# Patient Record
Sex: FEMALE | Race: BLACK OR AFRICAN AMERICAN | Employment: UNEMPLOYED | URBAN - METROPOLITAN AREA
[De-identification: names, ages, dates, MRNs, and addresses within clinical notes are randomized per-mention and may not be internally consistent; named-entity substitution may affect disease eponyms.]

---

## 2017-02-27 ENCOUNTER — HOSPITAL ENCOUNTER (EMERGENCY)
Age: 47
Discharge: HOME | End: 2017-02-27
Attending: EMERGENCY MEDICINE
Payer: COMMERCIAL

## 2017-02-27 VITALS
DIASTOLIC BLOOD PRESSURE: 93 MMHG | RESPIRATION RATE: 17 BRPM | HEIGHT: 67 IN | TEMPERATURE: 99.2 F | SYSTOLIC BLOOD PRESSURE: 132 MMHG | BODY MASS INDEX: 27.31 KG/M2 | WEIGHT: 174 LBS | OXYGEN SATURATION: 100 % | HEART RATE: 86 BPM

## 2017-02-27 DIAGNOSIS — K08.89 TOOTHACHE: Primary | ICD-10-CM

## 2017-02-27 PROCEDURE — G0381 LEV 2 HOSP TYPE B ED VISIT: HCPCS

## 2017-02-27 RX ORDER — PENICILLIN V POTASSIUM 500 MG/1
500 TABLET ORAL 4 TIMES DAILY
Qty: 28 TABLET | Refills: 0 | Status: SHIPPED | OUTPATIENT
Start: 2017-02-27 | End: 2017-03-06

## 2017-02-27 ASSESSMENT — PAIN DESCRIPTION - FREQUENCY: FREQUENCY: CONTINUOUS

## 2017-02-27 ASSESSMENT — PAIN SCALES - GENERAL: PAINLEVEL_OUTOF10: 8

## 2017-02-27 ASSESSMENT — PAIN DESCRIPTION - LOCATION: LOCATION: JAW

## 2017-02-27 ASSESSMENT — ENCOUNTER SYMPTOMS
VOMITING: 0
SHORTNESS OF BREATH: 0
NAUSEA: 0
SORE THROAT: 0
VOICE CHANGE: 0
ABDOMINAL PAIN: 0

## 2017-02-27 ASSESSMENT — PAIN DESCRIPTION - ORIENTATION: ORIENTATION: LEFT;LOWER

## 2017-02-27 ASSESSMENT — PAIN DESCRIPTION - PAIN TYPE: TYPE: ACUTE PAIN

## 2017-06-12 ENCOUNTER — APPOINTMENT (OUTPATIENT)
Dept: CT IMAGING | Age: 47
End: 2017-06-12
Payer: COMMERCIAL

## 2017-06-12 ENCOUNTER — HOSPITAL ENCOUNTER (EMERGENCY)
Age: 47
Discharge: HOME | End: 2017-06-12
Attending: EMERGENCY MEDICINE
Payer: COMMERCIAL

## 2017-06-12 VITALS
WEIGHT: 163 LBS | SYSTOLIC BLOOD PRESSURE: 132 MMHG | HEART RATE: 100 BPM | TEMPERATURE: 98.1 F | BODY MASS INDEX: 25.58 KG/M2 | HEIGHT: 67 IN | RESPIRATION RATE: 16 BRPM | OXYGEN SATURATION: 100 % | DIASTOLIC BLOOD PRESSURE: 70 MMHG

## 2017-06-12 DIAGNOSIS — R73.9 HYPERGLYCEMIA: ICD-10-CM

## 2017-06-12 DIAGNOSIS — R10.9 RIGHT FLANK PAIN: Primary | ICD-10-CM

## 2017-06-12 LAB
ABSOLUTE EOS #: 0.6 K/UL (ref 0–0.4)
ABSOLUTE LYMPH #: 2.8 K/UL (ref 1–4.8)
ABSOLUTE MONO #: 0.4 K/UL (ref 0.1–1.3)
ALBUMIN SERPL-MCNC: 3.5 G/DL (ref 3.5–5.2)
ALBUMIN/GLOBULIN RATIO: ABNORMAL (ref 1–2.5)
ALP BLD-CCNC: 101 U/L (ref 35–104)
ALT SERPL-CCNC: 5 U/L (ref 5–33)
ANION GAP SERPL CALCULATED.3IONS-SCNC: 17 MMOL/L (ref 9–17)
AST SERPL-CCNC: 15 U/L
BASOPHILS # BLD: 1 %
BASOPHILS ABSOLUTE: 0.1 K/UL (ref 0–0.2)
BILIRUB SERPL-MCNC: <0.15 MG/DL (ref 0.3–1.2)
BILIRUBIN DIRECT: <0.08 MG/DL
BILIRUBIN URINE: NEGATIVE
BILIRUBIN, INDIRECT: ABNORMAL MG/DL (ref 0–1)
BUN BLDV-MCNC: 8 MG/DL (ref 6–20)
BUN/CREAT BLD: ABNORMAL (ref 9–20)
CALCIUM SERPL-MCNC: 8.7 MG/DL (ref 8.6–10.4)
CHLORIDE BLD-SCNC: 96 MMOL/L (ref 98–107)
CHP ED QC CHECK: YES
CO2: 20 MMOL/L (ref 20–31)
COLOR: YELLOW
COMMENT UA: ABNORMAL
CREAT SERPL-MCNC: 0.76 MG/DL (ref 0.5–0.9)
DIFFERENTIAL TYPE: ABNORMAL
EOSINOPHILS RELATIVE PERCENT: 7 %
GFR AFRICAN AMERICAN: >60 ML/MIN
GFR NON-AFRICAN AMERICAN: >60 ML/MIN
GFR SERPL CREATININE-BSD FRML MDRD: ABNORMAL ML/MIN/{1.73_M2}
GFR SERPL CREATININE-BSD FRML MDRD: ABNORMAL ML/MIN/{1.73_M2}
GLOBULIN: ABNORMAL G/DL (ref 1.5–3.8)
GLUCOSE BLD-MCNC: 294 MG/DL (ref 65–105)
GLUCOSE BLD-MCNC: 312 MG/DL
GLUCOSE BLD-MCNC: 395 MG/DL (ref 70–99)
GLUCOSE URINE: ABNORMAL
HCG QUALITATIVE: NEGATIVE
HCT VFR BLD CALC: 44.6 % (ref 36–46)
HEMOGLOBIN: 14.5 G/DL (ref 12–16)
KETONES, URINE: NEGATIVE
LEUKOCYTE ESTERASE, URINE: NEGATIVE
LIPASE: 26 U/L (ref 13–60)
LYMPHOCYTES # BLD: 34 %
MCH RBC QN AUTO: 27.7 PG (ref 26–34)
MCHC RBC AUTO-ENTMCNC: 32.6 G/DL (ref 31–37)
MCV RBC AUTO: 84.8 FL (ref 80–100)
MONOCYTES # BLD: 5 %
NITRITE, URINE: NEGATIVE
PDW BLD-RTO: 15.8 % (ref 11.5–14.9)
PH UA: 5 (ref 5–8)
PLATELET # BLD: 377 K/UL (ref 150–450)
PLATELET ESTIMATE: ABNORMAL
PMV BLD AUTO: 7.6 FL (ref 6–12)
POTASSIUM SERPL-SCNC: 3.9 MMOL/L (ref 3.7–5.3)
PROTEIN UA: NEGATIVE
RBC # BLD: 5.25 M/UL (ref 4–5.2)
RBC # BLD: ABNORMAL 10*6/UL
SEG NEUTROPHILS: 53 %
SEGMENTED NEUTROPHILS ABSOLUTE COUNT: 4.4 K/UL (ref 1.3–9.1)
SODIUM BLD-SCNC: 133 MMOL/L (ref 135–144)
SPECIFIC GRAVITY UA: 1.03 (ref 1–1.03)
TOTAL PROTEIN: 7 G/DL (ref 6.4–8.3)
TURBIDITY: CLEAR
URINE HGB: NEGATIVE
UROBILINOGEN, URINE: NORMAL
WBC # BLD: 8.2 K/UL (ref 3.5–11)
WBC # BLD: ABNORMAL 10*3/UL

## 2017-06-12 PROCEDURE — 85025 COMPLETE CBC W/AUTO DIFF WBC: CPT

## 2017-06-12 PROCEDURE — 96372 THER/PROPH/DIAG INJ SC/IM: CPT

## 2017-06-12 PROCEDURE — 82947 ASSAY GLUCOSE BLOOD QUANT: CPT

## 2017-06-12 PROCEDURE — 80076 HEPATIC FUNCTION PANEL: CPT

## 2017-06-12 PROCEDURE — 6360000002 HC RX W HCPCS: Performed by: EMERGENCY MEDICINE

## 2017-06-12 PROCEDURE — 80048 BASIC METABOLIC PNL TOTAL CA: CPT

## 2017-06-12 PROCEDURE — 74176 CT ABD & PELVIS W/O CONTRAST: CPT

## 2017-06-12 PROCEDURE — 99285 EMERGENCY DEPT VISIT HI MDM: CPT

## 2017-06-12 PROCEDURE — 83690 ASSAY OF LIPASE: CPT

## 2017-06-12 PROCEDURE — 6370000000 HC RX 637 (ALT 250 FOR IP): Performed by: EMERGENCY MEDICINE

## 2017-06-12 PROCEDURE — 96374 THER/PROPH/DIAG INJ IV PUSH: CPT

## 2017-06-12 PROCEDURE — 36415 COLL VENOUS BLD VENIPUNCTURE: CPT

## 2017-06-12 PROCEDURE — 84703 CHORIONIC GONADOTROPIN ASSAY: CPT

## 2017-06-12 PROCEDURE — 81003 URINALYSIS AUTO W/O SCOPE: CPT

## 2017-06-12 PROCEDURE — 2580000003 HC RX 258: Performed by: EMERGENCY MEDICINE

## 2017-06-12 RX ORDER — MORPHINE SULFATE 4 MG/ML
4 INJECTION, SOLUTION INTRAMUSCULAR; INTRAVENOUS ONCE
Status: COMPLETED | OUTPATIENT
Start: 2017-06-12 | End: 2017-06-12

## 2017-06-12 RX ORDER — ONDANSETRON 4 MG/1
4 TABLET, ORALLY DISINTEGRATING ORAL ONCE
Status: COMPLETED | OUTPATIENT
Start: 2017-06-12 | End: 2017-06-12

## 2017-06-12 RX ORDER — ACETAMINOPHEN 500 MG
500 TABLET ORAL ONCE
Status: COMPLETED | OUTPATIENT
Start: 2017-06-12 | End: 2017-06-12

## 2017-06-12 RX ORDER — 0.9 % SODIUM CHLORIDE 0.9 %
1000 INTRAVENOUS SOLUTION INTRAVENOUS ONCE
Status: COMPLETED | OUTPATIENT
Start: 2017-06-12 | End: 2017-06-12

## 2017-06-12 RX ADMIN — ONDANSETRON 4 MG: 4 TABLET, ORALLY DISINTEGRATING ORAL at 22:52

## 2017-06-12 RX ADMIN — SODIUM CHLORIDE 1000 ML: 9 INJECTION, SOLUTION INTRAVENOUS at 21:01

## 2017-06-12 RX ADMIN — MORPHINE SULFATE 4 MG: 4 INJECTION, SOLUTION INTRAMUSCULAR; INTRAVENOUS at 20:30

## 2017-06-12 RX ADMIN — INSULIN LISPRO 4 UNITS: 100 INJECTION, SOLUTION INTRAVENOUS; SUBCUTANEOUS at 20:59

## 2017-06-12 RX ADMIN — ACETAMINOPHEN 500 MG: 500 TABLET ORAL at 19:26

## 2017-06-12 ASSESSMENT — PAIN DESCRIPTION - ONSET: ONSET: ON-GOING

## 2017-06-12 ASSESSMENT — ENCOUNTER SYMPTOMS
ABDOMINAL PAIN: 0
DIARRHEA: 0
VOMITING: 0
EYE PAIN: 0
COUGH: 0
BACK PAIN: 0
SHORTNESS OF BREATH: 0
NAUSEA: 0
SORE THROAT: 0

## 2017-06-12 ASSESSMENT — PAIN DESCRIPTION - FREQUENCY: FREQUENCY: CONTINUOUS

## 2017-06-12 ASSESSMENT — PAIN DESCRIPTION - PAIN TYPE: TYPE: ACUTE PAIN

## 2017-06-12 ASSESSMENT — PAIN SCALES - GENERAL
PAINLEVEL_OUTOF10: 9
PAINLEVEL_OUTOF10: 9
PAINLEVEL_OUTOF10: 10

## 2017-06-12 ASSESSMENT — PAIN DESCRIPTION - ORIENTATION: ORIENTATION: RIGHT

## 2017-06-12 ASSESSMENT — PAIN DESCRIPTION - LOCATION: LOCATION: FLANK

## 2017-06-13 LAB — GLUCOSE BLD-MCNC: 312 MG/DL (ref 65–105)

## 2017-06-19 ENCOUNTER — HOSPITAL ENCOUNTER (EMERGENCY)
Age: 47
Discharge: HOME | End: 2017-06-19
Attending: EMERGENCY MEDICINE
Payer: COMMERCIAL

## 2017-06-19 VITALS
DIASTOLIC BLOOD PRESSURE: 93 MMHG | TEMPERATURE: 97.5 F | RESPIRATION RATE: 16 BRPM | OXYGEN SATURATION: 100 % | HEART RATE: 90 BPM | SYSTOLIC BLOOD PRESSURE: 143 MMHG
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
## 2017-06-19 PROCEDURE — 99283 EMERGENCY DEPT VISIT LOW MDM: CPT

## 2017-06-19 ASSESSMENT — ENCOUNTER SYMPTOMS
DIARRHEA: 0
COLOR CHANGE: 0
SHORTNESS OF BREATH: 0
BACK PAIN: 0
NAUSEA: 0
ABDOMINAL PAIN: 0
COUGH: 0
VOMITING: 0

## 2017-07-19 ENCOUNTER — HOSPITAL ENCOUNTER (EMERGENCY)
Age: 47
Discharge: HOME | End: 2017-07-19
Attending: EMERGENCY MEDICINE
Payer: COMMERCIAL

## 2017-07-19 VITALS
HEART RATE: 106 BPM | OXYGEN SATURATION: 100 % | SYSTOLIC BLOOD PRESSURE: 176 MMHG | TEMPERATURE: 97.2 F | RESPIRATION RATE: 18 BRPM | DIASTOLIC BLOOD PRESSURE: 97 MMHG

## 2017-07-19 DIAGNOSIS — L29.9 PRURITUS: Primary | ICD-10-CM

## 2017-07-19 PROCEDURE — 99282 EMERGENCY DEPT VISIT SF MDM: CPT

## 2017-07-19 PROCEDURE — 6370000000 HC RX 637 (ALT 250 FOR IP): Performed by: EMERGENCY MEDICINE

## 2017-07-19 RX ORDER — PREDNISONE 20 MG/1
20 TABLET ORAL ONCE
Status: COMPLETED | OUTPATIENT
Start: 2017-07-19 | End: 2017-07-19

## 2017-07-19 RX ORDER — FAMOTIDINE 20 MG/1
20 TABLET, FILM COATED ORAL ONCE
Status: COMPLETED | OUTPATIENT
Start: 2017-07-19 | End: 2017-07-19

## 2017-07-19 RX ORDER — DIPHENHYDRAMINE HCL 25 MG
25 TABLET ORAL ONCE
Status: COMPLETED | OUTPATIENT
Start: 2017-07-19 | End: 2017-07-19

## 2017-07-19 RX ADMIN — FAMOTIDINE 20 MG: 20 TABLET, FILM COATED ORAL at 21:59

## 2017-07-19 RX ADMIN — DIPHENHYDRAMINE HCL 25 MG: 25 TABLET ORAL at 22:01

## 2017-07-19 RX ADMIN — PREDNISONE 20 MG: 20 TABLET ORAL at 21:59

## 2017-07-19 ASSESSMENT — ENCOUNTER SYMPTOMS
PHOTOPHOBIA: 0
COUGH: 0
ABDOMINAL PAIN: 0
EYE PAIN: 0
VOMITING: 0
RHINORRHEA: 0
NAUSEA: 0
SORE THROAT: 0
DIARRHEA: 0
BLOOD IN STOOL: 0
SHORTNESS OF BREATH: 0

## 2017-09-18 ENCOUNTER — HOSPITAL ENCOUNTER (INPATIENT)
Age: 47
LOS: 2 days | Discharge: HOME HEALTH | DRG: 420 | End: 2017-09-20
Attending: EMERGENCY MEDICINE | Admitting: INTERNAL MEDICINE
Payer: COMMERCIAL

## 2017-09-18 DIAGNOSIS — E16.2 HYPOGLYCEMIA: Primary | ICD-10-CM

## 2017-09-18 LAB
CHP ED QC CHECK: YES
CHP ED QC CHECK: YES
GLUCOSE BLD-MCNC: 71 MG/DL
GLUCOSE BLD-MCNC: 71 MG/DL (ref 65–105)
GLUCOSE BLD-MCNC: 84 MG/DL
GLUCOSE BLD-MCNC: 84 MG/DL (ref 65–105)
GLUCOSE BLD-MCNC: 89 MG/DL (ref 65–105)

## 2017-09-18 PROCEDURE — 82947 ASSAY GLUCOSE BLOOD QUANT: CPT

## 2017-09-18 PROCEDURE — G0378 HOSPITAL OBSERVATION PER HR: HCPCS

## 2017-09-18 PROCEDURE — 2060000000 HC ICU INTERMEDIATE R&B

## 2017-09-18 PROCEDURE — G0383 LEV 4 HOSP TYPE B ED VISIT: HCPCS

## 2017-09-18 PROCEDURE — 2580000003 HC RX 258: Performed by: EMERGENCY MEDICINE

## 2017-09-18 RX ORDER — DEXTROSE AND SODIUM CHLORIDE 5; .9 G/100ML; G/100ML
INJECTION, SOLUTION INTRAVENOUS CONTINUOUS
Status: DISCONTINUED | OUTPATIENT
Start: 2017-09-19 | End: 2017-09-20

## 2017-09-18 RX ORDER — DEXTROSE MONOHYDRATE 25 G/50ML
25 INJECTION, SOLUTION INTRAVENOUS ONCE
Status: COMPLETED | OUTPATIENT
Start: 2017-09-18 | End: 2017-09-18

## 2017-09-18 RX ADMIN — DEXTROSE MONOHYDRATE 25 G: 25 INJECTION, SOLUTION INTRAVENOUS at 23:26

## 2017-09-18 ASSESSMENT — PAIN DESCRIPTION - PAIN TYPE: TYPE: ACUTE PAIN

## 2017-09-18 ASSESSMENT — PAIN DESCRIPTION - FREQUENCY: FREQUENCY: CONTINUOUS

## 2017-09-18 ASSESSMENT — PAIN DESCRIPTION - ORIENTATION: ORIENTATION: LOWER;LEFT

## 2017-09-18 ASSESSMENT — PAIN DESCRIPTION - ONSET: ONSET: SUDDEN

## 2017-09-18 ASSESSMENT — PAIN DESCRIPTION - PROGRESSION: CLINICAL_PROGRESSION: GRADUALLY WORSENING

## 2017-09-18 ASSESSMENT — PAIN DESCRIPTION - DESCRIPTORS: DESCRIPTORS: ACHING;STABBING

## 2017-09-18 ASSESSMENT — PAIN DESCRIPTION - LOCATION: LOCATION: BACK

## 2017-09-18 ASSESSMENT — PAIN SCALES - GENERAL: PAINLEVEL_OUTOF10: 9

## 2017-09-19 PROBLEM — E16.2 HYPOGLYCEMIA: Status: ACTIVE | Noted: 2017-09-19

## 2017-09-19 LAB
ESTIMATED AVERAGE GLUCOSE: 243 MG/DL
GLUCOSE BLD-MCNC: 104 MG/DL (ref 65–105)
GLUCOSE BLD-MCNC: 132 MG/DL (ref 65–105)
GLUCOSE BLD-MCNC: 137 MG/DL (ref 65–105)
GLUCOSE BLD-MCNC: 150 MG/DL (ref 65–105)
GLUCOSE BLD-MCNC: 165 MG/DL (ref 65–105)
GLUCOSE BLD-MCNC: 194 MG/DL (ref 65–105)
GLUCOSE BLD-MCNC: 211 MG/DL (ref 65–105)
GLUCOSE BLD-MCNC: 219 MG/DL (ref 65–105)
GLUCOSE BLD-MCNC: 233 MG/DL (ref 65–105)
GLUCOSE BLD-MCNC: 40 MG/DL (ref 65–105)
GLUCOSE BLD-MCNC: 64 MG/DL (ref 65–105)
GLUCOSE BLD-MCNC: 64 MG/DL (ref 65–105)
GLUCOSE BLD-MCNC: 65 MG/DL (ref 65–105)
GLUCOSE BLD-MCNC: 75 MG/DL (ref 65–105)
GLUCOSE BLD-MCNC: 98 MG/DL (ref 65–105)
HBA1C MFR BLD: 10.1 % (ref 4–6)
INSULIN COMMENT: NORMAL
INSULIN REFERENCE RANGE:: NORMAL
INSULIN: 16.6 MU/L

## 2017-09-19 PROCEDURE — 83525 ASSAY OF INSULIN: CPT

## 2017-09-19 PROCEDURE — G0108 DIAB MANAGE TRN  PER INDIV: HCPCS

## 2017-09-19 PROCEDURE — 6370000000 HC RX 637 (ALT 250 FOR IP): Performed by: NURSE PRACTITIONER

## 2017-09-19 PROCEDURE — 6360000002 HC RX W HCPCS

## 2017-09-19 PROCEDURE — 97535 SELF CARE MNGMENT TRAINING: CPT

## 2017-09-19 PROCEDURE — 2580000003 HC RX 258: Performed by: EMERGENCY MEDICINE

## 2017-09-19 PROCEDURE — 82947 ASSAY GLUCOSE BLOOD QUANT: CPT

## 2017-09-19 PROCEDURE — 97165 OT EVAL LOW COMPLEX 30 MIN: CPT

## 2017-09-19 PROCEDURE — 1200000000 HC SEMI PRIVATE

## 2017-09-19 PROCEDURE — 2500000003 HC RX 250 WO HCPCS: Performed by: NURSE PRACTITIONER

## 2017-09-19 PROCEDURE — G8989 SELF CARE D/C STATUS: HCPCS

## 2017-09-19 PROCEDURE — 99222 1ST HOSP IP/OBS MODERATE 55: CPT | Performed by: FAMILY MEDICINE

## 2017-09-19 PROCEDURE — 6370000000 HC RX 637 (ALT 250 FOR IP): Performed by: FAMILY MEDICINE

## 2017-09-19 PROCEDURE — 6370000000 HC RX 637 (ALT 250 FOR IP): Performed by: EMERGENCY MEDICINE

## 2017-09-19 PROCEDURE — 36415 COLL VENOUS BLD VENIPUNCTURE: CPT

## 2017-09-19 PROCEDURE — 2580000003 HC RX 258: Performed by: NURSE PRACTITIONER

## 2017-09-19 PROCEDURE — G8988 SELF CARE GOAL STATUS: HCPCS

## 2017-09-19 PROCEDURE — 6360000002 HC RX W HCPCS: Performed by: NURSE PRACTITIONER

## 2017-09-19 PROCEDURE — G8987 SELF CARE CURRENT STATUS: HCPCS

## 2017-09-19 PROCEDURE — 83036 HEMOGLOBIN GLYCOSYLATED A1C: CPT

## 2017-09-19 RX ORDER — ACETAMINOPHEN 325 MG/1
650 TABLET ORAL EVERY 4 HOURS PRN
Status: DISCONTINUED | OUTPATIENT
Start: 2017-09-19 | End: 2017-09-20 | Stop reason: HOSPADM

## 2017-09-19 RX ORDER — ACETAMINOPHEN 325 MG/1
650 TABLET ORAL EVERY 4 HOURS PRN
Status: DISCONTINUED | OUTPATIENT
Start: 2017-09-19 | End: 2017-09-19

## 2017-09-19 RX ORDER — SODIUM CHLORIDE 0.9 % (FLUSH) 0.9 %
10 SYRINGE (ML) INJECTION PRN
Status: DISCONTINUED | OUTPATIENT
Start: 2017-09-19 | End: 2017-09-20 | Stop reason: HOSPADM

## 2017-09-19 RX ORDER — MAGNESIUM SULFATE 1 G/100ML
1 INJECTION INTRAVENOUS PRN
Status: DISCONTINUED | OUTPATIENT
Start: 2017-09-19 | End: 2017-09-20 | Stop reason: HOSPADM

## 2017-09-19 RX ORDER — TOPIRAMATE 25 MG/1
25 TABLET ORAL DAILY
Status: DISCONTINUED | OUTPATIENT
Start: 2017-09-19 | End: 2017-09-20 | Stop reason: HOSPADM

## 2017-09-19 RX ORDER — DEXTROSE MONOHYDRATE 25 G/50ML
12.5 INJECTION, SOLUTION INTRAVENOUS PRN
Status: DISCONTINUED | OUTPATIENT
Start: 2017-09-19 | End: 2017-09-20 | Stop reason: HOSPADM

## 2017-09-19 RX ORDER — DEXTROSE MONOHYDRATE 50 MG/ML
100 INJECTION, SOLUTION INTRAVENOUS PRN
Status: DISCONTINUED | OUTPATIENT
Start: 2017-09-19 | End: 2017-09-20 | Stop reason: HOSPADM

## 2017-09-19 RX ORDER — DICYCLOMINE HYDROCHLORIDE 10 MG/1
20 CAPSULE ORAL EVERY 6 HOURS PRN
Status: DISCONTINUED | OUTPATIENT
Start: 2017-09-19 | End: 2017-09-20 | Stop reason: HOSPADM

## 2017-09-19 RX ORDER — NICOTINE POLACRILEX 4 MG
15 LOZENGE BUCCAL PRN
Status: DISCONTINUED | OUTPATIENT
Start: 2017-09-19 | End: 2017-09-20 | Stop reason: HOSPADM

## 2017-09-19 RX ORDER — PANTOPRAZOLE SODIUM 40 MG/1
40 TABLET, DELAYED RELEASE ORAL
Status: DISCONTINUED | OUTPATIENT
Start: 2017-09-19 | End: 2017-09-20 | Stop reason: HOSPADM

## 2017-09-19 RX ORDER — SODIUM CHLORIDE 0.9 % (FLUSH) 0.9 %
10 SYRINGE (ML) INJECTION EVERY 12 HOURS SCHEDULED
Status: DISCONTINUED | OUTPATIENT
Start: 2017-09-19 | End: 2017-09-20 | Stop reason: HOSPADM

## 2017-09-19 RX ORDER — ONDANSETRON 4 MG/1
4 TABLET, FILM COATED ORAL EVERY 8 HOURS PRN
Status: DISCONTINUED | OUTPATIENT
Start: 2017-09-19 | End: 2017-09-20 | Stop reason: HOSPADM

## 2017-09-19 RX ORDER — QUETIAPINE FUMARATE 100 MG/1
100 TABLET, FILM COATED ORAL NIGHTLY
Status: DISCONTINUED | OUTPATIENT
Start: 2017-09-19 | End: 2017-09-20 | Stop reason: HOSPADM

## 2017-09-19 RX ORDER — INSULIN GLARGINE 100 [IU]/ML
75 INJECTION, SOLUTION SUBCUTANEOUS NIGHTLY
Status: DISCONTINUED | OUTPATIENT
Start: 2017-09-19 | End: 2017-09-19

## 2017-09-19 RX ORDER — POTASSIUM CHLORIDE 7.45 MG/ML
10 INJECTION INTRAVENOUS PRN
Status: DISCONTINUED | OUTPATIENT
Start: 2017-09-19 | End: 2017-09-20 | Stop reason: HOSPADM

## 2017-09-19 RX ORDER — MORPHINE SULFATE 2 MG/ML
2 INJECTION, SOLUTION INTRAMUSCULAR; INTRAVENOUS
Status: DISCONTINUED | OUTPATIENT
Start: 2017-09-19 | End: 2017-09-20 | Stop reason: HOSPADM

## 2017-09-19 RX ORDER — HYDROCODONE BITARTRATE AND ACETAMINOPHEN 5; 325 MG/1; MG/1
1 TABLET ORAL EVERY 4 HOURS PRN
Status: DISCONTINUED | OUTPATIENT
Start: 2017-09-19 | End: 2017-09-20 | Stop reason: HOSPADM

## 2017-09-19 RX ORDER — ASPIRIN 81 MG/1
81 TABLET, CHEWABLE ORAL DAILY
Status: DISCONTINUED | OUTPATIENT
Start: 2017-09-19 | End: 2017-09-20 | Stop reason: HOSPADM

## 2017-09-19 RX ORDER — POTASSIUM CHLORIDE 20 MEQ/1
40 TABLET, EXTENDED RELEASE ORAL PRN
Status: DISCONTINUED | OUTPATIENT
Start: 2017-09-19 | End: 2017-09-20 | Stop reason: HOSPADM

## 2017-09-19 RX ORDER — OXYCODONE HYDROCHLORIDE AND ACETAMINOPHEN 5; 325 MG/1; MG/1
1 TABLET ORAL EVERY 4 HOURS PRN
Status: DISCONTINUED | OUTPATIENT
Start: 2017-09-19 | End: 2017-09-19

## 2017-09-19 RX ORDER — BISACODYL 10 MG
10 SUPPOSITORY, RECTAL RECTAL DAILY PRN
Status: DISCONTINUED | OUTPATIENT
Start: 2017-09-19 | End: 2017-09-20 | Stop reason: HOSPADM

## 2017-09-19 RX ORDER — POTASSIUM CHLORIDE 20MEQ/15ML
40 LIQUID (ML) ORAL PRN
Status: DISCONTINUED | OUTPATIENT
Start: 2017-09-19 | End: 2017-09-20 | Stop reason: HOSPADM

## 2017-09-19 RX ORDER — MORPHINE SULFATE 2 MG/ML
INJECTION, SOLUTION INTRAMUSCULAR; INTRAVENOUS
Status: COMPLETED
Start: 2017-09-19 | End: 2017-09-19

## 2017-09-19 RX ORDER — ATORVASTATIN CALCIUM 40 MG/1
40 TABLET, FILM COATED ORAL DAILY
Status: DISCONTINUED | OUTPATIENT
Start: 2017-09-19 | End: 2017-09-20 | Stop reason: HOSPADM

## 2017-09-19 RX ORDER — LISINOPRIL 2.5 MG/1
2.5 TABLET ORAL DAILY
Status: DISCONTINUED | OUTPATIENT
Start: 2017-09-19 | End: 2017-09-20

## 2017-09-19 RX ORDER — MORPHINE SULFATE 4 MG/ML
4 INJECTION, SOLUTION INTRAMUSCULAR; INTRAVENOUS
Status: DISCONTINUED | OUTPATIENT
Start: 2017-09-19 | End: 2017-09-20 | Stop reason: HOSPADM

## 2017-09-19 RX ORDER — ONDANSETRON 2 MG/ML
4 INJECTION INTRAMUSCULAR; INTRAVENOUS EVERY 6 HOURS PRN
Status: DISCONTINUED | OUTPATIENT
Start: 2017-09-19 | End: 2017-09-20 | Stop reason: HOSPADM

## 2017-09-19 RX ORDER — OXYCODONE HYDROCHLORIDE AND ACETAMINOPHEN 5; 325 MG/1; MG/1
2 TABLET ORAL EVERY 4 HOURS PRN
Status: DISCONTINUED | OUTPATIENT
Start: 2017-09-19 | End: 2017-09-19

## 2017-09-19 RX ADMIN — ASPIRIN 81 MG: 81 TABLET, CHEWABLE ORAL at 10:21

## 2017-09-19 RX ADMIN — ENOXAPARIN SODIUM 40 MG: 40 INJECTION SUBCUTANEOUS at 10:21

## 2017-09-19 RX ADMIN — HYDROCODONE BITARTRATE AND ACETAMINOPHEN 1 TABLET: 5; 325 TABLET ORAL at 08:35

## 2017-09-19 RX ADMIN — DEXTROSE AND SODIUM CHLORIDE: 5; 900 INJECTION, SOLUTION INTRAVENOUS at 12:32

## 2017-09-19 RX ADMIN — DEXTROSE MONOHYDRATE 12.5 G: 25 INJECTION, SOLUTION INTRAVENOUS at 06:14

## 2017-09-19 RX ADMIN — MORPHINE SULFATE 2 MG: 2 INJECTION, SOLUTION INTRAMUSCULAR; INTRAVENOUS at 06:13

## 2017-09-19 RX ADMIN — GLUCAGON HYDROCHLORIDE 1 MG: KIT at 09:41

## 2017-09-19 RX ADMIN — HYDROCODONE BITARTRATE AND ACETAMINOPHEN 1 TABLET: 5; 325 TABLET ORAL at 21:11

## 2017-09-19 RX ADMIN — QUETIAPINE FUMARATE 100 MG: 100 TABLET ORAL at 21:11

## 2017-09-19 RX ADMIN — Medication 10 ML: at 21:11

## 2017-09-19 RX ADMIN — DEXTROSE MONOHYDRATE 100 ML/HR: 50 INJECTION, SOLUTION INTRAVENOUS at 09:43

## 2017-09-19 RX ADMIN — ATORVASTATIN CALCIUM 40 MG: 40 TABLET, FILM COATED ORAL at 10:21

## 2017-09-19 RX ADMIN — DEXTROSE AND SODIUM CHLORIDE: 5; 900 INJECTION, SOLUTION INTRAVENOUS at 01:04

## 2017-09-19 RX ADMIN — LISINOPRIL 2.5 MG: 2.5 TABLET ORAL at 10:21

## 2017-09-19 RX ADMIN — TOPIRAMATE 25 MG: 25 TABLET, FILM COATED ORAL at 10:22

## 2017-09-19 RX ADMIN — INSULIN LISPRO 6 UNITS: 100 INJECTION, SOLUTION INTRAVENOUS; SUBCUTANEOUS at 17:18

## 2017-09-19 ASSESSMENT — ENCOUNTER SYMPTOMS
ABDOMINAL PAIN: 0
DIARRHEA: 0
WHEEZING: 0
COUGH: 0
BLOOD IN STOOL: 0
PHOTOPHOBIA: 0
BACK PAIN: 0
SHORTNESS OF BREATH: 0
RHINORRHEA: 0
CONSTIPATION: 0
CHEST TIGHTNESS: 0
SINUS PRESSURE: 0
EYE PAIN: 0
NAUSEA: 0
SORE THROAT: 0
VOMITING: 0

## 2017-09-19 ASSESSMENT — PAIN SCALES - GENERAL
PAINLEVEL_OUTOF10: 10
PAINLEVEL_OUTOF10: 9
PAINLEVEL_OUTOF10: 4
PAINLEVEL_OUTOF10: 10
PAINLEVEL_OUTOF10: 9
PAINLEVEL_OUTOF10: 10

## 2017-09-19 ASSESSMENT — PAIN DESCRIPTION - PAIN TYPE
TYPE: ACUTE PAIN
TYPE: ACUTE PAIN
TYPE: CHRONIC PAIN

## 2017-09-19 ASSESSMENT — PAIN DESCRIPTION - LOCATION
LOCATION: BACK
LOCATION: HEAD
LOCATION: HEAD

## 2017-09-19 ASSESSMENT — PAIN DESCRIPTION - FREQUENCY: FREQUENCY: CONTINUOUS

## 2017-09-19 ASSESSMENT — PAIN DESCRIPTION - ORIENTATION
ORIENTATION: LOWER
ORIENTATION: LOWER

## 2017-09-19 ASSESSMENT — PAIN DESCRIPTION - DESCRIPTORS: DESCRIPTORS: ACHING;CONSTANT

## 2017-09-20 VITALS
HEIGHT: 66 IN | BODY MASS INDEX: 25.23 KG/M2 | TEMPERATURE: 98.3 F | RESPIRATION RATE: 18 BRPM | OXYGEN SATURATION: 96 % | HEART RATE: 91 BPM | SYSTOLIC BLOOD PRESSURE: 134 MMHG | DIASTOLIC BLOOD PRESSURE: 76 MMHG | WEIGHT: 157 LBS

## 2017-09-20 LAB
ALBUMIN SERPL-MCNC: 3.5 G/DL (ref 3.5–5.2)
ALBUMIN/GLOBULIN RATIO: 1.3 (ref 1–2.5)
ALP BLD-CCNC: 78 U/L (ref 35–104)
ALT SERPL-CCNC: 8 U/L (ref 5–33)
ANION GAP SERPL CALCULATED.3IONS-SCNC: 13 MMOL/L (ref 9–17)
AST SERPL-CCNC: 12 U/L
BILIRUB SERPL-MCNC: <0.1 MG/DL (ref 0.3–1.2)
BUN BLDV-MCNC: 10 MG/DL (ref 6–20)
BUN/CREAT BLD: ABNORMAL (ref 9–20)
CALCIUM SERPL-MCNC: 8.6 MG/DL (ref 8.6–10.4)
CHLORIDE BLD-SCNC: 104 MMOL/L (ref 98–107)
CO2: 23 MMOL/L (ref 20–31)
CREAT SERPL-MCNC: 0.68 MG/DL (ref 0.5–0.9)
GFR AFRICAN AMERICAN: >60 ML/MIN
GFR NON-AFRICAN AMERICAN: >60 ML/MIN
GFR SERPL CREATININE-BSD FRML MDRD: ABNORMAL ML/MIN/{1.73_M2}
GFR SERPL CREATININE-BSD FRML MDRD: ABNORMAL ML/MIN/{1.73_M2}
GLUCOSE BLD-MCNC: 102 MG/DL (ref 65–105)
GLUCOSE BLD-MCNC: 129 MG/DL (ref 65–105)
GLUCOSE BLD-MCNC: 190 MG/DL (ref 65–105)
GLUCOSE BLD-MCNC: 53 MG/DL (ref 65–105)
GLUCOSE BLD-MCNC: 70 MG/DL (ref 65–105)
GLUCOSE BLD-MCNC: 84 MG/DL (ref 70–99)
HCT VFR BLD CALC: 41.5 % (ref 36–46)
HEMOGLOBIN: 13.8 G/DL (ref 12–16)
INR BLD: 0.9
MCH RBC QN AUTO: 29.1 PG (ref 26–34)
MCHC RBC AUTO-ENTMCNC: 33.3 G/DL (ref 31–37)
MCV RBC AUTO: 87.3 FL (ref 80–100)
PDW BLD-RTO: 17.9 % (ref 12.5–15.4)
PLATELET # BLD: 380 K/UL (ref 140–450)
PMV BLD AUTO: 7.5 FL (ref 6–12)
POTASSIUM SERPL-SCNC: 4.1 MMOL/L (ref 3.7–5.3)
PROTHROMBIN TIME: 9.8 SEC (ref 9.4–12.6)
RBC # BLD: 4.75 M/UL (ref 4–5.2)
SODIUM BLD-SCNC: 140 MMOL/L (ref 135–144)
TOTAL PROTEIN: 6.2 G/DL (ref 6.4–8.3)
WBC # BLD: 8.9 K/UL (ref 3.5–11)

## 2017-09-20 PROCEDURE — 85610 PROTHROMBIN TIME: CPT

## 2017-09-20 PROCEDURE — 6370000000 HC RX 637 (ALT 250 FOR IP): Performed by: EMERGENCY MEDICINE

## 2017-09-20 PROCEDURE — 2580000003 HC RX 258: Performed by: EMERGENCY MEDICINE

## 2017-09-20 PROCEDURE — 99232 SBSQ HOSP IP/OBS MODERATE 35: CPT | Performed by: INTERNAL MEDICINE

## 2017-09-20 PROCEDURE — 82947 ASSAY GLUCOSE BLOOD QUANT: CPT

## 2017-09-20 PROCEDURE — 80053 COMPREHEN METABOLIC PANEL: CPT

## 2017-09-20 PROCEDURE — 85027 COMPLETE CBC AUTOMATED: CPT

## 2017-09-20 PROCEDURE — 36415 COLL VENOUS BLD VENIPUNCTURE: CPT

## 2017-09-20 PROCEDURE — 6370000000 HC RX 637 (ALT 250 FOR IP): Performed by: NURSE PRACTITIONER

## 2017-09-20 RX ORDER — LISINOPRIL 10 MG/1
10 TABLET ORAL DAILY
Status: DISCONTINUED | OUTPATIENT
Start: 2017-09-21 | End: 2017-09-20 | Stop reason: HOSPADM

## 2017-09-20 RX ORDER — INSULIN GLARGINE 100 [IU]/ML
30 INJECTION, SOLUTION SUBCUTANEOUS 2 TIMES DAILY
Qty: 1 VIAL | Refills: 1 | Status: ON HOLD | OUTPATIENT
Start: 2017-09-20 | End: 2018-06-20

## 2017-09-20 RX ADMIN — DEXTROSE AND SODIUM CHLORIDE: 5; 900 INJECTION, SOLUTION INTRAVENOUS at 08:21

## 2017-09-20 RX ADMIN — LISINOPRIL 2.5 MG: 2.5 TABLET ORAL at 09:27

## 2017-09-20 RX ADMIN — ATORVASTATIN CALCIUM 40 MG: 40 TABLET, FILM COATED ORAL at 09:27

## 2017-09-20 RX ADMIN — HYDROCODONE BITARTRATE AND ACETAMINOPHEN 1 TABLET: 5; 325 TABLET ORAL at 01:49

## 2017-09-20 RX ADMIN — PANTOPRAZOLE SODIUM 40 MG: 40 TABLET, DELAYED RELEASE ORAL at 09:27

## 2017-09-20 RX ADMIN — ASPIRIN 81 MG: 81 TABLET, CHEWABLE ORAL at 09:26

## 2017-09-20 ASSESSMENT — PAIN SCALES - GENERAL: PAINLEVEL_OUTOF10: 9

## 2017-11-19 ENCOUNTER — HOSPITAL ENCOUNTER (OUTPATIENT)
Age: 47
Setting detail: OBSERVATION
Discharge: HOME | End: 2017-11-20
Attending: EMERGENCY MEDICINE | Admitting: INTERNAL MEDICINE
Payer: COMMERCIAL

## 2017-11-19 DIAGNOSIS — R53.83 FATIGUE, UNSPECIFIED TYPE: ICD-10-CM

## 2017-11-19 DIAGNOSIS — E16.2 HYPOGLYCEMIA: Primary | ICD-10-CM

## 2017-11-19 PROCEDURE — 99285 EMERGENCY DEPT VISIT HI MDM: CPT

## 2017-11-19 PROCEDURE — 82947 ASSAY GLUCOSE BLOOD QUANT: CPT

## 2017-11-19 PROCEDURE — 93005 ELECTROCARDIOGRAM TRACING: CPT

## 2017-11-19 RX ORDER — 0.9 % SODIUM CHLORIDE 0.9 %
1000 INTRAVENOUS SOLUTION INTRAVENOUS ONCE
Status: COMPLETED | OUTPATIENT
Start: 2017-11-20 | End: 2017-11-20

## 2017-11-19 ASSESSMENT — PAIN DESCRIPTION - DESCRIPTORS: DESCRIPTORS: ACHING

## 2017-11-19 ASSESSMENT — PAIN SCALES - GENERAL: PAINLEVEL_OUTOF10: 9

## 2017-11-19 ASSESSMENT — PAIN DESCRIPTION - LOCATION: LOCATION: HEAD

## 2017-11-19 ASSESSMENT — PAIN DESCRIPTION - ORIENTATION: ORIENTATION: POSTERIOR

## 2017-11-19 ASSESSMENT — PAIN DESCRIPTION - PAIN TYPE: TYPE: ACUTE PAIN

## 2017-11-20 ENCOUNTER — APPOINTMENT (OUTPATIENT)
Dept: GENERAL RADIOLOGY | Age: 47
End: 2017-11-20
Payer: COMMERCIAL

## 2017-11-20 VITALS
BODY MASS INDEX: 26.52 KG/M2 | SYSTOLIC BLOOD PRESSURE: 136 MMHG | DIASTOLIC BLOOD PRESSURE: 73 MMHG | TEMPERATURE: 98 F | WEIGHT: 165 LBS | HEIGHT: 66 IN | HEART RATE: 102 BPM | RESPIRATION RATE: 18 BRPM | OXYGEN SATURATION: 100 %

## 2017-11-20 LAB
ABSOLUTE EOS #: 0.25 K/UL (ref 0–0.44)
ABSOLUTE IMMATURE GRANULOCYTE: 0.05 K/UL (ref 0–0.3)
ABSOLUTE LYMPH #: 3.62 K/UL (ref 1.1–3.7)
ABSOLUTE MONO #: 0.5 K/UL (ref 0.1–1.2)
ALBUMIN SERPL-MCNC: 3.5 G/DL (ref 3.5–5.2)
ALBUMIN/GLOBULIN RATIO: 1 (ref 1–2.5)
ALP BLD-CCNC: 107 U/L (ref 35–104)
ALT SERPL-CCNC: 10 U/L (ref 5–33)
AMYLASE: 58 U/L (ref 28–100)
ANION GAP SERPL CALCULATED.3IONS-SCNC: 13 MMOL/L (ref 9–17)
AST SERPL-CCNC: 17 U/L
BASOPHILS # BLD: 1 % (ref 0–2)
BASOPHILS ABSOLUTE: 0.04 K/UL (ref 0–0.2)
BILIRUB SERPL-MCNC: <0.1 MG/DL (ref 0.3–1.2)
BUN BLDV-MCNC: 10 MG/DL (ref 6–20)
BUN/CREAT BLD: ABNORMAL (ref 9–20)
CALCIUM SERPL-MCNC: 9.1 MG/DL (ref 8.6–10.4)
CHLORIDE BLD-SCNC: 101 MMOL/L (ref 98–107)
CO2: 25 MMOL/L (ref 20–31)
CREAT SERPL-MCNC: 0.76 MG/DL (ref 0.5–0.9)
DIFFERENTIAL TYPE: ABNORMAL
EKG ATRIAL RATE: 103 BPM
EKG P AXIS: 65 DEGREES
EKG P-R INTERVAL: 150 MS
EKG Q-T INTERVAL: 366 MS
EKG QRS DURATION: 96 MS
EKG QTC CALCULATION (BAZETT): 479 MS
EKG R AXIS: 53 DEGREES
EKG T AXIS: 46 DEGREES
EKG VENTRICULAR RATE: 103 BPM
EOSINOPHILS RELATIVE PERCENT: 3 % (ref 1–4)
GFR AFRICAN AMERICAN: >60 ML/MIN
GFR NON-AFRICAN AMERICAN: >60 ML/MIN
GFR SERPL CREATININE-BSD FRML MDRD: ABNORMAL ML/MIN/{1.73_M2}
GFR SERPL CREATININE-BSD FRML MDRD: ABNORMAL ML/MIN/{1.73_M2}
GLUCOSE BLD-MCNC: 103 MG/DL (ref 65–105)
GLUCOSE BLD-MCNC: 182 MG/DL (ref 65–105)
GLUCOSE BLD-MCNC: 194 MG/DL (ref 65–105)
GLUCOSE BLD-MCNC: 220 MG/DL (ref 65–105)
GLUCOSE BLD-MCNC: 232 MG/DL (ref 65–105)
GLUCOSE BLD-MCNC: 286 MG/DL (ref 65–105)
GLUCOSE BLD-MCNC: 296 MG/DL (ref 65–105)
GLUCOSE BLD-MCNC: 62 MG/DL (ref 70–99)
GLUCOSE BLD-MCNC: 69 MG/DL (ref 65–105)
GLUCOSE BLD-MCNC: 89 MG/DL (ref 65–105)
HCT VFR BLD CALC: 44.3 % (ref 36.3–47.1)
HEMOGLOBIN: 14.8 G/DL (ref 11.9–15.1)
IMMATURE GRANULOCYTES: 1 %
LACTIC ACID, WHOLE BLOOD: 2 MMOL/L (ref 0.7–2.1)
LACTIC ACID, WHOLE BLOOD: 2 MMOL/L (ref 0.7–2.1)
LIPASE: 24 U/L (ref 13–60)
LYMPHOCYTES # BLD: 41 % (ref 24–43)
MCH RBC QN AUTO: 29.2 PG (ref 25.2–33.5)
MCHC RBC AUTO-ENTMCNC: 33.4 G/DL (ref 28.4–34.8)
MCV RBC AUTO: 87.4 FL (ref 82.6–102.9)
MONOCYTES # BLD: 6 % (ref 3–12)
PDW BLD-RTO: 15.2 % (ref 11.8–14.4)
PLATELET # BLD: ABNORMAL K/UL (ref 138–453)
PLATELET ESTIMATE: ABNORMAL
PLATELET, FLUORESCENCE: NORMAL K/UL (ref 138–453)
PLATELET, IMMATURE FRACTION: NORMAL % (ref 1.1–10.3)
PMV BLD AUTO: ABNORMAL FL (ref 8.1–13.5)
POC TROPONIN I: 0.01 NG/ML (ref 0–0.1)
POC TROPONIN INTERP: NORMAL
POTASSIUM SERPL-SCNC: 3.7 MMOL/L (ref 3.7–5.3)
RBC # BLD: 5.07 M/UL (ref 3.95–5.11)
RBC # BLD: ABNORMAL 10*6/UL
SEG NEUTROPHILS: 49 % (ref 36–65)
SEGMENTED NEUTROPHILS ABSOLUTE COUNT: 4.36 K/UL (ref 1.5–8.1)
SODIUM BLD-SCNC: 139 MMOL/L (ref 135–144)
TOTAL PROTEIN: 6.9 G/DL (ref 6.4–8.3)
TROPONIN INTERP: NORMAL
TROPONIN INTERP: NORMAL
TROPONIN T: <0.03 NG/ML
TROPONIN T: <0.03 NG/ML
TSH SERPL DL<=0.05 MIU/L-ACNC: 1.66 MIU/L (ref 0.3–5)
WBC # BLD: 8.8 K/UL (ref 3.5–11.3)
WBC # BLD: ABNORMAL 10*3/UL

## 2017-11-20 PROCEDURE — 71020 XR CHEST STANDARD TWO VW: CPT

## 2017-11-20 PROCEDURE — 80053 COMPREHEN METABOLIC PANEL: CPT

## 2017-11-20 PROCEDURE — 85025 COMPLETE CBC W/AUTO DIFF WBC: CPT

## 2017-11-20 PROCEDURE — 99219 PR INITIAL OBSERVATION CARE/DAY 50 MINUTES: CPT | Performed by: INTERNAL MEDICINE

## 2017-11-20 PROCEDURE — 6370000000 HC RX 637 (ALT 250 FOR IP): Performed by: NURSE PRACTITIONER

## 2017-11-20 PROCEDURE — G0378 HOSPITAL OBSERVATION PER HR: HCPCS

## 2017-11-20 PROCEDURE — G0108 DIAB MANAGE TRN  PER INDIV: HCPCS

## 2017-11-20 PROCEDURE — 84443 ASSAY THYROID STIM HORMONE: CPT

## 2017-11-20 PROCEDURE — 82947 ASSAY GLUCOSE BLOOD QUANT: CPT

## 2017-11-20 PROCEDURE — 6370000000 HC RX 637 (ALT 250 FOR IP): Performed by: STUDENT IN AN ORGANIZED HEALTH CARE EDUCATION/TRAINING PROGRAM

## 2017-11-20 PROCEDURE — 83690 ASSAY OF LIPASE: CPT

## 2017-11-20 PROCEDURE — 2580000003 HC RX 258: Performed by: STUDENT IN AN ORGANIZED HEALTH CARE EDUCATION/TRAINING PROGRAM

## 2017-11-20 PROCEDURE — 84484 ASSAY OF TROPONIN QUANT: CPT

## 2017-11-20 PROCEDURE — 82150 ASSAY OF AMYLASE: CPT

## 2017-11-20 PROCEDURE — 83605 ASSAY OF LACTIC ACID: CPT

## 2017-11-20 PROCEDURE — 36415 COLL VENOUS BLD VENIPUNCTURE: CPT

## 2017-11-20 PROCEDURE — 2580000003 HC RX 258: Performed by: NURSE PRACTITIONER

## 2017-11-20 RX ORDER — SODIUM CHLORIDE 0.9 % (FLUSH) 0.9 %
10 SYRINGE (ML) INJECTION EVERY 12 HOURS SCHEDULED
Status: DISCONTINUED | OUTPATIENT
Start: 2017-11-20 | End: 2017-11-20 | Stop reason: HOSPADM

## 2017-11-20 RX ORDER — DICYCLOMINE HYDROCHLORIDE 10 MG/1
20 CAPSULE ORAL EVERY 6 HOURS PRN
Status: DISCONTINUED | OUTPATIENT
Start: 2017-11-20 | End: 2017-11-20 | Stop reason: HOSPADM

## 2017-11-20 RX ORDER — QUETIAPINE FUMARATE 100 MG/1
100 TABLET, FILM COATED ORAL NIGHTLY
Status: DISCONTINUED | OUTPATIENT
Start: 2017-11-20 | End: 2017-11-20 | Stop reason: HOSPADM

## 2017-11-20 RX ORDER — BISACODYL 10 MG
10 SUPPOSITORY, RECTAL RECTAL DAILY PRN
Status: DISCONTINUED | OUTPATIENT
Start: 2017-11-20 | End: 2017-11-20 | Stop reason: HOSPADM

## 2017-11-20 RX ORDER — POTASSIUM CHLORIDE 7.45 MG/ML
10 INJECTION INTRAVENOUS PRN
Status: DISCONTINUED | OUTPATIENT
Start: 2017-11-20 | End: 2017-11-20 | Stop reason: HOSPADM

## 2017-11-20 RX ORDER — LISINOPRIL 2.5 MG/1
2.5 TABLET ORAL DAILY
Status: DISCONTINUED | OUTPATIENT
Start: 2017-11-20 | End: 2017-11-20 | Stop reason: HOSPADM

## 2017-11-20 RX ORDER — OXYCODONE HYDROCHLORIDE AND ACETAMINOPHEN 5; 325 MG/1; MG/1
2 TABLET ORAL EVERY 4 HOURS PRN
Status: DISCONTINUED | OUTPATIENT
Start: 2017-11-20 | End: 2017-11-20 | Stop reason: HOSPADM

## 2017-11-20 RX ORDER — POTASSIUM CHLORIDE 20MEQ/15ML
40 LIQUID (ML) ORAL PRN
Status: DISCONTINUED | OUTPATIENT
Start: 2017-11-20 | End: 2017-11-20 | Stop reason: HOSPADM

## 2017-11-20 RX ORDER — DOCUSATE SODIUM 100 MG/1
100 CAPSULE, LIQUID FILLED ORAL 2 TIMES DAILY
Status: DISCONTINUED | OUTPATIENT
Start: 2017-11-20 | End: 2017-11-20 | Stop reason: HOSPADM

## 2017-11-20 RX ORDER — NICOTINE POLACRILEX 4 MG
15 LOZENGE BUCCAL PRN
Status: DISCONTINUED | OUTPATIENT
Start: 2017-11-20 | End: 2017-11-20 | Stop reason: HOSPADM

## 2017-11-20 RX ORDER — BUTALBITAL, ACETAMINOPHEN AND CAFFEINE 50; 325; 40 MG/1; MG/1; MG/1
1 TABLET ORAL ONCE
Status: COMPLETED | OUTPATIENT
Start: 2017-11-20 | End: 2017-11-20

## 2017-11-20 RX ORDER — OXYCODONE HYDROCHLORIDE AND ACETAMINOPHEN 5; 325 MG/1; MG/1
1 TABLET ORAL EVERY 4 HOURS PRN
Status: DISCONTINUED | OUTPATIENT
Start: 2017-11-20 | End: 2017-11-20 | Stop reason: HOSPADM

## 2017-11-20 RX ORDER — DEXTROSE MONOHYDRATE 50 MG/ML
100 INJECTION, SOLUTION INTRAVENOUS PRN
Status: DISCONTINUED | OUTPATIENT
Start: 2017-11-20 | End: 2017-11-20 | Stop reason: HOSPADM

## 2017-11-20 RX ORDER — NITROGLYCERIN 0.4 MG/1
0.4 TABLET SUBLINGUAL EVERY 5 MIN PRN
Status: DISCONTINUED | OUTPATIENT
Start: 2017-11-20 | End: 2017-11-20 | Stop reason: HOSPADM

## 2017-11-20 RX ORDER — ONDANSETRON 2 MG/ML
4 INJECTION INTRAMUSCULAR; INTRAVENOUS EVERY 6 HOURS PRN
Status: DISCONTINUED | OUTPATIENT
Start: 2017-11-20 | End: 2017-11-20 | Stop reason: HOSPADM

## 2017-11-20 RX ORDER — SODIUM CHLORIDE 0.9 % (FLUSH) 0.9 %
10 SYRINGE (ML) INJECTION PRN
Status: DISCONTINUED | OUTPATIENT
Start: 2017-11-20 | End: 2017-11-20 | Stop reason: HOSPADM

## 2017-11-20 RX ORDER — ACETAMINOPHEN 325 MG/1
650 TABLET ORAL EVERY 4 HOURS PRN
Status: DISCONTINUED | OUTPATIENT
Start: 2017-11-20 | End: 2017-11-20 | Stop reason: HOSPADM

## 2017-11-20 RX ORDER — ATORVASTATIN CALCIUM 40 MG/1
40 TABLET, FILM COATED ORAL DAILY
Status: DISCONTINUED | OUTPATIENT
Start: 2017-11-20 | End: 2017-11-20 | Stop reason: HOSPADM

## 2017-11-20 RX ORDER — POTASSIUM CHLORIDE 20 MEQ/1
40 TABLET, EXTENDED RELEASE ORAL PRN
Status: DISCONTINUED | OUTPATIENT
Start: 2017-11-20 | End: 2017-11-20 | Stop reason: HOSPADM

## 2017-11-20 RX ORDER — DEXTROSE MONOHYDRATE 25 G/50ML
12.5 INJECTION, SOLUTION INTRAVENOUS PRN
Status: DISCONTINUED | OUTPATIENT
Start: 2017-11-20 | End: 2017-11-20 | Stop reason: HOSPADM

## 2017-11-20 RX ORDER — MAGNESIUM SULFATE 1 G/100ML
1 INJECTION INTRAVENOUS PRN
Status: DISCONTINUED | OUTPATIENT
Start: 2017-11-20 | End: 2017-11-20 | Stop reason: HOSPADM

## 2017-11-20 RX ADMIN — Medication 10 ML: at 10:04

## 2017-11-20 RX ADMIN — DOCUSATE SODIUM 100 MG: 100 CAPSULE ORAL at 10:04

## 2017-11-20 RX ADMIN — LISINOPRIL 2.5 MG: 2.5 TABLET ORAL at 10:04

## 2017-11-20 RX ADMIN — BUTALBITAL, ACETAMINOPHEN, AND CAFFEINE 1 TABLET: 50; 325; 40 TABLET ORAL at 03:06

## 2017-11-20 RX ADMIN — OXYCODONE HYDROCHLORIDE AND ACETAMINOPHEN 2 TABLET: 5; 325 TABLET ORAL at 14:54

## 2017-11-20 RX ADMIN — INSULIN LISPRO 4 UNITS: 100 INJECTION, SOLUTION INTRAVENOUS; SUBCUTANEOUS at 12:46

## 2017-11-20 RX ADMIN — ASPIRIN 325 MG: 325 TABLET, COATED ORAL at 06:13

## 2017-11-20 RX ADMIN — SODIUM CHLORIDE 1000 ML: 9 INJECTION, SOLUTION INTRAVENOUS at 03:08

## 2017-11-20 RX ADMIN — ATORVASTATIN CALCIUM 40 MG: 40 TABLET, FILM COATED ORAL at 10:04

## 2017-11-20 RX ADMIN — OXYCODONE HYDROCHLORIDE AND ACETAMINOPHEN 2 TABLET: 5; 325 TABLET ORAL at 04:32

## 2017-11-20 ASSESSMENT — ENCOUNTER SYMPTOMS
BACK PAIN: 0
COUGH: 0
BLURRED VISION: 1
CONSTIPATION: 0
DOUBLE VISION: 0
SHORTNESS OF BREATH: 0
NAUSEA: 1
WHEEZING: 0
EYE PAIN: 0
VOICE CHANGE: 0
STRIDOR: 0
DIARRHEA: 0
NAUSEA: 0
SPUTUM PRODUCTION: 0
BACK PAIN: 1
BLOOD IN STOOL: 0
ABDOMINAL PAIN: 0
VOMITING: 0
TROUBLE SWALLOWING: 0
SORE THROAT: 0

## 2017-11-20 ASSESSMENT — PAIN DESCRIPTION - FREQUENCY
FREQUENCY: CONTINUOUS
FREQUENCY: CONTINUOUS

## 2017-11-20 ASSESSMENT — PAIN SCALES - GENERAL
PAINLEVEL_OUTOF10: 9
PAINLEVEL_OUTOF10: 10
PAINLEVEL_OUTOF10: 9
PAINLEVEL_OUTOF10: 10
PAINLEVEL_OUTOF10: 9
PAINLEVEL_OUTOF10: 2

## 2017-11-20 ASSESSMENT — PAIN DESCRIPTION - LOCATION
LOCATION: HEAD
LOCATION: HEAD

## 2017-11-20 ASSESSMENT — PAIN DESCRIPTION - PROGRESSION
CLINICAL_PROGRESSION: NOT CHANGED
CLINICAL_PROGRESSION: NOT CHANGED

## 2017-11-20 ASSESSMENT — PAIN DESCRIPTION - DESCRIPTORS
DESCRIPTORS: ACHING
DESCRIPTORS: HEADACHE

## 2017-11-20 ASSESSMENT — PAIN DESCRIPTION - ONSET
ONSET: ON-GOING
ONSET: ON-GOING

## 2017-11-20 ASSESSMENT — PAIN DESCRIPTION - PAIN TYPE
TYPE: ACUTE PAIN
TYPE: ACUTE PAIN

## 2017-11-20 ASSESSMENT — PAIN DESCRIPTION - ORIENTATION
ORIENTATION: POSTERIOR
ORIENTATION: POSTERIOR

## 2017-11-20 NOTE — ED PROVIDER NOTES
[facility]  Emergency Department Encounter  Emergency Medicine Resident     Pt Name: Randolph Wolfe  MRN: [id number]  [place] 1970  Date of evaluation: 11/19/17  PCP:  Adriana Larose MD    [address] [place] [address]       Chief Complaint   Patient presents with    Hypoglycemia     having low BS all day today       HISTORY OF PRESENT ILLNESS  (Location/Symptom, Timing/Onset, Context/Setting, Quality, Duration, Modifying Factors, Severity.)      Randolph Wolfe is a 50 y.o. female who presents with Hyperglycemia and fatigue. She notes that her blood sugar has been low all day. She took 5 mg of Lantus at 1 PM, 35 mg again at 9 PM.  And states that her blood sugar has been anywhere from 50-80 when she is checked at home. Family is present with the patient who notes that she is acting fatigued than usual.  She was complaining of left-sided chest pain yesterday    PAST MEDICAL / SURGICAL / SOCIAL / FAMILY HISTORY      has a past medical history of Back pain; Bipolar 1 disorder ([facility] Diabetes mellitus ([facility] Disc disorder; and Hypertension. has a past surgical history that includes Cholecystectomy and back surgery. Social History     Social History    Marital status: Single     Spouse name: N/A    Number of children: N/A    Years of education: N/A     Occupational History    Not on file. 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 No pertinent family history.     Allergies:  Codeine    Home Medications:  Prior to Admission medications    Medication Sig frequency and hematuria. Musculoskeletal: Negative for back pain, gait problem and joint swelling. Neurological: Positive for dizziness. Negative for syncope, speech difficulty and headaches. Psychiatric/Behavioral: Negative for suicidal ideas. PHYSICAL EXAM   (up to 7 for level 4, 8 or more for level 5)      INITIAL VITALS:   /73   Pulse 102   Temp 98 °F (36.7 °C) (Oral)   Resp 18   Ht 5' 6\" (1.676 m)   Wt 165 lb (74.8 kg)   SpO2 100%   BMI 26.63 kg/m²     Physical Exam   Constitutional: She is oriented to person, place, and time. She appears well-developed and well-nourished. No distress. Patient is very tired appearing   HENT:   Head: Normocephalic and atraumatic. Eyes: Conjunctivae and EOM are normal. Pupils are equal, round, and reactive to light. Neck: Normal range of motion. Neck supple. Cardiovascular: Normal rate, regular rhythm, normal heart sounds and intact distal pulses. Pulmonary/Chest: Effort normal and breath sounds normal. No stridor. No respiratory distress. She has no wheezes. Abdominal: Soft. Bowel sounds are normal. There is no tenderness. There is no rebound. Musculoskeletal: Normal range of motion. She exhibits no edema. Neurological: She is alert and oriented to person, place, and time. No cranial nerve deficit. She exhibits normal muscle tone. Coordination normal.   NIH was assessed patient was a one for drowsiness   Skin: Skin is warm and dry. She is not diaphoretic. Psychiatric: She has a normal mood and affect. Thought content normal.   Nursing note and vitals reviewed.       DIFFERENTIAL  DIAGNOSIS     PLAN (LABS / IMAGING / EKG):  Orders Placed This Encounter   Procedures    XR CHEST STANDARD (2 VW)    CBC Auto Differential    DRUG SCREEN MULTI URINE    UA W/REFLEX CULTURE    COMPREHENSIVE METABOLIC PANEL    LIPASE    Amylase    TSH with Reflex    Immature Platelet Fraction    Comprehensive metabolic panel    Troponin    Lipid panel - fasting    CBC    Lactic Acid, Whole Blood    DIET CARDIAC;    Vital signs per unit routine    Notify physician    Up as tolerated    Daily weights    Intake and output    Tobacco cessation education protocol    Place intermittent pneumatic compression device    HYPOGLYCEMIA TREATMENT: blood glucose less than 50 mg/dL and patient  ALERT and TOLERATING PO    HYPOGLYCEMIA TREATMENT: blood glucose less than 70 mg/dL and patient ALERT and TOLERATING PO    HYPOGLYCEMIA TREATMENT: blood glucose less than 70 mg/dL and patient NOT ALERT or NPO    Telemetry monitoring    Full Code    Inpatient consult to Internal Medicine    Inpatient consult to Hospitalist    Inpatient consult to Diabetes educator    Initiate Oxygen Therapy Protocol    Pulse Oximetry Spot Check    POC Glucose Fingerstick    POCT troponin    POCT Glucose    POCT Glucose    POC Glucose Fingerstick    POC Glucose Fingerstick    POC Glucose Fingerstick    POC Glucose Fingerstick    POC Glucose Fingerstick    EKG 12 Lead    EKG 12 lead    Insert peripheral IV    PATIENT STATUS (FROM ED OR OR/PROCEDURAL) Observation       MEDICATIONS ORDERED:  Orders Placed This Encounter   Medications    0.9 % sodium chloride bolus    butalbital-acetaminophen-caffeine (FIORICET, ESGIC) per tablet 1 tablet    atorvastatin (LIPITOR) tablet 40 mg    dicyclomine (BENTYL) capsule 20 mg    lisinopril (PRINIVIL;ZESTRIL) tablet 2.5 mg    QUEtiapine (SEROQUEL) tablet 100 mg    sodium chloride flush 0.9 % injection 10 mL    sodium chloride flush 0.9 % injection 10 mL    OR Linked Order Group     potassium chloride (KLOR-CON M) extended release tablet 40 mEq     potassium chloride 20 MEQ/15ML (10%) oral solution 40 mEq     potassium chloride 10 mEq/100 mL IVPB (Peripheral Line)    potassium chloride 10 mEq/100 mL IVPB (Peripheral Line)    magnesium sulfate 1 g in dextrose 5% 100 mL IVPB    acetaminophen (TYLENOL) tablet 650 mg    magnesium hydroxide (MILK OF MAGNESIA) 400 MG/5ML suspension 30 mL    docusate sodium (COLACE) capsule 100 mg    bisacodyl (DULCOLAX) suppository 10 mg    ondansetron (ZOFRAN) injection 4 mg    aspirin EC tablet 325 mg    enoxaparin (LOVENOX) injection 40 mg    nitroGLYCERIN (NITROSTAT) SL tablet 0.4 mg    glucose (GLUTOSE) 40 % oral gel 15 g    dextrose 50 % solution 12.5 g    glucagon (rDNA) injection 1 mg    dextrose 5 % solution    insulin lispro (HUMALOG) injection vial 0-12 Units    insulin lispro (HUMALOG) injection vial 0-6 Units    OR Linked Order Group     oxyCODONE-acetaminophen (PERCOCET) 5-325 MG per tablet 1 tablet     oxyCODONE-acetaminophen (PERCOCET) 5-325 MG per tablet 2 tablet    OR Linked Order Group     HYDROmorphone (DILAUDID) injection 0.25 mg     HYDROmorphone (DILAUDID) injection 0.5 mg       DDX:  Hypoglycemia, Infection, ACS, stroke, AMS, seizure, headache,  alcohol / drugs / toxidromes,         DIAGNOSTIC RESULTS / EMERGENCY DEPARTMENT COURSE / MDM     LABS:  Results for orders placed or performed during the hospital encounter of 11/19/17   CBC Auto Differential   Result Value Ref Range    WBC 8.8 3.5 - 11.3 k/uL    RBC 5.07 3.95 - 5.11 m/uL    Hemoglobin 14.8 11.9 - 15.1 g/dL    Hematocrit 44.3 36.3 - 47.1 %    MCV 87.4 82.6 - 102.9 fL    MCH 29.2 25.2 - 33.5 pg    MCHC 33.4 28.4 - 34.8 g/dL    RDW 15.2 (H) 11.8 - 14.4 %    Platelets See Reflexed IPF Result 138 - 453 k/uL    MPV NOT REPORTED 8.1 - 13.5 fL    Differential Type NOT REPORTED     WBC Morphology NOT REPORTED     RBC Morphology ANISOCYTOSIS PRESENT     Platelet Estimate NOT REPORTED     Seg Neutrophils 49 36 - 65 %    Lymphocytes 41 24 - 43 %    Monocytes 6 3 - 12 %    Eosinophils % 3 1 - 4 %    Basophils 1 0 - 2 %    Immature Granulocytes 1 (H) 0 %    Segs Absolute 4.36 1.50 - 8.10 k/uL    Absolute Lymph # 3.62 1.10 - 3.70 k/uL    Absolute Mono # 0.50 0.10 - 1.20 k/uL    Absolute Eos # 0.25 0.00 - 0.44 k/uL TO:  Logan Edgar MD  [address]  [phone]            DISCHARGE MEDICATIONS:  Current Discharge Medication List          Zoraida Zuh DO  Emergency Medicine Resident    (Please note that portions of this note were completed with a voice recognition program.  Efforts were made to edit the dictations but occasionally words are mis-transcribed.)       Zoraida Zhu DO  Resident  11/20/17 8617

## 2017-11-20 NOTE — ED PROVIDER NOTES
[facility]     Emergency Department     Faculty Attestation    I performed a history and physical examination of the patient and discussed management with the resident. I reviewed the residents note and agree with the documented findings including all diagnostic interpretations and plan of care. Any areas of disagreement are noted on the chart. I was personally present for the key portions of any procedures. I have documented in the chart those procedures where I was not present during the key portions. I have reviewed the emergency nurses triage note. I agree with the chief complaint, past medical history, past surgical history, allergies, medications, social and family history as documented unless otherwise noted below. Documentation of the HPI, Physical Exam and Medical Decision Making performed by [email or web address] is based on my personal performance of the HPI, PE and MDM. For Physician Assistant/ Nurse Practitioner cases/documentation I have personally evaluated this patient and have completed at least one if not all key elements of the E/M (history, physical exam, and MDM). Additional findings are as noted. Primary Care Physician: Rayna Zhou MD    History: This is a 50 y.o. female who presents to the Emergency Department with complaint of hypoglycemia, fatigue. Ongoing for the past day. Sugar as low as 55. Does respond to oral intake. Takes Lantus twice daily, no recent changes in dosage. Appetite has been somewhat decreased. No fevers no vomiting no shortness breath or cough, no dysuria no diarrhea. Did have episode of chest pain yesterday left sided radiating to left arm. Recently stopped taking aspirin and Lipitor. No history of coronary artery disease. Physical:     oral temperature is 97.7 °F (36.5 °C). Her blood pressure is 147/86 (abnormal) and her pulse is 109. Her respiration is 16 and oxygen saturation is 98%.     50 y.o. female appears fatigued, nontoxic, oropharynx clear moist, pupils are 4 mm and weakly reactive bilaterally extracted motion is intact, no facial droop no slurring of speech, cardiac exam tachycardic no murmurs or gallops, pulmonary clear to auscultation bilaterally abdomen soft mild diffuse tenderness on examination with no rebound or guarding. Radial pulse 2+ bilaterally. Impression: Hypoglycemia, chest pain yesterday    Plan: Cardiac and abdominal labs, TSH, EKG, chest x-ray, fluids. Potential admission. Pre-hypertension/Hypertension: The patient has been informed that they may have pre-hypertension or Hypertension based on a blood pressure reading in the emergency department. I recommend that the patient call the primary care provider listed on their discharge instructions or a physician of their choice this week to arrange follow up for further evaluation of possible pre-hypertension or Hypertension.       EKG Interpretation  EKG Interpretation    Interpreted by emergency department physician    Rhythm: sinus tachycardia  Rate: 100-110  Axis: normal  Ectopy: premature atrial contraction  Conduction: normal  ST Segments: normal  T Waves: normal  Q Waves: none    EKG  Impression: non-specific EKG    Norbert Trinidad MD      Interpreted by me      Jenifer Aguilar MD  Attending Emergency Physician        Norbert Trinidad MD  11/20/17 [id number]

## 2017-11-20 NOTE — ED TRIAGE NOTES
Pt presents to ed with c/o hypoglycemia all day today. PT states at one point her BS was as low as 55. Pt states her BS was 65 while she was eating dinner around 1900. PT states at 2100 it was about 115. PT states she took her lantus at 2100 as well. PT staets after that her bs when down to 55. Pt states she ate a lot of hard candy and cake to get it back up. Pt c/o headache right now. No other complaints per patient. Pt resting on bed. Family at  Bedside. Pt placed on pulse ox and BP cuff.

## 2017-11-20 NOTE — PROGRESS NOTES
( long acting) / Humalog-  Bolus (pre meal)  regime - insulin action times. _x__ Importance of dosing  pre meal rapid insulin from BG result at time of start of  meal & administering  within 15 minutes of eating meals   _x__ Importance of taking long acting insulin at same time each day and not to skip doses   _x__ Demonstration of self-administering insulin via vial and syringe Handout to go over administration using vial and syringe   _x__ Reinforce safe needle / sharps disposal    _x__ Insulin injection site rotation  Patient mainly uses left abd area, discussed with nurse to have poatient do own injection and use new site    Following Support materials were provided for patient to take home:  _x__ Educational Booklet \"Diabetes Leaving the Hospital\"  _x__ Be safe with Allen teaching sheet from [employer]  _x__\"Type 2 Diabetes and Adding Insulin\" take home kit with survival skills fold out, self care diary for blood glucose and food, Diabetes ID card and glucose tabs  _x__ [facility] Diabetes Education brochure/ contact card      Patient verbalizes and needs reinforcement. understanding  of survival skills education. Patient very vague with sharing information but receptive with education information given. RECOMMENDATIONS INPATIENT PLAN:  _x_ Dietician Consult this admission for education on CHO diet and diet plan for home  Patient states that she has never followed any CHO counting diet and not familiar with it.   RN Bedside Support Plan:  _x__ Bedside RN to support patient with administration of insulin at bedside  _x__ Bedside RN to support patient with SMBG -Reinforce target BG ranges, Hyper and Hypo signs and symptoms and treatment  _x_ Bedside RN to coordinate BG Check with mealtime and insulin  _x_ Bedside RN to ensure snack at HS for patient on HS correction scale insulin  _x_ Bedside RN to reinforce survival skills education and diabetes self care    __x_ set up patient to watch Education TV Channels [facility]'s Channels 75 and 76 at 9am, 3pm,  7pm, 9pm)         RECOMMENDATIONS FOR OUTPATIENT PLAN:  Diabetes Self-Monitoring Supplies:  _x__ Preferred / formulary blood glucose meter for BGSM at home use  Patient has meter at home    __x_ Strips and lancets for 4  frequency of home BGSM  Patient should be testing before each kassy and bedtime with taking both Lantus and Novolog at meals. Diabetes Medications:  _x__ Insulin  Vial  Basal / long acting - dose per MD   _x__ Insulin  Vial  Bolus pre meal set dose  or correction scale - dose per MD  _x__ Insulin Delivery method - Syringe - order  Insulin syringe Needle U 100 31G X 15/64\" 0.5ml    Diabetes Education / HCP follow -up :   _x_ Would recommend follow -up education at outpatient diabetes education at [address] [place] [address]. An ordered is needed for this service and can be placed via EHR. Discharge Navigator --- Med Reconciliation -- New orders for discharge tab -- search diabetic ed - REF20 -  [facility]  - review and sign     _x__ Follow -up with HCP / PCP within one week.     Chance White RN

## 2017-11-20 NOTE — CARE COORDINATION
needed for discharge: No     Discharge Plan: home with [facility] home care         Electronically signed by Celeste Ivy RN on 11/20/17 at 11:36 AM

## 2017-11-20 NOTE — H&P
[facility]    HISTORY AND PHYSICAL EXAMINATION            Date:   11/20/2017  Patient name:  Twan Nova  Date of admission:  11/19/2017 11:29 PM  MRN:   [id number]  Account:  [de-identified]  YOB: 1970  PCP:    Logan Sanches MD  Room:   8153/2297-61  Code Status:    Full Code    Chief Complaint:     Chief Complaint   Patient presents with    Hypoglycemia     having low BS all day today       History Obtained From:     patient, electronic medical record    History of Present Illness: The patient is a 50 y.o. female who presents with Hypoglycemia (having low BS all day today)   she is admitted to the hospital for further evaluation and treatment    Patient was admitted thru ER with:  Suzie Perez is a 50 y.o. female who presents with Hyperglycemia and fatigue. She notes that her blood sugar has been low all day. She took 5 mg of Lantus at 1 PM, 35 mg again at 9 PM.  And states that her blood sugar has been anywhere from 50-80 when she is checked at home. Family is present with the patient who notes that she is acting fatigued than usual.  She was complaining of left-sided chest pain yesterday\"    She acknowledges that she has not been eating well  There has been no compliance with the ADA diet  Her insulin administration has been erratic  Her weight has fallen from 190-160 pounds over the last several months      Past Medical History:     Past Medical History:   Diagnosis Date    Back pain     Bipolar 1 disorder ([address]     Diabetes mellitus ([facility]     Disc disorder     Hypertension         Past Surgical History:     Past Surgical History:   Procedure Laterality Date    BACK SURGERY      CHOLECYSTECTOMY          Medications Prior to Admission:     Prior to Admission medications    Medication Sig Start Date End Date Taking?  Authorizing Provider   insulin glargine (LANTUS) 100 UNIT/ML injection vial Inject Result Value Ref Range    POC Glucose 89 65 - 105 mg/dL   EKG 12 Lead    Collection Time: 11/19/17 11:59 PM   Result Value Ref Range    Ventricular Rate 103 BPM    Atrial Rate 103 BPM    P-R Interval 150 ms    QRS Duration 96 ms    Q-T Interval 366 ms    QTc Calculation (Bazett) 479 ms    P Axis 65 degrees    R Axis 53 degrees    T Axis 46 degrees   POCT troponin    Collection Time: 11/20/17 12:10 AM   Result Value Ref Range    POC Troponin I 0.01 0.00 - 0.10 ng/mL    POC Troponin Interp       The Troponin-I (POC) results cannot be compared to the Troponin-T results.    CBC Auto Differential    Collection Time: 11/20/17 12:19 AM   Result Value Ref Range    WBC 8.8 3.5 - 11.3 k/uL    RBC 5.07 3.95 - 5.11 m/uL    Hemoglobin 14.8 11.9 - 15.1 g/dL    Hematocrit 44.3 36.3 - 47.1 %    MCV 87.4 82.6 - 102.9 fL    MCH 29.2 25.2 - 33.5 pg    MCHC 33.4 28.4 - 34.8 g/dL    RDW 15.2 (H) 11.8 - 14.4 %    Platelets See Reflexed IPF Result 138 - 453 k/uL    MPV NOT REPORTED 8.1 - 13.5 fL    Differential Type NOT REPORTED     WBC Morphology NOT REPORTED     RBC Morphology ANISOCYTOSIS PRESENT     Platelet Estimate NOT REPORTED     Seg Neutrophils 49 36 - 65 %    Lymphocytes 41 24 - 43 %    Monocytes 6 3 - 12 %    Eosinophils % 3 1 - 4 %    Basophils 1 0 - 2 %    Immature Granulocytes 1 (H) 0 %    Segs Absolute 4.36 1.50 - 8.10 k/uL    Absolute Lymph # 3.62 1.10 - 3.70 k/uL    Absolute Mono # 0.50 0.10 - 1.20 k/uL    Absolute Eos # 0.25 0.00 - 0.44 k/uL    Basophils # 0.04 0.00 - 0.20 k/uL    Absolute Immature Granulocyte 0.05 0.00 - 0.30 k/uL   COMPREHENSIVE METABOLIC PANEL    Collection Time: 11/20/17 12:19 AM   Result Value Ref Range    Glucose 62 (L) 70 - 99 mg/dL    BUN 10 6 - 20 mg/dL    CREATININE 0.76 0.50 - 0.90 mg/dL    Bun/Cre Ratio NOT REPORTED 9 - 20    Calcium 9.1 8.6 - 10.4 mg/dL    Sodium 139 135 - 144 mmol/L    Potassium 3.7 3.7 - 5.3 mmol/L    Chloride 101 98 - 107 mmol/L    CO2 25 20 - 31 mmol/L    Anion Gap 13 9 - 17 mmol/L    Alkaline Phosphatase 107 (H) 35 - 104 U/L    ALT 10 5 - 33 U/L    AST 17 <32 U/L    Total Bilirubin <0.10 (L) 0.3 - 1.2 mg/dL    Total Protein 6.9 6.4 - 8.3 g/dL    Alb 3.5 3.5 - 5.2 g/dL    Albumin/Globulin Ratio 1.0 1.0 - 2.5    GFR Non-African American >60 >60 mL/min    GFR African American >60 >60 mL/min    GFR Comment          GFR Staging NOT REPORTED    LIPASE    Collection Time: 11/20/17 12:19 AM   Result Value Ref Range    Lipase 24 13 - 60 U/L   Amylase    Collection Time: 11/20/17 12:19 AM   Result Value Ref Range    Amylase 58 28 - 100 U/L   Lactic Acid, Whole Blood    Collection Time: 11/20/17 12:19 AM   Result Value Ref Range    Lactic Acid, Whole Blood 2.0 0.7 - 2.1 mmol/L   TSH with Reflex    Collection Time: 11/20/17 12:19 AM   Result Value Ref Range    TSH 1.66 0.30 - 5.00 mIU/L   Immature Platelet Fraction    Collection Time: 11/20/17 12:19 AM   Result Value Ref Range    Platelet, Immature Fraction NOT REPORTED 1.1 - 10.3 %    Platelet, Fluorescence Platelet clumps present, count appears adequate.  138 - 453 k/uL   POC Glucose Fingerstick    Collection Time: 11/20/17 12:34 AM   Result Value Ref Range    POC Glucose 69 65 - 105 mg/dL   POC Glucose Fingerstick    Collection Time: 11/20/17  2:18 AM   Result Value Ref Range    POC Glucose 103 65 - 105 mg/dL   POC Glucose Fingerstick    Collection Time: 11/20/17  4:07 AM   Result Value Ref Range    POC Glucose 220 (H) 65 - 105 mg/dL   Troponin    Collection Time: 11/20/17  5:21 AM   Result Value Ref Range    Troponin T <0.03 <0.03 ng/mL    Troponin Interp         Lactic Acid, Whole Blood    Collection Time: 11/20/17  5:21 AM   Result Value Ref Range    Lactic Acid, Whole Blood 2.0 0.7 - 2.1 mmol/L   POC Glucose Fingerstick    Collection Time: 11/20/17  7:26 AM   Result Value Ref Range    POC Glucose 182 (H) 65 - 105 mg/dL   Troponin    Collection Time: 11/20/17 10:02 AM   Result Value Ref Range    Troponin T <0.03 <0.03 ng/mL    Troponin Interp         POC Glucose Fingerstick    Collection Time: 11/20/17 10:04 AM   Result Value Ref Range    POC Glucose 194 (H) 65 - 105 mg/dL   POC Glucose Fingerstick    Collection Time: 11/20/17 12:17 PM   Result Value Ref Range    POC Glucose 232 (H) 65 - 105 mg/dL   POC Glucose Fingerstick    Collection Time: 11/20/17  2:33 PM   Result Value Ref Range    POC Glucose 286 (H) 65 - 105 mg/dL       Imaging/Diagnostics:  Chest x-ray  Impression:        1. No acute cardiopulmonary disease.         Results for Elsy Solis (MRN [id number]) as of 11/20/2017 15:17   Ref. Range 12/10/2015 23:45 9/19/2017 06:51   Hemoglobin A1C Latest Ref Range: 4.0 - 6.0 % 10.5 (H) 10.1 (H)       Assessment :      Principal Problem:    Hypoglycemia  Active Problems:    Migraine without aura and without status migrainosus, not intractable    Type 2 diabetes mellitus with hyperglycemia ([facility])    Intractable migraine without status migrainosus    Bipolar 1 disorder ([facility]    Hypertension        Plan:     Admit for observation  Will monitor and control Diabetes  Will monitor and control Blood Pressure  Diabetic Ed  Patient seems to have little insight into managing her blood sugars  There are concerns regarding compliance  Ongoing pain management    Her sugars seem to be responding  Hope to discharge this p.m. Will arrange home care to assist the patient in proper diabetic management  Will discharge when arrangements complete and ok with other services.   Follow-up with PCP in one week, Jose Segal MD  Notify PCP of discharge   She has been provided with glucagon for home use  Med rec done  TEVIN done  Home care orders placed    Consultations:   [place] TO HOSPITALIST  IP CONSULT TO [address] [place] [address],   11/20/2017  3:18 PM    Copy sent to Dr. Jose Segal MD

## 2017-11-21 NOTE — DISCHARGE SUMMARY
[facility])    Hypertension           Plan:      Admit for observation  Will monitor and control Diabetes  Will monitor and control Blood Pressure  Diabetic Ed  Patient seems to have little insight into managing her blood sugars  There are concerns regarding compliance  Ongoing pain management     Her sugars seem to be responding  Hope to discharge this p.m. Will arrange home care to assist the patient in proper diabetic management  Will discharge when arrangements complete and ok with other services. Follow-up with PCP in one week, Lizeth Pratt MD  Notify PCP of discharge   She has been provided with glucagon for home use  Med rec done  TEVIN done  Home care orders placed      Significant Diagnostic Studies:   Labs / Micro:   Hematology:  Recent Labs      11/20/17 0019   WBC  8.8   HGB  14.8   HCT  44.3   PLT  See Reflexed IPF Result     Chemistry:  Recent Labs      11/20/17 0019   NA  139   K  3.7   CL  101   CO2  25   GLUCOSE  62*   BUN  10   CREATININE  0.76   CALCIUM  9.1     Recent Labs      11/20/17 0010 11/20/17 0019   PROT   --   6.9   LABALBU   --   3.5   TSH   --   1.66   AST   --   17   ALT   --   10   ALKPHOS   --   107*   BILITOT   --   <0.10*   AMYLASE   --   58   LIPASE   --   24   TROPONINI  0.01   --          Radiology:    Xr Chest Standard (2 Vw)    Result Date: 11/20/2017  EXAMINATION: TWO VIEWS OF THE CHEST 11/20/2017 12:06 am COMPARISON: 07/11/2016. HISTORY: ORDERING SYSTEM PROVIDED HISTORY: chest pain TECHNOLOGIST PROVIDED HISTORY: Reason for exam:->chest pain Acute. Initial evaluation. Low blood sugar. FINDINGS: The cardiac silhouette and mediastinal contours are normal.  The lungs are clear. There is no focal lung infiltrate. There is eventration of the right hemidiaphragm. No pleural effusion or pneumothorax. Cholecystectomy clips are noted. The visualized osseous structures are unremarkable. 1. No acute cardiopulmonary disease.          Consultations:    Consults: tablet  Commonly known as:  SEROQUEL        STOP taking these medications    topiramate 25 MG tablet  Commonly known as:  TOPAMAX           Where to Get Your Medications      You can get these medications from any pharmacy    Bring a paper prescription for each of these medications  · glucagon (rDNA) 1 MG injection         Time Spent on discharge is  31 mins in patient examination, evaluation, counseling, medication reconciliation, discharge plan and follow up. Electronically signed by   Shayy Medellin DO  11/20/2017  10:56 PM      Thank you Dr. Yumi Orr MD for the opportunity to be involved in this patient's care.

## 2018-01-18 ENCOUNTER — HOSPITAL ENCOUNTER (EMERGENCY)
Age: 48
Discharge: HOME | End: 2018-01-18
Attending: EMERGENCY MEDICINE
Payer: COMMERCIAL

## 2018-01-18 VITALS
BODY MASS INDEX: 29.62 KG/M2 | HEIGHT: 69 IN | WEIGHT: 200 LBS | DIASTOLIC BLOOD PRESSURE: 95 MMHG | RESPIRATION RATE: 16 BRPM | HEART RATE: 95 BPM | SYSTOLIC BLOOD PRESSURE: 137 MMHG | OXYGEN SATURATION: 99 % | TEMPERATURE: 97 F

## 2018-01-18 DIAGNOSIS — M54.50 CHRONIC LOW BACK PAIN WITHOUT SCIATICA, UNSPECIFIED BACK PAIN LATERALITY: Primary | ICD-10-CM

## 2018-01-18 DIAGNOSIS — G89.29 CHRONIC BILATERAL LOW BACK PAIN WITHOUT SCIATICA: ICD-10-CM

## 2018-01-18 DIAGNOSIS — M54.50 CHRONIC BILATERAL LOW BACK PAIN WITHOUT SCIATICA: ICD-10-CM

## 2018-01-18 DIAGNOSIS — G89.29 CHRONIC LOW BACK PAIN WITHOUT SCIATICA, UNSPECIFIED BACK PAIN LATERALITY: Primary | ICD-10-CM

## 2018-01-18 PROCEDURE — 99282 EMERGENCY DEPT VISIT SF MDM: CPT

## 2018-01-18 RX ORDER — OXYCODONE HYDROCHLORIDE AND ACETAMINOPHEN 5; 325 MG/1; MG/1
1 TABLET ORAL EVERY 8 HOURS PRN
Qty: 10 TABLET | Refills: 0 | Status: SHIPPED | OUTPATIENT
Start: 2018-01-18 | End: 2018-01-21

## 2018-01-18 RX ORDER — ORPHENADRINE CITRATE 100 MG/1
100 TABLET, EXTENDED RELEASE ORAL 2 TIMES DAILY PRN
Qty: 15 TABLET | Refills: 0 | Status: SHIPPED | OUTPATIENT
Start: 2018-01-18 | End: 2018-01-28

## 2018-01-18 ASSESSMENT — ENCOUNTER SYMPTOMS
COLOR CHANGE: 0
BACK PAIN: 1
CHOKING: 0
COUGH: 0
ABDOMINAL DISTENTION: 0
APNEA: 0

## 2018-01-18 ASSESSMENT — PAIN SCALES - WONG BAKER: WONGBAKER_NUMERICALRESPONSE: 8;6

## 2018-01-18 ASSESSMENT — PAIN DESCRIPTION - FREQUENCY: FREQUENCY: CONTINUOUS

## 2018-01-18 ASSESSMENT — PAIN DESCRIPTION - PAIN TYPE: TYPE: CHRONIC PAIN

## 2018-01-18 ASSESSMENT — PAIN DESCRIPTION - DESCRIPTORS: DESCRIPTORS: DULL

## 2018-01-18 ASSESSMENT — PAIN DESCRIPTION - ORIENTATION: ORIENTATION: LOWER;MID

## 2018-01-18 ASSESSMENT — PAIN DESCRIPTION - LOCATION: LOCATION: BACK

## 2018-01-18 NOTE — ED PROVIDER NOTES
[facility]     Emergency Department     Faculty Attestation        I performed a history and physical examination of the patient and discussed management with the resident. I reviewed the residents note and agree with the documented findings and plan of care. Any areas of disagreement are noted on the chart. I was personally present for the key portions of any procedures. I have documented in the chart those procedures where I was not present during the key portions. I have reviewed the emergency nurses triage note. I agree with the chief complaint, past medical history, past surgical history, allergies, medications, social and family history as documented unless otherwise noted below. For Physician Assistant/ Nurse Practitioner cases/documentation I have personally evaluated this patient and have completed at least one if not all key elements of the E/M (history, physical exam, and MDM). Additional findings are as noted. PCP:  Brittanie Gonzalez MD    Pertinent Comments:       Patient presents with acute on chronic back pain without any new injury. Denies Numbness/tingling or weakness. No loss of b/b function. No perianal numbness. Physical Exam:  5/5 strength in Bilateral LE's, sensation to light touch intact, DTR's are 2+ and equal.  Downgoing Babinski's Bilaterally. Abd is soft/nt/nd/+ BS/No pulsatile masses palpated. Plan: Pain control with short course followup. Critical Care  None      (Please note that portions of this note were completed with a voice recognition program. Efforts were made to edit the dictations but occasionally words are mis-transcribed.  Whenever words are used in this note in any gender, they shall be construed as though they were used in the gender appropriate to the circumstances; and whenever words are used in this note in the singular or plural form, they shall be construed as though they were used in
TABLET    Take 1 tablet by mouth every 8 hours as needed for Pain for up to 3 days.        (Please note that portions of this note were completed with a voice recognition program.  Efforts were made to edit the dictations but occasionally words are mis-transcribed.)    Chloe Vaca  Emergency Medicine Resident           Chloe Vaca MD  Resident  01/18/18 5139

## 2018-06-19 ENCOUNTER — HOSPITAL ENCOUNTER (INPATIENT)
Age: 48
LOS: 1 days | Discharge: HOME | DRG: 420 | End: 2018-06-20
Attending: EMERGENCY MEDICINE | Admitting: INTERNAL MEDICINE
Payer: COMMERCIAL

## 2018-06-19 DIAGNOSIS — E16.0 HYPOGLYCEMIA DUE TO INSULIN: Primary | ICD-10-CM

## 2018-06-19 DIAGNOSIS — T38.3X5A HYPOGLYCEMIA DUE TO INSULIN: Primary | ICD-10-CM

## 2018-06-19 LAB
-: ABNORMAL
ABSOLUTE EOS #: 0.45 K/UL (ref 0–0.4)
ABSOLUTE IMMATURE GRANULOCYTE: 0 K/UL (ref 0–0.3)
ABSOLUTE LYMPH #: 5.82 K/UL (ref 1–4.8)
ABSOLUTE MONO #: 0.67 K/UL (ref 0.1–0.8)
AMORPHOUS: ABNORMAL
ANION GAP SERPL CALCULATED.3IONS-SCNC: 13 MMOL/L (ref 9–17)
BACTERIA: ABNORMAL
BASOPHILS # BLD: 0 % (ref 0–2)
BASOPHILS ABSOLUTE: 0 K/UL (ref 0–0.2)
BILIRUBIN URINE: NEGATIVE
BUN BLDV-MCNC: 7 MG/DL (ref 6–20)
BUN/CREAT BLD: ABNORMAL (ref 9–20)
CALCIUM SERPL-MCNC: 8.6 MG/DL (ref 8.6–10.4)
CASTS UA: ABNORMAL /LPF (ref 0–2)
CHLORIDE BLD-SCNC: 101 MMOL/L (ref 98–107)
CHP ED QC CHECK: YES
CO2: 23 MMOL/L (ref 20–31)
COLOR: YELLOW
CREAT SERPL-MCNC: 0.6 MG/DL (ref 0.5–0.9)
CRYSTALS, UA: ABNORMAL /HPF
DIFFERENTIAL TYPE: ABNORMAL
EOSINOPHILS RELATIVE PERCENT: 4 % (ref 1–4)
EPITHELIAL CELLS UA: ABNORMAL /HPF (ref 0–5)
GFR AFRICAN AMERICAN: >60 ML/MIN
GFR NON-AFRICAN AMERICAN: >60 ML/MIN
GFR SERPL CREATININE-BSD FRML MDRD: ABNORMAL ML/MIN/{1.73_M2}
GFR SERPL CREATININE-BSD FRML MDRD: ABNORMAL ML/MIN/{1.73_M2}
GLUCOSE BLD-MCNC: 136 MG/DL (ref 65–105)
GLUCOSE BLD-MCNC: 159 MG/DL (ref 65–105)
GLUCOSE BLD-MCNC: 178 MG/DL
GLUCOSE BLD-MCNC: 178 MG/DL (ref 65–105)
GLUCOSE BLD-MCNC: 374 MG/DL (ref 70–99)
GLUCOSE BLD-MCNC: 38 MG/DL (ref 65–105)
GLUCOSE BLD-MCNC: 46 MG/DL (ref 65–105)
GLUCOSE BLD-MCNC: 65 MG/DL (ref 65–105)
GLUCOSE BLD-MCNC: 96 MG/DL (ref 65–105)
GLUCOSE URINE: ABNORMAL
HCT VFR BLD CALC: 41 % (ref 36.3–47.1)
HEMOGLOBIN: 13.1 G/DL (ref 11.9–15.1)
IMMATURE GRANULOCYTES: 0 %
KETONES, URINE: NEGATIVE
LEUKOCYTE ESTERASE, URINE: NEGATIVE
LYMPHOCYTES # BLD: 52 % (ref 24–44)
MCH RBC QN AUTO: 27.6 PG (ref 25.2–33.5)
MCHC RBC AUTO-ENTMCNC: 32 G/DL (ref 28.4–34.8)
MCV RBC AUTO: 86.5 FL (ref 82.6–102.9)
MONOCYTES # BLD: 6 % (ref 1–7)
MORPHOLOGY: ABNORMAL
MUCUS: ABNORMAL
NITRITE, URINE: NEGATIVE
NRBC AUTOMATED: 0 PER 100 WBC
OTHER OBSERVATIONS UA: ABNORMAL
PDW BLD-RTO: 15.5 % (ref 11.8–14.4)
PH UA: 5.5 (ref 5–8)
PLATELET # BLD: 412 K/UL (ref 138–453)
PLATELET ESTIMATE: ABNORMAL
PMV BLD AUTO: 9.2 FL (ref 8.1–13.5)
POTASSIUM SERPL-SCNC: 3 MMOL/L (ref 3.7–5.3)
PROTEIN UA: NEGATIVE
RBC # BLD: 4.74 M/UL (ref 3.95–5.11)
RBC # BLD: ABNORMAL 10*6/UL
RBC UA: ABNORMAL /HPF (ref 0–2)
RENAL EPITHELIAL, UA: ABNORMAL /HPF
SEG NEUTROPHILS: 38 % (ref 36–66)
SEGMENTED NEUTROPHILS ABSOLUTE COUNT: 4.26 K/UL (ref 1.8–7.7)
SODIUM BLD-SCNC: 137 MMOL/L (ref 135–144)
SPECIFIC GRAVITY UA: 1.02 (ref 1–1.03)
TRICHOMONAS: ABNORMAL
TURBIDITY: CLEAR
URINE HGB: NEGATIVE
UROBILINOGEN, URINE: NORMAL
WBC # BLD: 11.2 K/UL (ref 3.5–11.3)
WBC # BLD: ABNORMAL 10*3/UL
WBC UA: ABNORMAL /HPF (ref 0–5)
YEAST: ABNORMAL

## 2018-06-19 PROCEDURE — 80048 BASIC METABOLIC PNL TOTAL CA: CPT

## 2018-06-19 PROCEDURE — 99285 EMERGENCY DEPT VISIT HI MDM: CPT

## 2018-06-19 PROCEDURE — 96365 THER/PROPH/DIAG IV INF INIT: CPT

## 2018-06-19 PROCEDURE — 2580000003 HC RX 258: Performed by: EMERGENCY MEDICINE

## 2018-06-19 PROCEDURE — 96366 THER/PROPH/DIAG IV INF ADDON: CPT

## 2018-06-19 PROCEDURE — 96374 THER/PROPH/DIAG INJ IV PUSH: CPT

## 2018-06-19 PROCEDURE — 81001 URINALYSIS AUTO W/SCOPE: CPT

## 2018-06-19 PROCEDURE — G0378 HOSPITAL OBSERVATION PER HR: HCPCS

## 2018-06-19 PROCEDURE — 6360000002 HC RX W HCPCS: Performed by: EMERGENCY MEDICINE

## 2018-06-19 PROCEDURE — 96375 TX/PRO/DX INJ NEW DRUG ADDON: CPT

## 2018-06-19 PROCEDURE — 85025 COMPLETE CBC W/AUTO DIFF WBC: CPT

## 2018-06-19 PROCEDURE — 82947 ASSAY GLUCOSE BLOOD QUANT: CPT

## 2018-06-19 PROCEDURE — 2000000000 HC ICU R&B

## 2018-06-19 PROCEDURE — 2500000003 HC RX 250 WO HCPCS: Performed by: EMERGENCY MEDICINE

## 2018-06-19 RX ORDER — DEXTROSE, SODIUM CHLORIDE, AND POTASSIUM CHLORIDE 5; .9; .15 G/100ML; G/100ML; G/100ML
INJECTION INTRAVENOUS CONTINUOUS
Status: DISCONTINUED | OUTPATIENT
Start: 2018-06-19 | End: 2018-06-20

## 2018-06-19 RX ORDER — DEXTROSE MONOHYDRATE 25 G/50ML
25 INJECTION, SOLUTION INTRAVENOUS ONCE
Status: COMPLETED | OUTPATIENT
Start: 2018-06-19 | End: 2018-06-19

## 2018-06-19 RX ORDER — DIPHENHYDRAMINE HYDROCHLORIDE 50 MG/ML
25 INJECTION INTRAMUSCULAR; INTRAVENOUS ONCE
Status: COMPLETED | OUTPATIENT
Start: 2018-06-19 | End: 2018-06-19

## 2018-06-19 RX ORDER — ONDANSETRON 2 MG/ML
4 INJECTION INTRAMUSCULAR; INTRAVENOUS ONCE
Status: COMPLETED | OUTPATIENT
Start: 2018-06-19 | End: 2018-06-19

## 2018-06-19 RX ORDER — DEXAMETHASONE SODIUM PHOSPHATE 10 MG/ML
10 INJECTION INTRAMUSCULAR; INTRAVENOUS ONCE
Status: COMPLETED | OUTPATIENT
Start: 2018-06-19 | End: 2018-06-19

## 2018-06-19 RX ORDER — 0.9 % SODIUM CHLORIDE 0.9 %
1000 INTRAVENOUS SOLUTION INTRAVENOUS ONCE
Status: COMPLETED | OUTPATIENT
Start: 2018-06-19 | End: 2018-06-19

## 2018-06-19 RX ADMIN — PROCHLORPERAZINE EDISYLATE 10 MG: 5 INJECTION INTRAMUSCULAR; INTRAVENOUS at 19:18

## 2018-06-19 RX ADMIN — DIPHENHYDRAMINE HYDROCHLORIDE 25 MG: 50 INJECTION, SOLUTION INTRAMUSCULAR; INTRAVENOUS at 19:17

## 2018-06-19 RX ADMIN — SODIUM CHLORIDE 1000 ML: 9 INJECTION, SOLUTION INTRAVENOUS at 19:16

## 2018-06-19 RX ADMIN — GLUCAGON 1 MG: 1 INJECTION, POWDER, LYOPHILIZED, FOR SOLUTION INTRAMUSCULAR; INTRAVENOUS at 22:31

## 2018-06-19 RX ADMIN — ONDANSETRON 4 MG: 2 INJECTION INTRAMUSCULAR; INTRAVENOUS at 21:29

## 2018-06-19 RX ADMIN — DEXTROSE MONOHYDRATE 25 G: 25 INJECTION, SOLUTION INTRAVENOUS at 20:05

## 2018-06-19 RX ADMIN — DEXAMETHASONE SODIUM PHOSPHATE 10 MG: 10 INJECTION INTRAMUSCULAR; INTRAVENOUS at 19:18

## 2018-06-19 RX ADMIN — DEXTROSE MONOHYDRATE 25 G: 25 INJECTION, SOLUTION INTRAVENOUS at 21:14

## 2018-06-19 RX ADMIN — POTASSIUM CHLORIDE, DEXTROSE MONOHYDRATE AND SODIUM CHLORIDE: 150; 5; 900 INJECTION, SOLUTION INTRAVENOUS at 21:29

## 2018-06-19 ASSESSMENT — PAIN DESCRIPTION - LOCATION: LOCATION: HEAD;BACK

## 2018-06-19 ASSESSMENT — PAIN DESCRIPTION - DESCRIPTORS: DESCRIPTORS: ACHING;DULL

## 2018-06-19 ASSESSMENT — ENCOUNTER SYMPTOMS
CHEST TIGHTNESS: 0
SHORTNESS OF BREATH: 0
ABDOMINAL PAIN: 0
DIARRHEA: 0
SORE THROAT: 0
CONSTIPATION: 0
VOMITING: 0
NAUSEA: 0

## 2018-06-19 ASSESSMENT — PAIN DESCRIPTION - FREQUENCY: FREQUENCY: CONTINUOUS

## 2018-06-19 ASSESSMENT — PAIN DESCRIPTION - ONSET: ONSET: ON-GOING

## 2018-06-19 ASSESSMENT — PAIN DESCRIPTION - PROGRESSION: CLINICAL_PROGRESSION: GRADUALLY WORSENING

## 2018-06-19 ASSESSMENT — PAIN SCALES - GENERAL: PAINLEVEL_OUTOF10: 8

## 2018-06-19 ASSESSMENT — PAIN DESCRIPTION - PAIN TYPE: TYPE: ACUTE PAIN

## 2018-06-20 VITALS
TEMPERATURE: 98.3 F | RESPIRATION RATE: 13 BRPM | OXYGEN SATURATION: 100 % | SYSTOLIC BLOOD PRESSURE: 141 MMHG | WEIGHT: 161.38 LBS | DIASTOLIC BLOOD PRESSURE: 89 MMHG | HEIGHT: 67 IN | BODY MASS INDEX: 25.33 KG/M2 | HEART RATE: 74 BPM

## 2018-06-20 PROBLEM — E11.9 TYPE 2 DIABETES MELLITUS (HCC): Status: ACTIVE | Noted: 2018-06-20

## 2018-06-20 LAB
ABSOLUTE EOS #: <0.03 K/UL (ref 0–0.44)
ABSOLUTE IMMATURE GRANULOCYTE: 0.03 K/UL (ref 0–0.3)
ABSOLUTE LYMPH #: 1.21 K/UL (ref 1.1–3.7)
ABSOLUTE MONO #: 0.1 K/UL (ref 0.1–1.2)
ALBUMIN SERPL-MCNC: 3.7 G/DL (ref 3.5–5.2)
ALBUMIN/GLOBULIN RATIO: 1.2 (ref 1–2.5)
ALP BLD-CCNC: 74 U/L (ref 35–104)
ALT SERPL-CCNC: 8 U/L (ref 5–33)
ANION GAP SERPL CALCULATED.3IONS-SCNC: 13 MMOL/L (ref 9–17)
AST SERPL-CCNC: 11 U/L
BASOPHILS # BLD: 0 % (ref 0–2)
BASOPHILS ABSOLUTE: <0.03 K/UL (ref 0–0.2)
BILIRUB SERPL-MCNC: 0.17 MG/DL (ref 0.3–1.2)
BUN BLDV-MCNC: 8 MG/DL (ref 6–20)
BUN/CREAT BLD: ABNORMAL (ref 9–20)
CALCIUM IONIZED: 1.22 MMOL/L (ref 1.13–1.33)
CALCIUM SERPL-MCNC: 8.8 MG/DL (ref 8.6–10.4)
CHLORIDE BLD-SCNC: 102 MMOL/L (ref 98–107)
CO2: 20 MMOL/L (ref 20–31)
CREAT SERPL-MCNC: 0.56 MG/DL (ref 0.5–0.9)
DIFFERENTIAL TYPE: ABNORMAL
EKG ATRIAL RATE: 52 BPM
EKG P AXIS: 71 DEGREES
EKG P-R INTERVAL: 154 MS
EKG Q-T INTERVAL: 540 MS
EKG QRS DURATION: 102 MS
EKG QTC CALCULATION (BAZETT): 502 MS
EKG R AXIS: 52 DEGREES
EKG T AXIS: 62 DEGREES
EKG VENTRICULAR RATE: 52 BPM
EOSINOPHILS RELATIVE PERCENT: 0 % (ref 1–4)
GFR AFRICAN AMERICAN: >60 ML/MIN
GFR NON-AFRICAN AMERICAN: >60 ML/MIN
GFR SERPL CREATININE-BSD FRML MDRD: ABNORMAL ML/MIN/{1.73_M2}
GFR SERPL CREATININE-BSD FRML MDRD: ABNORMAL ML/MIN/{1.73_M2}
GLUCOSE BLD-MCNC: 168 MG/DL (ref 65–105)
GLUCOSE BLD-MCNC: 168 MG/DL (ref 65–105)
GLUCOSE BLD-MCNC: 169 MG/DL (ref 70–99)
GLUCOSE BLD-MCNC: 192 MG/DL (ref 65–105)
GLUCOSE BLD-MCNC: 200 MG/DL (ref 65–105)
GLUCOSE BLD-MCNC: 207 MG/DL (ref 65–105)
GLUCOSE BLD-MCNC: 214 MG/DL (ref 65–105)
GLUCOSE BLD-MCNC: 217 MG/DL (ref 65–105)
HCT VFR BLD CALC: 42.5 % (ref 36.3–47.1)
HEMOGLOBIN: 13.5 G/DL (ref 11.9–15.1)
IMMATURE GRANULOCYTES: 1 %
LACTIC ACID, WHOLE BLOOD: 1.6 MMOL/L (ref 0.7–2.1)
LACTIC ACID: NORMAL MMOL/L
LYMPHOCYTES # BLD: 19 % (ref 24–43)
MAGNESIUM: 2.2 MG/DL (ref 1.6–2.6)
MCH RBC QN AUTO: 27.6 PG (ref 25.2–33.5)
MCHC RBC AUTO-ENTMCNC: 31.8 G/DL (ref 28.4–34.8)
MCV RBC AUTO: 86.9 FL (ref 82.6–102.9)
MONOCYTES # BLD: 2 % (ref 3–12)
NRBC AUTOMATED: 0 PER 100 WBC
PDW BLD-RTO: 15.4 % (ref 11.8–14.4)
PHOSPHORUS: 1.8 MG/DL (ref 2.6–4.5)
PLATELET # BLD: 386 K/UL (ref 138–453)
PLATELET ESTIMATE: ABNORMAL
PMV BLD AUTO: 9.4 FL (ref 8.1–13.5)
POTASSIUM SERPL-SCNC: 4.1 MMOL/L (ref 3.7–5.3)
RBC # BLD: 4.89 M/UL (ref 3.95–5.11)
RBC # BLD: ABNORMAL 10*6/UL
SEG NEUTROPHILS: 78 % (ref 36–65)
SEGMENTED NEUTROPHILS ABSOLUTE COUNT: 4.99 K/UL (ref 1.5–8.1)
SODIUM BLD-SCNC: 135 MMOL/L (ref 135–144)
TOTAL PROTEIN: 6.8 G/DL (ref 6.4–8.3)
TROPONIN INTERP: NORMAL
TROPONIN T: <0.03 NG/ML
WBC # BLD: 6.4 K/UL (ref 3.5–11.3)
WBC # BLD: ABNORMAL 10*3/UL

## 2018-06-20 PROCEDURE — 6370000000 HC RX 637 (ALT 250 FOR IP): Performed by: STUDENT IN AN ORGANIZED HEALTH CARE EDUCATION/TRAINING PROGRAM

## 2018-06-20 PROCEDURE — 99223 1ST HOSP IP/OBS HIGH 75: CPT | Performed by: INTERNAL MEDICINE

## 2018-06-20 PROCEDURE — 93005 ELECTROCARDIOGRAM TRACING: CPT

## 2018-06-20 PROCEDURE — 84100 ASSAY OF PHOSPHORUS: CPT

## 2018-06-20 PROCEDURE — 6360000002 HC RX W HCPCS: Performed by: STUDENT IN AN ORGANIZED HEALTH CARE EDUCATION/TRAINING PROGRAM

## 2018-06-20 PROCEDURE — G0378 HOSPITAL OBSERVATION PER HR: HCPCS

## 2018-06-20 PROCEDURE — 82330 ASSAY OF CALCIUM: CPT

## 2018-06-20 PROCEDURE — 2500000003 HC RX 250 WO HCPCS: Performed by: STUDENT IN AN ORGANIZED HEALTH CARE EDUCATION/TRAINING PROGRAM

## 2018-06-20 PROCEDURE — 36415 COLL VENOUS BLD VENIPUNCTURE: CPT

## 2018-06-20 PROCEDURE — 82947 ASSAY GLUCOSE BLOOD QUANT: CPT

## 2018-06-20 PROCEDURE — 2580000003 HC RX 258: Performed by: STUDENT IN AN ORGANIZED HEALTH CARE EDUCATION/TRAINING PROGRAM

## 2018-06-20 PROCEDURE — 96368 THER/DIAG CONCURRENT INF: CPT

## 2018-06-20 PROCEDURE — G0108 DIAB MANAGE TRN  PER INDIV: HCPCS

## 2018-06-20 PROCEDURE — 94762 N-INVAS EAR/PLS OXIMTRY CONT: CPT

## 2018-06-20 PROCEDURE — 83735 ASSAY OF MAGNESIUM: CPT

## 2018-06-20 PROCEDURE — 96366 THER/PROPH/DIAG IV INF ADDON: CPT

## 2018-06-20 PROCEDURE — 85025 COMPLETE CBC W/AUTO DIFF WBC: CPT

## 2018-06-20 PROCEDURE — 80053 COMPREHEN METABOLIC PANEL: CPT

## 2018-06-20 PROCEDURE — 96372 THER/PROPH/DIAG INJ SC/IM: CPT

## 2018-06-20 PROCEDURE — 83605 ASSAY OF LACTIC ACID: CPT

## 2018-06-20 PROCEDURE — 84484 ASSAY OF TROPONIN QUANT: CPT

## 2018-06-20 RX ORDER — DEXTROSE, SODIUM CHLORIDE, AND POTASSIUM CHLORIDE 5; .45; .15 G/100ML; G/100ML; G/100ML
INJECTION INTRAVENOUS CONTINUOUS
Status: DISCONTINUED | OUTPATIENT
Start: 2018-06-20 | End: 2018-06-20 | Stop reason: HOSPADM

## 2018-06-20 RX ORDER — SODIUM CHLORIDE 0.9 % (FLUSH) 0.9 %
10 SYRINGE (ML) INJECTION EVERY 12 HOURS SCHEDULED
Status: DISCONTINUED | OUTPATIENT
Start: 2018-06-20 | End: 2018-06-20

## 2018-06-20 RX ORDER — ASPIRIN 81 MG/1
81 TABLET ORAL DAILY
Status: DISCONTINUED | OUTPATIENT
Start: 2018-06-20 | End: 2018-06-20 | Stop reason: HOSPADM

## 2018-06-20 RX ORDER — ONDANSETRON 2 MG/ML
4 INJECTION INTRAMUSCULAR; INTRAVENOUS EVERY 8 HOURS PRN
Status: DISCONTINUED | OUTPATIENT
Start: 2018-06-20 | End: 2018-06-20 | Stop reason: HOSPADM

## 2018-06-20 RX ORDER — ONDANSETRON 2 MG/ML
4 INJECTION INTRAMUSCULAR; INTRAVENOUS EVERY 6 HOURS PRN
Status: DISCONTINUED | OUTPATIENT
Start: 2018-06-20 | End: 2018-06-20 | Stop reason: SDUPTHER

## 2018-06-20 RX ORDER — ACETAMINOPHEN 325 MG/1
650 TABLET ORAL EVERY 4 HOURS PRN
Status: DISCONTINUED | OUTPATIENT
Start: 2018-06-20 | End: 2018-06-20 | Stop reason: HOSPADM

## 2018-06-20 RX ORDER — SODIUM CHLORIDE 0.9 % (FLUSH) 0.9 %
10 SYRINGE (ML) INJECTION PRN
Status: DISCONTINUED | OUTPATIENT
Start: 2018-06-20 | End: 2018-06-20 | Stop reason: HOSPADM

## 2018-06-20 RX ORDER — LISINOPRIL 2.5 MG/1
2.5 TABLET ORAL DAILY
Status: DISCONTINUED | OUTPATIENT
Start: 2018-06-20 | End: 2018-06-20 | Stop reason: HOSPADM

## 2018-06-20 RX ORDER — NICOTINE POLACRILEX 4 MG
15 LOZENGE BUCCAL PRN
Status: DISCONTINUED | OUTPATIENT
Start: 2018-06-20 | End: 2018-06-20 | Stop reason: HOSPADM

## 2018-06-20 RX ORDER — ACETAMINOPHEN 325 MG/1
650 TABLET ORAL EVERY 4 HOURS PRN
Status: DISCONTINUED | OUTPATIENT
Start: 2018-06-20 | End: 2018-06-20

## 2018-06-20 RX ORDER — DEXTROSE MONOHYDRATE 25 G/50ML
12.5 INJECTION, SOLUTION INTRAVENOUS PRN
Status: DISCONTINUED | OUTPATIENT
Start: 2018-06-20 | End: 2018-06-20 | Stop reason: HOSPADM

## 2018-06-20 RX ORDER — ACETAMINOPHEN 325 MG/1
650 TABLET ORAL EVERY 4 HOURS PRN
Status: DISCONTINUED | OUTPATIENT
Start: 2018-06-20 | End: 2018-06-20 | Stop reason: SDUPTHER

## 2018-06-20 RX ORDER — SODIUM CHLORIDE 0.9 % (FLUSH) 0.9 %
10 SYRINGE (ML) INJECTION EVERY 12 HOURS SCHEDULED
Status: DISCONTINUED | OUTPATIENT
Start: 2018-06-20 | End: 2018-06-20 | Stop reason: HOSPADM

## 2018-06-20 RX ORDER — ATORVASTATIN CALCIUM 40 MG/1
40 TABLET, FILM COATED ORAL DAILY
Status: DISCONTINUED | OUTPATIENT
Start: 2018-06-20 | End: 2018-06-20 | Stop reason: HOSPADM

## 2018-06-20 RX ORDER — DEXTROSE MONOHYDRATE 50 MG/ML
100 INJECTION, SOLUTION INTRAVENOUS PRN
Status: DISCONTINUED | OUTPATIENT
Start: 2018-06-20 | End: 2018-06-20 | Stop reason: HOSPADM

## 2018-06-20 RX ADMIN — ACETAMINOPHEN 650 MG: 325 TABLET ORAL at 04:41

## 2018-06-20 RX ADMIN — ASPIRIN 81 MG: 81 TABLET, COATED ORAL at 08:39

## 2018-06-20 RX ADMIN — ACETAMINOPHEN 650 MG: 325 TABLET ORAL at 12:02

## 2018-06-20 RX ADMIN — ENOXAPARIN SODIUM 40 MG: 100 INJECTION SUBCUTANEOUS at 08:39

## 2018-06-20 RX ADMIN — DESMOPRESSIN ACETATE 40 MG: 0.2 TABLET ORAL at 08:39

## 2018-06-20 RX ADMIN — SODIUM PHOSPHATE, MONOBASIC, MONOHYDRATE 11.7 MMOL: 276; 142 INJECTION, SOLUTION INTRAVENOUS at 06:35

## 2018-06-20 RX ADMIN — POTASSIUM CHLORIDE, DEXTROSE MONOHYDRATE AND SODIUM CHLORIDE: 150; 5; 450 INJECTION, SOLUTION INTRAVENOUS at 02:50

## 2018-06-20 RX ADMIN — LISINOPRIL 2.5 MG: 2.5 TABLET ORAL at 08:39

## 2018-06-20 RX ADMIN — Medication 10 ML: at 08:39

## 2018-06-20 ASSESSMENT — PAIN DESCRIPTION - PAIN TYPE
TYPE: ACUTE PAIN
TYPE: ACUTE PAIN

## 2018-06-20 ASSESSMENT — PAIN DESCRIPTION - LOCATION
LOCATION: HEAD
LOCATION: HEAD

## 2018-06-20 ASSESSMENT — PAIN DESCRIPTION - FREQUENCY
FREQUENCY: CONTINUOUS
FREQUENCY: CONTINUOUS

## 2018-06-20 ASSESSMENT — PAIN DESCRIPTION - PROGRESSION
CLINICAL_PROGRESSION: NOT CHANGED

## 2018-06-20 ASSESSMENT — PAIN SCALES - GENERAL
PAINLEVEL_OUTOF10: 3
PAINLEVEL_OUTOF10: 6
PAINLEVEL_OUTOF10: 0

## 2018-06-20 ASSESSMENT — PAIN DESCRIPTION - DESCRIPTORS
DESCRIPTORS: ACHING;DULL
DESCRIPTORS: ACHING;DULL

## 2018-06-20 ASSESSMENT — PAIN DESCRIPTION - ONSET
ONSET: ON-GOING
ONSET: ON-GOING

## 2018-10-29 ENCOUNTER — HOSPITAL ENCOUNTER (EMERGENCY)
Age: 48
Discharge: HOME | End: 2018-10-29
Attending: EMERGENCY MEDICINE
Payer: COMMERCIAL

## 2018-10-29 ENCOUNTER — APPOINTMENT (OUTPATIENT)
Dept: CT IMAGING | Age: 48
End: 2018-10-29
Payer: COMMERCIAL

## 2018-10-29 VITALS
BODY MASS INDEX: 26.06 KG/M2 | HEART RATE: 59 BPM | TEMPERATURE: 97.5 F | HEIGHT: 67 IN | DIASTOLIC BLOOD PRESSURE: 76 MMHG | RESPIRATION RATE: 18 BRPM | OXYGEN SATURATION: 100 % | WEIGHT: 166 LBS | SYSTOLIC BLOOD PRESSURE: 167 MMHG

## 2018-10-29 DIAGNOSIS — R19.7 DIARRHEA, UNSPECIFIED TYPE: ICD-10-CM

## 2018-10-29 DIAGNOSIS — R10.84 GENERALIZED ABDOMINAL PAIN: ICD-10-CM

## 2018-10-29 DIAGNOSIS — R11.2 NON-INTRACTABLE VOMITING WITH NAUSEA, UNSPECIFIED VOMITING TYPE: Primary | ICD-10-CM

## 2018-10-29 LAB
-: NORMAL
-: NORMAL
ALBUMIN SERPL-MCNC: 3.2 G/DL (ref 3.5–5.2)
ALBUMIN/GLOBULIN RATIO: 1.1 (ref 1–2.5)
ALLEN TEST: ABNORMAL
ALP BLD-CCNC: 80 U/L (ref 35–104)
ALT SERPL-CCNC: 11 U/L (ref 5–33)
AMORPHOUS: NORMAL
ANION GAP SERPL CALCULATED.3IONS-SCNC: 6 MMOL/L (ref 9–17)
ANION GAP: 4 MMOL/L (ref 7–16)
AST SERPL-CCNC: 26 U/L
BACTERIA: NORMAL
BILIRUB SERPL-MCNC: <0.1 MG/DL (ref 0.3–1.2)
BILIRUBIN URINE: NEGATIVE
BUN BLDV-MCNC: 8 MG/DL (ref 6–20)
BUN/CREAT BLD: ABNORMAL (ref 9–20)
CALCIUM SERPL-MCNC: 8.1 MG/DL (ref 8.6–10.4)
CASTS UA: NORMAL /LPF (ref 0–8)
CHLORIDE BLD-SCNC: 104 MMOL/L (ref 98–107)
CO2: 24 MMOL/L (ref 20–31)
COLOR: YELLOW
CREAT SERPL-MCNC: 0.64 MG/DL (ref 0.5–0.9)
CRYSTALS, UA: NORMAL /HPF
EKG ATRIAL RATE: 82 BPM
EKG P AXIS: 69 DEGREES
EKG P-R INTERVAL: 156 MS
EKG Q-T INTERVAL: 406 MS
EKG QRS DURATION: 96 MS
EKG QTC CALCULATION (BAZETT): 474 MS
EKG R AXIS: 54 DEGREES
EKG T AXIS: 56 DEGREES
EKG VENTRICULAR RATE: 82 BPM
EPITHELIAL CELLS UA: NORMAL /HPF (ref 0–5)
FIO2: ABNORMAL
GFR AFRICAN AMERICAN: >60 ML/MIN
GFR NON-AFRICAN AMERICAN: >60 ML/MIN
GFR SERPL CREATININE-BSD FRML MDRD: ABNORMAL ML/MIN/{1.73_M2}
GFR SERPL CREATININE-BSD FRML MDRD: ABNORMAL ML/MIN/{1.73_M2}
GLUCOSE BLD-MCNC: 121 MG/DL (ref 70–99)
GLUCOSE BLD-MCNC: 158 MG/DL (ref 74–100)
GLUCOSE URINE: NEGATIVE
HCO3 VENOUS: 27.2 MMOL/L (ref 22–29)
HCT VFR BLD CALC: 42.7 % (ref 36.3–47.1)
HEMOGLOBIN: 13.9 G/DL (ref 11.9–15.1)
KETONES, URINE: NEGATIVE
LACTIC ACID, WHOLE BLOOD: 1.6 MMOL/L (ref 0.7–2.1)
LEUKOCYTE ESTERASE, URINE: NEGATIVE
LIPASE: 39 U/L (ref 13–60)
MCH RBC QN AUTO: 28.7 PG (ref 25.2–33.5)
MCHC RBC AUTO-ENTMCNC: 32.6 G/DL (ref 28.4–34.8)
MCV RBC AUTO: 88.2 FL (ref 82.6–102.9)
MODE: ABNORMAL
MUCUS: NORMAL
NEGATIVE BASE EXCESS, VEN: ABNORMAL (ref 0–2)
NITRITE, URINE: NEGATIVE
NRBC AUTOMATED: 0 PER 100 WBC
O2 DEVICE/FLOW/%: ABNORMAL
O2 SAT, VEN: 36 % (ref 60–85)
OTHER OBSERVATIONS UA: NORMAL
PATIENT TEMP: ABNORMAL
PCO2, VEN: 45.3 MM HG (ref 41–51)
PDW BLD-RTO: 15.5 % (ref 11.8–14.4)
PH UA: 5 (ref 5–8)
PH VENOUS: 7.39 (ref 7.32–7.43)
PLATELET # BLD: 324 K/UL (ref 138–453)
PMV BLD AUTO: 11.6 FL (ref 8.1–13.5)
PO2, VEN: 21.9 MM HG (ref 30–50)
POC CHLORIDE: 108 MMOL/L (ref 98–107)
POC HEMATOCRIT: 48 % (ref 36–46)
POC HEMOGLOBIN: 16.4 G/DL (ref 12–16)
POC IONIZED CALCIUM: 1.01 MMOL/L (ref 1.15–1.33)
POC LACTIC ACID: 1.51 MMOL/L (ref 0.56–1.39)
POC PCO2 TEMP: ABNORMAL MM HG
POC PH TEMP: ABNORMAL
POC PO2 TEMP: ABNORMAL MM HG
POC SODIUM: 139 MMOL/L (ref 138–146)
POC TROPONIN I: 0.01 NG/ML (ref 0–0.1)
POC TROPONIN INTERP: NORMAL
POSITIVE BASE EXCESS, VEN: 2 (ref 0–3)
POTASSIUM SERPL-SCNC: 5.9 MMOL/L (ref 3.7–5.3)
PROTEIN UA: NEGATIVE
RBC # BLD: 4.84 M/UL (ref 3.95–5.11)
RBC UA: NORMAL /HPF (ref 0–4)
REASON FOR REJECTION: NORMAL
RENAL EPITHELIAL, UA: NORMAL /HPF
SAMPLE SITE: ABNORMAL
SODIUM BLD-SCNC: 134 MMOL/L (ref 135–144)
SPECIFIC GRAVITY UA: 1.02 (ref 1–1.03)
TOTAL CO2, VENOUS: 29 MMOL/L (ref 23–30)
TOTAL PROTEIN: 6.1 G/DL (ref 6.4–8.3)
TRICHOMONAS: NORMAL
TURBIDITY: CLEAR
URINE HGB: NEGATIVE
UROBILINOGEN, URINE: NORMAL
WBC # BLD: 10.1 K/UL (ref 3.5–11.3)
WBC UA: NORMAL /HPF (ref 0–5)
YEAST: NORMAL
ZZ NTE CLEAN UP: ORDERED TEST: NORMAL
ZZ NTE WITH NAME CLEAN UP: SPECIMEN SOURCE: NORMAL

## 2018-10-29 PROCEDURE — 83690 ASSAY OF LIPASE: CPT

## 2018-10-29 PROCEDURE — 83605 ASSAY OF LACTIC ACID: CPT

## 2018-10-29 PROCEDURE — 85014 HEMATOCRIT: CPT

## 2018-10-29 PROCEDURE — 99284 EMERGENCY DEPT VISIT MOD MDM: CPT

## 2018-10-29 PROCEDURE — 85027 COMPLETE CBC AUTOMATED: CPT

## 2018-10-29 PROCEDURE — 82803 BLOOD GASES ANY COMBINATION: CPT

## 2018-10-29 PROCEDURE — 84484 ASSAY OF TROPONIN QUANT: CPT

## 2018-10-29 PROCEDURE — 96374 THER/PROPH/DIAG INJ IV PUSH: CPT

## 2018-10-29 PROCEDURE — 74177 CT ABD & PELVIS W/CONTRAST: CPT

## 2018-10-29 PROCEDURE — 82947 ASSAY GLUCOSE BLOOD QUANT: CPT

## 2018-10-29 PROCEDURE — 82330 ASSAY OF CALCIUM: CPT

## 2018-10-29 PROCEDURE — 84132 ASSAY OF SERUM POTASSIUM: CPT

## 2018-10-29 PROCEDURE — 84295 ASSAY OF SERUM SODIUM: CPT

## 2018-10-29 PROCEDURE — 6370000000 HC RX 637 (ALT 250 FOR IP): Performed by: STUDENT IN AN ORGANIZED HEALTH CARE EDUCATION/TRAINING PROGRAM

## 2018-10-29 PROCEDURE — 82435 ASSAY OF BLOOD CHLORIDE: CPT

## 2018-10-29 PROCEDURE — 80053 COMPREHEN METABOLIC PANEL: CPT

## 2018-10-29 PROCEDURE — 6360000002 HC RX W HCPCS: Performed by: STUDENT IN AN ORGANIZED HEALTH CARE EDUCATION/TRAINING PROGRAM

## 2018-10-29 PROCEDURE — 81001 URINALYSIS AUTO W/SCOPE: CPT

## 2018-10-29 PROCEDURE — 93005 ELECTROCARDIOGRAM TRACING: CPT

## 2018-10-29 PROCEDURE — 6360000004 HC RX CONTRAST MEDICATION: Performed by: STUDENT IN AN ORGANIZED HEALTH CARE EDUCATION/TRAINING PROGRAM

## 2018-10-29 PROCEDURE — 2580000003 HC RX 258: Performed by: STUDENT IN AN ORGANIZED HEALTH CARE EDUCATION/TRAINING PROGRAM

## 2018-10-29 PROCEDURE — 82565 ASSAY OF CREATININE: CPT

## 2018-10-29 PROCEDURE — 96375 TX/PRO/DX INJ NEW DRUG ADDON: CPT

## 2018-10-29 RX ORDER — ONDANSETRON 2 MG/ML
4 INJECTION INTRAMUSCULAR; INTRAVENOUS ONCE
Status: COMPLETED | OUTPATIENT
Start: 2018-10-29 | End: 2018-10-29

## 2018-10-29 RX ORDER — MORPHINE SULFATE 4 MG/ML
4 INJECTION, SOLUTION INTRAMUSCULAR; INTRAVENOUS ONCE
Status: COMPLETED | OUTPATIENT
Start: 2018-10-29 | End: 2018-10-29

## 2018-10-29 RX ORDER — 0.9 % SODIUM CHLORIDE 0.9 %
1000 INTRAVENOUS SOLUTION INTRAVENOUS ONCE
Status: COMPLETED | OUTPATIENT
Start: 2018-10-29 | End: 2018-10-29

## 2018-10-29 RX ORDER — ONDANSETRON 4 MG/1
4 TABLET, ORALLY DISINTEGRATING ORAL EVERY 8 HOURS PRN
Qty: 20 TABLET | Refills: 0 | Status: ON HOLD | OUTPATIENT
Start: 2018-10-29 | End: 2022-05-30 | Stop reason: HOSPADM

## 2018-10-29 RX ORDER — MAGNESIUM HYDROXIDE/ALUMINUM HYDROXICE/SIMETHICONE 120; 1200; 1200 MG/30ML; MG/30ML; MG/30ML
30 SUSPENSION ORAL ONCE
Status: COMPLETED | OUTPATIENT
Start: 2018-10-29 | End: 2018-10-29

## 2018-10-29 RX ADMIN — ONDANSETRON 4 MG: 2 INJECTION INTRAMUSCULAR; INTRAVENOUS at 09:49

## 2018-10-29 RX ADMIN — ALUMINUM HYDROXIDE, MAGNESIUM HYDROXIDE, AND SIMETHICONE 30 ML: 200; 200; 20 SUSPENSION ORAL at 11:55

## 2018-10-29 RX ADMIN — SODIUM CHLORIDE 1000 ML: 9 INJECTION, SOLUTION INTRAVENOUS at 09:48

## 2018-10-29 RX ADMIN — MORPHINE SULFATE 4 MG: 4 INJECTION INTRAVENOUS at 09:49

## 2018-10-29 RX ADMIN — IOPAMIDOL 75 ML: 755 INJECTION, SOLUTION INTRAVENOUS at 10:33

## 2018-10-29 ASSESSMENT — ENCOUNTER SYMPTOMS
SORE THROAT: 0
DIARRHEA: 1
BLOOD IN STOOL: 0
NAUSEA: 1
SHORTNESS OF BREATH: 1
VOMITING: 1
COUGH: 0
ABDOMINAL PAIN: 1

## 2018-10-29 ASSESSMENT — PAIN DESCRIPTION - PAIN TYPE: TYPE: ACUTE PAIN

## 2018-10-29 ASSESSMENT — PAIN SCALES - GENERAL
PAINLEVEL_OUTOF10: 9
PAINLEVEL_OUTOF10: 9

## 2018-10-29 ASSESSMENT — PAIN DESCRIPTION - LOCATION: LOCATION: ABDOMEN

## 2018-10-29 NOTE — ED PROVIDER NOTES
Emergency Medicine Attending Note    I have seen and examined the patient in conjunction with the Resident/MLP. Please see my key portion documented:      I agree with the assessment and plan as discussed with Dr. Lizy Cardoso. Electronically signed:  PRADEEP Torres MD  10/29/18 [id number]

## 2018-10-29 NOTE — ED PROVIDER NOTES
Review of Systems   Constitutional: Positive for appetite change and chills. Negative for fatigue and fever. HENT: Negative for congestion and sore throat. Eyes: Negative for visual disturbance. Respiratory: Positive for shortness of breath. Negative for cough. Cardiovascular: Negative for chest pain. Gastrointestinal: Positive for abdominal pain, diarrhea, nausea and vomiting. Negative for blood in stool. Genitourinary: Negative for dysuria, flank pain and hematuria. Musculoskeletal: Negative for arthralgias, gait problem, neck pain and neck stiffness. Neurological: Negative for syncope, weakness, light-headedness, numbness and headaches. PHYSICAL EXAM   (up to 7 for level 4, 8 or more for level 5)      INITIAL VITALS:   BP (!) 167/76   Pulse 59   Temp 97.5 °F (36.4 °C) (Oral)   Resp 18   Ht 5' 7\" (1.702 m)   Wt 166 lb (75.3 kg)   SpO2 100%   BMI 26.00 kg/m²     Physical Exam   Gen. Appearance: patient appears well, nondistressed. HEENT: head atraumatic, normocephalic. Pupils equal and reactive to light. Oropharynx clear and moist.  Neck: Supple, normal range of motion. No lymphadenopathy. Pulmonary: Lungs clear to auscultation bilaterally. Equal air entry right left. Cardiovascular:  Heart sounds normal, no murmurs. Peripheral pulses strong, regular, equal.   Abdomen: Soft, nondistended. Moderately tender to palpation diffusely, with no rebound tenderness, guarding, or rigidity. Bowel sounds normal. No palpable masses. Neurology: GCS 15. Oriented to person place and time. Normal tone and power in all 4 extremities. No sensory deficits.     Skin: Warm, dry, well perfused      DIFFERENTIAL  DIAGNOSIS     PLAN (LABS / IMAGING / EKG):  Orders Placed This Encounter   Procedures    CT ABDOMEN PELVIS W IV CONTRAST    CBC    Urinalysis with microscopic    Lactic Acid, Whole Blood    Hemoglobin and hematocrit, blood    SODIUM (POC)    POTASSIUM (POC)    CHLORIDE (POC)  CALCIUM, IONIC (POC)    SPECIMEN REJECTION    Comprehensive Metabolic Panel    Lipase    PREVIOUS SPECIMEN    POC CHEMISTRY (NA,K,ICA,GLU,CALC HCT/HGB,LACTATE,CREA,CL)    POCT Troponin I    Venous Blood Gas, POC    Creatinine W/GFR Point of Care    Lactic Acid, POC    POCT Glucose    Anion Gap (Calc) POC    POCT troponin    EKG 12 Lead       MEDICATIONS ORDERED:  Orders Placed This Encounter   Medications    ondansetron (ZOFRAN) injection 4 mg    0.9 % sodium chloride bolus    morphine (PF) injection 4 mg    iopamidol (ISOVUE-370) 76 % injection 75 mL    aluminum & magnesium hydroxide-simethicone (MAALOX) 200-200-20 MG/5ML suspension 30 mL    ondansetron (ZOFRAN ODT) 4 MG disintegrating tablet     Sig: Take 1 tablet by mouth every 8 hours as needed for Nausea     Dispense:  20 tablet     Refill:  0       DDX: GERD, PUD, pancreatitis, cholecystitis, GB colic, cholangitis, Uual-Bxbz-Gmqffj, ACS/ MI, pneumonia, SBO, DKA, AAA, mesenteric ischemia, perforated viscous, acute gastroenteritis, NSAP, pyelonephritis, kidney stone, appendicitis, hernia, UTI, constipation, ectopic, ovarian torsion, ovarian cyst, PID, tuboovarian abscess, period/ fibroid    DIAGNOSTIC RESULTS / EMERGENCY DEPARTMENT COURSE / MDM     LABS:  Results for orders placed or performed during the hospital encounter of 10/29/18   CBC   Result Value Ref Range    WBC 10.1 3.5 - 11.3 k/uL    RBC 4.84 3.95 - 5.11 m/uL    Hemoglobin 13.9 11.9 - 15.1 g/dL    Hematocrit 42.7 36.3 - 47.1 %    MCV 88.2 82.6 - 102.9 fL    MCH 28.7 25.2 - 33.5 pg    MCHC 32.6 28.4 - 34.8 g/dL    RDW 15.5 (H) 11.8 - 14.4 %    Platelets 698 061 - 928 k/uL    MPV 11.6 8.1 - 13.5 fL    NRBC Automated 0.0 0.0 per 100 WBC   Urinalysis with microscopic   Result Value Ref Range    Color, UA YELLOW YEL    Turbidity UA CLEAR CLEAR    Glucose, Ur NEGATIVE NEG    Bilirubin Urine NEGATIVE NEG    Ketones, Urine NEGATIVE NEG    Specific [place], UA 1.021 1.005 - 1.030 of diverticulitis. Patient is afebrile, hemodynamically stable, and in no acute distress. She appears well hydrated, with normal mucous membranes, normal skin turgor, normal capillary refill. Normal respiratory effort, lungs clear bilaterally, heart sounds normal, neurologically normal.  Abdominal examination with diffuse tenderness to palpation, worse in RUQ and LLQ, no findings of surgical abdomen. History of cholecystectomy. No symptoms or findings on exam to suggest pulmonary or cardiac etiology of symptoms. Patient has been postmenopausal for many years; doubt pregnancy. With vomiting & diarrhea, doubt aortic pathology as etiology of patient's symptoms. No  symptoms. Plan for CBC, CMP, lipase, lactic acid, urinalysis, CT abdomen/pelvis. Symptomatic treatment. Reassess. RADIOLOGY:  CT ABDOMEN PELVIS W IV CONTRAST (Final result)   Result time 10/29/18 11:00:22   Final result by Fernando Curtis MD (10/29/18 11:00:22)                Impression:    No evidence for acute intra-abdominal or intrapelvic pathology.  No bowel  obstruction or inflammation.  No free intraperitoneal air or fluid.  No  evidence for urinary obstruction.  Normal appendix. Status post L5-S1 posterior spinal fusion with vertical rods and pedicle  screws.  Severe degenerative disc disease at L4-L5. Few scattered colonic diverticula without acute diverticulitis. Narrative:    EXAMINATION:  CT OF THE ABDOMEN AND PELVIS WITH CONTRAST 10/29/2018 10:33 am    TECHNIQUE:  CT of the abdomen and pelvis was performed with the administration of  intravenous contrast. Multiplanar reformatted images are provided for review. Dose modulation, iterative reconstruction, and/or weight based adjustment of  the mA/kV was utilized to reduce the radiation dose to as low as reasonably  achievable.     COMPARISON:  06/12/2017    HISTORY:  ORDERING SYSTEM PROVIDED HISTORY: eval for diverticulitis  TECHNOLOGIST PROVIDED HISTORY:  IV Only with no concerning findings. Lipase normal.  Lactic acid normal.  Urinalysis unremarkable. CT abdomen/pelvis unremarkable. Patient reassessed. Her symptoms have improved and she is amenable to outpatient follow-up. Rx for zofran provided. Patient counseled to follow up with her PCP as soon as possible which she does agree to do. I counseled the patient to return to the ED for worsening symptoms, excessive vomiting/inability to tolerate fluids, chest pain, shortness of breath, pain radiating to her back, blood in her vomit or stool, or for any other cares or concerns. Pt discharged in stable condition and in no distress. PROCEDURES:  None    CONSULTS:  None    CRITICAL CARE:  None    FINAL IMPRESSION      1. Non-intractable vomiting with nausea, unspecified vomiting type    2. Diarrhea, unspecified type    3.  Generalized abdominal pain          DISPOSITION / PLAN     DISPOSITION Decision To Discharge 10/29/2018 11:56:44 AM      PATIENT REFERRED TO:  Caesar Perez MD  [place] [address], [address] [facility]  [phone]    Schedule an appointment as soon as possible for a visit       [facility] ED  [address] [place] [address]  [phone]    As needed      DISCHARGE MEDICATIONS:  Discharge Medication List as of 10/29/2018 12:05 PM      START taking these medications    Details   ondansetron (ZOFRAN ODT) 4 MG disintegrating tablet Take 1 tablet by mouth every 8 hours as needed for Nausea, Disp-20 tablet, R-0Print             Marty Oconnor MD  Emergency Medicine Resident    (Please note that portions of thisnote were completed with a voice recognition program.  Efforts were made to edit the dictations but occasionally words are mis-transcribed.)        Marty Oconnor MD  10/30/18 0778

## 2018-10-30 LAB
GFR NON-AFRICAN AMERICAN: >60 ML/MIN
GFR SERPL CREATININE-BSD FRML MDRD: >60 ML/MIN
GFR SERPL CREATININE-BSD FRML MDRD: NORMAL ML/MIN/{1.73_M2}
POC CREATININE: 0.78 MG/DL (ref 0.51–1.19)
POC POTASSIUM: 7.2 MMOL/L (ref 3.5–4.5)

## 2019-01-02 ENCOUNTER — HOSPITAL ENCOUNTER (EMERGENCY)
Age: 49
Discharge: HOME | End: 2019-01-02
Attending: EMERGENCY MEDICINE
Payer: COMMERCIAL

## 2019-01-02 ENCOUNTER — APPOINTMENT (OUTPATIENT)
Dept: GENERAL RADIOLOGY | Age: 49
End: 2019-01-02
Payer: COMMERCIAL

## 2019-01-02 VITALS
WEIGHT: 170 LBS | BODY MASS INDEX: 26.68 KG/M2 | DIASTOLIC BLOOD PRESSURE: 91 MMHG | SYSTOLIC BLOOD PRESSURE: 153 MMHG | RESPIRATION RATE: 17 BRPM | OXYGEN SATURATION: 99 % | HEIGHT: 67 IN | TEMPERATURE: 98.1 F | HEART RATE: 96 BPM
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
## 2019-01-02 DIAGNOSIS — S20.212A CONTUSION OF LEFT CHEST WALL, INITIAL ENCOUNTER: Primary | ICD-10-CM

## 2019-01-02 PROCEDURE — 71046 X-RAY EXAM CHEST 2 VIEWS: CPT

## 2019-01-02 PROCEDURE — 99284 EMERGENCY DEPT VISIT MOD MDM: CPT

## 2019-01-02 PROCEDURE — 6360000002 HC RX W HCPCS: Performed by: STUDENT IN AN ORGANIZED HEALTH CARE EDUCATION/TRAINING PROGRAM

## 2019-01-02 PROCEDURE — 96372 THER/PROPH/DIAG INJ SC/IM: CPT

## 2019-01-02 RX ORDER — CYCLOBENZAPRINE HCL 5 MG
5 TABLET ORAL 2 TIMES DAILY PRN
Qty: 10 TABLET | Refills: 0 | Status: ON HOLD | OUTPATIENT
Start: 2019-01-02 | End: 2022-05-30 | Stop reason: HOSPADM

## 2019-01-02 RX ORDER — KETOROLAC TROMETHAMINE 30 MG/ML
30 INJECTION, SOLUTION INTRAMUSCULAR; INTRAVENOUS ONCE
Status: COMPLETED | OUTPATIENT
Start: 2019-01-02 | End: 2019-01-02

## 2019-01-02 RX ORDER — KETOROLAC TROMETHAMINE 30 MG/ML
30 INJECTION, SOLUTION INTRAMUSCULAR; INTRAVENOUS ONCE
Status: DISCONTINUED | OUTPATIENT
Start: 2019-01-02 | End: 2019-01-02

## 2019-01-02 RX ORDER — ACETAMINOPHEN 500 MG
1000 TABLET ORAL EVERY 6 HOURS PRN
Qty: 30 TABLET | Refills: 0 | Status: SHIPPED | OUTPATIENT
Start: 2019-01-02 | End: 2021-07-19

## 2019-01-02 RX ORDER — KETOCONAZOLE 20 MG/G
CREAM TOPICAL
Qty: 30 G | Refills: 1 | Status: SHIPPED | OUTPATIENT
Start: 2019-01-02

## 2019-01-02 RX ORDER — IBUPROFEN 800 MG/1
800 TABLET ORAL EVERY 8 HOURS PRN
Qty: 30 TABLET | Refills: 0 | Status: SHIPPED | OUTPATIENT
Start: 2019-01-02 | End: 2021-07-19

## 2019-01-02 RX ADMIN — KETOROLAC TROMETHAMINE 30 MG: 30 INJECTION, SOLUTION INTRAMUSCULAR at 17:19

## 2019-01-02 ASSESSMENT — PAIN SCALES - GENERAL
PAINLEVEL_OUTOF10: 8
PAINLEVEL_OUTOF10: 8

## 2019-01-02 ASSESSMENT — ENCOUNTER SYMPTOMS
SHORTNESS OF BREATH: 0
NAUSEA: 0
VOMITING: 0
ABDOMINAL PAIN: 0

## 2019-08-16 ENCOUNTER — APPOINTMENT (OUTPATIENT)
Dept: GENERAL RADIOLOGY | Age: 49
End: 2019-08-16
Payer: COMMERCIAL

## 2019-08-16 ENCOUNTER — HOSPITAL ENCOUNTER (EMERGENCY)
Age: 49
Discharge: HOME | End: 2019-08-16
Attending: EMERGENCY MEDICINE
Payer: COMMERCIAL

## 2019-08-16 VITALS
BODY MASS INDEX: 27.47 KG/M2 | OXYGEN SATURATION: 98 % | HEIGHT: 67 IN | RESPIRATION RATE: 18 BRPM | TEMPERATURE: 97.4 F | WEIGHT: 175 LBS | HEART RATE: 89 BPM | SYSTOLIC BLOOD PRESSURE: 178 MMHG | DIASTOLIC BLOOD PRESSURE: 85 MMHG

## 2019-08-16 DIAGNOSIS — M79.604 RIGHT LEG PAIN: Primary | ICD-10-CM

## 2019-08-16 LAB
ABSOLUTE EOS #: 0.16 K/UL (ref 0–0.44)
ABSOLUTE IMMATURE GRANULOCYTE: <0.03 K/UL (ref 0–0.3)
ABSOLUTE LYMPH #: 3.41 K/UL (ref 1.1–3.7)
ABSOLUTE MONO #: 0.54 K/UL (ref 0.1–1.2)
ANION GAP SERPL CALCULATED.3IONS-SCNC: 13 MMOL/L (ref 9–17)
BASOPHILS # BLD: 1 % (ref 0–2)
BASOPHILS ABSOLUTE: 0.05 K/UL (ref 0–0.2)
BUN BLDV-MCNC: 11 MG/DL (ref 6–20)
BUN/CREAT BLD: ABNORMAL (ref 9–20)
C-REACTIVE PROTEIN: 12.8 MG/L (ref 0–5)
CALCIUM SERPL-MCNC: 9.5 MG/DL (ref 8.6–10.4)
CHLORIDE BLD-SCNC: 97 MMOL/L (ref 98–107)
CO2: 25 MMOL/L (ref 20–31)
CREAT SERPL-MCNC: 0.64 MG/DL (ref 0.5–0.9)
DIFFERENTIAL TYPE: ABNORMAL
EOSINOPHILS RELATIVE PERCENT: 3 % (ref 1–4)
GFR AFRICAN AMERICAN: >60 ML/MIN
GFR NON-AFRICAN AMERICAN: >60 ML/MIN
GFR SERPL CREATININE-BSD FRML MDRD: ABNORMAL ML/MIN/{1.73_M2}
GFR SERPL CREATININE-BSD FRML MDRD: ABNORMAL ML/MIN/{1.73_M2}
GLUCOSE BLD-MCNC: 177 MG/DL (ref 70–99)
HCT VFR BLD CALC: 43.1 % (ref 36.3–47.1)
HEMOGLOBIN: 13.5 G/DL (ref 11.9–15.1)
IMMATURE GRANULOCYTES: 0 %
LYMPHOCYTES # BLD: 53 % (ref 24–43)
MCH RBC QN AUTO: 27.3 PG (ref 25.2–33.5)
MCHC RBC AUTO-ENTMCNC: 31.3 G/DL (ref 28.4–34.8)
MCV RBC AUTO: 87.2 FL (ref 82.6–102.9)
MONOCYTES # BLD: 8 % (ref 3–12)
NRBC AUTOMATED: 0 PER 100 WBC
PDW BLD-RTO: 15.4 % (ref 11.8–14.4)
PLATELET # BLD: 367 K/UL (ref 138–453)
PLATELET ESTIMATE: ABNORMAL
PMV BLD AUTO: 9.5 FL (ref 8.1–13.5)
POTASSIUM SERPL-SCNC: 3.7 MMOL/L (ref 3.7–5.3)
RBC # BLD: 4.94 M/UL (ref 3.95–5.11)
RBC # BLD: ABNORMAL 10*6/UL
SEG NEUTROPHILS: 35 % (ref 36–65)
SEGMENTED NEUTROPHILS ABSOLUTE COUNT: 2.26 K/UL (ref 1.5–8.1)
SODIUM BLD-SCNC: 135 MMOL/L (ref 135–144)
WBC # BLD: 6.4 K/UL (ref 3.5–11.3)
WBC # BLD: ABNORMAL 10*3/UL

## 2019-08-16 PROCEDURE — 73610 X-RAY EXAM OF ANKLE: CPT

## 2019-08-16 PROCEDURE — 80048 BASIC METABOLIC PNL TOTAL CA: CPT

## 2019-08-16 PROCEDURE — 86140 C-REACTIVE PROTEIN: CPT

## 2019-08-16 PROCEDURE — 85025 COMPLETE CBC W/AUTO DIFF WBC: CPT

## 2019-08-16 PROCEDURE — 6370000000 HC RX 637 (ALT 250 FOR IP): Performed by: STUDENT IN AN ORGANIZED HEALTH CARE EDUCATION/TRAINING PROGRAM

## 2019-08-16 PROCEDURE — 99283 EMERGENCY DEPT VISIT LOW MDM: CPT

## 2019-08-16 RX ORDER — IBUPROFEN 800 MG/1
800 TABLET ORAL ONCE
Status: COMPLETED | OUTPATIENT
Start: 2019-08-16 | End: 2019-08-16

## 2019-08-16 RX ORDER — ACETAMINOPHEN 500 MG
1000 TABLET ORAL ONCE
Status: COMPLETED | OUTPATIENT
Start: 2019-08-16 | End: 2019-08-16

## 2019-08-16 RX ORDER — OXYCODONE HYDROCHLORIDE 5 MG/1
5 TABLET ORAL ONCE
Status: COMPLETED | OUTPATIENT
Start: 2019-08-16 | End: 2019-08-16

## 2019-08-16 RX ADMIN — IBUPROFEN 800 MG: 800 TABLET, FILM COATED ORAL at 19:09

## 2019-08-16 RX ADMIN — ACETAMINOPHEN 1000 MG: 500 TABLET ORAL at 19:09

## 2019-08-16 RX ADMIN — OXYCODONE HYDROCHLORIDE 5 MG: 5 TABLET ORAL at 21:41

## 2019-08-16 ASSESSMENT — ENCOUNTER SYMPTOMS
COLOR CHANGE: 0
NAUSEA: 0
PHOTOPHOBIA: 0
WHEEZING: 0
RHINORRHEA: 0
ABDOMINAL PAIN: 0
VOMITING: 0
SHORTNESS OF BREATH: 0

## 2019-08-16 ASSESSMENT — PAIN DESCRIPTION - LOCATION: LOCATION: ANKLE;LEG

## 2019-08-16 ASSESSMENT — PAIN SCALES - GENERAL: PAINLEVEL_OUTOF10: 9

## 2019-08-16 ASSESSMENT — PAIN DESCRIPTION - PROGRESSION: CLINICAL_PROGRESSION: GRADUALLY WORSENING

## 2019-08-16 ASSESSMENT — PAIN DESCRIPTION - FREQUENCY: FREQUENCY: CONTINUOUS

## 2019-08-16 ASSESSMENT — PAIN DESCRIPTION - PAIN TYPE: TYPE: ACUTE PAIN

## 2019-08-16 ASSESSMENT — PAIN DESCRIPTION - ORIENTATION: ORIENTATION: LEFT

## 2019-08-16 ASSESSMENT — PAIN DESCRIPTION - DESCRIPTORS: DESCRIPTORS: BURNING;TINGLING;ACHING

## 2019-08-17 NOTE — ED PROVIDER NOTES
[facility]     Emergency Department     Faculty Note/ Attestation      Pt Name: Ami Sen                                       MRN: [id number]  [place] 1970  Date of evaluation: 8/16/2019    Patients PCP:    Genora Landau, MD    Attestation  I performed a history and physical examination of the patient and discussed management with the resident. I reviewed the residents note and agree with the documented findings and plan of care. Any areas of disagreement are noted on the chart. I was personally present for the key portions of any procedures. I have documented in the chart those procedures where I was not present during the key portions. I have reviewed the emergency nurses triage note. I agree with the chief complaint, past medical history, past surgical history, allergies, medications, social and family history as documented unless otherwise noted below. For Physician Assistant/ Nurse Practitioner cases/documentation I have personally evaluated this patient and have completed at least one if not all key elements of the E/M (history, physical exam, and MDM). Additional findings are as noted. Initial Screens:        Mac Coma Scale  Eye Opening: Spontaneous  Best Verbal Response: Oriented  Best Motor Response: Obeys commands  [place] Coma Scale Score: 15    Vitals:    Vitals:    08/16/19 1831 08/16/19 2108 08/16/19 2200   BP: (!) 186/111 (!) 187/97 (!) 178/85   Pulse: 93 90 89   Resp: 16 16 18   Temp: 97.4 °F (36.3 °C)     TempSrc: Oral     SpO2: 100% 98% 98%   Weight: 175 lb (79.4 kg)     Height: 5' 7\" (1.702 m)         CHIEF COMPLAINT       Chief Complaint   Patient presents with    Ankle Pain     Pain radiating up leg. x2 days. Had stints put in 8/5        The pt arrives with ankle pain x 2 days. The pt had vascular surgery on the 5th of August with Stents. The pt now having pain in distal fibula without any trauma. The pt surgery was at [facility].   The pt has
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    Sexual activity: Not on file   Lifestyle    Physical activity:     Days per week: Not on file     Minutes per session: Not on file    Stress: Not on file   Relationships    Social connections:     Talks on phone: Not on file     Gets together: Not on file     Attends Bahai service: Not on file     Active member of club or organization: Not on file     Attends meetings of clubs or organizations: Not on file     Relationship status: Not on file    Intimate partner violence:     Fear of current or ex partner: Not on file     Emotionally abused: Not on file     Physically abused: Not on file     Forced sexual activity: Not on file   Other Topics Concern    Not on file   Social History Narrative    Not on file       Did not review family history with patient    Allergies:  Codeine    Home Medications:  Prior to Admission medications    Medication Sig Start Date End Date Taking?  Authorizing Provider   ibuprofen (ADVIL;MOTRIN) 800 MG tablet Take 1 tablet by mouth every 8 hours as needed for Pain 1/2/19   Judy Madden, DO   acetaminophen (TYLENOL) 500 MG tablet Take 2 tablets by mouth every 6 hours as needed for Pain 1/2/19   Judy Madden, DO   cyclobenzaprine (FLEXERIL) 5 MG tablet Take 1 tablet by mouth 2 times daily as needed for Muscle spasms 1/2/19   Judy Madden, DO   ketoconazole (NIZORAL) 2 % cream Apply topically daily below Left breast 1/2/19   Judy Madden, DO   ondansetron (ZOFRAN ODT) 4 MG disintegrating tablet Take 1 tablet by mouth every 8 hours as needed for Nausea 10/29/18   Natalie Amaya MD   insulin glargine (LANTUS) 100 UNIT/ML injection pen Inject 60 Units into the skin nightly    Historical Provider, MD   lisinopril (PRINIVIL;ZESTRIL) 2.5 MG tablet Take 2.5 mg by mouth daily    Historical Provider, MD   atorvastatin

## 2020-01-19 ENCOUNTER — APPOINTMENT (OUTPATIENT)
Dept: GENERAL RADIOLOGY | Age: 50
End: 2020-01-19
Payer: COMMERCIAL

## 2020-01-19 ENCOUNTER — HOSPITAL ENCOUNTER (EMERGENCY)
Age: 50
Discharge: HOME | End: 2020-01-19
Attending: EMERGENCY MEDICINE
Payer: COMMERCIAL

## 2020-01-19 VITALS
HEART RATE: 69 BPM | SYSTOLIC BLOOD PRESSURE: 114 MMHG | OXYGEN SATURATION: 97 % | RESPIRATION RATE: 16 BRPM | DIASTOLIC BLOOD PRESSURE: 66 MMHG

## 2020-01-19 LAB
ACETAMINOPHEN LEVEL: <5 UG/ML (ref 10–30)
ALBUMIN SERPL-MCNC: 3.7 G/DL (ref 3.5–5.2)
ALBUMIN/GLOBULIN RATIO: 1.1 (ref 1–2.5)
ALP BLD-CCNC: 96 U/L (ref 35–104)
ALT SERPL-CCNC: 13 U/L (ref 5–33)
ANION GAP SERPL CALCULATED.3IONS-SCNC: 16 MMOL/L (ref 9–17)
AST SERPL-CCNC: 12 U/L
BILIRUB SERPL-MCNC: 0.26 MG/DL (ref 0.3–1.2)
BILIRUBIN URINE: NEGATIVE
BUN BLDV-MCNC: 10 MG/DL (ref 6–20)
BUN/CREAT BLD: ABNORMAL (ref 9–20)
CALCIUM SERPL-MCNC: 9.2 MG/DL (ref 8.6–10.4)
CHLORIDE BLD-SCNC: 95 MMOL/L (ref 98–107)
CO2: 21 MMOL/L (ref 20–31)
COLOR: YELLOW
COMMENT UA: NORMAL
CREAT SERPL-MCNC: 0.62 MG/DL (ref 0.5–0.9)
ETHANOL PERCENT: <0.01 %
ETHANOL: <10 MG/DL
GFR AFRICAN AMERICAN: >60 ML/MIN
GFR NON-AFRICAN AMERICAN: >60 ML/MIN
GFR SERPL CREATININE-BSD FRML MDRD: ABNORMAL ML/MIN/{1.73_M2}
GFR SERPL CREATININE-BSD FRML MDRD: ABNORMAL ML/MIN/{1.73_M2}
GLUCOSE BLD-MCNC: 125 MG/DL (ref 65–105)
GLUCOSE BLD-MCNC: 224 MG/DL (ref 70–99)
GLUCOSE BLD-MCNC: 57 MG/DL (ref 65–105)
GLUCOSE URINE: NEGATIVE
HCT VFR BLD CALC: 44.3 % (ref 36.3–47.1)
HEMOGLOBIN: 14.9 G/DL (ref 11.9–15.1)
KETONES, URINE: NEGATIVE
LEUKOCYTE ESTERASE, URINE: NEGATIVE
MCH RBC QN AUTO: 28.8 PG (ref 25.2–33.5)
MCHC RBC AUTO-ENTMCNC: 33.6 G/DL (ref 28.4–34.8)
MCV RBC AUTO: 85.5 FL (ref 82.6–102.9)
NITRITE, URINE: NEGATIVE
NRBC AUTOMATED: 0 PER 100 WBC
PDW BLD-RTO: 16.4 % (ref 11.8–14.4)
PH UA: 6.5 (ref 5–8)
PLATELET # BLD: 311 K/UL (ref 138–453)
PMV BLD AUTO: 9.7 FL (ref 8.1–13.5)
POTASSIUM SERPL-SCNC: 3.5 MMOL/L (ref 3.7–5.3)
PROTEIN UA: NEGATIVE
RBC # BLD: 5.18 M/UL (ref 3.95–5.11)
SALICYLATE LEVEL: <1 MG/DL (ref 3–10)
SODIUM BLD-SCNC: 132 MMOL/L (ref 135–144)
SPECIFIC GRAVITY UA: 1.01 (ref 1–1.03)
TOTAL PROTEIN: 7 G/DL (ref 6.4–8.3)
TOXIC TRICYCLIC SC,BLOOD: NEGATIVE
TURBIDITY: CLEAR
URINE HGB: NEGATIVE
UROBILINOGEN, URINE: NORMAL
WBC # BLD: 7.8 K/UL (ref 3.5–11.3)

## 2020-01-19 PROCEDURE — 82947 ASSAY GLUCOSE BLOOD QUANT: CPT

## 2020-01-19 PROCEDURE — 81003 URINALYSIS AUTO W/O SCOPE: CPT

## 2020-01-19 PROCEDURE — 80307 DRUG TEST PRSMV CHEM ANLYZR: CPT

## 2020-01-19 PROCEDURE — 99284 EMERGENCY DEPT VISIT MOD MDM: CPT

## 2020-01-19 PROCEDURE — 80053 COMPREHEN METABOLIC PANEL: CPT

## 2020-01-19 PROCEDURE — 85027 COMPLETE CBC AUTOMATED: CPT

## 2020-01-19 PROCEDURE — G0480 DRUG TEST DEF 1-7 CLASSES: HCPCS

## 2020-01-19 NOTE — ED PROVIDER NOTES
[facility]     Emergency Department     Faculty Attestation    I performed a history and physical examination of the patient and discussed management with the resident. I reviewed the residents note and agree with the documented findings including all diagnostic interpretations and plan of care. Any areas of disagreement are noted on the chart. I was personally present for the key portions of any procedures. I have documented in the chart those procedures where I was not present during the key portions. I have reviewed the emergency nurses triage note. I agree with the chief complaint, past medical history, past surgical history, allergies, medications, social and family history as documented unless otherwise noted below. Documentation of the HPI, Physical Exam and Medical Decision Making performed by scribheriberto is based on my personal performance of the HPI, PE and MDM. For Physician Assistant/ Nurse Practitioner cases/documentation I have personally evaluated this patient and have completed at least one if not all key elements of the E/M (history, physical exam, and MDM). Additional findings are as noted. Primary Care Physician: Carly Christianson MD    History: This is a 44 y.o. female who presents to the Emergency Department with complaint of blood sugar abnormality and feeling funny. Cannot give more specific than just feeling off. Has had polyuria but no polydipsia. Blood sugar check was over 400 before taking routine insulin regimen. No chest pain or shortness of breath no nausea no vomiting no abdominal pain. Has reported cough recently. Physical:     blood pressure is 142/71 (abnormal) and her pulse is 90. Her respiration is 18 and oxygen saturation is 99%. 44 y.o. female No acute distress, cardiac exam regular rate and rhythm no murmurs rubs gallops, pulmonary clear bilaterally abdomen is soft nontender nondistended.   Radial pulse 2+ bilaterally. Calves nontender nonswollen.     Impression: hyperglycemia    Plan: labs, fluids, chest x-ray and UA, reassess, recheck Glu      Loreto Shearer MD, [facility]  Attending Emergency Physician        Pia Chavis MD  01/19/20 [id number]

## 2020-01-19 NOTE — ED PROVIDER NOTES
[facility]  ED  Emergency Department Encounter  EmergencyMedicine Resident     Pt Name:Mikael Foster  MRN: [id number]  [place] 1970  Date of evaluation: 1/28/20  PCP:  Melody Asher MD    CHIEF COMPLAINT       Chief Complaint   Patient presents with    Diabetes     High/low sugar       HISTORY OF PRESENT ILLNESS  (Location/Symptom, Timing/Onset, Context/Setting, Quality, Duration, Modifying Factors, Severity.)      Aniya Strickland is a 44 y.o. female who presents with hyperglycemia. Patient states that she took additional insulin at home because her sugar was greater than 400. She took 10 units of Humalog a few hours ago and subsequently took an additional 20 units because her sugar was not coming down quick enough. Patient without any vomiting, however complains of nausea. No fevers or recent illnesses. She denies any abdominal pain. Patient with history of type 2 diabetes and currently takes 70/30 twice daily with sliding scale. PAST MEDICAL / SURGICAL / SOCIAL / FAMILY HISTORY      has a past medical history of Back pain, Bipolar 1 disorder ([facility] Diabetes mellitus ([facility] [address] Disc disorder, and Hypertension. has a past surgical history that includes Cholecystectomy and back surgery.     Social History     Socioeconomic History    Marital status: Single     Spouse name: Not on file    Number of children: Not on file    Years of education: Not on file    Highest education level: Not on file   Occupational History    Not on file   Social Needs    Financial resource strain: Not on file    Food insecurity:     Worry: Not on file     Inability: Not on file    Transportation needs:     Medical: Not on file     Non-medical: Not on file   Tobacco Use    Smoking status: Current Every Day Smoker     Packs/day: 0.50     Years: 13.00     Pack years: 6.50     Types: Cigarettes    Smokeless 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    Sexual activity: Not on file   Lifestyle    Physical activity:     Days per week: Not on file     Minutes per session: Not on file    Stress: Not on file   Relationships    Social connections:     Talks on phone: Not on file     Gets together: Not on file     Attends Sabianism service: Not on file     Active member of club or organization: Not on file     Attends meetings of clubs or organizations: Not on file     Relationship status: Not on file    Intimate partner violence:     Fear of current or ex partner: Not on file     Emotionally abused: Not on file     Physically abused: Not on file     Forced sexual activity: Not on file   Other Topics Concern    Not on file   Social History Narrative    Not on file       No family history on file. Allergies:  Codeine    Home Medications:  Prior to Admission medications    Medication Sig Start Date End Date Taking?  Authorizing Provider   ibuprofen (ADVIL;MOTRIN) 800 MG tablet Take 1 tablet by mouth every 8 hours as needed for Pain 1/2/19   Judy Madden, DO   acetaminophen (TYLENOL) 500 MG tablet Take 2 tablets by mouth every 6 hours as needed for Pain 1/2/19   Judy Madden, DO   cyclobenzaprine (FLEXERIL) 5 MG tablet Take 1 tablet by mouth 2 times daily as needed for Muscle spasms 1/2/19   Judy Madden, DO   ketoconazole (NIZORAL) 2 % cream Apply topically daily below Left breast 1/2/19   Judy Madden, DO   ondansetron (ZOFRAN ODT) 4 MG disintegrating tablet Take 1 tablet by mouth every 8 hours as needed for Nausea 10/29/18   Paula Hager MD   insulin glargine (LANTUS) 100 UNIT/ML injection pen Inject 60 Units into the skin nightly    Historical Provider, MD   lisinopril (PRINIVIL;ZESTRIL) 2.5 MG tablet Take 2.5 mg by mouth daily    Historical Provider, MD   atorvastatin (LIPITOR) 40 MG tablet Take 40 mg by mouth daily    Historical Provider, MD   aspirin 81 MG tablet Take 81 mg by mouth daily    Historical Provider, MD   dicyclomine (BENTYL) 10 MG capsule Take 2 capsules by mouth every 6 hours as needed (abdominal spasm) 10/21/16   Laureen Haro MD   ibuprofen (ADVIL;MOTRIN) 800 MG tablet Take 1 tablet by mouth every 8 hours as needed for Pain 10/1/16   Leisa Ramirez MD   insulin aspart (NOVOLOG) 100 UNIT/ML injection pen Inject 10 Units into the skin 3 times daily (before meals)     Historical Provider, MD   QUEtiapine (SEROQUEL) 50 MG tablet Take 100 mg by mouth nightly Indications: (2 tabs @ HS)     Historical Provider, MD       REVIEW OF SYSTEMS    (2-9 systems for level 4, 10 or more for level 5)      Review of Systems   Constitutional: Negative for chills and fever. HENT: Negative for sore throat and trouble swallowing. Eyes: Negative for photophobia. Respiratory: Negative for cough, chest tightness, shortness of breath and wheezing. Cardiovascular: Negative for chest pain and palpitations. Gastrointestinal: Positive for nausea. Negative for abdominal pain and vomiting. Endocrine: Negative for polyuria. Genitourinary: Negative for dysuria and flank pain. Musculoskeletal: Negative for back pain and neck pain. Skin: Negative for rash and wound. Neurological: Negative for syncope, weakness, light-headedness and headaches. Psychiatric/Behavioral: Negative for agitation and confusion.          PHYSICAL EXAM   (up to 7 for level 4, 8 or more for level 5)      INITIAL VITALS:   /66   Pulse 69   Resp 16   SpO2 97%       CONSTITUTIONAL: awake, alert, cooperative, no apparent distress  HEAD: atraumatic, normocephalic  EYES: sclera clear, pupils equal and reactive to light  ENT: ears are symmetric, nares patent   HEENT: moist mucous membranes  NECK: Supple, symmetrical, trachea midline  LUNGS: no respiratory distress, no audible wheezing  CARDIOVASCULAR: +S1/S2  ABDOMEN: soft, nontender, nondistended, no guarding, no rebound tenderness MUSCULOSKELETAL: full range of motion noted  NEUROLOGIC: Awake, alert, oriented to name, place and time  EXTREMITIES: peripheral pulses normal, no pedal edema, no calf tenderness  SKIN: normal coloration and turgor    DIFFERENTIAL  DIAGNOSIS     PLAN (LABS / IMAGING / EKG):  Orders Placed This Encounter   Procedures    CBC    Comprehensive Metabolic Panel    TOX SCR, BLD, ED    Urinalysis Reflex to Culture    POC Glucose Fingerstick    POC Glucose Fingerstick       MEDICATIONS ORDERED:  No orders of the defined types were placed in this encounter.         DIAGNOSTIC RESULTS / EMERGENCY DEPARTMENT COURSE / MDM     LABS:  Results for orders placed or performed during the hospital encounter of 01/19/20   CBC   Result Value Ref Range    WBC 7.8 3.5 - 11.3 k/uL    RBC 5.18 (H) 3.95 - 5.11 m/uL    Hemoglobin 14.9 11.9 - 15.1 g/dL    Hematocrit 44.3 36.3 - 47.1 %    MCV 85.5 82.6 - 102.9 fL    MCH 28.8 25.2 - 33.5 pg    MCHC 33.6 28.4 - 34.8 g/dL    RDW 16.4 (H) 11.8 - 14.4 %    Platelets 762 526 - 019 k/uL    MPV 9.7 8.1 - 13.5 fL    NRBC Automated 0.0 0.0 per 100 WBC   Comprehensive Metabolic Panel   Result Value Ref Range    Glucose 224 (H) 70 - 99 mg/dL    BUN 10 6 - 20 mg/dL    CREATININE 0.62 0.50 - 0.90 mg/dL    Bun/Cre Ratio NOT REPORTED 9 - 20    Calcium 9.2 8.6 - 10.4 mg/dL    Sodium 132 (L) 135 - 144 mmol/L    Potassium 3.5 (L) 3.7 - 5.3 mmol/L    Chloride 95 (L) 98 - 107 mmol/L    CO2 21 20 - 31 mmol/L    Anion Gap 16 9 - 17 mmol/L    Alkaline Phosphatase 96 35 - 104 U/L    ALT 13 5 - 33 U/L    AST 12 <32 U/L    Total Bilirubin 0.26 (L) 0.3 - 1.2 mg/dL    Total Protein 7.0 6.4 - 8.3 g/dL    Alb 3.7 3.5 - 5.2 g/dL    Albumin/Globulin Ratio 1.1 1.0 - 2.5    GFR Non-African American >60 >60 mL/min    GFR African American >60 >60 mL/min    GFR Comment          GFR Staging NOT REPORTED    TOX SCR, BLD, ED   Result Value Ref Range    Ethanol <10 <10 mg/dL    Ethanol percent <4.942 <2.792 %    Salicylate Lvl <1 [address]    Schedule an appointment as soon as possible for a visit in 1 day  For Follow-up      DISCHARGE MEDICATIONS:  Discharge Medication List as of 1/19/2020  6:46 AM          Lorna Arenas MD  Emergency Medicine Resident    (Please note that portions of thisnote were completed with a voice recognition program.  Efforts were made to edit the dictations but occasionally words are mis-transcribed.)       Lorna Arenas MD  01/28/20 1038

## 2020-01-20 ENCOUNTER — HOSPITAL ENCOUNTER (OUTPATIENT)
Dept: GENERAL RADIOLOGY | Age: 50
Discharge: HOME | End: 2020-01-22
Payer: COMMERCIAL

## 2020-01-20 PROCEDURE — 71046 X-RAY EXAM CHEST 2 VIEWS: CPT

## 2020-01-28 ASSESSMENT — ENCOUNTER SYMPTOMS
VOMITING: 0
PHOTOPHOBIA: 0
TROUBLE SWALLOWING: 0
NAUSEA: 1
ABDOMINAL PAIN: 0
SORE THROAT: 0
BACK PAIN: 0
COUGH: 0
CHEST TIGHTNESS: 0
SHORTNESS OF BREATH: 0
WHEEZING: 0

## 2021-07-19 ENCOUNTER — HOSPITAL ENCOUNTER (EMERGENCY)
Age: 51
Discharge: HOME | End: 2021-07-19
Attending: EMERGENCY MEDICINE
Payer: COMMERCIAL

## 2021-07-19 ENCOUNTER — APPOINTMENT (OUTPATIENT)
Dept: GENERAL RADIOLOGY | Age: 51
End: 2021-07-19
Payer: COMMERCIAL

## 2021-07-19 VITALS
DIASTOLIC BLOOD PRESSURE: 69 MMHG | HEART RATE: 95 BPM | SYSTOLIC BLOOD PRESSURE: 155 MMHG | TEMPERATURE: 97.8 F | RESPIRATION RATE: 20 BRPM | OXYGEN SATURATION: 99 %

## 2021-07-19 DIAGNOSIS — M79.672 LEFT FOOT PAIN: Primary | ICD-10-CM

## 2021-07-19 PROCEDURE — 6370000000 HC RX 637 (ALT 250 FOR IP): Performed by: STUDENT IN AN ORGANIZED HEALTH CARE EDUCATION/TRAINING PROGRAM

## 2021-07-19 PROCEDURE — 73630 X-RAY EXAM OF FOOT: CPT

## 2021-07-19 PROCEDURE — 73610 X-RAY EXAM OF ANKLE: CPT

## 2021-07-19 PROCEDURE — 99283 EMERGENCY DEPT VISIT LOW MDM: CPT

## 2021-07-19 RX ORDER — IBUPROFEN 400 MG/1
600 TABLET ORAL ONCE
Status: COMPLETED | OUTPATIENT
Start: 2021-07-19 | End: 2021-07-19

## 2021-07-19 RX ORDER — IBUPROFEN 600 MG/1
600 TABLET ORAL EVERY 6 HOURS PRN
Qty: 20 TABLET | Refills: 0 | Status: SHIPPED | OUTPATIENT
Start: 2021-07-19

## 2021-07-19 RX ORDER — ACETAMINOPHEN 500 MG
500 TABLET ORAL EVERY 6 HOURS PRN
Qty: 20 TABLET | Refills: 0 | Status: SHIPPED | OUTPATIENT
Start: 2021-07-19

## 2021-07-19 RX ORDER — IBUPROFEN 600 MG/1
600 TABLET ORAL EVERY 6 HOURS PRN
Qty: 80 TABLET | Refills: 0 | Status: SHIPPED | OUTPATIENT
Start: 2021-07-19 | End: 2021-07-19 | Stop reason: SDUPTHER

## 2021-07-19 RX ADMIN — IBUPROFEN 600 MG: 400 TABLET, FILM COATED ORAL at 18:57

## 2021-07-19 ASSESSMENT — ENCOUNTER SYMPTOMS
NAUSEA: 0
COUGH: 0
ABDOMINAL PAIN: 0
VOMITING: 0
WHEEZING: 0

## 2021-07-19 ASSESSMENT — PAIN SCALES - GENERAL
PAINLEVEL_OUTOF10: 10
PAINLEVEL_OUTOF10: 10

## 2021-07-19 NOTE — ED NOTES
Patient was wearing wedge shoes yesterday when she twisted her ankle. Now complains of pain.  Ice pack given      Srikanth Courtney RN  07/19/21 [id number]

## 2021-07-19 NOTE — ED PROVIDER NOTES
Cholecystectomy and back surgery. 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    Sexual activity: Not on file   Other Topics Concern    Not on file   Social History Narrative    Not on file     Social Determinants of Health     Financial Resource Strain:     Difficulty of Paying Living Expenses:    Food Insecurity:     Worried About Running Out of Food in the Last Year:     Ran Out of Food in the Last Year:    Transportation Needs:     Lack of Transportation (Medical):  Lack of Transportation (Non-Medical):    Physical Activity:     Days of Exercise per Week:     Minutes of Exercise per Session:    Stress:     Feeling of Stress :    Social Connections:     Frequency of Communication with Friends and Family:     Frequency of Social Gatherings with Friends and Family:     Attends Bahai Services:     Active Member of Clubs or Organizations:     Attends Club or Organization Meetings:     Marital Status:    Intimate Partner Violence:     Fear of Current or Ex-Partner:     Emotionally Abused:     Physically Abused:     Sexually Abused:        No family history on file. Allergies:  Codeine    Home Medications:  Prior to Admission medications    Medication Sig Start Date End Date Taking?  Authorizing Provider   acetaminophen (TYLENOL) 500 MG tablet Take 1 tablet by mouth every 6 hours as needed for Pain 7/19/21  Yes Malini Shen, DO   ibuprofen (ADVIL;MOTRIN) 600 MG tablet Take 1 tablet by mouth every 6 hours as needed for Pain 7/19/21  Yes Sanchez Crespo, DO   cyclobenzaprine (FLEXERIL) 5 MG tablet Take 1 tablet by mouth 2 times daily as needed for Muscle spasms 1/2/19   Judy Madden,    ketoconazole (NIZORAL) 2 % cream Apply topically daily below Left breast 1/2/19   Judy Madden, DO   ondansetron (ZOFRAN ODT) 4 MG disintegrating tablet Take 1 tablet by mouth every 8 hours as needed for Nausea 10/29/18   Shantel Morejon MD   insulin glargine (LANTUS) 100 UNIT/ML injection pen Inject 60 Units into the skin nightly    Historical Provider, MD   lisinopril (PRINIVIL;ZESTRIL) 2.5 MG tablet Take 2.5 mg by mouth daily    Historical Provider, MD   atorvastatin (LIPITOR) 40 MG tablet Take 40 mg by mouth daily    Historical Provider, MD   aspirin 81 MG tablet Take 81 mg by mouth daily    Historical Provider, MD   dicyclomine (BENTYL) 10 MG capsule Take 2 capsules by mouth every 6 hours as needed (abdominal spasm) 10/21/16   Jaky Toscano MD   insulin aspart (NOVOLOG) 100 UNIT/ML injection pen Inject 10 Units into the skin 3 times daily (before meals)     Historical Provider, MD   QUEtiapine (SEROQUEL) 50 MG tablet Take 100 mg by mouth nightly Indications: (2 tabs @ HS)     Historical Provider, MD       REVIEW OF SYSTEMS    (2-9 systems for level 4, 10 or more for level 5)      Review of Systems   Constitutional: Negative for chills and fever. HENT: Negative for congestion. Respiratory: Negative for cough and wheezing. Cardiovascular: Negative for chest pain. Gastrointestinal: Negative for abdominal pain, nausea and vomiting. Musculoskeletal: Positive for arthralgias. Negative for neck pain and neck stiffness. Left ankle and foot pain   Skin: Negative for rash. Neurological: Negative for dizziness, weakness and numbness.        PHYSICAL EXAM   (up to 7 for level 4, 8 or more for level 5)      INITIAL VITALS:   BP (!) 155/69   Pulse 95   Temp 97.8 °F (36.6 °C)   Resp 20   SpO2 99%     Physical Exam  Constitutional:       General: She is not in acute distress. Appearance: Normal appearance. She is not ill-appearing or toxic-appearing. HENT:      Head: Normocephalic and atraumatic. Nose: Nose normal.   Eyes:      Extraocular Movements: Extraocular movements intact. Conjunctiva/sclera: Conjunctivae normal.   Cardiovascular:      Rate and Rhythm: Normal rate and regular rhythm. Pulses: Normal pulses. Pulmonary:      Effort: Pulmonary effort is normal. No respiratory distress. Breath sounds: No stridor. No wheezing, rhonchi or rales. Abdominal:      General: There is no distension. Palpations: Abdomen is soft. Tenderness: There is no abdominal tenderness. There is no guarding or rebound. Musculoskeletal:         General: No deformity. Cervical back: Normal range of motion. No muscular tenderness. Comments: Mild tenderness to the inferior aspect of the lateral malleolus of left ankle. Normal range of motion. Diffuse pain in foot involving fifth metatarsal and extending in the toes. No deformity. Sensation to light touch intact. Normal DP PT pulses. No tenderness and full range of motion at the knee. Skin:     General: Skin is warm and dry. Findings: No rash. Neurological:      Mental Status: She is alert and oriented to person, place, and time.    Psychiatric:         Behavior: Behavior normal.         DIFFERENTIAL  DIAGNOSIS     PLAN (LABS / IMAGING / EKG):  Orders Placed This Encounter   Procedures    XR FOOT LEFT (MIN 3 VIEWS)    XR ANKLE LEFT (MIN 3 VIEWS)    [employer] Post Op Shoe, Unisex - Left; MD (M7.5-9/F8.5-10)       MEDICATIONS ORDERED:  Orders Placed This Encounter   Medications    ibuprofen (ADVIL;MOTRIN) tablet 600 mg    acetaminophen (TYLENOL) 500 MG tablet     Sig: Take 1 tablet by mouth every 6 hours as needed for Pain     Dispense:  20 tablet     Refill:  0    DISCONTD: ibuprofen (ADVIL;MOTRIN) 600 MG tablet     Sig: Take 1 tablet by mouth every 6 hours as needed for Pain     Dispense:  80 tablet     Refill:  0    ibuprofen (ADVIL;MOTRIN) 600 MG tablet     Sig: Take 1 tablet by mouth every 6 hours as needed for Pain     Dispense:  20 tablet     Refill:  0         DIAGNOSTIC RESULTS / EMERGENCY DEPARTMENT COURSE / MDM     No results found for this visit on 07/19/21. RADIOLOGY:  XR FOOT LEFT (MIN 3 VIEWS)   Final Result   Left ankle: No acute osseous abnormality. Left foot: Pes planus deformity. Arthritic changes without acute osseous   abnormality. XR ANKLE LEFT (MIN 3 VIEWS)   Final Result   Left ankle: No acute osseous abnormality. Left foot: Pes planus deformity. Arthritic changes without acute osseous   abnormality. IMPRESSION/MDM/EMERGENCY DEPARTMENT COURSE:  Patient came to emergency department, HPI and physical exam were conducted. All nursing notes were reviewed. 78-year-old female present emergency department with complaints of left ankle and foot pain after rolling ankle while wearing high heels yesterday. Difficulty ambulating secondary to pain. Mild associated swelling and limited range of motion due to pain. Concern for fracture. Mildly hypertensive 155/69 could be associated with pain but otherwise asymptomatic. Differential includes sprain, strain, fracture, contusion. Plan for imaging left ankle and foot. Meeting criteria by [place] ankle and foot rules. If no fracture plan for rice therapy and discharge. Crutches as needed. X-rays negative for dislocation or acute fracture. Pain mildly improved after icing it and ibuprofen. Discussed alternate doses of Tylenol Motrin as needed as well as rice therapy with patient and she is agreeable. Ace wrap applied by nursing as well as postop shoe to help with comfort. Patient able to ambulate and not needing crutches.   Discharged home plan to follow-up with PCP

## 2021-07-19 NOTE — ED NOTES
Pt provided with ace wrap and post-op shoe prior to discharge.       Wade Magallno RN  07/19/21 1951

## 2021-07-19 NOTE — ED PROVIDER NOTES
[address] [facility]     Emergency Department     Faculty Attestation    I performed a history and physical examination of the patient and discussed management with the resident. I reviewed the residents note and agree with the documented findings and plan of care. Any areas of disagreement are noted on the chart. I was personally present for the key portions of any procedures. I have documented in the chart those procedures where I was not present during the key portions. I have reviewed the emergency nurses triage note. I agree with the chief complaint, past medical history, past surgical history, allergies, medications, social and family history as documented unless otherwise noted below. For Physician Assistant/ Nurse Practitioner cases/documentation I have personally evaluated this patient and have completed at least one if not all key elements of the E/M (history, physical exam, and MDM). Additional findings are as noted. I have personally seen and evaluated the patient. I find the patient's history and physical exam are consistent with the NP/PA documentation. I agree with the care provided, treatment rendered, disposition and follow-up plan    Injury to the left foot and ankle. There is no knee discomfort no obvious deformity            Critical Care     Hugh Sharma M.D.   Attending Emergency  Physician             Yari Sauer MD  07/19/21 5747

## 2021-11-08 ENCOUNTER — TELEPHONE (OUTPATIENT)
Dept: PRIMARY CARE CLINIC | Age: 51
End: 2021-11-08

## 2022-05-27 ENCOUNTER — HOSPITAL ENCOUNTER (INPATIENT)
Age: 52
LOS: 4 days | Discharge: HOME | DRG: 885 | End: 2022-05-31
Attending: STUDENT IN AN ORGANIZED HEALTH CARE EDUCATION/TRAINING PROGRAM | Admitting: PSYCHIATRY & NEUROLOGY
Payer: COMMERCIAL

## 2022-05-27 DIAGNOSIS — R45.851 SUICIDAL IDEATION: Primary | ICD-10-CM

## 2022-05-27 DIAGNOSIS — R44.0 HEARING VOICES: ICD-10-CM

## 2022-05-27 PROBLEM — F32.A DEPRESSION WITH SUICIDAL IDEATION: Status: ACTIVE | Noted: 2022-05-27

## 2022-05-27 LAB
ABSOLUTE EOS #: 0.2 K/UL (ref 0–0.4)
ABSOLUTE LYMPH #: 2.3 K/UL (ref 1–4.8)
ABSOLUTE MONO #: 0.8 K/UL (ref 0.1–1.3)
ALBUMIN SERPL-MCNC: 3.8 G/DL (ref 3.5–5.2)
ALP BLD-CCNC: 83 U/L (ref 35–104)
ALT SERPL-CCNC: 9 U/L (ref 5–33)
AMPHETAMINE SCREEN URINE: NEGATIVE
ANION GAP SERPL CALCULATED.3IONS-SCNC: 11 MMOL/L (ref 9–17)
AST SERPL-CCNC: 12 U/L
BARBITURATE SCREEN URINE: NEGATIVE
BASOPHILS # BLD: 1 % (ref 0–2)
BASOPHILS ABSOLUTE: 0.1 K/UL (ref 0–0.2)
BENZODIAZEPINE SCREEN, URINE: NEGATIVE
BILIRUB SERPL-MCNC: 0.34 MG/DL (ref 0.3–1.2)
BUN BLDV-MCNC: 13 MG/DL (ref 6–20)
CALCIUM SERPL-MCNC: 9 MG/DL (ref 8.6–10.4)
CANNABINOID SCREEN URINE: NEGATIVE
CHLORIDE BLD-SCNC: 99 MMOL/L (ref 98–107)
CO2: 24 MMOL/L (ref 20–31)
COCAINE METABOLITE, URINE: NEGATIVE
CREAT SERPL-MCNC: 0.67 MG/DL (ref 0.5–0.9)
EOSINOPHILS RELATIVE PERCENT: 3 % (ref 0–4)
ETHANOL PERCENT: <0.01 %
ETHANOL: <10 MG/DL
GFR AFRICAN AMERICAN: >60 ML/MIN
GFR NON-AFRICAN AMERICAN: >60 ML/MIN
GFR SERPL CREATININE-BSD FRML MDRD: ABNORMAL ML/MIN/{1.73_M2}
GLUCOSE BLD-MCNC: 103 MG/DL (ref 65–105)
GLUCOSE BLD-MCNC: 171 MG/DL (ref 65–105)
GLUCOSE BLD-MCNC: 178 MG/DL (ref 65–105)
GLUCOSE BLD-MCNC: 283 MG/DL (ref 70–99)
GLUCOSE BLD-MCNC: 292 MG/DL (ref 65–105)
HCG QUALITATIVE: NEGATIVE
HCT VFR BLD CALC: 38.5 % (ref 36–46)
HEMOGLOBIN: 13.1 G/DL (ref 12–16)
LYMPHOCYTES # BLD: 33 % (ref 24–44)
MCH RBC QN AUTO: 29.6 PG (ref 26–34)
MCHC RBC AUTO-ENTMCNC: 34.1 G/DL (ref 31–37)
MCV RBC AUTO: 86.7 FL (ref 80–100)
METHADONE SCREEN, URINE: NEGATIVE
MONOCYTES # BLD: 11 % (ref 1–7)
OPIATES, URINE: NEGATIVE
OXYCODONE SCREEN URINE: NEGATIVE
PDW BLD-RTO: 14.7 % (ref 11.5–14.9)
PHENCYCLIDINE, URINE: NEGATIVE
PLATELET # BLD: 337 K/UL (ref 150–450)
PMV BLD AUTO: 6.9 FL (ref 6–12)
POTASSIUM SERPL-SCNC: 4 MMOL/L (ref 3.7–5.3)
RBC # BLD: 4.44 M/UL (ref 4–5.2)
SEG NEUTROPHILS: 52 % (ref 36–66)
SEGMENTED NEUTROPHILS ABSOLUTE COUNT: 3.7 K/UL (ref 1.3–9.1)
SODIUM BLD-SCNC: 134 MMOL/L (ref 135–144)
TEST INFORMATION: NORMAL
TOTAL PROTEIN: 6.4 G/DL (ref 6.4–8.3)
WBC # BLD: 7.1 K/UL (ref 3.5–11)

## 2022-05-27 PROCEDURE — 82947 ASSAY GLUCOSE BLOOD QUANT: CPT

## 2022-05-27 PROCEDURE — 99285 EMERGENCY DEPT VISIT HI MDM: CPT

## 2022-05-27 PROCEDURE — G0480 DRUG TEST DEF 1-7 CLASSES: HCPCS

## 2022-05-27 PROCEDURE — 1240000000 HC EMOTIONAL WELLNESS R&B

## 2022-05-27 PROCEDURE — 99223 1ST HOSP IP/OBS HIGH 75: CPT | Performed by: INTERNAL MEDICINE

## 2022-05-27 PROCEDURE — 80053 COMPREHEN METABOLIC PANEL: CPT

## 2022-05-27 PROCEDURE — 6370000000 HC RX 637 (ALT 250 FOR IP): Performed by: STUDENT IN AN ORGANIZED HEALTH CARE EDUCATION/TRAINING PROGRAM

## 2022-05-27 PROCEDURE — 6370000000 HC RX 637 (ALT 250 FOR IP): Performed by: INTERNAL MEDICINE

## 2022-05-27 PROCEDURE — 36415 COLL VENOUS BLD VENIPUNCTURE: CPT

## 2022-05-27 PROCEDURE — 6370000000 HC RX 637 (ALT 250 FOR IP): Performed by: PSYCHIATRY & NEUROLOGY

## 2022-05-27 PROCEDURE — 84703 CHORIONIC GONADOTROPIN ASSAY: CPT

## 2022-05-27 PROCEDURE — 85025 COMPLETE CBC W/AUTO DIFF WBC: CPT

## 2022-05-27 PROCEDURE — 80307 DRUG TEST PRSMV CHEM ANLYZR: CPT

## 2022-05-27 PROCEDURE — 96372 THER/PROPH/DIAG INJ SC/IM: CPT

## 2022-05-27 RX ORDER — ACETAMINOPHEN 325 MG/1
650 TABLET ORAL EVERY 6 HOURS PRN
Status: DISCONTINUED | OUTPATIENT
Start: 2022-05-27 | End: 2022-05-31 | Stop reason: HOSPADM

## 2022-05-27 RX ORDER — IBUPROFEN 400 MG/1
400 TABLET ORAL EVERY 6 HOURS PRN
Status: DISCONTINUED | OUTPATIENT
Start: 2022-05-27 | End: 2022-05-31 | Stop reason: HOSPADM

## 2022-05-27 RX ORDER — ASPIRIN 81 MG/1
81 TABLET, CHEWABLE ORAL DAILY
Status: DISCONTINUED | OUTPATIENT
Start: 2022-05-27 | End: 2022-05-31 | Stop reason: HOSPADM

## 2022-05-27 RX ORDER — KETOCONAZOLE 20 MG/G
CREAM TOPICAL 2 TIMES DAILY
Status: DISCONTINUED | OUTPATIENT
Start: 2022-05-27 | End: 2022-05-31 | Stop reason: HOSPADM

## 2022-05-27 RX ORDER — MAGNESIUM HYDROXIDE/ALUMINUM HYDROXICE/SIMETHICONE 120; 1200; 1200 MG/30ML; MG/30ML; MG/30ML
30 SUSPENSION ORAL EVERY 6 HOURS PRN
Status: DISCONTINUED | OUTPATIENT
Start: 2022-05-27 | End: 2022-05-31 | Stop reason: HOSPADM

## 2022-05-27 RX ORDER — TRAZODONE HYDROCHLORIDE 50 MG/1
50 TABLET ORAL NIGHTLY PRN
Status: DISCONTINUED | OUTPATIENT
Start: 2022-05-27 | End: 2022-05-31 | Stop reason: HOSPADM

## 2022-05-27 RX ORDER — HYDROXYZINE 50 MG/1
50 TABLET, FILM COATED ORAL 3 TIMES DAILY PRN
Status: DISCONTINUED | OUTPATIENT
Start: 2022-05-27 | End: 2022-05-31 | Stop reason: HOSPADM

## 2022-05-27 RX ORDER — HALOPERIDOL 5 MG
5 TABLET ORAL EVERY 6 HOURS PRN
Status: DISCONTINUED | OUTPATIENT
Start: 2022-05-27 | End: 2022-05-31 | Stop reason: HOSPADM

## 2022-05-27 RX ORDER — LORAZEPAM 1 MG/1
2 TABLET ORAL EVERY 6 HOURS PRN
Status: DISCONTINUED | OUTPATIENT
Start: 2022-05-27 | End: 2022-05-31 | Stop reason: HOSPADM

## 2022-05-27 RX ORDER — INSULIN GLARGINE 100 [IU]/ML
60 INJECTION, SOLUTION SUBCUTANEOUS NIGHTLY
Status: DISCONTINUED | OUTPATIENT
Start: 2022-05-27 | End: 2022-05-30

## 2022-05-27 RX ORDER — INSULIN LISPRO 100 [IU]/ML
8 INJECTION, SOLUTION INTRAVENOUS; SUBCUTANEOUS
Status: DISCONTINUED | OUTPATIENT
Start: 2022-05-27 | End: 2022-05-31 | Stop reason: HOSPADM

## 2022-05-27 RX ORDER — DIPHENHYDRAMINE HYDROCHLORIDE 50 MG/ML
50 INJECTION INTRAMUSCULAR; INTRAVENOUS EVERY 6 HOURS PRN
Status: DISCONTINUED | OUTPATIENT
Start: 2022-05-27 | End: 2022-05-31 | Stop reason: HOSPADM

## 2022-05-27 RX ORDER — LORAZEPAM 2 MG/ML
2 INJECTION INTRAMUSCULAR EVERY 6 HOURS PRN
Status: DISCONTINUED | OUTPATIENT
Start: 2022-05-27 | End: 2022-05-31 | Stop reason: HOSPADM

## 2022-05-27 RX ORDER — ACETAMINOPHEN 500 MG
1000 TABLET ORAL ONCE
Status: COMPLETED | OUTPATIENT
Start: 2022-05-27 | End: 2022-05-27

## 2022-05-27 RX ORDER — ATORVASTATIN CALCIUM 20 MG/1
40 TABLET, FILM COATED ORAL DAILY
Status: DISCONTINUED | OUTPATIENT
Start: 2022-05-27 | End: 2022-05-31 | Stop reason: HOSPADM

## 2022-05-27 RX ORDER — INSULIN LISPRO 100 [IU]/ML
5 INJECTION, SOLUTION INTRAVENOUS; SUBCUTANEOUS ONCE
Status: COMPLETED | OUTPATIENT
Start: 2022-05-27 | End: 2022-05-27

## 2022-05-27 RX ORDER — DEXTROSE MONOHYDRATE 50 MG/ML
100 INJECTION, SOLUTION INTRAVENOUS PRN
Status: DISCONTINUED | OUTPATIENT
Start: 2022-05-27 | End: 2022-05-31 | Stop reason: HOSPADM

## 2022-05-27 RX ORDER — LISINOPRIL 5 MG/1
2.5 TABLET ORAL DAILY
Status: DISCONTINUED | OUTPATIENT
Start: 2022-05-27 | End: 2022-05-31 | Stop reason: HOSPADM

## 2022-05-27 RX ORDER — QUETIAPINE FUMARATE 100 MG/1
50 TABLET, FILM COATED ORAL ONCE
Status: DISCONTINUED | OUTPATIENT
Start: 2022-05-27 | End: 2022-05-28

## 2022-05-27 RX ORDER — POLYETHYLENE GLYCOL 3350 17 G/17G
17 POWDER, FOR SOLUTION ORAL DAILY PRN
Status: DISCONTINUED | OUTPATIENT
Start: 2022-05-27 | End: 2022-05-31 | Stop reason: HOSPADM

## 2022-05-27 RX ORDER — NICOTINE 21 MG/24HR
1 PATCH, TRANSDERMAL 24 HOURS TRANSDERMAL DAILY
Status: DISCONTINUED | OUTPATIENT
Start: 2022-05-28 | End: 2022-05-31 | Stop reason: HOSPADM

## 2022-05-27 RX ORDER — HALOPERIDOL 5 MG/ML
5 INJECTION INTRAMUSCULAR EVERY 6 HOURS PRN
Status: DISCONTINUED | OUTPATIENT
Start: 2022-05-27 | End: 2022-05-31 | Stop reason: HOSPADM

## 2022-05-27 RX ADMIN — INSULIN GLARGINE 60 UNITS: 100 INJECTION, SOLUTION SUBCUTANEOUS at 21:24

## 2022-05-27 RX ADMIN — INSULIN LISPRO 5 UNITS: 100 INJECTION, SOLUTION INTRAVENOUS; SUBCUTANEOUS at 12:58

## 2022-05-27 RX ADMIN — ASPIRIN 81 MG: 81 TABLET, CHEWABLE ORAL at 17:08

## 2022-05-27 RX ADMIN — INSULIN LISPRO 8 UNITS: 100 INJECTION, SOLUTION INTRAVENOUS; SUBCUTANEOUS at 17:09

## 2022-05-27 RX ADMIN — IBUPROFEN 400 MG: 400 TABLET ORAL at 17:52

## 2022-05-27 RX ADMIN — ACETAMINOPHEN 1000 MG: 500 TABLET ORAL at 12:57

## 2022-05-27 RX ADMIN — LISINOPRIL 2.5 MG: 5 TABLET ORAL at 17:08

## 2022-05-27 RX ADMIN — HYDROXYZINE HYDROCHLORIDE 50 MG: 50 TABLET, FILM COATED ORAL at 21:25

## 2022-05-27 RX ADMIN — ATORVASTATIN CALCIUM 40 MG: 20 TABLET, FILM COATED ORAL at 17:08

## 2022-05-27 ASSESSMENT — PAIN DESCRIPTION - LOCATION
LOCATION: HEAD

## 2022-05-27 ASSESSMENT — SLEEP AND FATIGUE QUESTIONNAIRES
AVERAGE NUMBER OF SLEEP HOURS: 3
DO YOU HAVE DIFFICULTY SLEEPING: NO
SLEEP PATTERN: DIFFICULTY FALLING ASLEEP
DO YOU USE A SLEEP AID: YES

## 2022-05-27 ASSESSMENT — PAIN SCALES - GENERAL
PAINLEVEL_OUTOF10: 9
PAINLEVEL_OUTOF10: 6
PAINLEVEL_OUTOF10: 5

## 2022-05-27 ASSESSMENT — LIFESTYLE VARIABLES
HOW OFTEN DO YOU HAVE A DRINK CONTAINING ALCOHOL: MONTHLY OR LESS
HOW MANY STANDARD DRINKS CONTAINING ALCOHOL DO YOU HAVE ON A TYPICAL DAY: 1 OR 2

## 2022-05-27 ASSESSMENT — PATIENT HEALTH QUESTIONNAIRE - PHQ9: SUM OF ALL RESPONSES TO PHQ QUESTIONS 1-9: 21

## 2022-05-27 ASSESSMENT — ENCOUNTER SYMPTOMS
ABDOMINAL PAIN: 0
RHINORRHEA: 0
VOMITING: 0
NAUSEA: 0
COUGH: 0
SHORTNESS OF BREATH: 0

## 2022-05-27 ASSESSMENT — PAIN DESCRIPTION - DESCRIPTORS: DESCRIPTORS: ACHING

## 2022-05-27 NOTE — ED NOTES
Mode of arrival (squad #, walk in, police, etc) : walk in (dropped off by friend)        Chief complaint(s): suicidal         Arrival Note (brief scenario, treatment PTA, etc). : patient states she began having suicidal thoughts last night. Patient states it was a sudden onset brought on my auditory hallucinations. Patient states she cannot understand what the voices are saying. Patient states she usually takes 50mg seroquel nightly, but didn't take it last night. Patient had seroquel tablet in her pocket (removed and placed in patient belonging bag). Patient states she has thought about \"running in front of a car\". Patient states she hasn't used cocaine for about the past month. Patient also states she has had a migraine for the past 3 days. Patient is alert and oriented x4.         C= \"Have you ever felt that you should Cut down on your drinking? \"  No  A= \"Have people Annoyed you by criticizing your drinking? \"  No  G= \"Have you ever felt bad or Guilty about your drinking? \"  No  E= \"Have you ever had a drink as an Eye-opener first thing in the morning to steady your nerves or to help a hangover? \"  No      Deferred []      Reason for deferring: N/A    *If yes to two or more: probable alcohol abuse. Jinny Carlisle RN  05/27/22 4725

## 2022-05-27 NOTE — BH NOTE
Patient given tobacco quitline number 17695932058 at this time, refusing to call at this time, states \" I just dont want to quit now\"- patient given information as to the dangers of long term tobacco use. Continue to reinforce the importance of tobacco cessation.

## 2022-05-27 NOTE — H&P
[facility] Internal Medicine  Tootei Simon MD; Rebeca Herrera MD; Gita Sands MD; MD Avril Cabrera MD; MD [facility]   [facility]    HISTORY AND PHYSICAL EXAMINATION            Date:   5/27/2022  Patient name:  Everett Hargrove  Date of admission:  5/27/2022 10:10 AM  MRN:   [id number]  Account:  [de-identified]  YOB: 1970  PCP:    Vicki Rizvi MD  Room:   [facility]0208-  Code Status:    Full Code    Chief Complaint:     No chief complaint on file. Diabetes mellitus    History Obtained From:     Patient medical record nursing staff    History of Present Illness:     Everett Hargrove is a 45 y.o. Non- / non  female who presents with No chief complaint on file. and is admitted to the hospital for the management of Depression with suicidal ideation. HTN  Onset more than 2 years ago  desmond mild to mod  Controlled with current po meds  Not associated with headaches or blurry vision  No chest pain    Diabetes   Duration more than 7 years  Modifying factors on Glucophage and other med  Severity uncontrolled sever  Associated signs and symtoms neuropathy/ckd/ CAD. aggravated with sugar diet and better with low sugar diet           Past Medical History:     Past Medical History:   Diagnosis Date    Back pain     Bipolar 1 disorder ([address]     Diabetes mellitus [address] [address]     Disc disorder     Hypertension         Past Surgical History:     Past Surgical History:   Procedure Laterality Date    BACK SURGERY      CHOLECYSTECTOMY          Medications Prior to Admission:     Prior to Admission medications    Medication Sig Start Date End Date Taking?  Authorizing Provider   acetaminophen (TYLENOL) 500 MG tablet Take 1 tablet by mouth every 6 hours as needed for Pain 7/19/21   Heriberto Wynne DO   ibuprofen (ADVIL;MOTRIN) 600 MG tablet Take 1 tablet by mouth every 6 hours as needed for Pain 7/19/21   Ronald Slaughter,    cyclobenzaprine (FLEXERIL) 5 MG tablet Take 1 tablet by mouth 2 times daily as needed for Muscle spasms 1/2/19   Judy Madden DO   ketoconazole (NIZORAL) 2 % cream Apply topically daily below Left breast 1/2/19   Judy Madden,    ondansetron (ZOFRAN ODT) 4 MG disintegrating tablet Take 1 tablet by mouth every 8 hours as needed for Nausea 10/29/18   Narciso Sparks MD   insulin glargine (LANTUS) 100 UNIT/ML injection pen Inject 60 Units into the skin nightly    Historical Provider, MD   lisinopril (PRINIVIL;ZESTRIL) 2.5 MG tablet Take 2.5 mg by mouth daily    Historical Provider, MD   atorvastatin (LIPITOR) 40 MG tablet Take 40 mg by mouth daily    Historical Provider, MD   aspirin 81 MG tablet Take 81 mg by mouth daily    Historical Provider, MD   dicyclomine (BENTYL) 10 MG capsule Take 2 capsules by mouth every 6 hours as needed (abdominal spasm) 10/21/16   Tom Mccoy MD   insulin aspart (NOVOLOG) 100 UNIT/ML injection pen Inject 10 Units into the skin 3 times daily (before meals)     Historical Provider, MD   QUEtiapine (SEROQUEL) 50 MG tablet Take 50 mg by mouth nightly Indications: (2 tabs @ HS)     Historical Provider, MD        Allergies:     Codeine    Social History:     Tobacco:    reports that she has been smoking cigarettes. She has a 6.50 pack-year smoking history. She has never used smokeless tobacco.  Alcohol:      reports no history of alcohol use. Drug Use:  reports no history of drug use. Family History:     History reviewed. No pertinent family history. Review of Systems:     Positive and Negative as described in HPI.     CONSTITUTIONAL:  negative for fevers, chills, sweats, fatigue, weight loss  HEENT:  negative for vision, hearing changes, runny nose, throat pain  RESPIRATORY:  negative for shortness of breath, cough, congestion, wheezing  CARDIOVASCULAR:  negative for chest pain, palpitations  GASTROINTESTINAL: negative for nausea, vomiting, diarrhea, constipation, change in bowel habits, abdominal pain   GENITOURINARY:  negative for difficulty of urination, burning with urination, frequency   INTEGUMENT:  negative for rash, skin lesions, easy bruising   HEMATOLOGIC/LYMPHATIC:  negative for swelling/edema   ALLERGIC/IMMUNOLOGIC:  negative for urticaria , itching  ENDOCRINE:  negative increase in drinking, increase in urination, hot or cold intolerance  MUSCULOSKELETAL:  negative joint pains, muscle aches, swelling of joints  NEUROLOGICAL:  negative for headaches, dizziness, lightheadedness, numbness, pain, tingling extremities      Physical Exam:   BP (!) 151/71   Pulse 79   Temp 98.1 °F (36.7 °C) (Oral)   Resp 15   Ht 5' 7\" (1.702 m)   SpO2 99%   BMI 27.41 kg/m²   Temp (24hrs), Av.1 °F (36.7 °C), Min:98.1 °F (36.7 °C), Max:98.1 °F (36.7 °C)    Recent Labs     22  1108 22  1406   POCGLU 292* 178*     No intake or output data in the 24 hours ending 22 1512    General Appearance: alert, well appearing, and in no acute distress  Mental status: oriented to person, place, and time  Head: normocephalic, atraumatic  Eye: no icterus, redness, pupils equal and reactive, extraocular eye movements intact, conjunctiva clear  Ear: normal external ear, no discharge, hearing intact  Nose: no drainage noted  Mouth: mucous membranes moist  Patient is edentulous  Neck: supple, no carotid bruits, thyroid not palpable  Lungs: Bilateral equal air entry, clear to ausculation, no wheezing, rales or rhonchi, normal effort  Cardiovascular: normal rate, regular rhythm, no murmur, gallop, rub  Abdomen: Soft, nontender, nondistended, normal bowel sounds, no hepatomegaly or splenomegaly  Neurologic: There are no new focal motor or sensory deficits, normal muscle tone and bulk, no abnormal sensation, normal speech, cranial nerves II through XII grossly intact  Skin: No gross lesions, rashes, bruising or bleeding on exposed skin area  Extremities: peripheral pulses palpable, no pedal edema or calf pain with palpation      Investigations:      Laboratory Testing:  Recent Results (from the past 24 hour(s))   POC Glucose Fingerstick    Collection Time: 05/27/22 11:08 AM   Result Value Ref Range    POC Glucose 292 (H) 65 - 105 mg/dL   CBC with Auto Differential    Collection Time: 05/27/22 11:48 AM   Result Value Ref Range    WBC 7.1 3.5 - 11.0 k/uL    RBC 4.44 4.0 - 5.2 m/uL    Hemoglobin 13.1 12.0 - 16.0 g/dL    Hematocrit 38.5 36 - 46 %    MCV 86.7 80 - 100 fL    MCH 29.6 26 - 34 pg    MCHC 34.1 31 - 37 g/dL    RDW 14.7 11.5 - 14.9 %    Platelets 746 126 - 865 k/uL    MPV 6.9 6.0 - 12.0 fL    Seg Neutrophils 52 36 - 66 %    Lymphocytes 33 24 - 44 %    Monocytes 11 (H) 1 - 7 %    Eosinophils % 3 0 - 4 %    Basophils 1 0 - 2 %    Segs Absolute 3.70 1.3 - 9.1 k/uL    Absolute Lymph # 2.30 1.0 - 4.8 k/uL    Absolute Mono # 0.80 0.1 - 1.3 k/uL    Absolute Eos # 0.20 0.0 - 0.4 k/uL    Basophils Absolute 0.10 0.0 - 0.2 k/uL   Comprehensive Metabolic Panel    Collection Time: 05/27/22 11:48 AM   Result Value Ref Range    Glucose 283 (H) 70 - 99 mg/dL    BUN 13 6 - 20 mg/dL    CREATININE 0.67 0.50 - 0.90 mg/dL    Calcium 9.0 8.6 - 10.4 mg/dL    Sodium 134 (L) 135 - 144 mmol/L    Potassium 4.0 3.7 - 5.3 mmol/L    Chloride 99 98 - 107 mmol/L    CO2 24 20 - 31 mmol/L    Anion Gap 11 9 - 17 mmol/L    Alkaline Phosphatase 83 35 - 104 U/L    ALT 9 5 - 33 U/L    AST 12 <32 U/L    Total Bilirubin 0.34 0.3 - 1.2 mg/dL    Total Protein 6.4 6.4 - 8.3 g/dL    Albumin 3.8 3.5 - 5.2 g/dL    GFR Non-African American >60 >60 mL/min    GFR African American >60 >60 mL/min    GFR Comment         Ethanol    Collection Time: 05/27/22 11:48 AM   Result Value Ref Range    Ethanol <10 <10 mg/dL    Ethanol percent <0.010 %   HCG Qualitative, Serum    Collection Time: 05/27/22 11:48 AM   Result Value Ref Range    hCG Qual NEGATIVE NEGATIVE   Urine Drug Screen Collection Time: 05/27/22 11:55 AM   Result Value Ref Range    Amphetamine Screen, Ur NEGATIVE NEGATIVE    Barbiturate Screen, Ur NEGATIVE NEGATIVE    Benzodiazepine Screen, Urine NEGATIVE NEGATIVE    Cocaine Metabolite, Urine NEGATIVE NEGATIVE    Methadone Screen, Urine NEGATIVE NEGATIVE    Opiates, Urine NEGATIVE NEGATIVE    Phencyclidine, Urine NEGATIVE NEGATIVE    Cannabinoid Scrn, Ur NEGATIVE NEGATIVE    Oxycodone Screen, Ur NEGATIVE NEGATIVE    Test Information       Assay provides medical screening only. The absence of expected drug(s) and/or metabolite(s) may indicate diluted or adulterated urine, limitations of testing or timing of collection. POC Glucose Fingerstick    Collection Time: 05/27/22  2:06 PM   Result Value Ref Range    POC Glucose 178 (H) 65 - 105 mg/dL       Imaging/Diagnostics:  No results found. Assessment :      Hospital Problems           Last Modified POA    * (Principal) Depression with suicidal ideation 5/27/2022 Yes          Plan:     61-year-old lady with history of uncontrolled diabetes mellitus start Lantus 60 units nightly  Humalog 8 units before meals  Hypertension start lisinopril 2.5 mg  Hyperlipidemia start Lipitor 40 mg  Primary prevention aspirin 81 mg          Gerhardt Lovings, MD  5/27/2022  3:12 PM    Copy sent to Dr. Gracy Collet, MD    Please note that this chart was generated using voice recognition Dragon dictation software. Although every effort was made to ensure the accuracy of this automated transcription, some errors in transcription may have occurred.
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
## 2022-05-27 NOTE — ED PROVIDER NOTES
[facility]  Emergency Department Encounter  Emergency Medicine Physician     Pt Name: Mary Rowland  MRN: [id number]  [place] 1970  Date of evaluation: 5/27/22  PCP:  Padmaja Holcomb MD    CHIEF COMPLAINT     No chief complaint on file. HISTORY OF PRESENT ILLNESS  (Location/Symptom, Timing/Onset, Context/Setting, Quality, Duration, Modifying Factors, Severity.)    Mary Rowland is a 45 y.o. female who presents with suicidal thoughts, hearing voices. Patient states that she has been hearing more more voices recently that have been telling her to kill her self and to overdose on her medication. She states the voices couple days ago telling her to overdose on her Seroquel so she threw out all her Seroquel and now she is out of Seroquel. States that she was using crack/cocaine for over 30 years but quit a month ago. She states that she has not used since then. She states that she is doing her best to try to stay sober but the voices are making it difficult. Denies other complaints        PAST MEDICAL / SURGICAL / SOCIAL / FAMILY HISTORY    has a past medical history of Back pain, Bipolar 1 disorder ([address] Diabetes mellitus ([address] Disc disorder, and Hypertension. has a past surgical history that includes Cholecystectomy and back surgery.     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    Sexual activity: Not on file   Other Topics Concern    Not on file   Social History Narrative    Not on file     Social Determinants of Health     Financial Resource Strain:     Difficulty of Paying Living Expenses: Not on file   Food Insecurity:     Worried About Running Out of Food in the Last Year: Not on file    Marci of Food in the Last Year: Not on file   Transportation Needs:     Lack of Transportation (Medical): Not on file    Lack of Transportation (Non-Medical): Not on file   Physical Activity:     Days of Exercise per Week: Not on file    Minutes of Exercise per Session: Not on file   Stress:     Feeling of Stress : Not on file   Social Connections:     Frequency of Communication with Friends and Family: Not on file    Frequency of Social Gatherings with Friends and Family: Not on file    Attends [employer] Services: Not on file    Active Member of [address] [place] [address] [employer] or Organizations: Not on file    Attends Club or Organization Meetings: Not on file    Marital Status: Not on file   Intimate Partner Violence:     Fear of Current or Ex-Partner: Not on file    Emotionally Abused: Not on file    Physically Abused: Not on file    Sexually Abused: Not on file   Housing Stability:     Unable to Pay for Housing in the Last Year: Not on file    Number of [place] in the Last Year: Not on file    Unstable Housing in the Last Year: Not on file       History reviewed. No pertinent family history. Allergies:    Codeine    Home Medications:  Prior to Admission medications    Medication Sig Start Date End Date Taking?  Authorizing Provider   acetaminophen (TYLENOL) 500 MG tablet Take 1 tablet by mouth every 6 hours as needed for Pain 7/19/21   Dolphus Chihuahua, DO   ibuprofen (ADVIL;MOTRIN) 600 MG tablet Take 1 tablet by mouth every 6 hours as needed for Pain 7/19/21   Dolphus Chihuahua, DO   cyclobenzaprine (FLEXERIL) 5 MG tablet Take 1 tablet by mouth 2 times daily as needed for Muscle spasms 1/2/19   Judy Madden, DO   ketoconazole (NIZORAL) 2 % cream Apply topically daily below Left breast 1/2/19   Judy Madden,    ondansetron (ZOFRAN ODT) 4 MG disintegrating tablet Take 1 tablet by mouth every 8 hours as needed for Nausea 10/29/18   Srinivas Quiros MD   insulin glargine (LANTUS) 100 UNIT/ML injection pen Inject 60 Units into the skin nightly    Historical Provider, MD   lisinopril (PRINIVIL;ZESTRIL) 2.5 MG tablet Take 2.5 mg by mouth daily    Historical Provider, MD   atorvastatin (LIPITOR) 40 MG tablet Take 40 mg by mouth daily    Historical Provider, MD   aspirin 81 MG tablet Take 81 mg by mouth daily    Historical Provider, MD   dicyclomine (BENTYL) 10 MG capsule Take 2 capsules by mouth every 6 hours as needed (abdominal spasm) 10/21/16   Julia Giraldo MD   insulin aspart (NOVOLOG) 100 UNIT/ML injection pen Inject 10 Units into the skin 3 times daily (before meals)     Historical Provider, MD   QUEtiapine (SEROQUEL) 50 MG tablet Take 50 mg by mouth nightly Indications: (2 tabs @ HS)     Historical Provider, MD       REVIEW OF SYSTEMS    (2-9 systems for level 4, 10 or more for level 5)    Review of Systems   Constitutional: Negative for chills, fatigue and fever. HENT: Negative for congestion and rhinorrhea. Respiratory: Negative for cough and shortness of breath. Cardiovascular: Negative for chest pain. Gastrointestinal: Negative for abdominal pain, nausea and vomiting. Genitourinary: Negative for dysuria and flank pain. Musculoskeletal: Negative for myalgias. Neurological: Negative for headaches. Psychiatric/Behavioral: Positive for hallucinations, sleep disturbance and suicidal ideas. Negative for confusion. All other systems reviewed and are negative.       PHYSICAL EXAM   (up to 7 for level 4, 8 or more for level 5)    INITIAL VITALS:   ED Triage Vitals   BP Temp Temp Source Heart Rate Resp SpO2 Height Weight   05/27/22 1020 05/27/22 1020 05/27/22 1020 05/27/22 1020 05/27/22 1020 05/27/22 1020 05/27/22 1012 --   (!) 151/71 98.1 °F (36.7 °C) Oral 79 15 99 % 5' 7\" (1.702 m)        Physical Exam  Vitals and nursing note reviewed. Constitutional:       General: She is not in acute distress. Appearance: She is well-developed. Cardiovascular:      Rate and Rhythm: Normal rate and regular rhythm. Pulses:           Radial pulses are 2+ on the right side and 2+ on the left side. Heart sounds: Normal heart sounds. No murmur heard. Pulmonary:      Effort: Pulmonary effort is normal. No respiratory distress. Breath sounds: Normal breath sounds. No decreased breath sounds. Abdominal:      General: There is no distension. Palpations: Abdomen is soft. Tenderness: There is no abdominal tenderness. Skin:     General: Skin is warm and dry. Capillary Refill: Capillary refill takes less than 2 seconds. Neurological:      General: No focal deficit present. Mental Status: She is alert and oriented to person, place, and time. GCS: GCS eye subscore is 4. GCS verbal subscore is 5. GCS motor subscore is 6. Psychiatric:         Mood and Affect: Mood is depressed. Affect is flat. Speech: Speech normal.         Behavior: Behavior is cooperative. Thought Content: Thought content includes suicidal ideation. Thought content does not include homicidal ideation. Thought content includes suicidal plan. Thought content does not include homicidal plan. Cognition and Memory: Cognition is not impaired. Memory is not impaired. DIFFERENTIAL  DIAGNOSIS   PLAN (LABS / IMAGING / EKG):  Orders Placed This Encounter   Procedures    CBC with Auto Differential    Comprehensive Metabolic Panel    Ethanol    Urine Drug Screen    HCG Qualitative, Serum    ADULT DIET;  Regular    Vital signs    Activity as tolerated    HYPOGLYCEMIA TREATMENT: blood glucose less than 50 mg/dL and patient  ALERT and TOLERATING PO    HYPOGLYCEMIA TREATMENT: blood glucose less than 70 mg/dL and patient ALERT and TOLERATING PO    FINGERSTICK - Abnormal; Notable for the following components:    POC Glucose 292 (*)     All other components within normal limits   POC GLUCOSE FINGERSTICK - Abnormal; Notable for the following components:    POC Glucose 178 (*)     All other components within normal limits   ETHANOL   URINE DRUG SCREEN   HCG, SERUM, QUALITATIVE   POCT GLUCOSE   POCT GLUCOSE   POCT GLUCOSE   POCT GLUCOSE   POCT GLUCOSE       RADIOLOGY:  No results found. Impression:  Patient presents with worsening suicidal ideations and hallucinations, auditory. Denies any visual hallucinations. Has not been taking her medication for several days since she threw them out because the voices were telling her to overdose them. Patient was demonstrating actually somewhat irrational thought in order to avoid overdosing. States that she is here to get help and I believe that she would benefit from an inpatient stay. EMERGENCY DEPARTMENT COURSE:  ED Course as of 05/27/22 1617   Fri May 27, 2022   1242 Patient is medically cleared for admission to the [facility] [AP]      ED Course User Index  [AP] Any Ortiz DO         PROCEDURES:  none    CONSULTS:  IP CONSULT TO INTERNAL MEDICINE    CRITICAL CARE:  There was a high probability of clinically significant/life threatening deterioration in this patient's condition which required my urgent intervention. Total critical care time was 10 minutes. This excludes any time for separately reportable procedures. FINAL IMPRESSION     1. Suicidal ideation    2. Hearing voices          DISPOSITION / PLAN   DISPOSITION Decision To Admit 05/27/2022 12:41:27 PM      PATIENT REFERRED TO:  No follow-up provider specified.     DISCHARGE MEDICATIONS:  Current Discharge Medication List          Any Ortiz DO  Emergency Medicine Attending    (Please note that portions of this note were completed with a voice recognition program.  Efforts were made to edit the dictations but occasionally words are mis-transcribed.)         Frankey Railing,   05/27/22 2849

## 2022-05-28 PROBLEM — F33.3 MAJOR DEPRESSIVE DISORDER, RECURRENT, SEVERE WITH PSYCHOTIC FEATURES (HCC): Status: ACTIVE | Noted: 2022-05-28

## 2022-05-28 PROBLEM — F41.1 GENERALIZED ANXIETY DISORDER WITH PANIC ATTACKS: Status: ACTIVE | Noted: 2022-05-28

## 2022-05-28 PROBLEM — F14.21 COCAINE USE DISORDER, SEVERE, IN EARLY REMISSION (HCC): Status: ACTIVE | Noted: 2022-05-28

## 2022-05-28 PROBLEM — F43.10 PTSD (POST-TRAUMATIC STRESS DISORDER): Status: ACTIVE | Noted: 2022-05-28

## 2022-05-28 PROBLEM — F41.0 GENERALIZED ANXIETY DISORDER WITH PANIC ATTACKS: Status: ACTIVE | Noted: 2022-05-28

## 2022-05-28 LAB
GLUCOSE BLD-MCNC: 108 MG/DL (ref 65–105)
GLUCOSE BLD-MCNC: 139 MG/DL (ref 65–105)
GLUCOSE BLD-MCNC: 150 MG/DL (ref 65–105)
GLUCOSE BLD-MCNC: 152 MG/DL (ref 65–105)
GLUCOSE BLD-MCNC: 97 MG/DL (ref 65–105)

## 2022-05-28 PROCEDURE — 6370000000 HC RX 637 (ALT 250 FOR IP): Performed by: PSYCHIATRY & NEUROLOGY

## 2022-05-28 PROCEDURE — 6370000000 HC RX 637 (ALT 250 FOR IP): Performed by: INTERNAL MEDICINE

## 2022-05-28 PROCEDURE — 99232 SBSQ HOSP IP/OBS MODERATE 35: CPT | Performed by: INTERNAL MEDICINE

## 2022-05-28 PROCEDURE — 82947 ASSAY GLUCOSE BLOOD QUANT: CPT

## 2022-05-28 PROCEDURE — 90792 PSYCH DIAG EVAL W/MED SRVCS: CPT | Performed by: PSYCHIATRY & NEUROLOGY

## 2022-05-28 PROCEDURE — APPSS60 APP SPLIT SHARED TIME 46-60 MINUTES: Performed by: NURSE PRACTITIONER

## 2022-05-28 PROCEDURE — 1240000000 HC EMOTIONAL WELLNESS R&B

## 2022-05-28 RX ORDER — QUETIAPINE FUMARATE 50 MG/1
50 TABLET, FILM COATED ORAL NIGHTLY
Status: DISCONTINUED | OUTPATIENT
Start: 2022-05-28 | End: 2022-05-31 | Stop reason: HOSPADM

## 2022-05-28 RX ORDER — BENZONATATE 100 MG/1
100 CAPSULE ORAL 3 TIMES DAILY PRN
Status: DISCONTINUED | OUTPATIENT
Start: 2022-05-28 | End: 2022-05-31 | Stop reason: HOSPADM

## 2022-05-28 RX ADMIN — TRAZODONE HYDROCHLORIDE 50 MG: 50 TABLET ORAL at 21:26

## 2022-05-28 RX ADMIN — HYDROXYZINE HYDROCHLORIDE 50 MG: 50 TABLET, FILM COATED ORAL at 21:26

## 2022-05-28 RX ADMIN — ACETAMINOPHEN 650 MG: 325 TABLET ORAL at 08:38

## 2022-05-28 RX ADMIN — INSULIN GLARGINE 60 UNITS: 100 INJECTION, SOLUTION SUBCUTANEOUS at 21:26

## 2022-05-28 RX ADMIN — ASPIRIN 81 MG: 81 TABLET, CHEWABLE ORAL at 08:33

## 2022-05-28 RX ADMIN — INSULIN LISPRO 8 UNITS: 100 INJECTION, SOLUTION INTRAVENOUS; SUBCUTANEOUS at 08:01

## 2022-05-28 RX ADMIN — INSULIN LISPRO 8 UNITS: 100 INJECTION, SOLUTION INTRAVENOUS; SUBCUTANEOUS at 17:06

## 2022-05-28 RX ADMIN — BENZONATATE 100 MG: 100 CAPSULE ORAL at 13:24

## 2022-05-28 RX ADMIN — QUETIAPINE FUMARATE 50 MG: 50 TABLET ORAL at 21:26

## 2022-05-28 RX ADMIN — HYDROXYZINE HYDROCHLORIDE 50 MG: 50 TABLET, FILM COATED ORAL at 12:06

## 2022-05-28 RX ADMIN — LISINOPRIL 2.5 MG: 5 TABLET ORAL at 08:33

## 2022-05-28 RX ADMIN — ACETAMINOPHEN 650 MG: 325 TABLET ORAL at 21:26

## 2022-05-28 RX ADMIN — ACETAMINOPHEN 650 MG: 325 TABLET ORAL at 16:02

## 2022-05-28 RX ADMIN — ATORVASTATIN CALCIUM 40 MG: 20 TABLET, FILM COATED ORAL at 08:34

## 2022-05-28 ASSESSMENT — PAIN SCALES - GENERAL
PAINLEVEL_OUTOF10: 9
PAINLEVEL_OUTOF10: 0
PAINLEVEL_OUTOF10: 10
PAINLEVEL_OUTOF10: 2

## 2022-05-28 ASSESSMENT — PAIN DESCRIPTION - LOCATION: LOCATION: HEAD

## 2022-05-28 ASSESSMENT — PAIN DESCRIPTION - DESCRIPTORS: DESCRIPTORS: ACHING;TENDER;THROBBING

## 2022-05-28 NOTE — GROUP NOTE
Group Therapy Note    Date: 5/28/2022    Group Start Time: 1350  Group End Time: 8378  Group Topic: Healthy Living/Wellness    MONALISA SCOTT Emigdio Boyd        Group Therapy Note    Attendees: 6/18         Patient's Goal:  Watch ARUN talk and discuss    Notes:  calm    Status After Intervention:  Unchanged    Participation Level: Active Listener and Interactive    Participation Quality: Appropriate and Attentive      Speech:  normal      Thought Process/Content: Logical      Affective Functioning: Congruent      Mood: stable      Level of consciousness:  Alert and Attentive      Response to Learning: Able to verbalize current knowledge/experience and Progressing to goal      Endings: None Reported    Modes of Intervention: Education and Media      Discipline Responsible: Behavorial Health Tech      Signature:   Jed Cam

## 2022-05-28 NOTE — PROGRESS NOTES
Behavioral Services  Medicare Certification Upon Admission    I certify that this patient's inpatient psychiatric hospital admission is medically necessary for:    [x] (1) Treatment which could reasonably be expected to improve this patient's condition,       [x] (2) Or for diagnostic study;     AND     [x](2) The inpatient psychiatric services are provided while the individual is under the care of a physician and are included in the individualized plan of care.     Estimated length of stay/service 3-5 days    Plan for post-hospital care hc    Electronically signed by Renetta Alvarenga MD on 5/28/2022 at 10:51 AM
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
## 2022-05-28 NOTE — PROGRESS NOTES
[address] Internal Medicine  Thi Alanis MD; Bebeto Hayden MD; Mau Angeles MD; MD Vinita Ramos MD; MD BÁRBARA [facility] Internal Medicine   [facility]    HISTORY AND PHYSICAL EXAMINATION            Date:   5/28/2022  Patient name:  Marco aL  Date of admission:  5/27/2022 10:10 AM  MRN:   [id number]  Account:  [de-identified]  YOB: 1970  PCP:    Renetta Liu MD  Room:   [address] [place] [address]  Code Status:    Full Code    Chief Complaint:     No chief complaint on file. Diabetes mellitus    History Obtained From:     Patient medical record nursing staff    History of Present Illness:     Marco La is a 45 y.o. Non- / non  female who presents with No chief complaint on file. and is admitted to the hospital for the management of Major depressive disorder, recurrent, severe with psychotic features ([facility] HTN  Onset more than 2 years ago  desmond mild to mod  Controlled with current po meds  Not associated with headaches or blurry vision  No chest pain    Diabetes   Duration more than 7 years  Modifying factors on Glucophage and other med  Severity uncontrolled sever  Associated signs and symtoms neuropathy/ckd/ CAD. aggravated with sugar diet and better with low sugar diet           Past Medical History:     Past Medical History:   Diagnosis Date    Back pain     Bipolar 1 disorder ([address]     Diabetes mellitus ([address]     Disc disorder     Hypertension         Past Surgical History:     Past Surgical History:   Procedure Laterality Date    BACK SURGERY      CHOLECYSTECTOMY          Medications Prior to Admission:     Prior to Admission medications    Medication Sig Start Date End Date Taking?  Authorizing Provider   acetaminophen (TYLENOL) 500 MG tablet Take 1 tablet by mouth every 6 hours as needed for Pain 7/19/21   Leno Gonzáles DO   ibuprofen (ADVIL;MOTRIN) 600 MG negative for chest pain, palpitations  GASTROINTESTINAL:  negative for nausea, vomiting, diarrhea, constipation, change in bowel habits, abdominal pain   GENITOURINARY:  negative for difficulty of urination, burning with urination, frequency   INTEGUMENT:  negative for rash, skin lesions, easy bruising   HEMATOLOGIC/LYMPHATIC:  negative for swelling/edema   ALLERGIC/IMMUNOLOGIC:  negative for urticaria , itching  ENDOCRINE:  negative increase in drinking, increase in urination, hot or cold intolerance  MUSCULOSKELETAL:  negative joint pains, muscle aches, swelling of joints  NEUROLOGICAL:  negative for headaches, dizziness, lightheadedness, numbness, pain, tingling extremities      Physical Exam:   /74   Pulse 100   Temp 97.6 °F (36.4 °C) (Temporal)   Resp 14   Ht 5' 7\" (1.702 m)   Wt 131 lb (59.4 kg)   SpO2 100%   BMI 20.52 kg/m²   Temp (24hrs), Av.8 °F (36.6 °C), Min:97.6 °F (36.4 °C), Max:98 °F (36.7 °C)    Recent Labs     22  2113 22  0227 22  0745 22  1145   POCGLU 103 150* 152* 97     No intake or output data in the 24 hours ending 22 1547    General Appearance: alert, well appearing, and in no acute distress  Mental status: oriented to person, place, and time  Head: normocephalic, atraumatic  Eye: no icterus, redness, pupils equal and reactive, extraocular eye movements intact, conjunctiva clear  Ear: normal external ear, no discharge, hearing intact  Nose: no drainage noted  Mouth: mucous membranes moist  Patient is edentulous  Neck: supple, no carotid bruits, thyroid not palpable  Lungs: Bilateral equal air entry, clear to ausculation, no wheezing, rales or rhonchi, normal effort  Cardiovascular: normal rate, regular rhythm, no murmur, gallop, rub  Abdomen: Soft, nontender, nondistended, normal bowel sounds, no hepatomegaly or splenomegaly  Neurologic: There are no new focal motor or sensory deficits, normal muscle tone and bulk, no abnormal sensation, normal speech, cranial nerves II through XII grossly intact  Skin: No gross lesions, rashes, bruising or bleeding on exposed skin area  Extremities: peripheral pulses palpable, no pedal edema or calf pain with palpation      Investigations:      Laboratory Testing:  Recent Results (from the past 24 hour(s))   POC Glucose Fingerstick    Collection Time: 05/27/22  5:01 PM   Result Value Ref Range    POC Glucose 171 (H) 65 - 105 mg/dL   POC Glucose Fingerstick    Collection Time: 05/27/22  9:13 PM   Result Value Ref Range    POC Glucose 103 65 - 105 mg/dL   POC Glucose Fingerstick    Collection Time: 05/28/22  2:27 AM   Result Value Ref Range    POC Glucose 150 (H) 65 - 105 mg/dL   POC Glucose Fingerstick    Collection Time: 05/28/22  7:45 AM   Result Value Ref Range    POC Glucose 152 (H) 65 - 105 mg/dL   POC Glucose Fingerstick    Collection Time: 05/28/22 11:45 AM   Result Value Ref Range    POC Glucose 97 65 - 105 mg/dL       Imaging/Diagnostics:  No results found. Assessment :      Hospital Problems           Last Modified POA    * (Principal) Major depressive disorder, recurrent, severe with psychotic features ([facility] 5/28/2022 Yes    PTSD (post-traumatic stress disorder) 5/28/2022 Yes    Generalized anxiety disorder with panic attacks 5/28/2022 Yes    Cocaine use disorder, severe, in early remission ([facility] 5/28/2022 Yes          Plan:     66-year-old lady with history of uncontrolled diabetes mellitus start Lantus 60 units nightly  Humalog 8 units before meals  Hypertension start lisinopril 2.5 mg  Hyperlipidemia start Lipitor 40 mg  Primary prevention aspirin 81 mg          Chandan Martinez MD  5/28/2022  3:47 PM    Copy sent to Dr. Mariano Dozier MD    Please note that this chart was generated using voice recognition Dragon dictation software. Although every effort was made to ensure the accuracy of this automated transcription, some errors in transcription may have occurred.

## 2022-05-28 NOTE — PLAN OF CARE
Problem: Depression/Self Harm  Goal: Effect of psychiatric condition will be minimized and patient will be protected from self harm  Description: INTERVENTIONS:  1. Assess impact of patient's symptoms on level of functioning, self care needs and offer support as indicated  2. Assess patient/family knowledge of depression, impact on illness and need for teaching  3. Provide emotional support, presence and reassurance  4. Assess for possible suicidal thoughts or ideation. If patient expresses suicidal thoughts or statements do not leave alone, initiate Suicide Precautions, move to a room close to the nursing station and obtain sitter  5. Initiate consults as appropriate with Mental Health Professional, Spiritual Care, Psychosocial CNS, and consider a recommendation to the LIP for a Psychiatric Consultation  5/28/2022 1331 by Aditi Rios RN  Outcome: Progressing     Patient denied suicidal ideation. Patient denied homicidal ideation. Patient stated that her anxiety level was a 1/10. Patient stated that her depression level was a 7/10. When asked why, patient stated \"I'm use to it. \" Patient denied having any visual, auditory, gustatory, tactile, and olfactory hallucinations. Patient denied delusions. Patient denied paranoia. Patient stated that her sleep was poor due to her coughing. Patient stated that her appetite is adequate. Patient took a shower today and stated that \"I plan on taking a shower each day. \" Patient stated that she does not attend groups. When encouraged patient expressed willingness to attend morning group session. Patient stated \"I interact with peers when out in the day room. \" Patient stated that her medications are effective. Will continue to monitor.

## 2022-05-28 NOTE — CARE COORDINATION
[facility] Biopsychosocial Assessment    Current Level of Psychosocial Functioning     Independent X  Dependent    Minimal Assist     Comments:    Psychosocial High Risk Factors (check all that apply)    Unable to obtain meds   Chronic illness/pain    Substance abuse X  Lack of Family Support   Financial stress   Isolation   Inadequate Community Resources  Suicide attempt(s)  Not taking medications X  Victim of crime   Developmental Delay  Unable to manage personal needs    Age 72 or older   Homeless  No transportation   Readmission within 30 days  Unemployment  Traumatic Event    Comments:   Psychiatric Advanced Directives: n/a    Family to [employer] in Treatment: n/a    Sexual Orientation:  n/a    Patient Strengths: patient has housing, support from family, income, insurance, motivated for treatment    Patient Barriers: substance use, non-compliance with treatment, auditory hallucinations, poor judgement      Opiate Education Provided:  No- patient does not use opiates      CMHC/mental health history: [employer] [facility]   medication management, group/individual therapies, family meetings, psycho -education, treatment team meetings to assist with stabilization    Initial Discharge Plan:  Patient has her own apartment to go to but is planning to try to find inpatient treatment       Clinical Summary:    John Foreman is a 46 y/o female admitted to [place] [address] for suicidal ideations and symptoms of major depressive disorder. Patient reports fleeting suicidal thoughts for the past two days with a plan to walk in front of a car. Patient is also reporting auditory hallucinations but is unable to identify what she is hearing. She reports using cocaine and verbalized interest in going to inpatient treatment from the hospital.  provided patient with information and encourage patient to reach out to programs for admission at discharge.   Social work offered support to patient and will continue to engage her in treatment/discharge planning.
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
## 2022-05-28 NOTE — GROUP NOTE
Group Therapy Note    Date: 5/28/2022    Group Start Time: 1100  Group End Time: 1120  Group Topic: Group Therapy    [facility] SONIA Llamas        Group Therapy Note    Attendees:  17/18           patient refused to attend safety planning group at 1100 after encouragement from staff.  1:1 talk time offered but refused      Signature:  Yan Rodas

## 2022-05-28 NOTE — PLAN OF CARE
Problem: Anxiety  Goal: Will report anxiety at manageable levels  Description: INTERVENTIONS:  1. Administer medication as ordered  2. Teach and rehearse alternative coping skills  3. Provide emotional support with 1:1 interaction with staff  Outcome: Progressing  Flowsheets (Taken 5/28/2022 0002)  Will report anxiety at manageable levels:   Provide emotional support with 1:1 interaction with staff   Administer medication as ordered     Problem: Depression/Self Harm  Goal: Effect of psychiatric condition will be minimized and patient will be protected from self harm  Description: INTERVENTIONS:  1. Assess impact of patient's symptoms on level of functioning, self care needs and offer support as indicated  2. Assess patient/family knowledge of depression, impact on illness and need for teaching  3. Provide emotional support, presence and reassurance  4. Assess for possible suicidal thoughts or ideation. If patient expresses suicidal thoughts or statements do not leave alone, initiate Suicide Precautions, move to a room close to the nursing station and obtain sitter  5. Initiate consults as appropriate with Mental Health Professional, Spiritual Care, Psychosocial CNS, and consider a recommendation to the [facility] for a Psychiatric Consultation  Outcome: Progressing   Denies suicidal thoughts and remains free from harm at this time. Remains calm and cooperative. Pt is out to the day room for short time, aloof of peers. Accepting of medications.

## 2022-05-28 NOTE — GROUP NOTE
Group Therapy Note    Date: 5/28/2022    Group Start Time: 1000  Group End Time: 2943  Group Topic: Psychoeducation    [facility] NNEKA Mireles, LEATHAW        Group Therapy Note    Attendees: 4/18       Patient was offered group therapy today but declined to participate despite encouragement from staff. 1:1 was offered.       Signature:  NNEKA Vargas, LEEANN

## 2022-05-28 NOTE — BH NOTE
Patients blood sugar was 97 at lunch. 8 units of Humalog scheduled was not given due to blood sugar.

## 2022-05-29 LAB
GLUCOSE BLD-MCNC: 144 MG/DL (ref 65–105)
GLUCOSE BLD-MCNC: 187 MG/DL (ref 65–105)
GLUCOSE BLD-MCNC: 80 MG/DL (ref 65–105)
GLUCOSE BLD-MCNC: 95 MG/DL (ref 65–105)

## 2022-05-29 PROCEDURE — 1240000000 HC EMOTIONAL WELLNESS R&B

## 2022-05-29 PROCEDURE — 99231 SBSQ HOSP IP/OBS SF/LOW 25: CPT | Performed by: NURSE PRACTITIONER

## 2022-05-29 PROCEDURE — 6370000000 HC RX 637 (ALT 250 FOR IP): Performed by: PSYCHIATRY & NEUROLOGY

## 2022-05-29 PROCEDURE — 6370000000 HC RX 637 (ALT 250 FOR IP): Performed by: INTERNAL MEDICINE

## 2022-05-29 PROCEDURE — 99232 SBSQ HOSP IP/OBS MODERATE 35: CPT | Performed by: INTERNAL MEDICINE

## 2022-05-29 PROCEDURE — 82947 ASSAY GLUCOSE BLOOD QUANT: CPT

## 2022-05-29 RX ADMIN — INSULIN GLARGINE 60 UNITS: 100 INJECTION, SOLUTION SUBCUTANEOUS at 20:39

## 2022-05-29 RX ADMIN — ACETAMINOPHEN 650 MG: 325 TABLET ORAL at 08:04

## 2022-05-29 RX ADMIN — KETOCONAZOLE: 20 CREAM TOPICAL at 20:39

## 2022-05-29 RX ADMIN — ASPIRIN 81 MG: 81 TABLET, CHEWABLE ORAL at 07:55

## 2022-05-29 RX ADMIN — ACETAMINOPHEN 650 MG: 325 TABLET ORAL at 14:18

## 2022-05-29 RX ADMIN — QUETIAPINE FUMARATE 50 MG: 50 TABLET ORAL at 20:39

## 2022-05-29 RX ADMIN — IBUPROFEN 400 MG: 400 TABLET ORAL at 18:53

## 2022-05-29 RX ADMIN — HYDROXYZINE HYDROCHLORIDE 50 MG: 50 TABLET, FILM COATED ORAL at 08:03

## 2022-05-29 RX ADMIN — INSULIN LISPRO 8 UNITS: 100 INJECTION, SOLUTION INTRAVENOUS; SUBCUTANEOUS at 12:05

## 2022-05-29 RX ADMIN — HYDROXYZINE HYDROCHLORIDE 50 MG: 50 TABLET, FILM COATED ORAL at 14:19

## 2022-05-29 RX ADMIN — LISINOPRIL 2.5 MG: 5 TABLET ORAL at 07:56

## 2022-05-29 RX ADMIN — ACETAMINOPHEN 650 MG: 325 TABLET ORAL at 20:42

## 2022-05-29 RX ADMIN — TRAZODONE HYDROCHLORIDE 50 MG: 50 TABLET ORAL at 20:40

## 2022-05-29 RX ADMIN — HYDROXYZINE HYDROCHLORIDE 50 MG: 50 TABLET, FILM COATED ORAL at 20:41

## 2022-05-29 RX ADMIN — INSULIN LISPRO 8 UNITS: 100 INJECTION, SOLUTION INTRAVENOUS; SUBCUTANEOUS at 07:56

## 2022-05-29 RX ADMIN — ATORVASTATIN CALCIUM 40 MG: 20 TABLET, FILM COATED ORAL at 07:55

## 2022-05-29 ASSESSMENT — PAIN SCALES - GENERAL
PAINLEVEL_OUTOF10: 4
PAINLEVEL_OUTOF10: 7
PAINLEVEL_OUTOF10: 9
PAINLEVEL_OUTOF10: 2
PAINLEVEL_OUTOF10: 3
PAINLEVEL_OUTOF10: 7
PAINLEVEL_OUTOF10: 3

## 2022-05-29 ASSESSMENT — PAIN DESCRIPTION - LOCATION
LOCATION: GENERALIZED
LOCATION: HEAD

## 2022-05-29 NOTE — GROUP NOTE
Group Therapy Note    Date: 5/29/2022    Group Start Time: 0900  Group End Time: 0920  Group Topic: Community Meeting    MONALISA Donaldsonvictor hugo Adams        Group Therapy Note    Attendees: 2/17         Patient's Goal:  Find an inpatient rehab    Notes:  controlled    Status After Intervention:  Unchanged    Participation Level: Active Listener and Interactive    Participation Quality: Appropriate and Attentive      Speech:  normal      Thought Process/Content: Logical      Affective Functioning: Congruent      Mood: stable      Level of consciousness:  Alert, Oriented x4 and Attentive      Response to Learning: Able to verbalize current knowledge/experience and Progressing to goal      Endings: None Reported    Modes of Intervention: Education, Support and Socialization      Discipline Responsible: Behavorial Health Tech      Signature:   Mamie Leija

## 2022-05-29 NOTE — CARE COORDINATION
Met with patient today who identified she wants to go to outpatient treatment at [facility] at discharge and that she had talked to [facility] at [facility] to arrange this. She signed ANJELICA and clinicals were faxed. Scheduled patient to arrive on Tuesday at 1:30 and [facility] states they would be providing transportation.

## 2022-05-29 NOTE — PROGRESS NOTES
Daily Progress Note  5/29/2022    Patient Name: Yasmine Cast: Depression with suicidal ideation with plan and intent to walk into traffic         SUBJECTIVE:   Patient was seen for follow-up assessment today. Nursing staff report she has been medication adherent and behaviorally in control. She is cooperative and pleasant in the milieu. She was observed to be in the day area talking on the phone prior to assessment. She was agreeable to conversation on approach, which was held in the privacy of her room. Patient reports that she is in very good spirits today and is feeling hopeful that she will maintain sobriety from crack cocaine. She expresses great pride that it has been almost 5 weeks since she last used. She has started the application process for an IOP program at [facility] following discharge. She reports that thoughts of suicidal ideation are now fleeting and no longer at the forefront of her mind. Mood continues to improve and she is not feeling distressing anxiety today. She denies auditory and visual hallucinations and makes no delusional or paranoid statements. She has been attending groups and her interactions with peers and staff have been appropriate. At this time patient has yet to demonstrate sustained stability and is unable to contract for safety in the community and warrants further hospitalization for safety and stabilization.     Compliant with scheduled medications: [x] Yes    [] No     Received emergency medications in past 24 hrs: [] Yes   [x] No     Appetite:  [x] Normal/Adequate/Unchanged  [] Increased  [] Decreased      Sleep:       [x] Normal/Adequate/Unchanged  [] Fair  [] Poor      Group Attendance on Unit:   [x] Yes  [] Selectively    [] No    Medication Side Effects: Denies         Mental Status Exam  Level of consciousness: Alert and awake   Appearance: Appropriate attire for setting, seated in chair, with fair  grooming and hygiene Behavior/Motor: Approachable, calm  Attitude toward examiner: Cooperative, attentive, good eye contact  Speech: spontaneous, normal volume and hyperverbal   Mood: Euphoric  Affect: Mood congruent  Thought processes: coherent and rapid   Thought content: Denies homicidal ideation  Suicidal Ideation: Improving; patient unable to contract for safety in community  Delusions: Not evident  Perceptual Disturbance: patient is not observed responding to internal stimuli  Cognition: Oriented to self, location, time, and situation  Memory: intact  Insight & Judgement: poor     Data   height is 5' 7\" (1.702 m) and weight is 131 lb (59.4 kg). Her temporal temperature is 97.1 °F (36.2 °C). Her blood pressure is 111/70 and her pulse is 88. Her respiration is 14 and oxygen saturation is 100%.    Labs:   Admission on 05/27/2022   Component Date Value Ref Range Status    POC Glucose 05/27/2022 292* 65 - 105 mg/dL Final    WBC 05/27/2022 7.1  3.5 - 11.0 k/uL Final    RBC 05/27/2022 4.44  4.0 - 5.2 m/uL Final    Hemoglobin 05/27/2022 13.1  12.0 - 16.0 g/dL Final    Hematocrit 05/27/2022 38.5  36 - 46 % Final    MCV 05/27/2022 86.7  80 - 100 fL Final    MCH 05/27/2022 29.6  26 - 34 pg Final    MCHC 05/27/2022 34.1  31 - 37 g/dL Final    RDW 05/27/2022 14.7  11.5 - 14.9 % Final    Platelets 76/97/7824 337  150 - 450 k/uL Final    MPV 05/27/2022 6.9  6.0 - 12.0 fL Final    Seg Neutrophils 05/27/2022 52  36 - 66 % Final    Lymphocytes 05/27/2022 33  24 - 44 % Final    Monocytes 05/27/2022 11* 1 - 7 % Final    Eosinophils % 05/27/2022 3  0 - 4 % Final    Basophils 05/27/2022 1  0 - 2 % Final    Segs Absolute 05/27/2022 3.70  1.3 - 9.1 k/uL Final    Absolute Lymph # 05/27/2022 2.30  1.0 - 4.8 k/uL Final    Absolute Mono # 05/27/2022 0.80  0.1 - 1.3 k/uL Final    Absolute Eos # 05/27/2022 0.20  0.0 - 0.4 k/uL Final    Basophils Absolute 05/27/2022 0.10  0.0 - 0.2 k/uL Final    Glucose 05/27/2022 283* 70 - 99 mg/dL Final  BUN 05/27/2022 13  6 - 20 mg/dL Final    CREATININE 05/27/2022 0.67  0.50 - 0.90 mg/dL Final    Calcium 05/27/2022 9.0  8.6 - 10.4 mg/dL Final    Sodium 05/27/2022 134* 135 - 144 mmol/L Final    Potassium 05/27/2022 4.0  3.7 - 5.3 mmol/L Final    Chloride 05/27/2022 99  98 - 107 mmol/L Final    CO2 05/27/2022 24  20 - 31 mmol/L Final    Anion Gap 05/27/2022 11  9 - 17 mmol/L Final    Alkaline Phosphatase 05/27/2022 83  35 - 104 U/L Final    ALT 05/27/2022 9  5 - 33 U/L Final    AST 05/27/2022 12  <32 U/L Final    Total Bilirubin 05/27/2022 0.34  0.3 - 1.2 mg/dL Final    Total Protein 05/27/2022 6.4  6.4 - 8.3 g/dL Final    Albumin 05/27/2022 3.8  3.5 - 5.2 g/dL Final    GFR Non- 05/27/2022 >60  >60 mL/min Final    GFR  05/27/2022 >60  >60 mL/min Final    GFR Comment 05/27/2022        Final    Comment: Average GFR for 52-63 years old:   80 mL/min/1.73sq m  Chronic Kidney Disease:   <60 mL/min/1.73sq m  Kidney failure:   <15 mL/min/1.73sq m              eGFR calculated using average adult body mass.  Additional eGFR calculator available at:        [email or web address]            Ethanol 05/27/2022 <10  <10 mg/dL Final    Ethanol percent 05/27/2022 <0.010  % Final    Amphetamine Screen, Ur 05/27/2022 NEGATIVE  NEGATIVE Final    Comment:       (Positive cutoff 1000 ng/mL)                  Barbiturate Screen, Ur 05/27/2022 NEGATIVE  NEGATIVE Final    Comment:       (Positive cutoff 200 ng/mL)                  Benzodiazepine Screen, Urine 05/27/2022 NEGATIVE  NEGATIVE Final    Comment:       (Positive cutoff 200 ng/mL)                  Cocaine Metabolite, Urine 05/27/2022 NEGATIVE  NEGATIVE Final    Comment:       (Positive cutoff 300 ng/mL)                  Methadone Screen, Urine 05/27/2022 NEGATIVE  NEGATIVE Final    Comment:       (Positive cutoff 300 ng/mL)                  Opiates, Urine 05/27/2022 NEGATIVE  NEGATIVE Final Comment:       (Positive cutoff 300 ng/mL)                  Phencyclidine, Urine 05/27/2022 NEGATIVE  NEGATIVE Final    Comment:       (Positive cutoff 25 ng/mL)                  Cannabinoid Scrn, Ur 05/27/2022 NEGATIVE  NEGATIVE Final    Comment:       (Positive cutoff 50 ng/mL)                  Oxycodone Screen, Ur 05/27/2022 NEGATIVE  NEGATIVE Final    Comment:       (Positive cutoff 100 ng/mL)                  Test Information 05/27/2022 Assay provides medical screening only. The absence of expected drug(s) and/or metabolite(s) may indicate diluted or adulterated urine, limitations of testing or timing of collection. Final    Comment: Testing for legal purposes should be confirmed by another method. To request confirmation   of test result, please call the lab within 7 days of sample submission.  hCG Qual 05/27/2022 NEGATIVE  NEGATIVE Final    Comment: Specimens with hCG levels near the threshold of the test (25 mIU/mL) may give a negative or   indeterminate result. In such cases, another test should be performed with a new specimen   in 48-72 hours. If early pregnancy is suspected clinically in this setting, correlation   with quantitative serum b-hCG level is suggested.  POC Glucose 05/27/2022 178* 65 - 105 mg/dL Final    POC Glucose 05/27/2022 171* 65 - 105 mg/dL Final    POC Glucose 05/27/2022 103  65 - 105 mg/dL Final    POC Glucose 05/28/2022 150* 65 - 105 mg/dL Final    POC Glucose 05/28/2022 152* 65 - 105 mg/dL Final    POC Glucose 05/28/2022 97  65 - 105 mg/dL Final    POC Glucose 05/28/2022 139* 65 - 105 mg/dL Final    POC Glucose 05/28/2022 108* 65 - 105 mg/dL Final    POC Glucose 05/29/2022 80  65 - 105 mg/dL Final    POC Glucose 05/29/2022 144* 65 - 105 mg/dL Final         Reviewed patient's current plan of care and vital signs with nursing staff.     Labs reviewed: [x] Yes  Last EKG in EMR reviewed: [x] Yes    Medications  Current Facility-Administered Medications: QUEtiapine (SEROQUEL) tablet 50 mg, 50 mg, Oral, Nightly  benzonatate (TESSALON) capsule 100 mg, 100 mg, Oral, TID PRN  acetaminophen (TYLENOL) tablet 650 mg, 650 mg, Oral, Q6H PRN  ibuprofen (ADVIL;MOTRIN) tablet 400 mg, 400 mg, Oral, Q6H PRN  hydrOXYzine (ATARAX) tablet 50 mg, 50 mg, Oral, TID PRN  traZODone (DESYREL) tablet 50 mg, 50 mg, Oral, Nightly PRN  polyethylene glycol (GLYCOLAX) packet 17 g, 17 g, Oral, Daily PRN  aluminum & magnesium hydroxide-simethicone (MAALOX) 200-200-20 MG/5ML suspension 30 mL, 30 mL, Oral, Q6H PRN  haloperidol (HALDOL) tablet 5 mg, 5 mg, Oral, Q6H PRN **AND** LORazepam (ATIVAN) tablet 2 mg, 2 mg, Oral, Q6H PRN  haloperidol lactate (HALDOL) injection 5 mg, 5 mg, IntraMUSCular, Q6H PRN **AND** LORazepam (ATIVAN) injection 2 mg, 2 mg, IntraMUSCular, Q6H PRN **AND** diphenhydrAMINE (BENADRYL) injection 50 mg, 50 mg, IntraMUSCular, Q6H PRN  aspirin chewable tablet 81 mg, 81 mg, Oral, Daily  atorvastatin (LIPITOR) tablet 40 mg, 40 mg, Oral, Daily  insulin glargine (LANTUS) injection vial 60 Units, 60 Units, SubCUTAneous, Nightly  ketoconazole (NIZORAL) 2 % cream, , Topical, BID  lisinopril (PRINIVIL;ZESTRIL) tablet 2.5 mg, 2.5 mg, Oral, Daily  glucose chewable tablet 16 g, 4 tablet, Oral, PRN  dextrose bolus 10% 125 mL, 125 mL, IntraVENous, PRN **OR** dextrose bolus 10% 250 mL, 250 mL, IntraVENous, PRN  glucagon (rDNA) injection 1 mg, 1 mg, IntraMUSCular, PRN  dextrose 5 % solution, 100 mL/hr, IntraVENous, PRN  insulin lispro (HUMALOG) injection vial 8 Units, 8 Units, SubCUTAneous, TID WC  nicotine (NICODERM CQ) 14 MG/24HR 1 patch, 1 patch, TransDERmal, Daily    ASSESSMENT  Major depressive disorder, recurrent, severe with psychotic features [facility]         PLAN  Patient symptoms are: improving  Continue current medication regimen. Patient applied to [facility]   Monitor need and frequency of PRN medications. Encourage participation in groups and milieu.   Attempt to develop insight. Supportive Therapy conducted. Probable discharge is per attending physician. Follow-up daily while inpatient. Patient continues to be monitored in the inpatient psychiatric facility at [facility] for safety and stabilization. Patient continues to need, on a daily basis, active treatment furnished directly by or requiring the supervision of inpatient psychiatric personnel. Electronically signed by PRITI Marsh CNP on 5/29/2022 at 4:40 PM    **This report has been created using voice recognition software. It may contain minor errors which are inherent in voice recognition technology. **

## 2022-05-29 NOTE — PROGRESS NOTES
[facility]  Taylor Rasmussen MD; Dee Cordero MD; Kaitlin Gomez MD; MD Tommy Patterson MD; Joanna Wilson MD    [facility]   [facility]    HISTORY AND PHYSICAL EXAMINATION            Date:   5/29/2022  Patient name:  Patrica Vargas  Date of admission:  5/27/2022 10:10 AM  MRN:   [id number]  Account:  [de-identified]  YOB: 1970  PCP:    Kasandra Craig MD  Room:   [address] [place] [address]  Code Status:    Full Code    Chief Complaint:     No chief complaint on file. Diabetes mellitus    History Obtained From:     Patient medical record nursing staff    History of Present Illness:     Patrica Vargas is a 45 y.o. Non- / non  female who presents with No chief complaint on file. and is admitted to the hospital for the management of Major depressive disorder, recurrent, severe with psychotic features ([facility] HTN  Onset more than 2 years ago  desmond mild to mod  Controlled with current po meds  Not associated with headaches or blurry vision  No chest pain    Diabetes   Duration more than 7 years  Modifying factors on Glucophage and other med  Severity uncontrolled sever  Associated signs and symtoms neuropathy/ckd/ CAD. aggravated with sugar diet and better with low sugar diet           Past Medical History:     Past Medical History:   Diagnosis Date    Back pain     Bipolar 1 disorder ([address]     Diabetes mellitus ([address]     Disc disorder     Hypertension         Past Surgical History:     Past Surgical History:   Procedure Laterality Date    BACK SURGERY      CHOLECYSTECTOMY          Medications Prior to Admission:     Prior to Admission medications    Medication Sig Start Date End Date Taking?  Authorizing Provider   acetaminophen (TYLENOL) 500 MG tablet Take 1 tablet by mouth every 6 hours as needed for Pain 7/19/21   Bernestine Duty, DO   ibuprofen (ADVIL;MOTRIN) 600 MG tablet Take 1 tablet by mouth every 6 hours as needed for Pain 7/19/21   Nuris Fernando, DO   cyclobenzaprine (FLEXERIL) 5 MG tablet Take 1 tablet by mouth 2 times daily as needed for Muscle spasms 1/2/19   Judy Madden DO   ketoconazole (NIZORAL) 2 % cream Apply topically daily below Left breast 1/2/19   Judy Madden DO   ondansetron (ZOFRAN ODT) 4 MG disintegrating tablet Take 1 tablet by mouth every 8 hours as needed for Nausea 10/29/18   Arlet Merchant MD   insulin glargine (LANTUS) 100 UNIT/ML injection pen Inject 60 Units into the skin nightly    Historical Provider, MD   lisinopril (PRINIVIL;ZESTRIL) 2.5 MG tablet Take 2.5 mg by mouth daily    Historical Provider, MD   atorvastatin (LIPITOR) 40 MG tablet Take 40 mg by mouth daily    Historical Provider, MD   aspirin 81 MG tablet Take 81 mg by mouth daily    Historical Provider, MD   dicyclomine (BENTYL) 10 MG capsule Take 2 capsules by mouth every 6 hours as needed (abdominal spasm) 10/21/16   Flaco Banerjee MD   insulin aspart (NOVOLOG) 100 UNIT/ML injection pen Inject 10 Units into the skin 3 times daily (before meals)     Historical Provider, MD   QUEtiapine (SEROQUEL) 50 MG tablet Take 50 mg by mouth nightly Indications: (2 tabs @ HS)     Historical Provider, MD        Allergies:     Codeine and Fish-derived products    Social History:     Tobacco:    reports that she has been smoking cigarettes. She has a 6.50 pack-year smoking history. She has never used smokeless tobacco.  Alcohol:      reports no history of alcohol use. Drug Use:  reports no history of drug use. Family History:     History reviewed. No pertinent family history. Review of Systems:     Positive and Negative as described in HPI.     CONSTITUTIONAL:  negative for fevers, chills, sweats, fatigue, weight loss  HEENT:  negative for vision, hearing changes, runny nose, throat pain  RESPIRATORY:  negative for shortness of breath, cough, congestion, wheezing  CARDIOVASCULAR: negative for chest pain, palpitations  GASTROINTESTINAL:  negative for nausea, vomiting, diarrhea, constipation, change in bowel habits, abdominal pain   GENITOURINARY:  negative for difficulty of urination, burning with urination, frequency   INTEGUMENT:  negative for rash, skin lesions, easy bruising   HEMATOLOGIC/LYMPHATIC:  negative for swelling/edema   ALLERGIC/IMMUNOLOGIC:  negative for urticaria , itching  ENDOCRINE:  negative increase in drinking, increase in urination, hot or cold intolerance  MUSCULOSKELETAL:  negative joint pains, muscle aches, swelling of joints  NEUROLOGICAL:  negative for headaches, dizziness, lightheadedness, numbness, pain, tingling extremities      Physical Exam:   /70   Pulse 88   Temp 97.1 °F (36.2 °C) (Temporal)   Resp 14   Ht 5' 7\" (1.702 m)   Wt 131 lb (59.4 kg)   SpO2 100%   BMI 20.52 kg/m²   Temp (24hrs), Av.4 °F (36.3 °C), Min:97.1 °F (36.2 °C), Max:97.6 °F (36.4 °C)    Recent Labs     22  1659 22  0724 22  1142   POCGLU 139* 108* 80 144*     No intake or output data in the 24 hours ending 22 1424    General Appearance: alert, well appearing, and in no acute distress  Mental status: oriented to person, place, and time  Head: normocephalic, atraumatic  Eye: no icterus, redness, pupils equal and reactive, extraocular eye movements intact, conjunctiva clear  Ear: normal external ear, no discharge, hearing intact  Nose: no drainage noted  Mouth: mucous membranes moist  Patient is edentulous  Neck: supple, no carotid bruits, thyroid not palpable  Lungs: Bilateral equal air entry, clear to ausculation, no wheezing, rales or rhonchi, normal effort  Cardiovascular: normal rate, regular rhythm, no murmur, gallop, rub  Abdomen: Soft, nontender, nondistended, normal bowel sounds, no hepatomegaly or splenomegaly  Neurologic: There are no new focal motor or sensory deficits, normal muscle tone and bulk, no abnormal sensation, normal speech, cranial nerves II through XII grossly intact  Skin: No gross lesions, rashes, bruising or bleeding on exposed skin area  Extremities: peripheral pulses palpable, no pedal edema or calf pain with palpation      Investigations:      Laboratory Testing:  Recent Results (from the past 24 hour(s))   POC Glucose Fingerstick    Collection Time: 05/28/22  4:59 PM   Result Value Ref Range    POC Glucose 139 (H) 65 - 105 mg/dL   POC Glucose Fingerstick    Collection Time: 05/28/22  8:13 PM   Result Value Ref Range    POC Glucose 108 (H) 65 - 105 mg/dL   POC Glucose Fingerstick    Collection Time: 05/29/22  7:24 AM   Result Value Ref Range    POC Glucose 80 65 - 105 mg/dL   POC Glucose Fingerstick    Collection Time: 05/29/22 11:42 AM   Result Value Ref Range    POC Glucose 144 (H) 65 - 105 mg/dL       Imaging/Diagnostics:  No results found. Assessment :      Hospital Problems           Last Modified POA    * (Principal) Major depressive disorder, recurrent, severe with psychotic features ([facility] 5/28/2022 Yes    PTSD (post-traumatic stress disorder) 5/28/2022 Yes    Generalized anxiety disorder with panic attacks 5/28/2022 Yes    Cocaine use disorder, severe, in early remission ([address] 5/28/2022 Yes          Plan:     17-year-old lady with history of uncontrolled diabetes mellitus start Lantus 60 units nightly  Humalog 8 units before meals  Hypertension start lisinopril 2.5 mg  Hyperlipidemia start Lipitor 40 mg  Primary prevention aspirin 81 mg          Jim Wilson MD  5/29/2022  2:24 PM    Copy sent to Dr. Mina Mccain MD    Please note that this chart was generated using voice recognition Dragon dictation software. Although every effort was made to ensure the accuracy of this automated transcription, some errors in transcription may have occurred.

## 2022-05-29 NOTE — PROGRESS NOTES
Comprehensive Nutrition Assessment    Type and Reason for Visit:  Initial,Positive Nutrition Screen (Poor appetite and intake, wt loss)    Nutrition Recommendations/Plan:   1. Continue current diet. 2. Monitor glucose levels. Malnutrition Assessment:  Malnutrition Status:  Insufficient data (05/29/22 1602)    Context:  Acute Illness     Findings of the 6 clinical characteristics of malnutrition:  Energy Intake:  Unable to assess (poor appetite and intake reported prior to admission)  Weight Loss:   (No apparent wt loss x 3 months; previous wt loss of 20lb over unknown time period)     Body Fat Loss:  Unable to assess     Muscle Mass Loss:  Unable to assess    Fluid Accumulation:  No significant fluid accumulation     Strength:  Not Performed    Nutrition Assessment:    Pt admitted due to depression with suicidal ideation. Nurse states pt appears to be eating well and tolerating current diet. No plans to implement carbohydrate control diet at present. Nurse will contact RD if further intervention appears necessary. Nutrition Related Findings:    No edema. POC Glucose: 80, 144. Hx: DM, HTN, Substance Abuse. Current Nutrition Intake & Therapies:    Average Meal Intake: %     ADULT DIET; Regular    Anthropometric Measures:  Height: 5' 7\" (170.2 cm)  Ideal Body Weight (IBW): 135 lbs (61 kg)    Admission Body Weight: 130 lb 15.3 oz (59.4 kg)  Current Body Weight: 130 lb 15.3 oz (59.4 kg), 97 % IBW. Weight Source: Standing Scale  Current BMI (kg/m2): 20.5  Usual Body Weight: 130 lb 1.1 oz (59 kg) (59 kg (2/14/22), 67.6kg (8/10/20))  % Weight Change (Calculated): 0.7                    BMI Categories: Normal Weight (BMI 18.5-24. 9)    Estimated Daily Nutrient Needs:  Energy Requirements Based On: Formula  Weight Used for Energy Requirements: Admission  Energy (kcal/day): 6570-8033 based on [facility] with 1.2-1.3 factor  Weight Used for Protein Requirements: Admission  Protein (g/day): 59 based on 1 gm per kg    Nutrition Diagnosis:   · Inadequate oral intake related to psychological cause or life stress as evidenced by poor intake prior to admission,weight loss      Nutrition Interventions:   Food and/or Nutrient Delivery: Continue Current Diet  Nutrition Education/Counseling: No recommendation at this time  Coordination of Nutrition Care: Continue to monitor while inpatient       Goals:     Goals: Meet at least 75% of estimated needs (with reasonable glucose control)       Nutrition Monitoring and Evaluation:   Behavioral-Environmental Outcomes: None Identified  Food/Nutrient Intake Outcomes: Food and Nutrient Intake  Physical Signs/Symptoms Outcomes: Biochemical Data,Chewing or Swallowing,Weight    Discharge Planning:    Continue current diet (Recommend carbohdrate control diet if pt willing, especially if glucose control is not adequate)     Mary Alvarado RD, LD  Contact: [phone]

## 2022-05-29 NOTE — PLAN OF CARE
Problem: Depression/Self Harm  Goal: Effect of psychiatric condition will be minimized and patient will be protected from self harm  Description: INTERVENTIONS:  1. Assess impact of patient's symptoms on level of functioning, self care needs and offer support as indicated  2. Assess patient/family knowledge of depression, impact on illness and need for teaching  3. Provide emotional support, presence and reassurance  4. Assess for possible suicidal thoughts or ideation. If patient expresses suicidal thoughts or statements do not leave alone, initiate Suicide Precautions, move to a room close to the nursing station and obtain sitter  5. Initiate consults as appropriate with Mental Health Professional, Spiritual Care, Psychosocial CNS, and consider a recommendation to the LIP for a Psychiatric Consultation  5/29/2022 0819 by Lani Amador RN  Outcome: Progressing  Pt denies thoughts of self-harm, safety checks continue Q15 minutes. Pt brightens upon approach. Pleasant and cooperative, took medications without issue. Denies symptoms of depression. No complaints about sleep or appetite. Denies hallucinations of any type. Problem: Anxiety  Goal: Will report anxiety at manageable levels  Description: INTERVENTIONS:  1. Administer medication as ordered  2. Teach and rehearse alternative coping skills  3. Provide emotional support with 1:1 interaction with staff  5/29/2022 0819 by Lani Amador RN  Outcome: Progressing  Pt reports some anxiety. Controlled in behavior. Pt requests as needed medications appropriately.
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
## 2022-05-30 LAB
GLUCOSE BLD-MCNC: 106 MG/DL (ref 65–105)
GLUCOSE BLD-MCNC: 109 MG/DL (ref 65–105)
GLUCOSE BLD-MCNC: 128 MG/DL (ref 65–105)
GLUCOSE BLD-MCNC: 129 MG/DL (ref 65–105)
GLUCOSE BLD-MCNC: 173 MG/DL (ref 65–105)
GLUCOSE BLD-MCNC: 56 MG/DL (ref 65–105)
GLUCOSE BLD-MCNC: 57 MG/DL (ref 65–105)
GLUCOSE BLD-MCNC: 98 MG/DL (ref 65–105)

## 2022-05-30 PROCEDURE — 6370000000 HC RX 637 (ALT 250 FOR IP): Performed by: INTERNAL MEDICINE

## 2022-05-30 PROCEDURE — 99232 SBSQ HOSP IP/OBS MODERATE 35: CPT | Performed by: INTERNAL MEDICINE

## 2022-05-30 PROCEDURE — 6370000000 HC RX 637 (ALT 250 FOR IP): Performed by: PSYCHIATRY & NEUROLOGY

## 2022-05-30 PROCEDURE — 99232 SBSQ HOSP IP/OBS MODERATE 35: CPT | Performed by: PSYCHIATRY & NEUROLOGY

## 2022-05-30 PROCEDURE — 1240000000 HC EMOTIONAL WELLNESS R&B

## 2022-05-30 PROCEDURE — 82947 ASSAY GLUCOSE BLOOD QUANT: CPT

## 2022-05-30 PROCEDURE — APPSS30 APP SPLIT SHARED TIME 16-30 MINUTES: Performed by: NURSE PRACTITIONER

## 2022-05-30 RX ORDER — TRAZODONE HYDROCHLORIDE 50 MG/1
50 TABLET ORAL NIGHTLY PRN
Qty: 30 TABLET | Refills: 0 | Status: SHIPPED | OUTPATIENT
Start: 2022-05-30

## 2022-05-30 RX ORDER — HYDROXYZINE 50 MG/1
50 TABLET, FILM COATED ORAL 3 TIMES DAILY PRN
Qty: 90 TABLET | Refills: 0 | Status: SHIPPED | OUTPATIENT
Start: 2022-05-30 | End: 2022-06-29

## 2022-05-30 RX ORDER — LISINOPRIL 2.5 MG/1
2.5 TABLET ORAL DAILY
Qty: 30 TABLET | Refills: 0 | Status: ON HOLD | OUTPATIENT
Start: 2022-05-30 | End: 2022-10-24 | Stop reason: SDUPTHER

## 2022-05-30 RX ORDER — INSULIN GLARGINE 100 [IU]/ML
40 INJECTION, SOLUTION SUBCUTANEOUS NIGHTLY
Status: DISCONTINUED | OUTPATIENT
Start: 2022-05-30 | End: 2022-05-31 | Stop reason: HOSPADM

## 2022-05-30 RX ORDER — ATORVASTATIN CALCIUM 40 MG/1
40 TABLET, FILM COATED ORAL DAILY
Qty: 30 TABLET | Refills: 0 | Status: ON HOLD | OUTPATIENT
Start: 2022-05-30 | End: 2022-10-24 | Stop reason: SDUPTHER

## 2022-05-30 RX ORDER — QUETIAPINE FUMARATE 50 MG/1
50 TABLET, FILM COATED ORAL NIGHTLY
Qty: 30 TABLET | Refills: 0 | Status: ON HOLD | OUTPATIENT
Start: 2022-05-30 | End: 2022-10-24 | Stop reason: SDUPTHER

## 2022-05-30 RX ADMIN — QUETIAPINE FUMARATE 50 MG: 50 TABLET ORAL at 21:22

## 2022-05-30 RX ADMIN — ALUMINUM HYDROXIDE, MAGNESIUM HYDROXIDE, AND SIMETHICONE 30 ML: 200; 200; 20 SUSPENSION ORAL at 15:16

## 2022-05-30 RX ADMIN — Medication 16 G: at 01:50

## 2022-05-30 RX ADMIN — INSULIN GLARGINE 40 UNITS: 100 INJECTION, SOLUTION SUBCUTANEOUS at 21:23

## 2022-05-30 RX ADMIN — ACETAMINOPHEN 650 MG: 325 TABLET ORAL at 12:14

## 2022-05-30 RX ADMIN — HYDROXYZINE HYDROCHLORIDE 50 MG: 50 TABLET, FILM COATED ORAL at 13:04

## 2022-05-30 RX ADMIN — TRAZODONE HYDROCHLORIDE 50 MG: 50 TABLET ORAL at 21:22

## 2022-05-30 RX ADMIN — HYDROXYZINE HYDROCHLORIDE 50 MG: 50 TABLET, FILM COATED ORAL at 21:21

## 2022-05-30 RX ADMIN — LISINOPRIL 2.5 MG: 5 TABLET ORAL at 09:06

## 2022-05-30 RX ADMIN — ASPIRIN 81 MG: 81 TABLET, CHEWABLE ORAL at 09:06

## 2022-05-30 RX ADMIN — ATORVASTATIN CALCIUM 40 MG: 20 TABLET, FILM COATED ORAL at 09:05

## 2022-05-30 ASSESSMENT — PAIN SCALES - GENERAL
PAINLEVEL_OUTOF10: 5
PAINLEVEL_OUTOF10: 3
PAINLEVEL_OUTOF10: 9

## 2022-05-30 ASSESSMENT — PAIN DESCRIPTION - LOCATION
LOCATION: HEAD
LOCATION: ABDOMEN;CHEST

## 2022-05-30 NOTE — DISCHARGE INSTR - DIET
Good nutrition is important when healing from an illness, injury, or surgery. Follow any nutrition recommendations given to you during your hospital stay. If you were given an oral nutrition supplement while in the hospital, continue to take this supplement at home. You can take it with meals, in-between meals, and/or before bedtime. These supplements can be purchased at most local grocery stores, pharmacies, and chain [employer]-stores. If you have any questions about your diet or nutrition, call the hospital and ask for the dietitian.   General diet  Increase fluids

## 2022-05-30 NOTE — GROUP NOTE
Group Therapy Note    Date: 5/30/2022    Group Start Time: 0900  Group End Time: 0930  Group Topic: Group Therapy    [facility] Edgardo Varma LPN        Group Therapy Note    Attendees: 4/18         Patient's Goal:  Remain positive, relax more, use positive coping skills    Status After Intervention:  Improved    Participation Level:  Active Listener and Interactive    Participation Quality: Appropriate, Attentive, Sharing and Supportive      Speech:  normal      Thought Process/Content: Logical      Affective Functioning: Congruent      Mood: elevated      Level of consciousness:  Alert, Oriented x4 and Attentive      Response to Learning: Able to verbalize current knowledge/experience, Able to verbalize/acknowledge new learning, Able to retain information, Capable of insight, Able to change behavior and Progressing to goal      Endings: None Reported    Modes of Intervention: Socialization      Discipline Responsible: Licensed Practical Nurse      Signature:  Cammy Bates LPN

## 2022-05-30 NOTE — CARE COORDINATION
Social work called [facility] to verify patient will be arriving tomorrow by 1:30 still.   They stated they are not able to provide to/from the assessment tomorrow but will be able to provide transportation to programming once she has completed tomorrow's assessment and accepted to the [facility] program.

## 2022-05-30 NOTE — PLAN OF CARE
Problem: Depression/Self Harm  Goal: Effect of psychiatric condition will be minimized and patient will be protected from self harm  Description: INTERVENTIONS:  1. Assess impact of patient's symptoms on level of functioning, self care needs and offer support as indicated  2. Assess patient/family knowledge of depression, impact on illness and need for teaching  3. Provide emotional support, presence and reassurance  4. Assess for possible suicidal thoughts or ideation. If patient expresses suicidal thoughts or statements do not leave alone, initiate Suicide Precautions, move to a room close to the nursing station and obtain sitter  5. Initiate consults as appropriate with Mental Health Professional, Spiritual Care, Psychosocial CNS, and consider a recommendation to the LIP for a Psychiatric Consultation  5/30/2022 1014 by Makenzie Astorga RN  Outcome: Progressing  Pt denies feeling depressed. No thoughts of self-harm, safety checks continue Q15 minutes. Out and social with peers. Attends groups. Cooperative and pleasant. Compliant with medications. No complaints about sleep. Pt looks forward to discharge and leading a sober life. Problem: Anxiety  Goal: Will report anxiety at manageable levels  Description: INTERVENTIONS:  1. Administer medication as ordered  2. Teach and rehearse alternative coping skills  3. Provide emotional support with 1:1 interaction with staff  5/30/2022 1014 by Makenzie Astorga RN  Outcome: Progressing  Does not report any anxiety. Problem: Nutrition Deficit:  Goal: Optimize nutritional status  Outcome: Progressing  Out for all meals and eats adequate amounts.

## 2022-05-30 NOTE — GROUP NOTE
Group Therapy Note    Date: 2022    Group Start Time: 1330  Group End Time: 3916  Group Topic: Music Therapy    MONALISA AYALA    Ga Hines        Group Therapy Note    Attendees: 10/18         Patient's Goal:  Patients shared preferred music and analyzed lyrics and themes in music, and reflected on these to identify things in their life that they are they thankful for. Goals to increase socialization; experience and reflect on thankfulness and appreciation; increase self-expression;     Notes:  Patient attended and participated in group having positive interactions with peers and staff. Patient shared music and expressed appreciation for \"still being here\" which was also the title of the song she shared. Patient stated she could have  from her 27 years of drug abuse, but she has lived, and expressed thankfulness to God to still be alive and have the opportunity to live a sober life. Status After Intervention:  Improved    Participation Level:  Active Listener and Interactive    Participation Quality: Appropriate, Attentive and Sharing      Speech:  normal      Thought Process/Content: Logical  Linear      Affective Functioning: Congruent      Mood: euthymic      Level of consciousness:  Alert and Attentive      Response to Learning: Able to verbalize current knowledge/experience and Progressing to goal      Endings: None Reported    Modes of Intervention: Support, Socialization, Exploration, Activity, Media and Reality-testing      Discipline Responsible: Psychoeducational Specialist      Signature:  Ga Hines

## 2022-05-30 NOTE — PROGRESS NOTES
Daily Progress Note  5/30/2022    Patient Name: Minus Rater: Depression with suicidal ideation with plan and intent to walk into traffic         SUBJECTIVE:   Patient was seen for follow-up assessment today. Nursing staff report she has been medication adherent and behaviorally in control. She is approached in the day area and is pleasant and agreeable to assessment in privacy of her room. Thoughts and speech are organized and linear. Patient shares with writer that she has filled out the communication board in her room and has set goals for herself. She identifies that her mood today is \"happy, but anxious\". She remains focused on her success with sobriety and is looking forward to her assessment with [facility] tomorrow. Symptoms of depression continue to improve and she has been spending time in the day area today and is social with peers. She denies any side effects from scheduled medications. She attends groups regularly and her interactions with peers and staff have been appropriate. She is denying any auditory or visual hallucinations and makes no delusional or paranoid statements during assessment. Patient reports that suicidal ideation has resolved and she is no longer having thoughts of not wanting to be alive. Patient denies homicidal ideation. Patient is focused on discharge tomorrow.     Compliant with scheduled medications: [x] Yes    [] No     Received emergency medications in past 24 hrs: [] Yes   [x] No     Appetite:  [x] Normal/Adequate/Unchanged  [] Increased  [] Decreased      Sleep:       [x] Normal/Adequate/Unchanged  [] Fair  [] Poor      Group Attendance on Unit:   [x] Yes  [] Selectively    [] No    Medication Side Effects: Denies         Mental Status Exam  Level of consciousness: Alert and awake   Appearance: Appropriate attire for setting, seated in chair, with fair  grooming and hygiene   Behavior/Motor: Approachable, calm  Attitude toward examiner: Cooperative, attentive, good eye contact  Speech: spontaneous, normal volume and hyperverbal   Mood: Euthymic  Affect: Mood congruent  Thought processes: coherent and rapid   Thought content: Denies homicidal ideation  Suicidal Ideation: Improved  Delusions: Not evident  Perceptual Disturbance: patient is not observed responding to internal stimuli  Cognition: Oriented to self, location, time, and situation  Memory: intact  Insight & Judgement: poor, improving    Data   height is 5' 7\" (1.702 m) and weight is 131 lb (59.4 kg). Her oral temperature is 97.9 °F (36.6 °C). Her blood pressure is 136/65 and her pulse is 88. Her respiration is 16 and oxygen saturation is 100%.    Labs:   Admission on 05/27/2022   Component Date Value Ref Range Status    POC Glucose 05/27/2022 292* 65 - 105 mg/dL Final    WBC 05/27/2022 7.1  3.5 - 11.0 k/uL Final    RBC 05/27/2022 4.44  4.0 - 5.2 m/uL Final    Hemoglobin 05/27/2022 13.1  12.0 - 16.0 g/dL Final    Hematocrit 05/27/2022 38.5  36 - 46 % Final    MCV 05/27/2022 86.7  80 - 100 fL Final    MCH 05/27/2022 29.6  26 - 34 pg Final    MCHC 05/27/2022 34.1  31 - 37 g/dL Final    RDW 05/27/2022 14.7  11.5 - 14.9 % Final    Platelets 32/25/6467 337  150 - 450 k/uL Final    MPV 05/27/2022 6.9  6.0 - 12.0 fL Final    Seg Neutrophils 05/27/2022 52  36 - 66 % Final    Lymphocytes 05/27/2022 33  24 - 44 % Final    Monocytes 05/27/2022 11* 1 - 7 % Final    Eosinophils % 05/27/2022 3  0 - 4 % Final    Basophils 05/27/2022 1  0 - 2 % Final    Segs Absolute 05/27/2022 3.70  1.3 - 9.1 k/uL Final    Absolute Lymph # 05/27/2022 2.30  1.0 - 4.8 k/uL Final    Absolute Mono # 05/27/2022 0.80  0.1 - 1.3 k/uL Final    Absolute Eos # 05/27/2022 0.20  0.0 - 0.4 k/uL Final    Basophils Absolute 05/27/2022 0.10  0.0 - 0.2 k/uL Final    Glucose 05/27/2022 283* 70 - 99 mg/dL Final    BUN 05/27/2022 13  6 - 20 mg/dL Final    CREATININE 05/27/2022 0.67  0.50 - 0.90 mg/dL Final    Calcium 05/27/2022 9.0  8.6 - 10.4 mg/dL Final    Sodium 05/27/2022 134* 135 - 144 mmol/L Final    Potassium 05/27/2022 4.0  3.7 - 5.3 mmol/L Final    Chloride 05/27/2022 99  98 - 107 mmol/L Final    CO2 05/27/2022 24  20 - 31 mmol/L Final    Anion Gap 05/27/2022 11  9 - 17 mmol/L Final    Alkaline Phosphatase 05/27/2022 83  35 - 104 U/L Final    ALT 05/27/2022 9  5 - 33 U/L Final    AST 05/27/2022 12  <32 U/L Final    Total Bilirubin 05/27/2022 0.34  0.3 - 1.2 mg/dL Final    Total Protein 05/27/2022 6.4  6.4 - 8.3 g/dL Final    Albumin 05/27/2022 3.8  3.5 - 5.2 g/dL Final    GFR Non- 05/27/2022 >60  >60 mL/min Final    GFR  05/27/2022 >60  >60 mL/min Final    GFR Comment 05/27/2022        Final    Comment: Average GFR for 52-63 years old:   80 mL/min/1.73sq m  Chronic Kidney Disease:   <60 mL/min/1.73sq m  Kidney failure:   <15 mL/min/1.73sq m              eGFR calculated using average adult body mass.  Additional eGFR calculator available at:        [employer]            Ethanol 05/27/2022 <10  <10 mg/dL Final    Ethanol percent 05/27/2022 <0.010  % Final    Amphetamine Screen, Ur 05/27/2022 NEGATIVE  NEGATIVE Final    Comment:       (Positive cutoff 1000 ng/mL)                  Barbiturate Screen, Ur 05/27/2022 NEGATIVE  NEGATIVE Final    Comment:       (Positive cutoff 200 ng/mL)                  Benzodiazepine Screen, Urine 05/27/2022 NEGATIVE  NEGATIVE Final    Comment:       (Positive cutoff 200 ng/mL)                  Cocaine Metabolite, Urine 05/27/2022 NEGATIVE  NEGATIVE Final    Comment:       (Positive cutoff 300 ng/mL)                  Methadone Screen, Urine 05/27/2022 NEGATIVE  NEGATIVE Final    Comment:       (Positive cutoff 300 ng/mL)                  Opiates, Urine 05/27/2022 NEGATIVE  NEGATIVE Final    Comment:       (Positive cutoff 300 ng/mL)                  Phencyclidine, Urine 05/27/2022 NEGATIVE  NEGATIVE Final    Comment:       (Positive cutoff 25 ng/mL)                  Cannabinoid Scrn, Ur 05/27/2022 NEGATIVE  NEGATIVE Final    Comment:       (Positive cutoff 50 ng/mL)                  Oxycodone Screen, Ur 05/27/2022 NEGATIVE  NEGATIVE Final    Comment:       (Positive cutoff 100 ng/mL)                  Test Information 05/27/2022 Assay provides medical screening only. The absence of expected drug(s) and/or metabolite(s) may indicate diluted or adulterated urine, limitations of testing or timing of collection. Final    Comment: Testing for legal purposes should be confirmed by another method. To request confirmation   of test result, please call the lab within 7 days of sample submission.  hCG Qual 05/27/2022 NEGATIVE  NEGATIVE Final    Comment: Specimens with hCG levels near the threshold of the test (25 mIU/mL) may give a negative or   indeterminate result. In such cases, another test should be performed with a new specimen   in 48-72 hours. If early pregnancy is suspected clinically in this setting, correlation   with quantitative serum b-hCG level is suggested.       POC Glucose 05/27/2022 178* 65 - 105 mg/dL Final    POC Glucose 05/27/2022 171* 65 - 105 mg/dL Final    POC Glucose 05/27/2022 103  65 - 105 mg/dL Final    POC Glucose 05/28/2022 150* 65 - 105 mg/dL Final    POC Glucose 05/28/2022 152* 65 - 105 mg/dL Final    POC Glucose 05/28/2022 97  65 - 105 mg/dL Final    POC Glucose 05/28/2022 139* 65 - 105 mg/dL Final    POC Glucose 05/28/2022 108* 65 - 105 mg/dL Final    POC Glucose 05/29/2022 80  65 - 105 mg/dL Final    POC Glucose 05/29/2022 144* 65 - 105 mg/dL Final    POC Glucose 05/29/2022 95  65 - 105 mg/dL Final    POC Glucose 05/29/2022 187* 65 - 105 mg/dL Final    POC Glucose 05/30/2022 56* 65 - 105 mg/dL Final    Critical Noted    POC Glucose 05/30/2022 129* 65 - 105 mg/dL Final    POC Glucose 05/30/2022 57* 65 - 105 mg/dL Final    Critical Noted    POC Glucose 05/30/2022 106* 65 - 105 mg/dL Final    POC Glucose 05/30/2022 109* 65 - 105 mg/dL Final         Reviewed patient's current plan of care and vital signs with nursing staff.     Labs reviewed: [x] Yes  Last EKG in EMR reviewed: [x] Yes    Medications  Current Facility-Administered Medications: insulin glargine (LANTUS) injection vial 40 Units, 40 Units, SubCUTAneous, Nightly  QUEtiapine (SEROQUEL) tablet 50 mg, 50 mg, Oral, Nightly  benzonatate (TESSALON) capsule 100 mg, 100 mg, Oral, TID PRN  acetaminophen (TYLENOL) tablet 650 mg, 650 mg, Oral, Q6H PRN  ibuprofen (ADVIL;MOTRIN) tablet 400 mg, 400 mg, Oral, Q6H PRN  hydrOXYzine (ATARAX) tablet 50 mg, 50 mg, Oral, TID PRN  traZODone (DESYREL) tablet 50 mg, 50 mg, Oral, Nightly PRN  polyethylene glycol (GLYCOLAX) packet 17 g, 17 g, Oral, Daily PRN  aluminum & magnesium hydroxide-simethicone (MAALOX) 200-200-20 MG/5ML suspension 30 mL, 30 mL, Oral, Q6H PRN  haloperidol (HALDOL) tablet 5 mg, 5 mg, Oral, Q6H PRN **AND** LORazepam (ATIVAN) tablet 2 mg, 2 mg, Oral, Q6H PRN  haloperidol lactate (HALDOL) injection 5 mg, 5 mg, IntraMUSCular, Q6H PRN **AND** LORazepam (ATIVAN) injection 2 mg, 2 mg, IntraMUSCular, Q6H PRN **AND** diphenhydrAMINE (BENADRYL) injection 50 mg, 50 mg, IntraMUSCular, Q6H PRN  aspirin chewable tablet 81 mg, 81 mg, Oral, Daily  atorvastatin (LIPITOR) tablet 40 mg, 40 mg, Oral, Daily  ketoconazole (NIZORAL) 2 % cream, , Topical, BID  lisinopril (PRINIVIL;ZESTRIL) tablet 2.5 mg, 2.5 mg, Oral, Daily  glucose chewable tablet 16 g, 4 tablet, Oral, PRN  dextrose bolus 10% 125 mL, 125 mL, IntraVENous, PRN **OR** dextrose bolus 10% 250 mL, 250 mL, IntraVENous, PRN  glucagon (rDNA) injection 1 mg, 1 mg, IntraMUSCular, PRN  dextrose 5 % solution, 100 mL/hr, IntraVENous, PRN  insulin lispro (HUMALOG) injection vial 8 Units, 8 Units, SubCUTAneous, TID WC  nicotine (NICODERM CQ) 14 MG/24HR 1 patch, 1 patch, TransDERmal, Daily    ASSESSMENT  Major depressive disorder, recurrent, severe with psychotic features ([facility] [address]   DIANE with panic attacks  PTSD     Cocaine use disorder, severe, in early remission         HANDOFF  Patient symptoms are: improving  Patient has been accepted to [facility] with assessment scheduled 5/31 @ 1:30 PM  Medications as determined by attending physician  Encourage participation in groups and milieu. Probable discharge is to be determined by MD    Electronically signed by PRITI Vogel CNP on 5/30/2022 at 4:15 PM    **This report has been created using voice recognition software. It may contain minor errors which are inherent in voice recognition technology. **                                         Psychiatry Attending Attestation     I independently saw and evaluated the patient. I reviewed the Advance Practice Provider's documentation above. Any additional comments or changes to the Advance Practice Provider's documentation are stated below otherwise agree with assessment. Patient feels better than before. Mood and affect are better. Patient reports fleeting suicidal thoughts with no intent or plan. Patient notes that these thoughts are occurring less frequently. Denies any homicidal thoughts, that was explored with the patient. Oriented to time place and person. Recent and remote memory is intact. Patient feels hopeful. Sleep and appetite is good. No side effect from medication reported. Side-effect of medication were discussed with the patient . Patient is responding to current treatment. Discharge soon, if patient continues to show improvement. Case discussed with the staff. =     PLAN  Patient s symptoms are improving  We will continue same medication today and observe  Attempt to develop insight  Psycho-education conducted. Supportive Therapy conducted.   Probable discharge is tomorrow  Follow-up TBD    Electronically signed by Cami Frey MD on 5/30/22 at 5:15 PM EDT

## 2022-05-30 NOTE — PLAN OF CARE
Problem: Anxiety  Goal: Will report anxiety at manageable levels  Description: INTERVENTIONS:  1. Administer medication as ordered  2. Teach and rehearse alternative coping skills  3. Provide emotional support with 1:1 interaction with staff  5/29/2022 2113 by Bere Roper  Outcome: Progressing  Patient denies anxiety during 1:1 talk time. Patient have been calm, cooperative during 1:1 talk time. Q 15 minutes safety checks maintained. Problem: Depression/Self Harm  Goal: Effect of psychiatric condition will be minimized and patient will be protected from self harm  Description: INTERVENTIONS:  1. Assess impact of patient's symptoms on level of functioning, self care needs and offer support as indicated  2. Assess patient/family knowledge of depression, impact on illness and need for teaching  3. Provide emotional support, presence and reassurance  4. Assess for possible suicidal thoughts or ideation. If patient expresses suicidal thoughts or statements do not leave alone, initiate Suicide Precautions, move to a room close to the nursing station and obtain sitter  5. Initiate consults as appropriate with Mental Health Professional, Spiritual Care, Psychosocial CNS, and consider a recommendation to the [facility]IP for a Psychiatric Consultation  5/29/2022 2113 by Bere Roper  Outcome: Progressing   Patient denies depression during 1:1 talk time. Patient have been calm, cooperative during 1:1 talk time. Q 15 minutes safety checks maintained.

## 2022-05-30 NOTE — PROGRESS NOTES
[address] Internal Medicine  Boyd Avila MD; Haris Parrish MD; Yoel Mcdermott MD; MD Dianne Winkler MD; MD BÁRBARA Salguero [facility] Internal Medicine   [facility]    HISTORY AND PHYSICAL EXAMINATION            Date:   5/30/2022  Patient name:  Solo Deng  Date of admission:  5/27/2022 10:10 AM  MRN:   [id number]  Account:  [de-identified]  YOB: 1970  PCP:    Billy Mon MD  Room:   [address] [place] [address]  Code Status:    Full Code    Chief Complaint:     No chief complaint on file. Diabetes mellitus    History Obtained From:     Patient medical record nursing staff    History of Present Illness:     Solo Deng is a 45 y.o. Non- / non  female who presents with No chief complaint on file. and is admitted to the hospital for the management of Major depressive disorder, recurrent, severe with psychotic features ([address] HTN  Onset more than 2 years ago  desmond mild to mod  Controlled with current po meds  Not associated with headaches or blurry vision  No chest pain    Diabetes   Duration more than 7 years  Modifying factors on Glucophage and other med  Severity uncontrolled sever  Associated signs and symtoms neuropathy/ckd/ CAD. aggravated with sugar diet and better with low sugar diet           Past Medical History:     Past Medical History:   Diagnosis Date    Back pain     Bipolar 1 disorder ([address]     Diabetes mellitus ([address]     Disc disorder     Hypertension         Past Surgical History:     Past Surgical History:   Procedure Laterality Date    BACK SURGERY      CHOLECYSTECTOMY          Medications Prior to Admission:     Prior to Admission medications    Medication Sig Start Date End Date Taking?  Authorizing Provider   acetaminophen (TYLENOL) 500 MG tablet Take 1 tablet by mouth every 6 hours as needed for Pain 7/19/21   Rachele Vargas,    ibuprofen (ADVIL;MOTRIN) 600 MG tablet Take 1 tablet by mouth every 6 hours as needed for Pain 7/19/21   Erasto Ruelas,    cyclobenzaprine (FLEXERIL) 5 MG tablet Take 1 tablet by mouth 2 times daily as needed for Muscle spasms 1/2/19   Judy Madden DO   ketoconazole (NIZORAL) 2 % cream Apply topically daily below Left breast 1/2/19   Judy Madden DO   ondansetron (ZOFRAN ODT) 4 MG disintegrating tablet Take 1 tablet by mouth every 8 hours as needed for Nausea 10/29/18   Jamal Raphael MD   insulin glargine (LANTUS) 100 UNIT/ML injection pen Inject 60 Units into the skin nightly    Historical Provider, MD   lisinopril (PRINIVIL;ZESTRIL) 2.5 MG tablet Take 2.5 mg by mouth daily    Historical Provider, MD   atorvastatin (LIPITOR) 40 MG tablet Take 40 mg by mouth daily    Historical Provider, MD   aspirin 81 MG tablet Take 81 mg by mouth daily    Historical Provider, MD   dicyclomine (BENTYL) 10 MG capsule Take 2 capsules by mouth every 6 hours as needed (abdominal spasm) 10/21/16   Malou Hickey MD   insulin aspart (NOVOLOG) 100 UNIT/ML injection pen Inject 10 Units into the skin 3 times daily (before meals)     Historical Provider, MD   QUEtiapine (SEROQUEL) 50 MG tablet Take 50 mg by mouth nightly Indications: (2 tabs @ HS)     Historical Provider, MD        Allergies:     Codeine and Fish-derived products    Social History:     Tobacco:    reports that she has been smoking cigarettes. She has a 6.50 pack-year smoking history. She has never used smokeless tobacco.  Alcohol:      reports no history of alcohol use. Drug Use:  reports no history of drug use. Family History:     History reviewed. No pertinent family history. Review of Systems:     Positive and Negative as described in HPI.     CONSTITUTIONAL:  negative for fevers, chills, sweats, fatigue, weight loss  HEENT:  negative for vision, hearing changes, runny nose, throat pain  RESPIRATORY:  negative for shortness of breath, cough, congestion, wheezing  CARDIOVASCULAR: negative for chest pain, palpitations  GASTROINTESTINAL:  negative for nausea, vomiting, diarrhea, constipation, change in bowel habits, abdominal pain   GENITOURINARY:  negative for difficulty of urination, burning with urination, frequency   INTEGUMENT:  negative for rash, skin lesions, easy bruising   HEMATOLOGIC/LYMPHATIC:  negative for swelling/edema   ALLERGIC/IMMUNOLOGIC:  negative for urticaria , itching  ENDOCRINE:  negative increase in drinking, increase in urination, hot or cold intolerance  MUSCULOSKELETAL:  negative joint pains, muscle aches, swelling of joints  NEUROLOGICAL:  negative for headaches, dizziness, lightheadedness, numbness, pain, tingling extremities      Physical Exam:   /65   Pulse 88   Temp 97.9 °F (36.6 °C) (Oral)   Resp 16   Ht 5' 7\" (1.702 m)   Wt 131 lb (59.4 kg)   SpO2 100%   BMI 20.52 kg/m²   Temp (24hrs), Av.9 °F (36.6 °C), Min:97.9 °F (36.6 °C), Max:97.9 °F (36.6 °C)    Recent Labs     22  0147 22  0205 22  0718 22  0803   POCGLU 56* 129* 57* 106*     No intake or output data in the 24 hours ending 22 1155    General Appearance: alert, well appearing, and in no acute distress  Mental status: oriented to person, place, and time  Head: normocephalic, atraumatic  Eye: no icterus, redness, pupils equal and reactive, extraocular eye movements intact, conjunctiva clear  Ear: normal external ear, no discharge, hearing intact  Nose: no drainage noted  Mouth: mucous membranes moist  Patient is edentulous  Neck: supple, no carotid bruits, thyroid not palpable  Lungs: Bilateral equal air entry, clear to ausculation, no wheezing, rales or rhonchi, normal effort  Cardiovascular: normal rate, regular rhythm, no murmur, gallop, rub  Abdomen: Soft, nontender, nondistended, normal bowel sounds, no hepatomegaly or splenomegaly  Neurologic: There are no new focal motor or sensory deficits, normal muscle tone and bulk, no abnormal sensation, normal software. Although every effort was made to ensure the accuracy of this automated transcription, some errors in transcription may have occurred.

## 2022-05-30 NOTE — PROGRESS NOTES
0147 pt felt blood sugar was low. Accucheck taken- BS 56, pt given glucose chewable tabs per order. Pt requested orange juice, peanut butter, and crackers also. Pt alert and tolerating PO meds and food.     0205- BS recheck- 129

## 2022-05-31 VITALS
DIASTOLIC BLOOD PRESSURE: 71 MMHG | SYSTOLIC BLOOD PRESSURE: 105 MMHG | BODY MASS INDEX: 20.56 KG/M2 | HEART RATE: 120 BPM | OXYGEN SATURATION: 100 % | RESPIRATION RATE: 14 BRPM | HEIGHT: 67 IN | TEMPERATURE: 97.6 F | WEIGHT: 131 LBS

## 2022-05-31 LAB
GLUCOSE BLD-MCNC: 133 MG/DL (ref 65–105)
GLUCOSE BLD-MCNC: 85 MG/DL (ref 65–105)
GLUCOSE BLD-MCNC: 97 MG/DL (ref 65–105)

## 2022-05-31 PROCEDURE — 6370000000 HC RX 637 (ALT 250 FOR IP): Performed by: INTERNAL MEDICINE

## 2022-05-31 PROCEDURE — 82947 ASSAY GLUCOSE BLOOD QUANT: CPT

## 2022-05-31 PROCEDURE — 6370000000 HC RX 637 (ALT 250 FOR IP): Performed by: PSYCHIATRY & NEUROLOGY

## 2022-05-31 PROCEDURE — 99239 HOSP IP/OBS DSCHRG MGMT >30: CPT | Performed by: PSYCHIATRY & NEUROLOGY

## 2022-05-31 RX ADMIN — ATORVASTATIN CALCIUM 40 MG: 20 TABLET, FILM COATED ORAL at 08:27

## 2022-05-31 RX ADMIN — LISINOPRIL 2.5 MG: 5 TABLET ORAL at 08:27

## 2022-05-31 RX ADMIN — ASPIRIN 81 MG: 81 TABLET, CHEWABLE ORAL at 08:27

## 2022-05-31 RX ADMIN — POLYETHYLENE GLYCOL 3350 17 G: 17 POWDER, FOR SOLUTION ORAL at 03:07

## 2022-05-31 NOTE — PLAN OF CARE
Problem: Anxiety  Goal: Will report anxiety at manageable levels  Description: INTERVENTIONS:  1. Administer medication as ordered  2. Teach and rehearse alternative coping skills  3. Provide emotional support with 1:1 interaction with staff  5/30/2022 2022 by Shemar Mendez  Outcome: Adequate for Discharge  Patient denies feelings of anxiety and reports readiness for discharge. Patient vocalized insight into their drug use and a willingness to maintain sobriety. Patient educated on other coping methods such as deep breathing, walking, talking, playing cards. Problem: Depression/Self Harm  Goal: Effect of psychiatric condition will be minimized and patient will be protected from self harm  Description: INTERVENTIONS:  1. Assess impact of patient's symptoms on level of functioning, self care needs and offer support as indicated  2. Assess patient/family knowledge of depression, impact on illness and need for teaching  3. Provide emotional support, presence and reassurance  4. Assess for possible suicidal thoughts or ideation. If patient expresses suicidal thoughts or statements do not leave alone, initiate Suicide Precautions, move to a room close to the nursing station and obtain sitter  5. Initiate consults as appropriate with Mental Health Professional, Spiritual Care, Psychosocial CNS, and consider a recommendation to the [facility] for a Psychiatric Consultation  5/30/2022 2022 by Shemar Mendez  Outcome: Adequate for Discharge  Patient denies thoughts to harm self. Safe environment maintained and patient remains free from self-harm. Agreeable to seeking staff should thoughts to harm self or others arise. Q15min checks for safety.

## 2022-05-31 NOTE — BH NOTE
[address] [facility]  Discharge Note    Pt discharged with followings belongings:   Dental Appliances: None  Vision - Corrective Lenses: None  Hearing Aid: None  Jewelry: Ring  Body Piercings Removed: No  Clothing: Socks,Jacket/Coat,Pants,Shirt,Undergarments,Footwear  Other Valuables: Other (Comment) (none)   Valuables sent home with patient. Patient education on aftercare instructions: given to patient  Information faxed to [address] [place] [address] by nurse  at 10:58 AM .Patient verbalize understanding of AVS:  Yes, discharge to home via daughter. Status EXAM upon discharge:  Mental Status and Behavioral Exam  Normal: Yes  Level of Assistance: Independent/Self  Facial Expression: Brightened  Affect: Appropriate  Level of Consciousness: Alert  Frequency of Checks: 4 times per hour, close  Mood:Normal: Yes  Mood: Anxious  Motor Activity:Normal: Yes  Eye Contact: Good  Observed Behavior: Cooperative  Sexual Misconduct History: Current - no  Preception: [place] to person,[place] to time,[place] to place,[place] to situation  Attention:Normal: Yes  Attention: Distractible  Thought Processes: Circumstantial  Thought Content:Normal: Yes  Thought Content: Preoccupations  Depression Symptoms: No problems reported or observed. Anxiety Symptoms: No problems reported or observed. Marisol Symptoms: No problems reported or observed.   Hallucinations: None  Delusions: No  Memory:Normal: Yes  Memory: Poor remote,Poor recent  Insight and Judgment: Yes  Insight and Judgment: Poor judgment,Poor insight      Metabolic Screening:    Lab Results   Component Value Date    LABA1C 10.1 (H) 09/19/2017       No results found for: CHOL  No results found for: TRIG  No results found for: HDL  No components found for: LDLCAL  No results found for: Storm Freeze, LPN

## 2022-05-31 NOTE — BH NOTE
Patient given tobacco quitline number 04136640698 at this time, refusing to call at this time, states \" I just dont want to quit now\"- patient given information as to the dangers of long term tobacco use. Continue to reinforce the importance of tobacco cessation.

## 2022-05-31 NOTE — GROUP NOTE
HS Goal Group     Date: May 30, 2022   Group Start Time: 2000   Group End Time: 2025   [facility]   Attendees: 10/18     Group Topic: HS Goal Group   Notes: Patient participated in HS goal group. Status After Intervention: Improved   Participation Level:  Active Listener and Interactive   Participation Quality: Appropriate, attentive and supportive   Speech: Normal   Thought Process/Content: Logical and linear   Affective Functioning: Congruent   Mood: WDL   Level of consciousness: Oriented x4   Response to Learning: Progressing to goal; able to verbalize current knowledge/experience, acknowledge new learning, retain information and change behavior    Endings: None reported   Modes of Intervention: Education and exploration       Discipline Responsible: Behavioral Health Tech   Signature: GIO Hinojosa

## 2022-05-31 NOTE — CARE COORDINATION
Name: Gulshan Bach    : 1970    Discharge Date: 22    Primary Auth/Cert #: [id number]    Destination: Private residence    Discharge Medications:      Medication List      START taking these medications    hydrOXYzine 50 MG tablet  Commonly known as: ATARAX  Take 1 tablet by mouth 3 times daily as needed for Anxiety  Notes to patient: ANXIETY     traZODone 50 MG tablet  Commonly known as: DESYREL  Take 1 tablet by mouth nightly as needed for Sleep  Notes to patient: SLEEP        CONTINUE taking these medications    acetaminophen 500 MG tablet  Commonly known as: TYLENOL  Take 1 tablet by mouth every 6 hours as needed for Pain  Notes to patient: PAIN     aspirin 81 MG tablet  Notes to patient: HEART HEALTH     atorvastatin 40 MG tablet  Commonly known as: LIPITOR  Take 1 tablet by mouth daily  Notes to patient: CHOLESTEROL     ibuprofen 600 MG tablet  Commonly known as: ADVIL;MOTRIN  Take 1 tablet by mouth every 6 hours as needed for Pain  Notes to patient: PAIN     insulin aspart 100 UNIT/ML injection pen  Commonly known as: NovoLOG  Notes to patient: DIABETES     insulin glargine 100 UNIT/ML injection pen  Commonly known as: LANTUS;BASAGLAR  Notes to patient: DIABETES     ketoconazole 2 % cream  Commonly known as: NIZORAL  Apply topically daily below Left breast  Notes to patient: SKIN RASH     lisinopril 2.5 MG tablet  Commonly known as: PRINIVIL;ZESTRIL  Take 1 tablet by mouth daily  Notes to patient: LOWER BLOOD PRESSURE     QUEtiapine 50 MG tablet  Commonly known as: SEROQUEL  Take 1 tablet by mouth nightly Indications: (2 tabs @ HS)  Notes to patient:  [address] taking these medications    cyclobenzaprine 5 MG tablet  Commonly known as: FLEXERIL     dicyclomine 10 MG capsule  Commonly known as: Bentyl     ondansetron 4 MG disintegrating tablet  Commonly known as: Zofran ODT           Where to Get Your Medications      These medications were sent to [address] [place] [address]

## 2022-05-31 NOTE — PLAN OF CARE
Problem: Anxiety  Goal: Will report anxiety at manageable levels  Description: INTERVENTIONS:  1. Administer medication as ordered  2. Teach and rehearse alternative coping skills  3. Provide emotional support with 1:1 interaction with staff  Outcome: Completed     Problem: Depression/Self Harm  Goal: Effect of psychiatric condition will be minimized and patient will be protected from self harm  Description: INTERVENTIONS:  1. Assess impact of patient's symptoms on level of functioning, self care needs and offer support as indicated  2. Assess patient/family knowledge of depression, impact on illness and need for teaching  3. Provide emotional support, presence and reassurance  4. Assess for possible suicidal thoughts or ideation. If patient expresses suicidal thoughts or statements do not leave alone, initiate Suicide Precautions, move to a room close to the nursing station and obtain sitter  5.  Initiate consults as appropriate with Mental Health Professional, Spiritual Care, Psychosocial CNS, and consider a recommendation to the [facility] for a Psychiatric Consultation  Outcome: Completed     Problem: Pain  Goal: Verbalizes/displays adequate comfort level or baseline comfort level  Outcome: Completed     Problem: Nutrition Deficit:  Goal: Optimize nutritional status  Outcome: Completed
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
## 2022-10-24 ENCOUNTER — APPOINTMENT (OUTPATIENT)
Dept: GENERAL RADIOLOGY | Age: 52
End: 2022-10-24
Payer: COMMERCIAL

## 2022-10-24 ENCOUNTER — APPOINTMENT (OUTPATIENT)
Dept: NUCLEAR MEDICINE | Age: 52
End: 2022-10-24
Payer: COMMERCIAL

## 2022-10-24 ENCOUNTER — HOSPITAL ENCOUNTER (OUTPATIENT)
Age: 52
Setting detail: OBSERVATION
Discharge: HOME | End: 2022-10-25
Attending: EMERGENCY MEDICINE | Admitting: EMERGENCY MEDICINE
Payer: COMMERCIAL

## 2022-10-24 DIAGNOSIS — R07.9 CHEST PAIN, UNSPECIFIED TYPE: Primary | ICD-10-CM

## 2022-10-24 LAB
ABSOLUTE EOS #: 0.2 K/UL (ref 0–0.44)
ABSOLUTE IMMATURE GRANULOCYTE: <0.03 K/UL (ref 0–0.3)
ABSOLUTE LYMPH #: 2.26 K/UL (ref 1.1–3.7)
ABSOLUTE MONO #: 0.56 K/UL (ref 0.1–1.2)
ANION GAP SERPL CALCULATED.3IONS-SCNC: 13 MMOL/L (ref 9–17)
BASOPHILS # BLD: 0 % (ref 0–2)
BASOPHILS ABSOLUTE: <0.03 K/UL (ref 0–0.2)
BUN BLDV-MCNC: 14 MG/DL (ref 6–20)
CALCIUM SERPL-MCNC: 9 MG/DL (ref 8.6–10.4)
CHLORIDE BLD-SCNC: 99 MMOL/L (ref 98–107)
CO2: 23 MMOL/L (ref 20–31)
CREAT SERPL-MCNC: 0.67 MG/DL (ref 0.5–0.9)
D-DIMER QUANTITATIVE: 0.46 MG/L FEU
EOSINOPHILS RELATIVE PERCENT: 3 % (ref 1–4)
GFR SERPL CREATININE-BSD FRML MDRD: >60 ML/MIN/1.73M2
GLUCOSE BLD-MCNC: 159 MG/DL (ref 65–105)
GLUCOSE BLD-MCNC: 252 MG/DL (ref 65–105)
GLUCOSE BLD-MCNC: 274 MG/DL (ref 65–105)
GLUCOSE BLD-MCNC: 328 MG/DL (ref 70–99)
HCG QUALITATIVE: NEGATIVE
HCT VFR BLD CALC: 39 % (ref 36.3–47.1)
HEMOGLOBIN: 13.2 G/DL (ref 11.9–15.1)
IMMATURE GRANULOCYTES: 0 %
LV EF: 36 %
LV EF: 40 %
LVEF MODALITY: NORMAL
LVEF MODALITY: NORMAL
LYMPHOCYTES # BLD: 36 % (ref 24–43)
MCH RBC QN AUTO: 28.6 PG (ref 25.2–33.5)
MCHC RBC AUTO-ENTMCNC: 33.8 G/DL (ref 28.4–34.8)
MCV RBC AUTO: 84.6 FL (ref 82.6–102.9)
MONOCYTES # BLD: 9 % (ref 3–12)
NRBC AUTOMATED: 0 PER 100 WBC
PDW BLD-RTO: 13.2 % (ref 11.8–14.4)
PLATELET # BLD: 414 K/UL (ref 138–453)
PMV BLD AUTO: 9 FL (ref 8.1–13.5)
POTASSIUM SERPL-SCNC: 4 MMOL/L (ref 3.7–5.3)
RBC # BLD: 4.61 M/UL (ref 3.95–5.11)
SARS-COV-2, RAPID: NOT DETECTED
SEDIMENTATION RATE, ERYTHROCYTE: 24 MM/HR (ref 0–30)
SEG NEUTROPHILS: 52 % (ref 36–65)
SEGMENTED NEUTROPHILS ABSOLUTE COUNT: 3.19 K/UL (ref 1.5–8.1)
SODIUM BLD-SCNC: 135 MMOL/L (ref 135–144)
SPECIMEN DESCRIPTION: NORMAL
TROPONIN, HIGH SENSITIVITY: 10 NG/L (ref 0–14)
TROPONIN, HIGH SENSITIVITY: 11 NG/L (ref 0–14)
WBC # BLD: 6.3 K/UL (ref 3.5–11.3)

## 2022-10-24 PROCEDURE — 84484 ASSAY OF TROPONIN QUANT: CPT

## 2022-10-24 PROCEDURE — 96374 THER/PROPH/DIAG INJ IV PUSH: CPT

## 2022-10-24 PROCEDURE — 6360000002 HC RX W HCPCS: Performed by: STUDENT IN AN ORGANIZED HEALTH CARE EDUCATION/TRAINING PROGRAM

## 2022-10-24 PROCEDURE — 93005 ELECTROCARDIOGRAM TRACING: CPT

## 2022-10-24 PROCEDURE — 84703 CHORIONIC GONADOTROPIN ASSAY: CPT

## 2022-10-24 PROCEDURE — 6370000000 HC RX 637 (ALT 250 FOR IP): Performed by: EMERGENCY MEDICINE

## 2022-10-24 PROCEDURE — 93306 TTE W/DOPPLER COMPLETE: CPT

## 2022-10-24 PROCEDURE — 2580000003 HC RX 258: Performed by: STUDENT IN AN ORGANIZED HEALTH CARE EDUCATION/TRAINING PROGRAM

## 2022-10-24 PROCEDURE — 99285 EMERGENCY DEPT VISIT HI MDM: CPT

## 2022-10-24 PROCEDURE — 3430000000 HC RX DIAGNOSTIC RADIOPHARMACEUTICAL: Performed by: EMERGENCY MEDICINE

## 2022-10-24 PROCEDURE — 71045 X-RAY EXAM CHEST 1 VIEW: CPT

## 2022-10-24 PROCEDURE — 85652 RBC SED RATE AUTOMATED: CPT

## 2022-10-24 PROCEDURE — 6370000000 HC RX 637 (ALT 250 FOR IP): Performed by: STUDENT IN AN ORGANIZED HEALTH CARE EDUCATION/TRAINING PROGRAM

## 2022-10-24 PROCEDURE — A9500 TC99M SESTAMIBI: HCPCS | Performed by: EMERGENCY MEDICINE

## 2022-10-24 PROCEDURE — 2580000003 HC RX 258

## 2022-10-24 PROCEDURE — 6370000000 HC RX 637 (ALT 250 FOR IP)

## 2022-10-24 PROCEDURE — 87635 SARS-COV-2 COVID-19 AMP PRB: CPT

## 2022-10-24 PROCEDURE — G0378 HOSPITAL OBSERVATION PER HR: HCPCS

## 2022-10-24 PROCEDURE — 82947 ASSAY GLUCOSE BLOOD QUANT: CPT

## 2022-10-24 PROCEDURE — 85379 FIBRIN DEGRADATION QUANT: CPT

## 2022-10-24 PROCEDURE — 93017 CV STRESS TEST TRACING ONLY: CPT

## 2022-10-24 PROCEDURE — 85025 COMPLETE CBC W/AUTO DIFF WBC: CPT

## 2022-10-24 PROCEDURE — 78452 HT MUSCLE IMAGE SPECT MULT: CPT

## 2022-10-24 PROCEDURE — 80048 BASIC METABOLIC PNL TOTAL CA: CPT

## 2022-10-24 RX ORDER — KETOROLAC TROMETHAMINE 30 MG/ML
15 INJECTION, SOLUTION INTRAMUSCULAR; INTRAVENOUS ONCE
Status: COMPLETED | OUTPATIENT
Start: 2022-10-24 | End: 2022-10-24

## 2022-10-24 RX ORDER — ALBUTEROL SULFATE 90 UG/1
2 AEROSOL, METERED RESPIRATORY (INHALATION) PRN
Status: DISCONTINUED | OUTPATIENT
Start: 2022-10-24 | End: 2022-10-24 | Stop reason: ALTCHOICE

## 2022-10-24 RX ORDER — ATORVASTATIN CALCIUM 40 MG/1
40 TABLET, FILM COATED ORAL DAILY
Qty: 30 TABLET | Refills: 0 | Status: SHIPPED | OUTPATIENT
Start: 2022-10-24

## 2022-10-24 RX ORDER — TRAZODONE HYDROCHLORIDE 50 MG/1
50 TABLET ORAL NIGHTLY PRN
Status: DISCONTINUED | OUTPATIENT
Start: 2022-10-24 | End: 2022-10-25 | Stop reason: HOSPADM

## 2022-10-24 RX ORDER — METOPROLOL TARTRATE 5 MG/5ML
5 INJECTION INTRAVENOUS EVERY 5 MIN PRN
Status: DISCONTINUED | OUTPATIENT
Start: 2022-10-24 | End: 2022-10-24 | Stop reason: ALTCHOICE

## 2022-10-24 RX ORDER — SODIUM CHLORIDE 9 MG/ML
INJECTION, SOLUTION INTRAVENOUS PRN
Status: DISCONTINUED | OUTPATIENT
Start: 2022-10-24 | End: 2022-10-25 | Stop reason: HOSPADM

## 2022-10-24 RX ORDER — ENOXAPARIN SODIUM 100 MG/ML
40 INJECTION SUBCUTANEOUS DAILY
Status: DISCONTINUED | OUTPATIENT
Start: 2022-10-24 | End: 2022-10-25 | Stop reason: HOSPADM

## 2022-10-24 RX ORDER — NITROGLYCERIN 0.4 MG/1
0.4 TABLET SUBLINGUAL EVERY 5 MIN PRN
Status: DISCONTINUED | OUTPATIENT
Start: 2022-10-24 | End: 2022-10-24 | Stop reason: ALTCHOICE

## 2022-10-24 RX ORDER — ONDANSETRON 4 MG/1
4 TABLET, ORALLY DISINTEGRATING ORAL EVERY 8 HOURS PRN
Status: DISCONTINUED | OUTPATIENT
Start: 2022-10-24 | End: 2022-10-25 | Stop reason: HOSPADM

## 2022-10-24 RX ORDER — ACETAMINOPHEN 325 MG/1
650 TABLET ORAL EVERY 4 HOURS PRN
Status: DISCONTINUED | OUTPATIENT
Start: 2022-10-24 | End: 2022-10-25 | Stop reason: HOSPADM

## 2022-10-24 RX ORDER — SODIUM CHLORIDE 0.9 % (FLUSH) 0.9 %
5-40 SYRINGE (ML) INJECTION PRN
Status: DISCONTINUED | OUTPATIENT
Start: 2022-10-24 | End: 2022-10-24 | Stop reason: ALTCHOICE

## 2022-10-24 RX ORDER — SODIUM CHLORIDE 0.9 % (FLUSH) 0.9 %
10 SYRINGE (ML) INJECTION PRN
Status: DISCONTINUED | OUTPATIENT
Start: 2022-10-24 | End: 2022-10-25 | Stop reason: HOSPADM

## 2022-10-24 RX ORDER — LISINOPRIL 2.5 MG/1
2.5 TABLET ORAL DAILY
Qty: 30 TABLET | Refills: 0 | Status: SHIPPED | OUTPATIENT
Start: 2022-10-24

## 2022-10-24 RX ORDER — QUETIAPINE FUMARATE 25 MG/1
50 TABLET, FILM COATED ORAL NIGHTLY
Status: DISCONTINUED | OUTPATIENT
Start: 2022-10-24 | End: 2022-10-25 | Stop reason: HOSPADM

## 2022-10-24 RX ORDER — INSULIN LISPRO 100 [IU]/ML
0-4 INJECTION, SOLUTION INTRAVENOUS; SUBCUTANEOUS NIGHTLY
Status: DISCONTINUED | OUTPATIENT
Start: 2022-10-24 | End: 2022-10-25 | Stop reason: HOSPADM

## 2022-10-24 RX ORDER — LISINOPRIL 2.5 MG/1
2.5 TABLET ORAL DAILY
Status: DISCONTINUED | OUTPATIENT
Start: 2022-10-24 | End: 2022-10-25 | Stop reason: HOSPADM

## 2022-10-24 RX ORDER — QUETIAPINE FUMARATE 50 MG/1
50 TABLET, FILM COATED ORAL NIGHTLY
Qty: 30 TABLET | Refills: 0 | Status: SHIPPED | OUTPATIENT
Start: 2022-10-24

## 2022-10-24 RX ORDER — ATROPINE SULFATE 0.1 MG/ML
0.5 INJECTION INTRAVENOUS EVERY 5 MIN PRN
Status: DISCONTINUED | OUTPATIENT
Start: 2022-10-24 | End: 2022-10-24 | Stop reason: ALTCHOICE

## 2022-10-24 RX ORDER — ATORVASTATIN CALCIUM 40 MG/1
40 TABLET, FILM COATED ORAL DAILY
Status: DISCONTINUED | OUTPATIENT
Start: 2022-10-24 | End: 2022-10-25 | Stop reason: HOSPADM

## 2022-10-24 RX ORDER — ACETAMINOPHEN 325 MG/1
650 TABLET ORAL ONCE
Status: COMPLETED | OUTPATIENT
Start: 2022-10-24 | End: 2022-10-24

## 2022-10-24 RX ORDER — ASPIRIN 81 MG/1
81 TABLET, CHEWABLE ORAL DAILY
Status: DISCONTINUED | OUTPATIENT
Start: 2022-10-24 | End: 2022-10-25 | Stop reason: HOSPADM

## 2022-10-24 RX ORDER — INSULIN LISPRO 100 [IU]/ML
0-4 INJECTION, SOLUTION INTRAVENOUS; SUBCUTANEOUS
Status: DISCONTINUED | OUTPATIENT
Start: 2022-10-24 | End: 2022-10-25 | Stop reason: HOSPADM

## 2022-10-24 RX ORDER — SODIUM CHLORIDE 0.9 % (FLUSH) 0.9 %
5-40 SYRINGE (ML) INJECTION EVERY 12 HOURS SCHEDULED
Status: DISCONTINUED | OUTPATIENT
Start: 2022-10-24 | End: 2022-10-25 | Stop reason: HOSPADM

## 2022-10-24 RX ORDER — ONDANSETRON 2 MG/ML
4 INJECTION INTRAMUSCULAR; INTRAVENOUS EVERY 6 HOURS PRN
Status: DISCONTINUED | OUTPATIENT
Start: 2022-10-24 | End: 2022-10-25 | Stop reason: HOSPADM

## 2022-10-24 RX ORDER — DEXTROSE MONOHYDRATE 100 MG/ML
INJECTION, SOLUTION INTRAVENOUS CONTINUOUS PRN
Status: DISCONTINUED | OUTPATIENT
Start: 2022-10-24 | End: 2022-10-25 | Stop reason: HOSPADM

## 2022-10-24 RX ORDER — SODIUM CHLORIDE 0.9 % (FLUSH) 0.9 %
5-40 SYRINGE (ML) INJECTION PRN
Status: DISCONTINUED | OUTPATIENT
Start: 2022-10-24 | End: 2022-10-25 | Stop reason: HOSPADM

## 2022-10-24 RX ORDER — SODIUM CHLORIDE 9 MG/ML
500 INJECTION, SOLUTION INTRAVENOUS CONTINUOUS PRN
Status: DISCONTINUED | OUTPATIENT
Start: 2022-10-24 | End: 2022-10-24 | Stop reason: ALTCHOICE

## 2022-10-24 RX ORDER — AMINOPHYLLINE DIHYDRATE 25 MG/ML
50 INJECTION, SOLUTION INTRAVENOUS PRN
Status: DISCONTINUED | OUTPATIENT
Start: 2022-10-24 | End: 2022-10-24 | Stop reason: ALTCHOICE

## 2022-10-24 RX ADMIN — TETRAKIS(2-METHOXYISOBUTYLISOCYANIDE)COPPER(I) TETRAFLUOROBORATE 12.6 MILLICURIE: 1 INJECTION, POWDER, LYOPHILIZED, FOR SOLUTION INTRAVENOUS at 09:44

## 2022-10-24 RX ADMIN — TETRAKIS(2-METHOXYISOBUTYLISOCYANIDE)COPPER(I) TETRAFLUOROBORATE 34 MILLICURIE: 1 INJECTION, POWDER, LYOPHILIZED, FOR SOLUTION INTRAVENOUS at 11:10

## 2022-10-24 RX ADMIN — KETOROLAC TROMETHAMINE 15 MG: 30 INJECTION, SOLUTION INTRAMUSCULAR at 19:30

## 2022-10-24 RX ADMIN — ACETAMINOPHEN 650 MG: 325 TABLET ORAL at 07:12

## 2022-10-24 RX ADMIN — ACETAMINOPHEN 650 MG: 325 TABLET ORAL at 21:45

## 2022-10-24 RX ADMIN — REGADENOSON 0.4 MG: 0.08 INJECTION, SOLUTION INTRAVENOUS at 11:08

## 2022-10-24 RX ADMIN — SODIUM CHLORIDE, PRESERVATIVE FREE 10 ML: 5 INJECTION INTRAVENOUS at 11:10

## 2022-10-24 RX ADMIN — SODIUM CHLORIDE, PRESERVATIVE FREE 10 ML: 5 INJECTION INTRAVENOUS at 19:32

## 2022-10-24 RX ADMIN — INSULIN LISPRO 2 UNITS: 100 INJECTION, SOLUTION INTRAVENOUS; SUBCUTANEOUS at 13:23

## 2022-10-24 RX ADMIN — ACETAMINOPHEN 650 MG: 325 TABLET ORAL at 16:28

## 2022-10-24 RX ADMIN — LISINOPRIL 2.5 MG: 2.5 TABLET ORAL at 13:23

## 2022-10-24 RX ADMIN — ACETAMINOPHEN 650 MG: 325 TABLET ORAL at 13:21

## 2022-10-24 RX ADMIN — DESMOPRESSIN ACETATE 40 MG: 0.2 TABLET ORAL at 21:59

## 2022-10-24 RX ADMIN — SODIUM CHLORIDE, PRESERVATIVE FREE 10 ML: 5 INJECTION INTRAVENOUS at 11:01

## 2022-10-24 RX ADMIN — ASPIRIN 81 MG 81 MG: 81 TABLET ORAL at 13:23

## 2022-10-24 RX ADMIN — QUETIAPINE FUMARATE 50 MG: 25 TABLET ORAL at 21:59

## 2022-10-24 ASSESSMENT — PAIN DESCRIPTION - PAIN TYPE: TYPE: ACUTE PAIN

## 2022-10-24 ASSESSMENT — PAIN DESCRIPTION - DESCRIPTORS
DESCRIPTORS: THROBBING;ACHING
DESCRIPTORS: ACHING
DESCRIPTORS: ACHING
DESCRIPTORS: ACHING;THROBBING
DESCRIPTORS: ACHING

## 2022-10-24 ASSESSMENT — ENCOUNTER SYMPTOMS
DIARRHEA: 0
SHORTNESS OF BREATH: 0
ABDOMINAL PAIN: 0
NAUSEA: 1
CHEST TIGHTNESS: 1
COLOR CHANGE: 0
WHEEZING: 0
VOMITING: 0

## 2022-10-24 ASSESSMENT — PAIN DESCRIPTION - LOCATION
LOCATION: CHEST
LOCATION: HEAD
LOCATION: CHEST
LOCATION: HEAD
LOCATION: HEAD
LOCATION: BACK;HEAD

## 2022-10-24 ASSESSMENT — PAIN DESCRIPTION - ORIENTATION
ORIENTATION: ANTERIOR;POSTERIOR
ORIENTATION: LEFT
ORIENTATION: ANTERIOR;POSTERIOR
ORIENTATION: ANTERIOR
ORIENTATION: LEFT
ORIENTATION: ANTERIOR

## 2022-10-24 ASSESSMENT — PAIN DESCRIPTION - FREQUENCY: FREQUENCY: CONTINUOUS

## 2022-10-24 ASSESSMENT — PAIN SCALES - GENERAL
PAINLEVEL_OUTOF10: 10
PAINLEVEL_OUTOF10: 9
PAINLEVEL_OUTOF10: 8
PAINLEVEL_OUTOF10: 8

## 2022-10-24 ASSESSMENT — PAIN DESCRIPTION - ONSET: ONSET: ON-GOING

## 2022-10-24 ASSESSMENT — PAIN - FUNCTIONAL ASSESSMENT
PAIN_FUNCTIONAL_ASSESSMENT: 0-10
PAIN_FUNCTIONAL_ASSESSMENT: PREVENTS OR INTERFERES SOME ACTIVE ACTIVITIES AND ADLS
PAIN_FUNCTIONAL_ASSESSMENT: PREVENTS OR INTERFERES SOME ACTIVE ACTIVITIES AND ADLS
PAIN_FUNCTIONAL_ASSESSMENT: ACTIVITIES ARE NOT PREVENTED
PAIN_FUNCTIONAL_ASSESSMENT: PREVENTS OR INTERFERES SOME ACTIVE ACTIVITIES AND ADLS
PAIN_FUNCTIONAL_ASSESSMENT: ACTIVITIES ARE NOT PREVENTED

## 2022-10-24 ASSESSMENT — HEART SCORE
ECG: 0
ECG: 0

## 2022-10-24 NOTE — ED NOTES
Pt to ED with complaints of chest pain. Pt states pain started yesterday afternoon. Pt was evaluated at OSH and signed out AMA. Pt states pain started in her jaw and radiates down face, neck, and arm. Pt reports it as being \"pokey\" and sharp. Pt also reports being nauseous. Pt received 324 ASA and 1 nitro PTA. Pt placed on full cardiac monitor. EKG obtained. IV established PTA. Resident and Dr. Biju Gomez at bedside.      Karly Aguirre RN  10/24/22 9717

## 2022-10-24 NOTE — CONSULTS
medications    Medication Sig Start Date End Date Taking? Authorizing Provider   traZODone (DESYREL) 50 MG tablet Take 1 tablet by mouth nightly as needed for Sleep 5/30/22   Elidia Mo MD   atorvastatin (LIPITOR) 40 MG tablet Take 1 tablet by mouth daily 5/30/22   Elidia Mo MD   lisinopril (PRINIVIL;ZESTRIL) 2.5 MG tablet Take 1 tablet by mouth daily 5/30/22   Elidia Mo MD   QUEtiapine (SEROQUEL) 50 MG tablet Take 1 tablet by mouth nightly Indications: (2 tabs @ HS) 5/30/22   Elidia Mo MD   acetaminophen (TYLENOL) 500 MG tablet Take 1 tablet by mouth every 6 hours as needed for Pain 7/19/21   Strawberry Lease, DO   ibuprofen (ADVIL;MOTRIN) 600 MG tablet Take 1 tablet by mouth every 6 hours as needed for Pain 7/19/21   Omar Lease, DO   ketoconazole (NIZORAL) 2 % cream Apply topically daily below Left breast 1/2/19   Judy Madden, DO   insulin glargine (LANTUS) 100 UNIT/ML injection pen Inject 60 Units into the skin nightly    Historical Provider, MD   aspirin 81 MG tablet Take 81 mg by mouth daily    Historical Provider, MD   insulin aspart (NOVOLOG) 100 UNIT/ML injection pen Inject 10 Units into the skin 3 times daily (before meals)     Historical Provider, MD       Allergies:  Codeine and Fish-derived products    Social History:   reports that she has been smoking cigarettes. She has a 6.50 pack-year smoking history. She has never used smokeless tobacco. She reports that she does not drink alcohol and does not use drugs. Family History: family history is not on file. No h/o sudden cardiac death. REVIEW OF SYSTEMS:    Constitutional: there has been no unanticipated weight loss. There's been No change in energy level, No change in activity level. Eyes: No visual changes or diplopia. No scleral icterus. ENT: No Headaches  Cardiovascular: as above  Respiratory: No previous pulmonary problems, No cough  Gastrointestinal: No abdominal pain.   No change in bowel or bladder habits. Genitourinary: No dysuria, trouble voiding, or hematuria. Musculoskeletal:  No gait disturbance, No weakness or joint complaints. Integumentary: No rash or pruritis. Neurological: No headache, diplopia, change in muscle strength, numbness or tingling. No change in gait, balance, coordination, mood, affect, memory, mentation, behavior. Psychiatric: No anxiety, or depression. Endocrine: No temperature intolerance. No excessive thirst, fluid intake, or urination. No tremor. Hematologic/Lymphatic: No abnormal bruising or bleeding, blood clots or swollen lymph nodes. Allergic/Immunologic: No nasal congestion or hives. PHYSICAL EXAM:      BP (!) 165/86   Pulse 74   Temp 98.2 °F (36.8 °C) (Oral)   Resp 15   SpO2 98%    Constitutional and General Appearance: alert, cooperative, no distress and appears stated age  HEENT: PERRL, no cervical lymphadenopathy. No masses palpable. Normal oral mucosa  Respiratory:  Normal excursion and expansion without use of accessory muscles  Resp Auscultation: Good respiratory effort. No for increased work of breathing. On auscultation: clear to auscultation bilaterally  Cardiovascular: The apical impulse is not displaced  Heart tones are crisp and normal. regular S1 and S2.  Jugular venous pulsation Normal  The carotid upstroke is normal in amplitude and contour without delay or bruit  Peripheral pulses are symmetrical and full   Abdomen:   No masses or tenderness  Bowel sounds present  Extremities:   No Cyanosis or Clubbing   Lower extremity edema: No   Skin: Warm and dry  Neurological:  Alert and oriented.   Moves all extremities well  No abnormalities of mood, affect, memory, mentation, or behavior are noted      Labs:     CBC:   Recent Labs     10/24/22  0507   WBC 6.3   HGB 13.2   HCT 39.0        BMP:   Recent Labs     10/24/22  0507      K 4.0   CO2 23   BUN 14   CREATININE 0.67   LABGLOM >60   GLUCOSE 328*     BNP: No results for input(s): BNP in the last 72 hours. PT/INR: No results for input(s): PROTIME, INR in the last 72 hours. APTT:No results for input(s): APTT in the last 72 hours. CARDIAC ENZYMES:No results for input(s): CKTOTAL, CKMB, CKMBINDEX, TROPONINI in the last 72 hours. FASTING LIPID PANEL:No results found for: HDL, LDLDIRECT, LDLCALC, TRIG  LIVER PROFILE:No results for input(s): AST, ALT, LABALBU in the last 72 hours. Patient Active Problem List   Diagnosis    Migraine without aura and without status migrainosus, not intractable    Type 2 diabetes mellitus with hyperglycemia (HCC)    Intractable migraine without status migrainosus    Hypoglycemia    Bipolar 1 disorder (HCC)    Hypertension    Hypoglycemia due to insulin    Type 2 diabetes mellitus ([facility] [address]    Depression with suicidal ideation    Major depressive disorder, recurrent, severe with psychotic features ([address]    PTSD (post-traumatic stress disorder)    Generalized anxiety disorder with panic attacks    Cocaine use disorder, severe, in early remission ([facility] [address]    Chest pain         DATA:    IMPRESSION:    Chest pain  HTN  HLD  Diabetes, on insulin  PAD  PVD  Tobacco abuse    RECOMMENDATIONS:  Possible stress test to assess for perfusion defects  Possible Echo to assess LVEF and for any wall motion abnormalities    Will discuss with rounding attending for final recommendations. Michell Singh MD  Cardiology Service  Internal Medicine Residency Program, PGY-1  [facility]      Attending Physician Statement:    I have discussed the care of  Nighat Thompson , including pertinent history and exam findings, with the Cardiology fellow/resident. I have seen and examined the patient and the key elements of all parts of the encounter have been performed by me.  I agree with the assessment, plan and orders as documented by the fellow/resident, after I modified exam findings and plan of treatments, and the final version is my approved version of the assessment. Additional Comments:     Intermittent chest pain. Multiple coronary risk factors including PAD. ECG shows NS ST-T changes. Troponins negative. Will proceed with pharmacological stress test for further risk stratification (can not exercise due to PAD). Continue asa and high intensity statin.

## 2022-10-24 NOTE — CARE COORDINATION
10/24/22 1041   Service Assessment   Patient Orientation Alert and Oriented   Cognition Alert   History Provided By Patient   [address]   PCP Verified by CM Yes   Prior Functional Level Independent in ADLs/IADLs   Current Functional Level Independent in ADLs/IADLs   Can patient return to prior living arrangement Yes   Ability to make needs known: Good   Social/Functional History   Lives With Daughter   Type of Home Apartment   Home Layout One level   ADL Assistance Independent   Homemaking Assistance Independent   Homemaking Responsibilities Yes   Ambulation Assistance Independent   Transfer Assistance Independent   Active  No   Mode of Transportation Cab   Discharge Planning   Type of Residence [address] [facility]   Current Services Prior To Admission None   Potential Assistance Needed N/A   DME Ordered?  No   Potential Assistance Purchasing Medications No   Type of Home Care Services None   Patient expects to be discharged to: Apartment   Condition of Participation: Discharge Planning   The Plan for Transition of Care is related to the following treatment goals: increased comfort level   Home with daughter, may need cab  Per patient's request, notified patient's PCP Dr. Nixon Franklin office (spoke with Cara Rousseau) that she will not make her appointment today

## 2022-10-24 NOTE — ED PROVIDER NOTES
[facility]     Emergency Department     Faculty Attestation    I performed a history and physical examination of the patient and discussed management with the resident. I have reviewed and agree with the residents findings including all diagnostic interpretations, and treatment plans as written. Any areas of disagreement are noted on the chart. I was personally present for the key portions of any procedures. I have documented in the chart those procedures where I was not present during the key portions. I have reviewed the emergency nurses triage note. I agree with the chief complaint, past medical history, past surgical history, allergies, medications, social and family history as documented unless otherwise noted below. Documentation of the HPI, Physical Exam and Medical Decision Making performed by scribheriberto is based on my personal performance of the HPI, PE and MDM. For Physician Assistant/ Nurse Practitioner cases/documentation I have personally evaluated this patient and have completed at least one if not all key elements of the E/M (history, physical exam, and MDM). Additional findings are as noted. 46 yo F cp / intermittent diaphoresis, c/o nausea, no sob, given asa 324 mg by ems, pt had recent visit at [facility],   PE vss, gcs 15, neck supple, abdomen non tender, no distension, no rigidity, no calf swelling, no calf tenderness,     Observation admit    EKG Interpretation    Interpreted by me  Sinus, heart rate 81, no ischemia, normal axis, QT corrected 483    CRITICAL CARE: There was a high probability of clinically significant/life threatening deterioration in this patient's condition which required my urgent intervention. Total critical care time was 10 minutes. This excludes any time for separately reportable procedures.        [facility],   10/24/22 [address],   10/24/22 0901

## 2022-10-24 NOTE — ED PROVIDER NOTES
[facility]  ED  Emergency Department Encounter  Emergency Medicine Resident     Pt Name:Mikael Flores  MRN: [id number]  [place] 1970  Date of evaluation: 10/24/22  PCP:  Matthew Henson MD      CHIEF COMPLAINT       Chief Complaint   Patient presents with    Chest Pain     Started yesterday, getting progressively worse, started in jaw, radiates down left side       HISTORY OF PRESENT ILLNESS  (Location/Symptom, Timing/Onset, Context/Setting, Quality, Duration, Modifying Factors, Severity.)      Sean Hernandes is a 45 y.o. female past medical history of hypertension, diabetes, peripheral vascular disease presenting for assessment of chest pain. Onset of symptoms was approximately 24 hours ago. Patient states that she was at rest when she had onset of substernal to left-sided chest pain. At the time it was nonradiating and tolerable. She went for assessment at [address] [place] [address] where a troponin and chest x-ray were negative. She states that she left early because she had to get home to her daughter. She states that she has since had progressively worsening symptoms, including radiation to the left arm and neck as well as nausea and intermittent diaphoresis. She has not taken anything for her symptoms. Denies fevers, chills, cough, shortness of breath, vomiting. PAST MEDICAL / SURGICAL / SOCIAL / FAMILY HISTORY      has a past medical history of Back pain, Bipolar 1 disorder ([address] Diabetes mellitus ([address] Disc disorder, and Hypertension. has a past surgical history that includes Cholecystectomy and back surgery.       Social History     Socioeconomic History    Marital status: Single     Spouse name: Not on file    Number of children: Not on file    Years of education: Not on file    Highest education level: Not on file   Occupational History    Not on file   Tobacco Use    Smoking status: Every Day     Packs/day: 0.50     Years: 13.00     Pack years: 6.50     Types: Cigarettes    Smokeless tobacco: Never   Substance and Sexual Activity    Alcohol use: No    Drug use: No     Comment: 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    Sexual activity: Not on file   Other Topics Concern    Not on file   Social History Narrative    Not on file     Social Determinants of Health     Financial Resource Strain: Not on file   Food Insecurity: Not on file   Transportation Needs: Not on file   Physical Activity: Not on file   Stress: Not on file   Social Connections: Not on file   Intimate Partner Violence: Not on file   Housing Stability: Not on file       History reviewed. No pertinent family history. Allergies:  Codeine and Fish-derived products    Home Medications:  Prior to Admission medications    Medication Sig Start Date End Date Taking?  Authorizing Provider   traZODone (DESYREL) 50 MG tablet Take 1 tablet by mouth nightly as needed for Sleep 5/30/22   Pelon Middleton MD   atorvastatin (LIPITOR) 40 MG tablet Take 1 tablet by mouth daily 5/30/22   Pelon Middleton MD   lisinopril (PRINIVIL;ZESTRIL) 2.5 MG tablet Take 1 tablet by mouth daily 5/30/22   Pelon Middleton MD   QUEtiapine (SEROQUEL) 50 MG tablet Take 1 tablet by mouth nightly Indications: (2 tabs @ HS) 5/30/22   Pelon Middleton MD   acetaminophen (TYLENOL) 500 MG tablet Take 1 tablet by mouth every 6 hours as needed for Pain 7/19/21   Angelo Curtis DO   ibuprofen (ADVIL;MOTRIN) 600 MG tablet Take 1 tablet by mouth every 6 hours as needed for Pain 7/19/21   Angelo Curtis DO   ketoconazole (NIZORAL) 2 % cream Apply topically daily below Left breast 1/2/19   Judy Madden DO   insulin glargine (LANTUS) 100 UNIT/ML injection pen Inject 60 Units into the skin nightly    Historical Provider, MD   aspirin 81 MG tablet Take 81 mg by mouth daily    Historical Provider, MD   insulin aspart (NOVOLOG) 100 UNIT/ML injection pen Inject 10 Units into the skin 3 times daily (before meals) Historical Provider, MD       REVIEW OF SYSTEMS    (2-9 systems for level 4, 10 or more for level 5)      Review of Systems   Constitutional:  Positive for appetite change. Negative for activity change, fatigue and fever. Respiratory:  Positive for chest tightness. Negative for shortness of breath and wheezing. Cardiovascular:  Positive for chest pain. Negative for leg swelling. Gastrointestinal:  Positive for nausea. Negative for abdominal pain, diarrhea and vomiting. Genitourinary:  Negative for dysuria and flank pain. Musculoskeletal:  Positive for neck pain. Negative for arthralgias and myalgias. Skin:  Negative for color change. Neurological:  Positive for light-headedness. Negative for dizziness and headaches. PHYSICAL EXAM   (up to 7 for level 4, 8 or more for level 5)      INITIAL VITALS:   BP (!) 165/86   Pulse 74   Temp 98.2 °F (36.8 °C) (Oral)   Resp 15   SpO2 98%     Physical Exam  Vitals reviewed. Constitutional:       General: She is not in acute distress. Appearance: Normal appearance. She is not ill-appearing or diaphoretic. HENT:      Head: Normocephalic and atraumatic. Eyes:      Extraocular Movements: Extraocular movements intact. Pupils: Pupils are equal, round, and reactive to light. Cardiovascular:      Rate and Rhythm: Normal rate and regular rhythm. Musculoskeletal:      Cervical back: Normal range of motion. Neurological:      Mental Status: She is alert.        DIFFERENTIAL  DIAGNOSIS     PLAN (LABS / IMAGING / EKG):  Orders Placed This Encounter   Procedures    COVID-19, Rapid    XR CHEST PORTABLE    Troponin    CBC with Auto Differential    Basic Metabolic Panel    D-Dimer, Quantitative    Place in Observation Service       MEDICATIONS ORDERED:  Orders Placed This Encounter   Medications    acetaminophen (TYLENOL) tablet 650 mg         DDX: Myocardial infarction, pneumothorax, PE, musculoskeletal chest pain, dissection, pneumonia    DIAGNOSTIC RESULTS / EMERGENCY DEPARTMENT COURSE / MDM   LAB RESULTS:  Results for orders placed or performed during the hospital encounter of 10/24/22   Troponin   Result Value Ref Range    Troponin, High Sensitivity 11 0 - 14 ng/L   Troponin   Result Value Ref Range    Troponin, High Sensitivity 10 0 - 14 ng/L   CBC with Auto Differential   Result Value Ref Range    WBC 6.3 3.5 - 11.3 k/uL    RBC 4.61 3.95 - 5.11 m/uL    Hemoglobin 13.2 11.9 - 15.1 g/dL    Hematocrit 39.0 36.3 - 47.1 %    MCV 84.6 82.6 - 102.9 fL    MCH 28.6 25.2 - 33.5 pg    MCHC 33.8 28.4 - 34.8 g/dL    RDW 13.2 11.8 - 14.4 %    Platelets 937 729 - 206 k/uL    MPV 9.0 8.1 - 13.5 fL    NRBC Automated 0.0 0.0 per 100 WBC    Seg Neutrophils 52 36 - 65 %    Lymphocytes 36 24 - 43 %    Monocytes 9 3 - 12 %    Eosinophils % 3 1 - 4 %    Basophils 0 0 - 2 %    Immature Granulocytes 0 0 %    Segs Absolute 3.19 1.50 - 8.10 k/uL    Absolute Lymph # 2.26 1.10 - 3.70 k/uL    Absolute Mono # 0.56 0.10 - 1.20 k/uL    Absolute Eos # 0.20 0.00 - 0.44 k/uL    Basophils Absolute <0.03 0.00 - 0.20 k/uL    Absolute Immature Granulocyte <0.03 0.00 - 0.30 k/uL   Basic Metabolic Panel   Result Value Ref Range    Glucose 328 (H) 70 - 99 mg/dL    BUN 14 6 - 20 mg/dL    Creatinine 0.67 0.50 - 0.90 mg/dL    Est, Glom Filt Rate >60 >60 mL/min/1.73m2    Calcium 9.0 8.6 - 10.4 mg/dL    Sodium 135 135 - 144 mmol/L    Potassium 4.0 3.7 - 5.3 mmol/L    Chloride 99 98 - 107 mmol/L    CO2 23 20 - 31 mmol/L    Anion Gap 13 9 - 17 mmol/L   D-Dimer, Quantitative   Result Value Ref Range    D-Dimer, Quant 0.46 mg/L FEU       IMPRESSION: Chest pain    RADIOLOGY:  XR CHEST PORTABLE   Preliminary Result   Negative portable chest x-ray. No acute cardiopulmonary abnormality   demonstrated. Clear lungs bilaterally.                EKG  Normal sinus rhythm    All EKG's are interpreted by the Emergency Department Physician who either signs or Co-signs this chart in the absence of a cardiologist.    EMERGENCY DEPARTMENT COURSE:  This patient presents to the emergency department with elevated blood pressure 178/78. Vital signs are otherwise within normal limits. She is complaining of chest pain that is substernal radiating to the left upper extremity and the neck. ECG assessed at bedside, normal sinus rhythm. Wells score 1.5, PERC 1.5. We will obtain ACS work-up and D-dimer. Reassess. 6:52 AM  Patient reassessed. Pain stable to improved. Troponin 11 on initial read. D-dimer negative. No significant electrolyte abnormality. No anemia or leukocytosis. Chest x-ray negative for acute process. Patient has a heart score of 4. She will be admitted to the observation unit for further assessment. She is agreeable to this. No notes of EC Admission Criteria type on file. PROCEDURES:      CONSULTS:  None    CRITICAL CARE:      FINAL IMPRESSION      1. Chest pain, unspecified type          DISPOSITION / PLAN     DISPOSITION Admitted 10/24/2022 06:50:39 AM      PATIENT REFERRED TO:  No follow-up provider specified.     DISCHARGE MEDICATIONS:  New Prescriptions    No medications on file       Que Nevarez MD  Emergency Medicine Resident    (Please note that portions of thisnote were completed with a voice recognition program.  Efforts were made to edit the dictations but occasionally words are mis-transcribed.)       Que Nevarez MD  Resident  10/24/22 [id number]

## 2022-10-24 NOTE — PROGRESS NOTES
[address] [facility]  CDU / OBSERVATION ENCOUNTER  ATTENDING NOTE       I performed a history and physical examination of the patient and discussed management with the resident or midlevel provider. I reviewed the resident or midlevel provider's note and agree with the documented findings and plan of care. Any areas of disagreement are noted on the chart. I was personally present for the key portions of any procedures. I have documented in the chart those procedures where I was not present during the key portions. I have reviewed the nurses notes. I agree with the chief complaint, past medical history, past surgical history, allergies, medications, social and family history as documented unless otherwise noted below. The Family history, social history, and ROS are effectively unchanged since admission unless noted elsewhere in the chart. Patient presents with complaints of chest pain. Patient's description of pain is typical enough of angina that she has a heart score of 4. Patient does have risk factors. Patient had work-up in emergency department which included EKGs and troponins. Negative. Patient admitted for further cardiac evaluation. Patient has not had cardiac testing for years. Last stress testing noted to be in 2014. Patient symptoms currently controlled. Patient for stress testing and disposition per testing results. Patient had stress testing done. Patient's stress test showed decreased ejection fraction and septal wall motion abnormalities. We will get echo to address these concerns. Unclear as to whether this is acute or chronic. Patient had no perfusion defects noted. Will reassess patient after echo. Patient being kept for cardiology to see again. EKG done this afternoon negative and unchanged from previous.     Monica Garcia MD  Attending Emergency  Physician

## 2022-10-24 NOTE — H&P
[address] [facility]  CDU / OBSERVATION ENCOUNTER  ATTENDING NOTE     Pt Name: Marina Lazo  MRN: [id number]  [place] 1970  Date of evaluation: 10/24/22  Patient's PCP is :  Corine Howard MD    CHIEF COMPLAINT       Chief Complaint   Patient presents with    Chest Pain     Started yesterday, getting progressively worse, started in jaw, radiates down left side       HISTORY OF PRESENT Gera Gee is a 45 y.o. female who presents chest pain    Location/Symptom: Patient complains of pain in the left jaw radiating down the neck and into the chest.  States that this is associated with \"black spots\" in her vision. She denies having had these black spots before and denies having them at the moment. Timing/Onset: on 10/23, 24 hours ago,   Provocation: none  Quality: \"sharp\"  Radiation: from jaw down towards chest  Severity: 8/10  Timing/Duration: Progressively worsening  Modifying Factors: none    REVIEW OF SYSTEMS       General ROS - No fevers, No malaise   Ophthalmic ROS - No discharge, black spots in vision which appeared and then disappeared. ENT ROS -  No sore throat, No rhinorrhea,   Respiratory ROS - no shortness of breath, no cough, no  wheezing  Cardiovascular ROS -left-sided chest pain, no dyspnea on exertion  Gastrointestinal ROS - No abdominal pain, no nausea or vomiting, no change in bowel habits, no black or bloody stools  Genito-Urinary ROS - No dysuria, trouble voiding, or hematuria  Musculoskeletal ROS - No myalgias, No arthalgias  Neurological ROS - No headache, no dizziness/lightheadedness, No focal weakness, no loss of sensation  Dermatological ROS - No lesions, No rash     (PQRS) Advance directives on face sheet per hospital policy. No change unless specifically mentioned in chart    PAST MEDICAL HISTORY    has a past medical history of Back pain, Bipolar 1 disorder ([address] Diabetes mellitus ([address] Disc disorder, and Hypertension.   Peripheral arterial disease. Upon review the patient has a history of peripheral arterial disease, hypertension, diabetes all of which are pertinent to her presenting complaint. SURGICAL HISTORY      has a past surgical history that includes Cholecystectomy and back surgery. I have reviewed and agree with Surgical History entered and it is not pertinent to this complaint. CURRENT MEDICATIONS     acetaminophen (TYLENOL) tablet 650 mg, Once      All medication charted and reviewed. ALLERGIES     is allergic to codeine and fish-derived products. FAMILY HISTORY     She indicated that her mother is . She indicated that her father is . family history is not on file. The patient denies any pertinent family history. I have reviewed and agree with the family history entered. I have reviewed the Family History and it is not significant to the case    SOCIAL HISTORY      reports that she has been smoking cigarettes. She has a 6.50 pack-year smoking history. She has never used smokeless tobacco. She reports that she does not drink alcohol and does not use drugs. I have reviewed and agree with all Social.  The patient has a history of crack cocaine use disorder and suicidal ideation. PHYSICAL EXAM     INITIAL VITALS:  oral temperature is 98.2 °F (36.8 °C). Her blood pressure is 165/86 (abnormal) and her pulse is 74. Her respiration is 15 and oxygen saturation is 98%. CONSTITUTIONAL: AOx4, no apparent distress, appears stated age, mild cachexia with wasting of temporalis muscles bilaterally.    HEAD: normocephalic, atraumatic   EYES: PERRLA, EOMI    ENT: moist mucous membranes, uvula midline   NECK: supple, symmetric   BACK: symmetric   LUNGS: clear to auscultation bilaterally   CARDIOVASCULAR: regular rate and rhythm, no murmurs, rubs or gallops   ABDOMEN: soft, non-tender, non-distended with normal active bowel sounds   NEUROLOGIC:  MAEx4, no focal sensory or motor deficits   MUSCULOSKELETAL: no clubbing, cyanosis or edema   SKIN: no rash or wounds       DIFFERENTIAL DIAGNOSIS/MDM:     ACS, PE, temporal arteritis, trigeminal neuralgia, MSK pain were considered. DIAGNOSTIC RESULTS     EKG: All EKG's are interpreted by the Observation Physician who either signs or Co-signs this chart in the absence of a cardiologist.    EKG Interpretation    Interpreted by observation physician. No ECG available from this encounter. RADIOLOGY:   I directly visualized the following  images and reviewed the radiologist interpretations:    XR CHEST PORTABLE    Result Date: 10/24/2022  Negative portable chest x-ray. No acute cardiopulmonary abnormality demonstrated. Clear lungs bilaterally. LABS:  I have reviewed and interpreted all available lab results. Labs Reviewed   BASIC METABOLIC PANEL - Abnormal; Notable for the following components:       Result Value    Glucose 328 (*)     All other components within normal limits   COVID-19, RAPID   TROPONIN   TROPONIN   CBC WITH AUTO DIFFERENTIAL   D-DIMER, QUANTITATIVE         CDU IMPRESSION / Ana Sanchez is a 45 y.o. female who presents with ongoing chest pain for approximately 24 hours. Patient has a history of peripheral arterial disease, hypertension, and diabetes mellitus. Patient is now saying that the pain originates from the left jaw shooting down into the chest and is associated with visual disturbance. Chest pain  Cardiology on board, stress test planned  ESR ordered to investigate for temporal arteritis  Continue home medications and pain control  Monitor vitals, labs, and imaging      DISPO: Cardiology evaluation with stress test and ESR for temporal arteritis investigation. CONSULTS:  Inpatient consult to cardiology    PROCEDURES:  Not indicated     PATIENT REFERRED TO:    No follow-up provider specified.     --  Judy Hamilton MD   Emergency Medicine Attending    This dictation was generated by voice recognition computer software. Although all attempts are made to edit the dictation for accuracy, there may be errors in the transcription that are not intended.

## 2022-10-24 NOTE — PROCEDURES
[facility]                              CARDIAC STRESS TEST    PATIENT NAME: Gayathri Hassan                 :        1970  MED REC NO:   [id number]                             ROOM:       0782  ACCOUNT NO:   [de-identified]                           ADMIT DATE: 10/24/2022  PROVIDER:     Stefani Shelby    DATE OF STUDY:  10/24/2022    ORDERING PROVIDER: Dr. Michelle Orozco PROVIDER: Dr. Gupta Patricia: Dr. Neptali Nieves:    Indication: Chest pain    Procedure explained and consent signed. Medications: Lexiscan, 0.4 mg  Resting Heart rate: 51 bpm  Resting blood pressure:  168/63 mm/Hg  Infusion heart rate:  92 bpm  Infusion blood pressure:  173/78 mm/Hg  Resting EKG: Abnormal, Sinus bradycardia and T wave changes V2-V3  Stress heart response: Normal Response  Stress BP response: Appropriate  Stress EKG(S): No changes seen  Chest discomfort: 8/10 during test and 5/10 in recovery  Ischemic EKG changes: None    IMPRESSION:  Electrocardiographically Negative Lexiscan stress study. Radio-isotope  results to follow from the department of Nuclear Medicine.            Jethro De Jesus    D: 10/24/2022 12:55:19       T: 10/24/2022 12:56:24     [id number]  Job#: [id number]     Doc#: Unknown    CC:

## 2022-10-24 NOTE — ED NOTES
Covid swab obtained, pt with episode of vomiting at this time. Pt denies any additional needs.      Surinder Oswald RN  10/24/22 1246

## 2022-10-24 NOTE — PLAN OF CARE
Problem: Chronic Conditions and Co-morbidities  Goal: Patient's chronic conditions and co-morbidity symptoms are monitored and maintained or improved  Outcome: Progressing  Flowsheets (Taken 10/24/2022 1943)  Care Plan - Patient's Chronic Conditions and Co-Morbidity Symptoms are Monitored and Maintained or Improved:   Monitor and assess patient's chronic conditions and comorbid symptoms for stability, deterioration, or improvement   Collaborate with multidisciplinary team to address chronic and comorbid conditions and prevent exacerbation or deterioration   Update acute care plan with appropriate goals if chronic or comorbid symptoms are exacerbated and prevent overall improvement and discharge     Problem: Pain  Goal: Verbalizes/displays adequate comfort level or baseline comfort level  Outcome: Progressing

## 2022-10-24 NOTE — DISCHARGE INSTRUCTIONS
You were seen in the emergency department for jaw and chest pain. Your cardiac work-up was negative including your stress test showed no evidence of low blood supply to the heart. There was mild to moderate decrease in your heart function however. It is important to follow-up with your primary care provider, call them as soon as possible to schedule an appointment. Return to the emergency department if you have any worsening of your chest pain, new pain, new visual disturbance or headache, difficulty breathing, or any other acute concern.

## 2022-10-25 VITALS
TEMPERATURE: 98.5 F | RESPIRATION RATE: 18 BRPM | HEIGHT: 67 IN | SYSTOLIC BLOOD PRESSURE: 128 MMHG | OXYGEN SATURATION: 100 % | DIASTOLIC BLOOD PRESSURE: 83 MMHG | WEIGHT: 138.45 LBS | HEART RATE: 67 BPM | BODY MASS INDEX: 21.73 KG/M2

## 2022-10-25 LAB
EKG ATRIAL RATE: 69 BPM
EKG P AXIS: 72 DEGREES
EKG P-R INTERVAL: 152 MS
EKG Q-T INTERVAL: 436 MS
EKG QRS DURATION: 108 MS
EKG QTC CALCULATION (BAZETT): 467 MS
EKG R AXIS: 54 DEGREES
EKG T AXIS: 56 DEGREES
EKG VENTRICULAR RATE: 69 BPM
GLUCOSE BLD-MCNC: 168 MG/DL (ref 65–105)
GLUCOSE BLD-MCNC: 270 MG/DL (ref 65–105)
GLUCOSE BLD-MCNC: 297 MG/DL (ref 65–105)
LV EF: 50 %
LVEF MODALITY: NORMAL

## 2022-10-25 PROCEDURE — C1894 INTRO/SHEATH, NON-LASER: HCPCS

## 2022-10-25 PROCEDURE — 6370000000 HC RX 637 (ALT 250 FOR IP): Performed by: SURGERY

## 2022-10-25 PROCEDURE — G0378 HOSPITAL OBSERVATION PER HR: HCPCS

## 2022-10-25 PROCEDURE — 2709999900 HC NON-CHARGEABLE SUPPLY

## 2022-10-25 PROCEDURE — 6360000002 HC RX W HCPCS

## 2022-10-25 PROCEDURE — 6370000000 HC RX 637 (ALT 250 FOR IP)

## 2022-10-25 PROCEDURE — 6370000000 HC RX 637 (ALT 250 FOR IP): Performed by: EMERGENCY MEDICINE

## 2022-10-25 PROCEDURE — 2580000003 HC RX 258

## 2022-10-25 PROCEDURE — 82947 ASSAY GLUCOSE BLOOD QUANT: CPT

## 2022-10-25 PROCEDURE — 93458 L HRT ARTERY/VENTRICLE ANGIO: CPT

## 2022-10-25 PROCEDURE — 6360000004 HC RX CONTRAST MEDICATION

## 2022-10-25 PROCEDURE — 6370000000 HC RX 637 (ALT 250 FOR IP): Performed by: STUDENT IN AN ORGANIZED HEALTH CARE EDUCATION/TRAINING PROGRAM

## 2022-10-25 RX ADMIN — INSULIN LISPRO 2 UNITS: 100 INJECTION, SOLUTION INTRAVENOUS; SUBCUTANEOUS at 12:04

## 2022-10-25 RX ADMIN — LISINOPRIL 2.5 MG: 2.5 TABLET ORAL at 08:17

## 2022-10-25 RX ADMIN — METOPROLOL TARTRATE 12.5 MG: 25 TABLET ORAL at 20:25

## 2022-10-25 RX ADMIN — QUETIAPINE FUMARATE 50 MG: 25 TABLET ORAL at 20:26

## 2022-10-25 RX ADMIN — SODIUM CHLORIDE, PRESERVATIVE FREE 10 ML: 5 INJECTION INTRAVENOUS at 08:19

## 2022-10-25 RX ADMIN — METOPROLOL TARTRATE 12.5 MG: 25 TABLET ORAL at 12:04

## 2022-10-25 RX ADMIN — ACETAMINOPHEN 650 MG: 325 TABLET ORAL at 19:03

## 2022-10-25 RX ADMIN — ASPIRIN 81 MG 81 MG: 81 TABLET ORAL at 08:17

## 2022-10-25 RX ADMIN — DESMOPRESSIN ACETATE 40 MG: 0.2 TABLET ORAL at 20:25

## 2022-10-25 RX ADMIN — SODIUM CHLORIDE, PRESERVATIVE FREE 10 ML: 5 INJECTION INTRAVENOUS at 20:26

## 2022-10-25 ASSESSMENT — PAIN SCALES - GENERAL: PAINLEVEL_OUTOF10: 4

## 2022-10-25 NOTE — PROGRESS NOTES
OBS/CDU   Attending NOTE      Patients PCP is:  Bella Burrell MD        SUBJECTIVE      Patient without significant complaint. Patient anxious to get procedure done with. Patient has been NPO. Awaiting catheterization today. Patient feeling better. Minimal symptoms now. PHYSICAL EXAM      General: NAD, AO X 3  Heent: EMOI, PERRL  Neck: SUPPLE, NO JVD  Cardiovascular: RRR, S1S2  Pulmonary: CTAB, NO SOB  Abdomen: SOFT, NTTP, ND, +BS  Extremities: +2/4 PULSES DISTAL, NO SWELLING  Neuro / Psych: NO NUMBNESS OR TINGLING, MENTATION AT BASELINE    PERTINENT TEST /EXAMS      I have reviewed all available laboratory results. MEDICATIONS CURRENT   metoprolol tartrate (LOPRESSOR) tablet 12.5 mg, BID  aspirin chewable tablet 81 mg, Daily  atorvastatin (LIPITOR) tablet 40 mg, Daily  lisinopril (PRINIVIL;ZESTRIL) tablet 2.5 mg, Daily  QUEtiapine (SEROQUEL) tablet 50 mg, Nightly  traZODone (DESYREL) tablet 50 mg, Nightly PRN  sodium chloride flush 0.9 % injection 5-40 mL, 2 times per day  sodium chloride flush 0.9 % injection 5-40 mL, PRN  0.9 % sodium chloride infusion, PRN  enoxaparin (LOVENOX) injection 40 mg, Daily  acetaminophen (TYLENOL) tablet 650 mg, Q4H PRN  ondansetron (ZOFRAN-ODT) disintegrating tablet 4 mg, Q8H PRN   Or  ondansetron (ZOFRAN) injection 4 mg, Q6H PRN  sodium chloride flush 0.9 % injection 10 mL, PRN  sodium chloride flush 0.9 % injection 10 mL, PRN  insulin lispro (HUMALOG) injection vial 0-4 Units, TID WC  insulin lispro (HUMALOG) injection vial 0-4 Units, Nightly  glucose chewable tablet 16 g, PRN  dextrose bolus 10% 125 mL, PRN   Or  dextrose bolus 10% 250 mL, PRN  glucagon (rDNA) injection 1 mg, PRN  dextrose 10 % infusion, Continuous PRN        All medication charted and reviewed. CONSULTS      IP CONSULT TO CARDIOLOGY  IP CONSULT TO CARDIOLOGY    ASSESSMENT/PLAN       Cristian Barrett is a 45 y.o. female who presents with chest pain, uncertain etiology.       Patient had stress testing done yesterday. Decreased ejection fraction but without lack of this blood flow. No ischemia noted on stress testing. Stress read as intermediate risk due to presence of low ejection fraction  Ejection fraction confirmed by ultrasound. Cardiac ultrasound done yesterday. Evaluated by senior cardiology resident today. Patient for catheterization. Further assessment after cath. Continue home medications and pain control  Monitor vitals, labs, and imaging  DISPO: pending consults and clinical improvement    --  Matthew Veliz MD  Emergency Medicine Attending Physician     This dictation was generated by voice recognition computer software. Although all attempts are made to edit the dictation for accuracy, there may be errors in the transcription that are not intended.

## 2022-10-25 NOTE — OP NOTE
[facility]    CARDIAC CATHETERIZATION    Date:   10/25/2022  Patient name:  Bandar Moreno  Date of admission:  10/24/2022  4:56 AM  MRN:   [id number]  YOB: 1970    Operators:  Primary:   LINDEN Cristina MD (Attending Physician)\    Assistant fellow :      Joanne Frias MD          Procedure performed:     [x] Left Heart Catheterization. [] Graft Angiography. [x] Left Ventriculography. [] Right Heart Catheterization. [x] Coronary Angiography. [] Aortic Valve Studies. [] PCI:      [] Other:       Pre Procedure Conscious Sedation Data:  ASA Class:    [] I [] II [x] III [] IV    Mallampati Class:  [x] I [] II [] III [] IV      Indication:  [] STEMI      [] + Stress test  [] ACS      [] + EKG Changes  [] Non Q MI       [] Significant Risk Factors  [] Recurrent Angina             [] Diabetes Mellitus    [] New LBBB      [] Uncontrolled HTN. [x] CHF / Low EF changes     [] Abnormal CTA / Ca Score      Procedure:  Access:  [] Femoral  [x] Radial  artery       [x] Right  [] Left    Procedure: After informed consent was obtained with explanation of the risks and benefits, patient was brought to the cath lab. The access area was prepped and draped in sterile fashion. 1% lidocaine was used for local block. The artery was cannulated with 6  Fr sheath with brisk arterial blood return. The side port was frequently flushed and aspirated with normal saline. Findings:  Cardiac Arteries and Lesion Findings       LMCA: Normal 0% stenosis. LAD: has proximal 30% and mid 30% stenosis. LCx: Normal 0% stenosis. The OM1 has ostial 30% stenosis. The OM2 has   proximal 30% stenosis. The Om3 is normal.     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 There is distal 50-60% stenosis. Coronary Tree        Dominance: Right       LV Analysis   LV function assessed as:Normal.   Ejection Fraction   +----------------------------------------------------------------------+---+   ! Method !EF%!   +----------------------------------------------------------------------+---+   ! LV gram                                                               !50 !   +----------------------------------------------------------------------+---+    Procedure Summary        Moderate disease of distal RCA. It also has high anterior origin. Mild disease otherwise. LVEF 50%. Recommendations        Medical therapy as needed. Risk factor modification. Routine Post Diagnostic Cath orders. Estimated Blood Loss: 10 mL      ____________________________________________________________________    History and Risk Factors    [x] Hypertension     [] Family history of CAD  [x] Hyperlipidemia     [] Cerebrovascular Disease   [] Prior MI       [] Peripheral Vascular disease   [] Prior PCI              [] Diabetes Mellitus    [] Left Main PCI. [] Currently on Dialysis. [] Prior CABG. [] Currently smoker. [] Cardiac Arrest outside of healthcare facility. [] Yes    [x] No        Witnessed     [] Yes   [] No     Arrest after arrival of EMS  [] Yes   [] No     [] Cardiac Arrest at other Facility. [] Yes   [x] No    Pre-Procedure Information. Heart Failure       [x] Yes    [] No        Class  [] I      [x] II  [] III    [] IV. New Diagnosis    [x] Yes  [] No    HF Type      [x] Systolic   [] Diastolic          [] Unknown. Diagnostic Test:   EKG       [] Normal   [x] Abnormal    New antiarrhythmia medications:    [] Yes   [] No   New onset atrial fibrillation / Flutter     [] Yes   [] No   ECG Abnormalities:      [] V. Fib   [] Zoe V. Tach           [] NS V. T   [] New LBBB           [] T.  Inv  []  ST dev > 0.5 mm         [] PVC's freq  [] PVC's infrequent    Stress Test Performed:      [] Yes    [x] No     Type:     [] Stress Echo   [] Exercise Stress Test (no imaging)      [] Stress Nuclear  [] Stress Imaging     Results   [] Negative   [] Positive        [] Indeterminate  [] Unavailable     If Positive/ Risk / Extent of Ischemia:       [] Low  [] Intermediate         [] High  [] Unavailable      Cardiac CTA Performed:     [] Yes    [x] No      Results   [] CAD   [] Non obstructive CAD      [] No CAD   [] Uncertain      [] Unknown   [] Structural Disease. Pre Procedure Medications:   [x] Yes    [] No         [x] ASA   [] Beta Blockers      [] Nitrate   [] Ca Channel Blockers      [] Ranolazine   [] Statin       [] Plavix/Others antiplatelets      Electronically signed on 10/25/2022 at 4:36 PM by:    Bobbi Duong MD  Fellow, [address]    I was present during entire procedure and performed all critical elements of the procedure.     Danie Toscano MD

## 2022-10-25 NOTE — PROGRESS NOTES
[facility]  Progress Note                   Date:   10/25/2022  Patient name: Rylie Abreu  Date of admission:  10/24/2022  4:56 AM  MRN:   [id number]  YOB: 1970  PCP: Amie Morales MD    Reason for Admission: Chest pain [R07.9]  Chest pain, unspecified type [R07.9]    Subjective:       Clinical Changes /Abnormalities:Patient seen and examined. Denies chest pain or shortness of breath. Tele/vitals/labs reviewed . Discussed case with RN. Review of Systems    Medications:   Scheduled Meds:   aspirin  81 mg Oral Daily    atorvastatin  40 mg Oral Daily    lisinopril  2.5 mg Oral Daily    QUEtiapine  50 mg Oral Nightly    sodium chloride flush  5-40 mL IntraVENous 2 times per day    enoxaparin  40 mg SubCUTAneous Daily    insulin lispro  0-4 Units SubCUTAneous TID WC    insulin lispro  0-4 Units SubCUTAneous Nightly     Continuous Infusions:   sodium chloride      dextrose       CBC:   Recent Labs     10/24/22  0507   WBC 6.3   HGB 13.2        BMP:    Recent Labs     10/24/22  0507      K 4.0   CL 99   CO2 23   BUN 14   CREATININE 0.67   GLUCOSE 328*     Hepatic:No results for input(s): AST, ALT, ALB, BILITOT, ALKPHOS in the last 72 hours. Troponin:   Recent Labs     10/24/22  0507 10/24/22  0633   TROPHS 11 10     BNP: No results for input(s): BNP in the last 72 hours. Lipids: No results for input(s): CHOL, HDL in the last 72 hours. Invalid input(s): LDLCALCU  INR: No results for input(s): INR in the last 72 hours. Objective:   Vitals: BP (!) 150/90   Pulse 70   Temp 98.4 °F (36.9 °C)   Resp 18   Ht 5' 7\" (1.702 m)   Wt 138 lb 7.2 oz (62.8 kg)   SpO2 99%   BMI 21.68 kg/m²   General appearance: alert and cooperative with exam  HEENT: Head: Normocephalic, no lesions, without obvious abnormality.   Neck:no JVD, trachea midline, no adenopathy  Lungs: Clear to auscultation  Heart: Regular rate and rhythm, s1/s2 auscultated, no murmurs  Abdomen: soft, non-tender, bowel sounds active  Extremities: no edema  Neurologic: not done    Stress test   Impression   1. No definitive scintigraphic evidence for reversible ischemia or infarct. 2. Left ventricular ejection fraction of 36%. Mild septal wall hypokinesis. 3.  Please see report for EKG portion of the examination which will be   performed separately by physician from cardiology. Risk stratification:  Intermediate risk         ECHO Summary  Left ventricle is normal in size with moderately reduced function. Anteroseptal and inferior wall hypokinesis. Calculated ejection fraction by bi-plane Navarrete's method is 40%  Mitral valve structure is normal.  Mild mitral regurgitation. Tricuspid valve structure is normal.  Mild tricuspid regurgitation. Estimated right ventricular systolic pressure is 30 mmHg.      Signature  ----------------------------------------------------------------------------   Electronically signed by Buster Palacios(Sonographer) on 10/24/2022 07:17   PM  ----------------------------------------------------------------------------     ----------------------------------------------------------------------------   Electronically signed by Willis Cheema(Interpreting physician) on 10/25/2022   09:13 AM  ----------------------------------------------------------------------------      Assessment / Acute Cardiac Problems:   Chest pain  HTN  HLD  Diabetes, on insulin  PAD  PVD  Tobacco abuse    Patient Active Problem List:     Migraine without aura and without status migrainosus, not intractable     Type 2 diabetes mellitus with hyperglycemia (HCC)     Intractable migraine without status migrainosus     Hypoglycemia     B[facility] 1 disorder ([facility]     Hypertension     Hypoglycemia due to insulin     Type 2 diabetes mellitus ([facility]     Depression with suicidal ideation     Major depressive disorder, recurrent, severe with psychotic features ([facility]     PTSD (post-traumatic stress disorder) Generalized anxiety disorder with panic attacks     Cocaine use disorder, severe, in early remission (HCC)     Chest pain      Plan of Treatment:   Coniine statin , asa , ACE , will add low dose BB   ECHO result discussed with Dr. Jerome Nino who recommended cardiac cath - I have discussed risks (including but not limited to vascular injury, infection, hematoma, contrast induced kidney dysfunction, CVA and MI), benefits, alternatives in detail. All questions answered. Patient agrees to proceed.       Electronically signed by PRITI Hussein NP on 10/25/2022 at 10:52 AM  [address]  [phone]

## 2022-10-25 NOTE — PLAN OF CARE
Problem: Chronic Conditions and Co-morbidities  Goal: Patient's chronic conditions and co-morbidity symptoms are monitored and maintained or improved  10/24/2022 2240 by Sam Chambers RN  Outcome: Progressing  10/24/2022 1944 by Jono Rooney RN  Outcome: Progressing  Flowsheets (Taken 10/24/2022 6593)  Care Plan - Patient's Chronic Conditions and Co-Morbidity Symptoms are Monitored and Maintained or Improved:   Monitor and assess patient's chronic conditions and comorbid symptoms for stability, deterioration, or improvement   Collaborate with multidisciplinary team to address chronic and comorbid conditions and prevent exacerbation or deterioration   Update acute care plan with appropriate goals if chronic or comorbid symptoms are exacerbated and prevent overall improvement and discharge     Problem: Pain  Goal: Verbalizes/displays adequate comfort level or baseline comfort level  10/24/2022 2240 by Sam Chambers RN  Outcome: Progressing  10/24/2022 1944 by Jono Rooney RN  Outcome: Progressing

## 2022-10-26 ENCOUNTER — CARE COORDINATION (OUTPATIENT)
Dept: CASE MANAGEMENT | Age: 52
End: 2022-10-26

## 2022-10-26 NOTE — DISCHARGE SUMMARY
CDU Discharge Summary        Patient:  Lorena Pakr  YOB: 1970    MRN: [id number]   Acct: [de-identified]    Primary Care Physician: Gabi Hirsch MD    Admit date:  10/24/2022  4:56 AM  Discharge date: 10/25/2022  9:15 PM     Discharge Diagnoses:     1.) Acute chest pain secondary to unknown etiology. Improved with Iv hydration, analgesics and rest    Follow-up:  Call today/tomorrow for a follow up appointment with Gabi Hirsch MD , or return to the Emergency Room with worsening symptoms    Stressed to patient the importance of following up with primary care doctor for further workup/management of symptoms. Pt verbalizes understanding and agrees with plan.     Discharge Medication Changes:       Medication List        CONTINUE taking these medications      acetaminophen 500 MG tablet  Commonly known as: TYLENOL  Take 1 tablet by mouth every 6 hours as needed for Pain     aspirin 81 MG tablet     atorvastatin 40 MG tablet  Commonly known as: LIPITOR  Take 1 tablet by mouth daily     ibuprofen 600 MG tablet  Commonly known as: ADVIL;MOTRIN  Take 1 tablet by mouth every 6 hours as needed for Pain     insulin aspart 100 UNIT/ML injection pen  Commonly known as: NovoLOG     insulin glargine 100 UNIT/ML injection pen  Commonly known as: LANTUS;BASAGLAR     lisinopril 2.5 MG tablet  Commonly known as: PRINIVIL;ZESTRIL  Take 1 tablet by mouth daily     QUEtiapine 50 MG tablet  Commonly known as: SEROQUEL  Take 1 tablet by mouth nightly Indications: (2 tabs @ HS)     traZODone 50 MG tablet  Commonly known as: DESYREL  Take 1 tablet by mouth nightly as needed for Sleep            ASK your doctor about these medications      ketoconazole 2 % cream  Commonly known as: NIZORAL  Apply topically daily below Left breast               Where to Get Your Medications        These medications were sent to [facility] [address] [facility], [address] [facility]  [address] [facility] [address] [place] [address]      Phone: [phone]   atorvastatin 40 MG tablet  lisinopril 2.5 MG tablet  QUEtiapine 50 MG tablet         Diet:  No diet orders on file, advance as tolerated     Activity:  As tolerated    Consultants: IP CONSULT TO CARDIOLOGY  IP CONSULT TO CARDIOLOGY    Procedures:  Not indicated     Diagnostic Test:   Results for orders placed or performed during the hospital encounter of 10/24/22   COVID-19, Rapid    Specimen: Nasopharyngeal Swab   Result Value Ref Range    Specimen Description . NASOPHARYNGEAL SWAB     SARS-CoV-2, Rapid Not Detected Not Detected   Troponin   Result Value Ref Range    Troponin, High Sensitivity 11 0 - 14 ng/L   Troponin   Result Value Ref Range    Troponin, High Sensitivity 10 0 - 14 ng/L   CBC with Auto Differential   Result Value Ref Range    WBC 6.3 3.5 - 11.3 k/uL    RBC 4.61 3.95 - 5.11 m/uL    Hemoglobin 13.2 11.9 - 15.1 g/dL    Hematocrit 39.0 36.3 - 47.1 %    MCV 84.6 82.6 - 102.9 fL    MCH 28.6 25.2 - 33.5 pg    MCHC 33.8 28.4 - 34.8 g/dL    RDW 13.2 11.8 - 14.4 %    Platelets 013 491 - 998 k/uL    MPV 9.0 8.1 - 13.5 fL    NRBC Automated 0.0 0.0 per 100 WBC    Seg Neutrophils 52 36 - 65 %    Lymphocytes 36 24 - 43 %    Monocytes 9 3 - 12 %    Eosinophils % 3 1 - 4 %    Basophils 0 0 - 2 %    Immature Granulocytes 0 0 %    Segs Absolute 3.19 1.50 - 8.10 k/uL    Absolute Lymph # 2.26 1.10 - 3.70 k/uL    Absolute Mono # 0.56 0.10 - 1.20 k/uL    Absolute Eos # 0.20 0.00 - 0.44 k/uL    Basophils Absolute <0.03 0.00 - 0.20 k/uL    Absolute Immature Granulocyte <0.03 0.00 - 0.30 k/uL   Basic Metabolic Panel   Result Value Ref Range    Glucose 328 (H) 70 - 99 mg/dL    BUN 14 6 - 20 mg/dL    Creatinine 0.67 0.50 - 0.90 mg/dL    Est, Glom Filt Rate >60 >60 mL/min/1.73m2    Calcium 9.0 8.6 - 10.4 mg/dL    Sodium 135 135 - 144 mmol/L    Potassium 4.0 3.7 - 5.3 mmol/L    Chloride 99 98 - 107 mmol/L    CO2 23 20 - 31 mmol/L    Anion Gap 13 9 - 17 mmol/L   D-Dimer, Quantitative   Result Value Ref Range    D-Dimer, Quant 0.46 mg/L FEU   HCG Qualitative, Serum   Result Value Ref Range    hCG Qual NEGATIVE NEGATIVE   Sedimentation Rate   Result Value Ref Range    Sed Rate 24 0 - 30 mm/Hr   POC Glucose Fingerstick   Result Value Ref Range    POC Glucose 274 (H) 65 - 105 mg/dL   POC Glucose Fingerstick   Result Value Ref Range    POC Glucose 159 (H) 65 - 105 mg/dL   POC Glucose Fingerstick   Result Value Ref Range    POC Glucose 252 (H) 65 - 105 mg/dL   POC Glucose Fingerstick   Result Value Ref Range    POC Glucose 270 (H) 65 - 105 mg/dL   POC Glucose Fingerstick   Result Value Ref Range    POC Glucose 297 (H) 65 - 105 mg/dL   POC Glucose Fingerstick   Result Value Ref Range    POC Glucose 168 (H) 65 - 105 mg/dL   EKG 12 Lead   Result Value Ref Range    Ventricular Rate 69 BPM    Atrial Rate 69 BPM    P-R Interval 152 ms    QRS Duration 108 ms    Q-T Interval 436 ms    QTc Calculation (Bazett) 467 ms    P Axis 72 degrees    R Axis 54 degrees    T Axis 56 degrees     XR CHEST PORTABLE    Result Date: 10/24/2022  EXAMINATION: ONE XRAY VIEW OF THE CHEST.  1 PORTABLE UPRIGHT AP VIEW OF CHEST. 10/24/2022 5:15 am COMPARISON: Chest x-ray, standard two views on 01/19/2020. HISTORY: ORDERING SYSTEM PROVIDED HISTORY: chest pain TECHNOLOGIST PROVIDED HISTORY: chest pain FINDINGS: The lungs are without acute focal process. There is no effusion or pneumothorax. The cardiomediastinal silhouette is without acute process. The osseous structures are without acute process. No definite interval changes compared to previous chest x-ray in 2020. Negative portable chest x-ray. No acute cardiopulmonary abnormality demonstrated. Clear lungs bilaterally.      NM Cardiac Stress Test Nuclear Imaging    Result Date: 10/24/2022  EXAMINATION: MYOCARDIAL PERFUSION IMAGING 10/24/2022 10:17 am TECHNIQUE: Rest dose:  12.6 mCi Tc-99m sestamibi intravenously Stress dose:  34 mCi Tc-99m sestamibi intravenously Under cardiology supervision, 0.4 mg Lexiscan was infused intravenously prior to injection of the stress dose. SPECT imaging was acquired following injection of the sestamibi. ECG gating was obtained following the stress acquisition. COMPARISON: 02/25/2014 HISTORY: ORDERING SYSTEM PROVIDED HISTORY: Chest pain TECHNOLOGIST PROVIDED HISTORY: Reason for Exam: Chest pain Procedure Type->Rx Is the patient pregnant?->No Reason for Exam: Chest pain, jaw/back and arm pain, numbness in the arms and hands,SOB, dizzy, nausea,unusual swating, prior heart bypass surgery, high BP, diabetic, and family Hx of CAD. 58-year-old female with chest pain FINDINGS: The patient achieved a maximum heart rate of 92 beats per minute, 55 % of the maximum age predicted heart rate of 166 beats per minute. Perfusion: There is no scintigraphic evidence for a reversible or fixed perfusion defect to suggest reversible ischemia or infarct. Function: The gated SPECT data demonstrates normal left ventricular size. Mild septal wall hypokinesis. Left ventricular ejection fraction:  36% TID score:  1.07 (threshold value of 1.39 is used for Lexiscan stress with Tc-99m). There is no stress-induced cavitary dilatation to suggest compensated triple vessel disease. End diastolic volume:  684UN Scores are visually adjusted to account for potential artifact. Summed stress score:  0 Summed rest score:  0 Summed reversibility score:  0     1. No definitive scintigraphic evidence for reversible ischemia or infarct. 2. Left ventricular ejection fraction of 36%. Mild septal wall hypokinesis. 3.  Please see report for EKG portion of the examination which will be performed separately by physician from cardiology. Risk stratification:  Intermediate risk Note:  Risk stratification incorporates both clinical history and test results.   Final risk determination is the responsibility of the ordering provider as history and other test results may increase or decrease the risk stratification reported for this examination. Risk stratification criteria are adapted from \"Noninvasive Risk Stratification\" criteria from [facility]. Al, ACC/AATS/AHA/ASE/ASNC/SCAI/SCCT/STS 2017 Appropriate Use Criteria For Coronary Revascularization in Patients With Stable Ischemic Heart Disease [facility] Volume 69, Issue 17, May 2017 High risk (>3% annual death or MI) 1. Severe resting LV dysfunction (LVEF >35%) not readily explained by non coronary causes 2. Resting perfusion abnormalities greater than 10% of the myocardium in patients without prior history or evidence of MI 3. Stress-induced perfusion abnormalities encumbering greater than or equal to 10% myocardium or stress segmental scores indicating multiple vascular territories with abnormalities 4. Stress-induced LV dilatation (TID ratio greater than 1.19 for exercise and greater than 1.39 for regadenoson) Intermediate risk (1% to 3% annual death or MI) 1. Mild/moderate resting LV dysfunction (LVEF 35% to 49%) not readily explained by non coronary causes. 2.  Resting perfusion abnormalities in 5%-9.9% of the myocardium in patients without a history or prior evidence of MI 3. Stress-induced perfusion abnormality encumbering 5%-9.9% of the myocardium or stress segmental scores indicating 1 vascular territory with abnormalities but without LV dilation 4. Small wall motion abnormality involving 1-2 segments and only 1 coronary bed. Low Risk (Less than 1% annual death or MI) 1. Normal or small myocardial perfusion defect at rest or with stress encumbering less than 5% of the myocardium. Physical Exam:    General appearance - NAD, AOx 3   Lungs -CTAB, no R/R/R  Heart - RRR, no M/R/G  Abdomen - Soft, NT/ND  Neurological:  MAEx4, No focal motor deficit, sensory loss  Extremities - Cap refil <2 sec in all ext., no edema  Skin -warm, dry      Hospital Course:  Clinical course has improved, labs and imaging reviewed.      Jorge Li

## 2022-10-26 NOTE — CARE COORDINATION
Care Transitions Outreach Attempt    Call within 2 business days of discharge: Yes   Attempted to reach patient for transitions of care follow up. Unable to reach patient. Patient: Didi Garner Patient : 1970 MRN: [id number]    Last Discharge  Street       Date Complaint Diagnosis Description Type Department Provider    10/24/22 Chest Pain Chest pain, unspecified type ED to Hosp-Admission (Discharged) (ADMITTED) [facility] Catalino Us MD; [facility]. .. Was this an external facility discharge? No Discharge Facility: [facility]    Noted following upcoming appointments from discharge chart review:   [facility] follow up appointment(s): No future appointments.   Non-[facility] follow up appointment(s): n/a

## 2022-10-27 ENCOUNTER — CARE COORDINATION (OUTPATIENT)
Dept: CASE MANAGEMENT | Age: 52
End: 2022-10-27

## 2022-10-27 NOTE — CARE COORDINATION
Care Transitions Outreach Attempt #2    Call within 2 business days of discharge: Yes   Attempt #2 to reach patient for transitions of care follow up. Unable to reach patient. [facility] full. Non [facility] PCP. CTN sign off for transitions. Patient: Yi Vanegas Patient : 1970 MRN: [id number]    Last Discharge [address]       Date Complaint Diagnosis Description Type Department Provider    10/24/22 Chest Pain Chest pain, unspecified type ED to Hosp-Admission (Discharged) (ADMITTED) [facility] 3C Bernarda Lockett MD; [facility]. .. Was this an external facility discharge? No Discharge Facility: [facility]    Noted following upcoming appointments from discharge chart review:   [facility] follow up appointment(s): No future appointments.     Dawna Sutherland, RN BSN   Care Transitions Nurse  [phone]

## 2023-07-16 ENCOUNTER — APPOINTMENT (OUTPATIENT)
Dept: CT IMAGING | Age: 53
DRG: 069 | End: 2023-07-16
Payer: MEDICARE

## 2023-07-16 ENCOUNTER — SOCIAL WORK (OUTPATIENT)
Dept: EMERGENCY DEPT | Age: 53
End: 2023-07-16

## 2023-07-16 ENCOUNTER — HOSPITAL ENCOUNTER (INPATIENT)
Age: 53
LOS: 3 days | Discharge: HOME | DRG: 069 | End: 2023-07-19
Attending: EMERGENCY MEDICINE | Admitting: PSYCHIATRY & NEUROLOGY
Payer: MEDICARE

## 2023-07-16 ENCOUNTER — APPOINTMENT (OUTPATIENT)
Dept: MRI IMAGING | Age: 53
DRG: 069 | End: 2023-07-16
Payer: MEDICARE

## 2023-07-16 DIAGNOSIS — I63.9 CEREBROVASCULAR ACCIDENT (CVA), UNSPECIFIED MECHANISM (HCC): Primary | ICD-10-CM

## 2023-07-16 LAB
ANION GAP SERPL CALCULATED.3IONS-SCNC: 11 MMOL/L (ref 9–17)
APAP SERPL-MCNC: <5 UG/ML (ref 10–30)
BASOPHILS # BLD: 0.07 K/UL (ref 0–0.2)
BASOPHILS NFR BLD: 1 % (ref 0–2)
BILIRUB UR QL STRIP: NEGATIVE
BUN BLD-MCNC: 18 MG/DL (ref 8–26)
BUN SERPL-MCNC: 16 MG/DL (ref 6–20)
CA-I BLD-SCNC: 1.18 MMOL/L (ref 1.15–1.33)
CALCIUM SERPL-MCNC: 9.8 MG/DL (ref 8.6–10.4)
CHLORIDE BLD-SCNC: 100 MMOL/L (ref 98–107)
CHLORIDE SERPL-SCNC: 99 MMOL/L (ref 98–107)
CK SERPL-CCNC: 164 U/L (ref 26–192)
CLARITY UR: CLEAR
CO2 BLD CALC-SCNC: 35 MMOL/L (ref 22–30)
CO2 SERPL-SCNC: 27 MMOL/L (ref 20–31)
COLOR UR: YELLOW
COMMENT: ABNORMAL
CREAT SERPL-MCNC: 0.6 MG/DL (ref 0.5–0.9)
EGFR, POC: >60 ML/MIN/1.73M2
EOSINOPHIL # BLD: 0.4 K/UL (ref 0–0.44)
EOSINOPHILS RELATIVE PERCENT: 5 % (ref 1–4)
ERYTHROCYTE [DISTWIDTH] IN BLOOD BY AUTOMATED COUNT: 14.6 % (ref 11.8–14.4)
ETHANOL PERCENT: <0.01 %
ETHANOLAMINE SERPL-MCNC: <10 MG/DL
GFR SERPL CREATININE-BSD FRML MDRD: >60 ML/MIN/1.73M2
GLUCOSE BLD-MCNC: 151 MG/DL (ref 74–100)
GLUCOSE SERPL-MCNC: 161 MG/DL (ref 70–99)
GLUCOSE UR STRIP-MCNC: NEGATIVE MG/DL
HCO3 VENOUS: 34.5 MMOL/L (ref 22–29)
HCT VFR BLD AUTO: 43.9 % (ref 36.3–47.1)
HCT VFR BLD AUTO: 50 % (ref 36–46)
HGB BLD-MCNC: 14.7 G/DL (ref 11.9–15.1)
HGB UR QL STRIP.AUTO: NEGATIVE
IMM GRANULOCYTES # BLD AUTO: <0.03 K/UL (ref 0–0.3)
IMM GRANULOCYTES NFR BLD: 0 %
INR PPP: 1
KETONES UR STRIP-MCNC: NEGATIVE MG/DL
LEUKOCYTE ESTERASE UR QL STRIP: NEGATIVE
LYMPHOCYTES # BLD: 56 % (ref 24–43)
LYMPHOCYTES NFR BLD: 4.85 K/UL (ref 1.1–3.7)
MCH RBC QN AUTO: 29 PG (ref 25.2–33.5)
MCHC RBC AUTO-ENTMCNC: 33.5 G/DL (ref 28.4–34.8)
MCV RBC AUTO: 86.6 FL (ref 82.6–102.9)
METER GLUCOSE: 146 MG/DL (ref 65–105)
METER GLUCOSE: 160 MG/DL (ref 65–105)
METER GLUCOSE: 334 MG/DL (ref 65–105)
MONOCYTES NFR BLD: 0.68 K/UL (ref 0.1–1.2)
MONOCYTES NFR BLD: 8 % (ref 3–12)
MYOGLOBIN SERPL-MCNC: 35 NG/ML (ref 25–58)
NEUTROPHILS NFR BLD: 30 % (ref 36–65)
NEUTS SEG NFR BLD: 2.57 K/UL (ref 1.5–8.1)
NITRITE UR QL STRIP: NEGATIVE
NRBC BLD-RTO: 0 PER 100 WBC
O2 SAT, VEN: 31.1 % (ref 60–85)
PARTIAL THROMBOPLASTIN TIME: 26.5 SEC (ref 23–36.5)
PCO2, VEN: 54.6 MM HG (ref 41–51)
PH UR STRIP: 7.5 [PH] (ref 5–8)
PH VENOUS: 7.41 (ref 7.32–7.43)
PLATELET # BLD AUTO: 334 K/UL (ref 138–453)
PMV BLD AUTO: 9.1 FL (ref 8.1–13.5)
PO2, VEN: 20 MM HG (ref 30–50)
POC ANION GAP: 5 MMOL/L (ref 7–16)
POC CREATININE: 0.6 MG/DL (ref 0.51–1.19)
POC HEMOGLOBIN (CALC): 16.8 G/DL (ref 12–16)
POC LACTIC ACID: 0.7 MMOL/L (ref 0.56–1.39)
POSITIVE BASE EXCESS, VEN: 7.7 MMOL/L (ref 0–3)
POTASSIUM BLD-SCNC: 6.6 MMOL/L (ref 3.5–5.1)
POTASSIUM SERPL-SCNC: 4.8 MMOL/L (ref 3.7–5.3)
PROT UR STRIP-MCNC: NEGATIVE MG/DL
PROTHROMBIN TIME: 12.8 SEC (ref 11.7–14.9)
RBC # BLD AUTO: 5.07 M/UL (ref 3.95–5.11)
RBC # BLD: ABNORMAL 10*6/UL
SALICYLATES SERPL-MCNC: <1 MG/DL (ref 3–10)
SODIUM BLD-SCNC: 138 MMOL/L (ref 138–146)
SODIUM SERPL-SCNC: 137 MMOL/L (ref 135–144)
SP GR UR STRIP: 1.04 (ref 1–1.03)
TOXIC TRICYCLIC SC,BLOOD: NEGATIVE
TROPONIN I SERPL HS-MCNC: 12 NG/L (ref 0–14)
UROBILINOGEN UR STRIP-ACNC: NORMAL EU/DL (ref 0–1)
WBC OTHER # BLD: 8.6 K/UL (ref 3.5–11.3)

## 2023-07-16 PROCEDURE — 80051 ELECTROLYTE PANEL: CPT

## 2023-07-16 PROCEDURE — G0480 DRUG TEST DEF 1-7 CLASSES: HCPCS

## 2023-07-16 PROCEDURE — 6360000002 HC RX W HCPCS

## 2023-07-16 PROCEDURE — 81003 URINALYSIS AUTO W/O SCOPE: CPT

## 2023-07-16 PROCEDURE — 97535 SELF CARE MNGMENT TRAINING: CPT

## 2023-07-16 PROCEDURE — 85730 THROMBOPLASTIN TIME PARTIAL: CPT

## 2023-07-16 PROCEDURE — 85014 HEMATOCRIT: CPT

## 2023-07-16 PROCEDURE — 97165 OT EVAL LOW COMPLEX 30 MIN: CPT

## 2023-07-16 PROCEDURE — 82553 CREATINE MB FRACTION: CPT

## 2023-07-16 PROCEDURE — 85027 COMPLETE CBC AUTOMATED: CPT

## 2023-07-16 PROCEDURE — 70551 MRI BRAIN STEM W/O DYE: CPT

## 2023-07-16 PROCEDURE — 0042T CT BRAIN PERFUSION: CPT

## 2023-07-16 PROCEDURE — 2060000002 HC BURN ICU INTERMEDIATE R&B

## 2023-07-16 PROCEDURE — 92523 SPEECH SOUND LANG COMPREHEN: CPT

## 2023-07-16 PROCEDURE — 82550 ASSAY OF CK (CPK): CPT

## 2023-07-16 PROCEDURE — 2580000003 HC RX 258: Performed by: PSYCHIATRY & NEUROLOGY

## 2023-07-16 PROCEDURE — 85610 PROTHROMBIN TIME: CPT

## 2023-07-16 PROCEDURE — 6360000004 HC RX CONTRAST MEDICATION: Performed by: PSYCHIATRY & NEUROLOGY

## 2023-07-16 PROCEDURE — 80179 DRUG ASSAY SALICYLATE: CPT

## 2023-07-16 PROCEDURE — 99222 1ST HOSP IP/OBS MODERATE 55: CPT | Performed by: PSYCHIATRY & NEUROLOGY

## 2023-07-16 PROCEDURE — 94761 N-INVAS EAR/PLS OXIMETRY MLT: CPT

## 2023-07-16 PROCEDURE — 83605 ASSAY OF LACTIC ACID: CPT

## 2023-07-16 PROCEDURE — 80143 DRUG ASSAY ACETAMINOPHEN: CPT

## 2023-07-16 PROCEDURE — 82330 ASSAY OF CALCIUM: CPT

## 2023-07-16 PROCEDURE — 6360000004 HC RX CONTRAST MEDICATION: Performed by: STUDENT IN AN ORGANIZED HEALTH CARE EDUCATION/TRAINING PROGRAM

## 2023-07-16 PROCEDURE — 2500000003 HC RX 250 WO HCPCS: Performed by: PSYCHIATRY & NEUROLOGY

## 2023-07-16 PROCEDURE — 82947 ASSAY GLUCOSE BLOOD QUANT: CPT

## 2023-07-16 PROCEDURE — 6370000000 HC RX 637 (ALT 250 FOR IP)

## 2023-07-16 PROCEDURE — 6370000000 HC RX 637 (ALT 250 FOR IP): Performed by: PSYCHIATRY & NEUROLOGY

## 2023-07-16 PROCEDURE — 84520 ASSAY OF UREA NITROGEN: CPT

## 2023-07-16 PROCEDURE — 82803 BLOOD GASES ANY COMBINATION: CPT

## 2023-07-16 PROCEDURE — 70450 CT HEAD/BRAIN W/O DYE: CPT

## 2023-07-16 PROCEDURE — 80048 BASIC METABOLIC PNL TOTAL CA: CPT

## 2023-07-16 PROCEDURE — 99285 EMERGENCY DEPT VISIT HI MDM: CPT

## 2023-07-16 PROCEDURE — 70498 CT ANGIOGRAPHY NECK: CPT

## 2023-07-16 PROCEDURE — 82565 ASSAY OF CREATININE: CPT

## 2023-07-16 PROCEDURE — 93005 ELECTROCARDIOGRAM TRACING: CPT | Performed by: STUDENT IN AN ORGANIZED HEALTH CARE EDUCATION/TRAINING PROGRAM

## 2023-07-16 PROCEDURE — 83874 ASSAY OF MYOGLOBIN: CPT

## 2023-07-16 PROCEDURE — 99221 1ST HOSP IP/OBS SF/LOW 40: CPT | Performed by: PSYCHIATRY & NEUROLOGY

## 2023-07-16 PROCEDURE — 80307 DRUG TEST PRSMV CHEM ANLYZR: CPT

## 2023-07-16 PROCEDURE — 84484 ASSAY OF TROPONIN QUANT: CPT

## 2023-07-16 RX ORDER — CLOPIDOGREL BISULFATE 75 MG/1
75 TABLET ORAL DAILY
Status: DISCONTINUED | OUTPATIENT
Start: 2023-07-17 | End: 2023-07-19 | Stop reason: HOSPADM

## 2023-07-16 RX ORDER — HYDRALAZINE HYDROCHLORIDE 20 MG/ML
20 INJECTION INTRAMUSCULAR; INTRAVENOUS EVERY 6 HOURS PRN
Status: DISCONTINUED | OUTPATIENT
Start: 2023-07-16 | End: 2023-07-19 | Stop reason: HOSPADM

## 2023-07-16 RX ORDER — LABETALOL HYDROCHLORIDE 5 MG/ML
10 INJECTION, SOLUTION INTRAVENOUS EVERY 4 HOURS PRN
Status: DISCONTINUED | OUTPATIENT
Start: 2023-07-16 | End: 2023-07-19 | Stop reason: HOSPADM

## 2023-07-16 RX ORDER — ASPIRIN 81 MG/1
81 TABLET ORAL DAILY
Status: DISCONTINUED | OUTPATIENT
Start: 2023-07-16 | End: 2023-07-19 | Stop reason: HOSPADM

## 2023-07-16 RX ORDER — INSULIN LISPRO 100 [IU]/ML
0-4 INJECTION, SOLUTION INTRAVENOUS; SUBCUTANEOUS
Status: DISCONTINUED | OUTPATIENT
Start: 2023-07-16 | End: 2023-07-17

## 2023-07-16 RX ORDER — ACETAMINOPHEN 325 MG/1
650 TABLET ORAL EVERY 4 HOURS PRN
Status: DISCONTINUED | OUTPATIENT
Start: 2023-07-16 | End: 2023-07-19 | Stop reason: HOSPADM

## 2023-07-16 RX ORDER — KETOROLAC TROMETHAMINE 15 MG/ML
30 INJECTION, SOLUTION INTRAMUSCULAR; INTRAVENOUS ONCE
Status: COMPLETED | OUTPATIENT
Start: 2023-07-16 | End: 2023-07-16

## 2023-07-16 RX ORDER — ROSUVASTATIN CALCIUM 20 MG/1
40 TABLET, COATED ORAL NIGHTLY
Status: DISCONTINUED | OUTPATIENT
Start: 2023-07-16 | End: 2023-07-19 | Stop reason: HOSPADM

## 2023-07-16 RX ORDER — CLOPIDOGREL BISULFATE 75 MG/1
300 TABLET ORAL ONCE
Status: COMPLETED | OUTPATIENT
Start: 2023-07-16 | End: 2023-07-16

## 2023-07-16 RX ORDER — DIPHENHYDRAMINE HYDROCHLORIDE 50 MG/ML
25 INJECTION INTRAMUSCULAR; INTRAVENOUS EVERY 6 HOURS PRN
Status: DISCONTINUED | OUTPATIENT
Start: 2023-07-16 | End: 2023-07-19 | Stop reason: HOSPADM

## 2023-07-16 RX ORDER — ONDANSETRON 4 MG/1
4 TABLET, ORALLY DISINTEGRATING ORAL EVERY 8 HOURS PRN
Status: DISCONTINUED | OUTPATIENT
Start: 2023-07-16 | End: 2023-07-19 | Stop reason: HOSPADM

## 2023-07-16 RX ORDER — DEXTROSE MONOHYDRATE 100 MG/ML
INJECTION, SOLUTION INTRAVENOUS CONTINUOUS PRN
Status: DISCONTINUED | OUTPATIENT
Start: 2023-07-16 | End: 2023-07-19 | Stop reason: HOSPADM

## 2023-07-16 RX ORDER — POLYETHYLENE GLYCOL 3350 17 G/17G
17 POWDER, FOR SOLUTION ORAL DAILY PRN
Status: DISCONTINUED | OUTPATIENT
Start: 2023-07-16 | End: 2023-07-19 | Stop reason: HOSPADM

## 2023-07-16 RX ORDER — ASPIRIN 300 MG/1
300 SUPPOSITORY RECTAL DAILY
Status: DISCONTINUED | OUTPATIENT
Start: 2023-07-16 | End: 2023-07-19 | Stop reason: HOSPADM

## 2023-07-16 RX ORDER — INSULIN LISPRO 100 [IU]/ML
0-4 INJECTION, SOLUTION INTRAVENOUS; SUBCUTANEOUS NIGHTLY
Status: DISCONTINUED | OUTPATIENT
Start: 2023-07-16 | End: 2023-07-17

## 2023-07-16 RX ORDER — ONDANSETRON 2 MG/ML
4 INJECTION INTRAMUSCULAR; INTRAVENOUS EVERY 6 HOURS PRN
Status: DISCONTINUED | OUTPATIENT
Start: 2023-07-16 | End: 2023-07-19 | Stop reason: HOSPADM

## 2023-07-16 RX ORDER — ENOXAPARIN SODIUM 100 MG/ML
40 INJECTION SUBCUTANEOUS DAILY
Status: DISCONTINUED | OUTPATIENT
Start: 2023-07-16 | End: 2023-07-19 | Stop reason: HOSPADM

## 2023-07-16 RX ORDER — KETOROLAC TROMETHAMINE 15 MG/ML
30 INJECTION, SOLUTION INTRAMUSCULAR; INTRAVENOUS ONCE
Status: COMPLETED | OUTPATIENT
Start: 2023-07-17 | End: 2023-07-17

## 2023-07-16 RX ADMIN — ROSUVASTATIN CALCIUM 40 MG: 20 TABLET, FILM COATED ORAL at 21:30

## 2023-07-16 RX ADMIN — IOPAMIDOL 50 ML: 755 INJECTION, SOLUTION INTRAVENOUS at 14:15

## 2023-07-16 RX ADMIN — INSULIN LISPRO 4 UNITS: 100 INJECTION, SOLUTION INTRAVENOUS; SUBCUTANEOUS at 21:32

## 2023-07-16 RX ADMIN — VALPROATE SODIUM 250 MG: 100 INJECTION, SOLUTION INTRAVENOUS at 14:44

## 2023-07-16 RX ADMIN — IOPAMIDOL 90 ML: 755 INJECTION, SOLUTION INTRAVENOUS at 03:36

## 2023-07-16 RX ADMIN — KETOROLAC TROMETHAMINE 30 MG: 15 INJECTION, SOLUTION INTRAMUSCULAR; INTRAVENOUS at 06:09

## 2023-07-16 RX ADMIN — ACETAMINOPHEN 650 MG: 325 TABLET ORAL at 16:23

## 2023-07-16 RX ADMIN — ACETAMINOPHEN 650 MG: 325 TABLET ORAL at 21:30

## 2023-07-16 RX ADMIN — Medication 10 MG: at 19:56

## 2023-07-16 RX ADMIN — ASPIRIN 81 MG: 81 TABLET, COATED ORAL at 07:51

## 2023-07-16 RX ADMIN — ENOXAPARIN SODIUM 40 MG: 40 INJECTION SUBCUTANEOUS at 12:37

## 2023-07-16 RX ADMIN — CLOPIDOGREL BISULFATE 300 MG: 75 TABLET, FILM COATED ORAL at 12:37

## 2023-07-16 RX ADMIN — HYDRALAZINE HYDROCHLORIDE 20 MG: 20 INJECTION INTRAMUSCULAR; INTRAVENOUS at 18:19

## 2023-07-16 ASSESSMENT — PAIN - FUNCTIONAL ASSESSMENT
PAIN_FUNCTIONAL_ASSESSMENT: ACTIVITIES ARE NOT PREVENTED
PAIN_FUNCTIONAL_ASSESSMENT: NONE - DENIES PAIN
PAIN_FUNCTIONAL_ASSESSMENT: ACTIVITIES ARE NOT PREVENTED
PAIN_FUNCTIONAL_ASSESSMENT: ACTIVITIES ARE NOT PREVENTED

## 2023-07-16 ASSESSMENT — ENCOUNTER SYMPTOMS
WHEEZING: 0
COLOR CHANGE: 0
CHEST TIGHTNESS: 0
NAUSEA: 0
VOMITING: 0
VOICE CHANGE: 1
COUGH: 0
BACK PAIN: 0
SHORTNESS OF BREATH: 0
ABDOMINAL PAIN: 0

## 2023-07-16 ASSESSMENT — PAIN DESCRIPTION - LOCATION
LOCATION: HEAD

## 2023-07-16 ASSESSMENT — PAIN DESCRIPTION - ORIENTATION
ORIENTATION: PROXIMAL;UPPER
ORIENTATION: POSTERIOR
ORIENTATION: LEFT
ORIENTATION: POSTERIOR
ORIENTATION: POSTERIOR

## 2023-07-16 ASSESSMENT — PAIN SCALES - GENERAL
PAINLEVEL_OUTOF10: 5
PAINLEVEL_OUTOF10: 8
PAINLEVEL_OUTOF10: 4
PAINLEVEL_OUTOF10: 9
PAINLEVEL_OUTOF10: 8
PAINLEVEL_OUTOF10: 5
PAINLEVEL_OUTOF10: 9

## 2023-07-16 ASSESSMENT — PAIN DESCRIPTION - PAIN TYPE
TYPE: ACUTE PAIN

## 2023-07-16 ASSESSMENT — PAIN DESCRIPTION - DESCRIPTORS
DESCRIPTORS: ACHING

## 2023-07-16 ASSESSMENT — PAIN SCALES - WONG BAKER: WONGBAKER_NUMERICALRESPONSE: 2

## 2023-07-16 ASSESSMENT — LIFESTYLE VARIABLES
HOW MANY STANDARD DRINKS CONTAINING ALCOHOL DO YOU HAVE ON A TYPICAL DAY: PATIENT DOES NOT DRINK
HOW OFTEN DO YOU HAVE A DRINK CONTAINING ALCOHOL: NEVER

## 2023-07-16 NOTE — ED NOTES
ED to inpatient nurses report      Chief Complaint:  Chief Complaint   Patient presents with    Cerebrovascular Accident     Present to ED from: [address]:     LOC: alert and orientated to name, place, date  Mobility: Requires assistance * 1  Oxygen Baseline: RA    Current needs required: RA   Pending ED orders: MRI  Present condition: A&Ox4, resting comfortably on stretcher,NAD. Remains on full monitor, NIH updated. Pt is updated plan of care, informed of needing MRI in the morning. Why did the patient come to the ED? CVA, slurred speech, sensation changes  What is the plan? MRI  Any procedures or intervention occur?      Mental Status:  Level of Consciousness: Alert (0)    Psych Assessment:      Vital signs   Vitals:    07/16/23 0433 07/16/23 0435 07/16/23 0437 07/16/23 0438   BP: (!) 179/64 (!) 154/88     Pulse: 58 54 70 62   Resp: 19 17     Temp:       TempSrc:       SpO2: 99% 99%     Weight:       Height:            Vitals:  Patient Vitals for the past 24 hrs:   BP Temp Temp src Pulse Resp SpO2 Height Weight   07/16/23 0438 -- -- -- 62 -- -- -- --   07/16/23 0437 -- -- -- 70 -- -- -- --   07/16/23 0435 (!) 154/88 -- -- 54 17 99 % -- --   07/16/23 0433 (!) 179/64 -- -- 58 19 99 % -- --   07/16/23 0425 (!) 188/70 -- -- 71 19 93 % -- --   07/16/23 0420 (!) 184/93 -- -- 65 22 98 % -- --   07/16/23 0419 -- -- -- -- 16 -- -- --   07/16/23 0418 -- -- -- 78 -- -- -- --   07/16/23 0415 (!) 172/81 -- -- 57 15 97 % -- --   07/16/23 0410 (!) 190/92 -- -- 78 19 97 % -- --   07/16/23 0407 -- -- -- 81 18 99 % -- --   07/16/23 0405 (!) 172/78 -- -- 65 13 98 % -- --   07/16/23 0400 (!) 171/125 -- -- 70 16 99 % -- --   07/16/23 0358 (!) 187/80 -- -- 66 15 100 % -- --   07/16/23 0356 -- -- -- 64 14 97 % -- --   07/16/23 0356 (!) 187/80 -- -- 68 14 100 % -- --   07/16/23 0346 (!) 158/65 -- -- -- -- -- -- --   07/16/23 0345 -- -- -- 59 16 98 % -- --   07/16/23 0341 (!) 180/73 -- -- -- -- -- -- --   07/16/23 0340 -- -- --

## 2023-07-16 NOTE — H&P
[facility]   [address] [facility]    HISTORY AND PHYSICAL EXAMINATION            Date:   7/16/2023  Patient name:  Henrik Sprague  Date of admission:  7/16/2023  3:29 AM  MRN:   [id number]  Account:  [de-identified]  YOB: 1970  PCP:    Nunu Browne MD  Room:   15/15  Code Status:    Full Code     CHIEF COMPLAINT:       R sided weakness    HISTORY OF PRESENT ILLNESS:       The patient is a 52 y.o. female who presents with right face, arm, leg weakness and dysarthria that presented upon waking up at 2 AM.  Patient's last known well was 10 PM when she went to sleep with no neurological deficits. Patient presents from [facility] where she was undergoing detox. Past medical history significant for hypertension, hyperlipidemia, severe PAD, drug abuse. Patient taking aspirin and cilostazol at home but unsure if she has been taking her medications consistently. NIH of 5 for dysarthria, sensory and facial droop. Weakness in the right upper and lower extremity noted on neurological exam.  Initial , blood glucose 151. CT head does not show any acute abnormality, CTA shows no LVO or critical stenosis. PAST MEDICAL HISTORY :       Past Medical History:        Diagnosis Date    Back pain     Bipolar 1 disorder ([address])     Diabetes mellitus ([address])     Disc disorder     Hypertension        Past Surgical History:        Procedure Laterality Date    BACK SURGERY      CHOLECYSTECTOMY         Social History:   Social History     Socioeconomic History    Marital status: Single     Spouse name: Not on file    Number of children: Not on file    Years of education: Not on file    Highest education level: Not on file   Occupational History    Not on file   Tobacco Use    Smoking status: Every Day     Packs/day: 0.50     Years: 13.00     Pack years: 6.50     Types: Cigarettes    Smokeless tobacco: Never   Substance and Sexual Activity    Alcohol use:  No

## 2023-07-16 NOTE — PLAN OF CARE
Notified primary service of the following via secure message: 1. Shreyas Negro Patient would like something to eat. Current diet is NPO. 2. Shreyas Negro Also, order clarification. Patient ordered 300mg Plavix today. However, per neuro note, \"Hold Plavix 300 mg now then Plavix 75mg daily for at least 21d. \" Want to clarify if patient should receive today. It's been held, for now. 3. Shreyas Negro Lastly, patient would like to be d/c and complete needed test outpatient - MRI and echo. Patient stated that she is admitted to inpatient [facility] for drug use and does not want to miss any part of her program. Her ride is here and willing to take her back. Primary service responded okay to give plavix and planned to address all other concerns when they round. 1205pm Notified primary service via secure message that patient was having another episode of slurred speech and right sided weakness. Requested someone come to the bedside now to evaluate. Dr. Gunter came to bedside to evaluate patient, called a stroke alert. However patient's symptoms resolved after 8 minutes. 1252pm Patient had another episode of slurred speech and right sided weakness when getting ready for transport to MRI. Dr. Jaylon Castle and Dr. Gunter witness some of patient's behavior and evaluated patient. Stated plan is to proceed to MRI then to CT Brain perfusion. 1413pm Notified primary service of the following via secure message: Patient had another episode in MRI where she couldn't move right side, slurred speech. Last about 3 minutes then resolved. Patient now in [address] [facility]. Last BP was 200/60. Was as high as . CT perfusion test in progress now. No medication for elevated BP ordered. 1434pm Notified primary service of the following via secure message: Back in the room. /70, HR 49-58. Dr. Gunter came to bedside. Discuss image results with patient and family. See orders. 1808pm Notified primary service of the following via secure message: /75. HR 52.  No medications

## 2023-07-16 NOTE — ED NOTES
Pt placed on bed pan. Pt cleaned up and placed in brief. Urine sent to lab.      Tom Rebolledo RN  07/16/23 1960

## 2023-07-16 NOTE — ED NOTES
The following labs were labeled with appropriate pt sticker and tubed to lab:     [] Blue     [] Lavender   [] on ice  [] Green/yellow  [] Green/black [] on ice  [] Cinderella Camel  [] on ice  [] Yellow  [] Red  [] Type/ Screen  [] ABG  [] VBG    [] COVID-19 swab    [] Rapid  [] PCR  [] Flu swab  [] Peds Viral Panel     [x] Urine Sample  [] Fecal Sample  [] Pelvic Cultures  [] Blood Cultures  [] X 2  [] STREP Cultures       Armand Lew RN  07/16/23 1959

## 2023-07-16 NOTE — PROGRESS NOTES
SLP ALL NOTES  Facility/Department: [address] BURN UNIT  Initial Speech/Language/Cognitive Assessment    NAME: Brenda Caba  : 1970   MRN: [id number]  ADMISSION DATE: 2023  ADMITTING DIAGNOSIS: has Migraine without aura and without status migrainosus, not intractable; Type 2 diabetes mellitus with hyperglycemia ([address]); Intractable migraine without status migrainosus; Hypoglycemia; Bipolar 1 disorder ([address]); Hypertension; Hypoglycemia due to insulin; Type 2 diabetes mellitus ([address]); Depression with suicidal ideation; Major depressive disorder, recurrent, severe with psychotic features ([address]); PTSD (post-traumatic stress disorder); Generalized anxiety disorder with panic attacks; Cocaine use disorder, severe, in early remission ([address]); Chest pain; and Acute ischemic stroke [facility]) on their problem list.    Date of Eval: 2023   Evaluating Therapist: SONJA Claros    Primary Complaint:     Pain:  Pain Assessment  Pain Assessment: 0-10  Pain Level: 0    Vision/ Hearing  Vision  Vision: Within Functional Limits  Hearing  Hearing: Within functional limits    Assessment:  Pt presents with mild cognitive deficits characterized by difficulties with delayed recall of 3 units, abstract reasoning and deductive reasoning. Pt. Presents with no dysarthria, no O/M deficits at this time. Pt. Does report she has episodes of right weakness with slurred speech. This was not noted during evaluation. ST to follow up and provide treatment to address noted deficits. Education provided.      Recommendations:  Recommendations  Requires SLP Intervention: Yes  Patient Education: yes  Patient Education Response: Verbalizes understanding  D/C Recommendations: Ongoing speech therapy is recommended during this hospitalization       Plan:   Speech Therapy Prognosis  Prognosis: Fair  Individuals consulted  Consulted and agree with results and recommendations: Patient    Goals:  Short Term Goals  Goal 1: Pt. will complete abstract

## 2023-07-16 NOTE — CONSULTS
previous history of stroke. On aspirin and cilostazol at home, unclear compliance. Stroke by clinical assessment        Last Known Well (date and time)   2200 7/15/2023     Candidate for IV Tenecteplase therapy    Yes []  Risks including 6% of sich/death, benefits of potential improved thrombolysis, and alternatives to IV thrombolytics discussed with patient and/or family. No   [x] due to the following exclusion criteria: Last well more than 4.5 hrs   Candidate for Thrombectomy   Yes []    No  [x] due to the following exclusion criteria: No LVO on Imaging      Disposition   [x] General Neurology Care Status - prefer 1st floor (1C)   [] Internal Medicine General Care Status   [] NICU Status - (1B)     [] MICU Status   [] Observation Status    Stroke admission order set:  [] [id number] - LEANDRO Intercerebral Hemorrhage Admission  [] [id number] - LEANDRO Sub Arachnoid Hemorrhage Admission  [] [id number] - LEANDRO Ischemic Stroke TPA Treatment Focused  [] [id number] - IP Ischemic Stroke ICU Post Alteplase (TPA) Admission   [x] [id number] - GEN Ischemic Stroke Non-Thrombolytic Focused      Plan:       Imaging  - CTH WO: Done  - MRI Brain WO: Pending  - CTA H&N  done  - ECHO  - EKG    Medications  -Continue aspirin 81 mg daily  -Hold Plavix 300 mg now then Plavix 75mg daily for at least 61Z  - Folic acid 1mg BID  -Crestor 40 mg nightly     Labs  - Fasting Lipid Panel - less than 70  - If no recent a1c, order a1c.  A1c DM Goal: <7.0%    Orders  - PT, OT, Speech eval, PMR c/s  - Telemetry   - Neuro checks per protocol  - We recommend SBP <200  - Blood glucose goal less than 180  - Please avoid dextrose containing solutions     - Discussed with Sherry Fernandez MD    Additional recommendations may follow    Please contact EV NSG or telestroke with any changes in patients neurologic status.            Javed Grace MD   7/16/2023  4:30 AM

## 2023-07-16 NOTE — PROGRESS NOTES
[facility]   [address] [facility]    Progress note             Date:   7/16/2023  Patient name:  Harry Llye  Date of admission:  7/16/2023  3:29 AM  MRN:   [id number]  Account:  [de-identified]  YOB: 1970  PCP:    Campbell Patel MD  Room:   [address] [place] [address]  Code Status:    Full Code    Chief Complaint:     Recurrent right-sided deficits and anarthria    Interval hx: The Patient was seen and examined at bedside. Patient reports episode of right-sided weakness and trouble speaking which had occurred during rehab around 2 AM on 7/16/2023. By the time EMS arrived, her symptoms had resolved. Patient was brought into the ED and reexperiencing symptoms. Symptoms resolved within 10 minutes or so. As of 15 a.m., patient had recurrence of symptoms and spontaneously resolved as well. Later in the afternoon, nurse notified Ary West with regards to recurrence of right-sided deficits. Patient underwent stat MRI as well as CT perfusion, both unremarkable. Medications Prior to Admission:     Prior to Admission medications    Medication Sig Start Date End Date Taking?  Authorizing Provider   atorvastatin (LIPITOR) 40 MG tablet Take 1 tablet by mouth daily  Patient not taking: Reported on 7/16/2023 10/24/22   Bren Mercer MD   lisinopril (PRINIVIL;ZESTRIL) 2.5 MG tablet Take 1 tablet by mouth daily  Patient not taking: Reported on 7/16/2023 10/24/22   Bren Mercer MD   QUEtiapine (SEROQUEL) 50 MG tablet Take 1 tablet by mouth nightly Indications: (2 tabs @ HS)  Patient not taking: Reported on 7/16/2023 10/24/22   Bren Mercer MD   traZODone (DESYREL) 50 MG tablet Take 1 tablet by mouth nightly as needed for Sleep  Patient not taking: Reported on 7/16/2023 5/30/22   Jamil Peterson MD   insulin glargine (LANTUS) 100 UNIT/ML injection pen Inject 60 Units into the skin nightly  Patient not taking: Reported on 7/16/2023    Historical Provider, MD

## 2023-07-16 NOTE — PROGRESS NOTES
Pt had increased weakness to rt side lasting 5 minutes then returned to baseline. Writer informed provider via perfect serve. Provider came to bedside. No new orders at this time.

## 2023-07-16 NOTE — ED NOTES
Patient is given warm blankets, family friend is updated by phone by Laura Lizarraga with permission of patient.      Ha Torres RN  07/16/23 [id number]

## 2023-07-16 NOTE — ED NOTES
The following labs were labeled with appropriate pt sticker and tubed to lab:     [x] Blue     [x] Lavender   [] on ice  [x] Green/yellow  [x] Green/black [] on ice  [] Delwyn Sheryl  [] on ice  [] Yellow  [] Red  [] Type/ Screen  [] ABG  [] VBG    [] COVID-19 swab    [] Rapid  [] PCR  [] Flu swab  [] Peds Viral Panel     [] Urine Sample  [] Fecal Sample  [] Pelvic Cultures  [] Blood Cultures  [] X 2  [] STREP Cultures       Deb Rogers RN  07/16/23 9576

## 2023-07-16 NOTE — CARE COORDINATION
LM with [facility] [address]. Requesting CB concerning pt return to [facility]. Called Fareed Barlow in ER, she gave CM [facility] number to call [phone]. Called above number, spoke to Kacey, he transferred CM to [facility], spoke Cinthya who transferred CM to nurse Sravani Quinteros. Provided Sravani Quinteros with CM number to call once she finds out what needs to be done to dc pt back to [address] [place] [address]    Call [phone] ask for Detox unit-ext [phone]  Fax DC information [phone], they will review and call back concerning pt return to [facility] Detox center.    Please include clinical notes- H & P.

## 2023-07-16 NOTE — PROGRESS NOTES
Occupational Therapy  Facility/Department: [address] BURN UNIT  Occupational Therapy Initial Assessment    Name: Arturo Fuentes  : 1970  MRN: [id number]  Date of Service: 2023  Chief Complaint   Patient presents with    Cerebrovascular Accident       Discharge Recommendations: No therapy recommended at discharge. Patient Diagnosis(es): The encounter diagnosis was Cerebrovascular accident (CVA), unspecified mechanism ([address]). Past Medical History:  has a past medical history of Back pain, Bipolar 1 disorder ([address]), Diabetes mellitus ([address]), Disc disorder, and Hypertension. Past Surgical History:  has a past surgical history that includes Cholecystectomy and back surgery. Assessment   Performance deficits / Impairments: Decreased ADL status; Decreased high-level IADLs  Prognosis: Good  Decision Making: Low Complexity  REQUIRES OT FOLLOW-UP: Yes  Activity Tolerance  Activity Tolerance: Patient Tolerated treatment well        Plan   Occupational Therapy Plan  Times Per Week: 2-3x     Restrictions  Restrictions/Precautions  Restrictions/Precautions: General Precautions, Up as Tolerated  Required Braces or Orthoses?: No  Position Activity Restriction  Other position/activity restrictions: SBP goal<200    Subjective   General  Patient assessed for rehabilitation services?: Yes  Family / Caregiver Present: No  Diagnosis: R weakness, dysarthria, undergoing detox  Subjective  Subjective: 7/10 pain level at back/L side of head  General Comment  Comments: RN approved therapy session     Social/Functional History  Social/Functional History  Lives With: Daughter  Type of Home: Apartment  Home Layout: One level  Home Access: Level entry  Bathroom Shower/Tub: Tub/Shower unit  Bathroom Toilet: Standard  Bathroom Equipment: Shower chair  Home Equipment:  (none)  ADL Assistance: Independent  Homemaking Assistance: Independent  Homemaking Responsibilities: Yes  Ambulation Assistance: Independent  Transfer

## 2023-07-16 NOTE — ED TRIAGE NOTES
Pt to ed via LS 1 for stroke alert. Pt was at [facility] when she woke up and called out for help stating she could not move her right side. Per ems last known well is last night at 2200. Pt states she woke up with slurred speech, right sided facial droop and right sided weakness. Pt states she is supposed to take blood thinners but has not taken it in a long time. Pt states she has not taken any of her prescribed meds in a long time. Pt was in detox at [facility] for cocaine, but states she also tested positive for fentanyl but states she was not aware she used fentanyl. Pt states she has no hx of stroke. Pt is alert and oriented on arrival. Stroke alert called. Ekg done. Pt in gown, on full cardiac monitor. IV placed by ems, labs drawn. Dr. Dacia Rodríguez at bedside. Will continue to monitor.

## 2023-07-17 PROBLEM — R53.1 RIGHT SIDED WEAKNESS: Status: ACTIVE | Noted: 2023-07-17

## 2023-07-17 PROBLEM — G45.9 TIA (TRANSIENT ISCHEMIC ATTACK): Status: ACTIVE | Noted: 2023-07-17

## 2023-07-17 PROBLEM — F44.9 CONVERSION DISORDER: Status: ACTIVE | Noted: 2023-07-17

## 2023-07-17 LAB
CHOLEST SERPL-MCNC: 167 MG/DL
CHOLESTEROL/HDL RATIO: 4.8
ERYTHROCYTE [DISTWIDTH] IN BLOOD BY AUTOMATED COUNT: 14.6 % (ref 11.8–14.4)
EST. AVERAGE GLUCOSE BLD GHB EST-MCNC: 243 MG/DL
HBA1C MFR BLD: 10.1 % (ref 4–6)
HCT VFR BLD AUTO: 39.1 % (ref 36.3–47.1)
HCYS SERPL-SCNC: 10.7 UMOL/L
HDLC SERPL-MCNC: 35 MG/DL
HGB BLD-MCNC: 12.9 G/DL (ref 11.9–15.1)
LDLC SERPL CALC-MCNC: 97 MG/DL (ref 0–130)
LV EF: 45 %
LVEF MODALITY: NORMAL
MCH RBC QN AUTO: 28.9 PG (ref 25.2–33.5)
MCHC RBC AUTO-ENTMCNC: 33 G/DL (ref 28.4–34.8)
MCV RBC AUTO: 87.5 FL (ref 82.6–102.9)
METER GLUCOSE: 113 MG/DL (ref 65–105)
METER GLUCOSE: 158 MG/DL (ref 65–105)
METER GLUCOSE: 241 MG/DL (ref 65–105)
METER GLUCOSE: 381 MG/DL (ref 65–105)
NRBC BLD-RTO: 0 PER 100 WBC
PLATELET # BLD AUTO: 385 K/UL (ref 138–453)
PMV BLD AUTO: 9.2 FL (ref 8.1–13.5)
RBC # BLD AUTO: 4.47 M/UL (ref 3.95–5.11)
TRIGL SERPL-MCNC: 173 MG/DL
WBC OTHER # BLD: 7.5 K/UL (ref 3.5–11.3)

## 2023-07-17 PROCEDURE — 97129 THER IVNTJ 1ST 15 MIN: CPT

## 2023-07-17 PROCEDURE — 81291 MTHFR GENE: CPT

## 2023-07-17 PROCEDURE — 2060000002 HC BURN ICU INTERMEDIATE R&B

## 2023-07-17 PROCEDURE — 85306 CLOT INHIBIT PROT S FREE: CPT

## 2023-07-17 PROCEDURE — 6360000002 HC RX W HCPCS

## 2023-07-17 PROCEDURE — 83090 ASSAY OF HOMOCYSTEINE: CPT

## 2023-07-17 PROCEDURE — 80061 LIPID PANEL: CPT

## 2023-07-17 PROCEDURE — 6370000000 HC RX 637 (ALT 250 FOR IP): Performed by: PSYCHIATRY & NEUROLOGY

## 2023-07-17 PROCEDURE — 85240 CLOT FACTOR VIII AHG 1 STAGE: CPT

## 2023-07-17 PROCEDURE — 6370000000 HC RX 637 (ALT 250 FOR IP)

## 2023-07-17 PROCEDURE — 6360000002 HC RX W HCPCS: Performed by: STUDENT IN AN ORGANIZED HEALTH CARE EDUCATION/TRAINING PROGRAM

## 2023-07-17 PROCEDURE — 83036 HEMOGLOBIN GLYCOSYLATED A1C: CPT

## 2023-07-17 PROCEDURE — 36415 COLL VENOUS BLD VENIPUNCTURE: CPT

## 2023-07-17 PROCEDURE — 93306 TTE W/DOPPLER COMPLETE: CPT

## 2023-07-17 PROCEDURE — 82947 ASSAY GLUCOSE BLOOD QUANT: CPT

## 2023-07-17 PROCEDURE — 85027 COMPLETE CBC AUTOMATED: CPT

## 2023-07-17 PROCEDURE — 99233 SBSQ HOSP IP/OBS HIGH 50: CPT | Performed by: PSYCHIATRY & NEUROLOGY

## 2023-07-17 PROCEDURE — 85307 ASSAY ACTIVATED PROTEIN C: CPT

## 2023-07-17 PROCEDURE — 85300 ANTITHROMBIN III ACTIVITY: CPT

## 2023-07-17 PROCEDURE — 81240 F2 GENE: CPT

## 2023-07-17 PROCEDURE — 81241 F5 GENE: CPT

## 2023-07-17 PROCEDURE — 85305 CLOT INHIBIT PROT S TOTAL: CPT

## 2023-07-17 RX ORDER — TRAZODONE HYDROCHLORIDE 50 MG/1
50 TABLET ORAL NIGHTLY PRN
Status: DISCONTINUED | OUTPATIENT
Start: 2023-07-17 | End: 2023-07-19 | Stop reason: HOSPADM

## 2023-07-17 RX ORDER — INSULIN LISPRO 100 [IU]/ML
0-4 INJECTION, SOLUTION INTRAVENOUS; SUBCUTANEOUS NIGHTLY
Status: DISCONTINUED | OUTPATIENT
Start: 2023-07-17 | End: 2023-07-19 | Stop reason: HOSPADM

## 2023-07-17 RX ORDER — INSULIN LISPRO 100 [IU]/ML
0-8 INJECTION, SOLUTION INTRAVENOUS; SUBCUTANEOUS
Status: DISCONTINUED | OUTPATIENT
Start: 2023-07-17 | End: 2023-07-19 | Stop reason: HOSPADM

## 2023-07-17 RX ORDER — KETOROLAC TROMETHAMINE 15 MG/ML
15 INJECTION, SOLUTION INTRAMUSCULAR; INTRAVENOUS ONCE
Status: COMPLETED | OUTPATIENT
Start: 2023-07-17 | End: 2023-07-17

## 2023-07-17 RX ADMIN — DIPHENHYDRAMINE HYDROCHLORIDE 25 MG: 50 INJECTION, SOLUTION INTRAMUSCULAR; INTRAVENOUS at 00:20

## 2023-07-17 RX ADMIN — KETOROLAC TROMETHAMINE 30 MG: 15 INJECTION, SOLUTION INTRAMUSCULAR; INTRAVENOUS at 00:15

## 2023-07-17 RX ADMIN — ACETAMINOPHEN 650 MG: 325 TABLET ORAL at 23:01

## 2023-07-17 RX ADMIN — KETOROLAC TROMETHAMINE 15 MG: 15 INJECTION, SOLUTION INTRAMUSCULAR; INTRAVENOUS at 16:54

## 2023-07-17 RX ADMIN — TRAZODONE HYDROCHLORIDE 50 MG: 50 TABLET ORAL at 23:00

## 2023-07-17 RX ADMIN — INSULIN LISPRO 1 UNITS: 100 INJECTION, SOLUTION INTRAVENOUS; SUBCUTANEOUS at 09:43

## 2023-07-17 RX ADMIN — ASPIRIN 81 MG: 81 TABLET, COATED ORAL at 09:39

## 2023-07-17 RX ADMIN — HYDRALAZINE HYDROCHLORIDE 20 MG: 20 INJECTION INTRAMUSCULAR; INTRAVENOUS at 15:47

## 2023-07-17 RX ADMIN — CLOPIDOGREL BISULFATE 75 MG: 75 TABLET, FILM COATED ORAL at 09:39

## 2023-07-17 RX ADMIN — ACETAMINOPHEN 650 MG: 325 TABLET ORAL at 18:18

## 2023-07-17 RX ADMIN — INSULIN LISPRO 8 UNITS: 100 INJECTION, SOLUTION INTRAVENOUS; SUBCUTANEOUS at 18:13

## 2023-07-17 RX ADMIN — ACETAMINOPHEN 650 MG: 325 TABLET ORAL at 13:27

## 2023-07-17 RX ADMIN — DIPHENHYDRAMINE HYDROCHLORIDE 25 MG: 50 INJECTION, SOLUTION INTRAMUSCULAR; INTRAVENOUS at 21:15

## 2023-07-17 RX ADMIN — ROSUVASTATIN CALCIUM 40 MG: 20 TABLET, FILM COATED ORAL at 21:12

## 2023-07-17 RX ADMIN — ENOXAPARIN SODIUM 40 MG: 40 INJECTION SUBCUTANEOUS at 09:39

## 2023-07-17 ASSESSMENT — PAIN SCALES - WONG BAKER
WONGBAKER_NUMERICALRESPONSE: 2
WONGBAKER_NUMERICALRESPONSE: 0;2

## 2023-07-17 ASSESSMENT — PAIN DESCRIPTION - ORIENTATION
ORIENTATION: OTHER (COMMENT)
ORIENTATION: POSTERIOR
ORIENTATION: RIGHT
ORIENTATION: POSTERIOR
ORIENTATION: POSTERIOR
ORIENTATION: UPPER

## 2023-07-17 ASSESSMENT — PAIN DESCRIPTION - LOCATION
LOCATION: HEAD
LOCATION: HAND

## 2023-07-17 ASSESSMENT — PAIN SCALES - GENERAL
PAINLEVEL_OUTOF10: 10
PAINLEVEL_OUTOF10: 9
PAINLEVEL_OUTOF10: 9
PAINLEVEL_OUTOF10: 0
PAINLEVEL_OUTOF10: 10
PAINLEVEL_OUTOF10: 0
PAINLEVEL_OUTOF10: 2
PAINLEVEL_OUTOF10: 7

## 2023-07-17 ASSESSMENT — PAIN DESCRIPTION - PAIN TYPE
TYPE: NEUROPATHIC PAIN
TYPE: NEUROPATHIC PAIN

## 2023-07-17 ASSESSMENT — PAIN DESCRIPTION - ONSET
ONSET: ON-GOING
ONSET: GRADUAL

## 2023-07-17 ASSESSMENT — PAIN - FUNCTIONAL ASSESSMENT
PAIN_FUNCTIONAL_ASSESSMENT: ACTIVITIES ARE NOT PREVENTED
PAIN_FUNCTIONAL_ASSESSMENT: ACTIVITIES ARE NOT PREVENTED

## 2023-07-17 ASSESSMENT — PAIN DESCRIPTION - DESCRIPTORS
DESCRIPTORS: ACHING;SHARP
DESCRIPTORS: ACHING;POUNDING;THROBBING
DESCRIPTORS: ACHING;DISCOMFORT
DESCRIPTORS: ACHING

## 2023-07-17 ASSESSMENT — PAIN DESCRIPTION - FREQUENCY
FREQUENCY: CONTINUOUS
FREQUENCY: CONTINUOUS

## 2023-07-17 NOTE — PLAN OF CARE
Problem: Safety - Adult  Goal: Free from fall injury  Recent Flowsheet Documentation  Taken 7/17/2023 0900 by Louisa Clifford RN  Free From Fall Injury: Instruct family/caregiver on patient safety

## 2023-07-17 NOTE — CARE COORDINATION
Consult for drug rehab, lost bed at [facility]. SW met with patient who confirmed she was at [facility] program for 7 days when admitted to hospital.  Patient admitted to cocaine use and has been clean since admission to [facility]. Patient planned to return to [facility] to continue treatment however when she called [facility] she was told \"she lost her bed\" and would have to have a new assessment to return. Patient initially unsure if she wanted to return to [facility]- drug treatment list provided to patient. Patient agreeable with this writer calling [facility]. SW called [facility] detox unit-spoke with Krystin Crockett who indicated they will need medical clearance/dc paperwork and patient will need to have assessment for re-admission to program since hospitalization, medical clearance/dc orders can be faxed to [facility] at [phone] Attn: Erlin Harper. Krystin Crockett indicated their walk-in hours for assessments are 8am- 1pm and patient will need to bring all medications with her to assessment. SW left message for [facility] admissions to see if appointment can be made for pt in the event dc is not before 1pm- awaiting return call. Patient updated on above information and confirmed she would like to return to [facility] and agreeable with hospital sending medical information/dc orders to [facility]. JERALD spoke with Erlin Harper at [facility] admissions who confirmed pt can bring in medical information/dc orders for assessment 8-1 pm, they are unable to schedule any appointments for assessments later than 1pm today. RN updated, pt currently off floor for echo.

## 2023-07-17 NOTE — PLAN OF CARE
Problem: Chronic Conditions and Co-morbidities  Goal: Patient's chronic conditions and co-morbidity symptoms are monitored and maintained or improved  7/16/2023 2308 by Reema Godfrey RN  Outcome: Progressing  7/16/2023 1542 by Naomi Maxwell RN  Outcome: Progressing     Problem: Discharge Planning  Goal: Discharge to home or other facility with appropriate resources  7/16/2023 2308 by Reema Godfrey RN  Outcome: Progressing  7/16/2023 1542 by Naomi Maxwell RN  Outcome: Progressing     Problem: Pain  Goal: Verbalizes/displays adequate comfort level or baseline comfort level  7/16/2023 2308 by Reema Godfrey RN  Outcome: Progressing  7/16/2023 1542 by Naomi Maxwell RN  Outcome: Progressing     Problem: Safety - Adult  Goal: Free from fall injury  7/16/2023 2308 by Reema Godfrey RN  Outcome: Progressing  7/16/2023 1542 by Naomi Maxwell RN  Outcome: Progressing

## 2023-07-17 NOTE — PLAN OF CARE
Problem: Chronic Conditions and Co-morbidities  Goal: Patient's chronic conditions and co-morbidity symptoms are monitored and maintained or improved  7/17/2023 0136 by Natali Gore RN  Outcome: Progressing  7/16/2023 2308 by Natali Gore RN  Outcome: Progressing  7/16/2023 1542 by Sonny Orona RN  Outcome: Progressing     Problem: Discharge Planning  Goal: Discharge to home or other facility with appropriate resources  7/17/2023 0136 by Natali Gore RN  Outcome: Progressing  7/16/2023 2308 by Natali Gore RN  Outcome: Progressing  7/16/2023 1542 by Sonny Orona RN  Outcome: Progressing     Problem: Pain  Goal: Verbalizes/displays adequate comfort level or baseline comfort level  7/17/2023 0136 by Natali Gore RN  Outcome: Progressing  7/16/2023 2308 by Natali Gore RN  Outcome: Progressing  7/16/2023 1542 by Sonny Orona RN  Outcome: Progressing     Problem: Safety - Adult  Goal: Free from fall injury  7/17/2023 0136 by Natali Gore RN  Outcome: Progressing  7/16/2023 2308 by Natali Gore RN  Outcome: Progressing  7/16/2023 1542 by Sonny Orona RN  Outcome: Progressing

## 2023-07-17 NOTE — CARE COORDINATION
Transitional Planning  Attempted to see patient for assessment, not in room. 218 pm Attempted to see patient, not in room.

## 2023-07-17 NOTE — PROGRESS NOTES
Physical Therapy        Physical Therapy Cancel Note      DATE: 2023    NAME: Pia Galindo  MRN: [id number]   : 1970      Patient not seen this date for Physical Therapy due to:    Patient independent with functional mobility. Will defer PT evaluation at this time. Pt independently ambulating off unit. Please reorder PT if future needs arise.        Electronically signed by Esther Conner PT on 2023 at 11:51 AM

## 2023-07-17 NOTE — PROGRESS NOTES
[address] [place] [address] Neurology   [address] [place] [address]    Progress note             Date:   7/17/2023  Patient name:  Kameron Gutierrez  Date of admission:  7/16/2023  3:29 AM  MRN:   [id number]  Account:  [de-identified]  YOB: 1970  PCP:    Felipe Katz MD  Room:   [address] [place] [address]  Code Status:    Full Code    Chief Complaint:     Recurrent right-sided deficits and anarthria    Interval hx: The Patient was seen and examined at bedside. No recurrence of right-sided deficits. Patient today endorsing increased social stressors. Notably, she has been living with her daughter for the past several months ever since her  passed away. Patient wants to live by herself but is unable to do so due to financial difficulties. She is also unable to find work. Medications Prior to Admission:     Prior to Admission medications    Medication Sig Start Date End Date Taking?  Authorizing Provider   atorvastatin (LIPITOR) 40 MG tablet Take 1 tablet by mouth daily  Patient not taking: Reported on 7/16/2023 10/24/22   Wei Bui MD   lisinopril (PRINIVIL;ZESTRIL) 2.5 MG tablet Take 1 tablet by mouth daily  Patient not taking: Reported on 7/16/2023 10/24/22   Wei Bui MD   QUEtiapine (SEROQUEL) 50 MG tablet Take 1 tablet by mouth nightly Indications: (2 tabs @ HS)  Patient not taking: Reported on 7/16/2023 10/24/22   Wei Bui MD   traZODone (DESYREL) 50 MG tablet Take 1 tablet by mouth nightly as needed for Sleep  Patient not taking: Reported on 7/16/2023 5/30/22   Anoop Albright MD   insulin glargine (LANTUS) 100 UNIT/ML injection pen Inject 60 Units into the skin nightly  Patient not taking: Reported on 7/16/2023    Historical Provider, MD   aspirin 81 MG tablet Take 81 mg by mouth daily  Patient not taking: Reported on 7/16/2023    Historical Provider, MD   insulin aspart (NOVOLOG) 100 UNIT/ML injection pen Inject 10 Units into the skin 3 times daily (before

## 2023-07-17 NOTE — CARE COORDINATION
Case Management Assessment  Initial Evaluation    Date/Time of Evaluation: 7/17/2023 4:21 PM  Assessment Completed by: Ralf Colin RN    If patient is discharged prior to next notation, then this note serves as note for discharge by case management. Patient Name: Francois Woo                   YOB: 1970  Diagnosis: Acute ischemic stroke [facility]) [I63.9]  Cerebrovascular accident (CVA), unspecified mechanism ([address]) [I63.9]                   Date / Time: 7/16/2023  3:29 AM    Patient Admission Status: Inpatient   Readmission Risk (Low < 19, Mod (19-27), High > 27): Readmission Risk Score: 11.8    Current PCP: Malissa Hinds MD  PCP verified by CM? Yes (Dr. Malissa Hinds.)    Chart Reviewed: Yes      History Provided by: Patient  Patient Orientation: Alert and Oriented, Person, Place, Situation    Patient Cognition: Alert    Hospitalization in the last 30 days (Readmission):  No    If yes, Readmission Assessment in CM Navigator will be completed. Advance Directives:      Code Status: Full Code   Patient's Primary Decision Maker is: Legal Next of Kin      Discharge Planning:    Patient lives with: (P) Family Members Type of Home: (P) Other (Comment) (Inpatient detox center)  Primary Care Giver: Self  Patient Support Systems include: Children   Current Financial resources: Medicaid, Medicare  Current community resources: [facility]  Current services prior to admission: (P) None            Current DME:              Type of Home Care services:  (P) None    ADLS  Prior functional level: Independent in ADLs/IADLs  Current functional level: Independent in ADLs/IADLs    PT AM-PAC:   /24  OT AM-PAC: 23 /24    Family can provide assistance at DC: No  Would you like Case Management to discuss the discharge plan with any other family members/significant others, and if so, who? No  Plans to Return to Present Housing: Other (see comment) (patient was at [facility], no longer has a bed.  Working

## 2023-07-18 LAB
METER GLUCOSE: 170 MG/DL (ref 65–105)
METER GLUCOSE: 194 MG/DL (ref 65–105)
METER GLUCOSE: 280 MG/DL (ref 65–105)
METER GLUCOSE: 314 MG/DL (ref 65–105)

## 2023-07-18 PROCEDURE — 6360000002 HC RX W HCPCS

## 2023-07-18 PROCEDURE — 6370000000 HC RX 637 (ALT 250 FOR IP)

## 2023-07-18 PROCEDURE — 2060000002 HC BURN ICU INTERMEDIATE R&B

## 2023-07-18 PROCEDURE — 82947 ASSAY GLUCOSE BLOOD QUANT: CPT

## 2023-07-18 PROCEDURE — 99232 SBSQ HOSP IP/OBS MODERATE 35: CPT | Performed by: PSYCHIATRY & NEUROLOGY

## 2023-07-18 PROCEDURE — 7100000010 HC PHASE II RECOVERY - FIRST 15 MIN

## 2023-07-18 PROCEDURE — 6370000000 HC RX 637 (ALT 250 FOR IP): Performed by: PSYCHIATRY & NEUROLOGY

## 2023-07-18 PROCEDURE — 7100000011 HC PHASE II RECOVERY - ADDTL 15 MIN

## 2023-07-18 PROCEDURE — 97535 SELF CARE MNGMENT TRAINING: CPT

## 2023-07-18 PROCEDURE — 6360000002 HC RX W HCPCS: Performed by: PSYCHIATRY & NEUROLOGY

## 2023-07-18 RX ORDER — ASPIRIN 81 MG/1
81 TABLET ORAL DAILY
Qty: 21 TABLET | Refills: 0 | Status: SHIPPED | OUTPATIENT
Start: 2023-07-19

## 2023-07-18 RX ORDER — KETOROLAC TROMETHAMINE 15 MG/ML
15 INJECTION, SOLUTION INTRAMUSCULAR; INTRAVENOUS EVERY 6 HOURS PRN
Status: DISCONTINUED | OUTPATIENT
Start: 2023-07-18 | End: 2023-07-19 | Stop reason: HOSPADM

## 2023-07-18 RX ORDER — QUETIAPINE FUMARATE 25 MG/1
50 TABLET, FILM COATED ORAL NIGHTLY PRN
Status: DISCONTINUED | OUTPATIENT
Start: 2023-07-18 | End: 2023-07-19 | Stop reason: HOSPADM

## 2023-07-18 RX ORDER — LISINOPRIL 2.5 MG/1
2.5 TABLET ORAL DAILY
Status: DISCONTINUED | OUTPATIENT
Start: 2023-07-18 | End: 2023-07-19 | Stop reason: HOSPADM

## 2023-07-18 RX ORDER — ROSUVASTATIN CALCIUM 40 MG/1
40 TABLET, COATED ORAL NIGHTLY
Qty: 30 TABLET | Refills: 3 | Status: SHIPPED | OUTPATIENT
Start: 2023-07-18

## 2023-07-18 RX ORDER — CLOPIDOGREL BISULFATE 75 MG/1
75 TABLET ORAL DAILY
Qty: 30 TABLET | Refills: 3 | Status: SHIPPED | OUTPATIENT
Start: 2023-07-19

## 2023-07-18 RX ADMIN — ROSUVASTATIN CALCIUM 40 MG: 20 TABLET, FILM COATED ORAL at 22:00

## 2023-07-18 RX ADMIN — KETOROLAC TROMETHAMINE 15 MG: 15 INJECTION, SOLUTION INTRAMUSCULAR; INTRAVENOUS at 18:14

## 2023-07-18 RX ADMIN — ASPIRIN 81 MG: 81 TABLET, COATED ORAL at 08:48

## 2023-07-18 RX ADMIN — CLOPIDOGREL BISULFATE 75 MG: 75 TABLET, FILM COATED ORAL at 08:39

## 2023-07-18 RX ADMIN — INSULIN LISPRO 6 UNITS: 100 INJECTION, SOLUTION INTRAVENOUS; SUBCUTANEOUS at 12:17

## 2023-07-18 RX ADMIN — ENOXAPARIN SODIUM 40 MG: 40 INJECTION SUBCUTANEOUS at 08:39

## 2023-07-18 RX ADMIN — ACETAMINOPHEN 650 MG: 325 TABLET ORAL at 05:34

## 2023-07-18 ASSESSMENT — PAIN DESCRIPTION - DESCRIPTORS
DESCRIPTORS: ACHING
DESCRIPTORS: ACHING;STABBING
DESCRIPTORS: ACHING

## 2023-07-18 ASSESSMENT — PAIN DESCRIPTION - ORIENTATION
ORIENTATION: POSTERIOR

## 2023-07-18 ASSESSMENT — PAIN DESCRIPTION - ONSET: ONSET: GRADUAL

## 2023-07-18 ASSESSMENT — PAIN - FUNCTIONAL ASSESSMENT
PAIN_FUNCTIONAL_ASSESSMENT: ACTIVITIES ARE NOT PREVENTED
PAIN_FUNCTIONAL_ASSESSMENT: PREVENTS OR INTERFERES SOME ACTIVE ACTIVITIES AND ADLS

## 2023-07-18 ASSESSMENT — PAIN DESCRIPTION - LOCATION
LOCATION: HEAD

## 2023-07-18 ASSESSMENT — PAIN SCALES - GENERAL
PAINLEVEL_OUTOF10: 8
PAINLEVEL_OUTOF10: 2
PAINLEVEL_OUTOF10: 9

## 2023-07-18 ASSESSMENT — PAIN DESCRIPTION - PAIN TYPE
TYPE: NEUROPATHIC PAIN
TYPE: NEUROPATHIC PAIN

## 2023-07-18 ASSESSMENT — PAIN DESCRIPTION - FREQUENCY
FREQUENCY: CONTINUOUS
FREQUENCY: CONTINUOUS

## 2023-07-18 NOTE — PROGRESS NOTES
CLINICAL PHARMACY NOTE: MEDS TO BEDS    Total # of Prescriptions Filled: 3   The following medications were delivered to the patient:  ASA 81MG   PLAVIX 75MG   CRESTOR 40MG     Additional Documentation:     [address]

## 2023-07-18 NOTE — PROGRESS NOTES
SPIRITUAL CARE DEPARTMENT - [address] [place] [address]  PROGRESS NOTE    Shift date: 7/17/2023  Shift day: Monday   Shift # 3    Room # 0163/0214-58   Name: Francois Woo                [employer]ist: [address] [place] [address] East of Lutheran: Unknown    Referral: Routine Visit    Admit Date & Time: 7/16/2023  3:29 AM    Assessment:  Francois Woo is a 52 y.o. female in the hospital because of an \"Acute Ischemic Stroke,\" per report. Upon entering the room writer observes patient resting in hospital bed, in dark room. Patient woke upon  calling her name. Intervention:   received Perfectserve message from bedside nurse, requesting a Bible for patient.  visited patient in room and introduced herself to patient.  experienced patient as fatigued and lethargic.  shared words of encouragement with patient.  shared Bible, Daily Devotional and Guidepost with patient in room.  encouraged patient to reach out to spiritual care for further support throughout her hospital stay. Per chart, patient's emergency contacts are her children, Sharla Magaña ([phone]), and Juan Edmonds ([phone]). Outcome:  Patient thanked  for visit. Plan:  Chaplains will remain available to offer spiritual and emotional support as needed.      07/17/23 2151   Encounter Summary   Service Provided For: Patient   Referral/Consult From: Nurse;Patient   Support System Children  (per chart)   Last Encounter  07/17/23   Complexity of Encounter Low   Begin Time 2151   End Time  2204   Total Time Calculated 13 min   Encounter    Type Follow up   Spiritual/Emotional needs   Type Spiritual Support   Assessment/Intervention/Outcome   Assessment Calm;Coping  (Fatigued)   Intervention Sustaining Presence/Ministry of presence   Outcome Comfort;Coping;Receptive   Plan and Referrals   Plan/Referrals Continue to visit, (comment)  (as needed)     Electronically signed by Katia Salcedo on 7/17/2023 at 11:27 PM.  [address] [facility]  [phone]

## 2023-07-18 NOTE — CARE COORDINATION
Transitional Planning  SW working with patient to go back to [facility] or [facility].

## 2023-07-18 NOTE — CARE COORDINATION
Met with pt to discuss her plans at discharge. Pt stated at first that she would like to go back to [facility]. She now is thinking she might want to go to [facility] or [facility]. Pt states that she called both and asked that SW follow up. [facility] and they do have openings. They requested some medication information be faxed. Also [facility]. Awaiting decision back from [facility].

## 2023-07-18 NOTE — PROGRESS NOTES
[facility] Neurology   [address] [facility]    Progress note             Date:   7/18/2023  Patient name:  Navdeep Cortes  Date of admission:  7/16/2023  3:29 AM  MRN:   [id number]  Account:  [de-identified]  YOB: 1970  PCP:    Leslie Luciano MD  Room:   [address] [place] [address]  Code Status:    Full Code    Chief Complaint:     Recurrent right-sided deficits and anarthria    Interval hx: The Patient was seen and examined at bedside. No recurrence of right-sided deficits. Patient today endorsing increased social stressors. Notably, she has been living with her daughter for the past several months ever since her  passed away. Patient wants to live by herself but is unable to do so due to financial difficulties. She is also unable to find work. Patient is stable to be discharged. Medications Prior to Admission:     Prior to Admission medications    Medication Sig Start Date End Date Taking? Authorizing Provider   clopidogrel (PLAVIX) 75 MG tablet Take 1 tablet by mouth daily 7/19/23  Yes Daiana Orozco, DO   aspirin 81 MG EC tablet Take 1 tablet by mouth daily 7/19/23  Yes Naldo Bang, DO   rosuvastatin (CRESTOR) 40 MG tablet Take 1 tablet by mouth nightly 7/18/23  Yes Naldo Bang, DO   lisinopril (PRINIVIL;ZESTRIL) 2.5 MG tablet Take 1 tablet by mouth daily 10/24/22   Shayy Noel MD   QUEtiapine (SEROQUEL) 50 MG tablet Take 1 tablet by mouth nightly Indications: (2 tabs @ HS) 10/24/22   Shayy Noel MD        Allergies:     Codeine and Fish-derived products    Social History:     Tobacco:    reports that she has been smoking cigarettes. She has a 6.50 pack-year smoking history. She has never used smokeless tobacco.  Alcohol:      reports no history of alcohol use. Drug Use:  reports no history of drug use. Family History:     No family history on file.     Review of Systems:     ROS:  Constitutional  Negative for fever and chills    HEENT  Negative for

## 2023-07-18 NOTE — DISCHARGE INSTR - COC
Continuity of Care Form    Patient Name: Della Wheeler   :  1970  MRN:  [id number]    Admit date:  2023  Discharge date:  ***    Code Status Order: Full Code   Advance Directives:     Admitting Physician:  Katie Castaneda MD  PCP: Amina Painter MD    Discharging Nurse: [facility] Unit/Room#: 6261/0171-28  Discharging Unit Phone Number: ***    Emergency Contact:   Extended Emergency Contact Information  Primary Emergency Contact: Lyndsey Donohue  Address: NA  Home Phone: [phone]  Mobile Phone: [phone]  Relation: Child  Secondary Emergency Contact: Alberto Gallagher  Address: NA  Home Phone: [phone]  Relation: Child    Past Surgical History:  Past Surgical History:   Procedure Laterality Date    BACK SURGERY      CHOLECYSTECTOMY         Immunization History: There is no immunization history on file for this patient. Active Problems:  Patient Active Problem List   Diagnosis Code    Migraine without aura and without status migrainosus, not intractable G43.009    Type 2 diabetes mellitus with hyperglycemia ([facility]) E11.65    Intractable migraine without status migrainosus G43.919    Hypoglycemia E16.2    Bipolar 1 disorder (HCC) F31.9    Hypertension I10    Hypoglycemia due to insulin E16.0, T38.3X5A    Type 2 diabetes mellitus ([address]) E11.9    Depression with suicidal ideation F32. A, R45.851    Major depressive disorder, recurrent, severe with psychotic features ([address]) F33.3    PTSD (post-traumatic stress disorder) F43.10    Generalized anxiety disorder with panic attacks F41.1, F41.0    Cocaine use disorder, severe, in early remission ([address]) F14.21    Chest pain R07.9    Acute ischemic stroke ([address]) I63.9    Cerebrovascular accident (CVA) ([address]) I63.9    Conversion disorder F44.9    TIA (transient ischemic attack) G45.9    Right sided weakness R53.1       Isolation/Infection:   Isolation            No Isolation          Patient Infection Status       None to display

## 2023-07-18 NOTE — PROGRESS NOTES
RN notified neuro about onset of blurry vision and black spots, and decreased sensation in left side of face. Symptoms resolved within 5 min. No new orders from neuro.

## 2023-07-18 NOTE — PROGRESS NOTES
Occupational Therapy  Facility/Department: [address] BURN UNIT  Occupational Therapy Initial Assessment    Name: Keith Barnes  : 1970  MRN: [id number]  Date of Service: 2023    Discharge Recommendations: Pt would benefit from continued therapy after discharge. Patient Diagnosis(es): The encounter diagnosis was Cerebrovascular accident (CVA), unspecified mechanism ([address]). Past Medical History:  has a past medical history of Back pain, Bipolar 1 disorder ([address]), Diabetes mellitus ([address]), Disc disorder, and Hypertension. Past Surgical History:  has a past surgical history that includes Cholecystectomy and back surgery. Assessment   Performance deficits / Impairments: Decreased ADL status; Decreased high-level IADLs  Assessment: Pt would benefit from continued acute care OT to address ab ove listed deifits. Prognosis: Good  Activity Tolerance  Activity Tolerance: Patient Tolerated treatment well        Plan   Occupational Therapy Plan  Times Per Week: 2-3x/wk     Restrictions  Restrictions/Precautions  Restrictions/Precautions: General Precautions, Up as Tolerated  Required Braces or Orthoses?: No  Position Activity Restriction  Other position/activity restrictions: SBP goal<200    Subjective   General  Patient assessed for rehabilitation services?: Yes  Family / Caregiver Present: No  Diagnosis: R weakness, dysarthria, undergoing detox  Subjective  Subjective: 8/10 pain level L side of head  General Comment  Comments: RN approved therapy session           Objective   O2 Device: None (Room air)  Safety Devices  Type of Devices: Left in bed;Nurse notified;Call light within reach; Patient at risk for falls  Bed Mobility Training  Bed Mobility Training: Yes  Overall Level of Assistance: Stand-by assistance  Interventions: Safety awareness training;Verbal cues  Rolling: Stand-by assistance; Adaptive equipment  Supine to Sit: Stand-by assistance; Adaptive equipment  Sit to Supine: Adaptive

## 2023-07-19 ENCOUNTER — APPOINTMENT (OUTPATIENT)
Dept: GENERAL RADIOLOGY | Age: 53
DRG: 069 | End: 2023-07-19
Payer: MEDICARE

## 2023-07-19 VITALS
HEIGHT: 67 IN | SYSTOLIC BLOOD PRESSURE: 154 MMHG | OXYGEN SATURATION: 96 % | RESPIRATION RATE: 15 BRPM | TEMPERATURE: 97.6 F | BODY MASS INDEX: 21.5 KG/M2 | HEART RATE: 75 BPM | WEIGHT: 137 LBS | DIASTOLIC BLOOD PRESSURE: 64 MMHG

## 2023-07-19 LAB
APCR PPP: 4.18 {RATIO}
EKG ATRIAL RATE: 60 BPM
EKG P AXIS: 73 DEGREES
EKG P-R INTERVAL: 160 MS
EKG Q-T INTERVAL: 436 MS
EKG QRS DURATION: 100 MS
EKG QTC CALCULATION (BAZETT): 436 MS
EKG R AXIS: 42 DEGREES
EKG T AXIS: 46 DEGREES
EKG VENTRICULAR RATE: 60 BPM
METER GLUCOSE: 240 MG/DL (ref 65–105)
METER GLUCOSE: 244 MG/DL (ref 65–105)
PROT S FREE AG ACT/NOR PPP IA: 82 % (ref 55–123)
TROPONIN I SERPL HS-MCNC: 11 NG/L (ref 0–14)

## 2023-07-19 PROCEDURE — 6360000002 HC RX W HCPCS: Performed by: PSYCHIATRY & NEUROLOGY

## 2023-07-19 PROCEDURE — 84484 ASSAY OF TROPONIN QUANT: CPT

## 2023-07-19 PROCEDURE — 6360000002 HC RX W HCPCS

## 2023-07-19 PROCEDURE — 93005 ELECTROCARDIOGRAM TRACING: CPT | Performed by: STUDENT IN AN ORGANIZED HEALTH CARE EDUCATION/TRAINING PROGRAM

## 2023-07-19 PROCEDURE — 97129 THER IVNTJ 1ST 15 MIN: CPT

## 2023-07-19 PROCEDURE — 36415 COLL VENOUS BLD VENIPUNCTURE: CPT

## 2023-07-19 PROCEDURE — 6370000000 HC RX 637 (ALT 250 FOR IP)

## 2023-07-19 PROCEDURE — 6370000000 HC RX 637 (ALT 250 FOR IP): Performed by: PSYCHIATRY & NEUROLOGY

## 2023-07-19 PROCEDURE — 93010 ELECTROCARDIOGRAM REPORT: CPT | Performed by: INTERNAL MEDICINE

## 2023-07-19 PROCEDURE — 82947 ASSAY GLUCOSE BLOOD QUANT: CPT

## 2023-07-19 PROCEDURE — 71045 X-RAY EXAM CHEST 1 VIEW: CPT

## 2023-07-19 RX ADMIN — ACETAMINOPHEN 650 MG: 325 TABLET ORAL at 08:47

## 2023-07-19 RX ADMIN — INSULIN LISPRO 2 UNITS: 100 INJECTION, SOLUTION INTRAVENOUS; SUBCUTANEOUS at 08:42

## 2023-07-19 RX ADMIN — LISINOPRIL 2.5 MG: 2.5 TABLET ORAL at 08:41

## 2023-07-19 RX ADMIN — ASPIRIN 81 MG: 81 TABLET, COATED ORAL at 08:41

## 2023-07-19 RX ADMIN — ENOXAPARIN SODIUM 40 MG: 40 INJECTION SUBCUTANEOUS at 08:41

## 2023-07-19 RX ADMIN — TRAZODONE HYDROCHLORIDE 50 MG: 50 TABLET ORAL at 01:56

## 2023-07-19 RX ADMIN — CLOPIDOGREL BISULFATE 75 MG: 75 TABLET, FILM COATED ORAL at 08:51

## 2023-07-19 RX ADMIN — KETOROLAC TROMETHAMINE 15 MG: 15 INJECTION, SOLUTION INTRAMUSCULAR; INTRAVENOUS at 05:25

## 2023-07-19 RX ADMIN — ACETAMINOPHEN 650 MG: 325 TABLET ORAL at 01:55

## 2023-07-19 ASSESSMENT — PAIN - FUNCTIONAL ASSESSMENT
PAIN_FUNCTIONAL_ASSESSMENT: PREVENTS OR INTERFERES SOME ACTIVE ACTIVITIES AND ADLS
PAIN_FUNCTIONAL_ASSESSMENT: PREVENTS OR INTERFERES WITH MANY ACTIVE NOT PASSIVE ACTIVITIES
PAIN_FUNCTIONAL_ASSESSMENT: PREVENTS OR INTERFERES WITH MANY ACTIVE NOT PASSIVE ACTIVITIES
PAIN_FUNCTIONAL_ASSESSMENT: PREVENTS OR INTERFERES SOME ACTIVE ACTIVITIES AND ADLS

## 2023-07-19 ASSESSMENT — PAIN SCALES - GENERAL
PAINLEVEL_OUTOF10: 7
PAINLEVEL_OUTOF10: 3
PAINLEVEL_OUTOF10: 8
PAINLEVEL_OUTOF10: 10
PAINLEVEL_OUTOF10: 0
PAINLEVEL_OUTOF10: 0

## 2023-07-19 ASSESSMENT — PAIN DESCRIPTION - PAIN TYPE
TYPE: OTHER (COMMENT)
TYPE: ACUTE PAIN
TYPE: NEUROPATHIC PAIN

## 2023-07-19 ASSESSMENT — PAIN DESCRIPTION - FREQUENCY
FREQUENCY: CONTINUOUS

## 2023-07-19 ASSESSMENT — PAIN DESCRIPTION - ORIENTATION
ORIENTATION: RIGHT;LEFT
ORIENTATION: RIGHT;LEFT
ORIENTATION: POSTERIOR
ORIENTATION: POSTERIOR

## 2023-07-19 ASSESSMENT — PAIN SCALES - WONG BAKER
WONGBAKER_NUMERICALRESPONSE: 0
WONGBAKER_NUMERICALRESPONSE: 0

## 2023-07-19 ASSESSMENT — PAIN DESCRIPTION - ONSET
ONSET: ON-GOING
ONSET: ON-GOING
ONSET: GRADUAL

## 2023-07-19 ASSESSMENT — PAIN DESCRIPTION - LOCATION
LOCATION: HEAD
LOCATION: CHEST;HEAD;ARM
LOCATION: CHEST;ARM;HEAD
LOCATION: HEAD

## 2023-07-19 ASSESSMENT — PAIN DESCRIPTION - DESCRIPTORS
DESCRIPTORS: DISCOMFORT
DESCRIPTORS: DISCOMFORT
DESCRIPTORS: ACHING;DISCOMFORT
DESCRIPTORS: ACHING;DISCOMFORT

## 2023-07-19 NOTE — PLAN OF CARE
Problem: Chronic Conditions and Co-morbidities  Goal: Patient's chronic conditions and co-morbidity symptoms are monitored and maintained or improved  7/18/2023 2348 by José Miguel Ivey RN  Outcome: Progressing  7/18/2023 1837 by Natali Victoria RN  Outcome: Progressing     Problem: Discharge Planning  Goal: Discharge to home or other facility with appropriate resources  7/18/2023 2348 by José Miguel Ivey RN  Outcome: Progressing  7/18/2023 1837 by Natali Victoria RN  Outcome: Progressing     Problem: Pain  Goal: Verbalizes/displays adequate comfort level or baseline comfort level  7/18/2023 2348 by José Miguel Ivey RN  Outcome: Progressing  7/18/2023 1837 by Natali Victoria RN  Outcome: Progressing     Problem: Safety - Adult  Goal: Free from fall injury  7/18/2023 2348 by José Miguel Ivey RN  Outcome: Progressing  7/18/2023 1837 by Natali Victorai RN  Outcome: Progressing     Problem: Skin/Tissue Integrity  Goal: Absence of new skin breakdown  Description: 1. Monitor for areas of redness and/or skin breakdown  2. Assess vascular access sites hourly  3. Every 4-6 hours minimum:  Change oxygen saturation probe site  4. Every 4-6 hours:  If on nasal continuous positive airway pressure, respiratory therapy assess nares and determine need for appliance change or resting period.   7/18/2023 2348 by José Miguel Ivey RN  Outcome: Progressing  7/18/2023 1837 by Natali Victoria RN  Outcome: Progressing

## 2023-07-19 NOTE — CARE COORDINATION
PHQ-9  Met with pt this date to assess for support and needs. Pt states she lives with her tr. Pt has 2 sons, 3 dtrs and 6 grandchildren. All live close by and are very supportive. Pt has no suicidal ideations or feelings of depression. Patient Health Questionnaire-9 (PHQ-9)    Over the last 2 weeks, how often have you been bothered by any of the following problems? 1. Little Interest or pleasure in doing things? [x] Not at all  [] Several Days  [] More than half the day  []  Nearly every day    2. Feeling down, depressed or hopeless? [x] Not at all  [] Several Days  [] More than half the day  []  Nearly every day    3. Trouble falling or staying asleep, or sleeping too much? [x] Not at all  [] Several Days  [] More than half the day  []  Nearly every day    4. Feeling tired or having little energy? [x] Not at all  [] Several Days  [] More than half the day  []  Nearly every day    5. Poor apettite or overeating? [x] Not at all  [] Several Days  [] More than half the day  []  Nearly every day    6. Feeling bad about yourself-or that you are a failure or have let yourself or your family down? [x] Not at all  [] Several Days  [] More than half the day  []  Nearly every day    7. Trouble concentrating on things, such as reading the newspaper or watching television? [x] Not at all  [] Several Days  [] More than half the day  []  Nearly every day    8. Moving or speaking so slowly that other people could have noticed? Or the opposite-being so fidgety or restless that you have been moving around a lot more than usual?   [x] Not at all  [] Several Days  [] More than half the day  []  Nearly every day    9. Thoughts that you would be better off dead or of hurting yourself in some way?    [x] Not at all  [] Several Days  [] More than half the day  []  Nearly every day    Total Score: 0    If you checked off any problems, how difficult have these problems made it for you to do your work, take care of

## 2023-07-19 NOTE — PROGRESS NOTES
Patient was given discharge instructions. Patient was given meds to bed medications. Patient educated to take medication as prescribed. Patient educated to follow up with PCP after discharge. Patient education on Plavix anticoagulant. Nazanin with social work set up patients transportation to [facility] center to kompany and then to patients home. Patient was offered to be wheeled to main entrance of hospital per unit staff but denied. Patient states she will wait for cab outside. Patients questions were answered in full. Patient was discharged safely with all personal belongings.

## 2023-07-19 NOTE — CARE COORDINATION
[facility] called and cannot take pt. Met with pt and she is continues to want to pursue Recovery housing. Called [facility] and spoke to [employer]. She stated that they can take pt today but need to be at [facility] today at 10:30am.  Explained that we will be unable to guanaco her there by 10:30am.  She stated that she can take pt tomorrow/Thursday at 11:00am.  Pt is agreeable to this plan as she needs to go to [facility] and  her belongings. Pt also spoke to [facility] to complete assessment via phone.

## 2023-07-20 LAB
AT III ACT/NOR PPP CHRO: 101 % (ref 83–122)
EKG ATRIAL RATE: 65 BPM
EKG P AXIS: 75 DEGREES
EKG P-R INTERVAL: 154 MS
EKG Q-T INTERVAL: 432 MS
EKG QRS DURATION: 86 MS
EKG QTC CALCULATION (BAZETT): 449 MS
EKG R AXIS: 49 DEGREES
EKG T AXIS: 44 DEGREES
EKG VENTRICULAR RATE: 65 BPM
FACTOR VIII ACTIVITY: 79 % (ref 50–150)
PROT S AG ACT/NOR PPP IA: 146 % (ref 63–126)

## 2023-07-20 PROCEDURE — 93010 ELECTROCARDIOGRAM REPORT: CPT | Performed by: INTERNAL MEDICINE

## 2023-07-21 LAB
F5 P.R506Q BLD/T QL: NEGATIVE
MTHFR INTERPRETATION: NORMAL
MTHFR MUTATION A1286C: NORMAL
MTHFR MUTATION C665T: NEGATIVE
SPECIMEN SOURCE: NORMAL
SPECIMEN: NORMAL

## 2023-07-23 LAB
F2 C.20210G>A GENO BLD/T: NEGATIVE
SPECIMEN SOURCE: NORMAL

## 2023-08-06 ENCOUNTER — APPOINTMENT (OUTPATIENT)
Dept: CT IMAGING | Age: 53
End: 2023-08-06
Payer: MEDICARE

## 2023-08-06 ENCOUNTER — APPOINTMENT (OUTPATIENT)
Dept: GENERAL RADIOLOGY | Age: 53
End: 2023-08-06
Payer: MEDICARE

## 2023-08-06 ENCOUNTER — HOSPITAL ENCOUNTER (EMERGENCY)
Age: 53
Discharge: HOME | End: 2023-08-06
Attending: EMERGENCY MEDICINE
Payer: MEDICARE

## 2023-08-06 VITALS
OXYGEN SATURATION: 98 % | DIASTOLIC BLOOD PRESSURE: 55 MMHG | SYSTOLIC BLOOD PRESSURE: 119 MMHG | HEART RATE: 61 BPM | TEMPERATURE: 98.4 F | RESPIRATION RATE: 14 BRPM

## 2023-08-06 DIAGNOSIS — R29.90 STROKE-LIKE SYMPTOMS: Primary | ICD-10-CM

## 2023-08-06 LAB
ANION GAP SERPL CALCULATED.3IONS-SCNC: 11 MMOL/L (ref 9–17)
BASOPHILS # BLD: 0.03 K/UL (ref 0–0.2)
BASOPHILS NFR BLD: 0 % (ref 0–2)
BNP SERPL-MCNC: 43 PG/ML
BUN BLD-MCNC: 17 MG/DL (ref 8–26)
BUN SERPL-MCNC: 16 MG/DL (ref 6–20)
CA-I BLD-SCNC: 1.22 MMOL/L (ref 1.15–1.33)
CALCIUM SERPL-MCNC: 9.1 MG/DL (ref 8.6–10.4)
CHLORIDE BLD-SCNC: 101 MMOL/L (ref 98–107)
CHLORIDE SERPL-SCNC: 101 MMOL/L (ref 98–107)
CK SERPL-CCNC: 140 U/L (ref 26–192)
CO2 BLD CALC-SCNC: 27 MMOL/L (ref 22–30)
CO2 SERPL-SCNC: 23 MMOL/L (ref 20–31)
CREAT SERPL-MCNC: 0.7 MG/DL (ref 0.5–0.9)
EGFR, POC: >60 ML/MIN/1.73M2
EOSINOPHIL # BLD: 0.28 K/UL (ref 0–0.44)
EOSINOPHILS RELATIVE PERCENT: 4 % (ref 1–4)
ERYTHROCYTE [DISTWIDTH] IN BLOOD BY AUTOMATED COUNT: 14.4 % (ref 11.8–14.4)
GFR SERPL CREATININE-BSD FRML MDRD: >60 ML/MIN/1.73M2
GLUCOSE BLD-MCNC: 343 MG/DL (ref 74–100)
GLUCOSE SERPL-MCNC: 322 MG/DL (ref 70–99)
HCO3 VENOUS: 26.9 MMOL/L (ref 22–29)
HCT VFR BLD AUTO: 41 % (ref 36.3–47.1)
HCT VFR BLD AUTO: 48 % (ref 36–46)
HGB BLD-MCNC: 14 G/DL (ref 11.9–15.1)
IMM GRANULOCYTES # BLD AUTO: <0.03 K/UL (ref 0–0.3)
IMM GRANULOCYTES NFR BLD: 0 %
INR PPP: 1
LYMPHOCYTES NFR BLD: 4.03 K/UL (ref 1.1–3.7)
LYMPHOCYTES RELATIVE PERCENT: 55 % (ref 24–43)
MAGNESIUM SERPL-MCNC: 2.3 MG/DL (ref 1.6–2.6)
MCH RBC QN AUTO: 29.8 PG (ref 25.2–33.5)
MCHC RBC AUTO-ENTMCNC: 34.1 G/DL (ref 28.4–34.8)
MCV RBC AUTO: 87.2 FL (ref 82.6–102.9)
METER GLUCOSE: 295 MG/DL (ref 65–105)
MONOCYTES NFR BLD: 0.42 K/UL (ref 0.1–1.2)
MONOCYTES NFR BLD: 6 % (ref 3–12)
MYOGLOBIN SERPL-MCNC: 30 NG/ML (ref 25–58)
NEUTROPHILS NFR BLD: 35 % (ref 36–65)
NEUTS SEG NFR BLD: 2.59 K/UL (ref 1.5–8.1)
NRBC BLD-RTO: 0 PER 100 WBC
O2 SAT, VEN: 18.5 % (ref 60–85)
PARTIAL THROMBOPLASTIN TIME: 26.8 SEC (ref 23–36.5)
PCO2, VEN: 47.9 MM HG (ref 41–51)
PH VENOUS: 7.36 (ref 7.32–7.43)
PLATELET # BLD AUTO: 315 K/UL (ref 138–453)
PMV BLD AUTO: 9.2 FL (ref 8.1–13.5)
PO2, VEN: 15.4 MM HG (ref 30–50)
POC ANION GAP: 11 MMOL/L (ref 7–16)
POC CREATININE: 0.6 MG/DL (ref 0.51–1.19)
POC HEMOGLOBIN (CALC): 16.3 G/DL (ref 12–16)
POC LACTIC ACID: 1.1 MMOL/L (ref 0.56–1.39)
POSITIVE BASE EXCESS, VEN: 0.6 MMOL/L (ref 0–3)
POTASSIUM BLD-SCNC: 4.2 MMOL/L (ref 3.5–4.5)
POTASSIUM SERPL-SCNC: 4.4 MMOL/L (ref 3.7–5.3)
PROTHROMBIN TIME: 12.7 SEC (ref 11.7–14.9)
RBC # BLD AUTO: 4.7 M/UL (ref 3.95–5.11)
SODIUM BLD-SCNC: 138 MMOL/L (ref 138–146)
SODIUM SERPL-SCNC: 135 MMOL/L (ref 135–144)
TROPONIN I SERPL HS-MCNC: 10 NG/L (ref 0–14)
TROPONIN I SERPL HS-MCNC: 10 NG/L (ref 0–14)
WBC OTHER # BLD: 7.4 K/UL (ref 3.5–11.3)

## 2023-08-06 PROCEDURE — 96365 THER/PROPH/DIAG IV INF INIT: CPT

## 2023-08-06 PROCEDURE — 70498 CT ANGIOGRAPHY NECK: CPT

## 2023-08-06 PROCEDURE — 6360000002 HC RX W HCPCS

## 2023-08-06 PROCEDURE — 82330 ASSAY OF CALCIUM: CPT

## 2023-08-06 PROCEDURE — 99285 EMERGENCY DEPT VISIT HI MDM: CPT

## 2023-08-06 PROCEDURE — 82565 ASSAY OF CREATININE: CPT

## 2023-08-06 PROCEDURE — 85730 THROMBOPLASTIN TIME PARTIAL: CPT

## 2023-08-06 PROCEDURE — 93005 ELECTROCARDIOGRAM TRACING: CPT

## 2023-08-06 PROCEDURE — 6360000004 HC RX CONTRAST MEDICATION: Performed by: EMERGENCY MEDICINE

## 2023-08-06 PROCEDURE — 71045 X-RAY EXAM CHEST 1 VIEW: CPT

## 2023-08-06 PROCEDURE — 82553 CREATINE MB FRACTION: CPT

## 2023-08-06 PROCEDURE — 83874 ASSAY OF MYOGLOBIN: CPT

## 2023-08-06 PROCEDURE — 96366 THER/PROPH/DIAG IV INF ADDON: CPT

## 2023-08-06 PROCEDURE — 82550 ASSAY OF CK (CPK): CPT

## 2023-08-06 PROCEDURE — 84484 ASSAY OF TROPONIN QUANT: CPT

## 2023-08-06 PROCEDURE — 83605 ASSAY OF LACTIC ACID: CPT

## 2023-08-06 PROCEDURE — 84520 ASSAY OF UREA NITROGEN: CPT

## 2023-08-06 PROCEDURE — 85610 PROTHROMBIN TIME: CPT

## 2023-08-06 PROCEDURE — 82947 ASSAY GLUCOSE BLOOD QUANT: CPT

## 2023-08-06 PROCEDURE — 70450 CT HEAD/BRAIN W/O DYE: CPT

## 2023-08-06 PROCEDURE — 96375 TX/PRO/DX INJ NEW DRUG ADDON: CPT

## 2023-08-06 PROCEDURE — 85025 COMPLETE CBC W/AUTO DIFF WBC: CPT

## 2023-08-06 PROCEDURE — 83880 ASSAY OF NATRIURETIC PEPTIDE: CPT

## 2023-08-06 PROCEDURE — 80048 BASIC METABOLIC PNL TOTAL CA: CPT

## 2023-08-06 PROCEDURE — 83735 ASSAY OF MAGNESIUM: CPT

## 2023-08-06 PROCEDURE — 82803 BLOOD GASES ANY COMBINATION: CPT

## 2023-08-06 PROCEDURE — 85014 HEMATOCRIT: CPT

## 2023-08-06 PROCEDURE — 80051 ELECTROLYTE PANEL: CPT

## 2023-08-06 RX ORDER — ONDANSETRON 2 MG/ML
4 INJECTION INTRAMUSCULAR; INTRAVENOUS ONCE
Status: COMPLETED | OUTPATIENT
Start: 2023-08-06 | End: 2023-08-06

## 2023-08-06 RX ORDER — PROCHLORPERAZINE EDISYLATE 5 MG/ML
10 INJECTION INTRAMUSCULAR; INTRAVENOUS ONCE
Status: COMPLETED | OUTPATIENT
Start: 2023-08-06 | End: 2023-08-06

## 2023-08-06 RX ORDER — TOPIRAMATE 25 MG/1
25 TABLET ORAL DAILY
Qty: 30 TABLET | Refills: 0 | Status: SHIPPED | OUTPATIENT
Start: 2023-08-06 | End: 2023-09-05

## 2023-08-06 RX ORDER — DIPHENHYDRAMINE HYDROCHLORIDE 50 MG/ML
25 INJECTION INTRAMUSCULAR; INTRAVENOUS ONCE
Status: COMPLETED | OUTPATIENT
Start: 2023-08-06 | End: 2023-08-06

## 2023-08-06 RX ORDER — MAGNESIUM SULFATE IN WATER 40 MG/ML
2000 INJECTION, SOLUTION INTRAVENOUS ONCE
Status: COMPLETED | OUTPATIENT
Start: 2023-08-06 | End: 2023-08-06

## 2023-08-06 RX ADMIN — DIPHENHYDRAMINE HYDROCHLORIDE 25 MG: 50 INJECTION INTRAMUSCULAR; INTRAVENOUS at 01:36

## 2023-08-06 RX ADMIN — MAGNESIUM SULFATE HEPTAHYDRATE 2000 MG: 40 INJECTION, SOLUTION INTRAVENOUS at 03:19

## 2023-08-06 RX ADMIN — PROCHLORPERAZINE EDISYLATE 10 MG: 5 INJECTION INTRAMUSCULAR; INTRAVENOUS at 01:37

## 2023-08-06 RX ADMIN — ONDANSETRON 4 MG: 2 INJECTION INTRAMUSCULAR; INTRAVENOUS at 01:38

## 2023-08-06 RX ADMIN — IOPAMIDOL 90 ML: 755 INJECTION, SOLUTION INTRAVENOUS at 01:20

## 2023-08-06 ASSESSMENT — ENCOUNTER SYMPTOMS
TROUBLE SWALLOWING: 0
CHEST TIGHTNESS: 0
COUGH: 0
NAUSEA: 1
PHOTOPHOBIA: 0
BACK PAIN: 0
VOMITING: 0
ABDOMINAL PAIN: 0
SORE THROAT: 0
SHORTNESS OF BREATH: 1
SINUS PAIN: 0

## 2023-08-06 NOTE — CONSULTS
cocktail and if needed IV magnesium  - Discharge on Topamax 25 mg daily until neurology follow-up      - Discussed with Barbara Paris MD    Additional recommendations may follow    Please contact [facility] or telestroke with any changes in patients neurologic status.          Malik Nice MD   8/6/2023  1:19 AM

## 2023-08-06 NOTE — DISCHARGE INSTRUCTIONS
You were seen in the ED tonight for headache and strokelike symptoms. We obtained CT imaging of your head and CT imaging of your neck to look at the vessels in your neck and brain, your imaging was significant for stenosis of one of the large vessels in your neck, however this was unchanged from your prior CT imaging on your recent admission. We administered pain medications to control your headache symptoms, your headache improved with these medications. Given your symptoms of stroke, we recommended admission, however you are wanting to be discharged at this time. Please come back to the ED if you continue to have these symptoms or if they worsen in any way, as you will likely need an MRI for further evaluation. We discussed this in detail with you as part of our informed discharge. Neurology is requesting that you be prescribed Topamax which is a medication that will help prevent migraines. Please take this every day. You are also prescribed aspirin, Plavix, rosuvastatin upon your recent discharge following her stroke. Please take these every day as prescribed to help prevent any new strokes. You will be arranged to follow-up with the neurologist in clinic after discharge. Please follow-up with them as soon as possible. Please return to the ED if you have any worsening symptoms of your headache, notice any changes in the nature of the headache, experience worsening of your strokelike symptoms including numbness, tingling, increasing weakness, or any other worrisome symptoms. Again, it is crucial that you return to the ED if you have any worsening symptoms, as you will need an MRI as the next step in evaluation of your stroke-like symptoms.

## 2023-08-06 NOTE — PROGRESS NOTES
[facility] CARE DEPARTMENT - [address]     Emergency/Trauma Note    PATIENT NAME: Abdifatah Magallanes    Shift date: 8/05/2023  Shift day: Saturday   Shift # 3    Room # 21/21   Name: Abdifatah Magallanes            Age: 52 y.o. Gender: female          Mormon: [id number] [facility] [address] of Spiritism: Unknown    Trauma/Incident type: Stroke Alert Non-Critical  Admit Date & Time: 8/6/2023 12:36 AM  TRAUMA NAME: N/A    ADVANCE DIRECTIVES IN CHART? No    NAME OF DECISION MAKER: Unknown    RELATIONSHIP OF DECISION MAKER TO PATIENT: Unknown    PATIENT/EVENT DESCRIPTION:  Abdifatah Magallanes is a 52 y.o. female who arrived to [address] and was paged out as a \"Stroke Alert Non-Critical.\" Per report, patient was Aric Da Silva known well at 6 pm.\" Patient was resting on her side, visiting with ED Registration, when  visited. Pt to be admitted to 21/21. SPIRITUAL ASSESSMENT-INTERVENTION-OUTCOME:   responded to page and gathered patient information outside room.  introduced herself to patient in room and sat down in chair beside hospital bed. Patient shared that she came to the hospital due to a \"Bad Headache\" and \"Numbness\" that traveled down her arm. Patient indicated that no one is aware of her hospitalization.  offered to reach out to family, however, patient declined.  encouraged patient to reach out to spiritual care if she changes her mind regarding contacting family. Patient indicated no further needs at time of visit. Patient thanked  for visit and care. PATIENT BELONGINGS:  No belongings noted    ANY BELONGINGS OF SIGNIFICANT VALUE NOTED:  N/A    REGISTRATION STAFF NOTIFIED? Yes      WHAT IS YOUR SPIRITUAL CARE PLAN FOR THIS PATIENT?:  Chaplains can make follow-up visit, per request. Katie Ariasjyotsna can be reached 24/7 via [employer].      08/06/23 0115   Encounter Summary   Service Provided For: Patient   Referral/Consult From: Multi-disciplinary team  (Stroke Alert Non-Critical)   Support

## 2023-08-06 NOTE — ED PROVIDER NOTES
[facility] ED  Emergency Department Encounter  Emergency Medicine Resident     Pt Name:Mikael Tapia  MRN: [id number]  [address] [place] 1970  Date of evaluation: 8/6/23  PCP:  Felipe Katz MD  Note Started: 12:38 AM EDT      CHIEF COMPLAINT       Chief Complaint   Patient presents with    Headache       HISTORY OF PRESENT ILLNESS  (Location/Symptom, Timing/Onset, Context/Setting, Quality, Duration, Modifying Factors, Severity.)      Kameron Gutierrez is a 52 y.o. female who presents with headache that started around 6 PM this evening. Patient states the headache is unlike her prior migraines, the headache starts in the back of her head, associated with numbness of the right side of the face, pain that radiates down into her right arm and chest, numbness of her right leg. Also associated with nausea, however no vomiting. Patient also states she is feeling like she is having \"shallow breaths\" but denies feeling like she is having trouble breathing. She also complaining of lightheadedness, blurred vision. Denies any trauma to the head, no falls. Of note, patient was recently admitted to the hospital on July 16 for concern for stroke, MRI brain and CT perfusion scans were negative. Patient states that his symptoms feel similar to when she was admitted for the strokelike symptoms. She was discharged on aspirin, Plavix, rosuvastatin, the patient is unsure if she has been taking his medications. At this time patient denies any fever, abdominal pain, vomiting, trouble having bowel movements, trouble urinating, leg swelling. PAST MEDICAL / SURGICAL / SOCIAL / FAMILY HISTORY      has a past medical history of Back pain, Bipolar 1 disorder ([address]), Diabetes mellitus ([address]), Disc disorder, and Hypertension. has a past surgical history that includes Cholecystectomy and back surgery.       Social History     Socioeconomic History    Marital status: Single     Spouse name: Not on file

## 2023-08-07 LAB
EKG ATRIAL RATE: 80 BPM
EKG P AXIS: 75 DEGREES
EKG P-R INTERVAL: 158 MS
EKG Q-T INTERVAL: 416 MS
EKG QRS DURATION: 94 MS
EKG QTC CALCULATION (BAZETT): 479 MS
EKG R AXIS: 31 DEGREES
EKG T AXIS: 58 DEGREES
EKG VENTRICULAR RATE: 80 BPM

## 2023-08-07 PROCEDURE — 93010 ELECTROCARDIOGRAM REPORT: CPT | Performed by: INTERNAL MEDICINE

## 2023-08-13 ENCOUNTER — HOSPITAL ENCOUNTER (EMERGENCY)
Age: 53
Discharge: HOME | End: 2023-08-13
Attending: EMERGENCY MEDICINE
Payer: MEDICARE

## 2023-08-13 VITALS
WEIGHT: 138.67 LBS | OXYGEN SATURATION: 100 % | DIASTOLIC BLOOD PRESSURE: 85 MMHG | HEIGHT: 67 IN | SYSTOLIC BLOOD PRESSURE: 146 MMHG | BODY MASS INDEX: 21.76 KG/M2 | RESPIRATION RATE: 13 BRPM | TEMPERATURE: 98.4 F | HEART RATE: 69 BPM

## 2023-08-13 DIAGNOSIS — R51.9 ACUTE NONINTRACTABLE HEADACHE, UNSPECIFIED HEADACHE TYPE: ICD-10-CM

## 2023-08-13 DIAGNOSIS — Z79.899 MEDICATION MANAGEMENT: Primary | ICD-10-CM

## 2023-08-13 DIAGNOSIS — R73.9 HYPERGLYCEMIA: ICD-10-CM

## 2023-08-13 LAB
ALBUMIN SERPL-MCNC: 4.2 G/DL (ref 3.5–5.2)
ALBUMIN/GLOB SERPL: 1.4 {RATIO} (ref 1–2.5)
ALP SERPL-CCNC: 102 U/L (ref 35–104)
ALT SERPL-CCNC: 10 U/L (ref 5–33)
ANION GAP SERPL CALCULATED.3IONS-SCNC: 8 MMOL/L (ref 9–17)
AST SERPL-CCNC: 12 U/L
BASOPHILS # BLD: 0.04 K/UL (ref 0–0.2)
BASOPHILS NFR BLD: 1 % (ref 0–2)
BILIRUB SERPL-MCNC: 0.2 MG/DL (ref 0.3–1.2)
BODY TEMPERATURE: 37
BUN BLD-MCNC: 13 MG/DL (ref 8–26)
BUN SERPL-MCNC: 13 MG/DL (ref 6–20)
CA-I BLD-SCNC: 1.28 MMOL/L (ref 1.15–1.33)
CALCIUM SERPL-MCNC: 9.2 MG/DL (ref 8.6–10.4)
CHLORIDE BLD-SCNC: 100 MMOL/L (ref 98–107)
CHLORIDE SERPL-SCNC: 97 MMOL/L (ref 98–107)
CO2 BLD CALC-SCNC: 30 MMOL/L (ref 22–30)
CO2 SERPL-SCNC: 23 MMOL/L (ref 20–31)
COHGB MFR BLD: 8.1 % (ref 0–5)
CREAT SERPL-MCNC: 0.7 MG/DL (ref 0.5–0.9)
EGFR, POC: >60 ML/MIN/1.73M2
EOSINOPHIL # BLD: 0.28 K/UL (ref 0–0.44)
EOSINOPHILS RELATIVE PERCENT: 4 % (ref 1–4)
ERYTHROCYTE [DISTWIDTH] IN BLOOD BY AUTOMATED COUNT: 14.6 % (ref 11.8–14.4)
FIO2 ON VENT: ABNORMAL %
GFR SERPL CREATININE-BSD FRML MDRD: >60 ML/MIN/1.73M2
GLUCOSE BLD-MCNC: 220 MG/DL (ref 65–105)
GLUCOSE BLD-MCNC: 288 MG/DL (ref 65–105)
GLUCOSE BLD-MCNC: 439 MG/DL (ref 74–100)
GLUCOSE SERPL-MCNC: 409 MG/DL (ref 70–99)
HCO3 VENOUS: 26.8 MMOL/L (ref 24–30)
HCO3 VENOUS: 29.5 MMOL/L (ref 22–29)
HCT VFR BLD AUTO: 43.5 % (ref 36.3–47.1)
HCT VFR BLD AUTO: 49 % (ref 36–46)
HGB BLD-MCNC: 14.4 G/DL (ref 11.9–15.1)
IMM GRANULOCYTES # BLD AUTO: <0.03 K/UL (ref 0–0.3)
IMM GRANULOCYTES NFR BLD: 0 %
LYMPHOCYTES NFR BLD: 3.56 K/UL (ref 1.1–3.7)
LYMPHOCYTES RELATIVE PERCENT: 44 % (ref 24–43)
MAGNESIUM SERPL-MCNC: 2.3 MG/DL (ref 1.6–2.6)
MCH RBC QN AUTO: 29 PG (ref 25.2–33.5)
MCHC RBC AUTO-ENTMCNC: 33.1 G/DL (ref 28.4–34.8)
MCV RBC AUTO: 87.5 FL (ref 82.6–102.9)
MONOCYTES NFR BLD: 0.44 K/UL (ref 0.1–1.2)
MONOCYTES NFR BLD: 6 % (ref 3–12)
NEGATIVE BASE EXCESS, VEN: 0.5 MMOL/L (ref 0–2)
NEUTROPHILS NFR BLD: 45 % (ref 36–65)
NEUTS SEG NFR BLD: 3.51 K/UL (ref 1.5–8.1)
NRBC BLD-RTO: 0 PER 100 WBC
O2 SAT, VEN: 30.7 % (ref 60–85)
O2 SAT, VEN: 79.5 % (ref 60–85)
PCO2, VEN: 55.9 MM HG (ref 41–51)
PCO2, VEN: 56.8 MM HG (ref 39–55)
PH VENOUS: 7.29 (ref 7.32–7.42)
PH VENOUS: 7.33 (ref 7.32–7.43)
PHOSPHATE SERPL-MCNC: 3.3 MG/DL (ref 2.6–4.5)
PLATELET # BLD AUTO: 336 K/UL (ref 138–453)
PMV BLD AUTO: 9.6 FL (ref 8.1–13.5)
PO2, VEN: 21.4 MM HG (ref 30–50)
PO2, VEN: 46.7 MM HG (ref 30–50)
POC ANION GAP: 9 MMOL/L (ref 7–16)
POC CREATININE: 0.6 MG/DL (ref 0.51–1.19)
POC HEMOGLOBIN (CALC): 16.8 G/DL (ref 12–16)
POC LACTIC ACID: 1.2 MMOL/L (ref 0.56–1.39)
POSITIVE BASE EXCESS, VEN: 1.9 MMOL/L (ref 0–3)
POTASSIUM BLD-SCNC: 4.2 MMOL/L (ref 3.5–4.5)
POTASSIUM SERPL-SCNC: 3.9 MMOL/L (ref 3.7–5.3)
PROT SERPL-MCNC: 7.1 G/DL (ref 6.4–8.3)
RBC # BLD AUTO: 4.97 M/UL (ref 3.95–5.11)
RBC # BLD: ABNORMAL 10*6/UL
SODIUM BLD-SCNC: 138 MMOL/L (ref 138–146)
SODIUM SERPL-SCNC: 128 MMOL/L (ref 135–144)
TROPONIN I SERPL HS-MCNC: 11 NG/L (ref 0–14)
WBC OTHER # BLD: 7.9 K/UL (ref 3.5–11.3)

## 2023-08-13 PROCEDURE — 93005 ELECTROCARDIOGRAM TRACING: CPT | Performed by: STUDENT IN AN ORGANIZED HEALTH CARE EDUCATION/TRAINING PROGRAM

## 2023-08-13 PROCEDURE — 83735 ASSAY OF MAGNESIUM: CPT

## 2023-08-13 PROCEDURE — 80053 COMPREHEN METABOLIC PANEL: CPT

## 2023-08-13 PROCEDURE — 80051 ELECTROLYTE PANEL: CPT

## 2023-08-13 PROCEDURE — 6370000000 HC RX 637 (ALT 250 FOR IP): Performed by: STUDENT IN AN ORGANIZED HEALTH CARE EDUCATION/TRAINING PROGRAM

## 2023-08-13 PROCEDURE — 99284 EMERGENCY DEPT VISIT MOD MDM: CPT

## 2023-08-13 PROCEDURE — 84100 ASSAY OF PHOSPHORUS: CPT

## 2023-08-13 PROCEDURE — 83605 ASSAY OF LACTIC ACID: CPT

## 2023-08-13 PROCEDURE — 82805 BLOOD GASES W/O2 SATURATION: CPT

## 2023-08-13 PROCEDURE — 84484 ASSAY OF TROPONIN QUANT: CPT

## 2023-08-13 PROCEDURE — 82803 BLOOD GASES ANY COMBINATION: CPT

## 2023-08-13 PROCEDURE — 85025 COMPLETE CBC W/AUTO DIFF WBC: CPT

## 2023-08-13 PROCEDURE — 84520 ASSAY OF UREA NITROGEN: CPT

## 2023-08-13 PROCEDURE — 82947 ASSAY GLUCOSE BLOOD QUANT: CPT

## 2023-08-13 PROCEDURE — 85014 HEMATOCRIT: CPT

## 2023-08-13 PROCEDURE — 2580000003 HC RX 258: Performed by: STUDENT IN AN ORGANIZED HEALTH CARE EDUCATION/TRAINING PROGRAM

## 2023-08-13 PROCEDURE — 6360000002 HC RX W HCPCS: Performed by: STUDENT IN AN ORGANIZED HEALTH CARE EDUCATION/TRAINING PROGRAM

## 2023-08-13 PROCEDURE — 36415 COLL VENOUS BLD VENIPUNCTURE: CPT

## 2023-08-13 PROCEDURE — 96372 THER/PROPH/DIAG INJ SC/IM: CPT

## 2023-08-13 PROCEDURE — 96365 THER/PROPH/DIAG IV INF INIT: CPT

## 2023-08-13 PROCEDURE — 82565 ASSAY OF CREATININE: CPT

## 2023-08-13 PROCEDURE — 82330 ASSAY OF CALCIUM: CPT

## 2023-08-13 RX ORDER — DEXTROSE MONOHYDRATE 100 MG/ML
INJECTION, SOLUTION INTRAVENOUS CONTINUOUS PRN
Status: DISCONTINUED | OUTPATIENT
Start: 2023-08-13 | End: 2023-08-13 | Stop reason: HOSPADM

## 2023-08-13 RX ORDER — MAGNESIUM SULFATE IN WATER 40 MG/ML
2000 INJECTION, SOLUTION INTRAVENOUS ONCE
Status: COMPLETED | OUTPATIENT
Start: 2023-08-13 | End: 2023-08-13

## 2023-08-13 RX ORDER — INSULIN GLARGINE 100 [IU]/ML
10 INJECTION, SOLUTION SUBCUTANEOUS NIGHTLY
COMMUNITY

## 2023-08-13 RX ORDER — INSULIN LISPRO 100 [IU]/ML
10 INJECTION, SOLUTION INTRAVENOUS; SUBCUTANEOUS ONCE
Status: COMPLETED | OUTPATIENT
Start: 2023-08-13 | End: 2023-08-13

## 2023-08-13 RX ORDER — INSULIN LISPRO 100 [IU]/ML
10 INJECTION, SOLUTION INTRAVENOUS; SUBCUTANEOUS DAILY
COMMUNITY
End: 2023-08-13

## 2023-08-13 RX ORDER — INSULIN ASPART 100 [IU]/ML
INJECTION, SUSPENSION SUBCUTANEOUS 2 TIMES DAILY WITH MEALS
COMMUNITY

## 2023-08-13 RX ORDER — ACETAMINOPHEN 500 MG
1000 TABLET ORAL ONCE
Status: COMPLETED | OUTPATIENT
Start: 2023-08-13 | End: 2023-08-13

## 2023-08-13 RX ORDER — 0.9 % SODIUM CHLORIDE 0.9 %
1000 INTRAVENOUS SOLUTION INTRAVENOUS ONCE
Status: COMPLETED | OUTPATIENT
Start: 2023-08-13 | End: 2023-08-13

## 2023-08-13 RX ADMIN — INSULIN LISPRO 10 UNITS: 100 INJECTION, SOLUTION INTRAVENOUS; SUBCUTANEOUS at 16:39

## 2023-08-13 RX ADMIN — MAGNESIUM SULFATE HEPTAHYDRATE 2000 MG: 40 INJECTION, SOLUTION INTRAVENOUS at 14:12

## 2023-08-13 RX ADMIN — ACETAMINOPHEN 1000 MG: 500 TABLET ORAL at 14:14

## 2023-08-13 RX ADMIN — SODIUM CHLORIDE 1000 ML: 9 INJECTION, SOLUTION INTRAVENOUS at 14:11

## 2023-08-13 ASSESSMENT — PAIN - FUNCTIONAL ASSESSMENT
PAIN_FUNCTIONAL_ASSESSMENT: NONE - DENIES PAIN
PAIN_FUNCTIONAL_ASSESSMENT: 0-10

## 2023-08-13 ASSESSMENT — PAIN SCALES - GENERAL
PAINLEVEL_OUTOF10: 9
PAINLEVEL_OUTOF10: 8

## 2023-08-13 ASSESSMENT — PAIN DESCRIPTION - LOCATION: LOCATION: HEAD

## 2023-08-13 NOTE — DISCHARGE INSTRUCTIONS
Take your medication as indicated and prescribed. Check your blood sugar at minimum 2 - 3 times per day and write down the results to take to your physician    [address] [place] [address] for worsening symptoms, persistent elevated blood sugar, low blood sugar, or if you develop any concerning symptoms such as: high fever not relieved by acetaminophen (Tylenol) and/or ibuprofen (Motrin / Advil), chills, shortness of breath, chest pain, feeling of your heart fluttering or racing, persistent nausea and/or vomiting, vomiting up blood, blood in your stool, numbness, loss of consciousness, weakness or tingling in the arms or legs or change in color of the extremities, changes in mental status, persistent headache, blurry vision, loss of bladder / bowel control, unable to follow up with your physician, or other any other care or concern.

## 2023-08-13 NOTE — ED NOTES
Patient has a small run of V-tach seen on monitor. Dr. Emily Shelby notified. Patient denies chest pain, SOB or any symptoms.       Michelle Graham RN  08/13/23 8980 Caio Lutz RN  08/13/23 1349

## 2023-08-14 LAB
EKG ATRIAL RATE: 73 BPM
EKG P AXIS: 59 DEGREES
EKG P-R INTERVAL: 162 MS
EKG Q-T INTERVAL: 408 MS
EKG QRS DURATION: 96 MS
EKG QTC CALCULATION (BAZETT): 449 MS
EKG R AXIS: 16 DEGREES
EKG T AXIS: 30 DEGREES
EKG VENTRICULAR RATE: 73 BPM

## 2023-08-14 PROCEDURE — 93010 ELECTROCARDIOGRAM REPORT: CPT | Performed by: INTERNAL MEDICINE

## 2023-08-14 NOTE — ED PROVIDER NOTES
[address] HANDOFF     4:16 PM EDT  Handoff taken on the following patient from prior Attending Physician:  Pt Name: David Aquino  PCP:  Go Dey MD    Attestation  I was available and discussed any additional care issues that arose and coordinated the management plans with the resident(s) caring for the patient during my duty period. Any areas of disagreement with resident's documentation of care or procedures are noted on the chart. I was personally present for the key portions of any/all procedures during my duty period. I have documented in the chart those procedures where I was not present during the key portions.          CHIEF COMPLAINT       Chief Complaint   Patient presents with    Blood Sugar Problem     Out of insulin          CURRENT MEDICATIONS     Previous Medications  Previous Medications    ASPIRIN 81 MG EC TABLET    Take 1 tablet by mouth daily    CLOPIDOGREL (PLAVIX) 75 MG TABLET    Take 1 tablet by mouth daily    INSULIN ASPART PROTAMINE-INSULIN ASPART (NOVOLOG MIX 70/30 FLEXPEN) INJECTION PEN    Inject into the skin 2 times daily (with meals)    INSULIN GLARGINE (LANTUS) 100 UNIT/ML INJECTION VIAL    Inject 10 Units into the skin nightly    LISINOPRIL (PRINIVIL;ZESTRIL) 2.5 MG TABLET    Take 1 tablet by mouth daily    QUETIAPINE (SEROQUEL) 50 MG TABLET    Take 1 tablet by mouth nightly Indications: (2 tabs @ HS)    ROSUVASTATIN (CRESTOR) 40 MG TABLET    Take 1 tablet by mouth nightly    TOPIRAMATE (TOPAMAX) 25 MG TABLET    Take 1 tablet by mouth daily       Encounter Medications  Orders Placed This Encounter   Medications    sodium chloride 0.9 % bolus 1,000 mL    acetaminophen (TYLENOL) tablet 1,000 mg    magnesium sulfate 2000 mg in 50 mL IVPB premix    insulin lispro (HUMALOG) injection vial 10 Units    glucose chewable tablet 16 g    OR Linked Order Group     dextrose bolus 10% 125 mL     dextrose bolus 10% 250 mL    glucagon (rDNA) injection 1
[address] ED  Emergency Department Encounter  Emergency Medicine Resident     Pt Name:Mikael Sauceda  MRN: [id number]  [address] [facility] 1970  Date of evaluation: 8/13/23  PCP:  Rexann Dance, MD  Note Started: 1:57 PM EDT    CHIEF COMPLAINT       Chief Complaint   Patient presents with    Blood Sugar Problem     Out of insulin      HISTORY OF PRESENT ILLNESS  (Location/Symptom, Timing/Onset, Context/Setting, Quality, Duration, Modifying Factors, Severity.)      Ivory Carrero is a 52 y.o. female who presents with elevated blood glucose as well as a headache. Patient states that she has been out of her short acting insulin for several months because her insurance changed and they are no longer covering it. She states that she tried to pick it up and it was over $300. She has been taking her long-acting Lantus. PAST MEDICAL / SURGICAL / SOCIAL / FAMILY HISTORY      has a past medical history of Back pain, Bipolar 1 disorder ([address]), Diabetes mellitus ([address]), Disc disorder, and Hypertension. has a past surgical history that includes Cholecystectomy and back surgery.     Social History     Socioeconomic History    Marital status: Single     Spouse name: Not on file    Number of children: Not on file    Years of education: Not on file    Highest education level: Not on file   Occupational History    Not on file   Tobacco Use    Smoking status: Every Day     Packs/day: 0.50     Years: 13.00     Pack years: 6.50     Types: Cigarettes    Smokeless tobacco: Never   Substance and Sexual Activity    Alcohol use: No    Drug use: Not Currently     Types: Cocaine     Comment: 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    Sexual activity: Not on file   Other Topics Concern    Not on file   Social History Narrative    Not on file     Social Determinants of Health     Financial Resource Strain: Not on file   Food Insecurity: Not on file   Transportation Needs: Not on file   Physical
nondistended. Mucous membranes are moist.  Impression is hyperglycemia and potential electrolyte derangements secondary to this. Fluids, labs, social work, insulin. Amount and/or Complexity of Data Reviewed  External Data Reviewed: labs and notes. Labs: ordered. ECG/medicine tests: ordered. Risk  OTC drugs. Prescription drug management. Diagnosis or treatment significantly limited by social determinants of health.       EKG Interpretation    Interpreted by me    Rhythm: normal sinus   Rate: normal  Axis: normal  Ectopy: none  Conduction: normal  ST Segments: no acute change  T Waves: no acute change  Q Waves: none    Clinical Impression: no acute changes and normal EKG    Alok Patrick MD, Prabhu Giraldo  Attending Emergency Physician        Divine Bingham MD  08/13/23 4335
salivary and thyroid glands. BONES: No acute osseous abnormality. CTA HEAD: ANTERIOR CIRCULATION: Short segment stenosis of moderate degree is present at the anterior aspect of the intra cavernous portion of the left ICA. Otherwise, no significant stenosis of the intracranial internal carotid, anterior cerebral, or middle cerebral arteries. No aneurysm. POSTERIOR CIRCULATION: No significant stenosis of the vertebral, basilar, or posterior cerebral arteries. No aneurysm. OTHER: No dural venous sinus thrombosis on this non-dedicated study. BRAIN: Please refer to the report for the dedicated noncontrast head CT. Short segment stenosis of moderate degree the left ICA at to anterior aspect of the intra cavernous portion. Short segment stenosis versus dissection of the left vertebral artery as it exits the C2 transverse foramen. Mild centrilobular emphysema. MRI brain without contrast    Result Date: 7/16/2023  EXAMINATION: MRI OF THE BRAIN WITHOUT CONTRAST  7/16/2023 1:27 pm TECHNIQUE: Multiplanar multisequence MRI of the brain was performed without the administration of intravenous contrast. COMPARISON: None. HISTORY: ORDERING SYSTEM PROVIDED HISTORY: R sided weakness, stroke workup TECHNOLOGIST PROVIDED HISTORY: R sided weakness, stroke workup Decision Support Exception - unselect if not a suspected or confirmed emergency medical condition->Emergency Medical Condition (MA) Reason for Exam: R sided weakness, stroke workup FINDINGS: INTRACRANIAL STRUCTURES/VENTRICLES: There is no acute infarct. No mass effect or midline shift. No evidence of an acute intracranial hemorrhage. The ventricles and sulci are normal in size and configuration. The sellar/suprasellar regions appear unremarkable. The normal signal voids within the major intracranial vessels appear maintained. ORBITS: The visualized portion of the orbits demonstrate no acute abnormality.  SINUSES: The visualized paranasal sinuses and mastoid air cells

## 2023-08-16 ENCOUNTER — HOSPITAL ENCOUNTER (EMERGENCY)
Age: 53
Discharge: HOME | End: 2023-08-16
Attending: EMERGENCY MEDICINE
Payer: MEDICARE

## 2023-08-16 VITALS
DIASTOLIC BLOOD PRESSURE: 91 MMHG | HEART RATE: 92 BPM | TEMPERATURE: 98.1 F | SYSTOLIC BLOOD PRESSURE: 178 MMHG | OXYGEN SATURATION: 100 % | RESPIRATION RATE: 18 BRPM

## 2023-08-16 DIAGNOSIS — R73.9 HYPERGLYCEMIA: Primary | ICD-10-CM

## 2023-08-16 LAB
ANION GAP SERPL CALCULATED.3IONS-SCNC: 11 MMOL/L (ref 9–17)
B-OH-BUTYR SERPL-MCNC: 0.11 MMOL/L (ref 0.02–0.27)
BASOPHILS # BLD: 0.04 K/UL (ref 0–0.2)
BASOPHILS NFR BLD: 1 % (ref 0–2)
BUN BLD-MCNC: 14 MG/DL (ref 8–26)
BUN BLD-MCNC: 16 MG/DL (ref 8–26)
BUN SERPL-MCNC: 15 MG/DL (ref 6–20)
CA-I BLD-SCNC: 1.16 MMOL/L (ref 1.15–1.33)
CA-I BLD-SCNC: 1.29 MMOL/L (ref 1.15–1.33)
CALCIUM SERPL-MCNC: 9.7 MG/DL (ref 8.6–10.4)
CHLORIDE BLD-SCNC: 101 MMOL/L (ref 98–107)
CHLORIDE BLD-SCNC: 106 MMOL/L (ref 98–107)
CHLORIDE SERPL-SCNC: 99 MMOL/L (ref 98–107)
CO2 BLD CALC-SCNC: 25 MMOL/L (ref 22–30)
CO2 BLD CALC-SCNC: 29 MMOL/L (ref 22–30)
CO2 SERPL-SCNC: 26 MMOL/L (ref 20–31)
CREAT SERPL-MCNC: 0.7 MG/DL (ref 0.5–0.9)
EGFR, POC: >60 ML/MIN/1.73M2
EGFR, POC: >60 ML/MIN/1.73M2
EOSINOPHIL # BLD: 0.29 K/UL (ref 0–0.44)
EOSINOPHILS RELATIVE PERCENT: 4 % (ref 1–4)
ERYTHROCYTE [DISTWIDTH] IN BLOOD BY AUTOMATED COUNT: 14.6 % (ref 11.8–14.4)
GFR SERPL CREATININE-BSD FRML MDRD: >60 ML/MIN/1.73M2
GLUCOSE BLD-MCNC: 237 MG/DL (ref 74–100)
GLUCOSE BLD-MCNC: 303 MG/DL (ref 65–105)
GLUCOSE BLD-MCNC: 321 MG/DL (ref 74–100)
GLUCOSE SERPL-MCNC: 293 MG/DL (ref 70–99)
HCO3 VENOUS: 24.5 MMOL/L (ref 22–29)
HCO3 VENOUS: 28.3 MMOL/L (ref 22–29)
HCT VFR BLD AUTO: 44 % (ref 36.3–47.1)
HCT VFR BLD AUTO: 46 % (ref 36–46)
HCT VFR BLD AUTO: 50 % (ref 36–46)
HGB BLD-MCNC: 14.6 G/DL (ref 11.9–15.1)
IMM GRANULOCYTES # BLD AUTO: <0.03 K/UL (ref 0–0.3)
IMM GRANULOCYTES NFR BLD: 0 %
LYMPHOCYTES NFR BLD: 3.17 K/UL (ref 1.1–3.7)
LYMPHOCYTES RELATIVE PERCENT: 40 % (ref 24–43)
MCH RBC QN AUTO: 29.1 PG (ref 25.2–33.5)
MCHC RBC AUTO-ENTMCNC: 33.2 G/DL (ref 28.4–34.8)
MCV RBC AUTO: 87.6 FL (ref 82.6–102.9)
MONOCYTES NFR BLD: 0.57 K/UL (ref 0.1–1.2)
MONOCYTES NFR BLD: 7 % (ref 3–12)
NEGATIVE BASE EXCESS, VEN: 0.4 MMOL/L (ref 0–2)
NEUTROPHILS NFR BLD: 48 % (ref 36–65)
NEUTS SEG NFR BLD: 3.79 K/UL (ref 1.5–8.1)
NRBC BLD-RTO: 0 PER 100 WBC
O2 SAT, VEN: 26.2 % (ref 60–85)
O2 SAT, VEN: 28.1 % (ref 60–85)
PCO2, VEN: 40.1 MM HG (ref 41–51)
PCO2, VEN: 57 MM HG (ref 41–51)
PH VENOUS: 7.3 (ref 7.32–7.43)
PH VENOUS: 7.39 (ref 7.32–7.43)
PLATELET # BLD AUTO: 319 K/UL (ref 138–453)
PMV BLD AUTO: 9.5 FL (ref 8.1–13.5)
PO2, VEN: 18.6 MM HG (ref 30–50)
PO2, VEN: 20 MM HG (ref 30–50)
POC ANION GAP: 10 MMOL/L (ref 7–16)
POC ANION GAP: 9 MMOL/L (ref 7–16)
POC CREATININE: 0.4 MG/DL (ref 0.51–1.19)
POC CREATININE: 0.4 MG/DL (ref 0.51–1.19)
POC HEMOGLOBIN (CALC): 15.5 G/DL (ref 12–16)
POC HEMOGLOBIN (CALC): 16.9 G/DL (ref 12–16)
POC LACTIC ACID: 0.8 MMOL/L (ref 0.56–1.39)
POC LACTIC ACID: 1.1 MMOL/L (ref 0.56–1.39)
POSITIVE BASE EXCESS, VEN: 0.4 MMOL/L (ref 0–3)
POTASSIUM BLD-SCNC: 4.5 MMOL/L (ref 3.5–4.5)
POTASSIUM BLD-SCNC: 5.2 MMOL/L (ref 3.5–4.5)
POTASSIUM SERPL-SCNC: 4.4 MMOL/L (ref 3.7–5.3)
RBC # BLD AUTO: 5.02 M/UL (ref 3.95–5.11)
RBC # BLD: ABNORMAL 10*6/UL
SODIUM BLD-SCNC: 138 MMOL/L (ref 138–146)
SODIUM BLD-SCNC: 139 MMOL/L (ref 138–146)
SODIUM SERPL-SCNC: 136 MMOL/L (ref 135–144)
WBC OTHER # BLD: 7.9 K/UL (ref 3.5–11.3)

## 2023-08-16 PROCEDURE — 82330 ASSAY OF CALCIUM: CPT

## 2023-08-16 PROCEDURE — 80048 BASIC METABOLIC PNL TOTAL CA: CPT

## 2023-08-16 PROCEDURE — 83605 ASSAY OF LACTIC ACID: CPT

## 2023-08-16 PROCEDURE — 85014 HEMATOCRIT: CPT

## 2023-08-16 PROCEDURE — 96361 HYDRATE IV INFUSION ADD-ON: CPT | Performed by: EMERGENCY MEDICINE

## 2023-08-16 PROCEDURE — 84520 ASSAY OF UREA NITROGEN: CPT

## 2023-08-16 PROCEDURE — 80051 ELECTROLYTE PANEL: CPT

## 2023-08-16 PROCEDURE — 96360 HYDRATION IV INFUSION INIT: CPT | Performed by: EMERGENCY MEDICINE

## 2023-08-16 PROCEDURE — 82565 ASSAY OF CREATININE: CPT

## 2023-08-16 PROCEDURE — 82010 KETONE BODYS QUAN: CPT

## 2023-08-16 PROCEDURE — 2580000003 HC RX 258: Performed by: EMERGENCY MEDICINE

## 2023-08-16 PROCEDURE — 99284 EMERGENCY DEPT VISIT MOD MDM: CPT | Performed by: EMERGENCY MEDICINE

## 2023-08-16 PROCEDURE — 85025 COMPLETE CBC W/AUTO DIFF WBC: CPT

## 2023-08-16 PROCEDURE — 82803 BLOOD GASES ANY COMBINATION: CPT

## 2023-08-16 PROCEDURE — 82947 ASSAY GLUCOSE BLOOD QUANT: CPT

## 2023-08-16 RX ORDER — 0.9 % SODIUM CHLORIDE 0.9 %
1000 INTRAVENOUS SOLUTION INTRAVENOUS ONCE
Status: COMPLETED | OUTPATIENT
Start: 2023-08-16 | End: 2023-08-16

## 2023-08-16 RX ADMIN — SODIUM CHLORIDE 1000 ML: 9 INJECTION, SOLUTION INTRAVENOUS at 11:16

## 2023-08-16 NOTE — DISCHARGE INSTRUCTIONS
Please be sure to get your short acting medication today or call your pharmacy. Return to ER for additional concerns.

## 2023-08-16 NOTE — ED NOTES
The following labs were labeled with appropriate pt sticker and tubed to lab:     [x] Blue     [x] Lavender   [] on ice  [x] Green/yellow  [] Green/black [] on ice  [] Mardella Varela  [] on ice  [] Yellow  [] Red  [] Type/ Screen  [] ABG  [] VBG    [] COVID-19 swab    [] Rapid  [] PCR  [] Flu swab  [] Peds Viral Panel     [] Urine Sample  [] Fecal Sample  [] Pelvic Cultures  [] Blood Cultures  [] X 2  [] STREP Cultures       Haley Francisco RN  08/16/23 1064

## 2023-08-16 NOTE — ED NOTES
Pt presents to the ER via triage ambulatory. Pt c/o headache & nausea. Pt states her BS at home was 440. Pt states not being able to get her insulin medications at home. Nausea and headache started this morning. Pt otherwise in NAD, pt A&O x4.            Eb Calvert RN  08/16/23 1319

## 2023-12-19 ENCOUNTER — HOSPITAL ENCOUNTER (OUTPATIENT)
Age: 53
Setting detail: OBSERVATION
Discharge: HOME | End: 2023-12-20
Attending: EMERGENCY MEDICINE | Admitting: EMERGENCY MEDICINE
Payer: MEDICARE

## 2023-12-19 ENCOUNTER — APPOINTMENT (OUTPATIENT)
Dept: GENERAL RADIOLOGY | Age: 53
End: 2023-12-19
Payer: MEDICARE

## 2023-12-19 ENCOUNTER — APPOINTMENT (OUTPATIENT)
Dept: CT IMAGING | Age: 53
End: 2023-12-19
Payer: MEDICARE

## 2023-12-19 DIAGNOSIS — I69.30 SEQUELA, POST-STROKE: ICD-10-CM

## 2023-12-19 DIAGNOSIS — R42 DIZZINESS: ICD-10-CM

## 2023-12-19 DIAGNOSIS — G45.9 TIA (TRANSIENT ISCHEMIC ATTACK): Primary | ICD-10-CM

## 2023-12-19 DIAGNOSIS — R26.81 UNSTEADY GAIT: ICD-10-CM

## 2023-12-19 LAB
AMPHET UR QL SCN: NEGATIVE
ANION GAP SERPL CALCULATED.3IONS-SCNC: 12 MMOL/L (ref 9–17)
BARBITURATES UR QL SCN: NEGATIVE
BASOPHILS # BLD: 0.04 K/UL (ref 0–0.2)
BASOPHILS NFR BLD: 0 % (ref 0–2)
BENZODIAZ UR QL: NEGATIVE
BUN BLD-MCNC: 14 MG/DL (ref 8–26)
BUN SERPL-MCNC: 13 MG/DL (ref 6–20)
CA-I BLD-SCNC: 1.24 MMOL/L (ref 1.15–1.33)
CALCIUM SERPL-MCNC: 9.3 MG/DL (ref 8.6–10.4)
CANNABINOIDS UR QL SCN: NEGATIVE
CHLORIDE BLD-SCNC: 101 MMOL/L (ref 98–107)
CHLORIDE SERPL-SCNC: 98 MMOL/L (ref 98–107)
CO2 BLD CALC-SCNC: 26 MMOL/L (ref 22–30)
CO2 SERPL-SCNC: 24 MMOL/L (ref 20–31)
COCAINE UR QL SCN: POSITIVE
CREAT SERPL-MCNC: 0.7 MG/DL (ref 0.5–0.9)
EGFR, POC: >60 ML/MIN/1.73M2
EOSINOPHIL # BLD: 0.23 K/UL (ref 0–0.44)
EOSINOPHILS RELATIVE PERCENT: 3 % (ref 1–4)
ERYTHROCYTE [DISTWIDTH] IN BLOOD BY AUTOMATED COUNT: 14.6 % (ref 11.8–14.4)
FENTANYL UR QL: NEGATIVE
GFR SERPL CREATININE-BSD FRML MDRD: >60 ML/MIN/1.73M2
GLUCOSE BLD-MCNC: 307 MG/DL (ref 65–105)
GLUCOSE BLD-MCNC: 346 MG/DL (ref 65–105)
GLUCOSE BLD-MCNC: 350 MG/DL (ref 65–105)
GLUCOSE BLD-MCNC: 400 MG/DL (ref 65–105)
GLUCOSE BLD-MCNC: 465 MG/DL (ref 74–100)
GLUCOSE SERPL-MCNC: 436 MG/DL (ref 70–99)
HCO3 VENOUS: 26.3 MMOL/L (ref 22–29)
HCT VFR BLD AUTO: 46.2 % (ref 36.3–47.1)
HCT VFR BLD AUTO: 51 % (ref 36–46)
HGB BLD-MCNC: 15.2 G/DL (ref 11.9–15.1)
IMM GRANULOCYTES # BLD AUTO: 0.04 K/UL (ref 0–0.3)
IMM GRANULOCYTES NFR BLD: 0 %
LYMPHOCYTES NFR BLD: 3.45 K/UL (ref 1.1–3.7)
LYMPHOCYTES RELATIVE PERCENT: 37 % (ref 24–43)
MCH RBC QN AUTO: 28.5 PG (ref 25.2–33.5)
MCHC RBC AUTO-ENTMCNC: 32.9 G/DL (ref 28.4–34.8)
MCV RBC AUTO: 86.7 FL (ref 82.6–102.9)
METHADONE UR QL: NEGATIVE
MONOCYTES NFR BLD: 0.73 K/UL (ref 0.1–1.2)
MONOCYTES NFR BLD: 8 % (ref 3–12)
NEUTROPHILS NFR BLD: 52 % (ref 36–65)
NEUTS SEG NFR BLD: 4.76 K/UL (ref 1.5–8.1)
NRBC BLD-RTO: 0 PER 100 WBC
O2 SAT, VEN: 56.3 % (ref 60–85)
OPIATES UR QL SCN: NEGATIVE
OXYCODONE UR QL SCN: NEGATIVE
PCO2, VEN: 43.6 MM HG (ref 41–51)
PCP UR QL SCN: NEGATIVE
PH VENOUS: 7.39 (ref 7.32–7.43)
PLATELET # BLD AUTO: 385 K/UL (ref 138–453)
PMV BLD AUTO: 9.6 FL (ref 8.1–13.5)
PO2, VEN: 30 MM HG (ref 30–50)
POC ANION GAP: 11 MMOL/L (ref 7–16)
POC CREATININE: 0.5 MG/DL (ref 0.51–1.19)
POC HEMOGLOBIN (CALC): 17.4 G/DL (ref 12–16)
POC LACTIC ACID: 1.5 MMOL/L (ref 0.56–1.39)
POSITIVE BASE EXCESS, VEN: 0.8 MMOL/L (ref 0–3)
POTASSIUM BLD-SCNC: 4.1 MMOL/L (ref 3.5–4.5)
POTASSIUM SERPL-SCNC: 4 MMOL/L (ref 3.7–5.3)
RBC # BLD AUTO: 5.33 M/UL (ref 3.95–5.11)
RBC # BLD: ABNORMAL 10*6/UL
SODIUM BLD-SCNC: 137 MMOL/L (ref 138–146)
SODIUM SERPL-SCNC: 134 MMOL/L (ref 135–144)
TEST INFORMATION: ABNORMAL
TROPONIN I SERPL HS-MCNC: 17 NG/L (ref 0–14)
TROPONIN I SERPL HS-MCNC: 7 NG/L (ref 0–14)
WBC OTHER # BLD: 9.3 K/UL (ref 3.5–11.3)

## 2023-12-19 PROCEDURE — 84520 ASSAY OF UREA NITROGEN: CPT

## 2023-12-19 PROCEDURE — 84484 ASSAY OF TROPONIN QUANT: CPT

## 2023-12-19 PROCEDURE — 99223 1ST HOSP IP/OBS HIGH 75: CPT | Performed by: PSYCHIATRY & NEUROLOGY

## 2023-12-19 PROCEDURE — 85025 COMPLETE CBC W/AUTO DIFF WBC: CPT

## 2023-12-19 PROCEDURE — 80051 ELECTROLYTE PANEL: CPT

## 2023-12-19 PROCEDURE — 82947 ASSAY GLUCOSE BLOOD QUANT: CPT

## 2023-12-19 PROCEDURE — 85014 HEMATOCRIT: CPT

## 2023-12-19 PROCEDURE — 82565 ASSAY OF CREATININE: CPT

## 2023-12-19 PROCEDURE — 80048 BASIC METABOLIC PNL TOTAL CA: CPT

## 2023-12-19 PROCEDURE — 96374 THER/PROPH/DIAG INJ IV PUSH: CPT

## 2023-12-19 PROCEDURE — 82330 ASSAY OF CALCIUM: CPT

## 2023-12-19 PROCEDURE — 70498 CT ANGIOGRAPHY NECK: CPT

## 2023-12-19 PROCEDURE — 82803 BLOOD GASES ANY COMBINATION: CPT

## 2023-12-19 PROCEDURE — 99285 EMERGENCY DEPT VISIT HI MDM: CPT

## 2023-12-19 PROCEDURE — 6370000000 HC RX 637 (ALT 250 FOR IP)

## 2023-12-19 PROCEDURE — 71045 X-RAY EXAM CHEST 1 VIEW: CPT

## 2023-12-19 PROCEDURE — G0378 HOSPITAL OBSERVATION PER HR: HCPCS

## 2023-12-19 PROCEDURE — 70450 CT HEAD/BRAIN W/O DYE: CPT

## 2023-12-19 PROCEDURE — 6360000002 HC RX W HCPCS

## 2023-12-19 PROCEDURE — 2580000003 HC RX 258

## 2023-12-19 PROCEDURE — 80307 DRUG TEST PRSMV CHEM ANLYZR: CPT

## 2023-12-19 PROCEDURE — 93005 ELECTROCARDIOGRAM TRACING: CPT

## 2023-12-19 PROCEDURE — 6360000004 HC RX CONTRAST MEDICATION

## 2023-12-19 PROCEDURE — 83605 ASSAY OF LACTIC ACID: CPT

## 2023-12-19 RX ORDER — QUETIAPINE FUMARATE 25 MG/1
50 TABLET, FILM COATED ORAL NIGHTLY
Status: DISCONTINUED | OUTPATIENT
Start: 2023-12-19 | End: 2023-12-20 | Stop reason: HOSPADM

## 2023-12-19 RX ORDER — ONDANSETRON 4 MG/1
4 TABLET, ORALLY DISINTEGRATING ORAL EVERY 8 HOURS PRN
Status: DISCONTINUED | OUTPATIENT
Start: 2023-12-19 | End: 2023-12-20 | Stop reason: HOSPADM

## 2023-12-19 RX ORDER — FAMOTIDINE 40 MG/1
40 TABLET, FILM COATED ORAL DAILY
COMMUNITY

## 2023-12-19 RX ORDER — ACETAMINOPHEN 325 MG/1
650 TABLET ORAL EVERY 6 HOURS PRN
Status: DISCONTINUED | OUTPATIENT
Start: 2023-12-19 | End: 2023-12-20 | Stop reason: HOSPADM

## 2023-12-19 RX ORDER — ACETAMINOPHEN 650 MG/1
650 SUPPOSITORY RECTAL EVERY 6 HOURS PRN
Status: DISCONTINUED | OUTPATIENT
Start: 2023-12-19 | End: 2023-12-20 | Stop reason: HOSPADM

## 2023-12-19 RX ORDER — SODIUM CHLORIDE 9 MG/ML
INJECTION, SOLUTION INTRAVENOUS PRN
Status: DISCONTINUED | OUTPATIENT
Start: 2023-12-19 | End: 2023-12-20 | Stop reason: HOSPADM

## 2023-12-19 RX ORDER — ROSUVASTATIN CALCIUM 20 MG/1
40 TABLET, COATED ORAL NIGHTLY
Status: DISCONTINUED | OUTPATIENT
Start: 2023-12-19 | End: 2023-12-20 | Stop reason: HOSPADM

## 2023-12-19 RX ORDER — POLYETHYLENE GLYCOL 3350 17 G/17G
17 POWDER, FOR SOLUTION ORAL DAILY PRN
Status: DISCONTINUED | OUTPATIENT
Start: 2023-12-19 | End: 2023-12-20 | Stop reason: HOSPADM

## 2023-12-19 RX ORDER — POTASSIUM CHLORIDE 7.45 MG/ML
10 INJECTION INTRAVENOUS PRN
Status: DISCONTINUED | OUTPATIENT
Start: 2023-12-19 | End: 2023-12-20 | Stop reason: HOSPADM

## 2023-12-19 RX ORDER — ASPIRIN 81 MG/1
81 TABLET ORAL DAILY
Status: DISCONTINUED | OUTPATIENT
Start: 2023-12-19 | End: 2023-12-20 | Stop reason: HOSPADM

## 2023-12-19 RX ORDER — CLOPIDOGREL BISULFATE 75 MG/1
75 TABLET ORAL DAILY
Status: DISCONTINUED | OUTPATIENT
Start: 2023-12-19 | End: 2023-12-20 | Stop reason: HOSPADM

## 2023-12-19 RX ORDER — POTASSIUM CHLORIDE 20 MEQ/1
40 TABLET, EXTENDED RELEASE ORAL PRN
Status: DISCONTINUED | OUTPATIENT
Start: 2023-12-19 | End: 2023-12-20 | Stop reason: HOSPADM

## 2023-12-19 RX ORDER — KETOROLAC TROMETHAMINE 30 MG/ML
30 INJECTION, SOLUTION INTRAMUSCULAR; INTRAVENOUS ONCE
Status: COMPLETED | OUTPATIENT
Start: 2023-12-19 | End: 2023-12-19

## 2023-12-19 RX ORDER — SODIUM CHLORIDE 0.9 % (FLUSH) 0.9 %
5-40 SYRINGE (ML) INJECTION EVERY 12 HOURS SCHEDULED
Status: DISCONTINUED | OUTPATIENT
Start: 2023-12-19 | End: 2023-12-20 | Stop reason: HOSPADM

## 2023-12-19 RX ORDER — INSULIN LISPRO 100 [IU]/ML
0-16 INJECTION, SOLUTION INTRAVENOUS; SUBCUTANEOUS
Status: DISCONTINUED | OUTPATIENT
Start: 2023-12-19 | End: 2023-12-20 | Stop reason: HOSPADM

## 2023-12-19 RX ORDER — ONDANSETRON 2 MG/ML
4 INJECTION INTRAMUSCULAR; INTRAVENOUS EVERY 6 HOURS PRN
Status: DISCONTINUED | OUTPATIENT
Start: 2023-12-19 | End: 2023-12-20 | Stop reason: HOSPADM

## 2023-12-19 RX ORDER — INSULIN LISPRO 100 [IU]/ML
0-4 INJECTION, SOLUTION INTRAVENOUS; SUBCUTANEOUS NIGHTLY
Status: DISCONTINUED | OUTPATIENT
Start: 2023-12-19 | End: 2023-12-20 | Stop reason: HOSPADM

## 2023-12-19 RX ORDER — OXYCODONE HYDROCHLORIDE 5 MG/1
5 TABLET ORAL ONCE
Status: COMPLETED | OUTPATIENT
Start: 2023-12-19 | End: 2023-12-19

## 2023-12-19 RX ORDER — INSULIN GLARGINE 100 [IU]/ML
10 INJECTION, SOLUTION SUBCUTANEOUS NIGHTLY
Status: DISCONTINUED | OUTPATIENT
Start: 2023-12-19 | End: 2023-12-20 | Stop reason: HOSPADM

## 2023-12-19 RX ORDER — MAGNESIUM SULFATE IN WATER 40 MG/ML
2000 INJECTION, SOLUTION INTRAVENOUS PRN
Status: DISCONTINUED | OUTPATIENT
Start: 2023-12-19 | End: 2023-12-20 | Stop reason: HOSPADM

## 2023-12-19 RX ORDER — LISINOPRIL 2.5 MG/1
2.5 TABLET ORAL DAILY
Status: DISCONTINUED | OUTPATIENT
Start: 2023-12-19 | End: 2023-12-20 | Stop reason: HOSPADM

## 2023-12-19 RX ORDER — SODIUM CHLORIDE 0.9 % (FLUSH) 0.9 %
5-40 SYRINGE (ML) INJECTION PRN
Status: DISCONTINUED | OUTPATIENT
Start: 2023-12-19 | End: 2023-12-20 | Stop reason: HOSPADM

## 2023-12-19 RX ORDER — OMEPRAZOLE 20 MG/1
20 CAPSULE, DELAYED RELEASE ORAL 2 TIMES DAILY
COMMUNITY

## 2023-12-19 RX ORDER — ROSUVASTATIN CALCIUM 10 MG/1
10 TABLET, COATED ORAL DAILY
COMMUNITY

## 2023-12-19 RX ORDER — MECLIZINE HCL 12.5 MG/1
12.5 TABLET ORAL ONCE
Status: COMPLETED | OUTPATIENT
Start: 2023-12-19 | End: 2023-12-19

## 2023-12-19 RX ORDER — ENOXAPARIN SODIUM 100 MG/ML
40 INJECTION SUBCUTANEOUS DAILY
Status: DISCONTINUED | OUTPATIENT
Start: 2023-12-19 | End: 2023-12-20 | Stop reason: HOSPADM

## 2023-12-19 RX ADMIN — SODIUM CHLORIDE, PRESERVATIVE FREE 10 ML: 5 INJECTION INTRAVENOUS at 21:04

## 2023-12-19 RX ADMIN — IOPAMIDOL 90 ML: 755 INJECTION, SOLUTION INTRAVENOUS at 06:07

## 2023-12-19 RX ADMIN — ASPIRIN 81 MG: 81 TABLET, COATED ORAL at 08:03

## 2023-12-19 RX ADMIN — MECLIZINE 12.5 MG: 12.5 TABLET ORAL at 05:04

## 2023-12-19 RX ADMIN — LISINOPRIL 2.5 MG: 2.5 TABLET ORAL at 08:53

## 2023-12-19 RX ADMIN — KETOROLAC TROMETHAMINE 30 MG: 30 INJECTION, SOLUTION INTRAMUSCULAR; INTRAVENOUS at 21:04

## 2023-12-19 RX ADMIN — SODIUM CHLORIDE, PRESERVATIVE FREE 10 ML: 5 INJECTION INTRAVENOUS at 08:00

## 2023-12-19 RX ADMIN — INSULIN LISPRO 12 UNITS: 100 INJECTION, SOLUTION INTRAVENOUS; SUBCUTANEOUS at 08:03

## 2023-12-19 RX ADMIN — INSULIN LISPRO 16 UNITS: 100 INJECTION, SOLUTION INTRAVENOUS; SUBCUTANEOUS at 18:56

## 2023-12-19 RX ADMIN — INSULIN LISPRO 4 UNITS: 100 INJECTION, SOLUTION INTRAVENOUS; SUBCUTANEOUS at 21:13

## 2023-12-19 RX ADMIN — QUETIAPINE FUMARATE 50 MG: 25 TABLET ORAL at 21:02

## 2023-12-19 RX ADMIN — ACETAMINOPHEN 650 MG: 325 TABLET ORAL at 18:56

## 2023-12-19 RX ADMIN — ROSUVASTATIN CALCIUM 40 MG: 20 TABLET, FILM COATED ORAL at 21:03

## 2023-12-19 RX ADMIN — INSULIN LISPRO 4 UNITS: 100 INJECTION, SOLUTION INTRAVENOUS; SUBCUTANEOUS at 13:00

## 2023-12-19 RX ADMIN — INSULIN GLARGINE 10 UNITS: 100 INJECTION, SOLUTION SUBCUTANEOUS at 21:14

## 2023-12-19 RX ADMIN — CLOPIDOGREL BISULFATE 75 MG: 75 TABLET ORAL at 08:03

## 2023-12-19 RX ADMIN — OXYCODONE 5 MG: 5 TABLET ORAL at 23:20

## 2023-12-19 NOTE — ED PROVIDER NOTES
[address] [place] [address] ED  Emergency Department Encounter  Emergency Medicine Resident     Pt Name:Mikael Fuentes  MRN: [id number]  [address] [facility] 1970  Date of evaluation: 12/19/23  PCP:  Hood Vela MD  Note Started: 4:32 AM EST      CHIEF COMPLAINT       Chief Complaint   Patient presents with    Dizziness    Aphasia    Headache       HISTORY OF PRESENT ILLNESS  (Location/Symptom, Timing/Onset, Context/Setting, Quality, Duration, Modifying Factors, Severity.)      Jerson Chacon is a 52 y.o. female w/ PMH DM, CAD, cocaine use, tobacco use, who presents with dizziness. Pt reports having a stroke 10 days ago and has had residual dizziness since then but upon waking this morning it was worse. She also complains of a 10/10 posterior headache. Patient notes that she is supposed to be taking insulin, but has not done so for over a month. She does check her blood sugars at home, they have been running in the high 200s. Patient denies fever, chills, nausea, vomiting, diarrhea, chest pain, shortness of breath, numbness, residual weakness, or any other symptoms at this time. PAST MEDICAL / SURGICAL / SOCIAL / FAMILY HISTORY      has a past medical history of Back pain, Bipolar 1 disorder ([address]), Diabetes mellitus ([address]), Disc disorder, and Hypertension. has a past surgical history that includes Cholecystectomy and back surgery.     Social History     Socioeconomic History    Marital status: Single     Spouse name: Not on file    Number of children: Not on file    Years of education: Not on file    Highest education level: Not on file   Occupational History    Not on file   Tobacco Use    Smoking status: Every Day     Current packs/day: 0.50     Average packs/day: 0.5 packs/day for 13.0 years (6.5 ttl pk-yrs)     Types: Cigarettes    Smokeless tobacco: Never   Substance and Sexual Activity    Alcohol use: No    Drug use: Not Currently     Types: Cocaine     Comment: pt states she has been

## 2023-12-19 NOTE — ED NOTES
Pt presents to the ED with c/o dizziness, headache and slurred speech that has been gradually worsening since 12/9. Pt reports she was seen at [facility] on the 9th for similar sx and was dx with a stroke. Pt denies any new changes to sx. Pt is well appearing, no distress. RR even and unlabored. NIH 0. Pt placed on full cardiac monitor, EKG done, IV established and labs drawn. Pt changed into gown. Call light within reach.       Jitendra Mcmullen RN  12/19/23 0500

## 2023-12-19 NOTE — CONSULTS
[facility] Neurology   [address] [facility]    Neurology Consult Note            Date:   12/19/2023  Patient name:  Jerson Chacon  Date of admission:  12/19/2023  4:31 AM  MRN:   [id number]  Account:  [de-identified]  YOB: 1970  PCP:    Hood Vela MD  Room:   [facility]  Code Status:    Full Code    Chief Complaint:     Chief Complaint   Patient presents with    Dizziness    Aphasia    Headache     History Obtained From:     patient    History of Present Illness: The patient is a 52 y.o. female with significant past medical history of hypertension, hyperlipidemia, diabetes mellitus, mood disorder, substance use, recent pontine stroke who presents with chief complaint of dizziness. Patient was recently admitted to [facility] with chief complaints of dizziness associated with vomiting. CT brain, CTA head and neck was negative. MRI brain was suggestive of acute/subacute left dorsal pontine infarction. She was started on aspirin Plavix and Crestor. Lab Showed elevated A1c of 9.5. Lipid profile showed elevated LDL of 162, triglyceride of 220, cholesterol 246. During her hospitalization she was also found to have elevated troponin levels, cardiology was consulted 2D echo was done showing EF of 55 to 60%. Patient left AMA. Patient presents today to the ER with chief complaints of dizziness. She reported that the dizziness  has been going on since her last hospitalization. Reports that she feels unsteady on her feet. Has noticed dizziness comes and goes and sometimes worse with head movements. It is associated with nausea and episodes of vomiting. Denies any headache or blurry vision, chest pain, cough or shortness of breath, diarrhea or abdominal pain. Patient reports not taking the medication on time, reports that she takes aspirin every other day. Her A1c is also increased from before which explains her noncompliance.   To the POC Hemoglobin (calc) 17.4 (H) 12.0 - 16.0 g/dL    POC Hematocrit 51 (H) 36 - 46 %   Creatinine W/GFR Point of Care    Collection Time: 12/19/23  4:42 AM   Result Value Ref Range    POC Creatinine 0.5 (L) 0.51 - 1.19 mg/dL    eGFR, POC >60 mL/min/1.73m2   CALCIUM, IONIC (POC)    Collection Time: 12/19/23  4:42 AM   Result Value Ref Range    POC Ionized Calcium 1.24 1.15 - 1.33 mmol/L   POCT urea (BUN)    Collection Time: 12/19/23  4:42 AM   Result Value Ref Range    POC BUN 14 8 - 26 mg/dL   Lactic Acid, POC    Collection Time: 12/19/23  4:42 AM   Result Value Ref Range    POC Lactic Acid 1.5 (H) 0.56 - 1.39 mmol/L   POCT Glucose    Collection Time: 12/19/23  4:42 AM   Result Value Ref Range    POC Glucose 465 (HH) 74 - 100 mg/dL   CBC with Auto Differential    Collection Time: 12/19/23  5:05 AM   Result Value Ref Range    WBC 9.3 3.5 - 11.3 k/uL    RBC 5.33 (H) 3.95 - 5.11 m/uL    Hemoglobin 15.2 (H) 11.9 - 15.1 g/dL    Hematocrit 46.2 36.3 - 47.1 %    MCV 86.7 82.6 - 102.9 fL    MCH 28.5 25.2 - 33.5 pg    MCHC 32.9 28.4 - 34.8 g/dL    RDW 14.6 (H) 11.8 - 14.4 %    Platelets 934 010 - 184 k/uL    MPV 9.6 8.1 - 13.5 fL    NRBC Automated 0.0 0.0 per 100 WBC    Neutrophils % 52 36 - 65 %    Lymphocytes % 37 24 - 43 %    Monocytes % 8 3 - 12 %    Eosinophils % 3 1 - 4 %    Basophils % 0 0 - 2 %    Immature Granulocytes 0 0 %    Neutrophils Absolute 4.76 1.50 - 8.10 k/uL    Lymphocytes Absolute 3.45 1.10 - 3.70 k/uL    Monocytes Absolute 0.73 0.10 - 1.20 k/uL    Eosinophils Absolute 0.23 0.00 - 0.44 k/uL    Basophils Absolute 0.04 0.00 - 0.20 k/uL    Absolute Immature Granulocyte 0.04 0.00 - 0.30 k/uL    RBC Morphology ANISOCYTOSIS PRESENT    Basic Metabolic Panel    Collection Time: 12/19/23  5:05 AM   Result Value Ref Range    Sodium 134 (L) 135 - 144 mmol/L    Potassium 4.0 3.7 - 5.3 mmol/L    Chloride 98 98 - 107 mmol/L    CO2 24 20 - 31 mmol/L    Anion Gap 12 9 - 17 mmol/L    Glucose 436 (HH) 70 - 99 mg/dL    BUN

## 2023-12-19 NOTE — PROGRESS NOTES
[address] [facility]  CDU / OBSERVATION ENCOUNTER  ATTENDING NOTE       I performed a history and physical examination of the patient and discussed management with the resident or midlevel provider. I reviewed the resident or midlevel provider's note and agree with the documented findings and plan of care. Any areas of disagreement are noted on the chart. I was personally present for the key portions of any procedures. I have documented in the chart those procedures where I was not present during the key portions. I have reviewed the nurses notes. I agree with the chief complaint, past medical history, past surgical history, allergies, medications, social and family history as documented unless otherwise noted below. The Family history, social history, and ROS are effectively unchanged since admission unless noted elsewhere in the chart. This patient was placed in the observation unit for reevaluation for possible admission to the hospital    Patient admitted to the ETU for dizziness. Patient was recently admitted to [facility] and found to have a subacute/acute pontine CVA. Patient also had an elevated troponin during that stay and cardiology was consulted. Patient left AGAINST MEDICAL ADVICE. Patient left AGAINST MEDICAL ADVICE during that visit prior to full neurology and cardiology evaluation being completed. On my exam today, patient states that she continues to have a lot of dizziness. She says she is able to walk but is worried about falling. Patient is also currently homeless and is on the list to get into a homeless shelter. She says that there is no way for her to be able to get follow-up with cardiology or neurology at [facility] due to her not having reliable transportation. CT head and CTA head and neck done in the emergency department was unremarkable. Patient is alert and oriented and answering all questions appropriately.   Patient has no focal neurologic deficits

## 2023-12-19 NOTE — ED NOTES
The following labs were labeled with appropriate pt sticker and tubed to lab:     [] Blue     [] Lavender   [] on ice  [x] Green/yellow  [] Green/black [] on ice  [] Angella Alfred  [] on ice  [] Yellow  [] Red  [] Pink  [] Type/ Screen  [] ABG  [] VBG    [] COVID-19 swab    [] Rapid  [] PCR  [] Flu swab  [] Peds Viral Panel     [] Urine Sample  [] Fecal Sample  [] Pelvic Cultures  [] Blood Cultures  [] X 2  [] STREP Cultures  [] Wound Cultures     Isaura Styles RN  12/19/23 9896

## 2023-12-19 NOTE — PROGRESS NOTES
[address]    Admission Medication Reconciliation       The patient's list of current home medications has been reviewed. Source(s) of information: Called pharmacy, Medication dispense report, attempted to talk to patient     Based on information provided by the above source(s), I have updated the patient's home med list as described below. Please review the ACTION REQUESTED section of this note below for any discrepancies on current hospital orders. I changed or updated the following medications on the patient's home medication list:  Removed Topiramate   Added Famotidine      Adjusted   Rosuvastatin    Other Notes N/a              Please feel free to call me with any questions about this encounter. Thank you.     Thank you  Sean Moe, PharmD, [facility]  Inpatient Clinical Pharmacist  [phone]      Electronically signed by Jacy Hargrove on 12/19/2023 at 11:34 AM

## 2023-12-19 NOTE — H&P
[address]  CDU / OBSERVATION ENCOUNTER  Resident NOTE     Pt Name: Pia Galindo  MRN: [id number]  [address] [facility] 1970  Date of evaluation: 12/19/23  Patient's PCP is :  Heber Chou MD    CHIEF COMPLAINT       Chief Complaint   Patient presents with    Dizziness    Aphasia    Headache         HISTORY OF PRESENT ILLNESS    Pia Galindo is a 52 y.o. female who presents dizziness. Patient recently had a stroke 10 days ago on December 9 and has had residual dizziness since but significantly worsened this morning upon waking. Patient also complaining of 10 out of 10 posterior headache when presenting to the ED. Patient is also not been taking her home dose of insulin. Patient did not have any residual deficits from previous stroke. Workup in the ED included CT CTA neck and head. MRI on December 9 showed a acute/subacute left dorsal pontine infarct and patient left AMA from [facility]. Was seen by cardiology at the time who had recommended a stress test as an outpatient. Patient admitted to the observation unit for PT OT assessment and rehab placement. Location/Symptom: Dizziness  Timing/Onset: Since stroke but worsening this morning  Provocation: None  Quality: N/A  Radiation: N/A  Severity: Severe  Timing/Duration: Constant  Modifying Factors: None    History was obtained in part through review of the ED chart.  When possible, a direct discussion was had with ED nurses, residents, and attendings  REVIEW OF SYSTEMS       General ROS - No fevers, No malaise   Ophthalmic ROS - No discharge, No changes in vision  ENT ROS -  No sore throat, No rhinorrhea,   Respiratory ROS - no shortness of breath, no cough, no  wheezing  Cardiovascular ROS - No chest pain, no dyspnea on exertion  Gastrointestinal ROS - No abdominal pain, no nausea or vomiting, no change in bowel habits, no black or bloody stools  Genito-Urinary ROS - No dysuria, trouble voiding, or hematuria  Musculoskeletal pneumothorax. No acute osseous abnormality. Elevated right hemidiaphragm. Clear chest without acute cardiopulmonary process. LABS:  I have reviewed and interpreted all available lab results. Labs Reviewed   ELECTROLYTES PLUS - Abnormal; Notable for the following components:       Result Value    POC Sodium 137 (*)     All other components within normal limits   HGB/HCT - Abnormal; Notable for the following components:    POC Hemoglobin (calc) 17.4 (*)     POC Hematocrit 51 (*)     All other components within normal limits   CBC WITH AUTO DIFFERENTIAL - Abnormal; Notable for the following components:    RBC 5.33 (*)     Hemoglobin 15.2 (*)     RDW 14.6 (*)     All other components within normal limits   BASIC METABOLIC PANEL - Abnormal; Notable for the following components:    Sodium 134 (*)     Glucose 436 (*)     All other components within normal limits   TROPONIN - Abnormal; Notable for the following components:    Troponin, High Sensitivity 17 (*)     All other components within normal limits   VENOUS BLOOD GAS, POINT OF CARE - Abnormal; Notable for the following components:    O2 Sat, Job 56.3 (*)     All other components within normal limits   CREATININE W/GFR POINT OF CARE - Abnormal; Notable for the following components:    POC Creatinine 0.5 (*)     All other components within normal limits   LACTIC ACID,POINT OF CARE - Abnormal; Notable for the following components:    POC Lactic Acid 1.5 (*)     All other components within normal limits   POCT GLUCOSE - Abnormal; Notable for the following components:    POC Glucose 465 (*)     All other components within normal limits   POC GLUCOSE FINGERSTICK - Abnormal; Notable for the following components:    POC Glucose 346 (*)     All other components within normal limits   CALCIUM, IONIC (POC)   TROPONIN   UREA N (POC)         CDU JESU / Africa Aleman is a 52 y.o. female who presents with dizziness and unsteady gait.   Recent pontine

## 2023-12-20 ENCOUNTER — OFFICE VISIT (OUTPATIENT)
Dept: OPERATING ROOM | Age: 53
End: 2023-12-20
Attending: INTERNAL MEDICINE

## 2023-12-20 ENCOUNTER — APPOINTMENT (OUTPATIENT)
Age: 53
End: 2023-12-20
Payer: MEDICARE

## 2023-12-20 VITALS
TEMPERATURE: 96.8 F | HEART RATE: 78 BPM | HEIGHT: 67 IN | OXYGEN SATURATION: 100 % | WEIGHT: 138.01 LBS | BODY MASS INDEX: 21.66 KG/M2 | DIASTOLIC BLOOD PRESSURE: 82 MMHG | RESPIRATION RATE: 16 BRPM | SYSTOLIC BLOOD PRESSURE: 142 MMHG

## 2023-12-20 PROBLEM — I63.50 CEREBROVASCULAR ACCIDENT (CVA) OF PONTINE STRUCTURE (HCC): Status: ACTIVE | Noted: 2023-07-16

## 2023-12-20 PROBLEM — Z91.148 NONCOMPLIANCE WITH MEDICATIONS: Status: ACTIVE | Noted: 2023-12-20

## 2023-12-20 PROBLEM — F19.10 DRUG ABUSE (HCC): Status: ACTIVE | Noted: 2023-12-20

## 2023-12-20 LAB
ECHO BSA: 1.72 M2
ECHO BSA: 1.72 M2
GLUCOSE BLD-MCNC: 242 MG/DL (ref 65–105)

## 2023-12-20 PROCEDURE — G0378 HOSPITAL OBSERVATION PER HR: HCPCS

## 2023-12-20 PROCEDURE — 6370000000 HC RX 637 (ALT 250 FOR IP)

## 2023-12-20 PROCEDURE — 6370000000 HC RX 637 (ALT 250 FOR IP): Performed by: EMERGENCY MEDICINE

## 2023-12-20 PROCEDURE — 80307 DRUG TEST PRSMV CHEM ANLYZR: CPT

## 2023-12-20 PROCEDURE — C1764 EVENT RECORDER, CARDIAC: HCPCS | Performed by: STUDENT IN AN ORGANIZED HEALTH CARE EDUCATION/TRAINING PROGRAM

## 2023-12-20 PROCEDURE — 82947 ASSAY GLUCOSE BLOOD QUANT: CPT

## 2023-12-20 PROCEDURE — 93312 ECHO TRANSESOPHAGEAL: CPT | Performed by: INTERNAL MEDICINE

## 2023-12-20 PROCEDURE — 6370000000 HC RX 637 (ALT 250 FOR IP): Performed by: INTERNAL MEDICINE

## 2023-12-20 PROCEDURE — G0480 DRUG TEST DEF 1-7 CLASSES: HCPCS

## 2023-12-20 PROCEDURE — 93285 PRGRMG DEV EVAL SCRMS IP: CPT | Performed by: INTERNAL MEDICINE

## 2023-12-20 PROCEDURE — 2580000003 HC RX 258

## 2023-12-20 PROCEDURE — 99152 MOD SED SAME PHYS/QHP 5/>YRS: CPT | Performed by: STUDENT IN AN ORGANIZED HEALTH CARE EDUCATION/TRAINING PROGRAM

## 2023-12-20 PROCEDURE — 99222 1ST HOSP IP/OBS MODERATE 55: CPT | Performed by: PSYCHIATRY & NEUROLOGY

## 2023-12-20 PROCEDURE — 93325 DOPPLER ECHO COLOR FLOW MAPG: CPT

## 2023-12-20 PROCEDURE — 93320 DOPPLER ECHO COMPLETE: CPT | Performed by: INTERNAL MEDICINE

## 2023-12-20 PROCEDURE — 99153 MOD SED SAME PHYS/QHP EA: CPT | Performed by: STUDENT IN AN ORGANIZED HEALTH CARE EDUCATION/TRAINING PROGRAM

## 2023-12-20 PROCEDURE — 6360000002 HC RX W HCPCS: Performed by: STUDENT IN AN ORGANIZED HEALTH CARE EDUCATION/TRAINING PROGRAM

## 2023-12-20 PROCEDURE — 36415 COLL VENOUS BLD VENIPUNCTURE: CPT

## 2023-12-20 PROCEDURE — 93325 DOPPLER ECHO COLOR FLOW MAPG: CPT | Performed by: INTERNAL MEDICINE

## 2023-12-20 PROCEDURE — 99223 1ST HOSP IP/OBS HIGH 75: CPT | Performed by: INTERNAL MEDICINE

## 2023-12-20 RX ORDER — MECLIZINE HYDROCHLORIDE 25 MG/1
25 TABLET ORAL 3 TIMES DAILY PRN
Qty: 30 TABLET | Refills: 1 | Status: SHIPPED | OUTPATIENT
Start: 2023-12-20

## 2023-12-20 RX ORDER — MECLIZINE HCL 12.5 MG/1
12.5 TABLET ORAL 3 TIMES DAILY PRN
Status: DISCONTINUED | OUTPATIENT
Start: 2023-12-20 | End: 2023-12-20

## 2023-12-20 RX ORDER — LIDOCAINE HYDROCHLORIDE 40 MG/ML
SOLUTION TOPICAL PRN
Status: COMPLETED | OUTPATIENT
Start: 2023-12-20 | End: 2023-12-20

## 2023-12-20 RX ORDER — MECLIZINE HCL 12.5 MG/1
25 TABLET ORAL EVERY 6 HOURS SCHEDULED
Status: DISCONTINUED | OUTPATIENT
Start: 2023-12-20 | End: 2023-12-20 | Stop reason: HOSPADM

## 2023-12-20 RX ORDER — MIDAZOLAM HYDROCHLORIDE 1 MG/ML
INJECTION INTRAMUSCULAR; INTRAVENOUS PRN
Status: COMPLETED | OUTPATIENT
Start: 2023-12-20 | End: 2023-12-20

## 2023-12-20 RX ORDER — FENTANYL CITRATE 50 UG/ML
INJECTION, SOLUTION INTRAMUSCULAR; INTRAVENOUS PRN
Status: COMPLETED | OUTPATIENT
Start: 2023-12-20 | End: 2023-12-20

## 2023-12-20 RX ADMIN — MIDAZOLAM 1 MG: 1 INJECTION INTRAMUSCULAR; INTRAVENOUS at 13:56

## 2023-12-20 RX ADMIN — ASPIRIN 81 MG: 81 TABLET, COATED ORAL at 08:26

## 2023-12-20 RX ADMIN — MIDAZOLAM 1 MG: 1 INJECTION INTRAMUSCULAR; INTRAVENOUS at 13:59

## 2023-12-20 RX ADMIN — INSULIN LISPRO 4 UNITS: 100 INJECTION, SOLUTION INTRAVENOUS; SUBCUTANEOUS at 09:11

## 2023-12-20 RX ADMIN — FENTANYL CITRATE 25 MCG: 50 INJECTION, SOLUTION INTRAMUSCULAR; INTRAVENOUS at 13:56

## 2023-12-20 RX ADMIN — LIDOCAINE HYDROCHLORIDE 10 ML: 40 SOLUTION TOPICAL at 13:56

## 2023-12-20 RX ADMIN — BENZOCAINE, BUTAMBEN, AND TETRACAINE HYDROCHLORIDE 3 SPRAY: .028; .004; .004 AEROSOL, SPRAY TOPICAL at 13:56

## 2023-12-20 RX ADMIN — LISINOPRIL 2.5 MG: 2.5 TABLET ORAL at 08:26

## 2023-12-20 RX ADMIN — CLOPIDOGREL BISULFATE 75 MG: 75 TABLET ORAL at 08:27

## 2023-12-20 RX ADMIN — SODIUM CHLORIDE, PRESERVATIVE FREE 10 ML: 5 INJECTION INTRAVENOUS at 08:27

## 2023-12-20 RX ADMIN — MECLIZINE 25 MG: 12.5 TABLET ORAL at 14:49

## 2023-12-20 NOTE — CARE COORDINATION
DISCHARGE PLANNING EVALUATION: OP/OBSERVATION        12/20/23, 11:50 AM SANTINO Cisneros         Location: OBS 21/21-1   Reason for hospitalization: TIA (transient ischemic attack) [G45.9]  Dizziness [R42]  Unsteady gait [R26.81]  Sequela, post-stroke [I69.30]          PCP: Liat Adan MD        Transition plan: homeless, seen by ss plan is to dc to [facility]

## 2023-12-20 NOTE — PROGRESS NOTES
Physical Therapy        Physical Therapy Cancel Note      DATE: 2023    NAME: Arturo Fuentes  MRN: [id number]   : 1970      Patient not seen this date for Physical Therapy due to:    Patient independent with functional mobility. Pt independently ambulating unit with no AD. Will defer PT evaluation at this time. Please reorder PT if future needs arise.        Electronically signed by Chandler Ohara PT on 2023 at 11:30 AM

## 2023-12-20 NOTE — PROCEDURES
[facility]  Transesophageal Echocardiogram       Today's Date:  12/20/2023  Indication:   Stroke    Operators:  Primary:   Tong Kapoor MD (Attending Physician)  Assistant:   Erich Junior MD (Cardiovascular Fellow)    Pre Procedure Conscious Sedation Data:  ASA Class:    [] I [] II [x] III [] IV    Mallampati Class:  [] I [x] II [] III [] IV    Procedure:  Patient seen and examined. History and Physical reviewed. Labs reviewed. After informed consent was obtained with explanation of the risks and benefits, the patient was brought to cardiac cath lab. All sedation was administered by the cardiologist. The oropharynx was pre-anesthesized with viscous lidocaine and cetacaine spray. The ultrasound probe was passed without any difficulty. LEANN findings:  LA:  Normal  LARRY:  No thrombus  RA:  Normal  RV:   Normal  LV:  Normal  Estimated LVEF:  55%  Aorta:   Mild atheromatous disease arch  Pericardium: No pericardial effusion  Septum:  No intracardiac shunt via color Doppler. No intracardiac shunt via injection of agitated saline. Valves:  Mitral Valve: Structurally normal. trivial regurgitation is identified. Aortic Valve: The aortic valve is trileaflet and opens adequately. no regurgitiation is identified. Tricuspid valve: Structurally normal. no regurgitation is identified. Pulmonary valve: Normal. No significant regurgitation    No valvular vegetations or thrombus identified. Summary:   1. A LEANN was performed without complications. 2. LVEF 55%  3. No thrombus or valvular vegetation identified  4. Trivial MR  5. Will proceed with Loop recorder         Erich Junior MD.  Fellow, cardiovascular disease   [facility]  April Seen, [address] [place] [address] [facility]

## 2023-12-20 NOTE — DISCHARGE SUMMARY
CDU Discharge Summary        Patient:  Keegan Gunter  YOB: 1970    MRN: [id number]   Acct: [de-identified]    Primary Care Physician: Bridger Hdz MD    Admit date:  12/19/2023  4:31 AM  Discharge date: 12/20/2023  5:35 PM     Discharge Diagnoses:     1.) Acute dizziness secondary to CVA history Improved with IV hydration, analgesics, rest, and further cardiac and neuro evaluation    Follow-up:  Call today/tomorrow for a follow up appointment with Bridger Hdz MD , or return to the Emergency Room with worsening symptoms    Stressed to patient the importance of following up with primary care doctor for further workup/management of symptoms. Pt verbalizes understanding and agrees with plan.     Discharge Medication Changes:       Medication List        START taking these medications      meclizine 25 MG tablet  Commonly known as: ANTIVERT  Take 1 tablet by mouth 3 times daily as needed for Dizziness            CONTINUE taking these medications      aspirin 81 MG EC tablet  Take 1 tablet by mouth daily     clopidogrel 75 MG tablet  Commonly known as: PLAVIX  Take 1 tablet by mouth daily     famotidine 40 MG tablet  Commonly known as: PEPCID     insulin glargine 100 UNIT/ML injection vial  Commonly known as: LANTUS     lisinopril 2.5 MG tablet  Commonly known as: PRINIVIL;ZESTRIL  Take 1 tablet by mouth daily     NovoLOG Mix 70/30 FlexPen injection pen  Generic drug: insulin aspart prot & aspart     omeprazole 20 MG delayed release capsule  Commonly known as: PRILOSEC     QUEtiapine 50 MG tablet  Commonly known as: SEROQUEL  Take 1 tablet by mouth nightly Indications: (2 tabs @ HS)     rosuvastatin 10 MG tablet  Commonly known as: CRESTOR               Where to Get Your Medications        These medications were sent to [address] [place] [address], [address] [place] [address]      Phone: [phone]   meclizine 25 MG The visualized paranasal sinuses and mastoid air cells demonstrate no acute abnormality. SOFT TISSUES/SKULL:  No acute abnormality of the visualized skull or soft tissues. No acute intracranial abnormality. No evidence of intracranial hemorrhage. No evidence of focal abnormality or acute process in brain. CTA HEAD NECK W CONTRAST    Result Date: 12/19/2023  EXAMINATION: CTA OF THE HEAD AND NECK WITH CONTRAST 12/19/2023 4:54 am: TECHNIQUE: CTA of the head and neck was performed with the administration of intravenous contrast. Multiplanar reformatted images are provided for review. MIP images are provided for review. Stenosis of the internal carotid arteries measured using NASCET criteria. Automated exposure control, iterative reconstruction, and/or weight based adjustment of the mA/kV was utilized to reduce the radiation dose to as low as reasonably achievable. COMPARISON: Noncontrast CT scan of the head on 12/19/2023 at 6:08 a.m. CTA of head and neck on 8/6/2023. HISTORY: ORDERING SYSTEM PROVIDED HISTORY: headache, dizzy TECHNOLOGIST PROVIDED HISTORY: Headache, dizzy Decision Support Exception - unselect if not a suspected or confirmed emergency medical condition->Emergency Medical Condition (MA) Reason for Exam: dizziness, aphasia, headache. hx of stroke FINDINGS: CTA NECK: AORTIC ARCH/ARCH VESSELS: No dissection or arterial injury. No significant stenosis of the brachiocephalic or subclavian arteries. CAROTID ARTERIES: No dissection, arterial injury, or hemodynamically significant stenosis by NASCET criteria. VERTEBRAL ARTERIES: No dissection, arterial injury, or significant stenosis. SOFT TISSUES: The lung apices are clear. No cervical or superior mediastinal lymphadenopathy. The larynx and pharynx are unremarkable. No acute abnormality of the salivary and thyroid glands. BONES: No acute osseous abnormality.  CTA HEAD: ANTERIOR CIRCULATION: In the cavernous segment of the left internal carotid artery

## 2023-12-20 NOTE — PROGRESS NOTES
[facility])  Occupational Therapy Not Seen Note    DATE: 2023    NAME: Neftaly Kang  MRN: [id number]   : 1970      Patient not seen this date for Occupational Therapy due to:    Patient independent with ADLs and functional tasks with no acute OT needs. Pt independently showering upon arrival to unit and observed walking off unit multiple times with no difficulties. Will defer OT evaluation at this time. Please reorder OT if future needs arise.        Electronically signed by CHEN yLnn on 2023 at 11:48 AM

## 2023-12-20 NOTE — CONSULTS
Cholecystectomy and back surgery. Allergies:  Codeine and Fish-derived products    Social History:   reports that she has been smoking cigarettes. She has a 6.5 pack-year smoking history. She has never used smokeless tobacco. She reports that she does not currently use drugs after having used the following drugs: Cocaine. She reports that she does not drink alcohol. Family History: family history is not on file. Home Medications:    Prior to Admission medications    Medication Sig Start Date End Date Taking? Authorizing Provider   rosuvastatin (CRESTOR) 10 MG tablet Take 1 tablet by mouth daily   Yes Chuy Lai MD   famotidine (PEPCID) 40 MG tablet Take 1 tablet by mouth daily   Yes Chuy Lai MD   omeprazole (PRILOSEC) 20 MG delayed release capsule Take 1 capsule by mouth in the morning and at bedtime   Yes Chuy Lai MD   insulin aspart protamine-insulin aspart (NOVOLOG MIX 70/30 FLEXPEN) injection pen Inject into the skin 2 times daily (with meals)    Chuy Lai MD   insulin glargine (LANTUS) 100 UNIT/ML injection vial Inject 10 Units into the skin nightly    Chuy Lai MD   clopidogrel (PLAVIX) 75 MG tablet Take 1 tablet by mouth daily 7/19/23   Tommy Carl DO   aspirin 81 MG EC tablet Take 1 tablet by mouth daily 7/19/23   Naldo Bang, DO   lisinopril (PRINIVIL;ZESTRIL) 2.5 MG tablet Take 1 tablet by mouth daily 10/24/22   Irma Pritchard MD   QUEtiapine (SEROQUEL) 50 MG tablet Take 1 tablet by mouth nightly Indications: (2 tabs @ HS) 10/24/22   Irma Pritchard MD       REVIEW OF SYSTEMS:    Constitutional: (+) Diaphoresis. No fatigue, weight loss, loss of appetite. Cardiovascular: as per HPI. Respiratory: as per HPI. Gastrointestinal: No abdominal pain, N/V. Genitourinary: No dysuria, trouble voiding, or hematuria. Musculoskeletal:  No gait disturbance, No weakness or joint complaints.   Neurological: (+) Headache, dizziness, LVEF 36%. No evidence for reversible ischemia or infarct. Cardiac Angiography:   10/25/2022: Moderate disease of distal RCA with 50-60% stenosis; high anterior origin  Mild disease otherwise (30% stenosis in proximal LAD, mid LAD, OM1, and OM2)   LVEF 50%. Recommend medical therapy as needed. ASSESSMENT & RECOMMENDATIONS:  Atypical cardiac chest pain  - Patient presenting with continued dizziness and episodic chest pain  - Was started on ASA, Plavix, Lisinopril, and Crestor at last admission  - Troponin 17 > 7  - EKG with T wave inversions  - Urine positive for cocaine  - Plan for TTE, per Neurology request    Type II diabetes mellitus  - Continue home medications      Pending additional recommendations from attending after rounds. MARQUITA Linton-S[address]  [place], [place]. Please wait for final attestation from rounding attending. Attending Physician Statement  I have discussed the case of Keith Barnes including pertinent history and exam findings with the student/resident/fellow. I have seen and examined the patient and the key elements of the encounter have been performed by me. I agree with the assessment, plan and orders as documented by the resident With changes made to the note. 52years old female with history of hypertension, diabetes mellitus, nonobstructive CAD on cardiac cath 2022, history of drug abuse positive drug screen for cocaine, presented with ischemic stroke and atypical chest pain. Her cardiac enzymes are negative and her EKG showed nonspecific T wave changes. Recommendations:  -Continue aspirin, Plavix, ACE inhibitors, and statins  -Add long-acting nitrates with Imdur 30 mg daily  -Proceed with LEANN and loop recorder implantation per neurology recommendations.     Electronically signed by Shira Claire MD on 12/20/2023 at 3:35 PM.    [facility]      [phone]

## 2023-12-20 NOTE — PLAN OF CARE
Problem: Chronic Conditions and Co-morbidities  Goal: Patient's chronic conditions and co-morbidity symptoms are monitored and maintained or improved  Outcome: Progressing  Flowsheets (Taken 12/20/2023 0619)  Care Plan - Patient's Chronic Conditions and Co-Morbidity Symptoms are Monitored and Maintained or Improved: Monitor and assess patient's chronic conditions and comorbid symptoms for stability, deterioration, or improvement     Problem: Safety - Adult  Goal: Free from fall injury  Outcome: Progressing  Flowsheets (Taken 12/20/2023 0619)  Free From Fall Injury: Instruct family/caregiver on patient safety     Problem: Pain  Goal: Verbalizes/displays adequate comfort level or baseline comfort level  Outcome: Progressing  Flowsheets (Taken 12/20/2023 0619)  Verbalizes/displays adequate comfort level or baseline comfort level:   Encourage patient to monitor pain and request assistance   Assess pain using appropriate pain scale

## 2023-12-20 NOTE — CARE COORDINATION
Consult received as pt homeless. Met with pt who stated she was at [facility] and was discharged in Oct.  Stated they wanted her to go to the [facility] but she did not want to be in that neighborhood as that was where she was using drugs. Pt stated she stays between friends and family but can't stay long at any place d/t it being HUD housing. Stated she is on a waiting list for a place to live thru the Homelessness Board and has already called [address] is on the waiting list there as well. Stated she gets an income of $98/month thru her "[employer]'s death benefit. Discussed [facility]'s [facility] and she would need to be there at 5:30p. Pt stated she was interested in going there at discharge. Also provided pt with list of area food pantries/meal sites. Pt stated she has a hx of cocaine use and did have a recent relapse. Stated she used go to AA/NA/CA daily and plans to get back into meetings. Cont to state she does not want to go to [facility] d/t not being a good area for her recovery.

## 2023-12-20 NOTE — PROGRESS NOTES
[employer] Rep contacted and states loop recorder does not need interrogated prior to discharge. Rep interrogated after placement.

## 2023-12-20 NOTE — PROGRESS NOTES
[facility] Neurology   [address]    Progress Note             Date:   12/20/2023  Patient name:  Navdeep Cortes  Date of admission:  12/19/2023  4:31 AM  MRN:   [id number]  Account:  [de-identified]  YOB: 1970  PCP:    Leslie Merida MD  Room:   OBS 21/21-1  Code Status:    Full Code    Chief Complaint:     Chief Complaint   Patient presents with    Dizziness    Aphasia    Headache       Interval hx: The patient was seen and examined at bedside. Is vitally stable, alert and  Alert oriented x 3. Still complaining of dizziness. Denies any associated symptoms. Lab workup positive for cocaine. Plan for LEANN with loop recorder placement. Brief History of Present Illness: The patient is a 52 y.o. female with significant past medical history of hypertension, hyperlipidemia, diabetes mellitus, mood disorder, substance use, recent pontine stroke who presents with chief complaint of dizziness. Patient was recently admitted to [facility] with chief complaints of dizziness associated with vomiting. CT brain, CTA head and neck was negative. MRI brain was suggestive of acute/subacute left dorsal pontine infarction. She was started on aspirin Plavix and Crestor. Lab Showed elevated A1c of 9.5. Lipid profile showed elevated LDL of 162, triglyceride of 220, cholesterol 246. During her hospitalization she was also found to have elevated troponin levels, cardiology was consulted 2D echo was done showing EF of 55 to 60%. Patient left AMA. Patient presents today to the ER with chief complaints of dizziness. She reported that the dizziness  has been going on since her last hospitalization. Reports that she feels unsteady on her feet. Has noticed dizziness comes and goes and sometimes worse with head movements. It is associated with nausea and episodes of vomiting.   Denies any headache or blurry vision, chest pain, cough or shortness of as inpatient status because of co-morbidities listed above, severity of signs and symptoms as outlined, requirement for current medical therapies and most importantly because of direct risk to patient if care not provided in a hospital setting.     Meek Powers MD  Internal medicine resident PGY 2  12/20/2023  1:43 PM    Copy sent to Dr. Kimo Ayala MD

## 2023-12-20 NOTE — PROCEDURES
[facility]      Procedure Note  LOOP RECORDER IMPLANT      Kaelyn Duong (38 y.o., female)  1970 12/20/2023    Procedure: Loop Recorder Implant    Operators:  Primary: Miesha Prakash (Attending Physician)  Gigi Servin MD       Model # Serial #   Recorder [id number]     Sedation monitoring  Loop recorder Implant    Indication: CVA    Procedure:  After the usual preparation of the left neck and chest, the patient was draped in the usual sterile manner. Local anesthesia was infused below the left clavicle from the midline laterally. An incision was made below the left breast, lateral to midline. The incision was dilated. The Loop recorder (Reveal) System was advanced using the standard techniques, and positioned successfully with no bleeding or compliaction    Impression / Device:  Successful Implantation of: Reveal / Loop Recporder    Plan:  Telemetry monitoring  Interrogate recorder before discharge  Wound check at [facility] in 7 days      Marijo Klinefelter, MD.  Fellow, cardiovascular disease   [address] [facility]

## 2023-12-20 NOTE — PROGRESS NOTES
TRANSFER - OUT REPORT:    Verbal report given to anika(name) on Mikael Duarte being transferred to observation(unit) for routine progression of patient care       Report consisted of patient's Situation, Background, Assessment and   Recommendations(SBAR). Information from the following report(s) Nurse Handoff Report was reviewed with the receiving nurse. Opportunity for questions and clarification was provided.       Patient transported with:   Registered Nurse Minocycline Pregnancy And Lactation Text: This medication is Pregnancy Category D and not consider safe during pregnancy. It is also excreted in breast milk.

## 2023-12-21 NOTE — PROGRESS NOTES
OBS/CDU   Attending NOTE      Patients PCP is:  Inna Valencia MD        SUBJECTIVE      Patient admitted for dizziness. Seen by neurology and cardiology. Patient symptoms from a are much more consistent with peripheral vertigo. Described spinning and movement. Patient is ambulating about department easily however. Patient is here with her son who she is more concerned about than herself. Symptoms nearly resolved this morning. Antivert adjusted. PHYSICAL EXAM      General: NAD, AO X 3  Heent: EMOI, PERRL  Neck: SUPPLE, NO JVD  Cardiovascular: RRR, S1S2  Pulmonary: CTAB, NO SOB  Abdomen: SOFT, NTTP, ND, +BS  Extremities: +2/4 PULSES DISTAL, NO SWELLING  Neuro / Psych: NO NUMBNESS OR TINGLING, MENTATION AT BASELINE    PERTINENT TEST /EXAMS      I have reviewed all available laboratory results. MEDICATIONS CURRENT   No current facility-administered medications for this encounter. All medication charted and reviewed. CONSULTS      IP CONSULT TO CASE MANAGEMENT  IP CONSULT TO NEUROLOGY  IP CONSULT TO CARDIOLOGY  IP CONSULT TO SOCIAL WORK    ASSESSMENT/PLAN       Scott Lo is a 52 y.o. female who presents with       Patient with dizziness. More consistent with peripheral vertigo than syncope on my evaluation  Patient seen by neurology and cardiology. Appreciate recommendations  Loop recorder per neurology recommendations inserted. Echocardiogram.  No structural cardiac disease. Medication changes made. Patient with relieved symptoms. Patient for outpatient follow-up after procedures and consultations. Continue home medications and pain control  Monitor vitals, labs, and imaging  DISPO: pending consults and clinical improvement    --  Prasanna Velasco MD  Emergency Medicine Attending Physician     This dictation was generated by voice recognition computer software.   Although all attempts are made to edit the dictation for accuracy, there may be errors in the transcription that

## 2023-12-22 LAB
AMPHETAMINE: NEGATIVE NG/ML
BARBITURATES: NEGATIVE NG/ML
BENZODIAZEPINES: NEGATIVE NG/ML
BUPRENORPHINE: NEGATIVE NG/ML
COCAINE: POSITIVE NG/ML
DRUGS OF ABUSE COMMENT: NORMAL
METHADONE: NEGATIVE NG/ML
METHAMPHETAMINE: NEGATIVE NG/ML
OPIATES: NEGATIVE NG/ML
OXYCODONE: NEGATIVE NG/ML
PHENCYCLIDINE: NEGATIVE NG/ML
THC: NEGATIVE NG/ML

## 2023-12-24 LAB — COCAINE BLOOD: <20 NG/ML

## 2023-12-27 LAB
EKG ATRIAL RATE: 95 BPM
EKG P AXIS: 80 DEGREES
EKG P-R INTERVAL: 154 MS
EKG Q-T INTERVAL: 398 MS
EKG QRS DURATION: 96 MS
EKG QTC CALCULATION (BAZETT): 500 MS
EKG R AXIS: 37 DEGREES
EKG T AXIS: -109 DEGREES
EKG VENTRICULAR RATE: 95 BPM

## 2024-06-03 ENCOUNTER — HOSPITAL ENCOUNTER (EMERGENCY)
Age: 54
Discharge: LEFT BEFORE DISCHARGE | End: 2024-06-04
Attending: EMERGENCY MEDICINE
Payer: MEDICARE

## 2024-06-03 ENCOUNTER — APPOINTMENT (OUTPATIENT)
Dept: CT IMAGING | Age: 54
End: 2024-06-03
Payer: MEDICARE

## 2024-06-03 ENCOUNTER — APPOINTMENT (OUTPATIENT)
Dept: GENERAL RADIOLOGY | Age: 54
End: 2024-06-03
Payer: MEDICARE

## 2024-06-03 DIAGNOSIS — R51.9 ACUTE NONINTRACTABLE HEADACHE, UNSPECIFIED HEADACHE TYPE: Primary | ICD-10-CM

## 2024-06-03 DIAGNOSIS — R42 LIGHTHEADEDNESS: ICD-10-CM

## 2024-06-03 LAB
ALBUMIN SERPL-MCNC: 4.2 G/DL (ref 3.5–5.2)
ALBUMIN/GLOB SERPL: 1.3 {RATIO} (ref 1–2.5)
ALP SERPL-CCNC: 103 U/L (ref 35–104)
ALT SERPL-CCNC: <5 U/L (ref 10–35)
ANION GAP SERPL CALCULATED.3IONS-SCNC: 14 MMOL/L (ref 9–16)
AST SERPL-CCNC: 18 U/L (ref 10–35)
BACTERIA URNS QL MICRO: ABNORMAL
BASOPHILS # BLD: 0.04 K/UL (ref 0–0.2)
BASOPHILS NFR BLD: 1 % (ref 0–2)
BILIRUB SERPL-MCNC: 0.2 MG/DL (ref 0–1.2)
BILIRUB UR QL STRIP: NEGATIVE
BUN BLD-MCNC: 12 MG/DL (ref 8–26)
BUN SERPL-MCNC: 14 MG/DL (ref 6–20)
CA-I BLD-SCNC: 1.19 MMOL/L (ref 1.15–1.33)
CALCIUM SERPL-MCNC: 9.3 MG/DL (ref 8.6–10.4)
CASTS #/AREA URNS LPF: ABNORMAL /LPF (ref 0–8)
CHLORIDE BLD-SCNC: 104 MMOL/L (ref 98–107)
CHLORIDE SERPL-SCNC: 100 MMOL/L (ref 98–107)
CLARITY UR: CLEAR
CO2 BLD CALC-SCNC: 31 MMOL/L (ref 22–30)
CO2 SERPL-SCNC: 24 MMOL/L (ref 20–31)
COLOR UR: YELLOW
CREAT SERPL-MCNC: 1 MG/DL (ref 0.5–0.9)
EGFR, POC: 66 ML/MIN/1.73M2
EOSINOPHIL # BLD: 0.2 K/UL (ref 0–0.44)
EOSINOPHILS RELATIVE PERCENT: 2 % (ref 1–4)
EPI CELLS #/AREA URNS HPF: ABNORMAL /HPF (ref 0–5)
ERYTHROCYTE [DISTWIDTH] IN BLOOD BY AUTOMATED COUNT: 14.8 % (ref 11.8–14.4)
GFR, ESTIMATED: 66 ML/MIN/1.73M2
GLUCOSE BLD-MCNC: 155 MG/DL (ref 74–100)
GLUCOSE SERPL-MCNC: 150 MG/DL (ref 74–99)
GLUCOSE UR STRIP-MCNC: ABNORMAL MG/DL
HCO3 VENOUS: 30.7 MMOL/L (ref 22–29)
HCT VFR BLD AUTO: 39.7 % (ref 36.3–47.1)
HCT VFR BLD AUTO: 49 % (ref 36–46)
HGB BLD-MCNC: 12.7 G/DL (ref 11.9–15.1)
HGB UR QL STRIP.AUTO: NEGATIVE
IMM GRANULOCYTES # BLD AUTO: <0.03 K/UL (ref 0–0.3)
IMM GRANULOCYTES NFR BLD: 0 %
INR PPP: 1
KETONES UR STRIP-MCNC: NEGATIVE MG/DL
LEUKOCYTE ESTERASE UR QL STRIP: NEGATIVE
LYMPHOCYTES NFR BLD: 4.92 K/UL (ref 1.1–3.7)
LYMPHOCYTES RELATIVE PERCENT: 59 % (ref 24–43)
MCH RBC QN AUTO: 28 PG (ref 25.2–33.5)
MCHC RBC AUTO-ENTMCNC: 32 G/DL (ref 28.4–34.8)
MCV RBC AUTO: 87.6 FL (ref 82.6–102.9)
MONOCYTES NFR BLD: 0.56 K/UL (ref 0.1–1.2)
MONOCYTES NFR BLD: 7 % (ref 3–12)
NEUTROPHILS NFR BLD: 31 % (ref 36–65)
NEUTS SEG NFR BLD: 2.62 K/UL (ref 1.5–8.1)
NITRITE UR QL STRIP: NEGATIVE
NRBC BLD-RTO: 0 PER 100 WBC
O2 SAT, VEN: 17.2 % (ref 60–85)
PCO2, VEN: 55.4 MM HG (ref 41–51)
PH UR STRIP: 7 [PH] (ref 5–8)
PH VENOUS: 7.35 (ref 7.32–7.43)
PLATELET # BLD AUTO: 296 K/UL (ref 138–453)
PMV BLD AUTO: 9.9 FL (ref 8.1–13.5)
PO2, VEN: 15.1 MM HG (ref 30–50)
POC ANION GAP: 8 MMOL/L (ref 7–16)
POC CREATININE: 1 MG/DL (ref 0.51–1.19)
POC HEMOGLOBIN (CALC): 16.7 G/DL (ref 12–16)
POC LACTIC ACID: 1.6 MMOL/L (ref 0.56–1.39)
POSITIVE BASE EXCESS, VEN: 3.3 MMOL/L (ref 0–3)
POTASSIUM BLD-SCNC: 3.3 MMOL/L (ref 3.5–4.5)
POTASSIUM SERPL-SCNC: 3.6 MMOL/L (ref 3.7–5.3)
PROT SERPL-MCNC: 7.5 G/DL (ref 6.6–8.7)
PROT UR STRIP-MCNC: NEGATIVE MG/DL
PROTHROMBIN TIME: 12.9 SEC (ref 11.7–14.9)
RBC # BLD AUTO: 4.53 M/UL (ref 3.95–5.11)
RBC # BLD: ABNORMAL 10*6/UL
RBC #/AREA URNS HPF: ABNORMAL /HPF (ref 0–4)
SODIUM BLD-SCNC: 142 MMOL/L (ref 138–146)
SODIUM SERPL-SCNC: 138 MMOL/L (ref 136–145)
SP GR UR STRIP: 1.02 (ref 1–1.03)
TROPONIN I SERPL HS-MCNC: 13 NG/L (ref 0–14)
UROBILINOGEN UR STRIP-ACNC: NORMAL EU/DL (ref 0–1)
WBC #/AREA URNS HPF: ABNORMAL /HPF (ref 0–5)
WBC OTHER # BLD: 8.4 K/UL (ref 3.5–11.3)

## 2024-06-03 PROCEDURE — 70498 CT ANGIOGRAPHY NECK: CPT

## 2024-06-03 PROCEDURE — 93005 ELECTROCARDIOGRAM TRACING: CPT | Performed by: STUDENT IN AN ORGANIZED HEALTH CARE EDUCATION/TRAINING PROGRAM

## 2024-06-03 PROCEDURE — 87086 URINE CULTURE/COLONY COUNT: CPT

## 2024-06-03 PROCEDURE — 82803 BLOOD GASES ANY COMBINATION: CPT

## 2024-06-03 PROCEDURE — 2580000003 HC RX 258: Performed by: STUDENT IN AN ORGANIZED HEALTH CARE EDUCATION/TRAINING PROGRAM

## 2024-06-03 PROCEDURE — 80053 COMPREHEN METABOLIC PANEL: CPT

## 2024-06-03 PROCEDURE — 70450 CT HEAD/BRAIN W/O DYE: CPT

## 2024-06-03 PROCEDURE — 84484 ASSAY OF TROPONIN QUANT: CPT

## 2024-06-03 PROCEDURE — 82565 ASSAY OF CREATININE: CPT

## 2024-06-03 PROCEDURE — 80051 ELECTROLYTE PANEL: CPT

## 2024-06-03 PROCEDURE — 84520 ASSAY OF UREA NITROGEN: CPT

## 2024-06-03 PROCEDURE — 71045 X-RAY EXAM CHEST 1 VIEW: CPT

## 2024-06-03 PROCEDURE — 83605 ASSAY OF LACTIC ACID: CPT

## 2024-06-03 PROCEDURE — 85014 HEMATOCRIT: CPT

## 2024-06-03 PROCEDURE — 85610 PROTHROMBIN TIME: CPT

## 2024-06-03 PROCEDURE — 99285 EMERGENCY DEPT VISIT HI MDM: CPT

## 2024-06-03 PROCEDURE — 85025 COMPLETE CBC W/AUTO DIFF WBC: CPT

## 2024-06-03 PROCEDURE — 6360000004 HC RX CONTRAST MEDICATION: Performed by: STUDENT IN AN ORGANIZED HEALTH CARE EDUCATION/TRAINING PROGRAM

## 2024-06-03 PROCEDURE — 82330 ASSAY OF CALCIUM: CPT

## 2024-06-03 PROCEDURE — 6370000000 HC RX 637 (ALT 250 FOR IP): Performed by: STUDENT IN AN ORGANIZED HEALTH CARE EDUCATION/TRAINING PROGRAM

## 2024-06-03 PROCEDURE — 82947 ASSAY GLUCOSE BLOOD QUANT: CPT

## 2024-06-03 PROCEDURE — 81001 URINALYSIS AUTO W/SCOPE: CPT

## 2024-06-03 RX ORDER — ACETAMINOPHEN 500 MG
1000 TABLET ORAL ONCE
Status: COMPLETED | OUTPATIENT
Start: 2024-06-03 | End: 2024-06-03

## 2024-06-03 RX ORDER — SODIUM CHLORIDE, SODIUM LACTATE, POTASSIUM CHLORIDE, AND CALCIUM CHLORIDE .6; .31; .03; .02 G/100ML; G/100ML; G/100ML; G/100ML
1000 INJECTION, SOLUTION INTRAVENOUS ONCE
Status: COMPLETED | OUTPATIENT
Start: 2024-06-03 | End: 2024-06-04

## 2024-06-03 RX ADMIN — SODIUM CHLORIDE, POTASSIUM CHLORIDE, SODIUM LACTATE AND CALCIUM CHLORIDE 1000 ML: 600; 310; 30; 20 INJECTION, SOLUTION INTRAVENOUS at 22:26

## 2024-06-03 RX ADMIN — ACETAMINOPHEN 1000 MG: 500 TABLET ORAL at 21:57

## 2024-06-03 RX ADMIN — IOPAMIDOL 90 ML: 755 INJECTION, SOLUTION INTRAVENOUS at 22:05

## 2024-06-03 ASSESSMENT — PAIN DESCRIPTION - LOCATION: LOCATION: HEAD

## 2024-06-03 ASSESSMENT — ENCOUNTER SYMPTOMS
BACK PAIN: 0
ABDOMINAL PAIN: 0
VOMITING: 0
SHORTNESS OF BREATH: 0
NAUSEA: 0

## 2024-06-03 ASSESSMENT — PAIN SCALES - GENERAL: PAINLEVEL_OUTOF10: 9

## 2024-06-03 ASSESSMENT — PAIN - FUNCTIONAL ASSESSMENT: PAIN_FUNCTIONAL_ASSESSMENT: 0-10

## 2024-06-04 VITALS
TEMPERATURE: 98.1 F | RESPIRATION RATE: 22 BRPM | DIASTOLIC BLOOD PRESSURE: 75 MMHG | HEART RATE: 74 BPM | OXYGEN SATURATION: 100 % | SYSTOLIC BLOOD PRESSURE: 175 MMHG

## 2024-06-04 PROBLEM — R51.9 ACUTE NONINTRACTABLE HEADACHE: Status: ACTIVE | Noted: 2024-06-04

## 2024-06-04 LAB
EKG ATRIAL RATE: 80 BPM
EKG P AXIS: 72 DEGREES
EKG P-R INTERVAL: 174 MS
EKG Q-T INTERVAL: 424 MS
EKG QRS DURATION: 98 MS
EKG QTC CALCULATION (BAZETT): 489 MS
EKG R AXIS: 42 DEGREES
EKG T AXIS: 43 DEGREES
EKG VENTRICULAR RATE: 80 BPM
MICROORGANISM SPEC CULT: NORMAL
SERVICE CMNT-IMP: NORMAL
SPECIMEN DESCRIPTION: NORMAL
TROPONIN I SERPL HS-MCNC: 11 NG/L (ref 0–14)

## 2024-06-04 PROCEDURE — 6370000000 HC RX 637 (ALT 250 FOR IP): Performed by: STUDENT IN AN ORGANIZED HEALTH CARE EDUCATION/TRAINING PROGRAM

## 2024-06-04 PROCEDURE — NBSRV NON-BILLABLE SERVICE: Performed by: PSYCHIATRY & NEUROLOGY

## 2024-06-04 RX ORDER — ACETAMINOPHEN 500 MG
500 TABLET ORAL EVERY 6 HOURS PRN
Qty: 10 TABLET | Refills: 0 | Status: SHIPPED | OUTPATIENT
Start: 2024-06-04

## 2024-06-04 RX ORDER — LISINOPRIL 2.5 MG/1
2.5 TABLET ORAL ONCE
Status: COMPLETED | OUTPATIENT
Start: 2024-06-04 | End: 2024-06-04

## 2024-06-04 RX ADMIN — LISINOPRIL 2.5 MG: 2.5 TABLET ORAL at 00:55

## 2024-06-04 NOTE — ED PROVIDER NOTES
[facility] ED     Emergency Department     Faculty Attestation        I performed a history and physical examination of the patient and discussed management with the resident. I reviewed the resident’s note and agree with the documented findings and plan of care. Any areas of disagreement are noted on the chart. I was personally present for the key portions of any procedures. I have documented in the chart those procedures where I was not present during the key portions. I have reviewed the emergency nurses triage note. I agree with the chief complaint, past medical history, past surgical history, allergies, medications, social and family history as documented unless otherwise noted below.    For mid-level providers such as nurse practitioners as well as physicians assistants:    I have personally seen and evaluated the patient.    I find the patient's history and physical exam are consistent with NP/PA documentation.  I agree with the care provided, treatment rendered, disposition, & follow-up plan.     Additional findings are as noted.    Vital Signs: BP (!) 109/90   Pulse 89   Temp 98.1 °F (36.7 °C) (Oral)   Resp 15   SpO2 99%   PCP:  Ciro Martinez Jr., MD    Pertinent Comments:     Patient presents with posterior headache and she had episode of right-sided weakness that is since resolved.  She states she has a continued headache and feels of the room spinning is a history of strokes in the past.  She has a NIH of 1 now.  Will discuss with stroke team, CT imaging laboratory studies, reassessment      Critical Care  None          Ap Barry MD    Attending Emergency Medicine Physician            Manfred Barry MD  06/03/24 7018    
     [facility]   Emergency Department  Emergency Medicine Attending Sign-out   Note started: 11:05 PM EDT    Care of Mikael Estevez was assumed from previous attending Dr. Barry at 11 PM and is being seen for Headache and Extremity Weakness  .  The patient's initial evaluation and plan have been discussed with the prior provider who initially evaluated the patient.     Attestation  I was available and discussed any additional care issues that arose and coordinated the management plans with the resident(s) caring for the patient during my duty period. Any areas of disagreement with resident's documentation of care or procedures are noted on the chart. I was personally present for the key portions of any/all procedures, during my duty period. I have documented in the chart those procedures where I was not present during the key portions.     BRIEF PATIENT SUMMARY/MDM COURSE PER INITIAL PROVIDER:   RECENT VITALS:     Temp: 98.1 °F (36.7 °C),  Pulse: 82, Respirations: 15, BP: (!) 184/73, SpO2: 100 %    This patient is a 54 y.o. Female with history of posterior headache, and multiple symptoms including vertigo and ataxia.  Being seen as a stroke consult.  They are recommending MRI,    DIAGNOSTICS/MEDICATIONS:     MEDICATIONS GIVEN:  ED Medication Orders (From admission, onward)      Start Ordered     Status Ordering Provider    06/03/24 2230 06/03/24 2224  lactated ringers bolus 1,000 mL  ONCE         Last MAR action: New Bag - by DUANE, SAVANNAH on 06/03/24 at 2226 JOSR TORRES    06/03/24 2204 06/03/24 2204  iopamidol (ISOVUE-370) 76 % injection 90 mL  IMG ONCE PRN         Last MAR action: Given - by MARILEE SALAZAR on 06/03/24 at 2205 JOSR TORRES    06/03/24 2200 06/03/24 2149  acetaminophen (TYLENOL) tablet 1,000 mg  ONCE         Last MAR action: Given - by DUANE, SAVANNAH on 06/03/24 at 2157 JOSR TORRES            LABS    Labs Reviewed   ELECTROLYTES PLUS - Abnormal; 
consciousness:  0 - alert; keenly responsive  1b.  Level of consciousness questions:  0 - answers both questions correctly  1c.  Level of consciousness questions:  0 - performs both tasks correctly  2.    Best Gaze:  0 - normal  3.    Visual:  0 - no visual loss  4.    Facial Palsy:  1 - minor paralysis (flattened nasolabial fold, asymmetric on smiling)  5a.  Motor left arm:  0 - no drift, limb holds 90 (or 45) degrees for full 10 seconds  5b.  Motor right arm:  0 - no drift, limb holds 90 (or 45) degrees for full 10 seconds  6a.  Motor left le - drift; leg falls by the end of the 5 second period but does not hit bed  6b.  Motor right le - drift; leg falls by the end of the 5 second period but does not hit bed  7.    Limb Ataxia:  0 - absent  8.    Sensory:  0 - normal; no sensory loss  9.    Best Language:  0 - no aphasia, normal  10.  Dysarthria:  1 - mild to moderate, patient slurs at least some words and at worst, can be understood with some difficulty  11.  Extinction and Inattention:  0 - no abnormality    TOTAL:  4    DDX/DIAGNOSTIC RESULTS / EMERGENCY DEPARTMENT COURSE / MDM     Medical Decision Making  54-year-old female presents emergency department for evaluation of generalized weakness and \"feeling off\" as well as a headache.  Did have episode of transient right-sided deficit yesterday while at [employer] however this has since resolved.  Currently NIH of 1 due to left-sided facial droop.  Given history of stroke and symptoms concerning for possible TIA yesterday as well as the left-sided facial droop today will obtain CT imaging as well as call stroke consult.  Will also obtain broad workup to assess for other etiology of her current symptoms such as infection versus metabolic derangement.  Will treat headache with Tylenol as per patient request.  Declines further medication at this time.     Differential includes TIA, lower suspicion for acute LVO given current physical exam, metabolic

## 2024-06-04 NOTE — CONSULTS
exclusion criteria: Last well more than 4.5 hrs   Candidate for Thrombectomy   Yes []    No  [x] due to the following exclusion criteria: No LVO on Imaging      Disposition   [] General Neurology Care Status - prefer 1st floor (1C)   [] Internal Medicine General Care Status   [] NICU Status - (1B)     [] MICU Status   [] Observation Status    Stroke admission order set:  [] [id number] - LEANDRO Intercerebral Hemorrhage Admission  [] [id number] - LEANDRO Sub Arachnoid Hemorrhage Admission  [] [id number] - LEANDRO Ischemic Stroke TPA Treatment Focused  [] [id number] - IP Ischemic Stroke ICU Post Alteplase (TPA) Admission   [] [id number] - GEN Ischemic Stroke Non-Thrombolytic Focused      Plan:       Offered repeat workup due to new headache. Patient declined due to social issues  Follow up with neurology as an outpatient in 3-4 weeks  Continue aspirin, plavix, and lipitor    - Discussed with Saleem Hilario MD    Additional recommendations may follow    Please contact EV NSG or telestroke with any changes in patients neurologic status.           Ronnie Luz MD   6/3/2024  11:10 PM

## 2024-06-04 NOTE — DISCHARGE INSTRUCTIONS
The stroke/neurology team recommended that you come in for MRI to further investigate the headache and the intermittent lightheadedness.     Your symptoms have resolved, however we cannot rule out TIA or \"mini stroke.\"     You are at risk for having subsequent TIA/strokes which could impact your daily functioning and could cause serious consequences including death.     Please call your PCP tomorrow to discuss getting this MRI performed as outpatient or return to the ED as soon as you are able for further evaluation.     Your BP was also elevated and you were given your home BP medication. Elevated BP can put you at higher risk of stroke.  Please take your blood pressure medication as prescribed and do not miss doses

## 2024-06-04 NOTE — ED NOTES
Pt brought to ED by EMS for headache and R sided weakness  Per EMS pt stated she had R sided weakness yesterday at [facility] but it did subside shortly after  Pt states that she has headache in the back of her head and states she feels off and has some diziness  Pt denies having any chest pain or SOb  Pt denies having any abdominal pain  Pt states that she has hx of stroke March 2024 but is unsure if she is on any blood thinners   EKG completed.  Breathing is non labored and no acute distress is noted.   Pt resting on stretcher, call light within reach.white board updated

## 2024-08-19 ENCOUNTER — HOSPITAL ENCOUNTER (EMERGENCY)
Age: 54
Discharge: HOME | End: 2024-08-19
Attending: EMERGENCY MEDICINE
Payer: MEDICARE

## 2024-08-19 VITALS
HEART RATE: 84 BPM | OXYGEN SATURATION: 100 % | DIASTOLIC BLOOD PRESSURE: 73 MMHG | SYSTOLIC BLOOD PRESSURE: 136 MMHG | TEMPERATURE: 99.2 F | BODY MASS INDEX: 20.36 KG/M2 | WEIGHT: 130 LBS | RESPIRATION RATE: 16 BRPM

## 2024-08-19 DIAGNOSIS — U07.1 COVID-19: Primary | ICD-10-CM

## 2024-08-19 LAB
ANION GAP SERPL CALCULATED.3IONS-SCNC: 12 MMOL/L (ref 9–17)
BASOPHILS # BLD: 0.03 K/UL (ref 0–0.2)
BASOPHILS NFR BLD: 1 % (ref 0–2)
BUN SERPL-MCNC: 12 MG/DL (ref 6–20)
BUN/CREAT SERPL: 20 (ref 9–20)
CALCIUM SERPL-MCNC: 8.6 MG/DL (ref 8.6–10.4)
CHLORIDE SERPL-SCNC: 94 MMOL/L (ref 98–107)
CO2 SERPL-SCNC: 25 MMOL/L (ref 20–31)
CREAT SERPL-MCNC: 0.6 MG/DL (ref 0.5–0.9)
EOSINOPHIL # BLD: 0.06 K/UL (ref 0–0.44)
EOSINOPHILS RELATIVE PERCENT: 1 % (ref 1–4)
ERYTHROCYTE [DISTWIDTH] IN BLOOD BY AUTOMATED COUNT: 15.1 % (ref 11.8–14.4)
FLUAV RNA RESP QL NAA+PROBE: NOT DETECTED
FLUBV RNA RESP QL NAA+PROBE: NOT DETECTED
GFR, ESTIMATED: >90 ML/MIN/1.73M2
GLUCOSE SERPL-MCNC: 293 MG/DL (ref 70–99)
HCT VFR BLD AUTO: 40.7 % (ref 36.3–47.1)
HGB BLD-MCNC: 13.7 G/DL (ref 11.9–15.1)
IMM GRANULOCYTES # BLD AUTO: 0.01 K/UL (ref 0–0.3)
IMM GRANULOCYTES NFR BLD: 0 %
LYMPHOCYTES NFR BLD: 1.97 K/UL (ref 1.1–3.7)
LYMPHOCYTES RELATIVE PERCENT: 43 % (ref 24–43)
MCH RBC QN AUTO: 28.3 PG (ref 25.2–33.5)
MCHC RBC AUTO-ENTMCNC: 33.7 G/DL (ref 28.4–34.8)
MCV RBC AUTO: 84.1 FL (ref 82.6–102.9)
MONOCYTES NFR BLD: 0.48 K/UL (ref 0.1–1.2)
MONOCYTES NFR BLD: 11 % (ref 3–12)
NEUTROPHILS NFR BLD: 44 % (ref 36–65)
NEUTS SEG NFR BLD: 1.99 K/UL (ref 1.5–8.1)
NRBC BLD-RTO: 0 PER 100 WBC
PLATELET # BLD AUTO: 324 K/UL (ref 138–453)
PMV BLD AUTO: 9.1 FL (ref 8.1–13.5)
POTASSIUM SERPL-SCNC: 3.7 MMOL/L (ref 3.7–5.3)
RBC # BLD AUTO: 4.84 M/UL (ref 3.95–5.11)
RBC # BLD: ABNORMAL 10*6/UL
SARS-COV-2 RNA RESP QL NAA+PROBE: DETECTED
SODIUM SERPL-SCNC: 131 MMOL/L (ref 135–144)
SOURCE: ABNORMAL
SPECIMEN DESCRIPTION: ABNORMAL
WBC OTHER # BLD: 4.5 K/UL (ref 3.5–11.3)

## 2024-08-19 PROCEDURE — 80048 BASIC METABOLIC PNL TOTAL CA: CPT

## 2024-08-19 PROCEDURE — 99284 EMERGENCY DEPT VISIT MOD MDM: CPT

## 2024-08-19 PROCEDURE — 87636 SARSCOV2 & INF A&B AMP PRB: CPT

## 2024-08-19 PROCEDURE — 6360000002 HC RX W HCPCS: Performed by: NURSE PRACTITIONER

## 2024-08-19 PROCEDURE — 85025 COMPLETE CBC W/AUTO DIFF WBC: CPT

## 2024-08-19 PROCEDURE — 96374 THER/PROPH/DIAG INJ IV PUSH: CPT

## 2024-08-19 PROCEDURE — 96361 HYDRATE IV INFUSION ADD-ON: CPT

## 2024-08-19 PROCEDURE — 2580000003 HC RX 258: Performed by: NURSE PRACTITIONER

## 2024-08-19 RX ORDER — ONDANSETRON 4 MG/1
4 TABLET, ORALLY DISINTEGRATING ORAL EVERY 8 HOURS PRN
Qty: 12 TABLET | Refills: 0 | Status: SHIPPED | OUTPATIENT
Start: 2024-08-19

## 2024-08-19 RX ORDER — ONDANSETRON 2 MG/ML
4 INJECTION INTRAMUSCULAR; INTRAVENOUS ONCE
Status: COMPLETED | OUTPATIENT
Start: 2024-08-19 | End: 2024-08-19

## 2024-08-19 RX ORDER — BENZONATATE 100 MG/1
100-200 CAPSULE ORAL 3 TIMES DAILY PRN
Qty: 30 CAPSULE | Refills: 0 | Status: SHIPPED | OUTPATIENT
Start: 2024-08-19

## 2024-08-19 RX ORDER — 0.9 % SODIUM CHLORIDE 0.9 %
1000 INTRAVENOUS SOLUTION INTRAVENOUS ONCE
Status: COMPLETED | OUTPATIENT
Start: 2024-08-19 | End: 2024-08-19

## 2024-08-19 RX ORDER — GUAIFENESIN 600 MG/1
600 TABLET, EXTENDED RELEASE ORAL 2 TIMES DAILY PRN
Qty: 12 TABLET | Refills: 0 | Status: SHIPPED | OUTPATIENT
Start: 2024-08-19

## 2024-08-19 RX ADMIN — ONDANSETRON 4 MG: 2 INJECTION INTRAMUSCULAR; INTRAVENOUS at 15:33

## 2024-08-19 RX ADMIN — SODIUM CHLORIDE 1000 ML: 9 INJECTION, SOLUTION INTRAVENOUS at 15:32

## 2024-08-19 ASSESSMENT — PAIN - FUNCTIONAL ASSESSMENT: PAIN_FUNCTIONAL_ASSESSMENT: 0-10

## 2024-08-19 ASSESSMENT — ENCOUNTER SYMPTOMS
DIARRHEA: 1
RHINORRHEA: 1
COLOR CHANGE: 0
COUGH: 1
BACK PAIN: 0
SHORTNESS OF BREATH: 0
NAUSEA: 1
ABDOMINAL PAIN: 1
SORE THROAT: 0
VOMITING: 1

## 2024-08-19 ASSESSMENT — PAIN DESCRIPTION - DESCRIPTORS: DESCRIPTORS: SHARP

## 2024-08-19 ASSESSMENT — PAIN DESCRIPTION - LOCATION: LOCATION: ABDOMEN;HEAD

## 2024-08-19 ASSESSMENT — PAIN SCALES - GENERAL: PAINLEVEL_OUTOF10: 8

## 2024-08-19 ASSESSMENT — PAIN DESCRIPTION - FREQUENCY: FREQUENCY: CONTINUOUS

## 2024-08-19 NOTE — ED PROVIDER NOTES
eMERGENCY dEPARTMENT eNCOUnter   Independent Attestation     Pt Name: Mikael Estevez  MRN: [id number]  Birthdate 1970  Date of evaluation: 8/19/24     Mikael Estevez is a 54 y.o. female with CC: Headache (Onset last Fri), Abdominal Pain, and Nausea & Vomiting      Based on the medical record the care appears appropriate.  I was personally available for consultation in the Emergency Department.    Jey Terrazas DO  Attending Emergency Physician                  Jey Terrazas DO  08/19/24 1582    
84   Resp: 16   Temp: 99.2 °F (37.3 °C)   TempSrc: Temporal   SpO2: 100%   Weight: 59 kg (130 lb)         MEDICATIONS GIVEN IN THE ED:  Medications   sodium chloride 0.9 % bolus 1,000 mL (1,000 mLs IntraVENous New Bag 8/19/24 1532)   ondansetron (ZOFRAN) injection 4 mg (4 mg IntraVENous Given 8/19/24 1533)         ED ORDERS:  Orders Placed This Encounter   Procedures    COVID-19 & Influenza Combo     Standing Status:   Standing     Number of Occurrences:   1     Order Specific Question:   Date of Symptom Onset     Answer:   8/16/2024     Order Specific Question:   Reason for Test     Answer:   Symptomatic     Order Specific Question:   Pregnant?     Answer:   No    CBC with Auto Differential     Standing Status:   Standing     Number of Occurrences:   1    BMP     Standing Status:   Standing     Number of Occurrences:   1    Saline lock IV     Standing Status:   Standing     Number of Occurrences:   1         CLINICAL DECISION MAKING:  The patient presented alert with a nontoxic appearance.    The patient was involved in her plan of care through shared decision making. The testing that was ordered was discussed with the patient. Any medications that may have been ordered were discussed with the patient.     I have reviewed the patient's previous medical records using the electronic health record that we have available that were pertinent to today's visit.      Labs were reviewed. She tested positive for Covid-19. Prescriptions were provided for tessalon perles, mucinex, and Zofran. Follow up with pcp for a recheck, further evaluation and treatment. Evaluation and treatment course in the ED, and plan of care upon discharge was discussed in length with the patient. Patient had no further questions prior to being discharged and was instructed to return to the ED for new or worsening symptoms.      MIPS Measure #65:  Appropriate Treatment for Patients with URI    [x] The patient was diagnosed with upper respiratory

## 2024-08-20 ENCOUNTER — HOSPITAL ENCOUNTER (OUTPATIENT)
Age: 54
Setting detail: OBSERVATION
Discharge: HOME | End: 2024-08-21
Attending: EMERGENCY MEDICINE | Admitting: EMERGENCY MEDICINE
Payer: MEDICARE

## 2024-08-20 ENCOUNTER — APPOINTMENT (OUTPATIENT)
Dept: GENERAL RADIOLOGY | Age: 54
End: 2024-08-20
Payer: MEDICARE

## 2024-08-20 DIAGNOSIS — R07.9 CHEST PAIN, UNSPECIFIED TYPE: Primary | ICD-10-CM

## 2024-08-20 DIAGNOSIS — U07.1 COVID-19: ICD-10-CM

## 2024-08-20 LAB
ANION GAP SERPL CALCULATED.3IONS-SCNC: 11 MMOL/L (ref 9–16)
ATYPICAL LYMPHOCYTE ABSOLUTE COUNT: 0.06 K/UL
ATYPICAL LYMPHOCYTES: 1 %
BASOPHILS # BLD: 0.06 K/UL (ref 0–0.2)
BASOPHILS NFR BLD: 1 % (ref 0–2)
BUN SERPL-MCNC: 11 MG/DL (ref 6–20)
CALCIUM SERPL-MCNC: 8.8 MG/DL (ref 8.6–10.4)
CHLORIDE SERPL-SCNC: 96 MMOL/L (ref 98–107)
CO2 SERPL-SCNC: 28 MMOL/L (ref 20–31)
CREAT SERPL-MCNC: 0.7 MG/DL (ref 0.5–0.9)
EOSINOPHIL # BLD: 0 K/UL (ref 0–0.4)
EOSINOPHILS RELATIVE PERCENT: 0 % (ref 1–4)
ERYTHROCYTE [DISTWIDTH] IN BLOOD BY AUTOMATED COUNT: 15.3 % (ref 11.8–14.4)
GFR, ESTIMATED: >90 ML/MIN/1.73M2
GLUCOSE BLD-MCNC: 215 MG/DL (ref 65–105)
GLUCOSE BLD-MCNC: 296 MG/DL (ref 65–105)
GLUCOSE SERPL-MCNC: 272 MG/DL (ref 74–99)
HCT VFR BLD AUTO: 37.9 % (ref 36.3–47.1)
HGB BLD-MCNC: 12.9 G/DL (ref 11.9–15.1)
IMM GRANULOCYTES # BLD AUTO: 0 K/UL (ref 0–0.3)
IMM GRANULOCYTES NFR BLD: 0 %
LYMPHOCYTES NFR BLD: 3.13 K/UL (ref 1–4.8)
LYMPHOCYTES RELATIVE PERCENT: 56 % (ref 24–44)
MCH RBC QN AUTO: 28.5 PG (ref 25.2–33.5)
MCHC RBC AUTO-ENTMCNC: 34 G/DL (ref 28.4–34.8)
MCV RBC AUTO: 83.7 FL (ref 82.6–102.9)
MONOCYTES NFR BLD: 0.45 K/UL (ref 0.1–0.8)
MONOCYTES NFR BLD: 8 % (ref 1–7)
MORPHOLOGY: NORMAL
NEUTROPHILS NFR BLD: 34 % (ref 36–66)
NEUTS SEG NFR BLD: 1.9 K/UL (ref 1.8–7.7)
NRBC BLD-RTO: 0 PER 100 WBC
PLATELET # BLD AUTO: 311 K/UL (ref 138–453)
PMV BLD AUTO: 9.2 FL (ref 8.1–13.5)
POTASSIUM SERPL-SCNC: 3.6 MMOL/L (ref 3.7–5.3)
RBC # BLD AUTO: 4.53 M/UL (ref 3.95–5.11)
SODIUM SERPL-SCNC: 135 MMOL/L (ref 136–145)
TROPONIN I SERPL HS-MCNC: 15 NG/L (ref 0–14)
TROPONIN I SERPL HS-MCNC: 9 NG/L (ref 0–14)
WBC OTHER # BLD: 5.6 K/UL (ref 3.5–11.3)

## 2024-08-20 PROCEDURE — 71045 X-RAY EXAM CHEST 1 VIEW: CPT

## 2024-08-20 PROCEDURE — 80048 BASIC METABOLIC PNL TOTAL CA: CPT

## 2024-08-20 PROCEDURE — 6370000000 HC RX 637 (ALT 250 FOR IP): Performed by: STUDENT IN AN ORGANIZED HEALTH CARE EDUCATION/TRAINING PROGRAM

## 2024-08-20 PROCEDURE — 85025 COMPLETE CBC W/AUTO DIFF WBC: CPT

## 2024-08-20 PROCEDURE — 93005 ELECTROCARDIOGRAM TRACING: CPT | Performed by: STUDENT IN AN ORGANIZED HEALTH CARE EDUCATION/TRAINING PROGRAM

## 2024-08-20 PROCEDURE — 84484 ASSAY OF TROPONIN QUANT: CPT

## 2024-08-20 PROCEDURE — 82947 ASSAY GLUCOSE BLOOD QUANT: CPT

## 2024-08-20 PROCEDURE — 99285 EMERGENCY DEPT VISIT HI MDM: CPT

## 2024-08-20 RX ORDER — ONDANSETRON 2 MG/ML
4 INJECTION INTRAMUSCULAR; INTRAVENOUS EVERY 6 HOURS PRN
Status: DISCONTINUED | OUTPATIENT
Start: 2024-08-20 | End: 2024-08-21 | Stop reason: HOSPADM

## 2024-08-20 RX ORDER — DEXTROSE MONOHYDRATE 100 MG/ML
INJECTION, SOLUTION INTRAVENOUS CONTINUOUS PRN
Status: DISCONTINUED | OUTPATIENT
Start: 2024-08-20 | End: 2024-08-21 | Stop reason: HOSPADM

## 2024-08-20 RX ORDER — SODIUM CHLORIDE 0.9 % (FLUSH) 0.9 %
5-40 SYRINGE (ML) INJECTION EVERY 12 HOURS SCHEDULED
Status: DISCONTINUED | OUTPATIENT
Start: 2024-08-21 | End: 2024-08-21 | Stop reason: HOSPADM

## 2024-08-20 RX ORDER — ONDANSETRON 4 MG/1
4 TABLET, ORALLY DISINTEGRATING ORAL EVERY 8 HOURS PRN
Status: DISCONTINUED | OUTPATIENT
Start: 2024-08-20 | End: 2024-08-21 | Stop reason: HOSPADM

## 2024-08-20 RX ORDER — POTASSIUM CHLORIDE 7.45 MG/ML
10 INJECTION INTRAVENOUS PRN
Status: DISCONTINUED | OUTPATIENT
Start: 2024-08-20 | End: 2024-08-21 | Stop reason: HOSPADM

## 2024-08-20 RX ORDER — MAGNESIUM SULFATE IN WATER 40 MG/ML
2000 INJECTION, SOLUTION INTRAVENOUS PRN
Status: DISCONTINUED | OUTPATIENT
Start: 2024-08-20 | End: 2024-08-21 | Stop reason: HOSPADM

## 2024-08-20 RX ORDER — POLYETHYLENE GLYCOL 3350 17 G/17G
17 POWDER, FOR SOLUTION ORAL DAILY PRN
Status: DISCONTINUED | OUTPATIENT
Start: 2024-08-20 | End: 2024-08-21 | Stop reason: HOSPADM

## 2024-08-20 RX ORDER — GLUCAGON 1 MG/ML
1 KIT INJECTION PRN
Status: DISCONTINUED | OUTPATIENT
Start: 2024-08-20 | End: 2024-08-21 | Stop reason: HOSPADM

## 2024-08-20 RX ORDER — INSULIN LISPRO 100 [IU]/ML
0-8 INJECTION, SOLUTION INTRAVENOUS; SUBCUTANEOUS
Status: DISCONTINUED | OUTPATIENT
Start: 2024-08-21 | End: 2024-08-21 | Stop reason: HOSPADM

## 2024-08-20 RX ORDER — ENOXAPARIN SODIUM 100 MG/ML
40 INJECTION SUBCUTANEOUS DAILY
Status: DISCONTINUED | OUTPATIENT
Start: 2024-08-21 | End: 2024-08-21 | Stop reason: HOSPADM

## 2024-08-20 RX ORDER — SODIUM CHLORIDE 9 MG/ML
INJECTION, SOLUTION INTRAVENOUS PRN
Status: DISCONTINUED | OUTPATIENT
Start: 2024-08-20 | End: 2024-08-21 | Stop reason: HOSPADM

## 2024-08-20 RX ORDER — INSULIN LISPRO 100 [IU]/ML
0-4 INJECTION, SOLUTION INTRAVENOUS; SUBCUTANEOUS NIGHTLY
Status: DISCONTINUED | OUTPATIENT
Start: 2024-08-21 | End: 2024-08-21 | Stop reason: HOSPADM

## 2024-08-20 RX ORDER — ACETAMINOPHEN 325 MG/1
650 TABLET ORAL EVERY 6 HOURS PRN
Status: DISCONTINUED | OUTPATIENT
Start: 2024-08-20 | End: 2024-08-21 | Stop reason: HOSPADM

## 2024-08-20 RX ORDER — ACETAMINOPHEN 650 MG/1
650 SUPPOSITORY RECTAL EVERY 6 HOURS PRN
Status: DISCONTINUED | OUTPATIENT
Start: 2024-08-20 | End: 2024-08-21 | Stop reason: HOSPADM

## 2024-08-20 RX ORDER — QUETIAPINE FUMARATE 25 MG/1
50 TABLET, FILM COATED ORAL NIGHTLY
Status: CANCELLED | OUTPATIENT
Start: 2024-08-21

## 2024-08-20 RX ORDER — POTASSIUM CHLORIDE 1500 MG/1
40 TABLET, EXTENDED RELEASE ORAL PRN
Status: DISCONTINUED | OUTPATIENT
Start: 2024-08-20 | End: 2024-08-21 | Stop reason: HOSPADM

## 2024-08-20 RX ORDER — ACETAMINOPHEN 500 MG
1000 TABLET ORAL ONCE
Status: COMPLETED | OUTPATIENT
Start: 2024-08-20 | End: 2024-08-20

## 2024-08-20 RX ORDER — SODIUM CHLORIDE 0.9 % (FLUSH) 0.9 %
5-40 SYRINGE (ML) INJECTION PRN
Status: DISCONTINUED | OUTPATIENT
Start: 2024-08-20 | End: 2024-08-21 | Stop reason: HOSPADM

## 2024-08-20 RX ADMIN — ACETAMINOPHEN 1000 MG: 500 TABLET ORAL at 21:18

## 2024-08-20 ASSESSMENT — PAIN DESCRIPTION - DESCRIPTORS
DESCRIPTORS: ACHING
DESCRIPTORS: ACHING

## 2024-08-20 ASSESSMENT — PAIN DESCRIPTION - LOCATION
LOCATION: CHEST;HEAD
LOCATION: CHEST

## 2024-08-20 ASSESSMENT — PAIN SCALES - GENERAL
PAINLEVEL_OUTOF10: 8
PAINLEVEL_OUTOF10: 10

## 2024-08-20 ASSESSMENT — PAIN DESCRIPTION - PAIN TYPE: TYPE: ACUTE PAIN

## 2024-08-21 VITALS
TEMPERATURE: 97.5 F | SYSTOLIC BLOOD PRESSURE: 140 MMHG | OXYGEN SATURATION: 97 % | RESPIRATION RATE: 18 BRPM | DIASTOLIC BLOOD PRESSURE: 46 MMHG | HEART RATE: 65 BPM

## 2024-08-21 LAB
EKG ATRIAL RATE: 52 BPM
EKG P AXIS: 51 DEGREES
EKG P-R INTERVAL: 150 MS
EKG Q-T INTERVAL: 474 MS
EKG QRS DURATION: 96 MS
EKG QTC CALCULATION (BAZETT): 440 MS
EKG R AXIS: 20 DEGREES
EKG T AXIS: 26 DEGREES
EKG VENTRICULAR RATE: 52 BPM
GLUCOSE BLD-MCNC: 164 MG/DL (ref 65–105)
GLUCOSE BLD-MCNC: 196 MG/DL (ref 65–105)
GLUCOSE BLD-MCNC: 249 MG/DL (ref 65–105)

## 2024-08-21 PROCEDURE — 6370000000 HC RX 637 (ALT 250 FOR IP): Performed by: STUDENT IN AN ORGANIZED HEALTH CARE EDUCATION/TRAINING PROGRAM

## 2024-08-21 PROCEDURE — 99223 1ST HOSP IP/OBS HIGH 75: CPT | Performed by: INTERNAL MEDICINE

## 2024-08-21 PROCEDURE — 2580000003 HC RX 258: Performed by: STUDENT IN AN ORGANIZED HEALTH CARE EDUCATION/TRAINING PROGRAM

## 2024-08-21 PROCEDURE — 93005 ELECTROCARDIOGRAM TRACING: CPT | Performed by: EMERGENCY MEDICINE

## 2024-08-21 PROCEDURE — 82947 ASSAY GLUCOSE BLOOD QUANT: CPT

## 2024-08-21 PROCEDURE — G0378 HOSPITAL OBSERVATION PER HR: HCPCS

## 2024-08-21 RX ORDER — ASPIRIN 81 MG/1
81 TABLET ORAL DAILY
Status: DISCONTINUED | OUTPATIENT
Start: 2024-08-21 | End: 2024-08-21 | Stop reason: HOSPADM

## 2024-08-21 RX ORDER — ATORVASTATIN CALCIUM 40 MG/1
40 TABLET, FILM COATED ORAL NIGHTLY
Status: DISCONTINUED | OUTPATIENT
Start: 2024-08-21 | End: 2024-08-21 | Stop reason: HOSPADM

## 2024-08-21 RX ADMIN — ACETAMINOPHEN 650 MG: 325 TABLET ORAL at 11:29

## 2024-08-21 RX ADMIN — ASPIRIN 81 MG: 81 TABLET, COATED ORAL at 11:29

## 2024-08-21 RX ADMIN — POTASSIUM BICARBONATE 40 MEQ: 782 TABLET, EFFERVESCENT ORAL at 11:29

## 2024-08-21 RX ADMIN — SODIUM CHLORIDE, PRESERVATIVE FREE 10 ML: 5 INJECTION INTRAVENOUS at 09:55

## 2024-08-21 NOTE — PROGRESS NOTES
[facility]  CDU / OBSERVATION ENCOUNTER  ATTENDING NOTE            Patient's symptoms of chest pain have improved.  Patient has symptoms more consistent with COVID.  Patient has had positive COVID testing and symptoms began approximately 48 hours earlier.  Patient does have comorbidities.  Will start Paxlovid.  Discussed with patient the cost and concerns related to that medication       Sam Briceno MD  Attending Emergency  Physician

## 2024-08-21 NOTE — ED PROVIDER NOTES
[facility] ED  Emergency Department Encounter  Emergency Medicine Resident     Pt Name:Mikael Estevez  MRN: [id number]  Birthdate 1970  Date of evaluation: 8/20/24  PCP:  Ciro Martinez Jr., MD  Note Started: 9:00 PM EDT      CHIEF COMPLAINT       Chief Complaint   Patient presents with    Chest Pain    Shortness of Breath    Positive For Covid-19    Homeless       HISTORY OF PRESENT ILLNESS  (Location/Symptom, Timing/Onset, Context/Setting, Quality, Duration, Modifying Factors, Severity.)      Mikael Estevez is a 54 y.o. female past medical history of diabetes, hypertension, presenting for assessment of myalgias, shortness of breath and chest discomfort.  Patient had symptom onset approximately 3 days ago.  She was diagnosed with COVID-19 yesterday.  Was discharged with prescriptions for Tessalon Perles, guaifenesin, ondansetron.  She reports having taken the medications.  No new symptoms today beyond what she is complaining of.  She does report being vaccinated.    PAST MEDICAL / SURGICAL / SOCIAL / FAMILY HISTORY      has a past medical history of Back pain, Bipolar 1 disorder (HCC), Diabetes mellitus (HCC), Disc disorder, and Hypertension.        has a past surgical history that includes Cholecystectomy and back surgery.       Social History     Socioeconomic History    Marital status: Single     Spouse name: Not on file    Number of children: Not on file    Years of education: Not on file    Highest education level: Not on file   Occupational History    Not on file   Tobacco Use    Smoking status: Every Day     Current packs/day: 0.50     Average packs/day: 0.5 packs/day for 13.0 years (6.5 ttl pk-yrs)     Types: Cigarettes    Smokeless tobacco: Never   Vaping Use    Vaping status: Never Used   Substance and Sexual Activity    Alcohol use: No    Drug use: Not Currently     Types: Cocaine     Comment: 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    Sexual activity: Not  ordered.    Risk  OTC drugs.  Prescription drug management.  Decision regarding hospitalization.        EKG  Normal sinus rhythm; rate 67; slight ST depression in leads III and aVF; this is improved from prior ECGs; normal T waves; normal conduction; no ectopy; normal axis    All EKG's are interpreted by the Emergency Department Physician who either signs or Co-signs this chart in the absence of a cardiologist.    EMERGENCY DEPARTMENT COURSE:  This patient presents to the emergency department blood pressure 155/170 otherwise unremarkable vital signs.  Complaining of shortness of breath myalgias and chest discomfort.  This is likely in keeping with her diagnosis of COVID-19 yesterday, symptoms have been ongoing for 3 days.  Given her cardiac history, we will obtain a cardiac workup, chest x-ray.  Administer Tylenol for symptom relief.  Plan to reassess    11:53 PM  Patient reassessed.  Stable.  Not complaining of chest pain at this time.  Maintains 100% on room air with normal heart rate.    Patient has a heart score of 4.  Her initial troponin was 15 with downtrend to 9.  Given her medical history and likely increased metabolic demand with current COVID-19 infection, will admit to observation unit for further monitoring as well as cardiology consultation in the morning.    I did ask patient what medication she takes at home.  She reports she only takes Seroquel at night.  She does not take any other medications.  On review of her meds I did see medication such as Plavix aspirin Crestor among others.  She will require a detailed review of her history and medication reconciliation upon discharge           PROCEDURES:       CONSULTS:  IP CONSULT TO CARDIOLOGY    CRITICAL CARE:  There was significant risk of life threatening deterioration of patient's condition requiring my direct management. Critical care time   minutes, excluding any documented procedures.    FINAL IMPRESSION      1. Chest pain, unspecified type    2.

## 2024-08-21 NOTE — PLAN OF CARE
Problem: Chronic Conditions and Co-morbidities  Goal: Patient's chronic conditions and co-morbidity symptoms are monitored and maintained or improved  8/21/2024 1346 by Julio Arora RN  Outcome: Adequate for Discharge  8/21/2024 0404 by Donis Matamoros RN  Outcome: Progressing     Problem: Discharge Planning  Goal: Discharge to home or other facility with appropriate resources  8/21/2024 1346 by Julio Arora RN  Outcome: Adequate for Discharge  8/21/2024 0404 by Donis Matamoros RN  Outcome: Progressing     Problem: ABCDS Injury Assessment  Goal: Absence of physical injury  8/21/2024 1346 by Julio Arora RN  Outcome: Adequate for Discharge  8/21/2024 0404 by Donis Matamoros, RN  Outcome: Progressing     Problem: Safety - Adult  Goal: Free from fall injury  8/21/2024 1346 by Julio Arora RN  Outcome: Adequate for Discharge  8/21/2024 0404 by Donis Matamoros, RN  Outcome: Progressing

## 2024-08-21 NOTE — ED NOTES
ED to inpatient nurses report      Chief Complaint:  Chief Complaint   Patient presents with    Chest Pain    Shortness of Breath    Positive For Covid-19    Homeless     Present to ED from: Car (Pt's homeless currently, lives in her car)     MOA:     LOC: alert and orientated to name, place, date  Mobility: Independent  Oxygen Baseline: RA    Current needs required: RA   Pending ED orders: none   Present condition: headache     Why did the patient come to the ED? COVID+, unrelieved symptoms       Pt presents to the ED with c/o of chest pain and SOB.   Pt states she tested positive for covid yesterday and is complaining of headache and chest pain.   Pt states she had not taken anything for her symptoms.   Pt states she is currently homeless and living in her car.   Pt place on full cardiac monitor,  Call light in reach, white board updated.       What is the plan? Admission to observation for cardiology   Any procedures or intervention occur? Trop x2, tylenol, chest xray   Any safety concerns?? Worsening condition, hyperglycemia, pain control     Mental Status:       Psych Assessment:      Vital signs   Vitals:    08/20/24 2026   BP: (!) 155/70   Pulse: 62   Resp: 18   Temp: 99.1 °F (37.3 °C)   TempSrc: Oral   SpO2: 100%        Vitals:  Patient Vitals for the past 24 hrs:   BP Temp Temp src Pulse Resp SpO2   08/20/24 2026 (!) 155/70 99.1 °F (37.3 °C) Oral 62 18 100 %      Visit Vitals  BP (!) 155/70   Pulse 62   Temp 99.1 °F (37.3 °C) (Oral)   Resp 18   SpO2 100%        LDAs:   Peripheral IV 08/19/24 Left Forearm (Active)   Site Assessment Clean, dry & intact 08/20/24 2348   Line Status Blood return noted 08/20/24 2348   Phlebitis Assessment No symptoms 08/20/24 2348   Infiltration Assessment 0 08/20/24 2348   Dressing Status New dressing applied 08/20/24 2348   Dressing Type Transparent 08/20/24 2348   Dressing Intervention New 08/20/24 2348       Ambulatory Status:  Presents to emergency department  because of  polyethylene glycol (GLYCOLAX) packet 17 g    OR Linked Order Group     acetaminophen (TYLENOL) tablet 650 mg     acetaminophen (TYLENOL) suppository 650 mg    insulin lispro (HUMALOG,ADMELOG) injection vial 0-8 Units    insulin lispro (HUMALOG,ADMELOG) injection vial 0-4 Units    glucose chewable tablet 16 g    OR Linked Order Group     dextrose bolus 10% 125 mL     dextrose bolus 10% 250 mL    glucagon injection 1 mg    dextrose 10 % infusion       SURGICAL HISTORY       Past Surgical History:   Procedure Laterality Date    BACK SURGERY      CHOLECYSTECTOMY         PAST MEDICAL HISTORY       Past Medical History:   Diagnosis Date    Back pain     Bipolar 1 disorder (HCC)     Diabetes mellitus (HCC)     Disc disorder     Hypertension        Labs:  Labs Reviewed   BASIC METABOLIC PANEL - Abnormal; Notable for the following components:       Result Value    Sodium 135 (*)     Potassium 3.6 (*)     Chloride 96 (*)     Glucose 272 (*)     All other components within normal limits   CBC WITH AUTO DIFFERENTIAL - Abnormal; Notable for the following components:    RDW 15.3 (*)     Neutrophils % 34 (*)     Lymphocytes % 56 (*)     Monocytes % 8 (*)     Eosinophils % 0 (*)     All other components within normal limits   TROPONIN - Abnormal; Notable for the following components:    Troponin, High Sensitivity 15 (*)     All other components within normal limits   POC GLUCOSE FINGERSTICK - Abnormal; Notable for the following components:    POC Glucose 215 (*)     All other components within normal limits   TROPONIN       Electronically signed by Adele Fritz RN on 8/20/2024 at 11:52 PM

## 2024-08-21 NOTE — H&P
[facility]  CDU / OBSERVATION ENCOUNTER  Physician NOTE     Pt Name: Mikael Estevez  MRN: [id number]  Birthdate 1970  Date of evaluation: 8/21/24  Patient's PCP is :  Ciro Martinez Jr., MD    CHIEF COMPLAINT       Chief Complaint   Patient presents with    Chest Pain    Shortness of Breath    Positive For Covid-19    Homeless         HISTORY OF PRESENT ILLNESS    Mikael Estevez is a 54 y.o. female with past medical history of T2DM, HTN, CVA, cocaine use, current  smoker, who presented to the ED yesterday with complaints of new onset chest pain, worsening cough and shortness of breath that began overnight. Patient was seen in ED on 8/19/24 for cough/congestion/nausea/vomiting with positive COVID test.    Patient describes chest pain as pressure-like, which worsens with cough and is relieved with rest. Pain does not radiate. Pain has improved since yesterday, she does not complain of any SOB at this time. Has been resting comfortably on room air. Complaints of lingering body aches and fatigue, but feels overall improved since admission.     Patient has a significant cardiac history including hypertension, TIA, severe PAD, CAD. Cardiac cath in 10/2022. Last echo in 12/2023 (EF 55-60%)    Patient currently lives in her car, has health insurance through job. 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 Current smoker, 15 pack year history. Patient reports occasional alcohol use and cocaine use, most recently 2 days ago.     Location/Symptom: left chest pain   Timing/Onset: 1 day  Provocation: cough, deep breathing  Quality: pressure-like  Radiation: none  Severity: 5/10  Timing/Duration: 1 day  Modifying Factors: none    History was obtained in part through review of the ED chart. When possible, a direct discussion was had with ED nurses, residents, and attendings  REVIEW OF SYSTEMS       General ROS - No fevers, fatigue  Ophthalmic ROS - No discharge, No changes in vision  ENT ROS -  No sore throat, No  MAKING NOTE:   Amount and/or Complexity of Data Reviewed  Labs: ordered.  Radiology: ordered.  ECG/medicine tests: ordered.     Risk  OTC drugs.  Prescription drug management.  Decision regarding hospitalization.           EKG  Normal sinus rhythm; rate 67; slight ST depression in leads III and aVF; this is improved from prior ECGs; normal T waves; normal conduction; no ectopy; normal axis     All EKG's are interpreted by the Emergency Department Physician who either signs or Co-signs this chart in the absence of a cardiologist.     EMERGENCY DEPARTMENT COURSE:  This patient presents to the emergency department blood pressure 155/170 otherwise unremarkable vital signs.  Complaining of shortness of breath myalgias and chest discomfort.  This is likely in keeping with her diagnosis of COVID-19 yesterday, symptoms have been ongoing for 3 days.  Given her cardiac history, we will obtain a cardiac workup, chest x-ray.  Administer Tylenol for symptom relief.  Plan to reassess     11:53 PM  Patient reassessed.  Stable.  Not complaining of chest pain at this time.  Maintains 100% on room air with normal heart rate.     Patient has a heart score of 4.  Her initial troponin was 15 with downtrend to 9.  Given her medical history and likely increased metabolic demand with current COVID-19 infection, will admit to observation unit for further monitoring as well as cardiology consultation in the morning.     I did ask patient what medication she takes at home.  She reports she only takes Seroquel at night.  She does not take any other medications.  On review of her meds I did see medication such as Plavix aspirin Crestor among others.  She will require a detailed review of her history and medication reconciliation upon discharge    DIAGNOSTIC RESULTS     EKG: All EKG's are interpreted by the Observation Physician who either signs or Co-signs this chart in the absence of a cardiologist.    EKG Interpretation    Interpreted by  observation physician    Rhythm: sinus bradycardia  Rate: 50-60  Axis: normal  Ectopy: none  Conduction: normal  ST Segments: no acute change  T Waves: no acute change  Q Waves: none     Clinical Impression: sinus bradycardia and evidence of old anterior infarct, QT shortened since last EKG 06/2024    Meenakshi Lester, M4          RADIOLOGY:   I directly visualized the following  images and reviewed the radiologist interpretations:    XR CHEST PORTABLE    Result Date: 8/20/2024  EXAMINATION: ONE XRAY VIEW OF THE CHEST 8/20/2024 11:18 pm COMPARISON: Chest radiograph 06/03/2024. HISTORY: ORDERING SYSTEM PROVIDED HISTORY: ro pna TECHNOLOGIST PROVIDED HISTORY: ro pna Reason for Exam: port Upright FINDINGS: The lungs are without acute focal process.  There is no effusion or pneumothorax. The cardiomediastinal silhouette is without acute process. The osseous structures are without acute process.     No acute process.       LABS:  I have reviewed and interpreted all available lab results.  Labs Reviewed   BASIC METABOLIC PANEL - Abnormal; Notable for the following components:       Result Value    Sodium 135 (*)     Potassium 3.6 (*)     Chloride 96 (*)     Glucose 272 (*)     All other components within normal limits   CBC WITH AUTO DIFFERENTIAL - Abnormal; Notable for the following components:    RDW 15.3 (*)     Neutrophils % 34 (*)     Lymphocytes % 56 (*)     Monocytes % 8 (*)     Eosinophils % 0 (*)     All other components within normal limits   TROPONIN - Abnormal; Notable for the following components:    Troponin, High Sensitivity 15 (*)     All other components within normal limits   POC GLUCOSE FINGERSTICK - Abnormal; Notable for the following components:    POC Glucose 215 (*)     All other components within normal limits   POC GLUCOSE FINGERSTICK - Abnormal; Notable for the following components:    POC Glucose 296 (*)     All other components within normal limits   POC GLUCOSE FINGERSTICK - Abnormal; Notable

## 2024-08-21 NOTE — ED PROVIDER NOTES
[facility] ED     Emergency Department     Faculty Attestation        I performed a history and physical examination of the patient and discussed management with the resident. I reviewed the resident’s note and agree with the documented findings and plan of care. Any areas of disagreement are noted on the chart. I was personally present for the key portions of any procedures. I have documented in the chart those procedures where I was not present during the key portions. I have reviewed the emergency nurses triage note. I agree with the chief complaint, past medical history, past surgical history, allergies, medications, social and family history as documented unless otherwise noted below.  For Physician Assistant/ Nurse Practitioner cases/documentation I have personally evaluated this patient and have completed at least one if not all key elements of the E/M (history, physical exam, and MDM). Additional findings are as noted.      Vital Signs: BP: (!) 155/70  Pulse: 62  Respirations: 18  Temp: 99.1 °F (37.3 °C) SpO2: 100 %  PCP:  Ciro Martinez Jr., MD  Note Started: 8/20/24, 8:55 PM EDT    Pertinent Comments:     Patient is a 54-year-old female with recent diagnosis of COVID yesterday with diffuse myalgias now as well as some chest pain worsened today and here for evaluation.   Physical exam is benign with lungs clear to station bilateral with midline trachea heart regular rate and rhythm and abdomen soft/nontender.    No obvious swelling in lower extremities or calf pain and intact distal pulses.    Assessment/Plan: Likely COVID syndrome but given patient age and risk factors will obtain brief cardiac workup as well.   Attempt symptomatic control and reevaluate after          EKG Interpretation    Interpreted by emergency department physician    Rhythm: normal sinus   Rate: normal at 67 bpm  Axis: normal  Conduction: normal  ST Segments: Subtle

## 2024-08-21 NOTE — ED NOTES
Pt presents to the ED with c/o of chest pain and SOB.   Pt states she tested positive for covid yesterday and is complaining of headache and chest pain.   Pt states she had not taken anything for her symptoms.   Pt states she is currently homeless and living in her car.   Pt place on full cardiac monitor,  Call light in reach, white board updated.

## 2024-08-21 NOTE — CONSULTS
[facility]    Inpatient Consultation Note               Today's Date: 8/21/2024  Patient Name: Mikael Estevez  Date of admission: 8/20/2024  8:51 PM  Patient's age: 54 y.o., 1970  Admission Dx: Chest pain [R07.9]  Chest pain, unspecified type [R07.9]  COVID-19 [U07.1]    Reason for  Consult:  Chest pain    Requesting Physician: Sam Briceno MD    CHIEF COMPLAINT:  Chest pain + shortness of breath   Chief Complaint   Patient presents with    Chest Pain    Shortness of Breath    Positive For Covid-19    Homeless       History Obtained From:  patient    HISTORY OF PRESENT ILLNESS:      55 y/o female patient with past medical history including PTSD, T2DM, HTN, CVA, and cocaine use presented to the ED yesterday with complaints of chest pain, cough, and shortness of breath that started yesterday. Patient was diagnosed with Covid 2 days ago.     Patient describes the pain as a pressure like pain to the left chest that does not radiate. It started while the patient was at rest in her car. Worsens with deep breathing. The pain is still present. No relieving factors noted. Patient does not take any of her medications, which include Plavix, lisinopril, Crestor, ASA, and other psych medications.     Patient is a current everyday smoker with 15 pack-year history. Patient also reports alcohol use and cocaine use. Last cocaine use was 2 days ago. She is homeless and currently lives in her car.     Patient had a loop recorder implanted 9 months ago due to a stroke. She has not had that interrogated since placement.     Troponin was initially 15 > 9. Historic troponins ranged from 7-17. Chest XR showed no acute process. Labs otherwise reviewed and showed no significant electrolyte abnormality. Hyperglycemic.     No palpitations reported. No other pertinent medical problems or symptoms reported.     Past Medical History:   has a past medical history of Back pain, Bipolar 1 disorder (HCC), Diabetes

## 2024-08-21 NOTE — DISCHARGE INSTRUCTIONS
Do not start/take crestor while on paxlovid    Your workup from the emergency department did not show any significant pathology but you were admitted to the observation unit for ongoing evaluation.      You saw the following specialists cardiologists    We no longer need to keep you in the hospital but that does not mean you are done being treated  -Take your prescribed medications as directed  -Follow-up with your primary care physician or the specialist designated or both as scheduled    Please return if your condition worsens or there are new symptoms    If there are concerns that have not been addressed while you have been in the hospital please let the discharge nurse know so the physician can be informed -it is easier to fix problems while you are still here    If you have questions after you have left the hospital please call the unit at  and ask for the observation resident on-call

## 2024-08-21 NOTE — CARE COORDINATION
DISCHARGE PLANNING EVALUATION: OP/OBSERVATION        8/21/24, 9:03 AM EDT    Mikael Estevez         Location: OBS 18/18-1   Reason for hospitalization: Chest pain [R07.9]  Chest pain, unspecified type [R07.9]  COVID-19 [U07.1]     CM Services requested for transitional needs.     PCP: Ciro Martinez Jr., MD    Transportation/Food Security/Housing Addressed:  No issues identified.     Equipment needs:     Case Management Services Information Letter Provided []    Transition plan: homeless, living in car, SW consulted

## 2024-08-22 LAB
EKG ATRIAL RATE: 67 BPM
EKG P AXIS: 31 DEGREES
EKG P-R INTERVAL: 138 MS
EKG Q-T INTERVAL: 424 MS
EKG QRS DURATION: 96 MS
EKG QTC CALCULATION (BAZETT): 448 MS
EKG R AXIS: 38 DEGREES
EKG T AXIS: 3 DEGREES
EKG VENTRICULAR RATE: 67 BPM

## 2024-08-22 NOTE — DISCHARGE SUMMARY
CDU Discharge Summary        Patient:  Mikael Estevez  YOB: 1970    MRN: [id number]   Acct: [id number]    Primary Care Physician: Ciro Martinez Jr., MD    Admit date:  8/20/2024  8:51 PM  Discharge date: 8/21/2024  2:25 PM      Discharge Diagnoses:     1.)  Patient had chest discomfort with acute onset due to likely COVID.  Treated with Paxlovid.  Patient's symptoms are improving with the plan to follow closely as outpatient    Follow-up:  Call today/tomorrow for a follow up appointment with Ciro Martinez Jr., MD , or return to the Emergency Room with worsening symptoms    Stressed to patient the importance of following up with primary care doctor for further workup/management of symptoms.  Pt verbalizes understanding and agrees with plan.    Discharge Medication Changes:       Medication List        START taking these medications      nirmatrelvir/ritonavir 300/100 20 x 150 MG & 10 x 100MG Tbpk  Commonly known as: PAXLOVID  Take 3 tablets by mouth in the morning and 3 tablets in the evening. Take 3 tablets (two 150 mg nirmatrelvir and one 100 mg ritonavir tablets) by mouth every 12 hours for 5 days..            CONTINUE taking these medications      acetaminophen 500 MG tablet  Commonly known as: TYLENOL  Take 1 tablet by mouth every 6 hours as needed for Pain     aspirin 81 MG EC tablet  Take 1 tablet by mouth daily     benzonatate 100 MG capsule  Commonly known as: TESSALON  Take 1-2 capsules by mouth 3 times daily as needed for Cough     clopidogrel 75 MG tablet  Commonly known as: PLAVIX  Take 1 tablet by mouth daily     famotidine 40 MG tablet  Commonly known as: PEPCID     guaiFENesin 600 MG extended release tablet  Commonly known as: MUCINEX  Take 1 tablet by mouth 2 times daily as needed for Congestion     insulin glargine 100 UNIT/ML injection vial  Commonly known as: LANTUS     lisinopril 2.5 MG tablet  Commonly known as: PRINIVIL;ZESTRIL  Take 1 tablet by mouth daily    that are not intended.

## 2025-04-10 ENCOUNTER — APPOINTMENT (OUTPATIENT)
Dept: GENERAL RADIOLOGY | Age: 55
End: 2025-04-10
Payer: MEDICARE

## 2025-04-10 ENCOUNTER — HOSPITAL ENCOUNTER (EMERGENCY)
Age: 55
Discharge: HOME | End: 2025-04-10
Attending: EMERGENCY MEDICINE
Payer: MEDICARE

## 2025-04-10 VITALS
DIASTOLIC BLOOD PRESSURE: 54 MMHG | RESPIRATION RATE: 18 BRPM | OXYGEN SATURATION: 100 % | TEMPERATURE: 98.2 F | SYSTOLIC BLOOD PRESSURE: 104 MMHG | HEART RATE: 108 BPM

## 2025-04-10 DIAGNOSIS — L08.9 INFECTION OF TOE: Primary | ICD-10-CM

## 2025-04-10 DIAGNOSIS — M79.671 RIGHT FOOT PAIN: ICD-10-CM

## 2025-04-10 LAB — GLUCOSE BLD-MCNC: 75 MG/DL (ref 65–105)

## 2025-04-10 PROCEDURE — 6370000000 HC RX 637 (ALT 250 FOR IP)

## 2025-04-10 PROCEDURE — 99283 EMERGENCY DEPT VISIT LOW MDM: CPT | Performed by: EMERGENCY MEDICINE

## 2025-04-10 PROCEDURE — 82947 ASSAY GLUCOSE BLOOD QUANT: CPT

## 2025-04-10 PROCEDURE — 73630 X-RAY EXAM OF FOOT: CPT

## 2025-04-10 RX ORDER — IBUPROFEN 600 MG/1
600 TABLET, FILM COATED ORAL 4 TIMES DAILY PRN
Qty: 360 TABLET | Refills: 0 | Status: ON HOLD | OUTPATIENT
Start: 2025-04-10

## 2025-04-10 RX ORDER — CEPHALEXIN 500 MG/1
500 CAPSULE ORAL 4 TIMES DAILY
Qty: 28 CAPSULE | Refills: 0 | Status: SHIPPED | OUTPATIENT
Start: 2025-04-10 | End: 2025-04-17

## 2025-04-10 RX ORDER — CEPHALEXIN 500 MG/1
500 CAPSULE ORAL ONCE
Status: COMPLETED | OUTPATIENT
Start: 2025-04-10 | End: 2025-04-10

## 2025-04-10 RX ORDER — IBUPROFEN 800 MG/1
800 TABLET, FILM COATED ORAL ONCE
Status: COMPLETED | OUTPATIENT
Start: 2025-04-10 | End: 2025-04-10

## 2025-04-10 RX ADMIN — CEPHALEXIN 500 MG: 500 CAPSULE ORAL at 16:21

## 2025-04-10 RX ADMIN — IBUPROFEN 800 MG: 800 TABLET, FILM COATED ORAL at 15:14

## 2025-04-10 ASSESSMENT — PAIN - FUNCTIONAL ASSESSMENT: PAIN_FUNCTIONAL_ASSESSMENT: ACTIVITIES ARE NOT PREVENTED

## 2025-04-10 ASSESSMENT — PAIN DESCRIPTION - ORIENTATION: ORIENTATION: RIGHT

## 2025-04-10 ASSESSMENT — PAIN SCALES - GENERAL
PAINLEVEL_OUTOF10: 10
PAINLEVEL_OUTOF10: 10

## 2025-04-10 ASSESSMENT — PAIN DESCRIPTION - DESCRIPTORS: DESCRIPTORS: ACHING;DISCOMFORT

## 2025-04-10 ASSESSMENT — PAIN DESCRIPTION - LOCATION: LOCATION: FOOT

## 2025-04-10 NOTE — ED PROVIDER NOTES
[facility] EMERGENCY DEPARTMENT     Emergency Department     Faculty Attestation    I performed a history and physical examination of the patient and discussed management with the resident. I reviewed the resident’s note and agree with the documented findings and plan of care. Any areas of disagreement are noted on the chart. I was personally present for the key portions of any procedures. I have documented in the chart those procedures where I was not present during the key portions. I have reviewed the emergency nurses triage note. I agree with the chief complaint, past medical history, past surgical history, allergies, medications, social and family history as documented unless otherwise noted below. For Physician Assistant/ Nurse Practitioner cases/documentation I have personally evaluated this patient and have completed at least one if not all key elements of the E/M (history, physical exam, and MDM). Additional findings are as noted.    3:35 PM EDT    Patient presents with pain to her right big toe that radiates down into the foot.  She says this started about a month ago after she stubbed it.  She says that the pain has been getting worse over the past week or so and has had some drainage from the toenail.  She denies any fevers or chills.  She denies any pain to the ankle.  Patient does have some tenderness to the right big toe and first MTP joint.  There is mild edema.  Patient does have some thickening of the nail which appears consistent with a fungal type infection.  Will get an x-ray.  Will treat patient's pain and reassess.      Nayeli Rosenberg MD  Attending Emergency  Physician

## 2025-04-10 NOTE — DISCHARGE INSTRUCTIONS
Your waited in the emergency department for right foot pain.  You have an infection around the toenail of your big toe.  Please take all antibiotics as prescribed and do not miss any doses.  Schedule a follow-up appoint with podiatry, Dr. Madison, contact information is provided below.    X-ray showed no signs of bone infection.    Return to the emergency department immediately for worsening your symptoms including severe pain, fever, nausea/vomiting, difficulty with walking or for any other worrisome concerns.

## 2025-04-10 NOTE — ED NOTES
Pt arrives to ED 31F via triage.   Pt co left foot pain.   Pt states foot is swollen and fluid has been coming out of big toe.   Pt states she hit her toe 2 months ago.   Pt states pain has gotten worse starting 2 weeks ago.   Pt states that she is a diabetic.   Pt states she has been taking tylenol with no relief of pain.   Pt denies taking any pain meds today.   Respirations even and unlabored. No acute distress noted.

## 2025-04-10 NOTE — ED PROVIDER NOTES
[facility] EMERGENCY DEPARTMENT  Emergency Department Encounter  Emergency Medicine Resident     Pt Name:Mikael Estevez  MRN: [id number]  Birthdate 1970  Date of evaluation: 4/10/25  PCP:  Ciro Martinez Jr., MD  Note Started: 3:02 PM EDT      CHIEF COMPLAINT       Chief Complaint   Patient presents with    Foot Pain     Right; hit it about 1 mo ago       HISTORY OF PRESENT ILLNESS  (Location/Symptom, Timing/Onset, Context/Setting, Quality, Duration, Modifying Factors, Severity.)      Mikael Estevez is a 55 y.o. female with history of insulin-dependent diabetes mellitus who presents with right foot pain.  Patient states approximately 2 months ago she stubbed her big toe.  She had complete resolution of symptoms after several days, was able to ambulate without difficulty.  Over the last 2 weeks patient has progressively worsening pain of the right foot, particular overlying the big toe and 1-2 metatarsals.  The pain is described as \"achy\".  She denies any associated fever, chills, myalgias or night sweats.    PAST MEDICAL / SURGICAL / SOCIAL / FAMILY HISTORY      has a past medical history of Back pain, Bipolar 1 disorder (HCC), Diabetes mellitus (HCC), Disc disorder, and Hypertension.       has a past surgical history that includes Cholecystectomy and back surgery.      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   [BG]      ED Course User Index  [BG] Unruly Concepcion MD       PROCEDURES:  None    CONSULTS:  None      FINAL IMPRESSION      1. Infection of toe    2. Right foot pain          DISPOSITION / PLAN     DISPOSITION Decision To Discharge 04/10/2025 04:17:43 PM   DISPOSITION CONDITION Stable     PATIENT REFERRED TO:  Saad Madison, DPM  [address] [id number] [phone]    Schedule an appointment as soon as possible for a visit   follow-up    [facility] Emergency Department  [address] [place] [address]  [phone]  Go to   If symptoms worsen      DISCHARGE MEDICATIONS:  New Prescriptions    CEPHALEXIN (KEFLEX) 500 MG CAPSULE    Take 1 capsule by mouth 4 times daily for 7 days    IBUPROFEN (ADVIL;MOTRIN) 600 MG TABLET    Take 1 tablet by mouth 4 times daily as needed for Pain       Unruly Concepcion MD  Emergency Medicine Resident    (Please note that portions of thisnote were completed with a voice recognition program.  Efforts were made to edit the dictations but occasionally words are mis-transcribed.)

## 2025-04-14 ENCOUNTER — TELEPHONE (OUTPATIENT)
Dept: PODIATRY | Age: 55
End: 2025-04-14

## 2025-04-14 NOTE — TELEPHONE ENCOUNTER
Patient called in to get appointment scheduled for right big toe pain/infection. Patient having drainage. Was seen in ED 04/10/25  Please return call  Thank you

## 2025-04-14 NOTE — TELEPHONE ENCOUNTER
Patient called back stating she needs mediation transportation and needs at least 2 days prior notice for appointments.    Thank you.

## 2025-04-19 ENCOUNTER — HOSPITAL ENCOUNTER (INPATIENT)
Age: 55
LOS: 17 days | Discharge: HOME HEALTH | DRG: 617 | End: 2025-05-06
Attending: EMERGENCY MEDICINE | Admitting: INTERNAL MEDICINE
Payer: MEDICARE

## 2025-04-19 ENCOUNTER — APPOINTMENT (OUTPATIENT)
Dept: GENERAL RADIOLOGY | Age: 55
DRG: 617 | End: 2025-04-19
Payer: MEDICARE

## 2025-04-19 ENCOUNTER — APPOINTMENT (OUTPATIENT)
Dept: VASCULAR LAB | Age: 55
DRG: 617 | End: 2025-04-19
Payer: MEDICARE

## 2025-04-19 DIAGNOSIS — I42.9 CARDIOMYOPATHY, UNSPECIFIED TYPE (HCC): ICD-10-CM

## 2025-04-19 DIAGNOSIS — L97.514 ULCER OF GREAT TOE, RIGHT, WITH NECROSIS OF BONE (HCC): ICD-10-CM

## 2025-04-19 DIAGNOSIS — I42.9 CARDIOMYOPATHY (HCC): ICD-10-CM

## 2025-04-19 DIAGNOSIS — M86.10 ACUTE OSTEOMYELITIS (HCC): ICD-10-CM

## 2025-04-19 DIAGNOSIS — Z98.890 POST-OPERATIVE STATE: ICD-10-CM

## 2025-04-19 DIAGNOSIS — M86.9 OSTEOMYELITIS OF GREAT TOE (HCC): Primary | ICD-10-CM

## 2025-04-19 DIAGNOSIS — S91.101A OPEN WOUND OF RIGHT GREAT TOE, INITIAL ENCOUNTER: ICD-10-CM

## 2025-04-19 DIAGNOSIS — I73.9 PERIPHERAL VASCULAR DISEASE: ICD-10-CM

## 2025-04-19 DIAGNOSIS — M79.671 RIGHT FOOT PAIN: ICD-10-CM

## 2025-04-19 DIAGNOSIS — M86.9 OSTEOMYELITIS OF RIGHT FOOT, UNSPECIFIED TYPE (HCC): ICD-10-CM

## 2025-04-19 DIAGNOSIS — M24.573 EQUINUS CONTRACTURE OF ANKLE: ICD-10-CM

## 2025-04-19 DIAGNOSIS — M86.00 ACUTE HEMATOGENOUS OSTEOMYELITIS, UNSPECIFIED SITE (HCC): ICD-10-CM

## 2025-04-19 LAB
AMPHET UR QL SCN: NEGATIVE
ANION GAP SERPL CALCULATED.3IONS-SCNC: 14 MMOL/L (ref 9–16)
BACTERIA URNS QL MICRO: ABNORMAL
BARBITURATES UR QL SCN: NEGATIVE
BASOPHILS # BLD: 0.05 K/UL (ref 0–0.2)
BASOPHILS NFR BLD: 1 % (ref 0–2)
BENZODIAZ UR QL: NEGATIVE
BILIRUB UR QL STRIP: NEGATIVE
BUN SERPL-MCNC: 17 MG/DL (ref 6–20)
CALCIUM SERPL-MCNC: 9.5 MG/DL (ref 8.6–10.4)
CANNABINOIDS UR QL SCN: NEGATIVE
CASTS #/AREA URNS LPF: ABNORMAL /LPF (ref 0–8)
CHLORIDE SERPL-SCNC: 96 MMOL/L (ref 98–107)
CHOLEST SERPL-MCNC: 205 MG/DL (ref 0–199)
CHOLESTEROL/HDL RATIO: 5.1
CLARITY UR: CLEAR
CO2 SERPL-SCNC: 24 MMOL/L (ref 20–31)
COCAINE UR QL SCN: POSITIVE
COLOR UR: YELLOW
CREAT SERPL-MCNC: 0.7 MG/DL (ref 0.6–0.9)
CRP SERPL HS-MCNC: 41.3 MG/L (ref 0–5)
EOSINOPHIL # BLD: 0.22 K/UL (ref 0–0.44)
EOSINOPHILS RELATIVE PERCENT: 2 % (ref 1–4)
EPI CELLS #/AREA URNS HPF: ABNORMAL /HPF (ref 0–5)
ERYTHROCYTE [DISTWIDTH] IN BLOOD BY AUTOMATED COUNT: 14.8 % (ref 11.8–14.4)
ERYTHROCYTE [SEDIMENTATION RATE] IN BLOOD BY PHOTOMETRIC METHOD: 71 MM/HR (ref 0–30)
EST. AVERAGE GLUCOSE BLD GHB EST-MCNC: 332 MG/DL
EST. AVERAGE GLUCOSE BLD GHB EST-MCNC: 332 MG/DL
FENTANYL UR QL: NEGATIVE
GFR, ESTIMATED: >90 ML/MIN/1.73M2
GLUCOSE BLD-MCNC: 317 MG/DL (ref 65–105)
GLUCOSE BLD-MCNC: 376 MG/DL (ref 65–105)
GLUCOSE BLD-MCNC: 439 MG/DL (ref 65–105)
GLUCOSE SERPL-MCNC: 359 MG/DL (ref 74–99)
GLUCOSE UR STRIP-MCNC: ABNORMAL MG/DL
HBA1C MFR BLD: 13.2 % (ref 4–6)
HBA1C MFR BLD: 13.2 % (ref 4–6)
HCT VFR BLD AUTO: 41.4 % (ref 36.3–47.1)
HDLC SERPL-MCNC: 40 MG/DL
HGB BLD-MCNC: 13.9 G/DL (ref 11.9–15.1)
HGB UR QL STRIP.AUTO: NEGATIVE
IMM GRANULOCYTES # BLD AUTO: 0.05 K/UL (ref 0–0.3)
IMM GRANULOCYTES NFR BLD: 1 %
KETONES UR STRIP-MCNC: ABNORMAL MG/DL
LDLC SERPL CALC-MCNC: 99 MG/DL (ref 0–100)
LEUKOCYTE ESTERASE UR QL STRIP: NEGATIVE
LYMPHOCYTES NFR BLD: 3.88 K/UL (ref 1.1–3.7)
LYMPHOCYTES RELATIVE PERCENT: 36 % (ref 24–43)
MCH RBC QN AUTO: 27.4 PG (ref 25.2–33.5)
MCHC RBC AUTO-ENTMCNC: 33.6 G/DL (ref 28.4–34.8)
MCV RBC AUTO: 81.7 FL (ref 82.6–102.9)
METHADONE UR QL: NEGATIVE
MONOCYTES NFR BLD: 0.6 K/UL (ref 0.1–1.2)
MONOCYTES NFR BLD: 6 % (ref 3–12)
NEUTROPHILS NFR BLD: 54 % (ref 36–65)
NEUTS SEG NFR BLD: 5.95 K/UL (ref 1.5–8.1)
NITRITE UR QL STRIP: NEGATIVE
NRBC BLD-RTO: 0 PER 100 WBC
OPIATES UR QL SCN: NEGATIVE
OXYCODONE UR QL SCN: NEGATIVE
PCP UR QL SCN: NEGATIVE
PH UR STRIP: 5 [PH] (ref 5–8)
PLATELET # BLD AUTO: 425 K/UL (ref 138–453)
PMV BLD AUTO: 9.1 FL (ref 8.1–13.5)
POTASSIUM SERPL-SCNC: 4.1 MMOL/L (ref 3.7–5.3)
PROT UR STRIP-MCNC: ABNORMAL MG/DL
RBC # BLD AUTO: 5.07 M/UL (ref 3.95–5.11)
RBC # BLD: ABNORMAL 10*6/UL
RBC #/AREA URNS HPF: ABNORMAL /HPF (ref 0–4)
SODIUM SERPL-SCNC: 134 MMOL/L (ref 136–145)
SP GR UR STRIP: 1.04 (ref 1–1.03)
TEST INFORMATION: ABNORMAL
TRIGL SERPL-MCNC: 332 MG/DL
UROBILINOGEN UR STRIP-ACNC: NORMAL EU/DL (ref 0–1)
VAS LEFT ABI: 0.46
VAS LEFT ARM BP: 142 MMHG
VAS LEFT DORSALIS PEDIS BP: 62 MMHG
VAS LEFT PTA BP: 65 MMHG
VAS LEFT TBI: 0
VAS LEFT TOE PRESSURE: 0 MMHG
VAS RIGHT ABI: 0.31
VAS RIGHT ARM BP: 140 MMHG
VAS RIGHT DORSALIS PEDIS BP: 41 MMHG
VAS RIGHT PTA BP: 44 MMHG
VAS RIGHT TBI: 0
VAS RIGHT TOE PRESSURE: 0 MMHG
VLDLC SERPL CALC-MCNC: 66 MG/DL (ref 1–30)
WBC #/AREA URNS HPF: ABNORMAL /HPF (ref 0–5)
WBC OTHER # BLD: 10.8 K/UL (ref 3.5–11.3)

## 2025-04-19 PROCEDURE — 81001 URINALYSIS AUTO W/SCOPE: CPT

## 2025-04-19 PROCEDURE — 96374 THER/PROPH/DIAG INJ IV PUSH: CPT

## 2025-04-19 PROCEDURE — 87641 MR-STAPH DNA AMP PROBE: CPT

## 2025-04-19 PROCEDURE — 80048 BASIC METABOLIC PNL TOTAL CA: CPT

## 2025-04-19 PROCEDURE — 2500000003 HC RX 250 WO HCPCS

## 2025-04-19 PROCEDURE — 85652 RBC SED RATE AUTOMATED: CPT

## 2025-04-19 PROCEDURE — 87070 CULTURE OTHR SPECIMN AEROBIC: CPT

## 2025-04-19 PROCEDURE — 80061 LIPID PANEL: CPT

## 2025-04-19 PROCEDURE — 6370000000 HC RX 637 (ALT 250 FOR IP): Performed by: STUDENT IN AN ORGANIZED HEALTH CARE EDUCATION/TRAINING PROGRAM

## 2025-04-19 PROCEDURE — 85025 COMPLETE CBC W/AUTO DIFF WBC: CPT

## 2025-04-19 PROCEDURE — 6370000000 HC RX 637 (ALT 250 FOR IP): Performed by: INTERNAL MEDICINE

## 2025-04-19 PROCEDURE — 11044 DBRDMT BONE 1ST 20 SQ CM/<: CPT | Performed by: PODIATRIST

## 2025-04-19 PROCEDURE — 83036 HEMOGLOBIN GLYCOSYLATED A1C: CPT

## 2025-04-19 PROCEDURE — 87077 CULTURE AEROBIC IDENTIFY: CPT

## 2025-04-19 PROCEDURE — 2580000003 HC RX 258: Performed by: INTERNAL MEDICINE

## 2025-04-19 PROCEDURE — 99222 1ST HOSP IP/OBS MODERATE 55: CPT | Performed by: STUDENT IN AN ORGANIZED HEALTH CARE EDUCATION/TRAINING PROGRAM

## 2025-04-19 PROCEDURE — 36415 COLL VENOUS BLD VENIPUNCTURE: CPT

## 2025-04-19 PROCEDURE — 93005 ELECTROCARDIOGRAM TRACING: CPT

## 2025-04-19 PROCEDURE — 6360000002 HC RX W HCPCS

## 2025-04-19 PROCEDURE — 96375 TX/PRO/DX INJ NEW DRUG ADDON: CPT

## 2025-04-19 PROCEDURE — 99222 1ST HOSP IP/OBS MODERATE 55: CPT | Performed by: INTERNAL MEDICINE

## 2025-04-19 PROCEDURE — 99222 1ST HOSP IP/OBS MODERATE 55: CPT | Performed by: PODIATRIST

## 2025-04-19 PROCEDURE — 1200000000 HC SEMI PRIVATE

## 2025-04-19 PROCEDURE — 86140 C-REACTIVE PROTEIN: CPT

## 2025-04-19 PROCEDURE — 87075 CULTR BACTERIA EXCEPT BLOOD: CPT

## 2025-04-19 PROCEDURE — 2580000003 HC RX 258

## 2025-04-19 PROCEDURE — 93923 UPR/LXTR ART STDY 3+ LVLS: CPT

## 2025-04-19 PROCEDURE — 82947 ASSAY GLUCOSE BLOOD QUANT: CPT

## 2025-04-19 PROCEDURE — 6360000002 HC RX W HCPCS: Performed by: INTERNAL MEDICINE

## 2025-04-19 PROCEDURE — 99223 1ST HOSP IP/OBS HIGH 75: CPT | Performed by: INTERNAL MEDICINE

## 2025-04-19 PROCEDURE — 6370000000 HC RX 637 (ALT 250 FOR IP)

## 2025-04-19 PROCEDURE — 93923 UPR/LXTR ART STDY 3+ LVLS: CPT | Performed by: STUDENT IN AN ORGANIZED HEALTH CARE EDUCATION/TRAINING PROGRAM

## 2025-04-19 PROCEDURE — 99285 EMERGENCY DEPT VISIT HI MDM: CPT

## 2025-04-19 PROCEDURE — 87076 CULTURE ANAEROBE IDENT EACH: CPT

## 2025-04-19 PROCEDURE — 86403 PARTICLE AGGLUT ANTBDY SCRN: CPT

## 2025-04-19 PROCEDURE — 87205 SMEAR GRAM STAIN: CPT

## 2025-04-19 PROCEDURE — 73630 X-RAY EXAM OF FOOT: CPT

## 2025-04-19 PROCEDURE — 80307 DRUG TEST PRSMV CHEM ANLYZR: CPT

## 2025-04-19 RX ORDER — ROSUVASTATIN CALCIUM 10 MG/1
10 TABLET, COATED ORAL DAILY
Status: DISCONTINUED | OUTPATIENT
Start: 2025-04-19 | End: 2025-04-21

## 2025-04-19 RX ORDER — IBUPROFEN 600 MG/1
600 TABLET, FILM COATED ORAL 4 TIMES DAILY PRN
Status: DISCONTINUED | OUTPATIENT
Start: 2025-04-19 | End: 2025-04-19

## 2025-04-19 RX ORDER — SODIUM CHLORIDE 9 MG/ML
INJECTION, SOLUTION INTRAVENOUS PRN
Status: DISCONTINUED | OUTPATIENT
Start: 2025-04-19 | End: 2025-05-06 | Stop reason: HOSPADM

## 2025-04-19 RX ORDER — ACETAMINOPHEN 500 MG
1000 TABLET ORAL ONCE
Status: COMPLETED | OUTPATIENT
Start: 2025-04-19 | End: 2025-04-19

## 2025-04-19 RX ORDER — INSULIN GLARGINE 100 [IU]/ML
24 INJECTION, SOLUTION SUBCUTANEOUS NIGHTLY
COMMUNITY

## 2025-04-19 RX ORDER — ASPIRIN 81 MG/1
81 TABLET ORAL DAILY
Status: DISCONTINUED | OUTPATIENT
Start: 2025-04-19 | End: 2025-05-06 | Stop reason: HOSPADM

## 2025-04-19 RX ORDER — INSULIN GLARGINE 100 [IU]/ML
10 INJECTION, SOLUTION SUBCUTANEOUS DAILY
Status: DISCONTINUED | OUTPATIENT
Start: 2025-04-19 | End: 2025-04-19

## 2025-04-19 RX ORDER — DEXTROSE MONOHYDRATE 100 MG/ML
INJECTION, SOLUTION INTRAVENOUS CONTINUOUS PRN
Status: DISCONTINUED | OUTPATIENT
Start: 2025-04-19 | End: 2025-05-06 | Stop reason: HOSPADM

## 2025-04-19 RX ORDER — QUETIAPINE FUMARATE 25 MG/1
50 TABLET, FILM COATED ORAL NIGHTLY
Status: DISCONTINUED | OUTPATIENT
Start: 2025-04-19 | End: 2025-04-19

## 2025-04-19 RX ORDER — LIDOCAINE 4 G/G
1 PATCH TOPICAL ONCE
Status: COMPLETED | OUTPATIENT
Start: 2025-04-19 | End: 2025-04-19

## 2025-04-19 RX ORDER — LISINOPRIL 5 MG/1
2.5 TABLET ORAL DAILY
Status: DISCONTINUED | OUTPATIENT
Start: 2025-04-19 | End: 2025-04-21

## 2025-04-19 RX ORDER — QUETIAPINE FUMARATE 100 MG/1
150 TABLET, FILM COATED ORAL NIGHTLY
COMMUNITY

## 2025-04-19 RX ORDER — ONDANSETRON 2 MG/ML
4 INJECTION INTRAMUSCULAR; INTRAVENOUS EVERY 6 HOURS PRN
Status: DISCONTINUED | OUTPATIENT
Start: 2025-04-19 | End: 2025-05-06 | Stop reason: HOSPADM

## 2025-04-19 RX ORDER — SODIUM CHLORIDE 0.9 % (FLUSH) 0.9 %
5-40 SYRINGE (ML) INJECTION PRN
Status: DISCONTINUED | OUTPATIENT
Start: 2025-04-19 | End: 2025-05-06 | Stop reason: HOSPADM

## 2025-04-19 RX ORDER — POTASSIUM CHLORIDE 7.45 MG/ML
10 INJECTION INTRAVENOUS PRN
Status: DISCONTINUED | OUTPATIENT
Start: 2025-04-19 | End: 2025-05-06 | Stop reason: HOSPADM

## 2025-04-19 RX ORDER — POLYETHYLENE GLYCOL 3350 17 G/17G
17 POWDER, FOR SOLUTION ORAL DAILY PRN
Status: DISCONTINUED | OUTPATIENT
Start: 2025-04-19 | End: 2025-05-04

## 2025-04-19 RX ORDER — INSULIN GLARGINE 100 [IU]/ML
20 INJECTION, SOLUTION SUBCUTANEOUS DAILY
Status: DISCONTINUED | OUTPATIENT
Start: 2025-04-20 | End: 2025-04-21

## 2025-04-19 RX ORDER — LINEZOLID 600 MG/1
600 TABLET, FILM COATED ORAL EVERY 12 HOURS SCHEDULED
Status: DISCONTINUED | OUTPATIENT
Start: 2025-04-19 | End: 2025-04-26

## 2025-04-19 RX ORDER — GABAPENTIN 100 MG/1
100 CAPSULE ORAL 3 TIMES DAILY
Qty: 36 CAPSULE | Refills: 0 | Status: SHIPPED | OUTPATIENT
Start: 2025-04-19 | End: 2025-05-06 | Stop reason: HOSPADM

## 2025-04-19 RX ORDER — INSULIN LISPRO 100 [IU]/ML
0-4 INJECTION, SOLUTION INTRAVENOUS; SUBCUTANEOUS
Status: DISCONTINUED | OUTPATIENT
Start: 2025-04-19 | End: 2025-04-19

## 2025-04-19 RX ORDER — DOXYCYCLINE HYCLATE 100 MG
100 TABLET ORAL ONCE
Status: DISCONTINUED | OUTPATIENT
Start: 2025-04-19 | End: 2025-04-19

## 2025-04-19 RX ORDER — POTASSIUM CHLORIDE 1500 MG/1
40 TABLET, EXTENDED RELEASE ORAL PRN
Status: DISCONTINUED | OUTPATIENT
Start: 2025-04-19 | End: 2025-05-06 | Stop reason: HOSPADM

## 2025-04-19 RX ORDER — SODIUM CHLORIDE 0.9 % (FLUSH) 0.9 %
5-40 SYRINGE (ML) INJECTION EVERY 12 HOURS SCHEDULED
Status: DISCONTINUED | OUTPATIENT
Start: 2025-04-19 | End: 2025-05-06 | Stop reason: HOSPADM

## 2025-04-19 RX ORDER — OXYCODONE AND ACETAMINOPHEN 5; 325 MG/1; MG/1
1 TABLET ORAL EVERY 4 HOURS PRN
Refills: 0 | Status: DISCONTINUED | OUTPATIENT
Start: 2025-04-19 | End: 2025-04-22

## 2025-04-19 RX ORDER — LIDOCAINE 4 G/G
1 PATCH TOPICAL DAILY
Qty: 7 PATCH | Refills: 0 | Status: SHIPPED | OUTPATIENT
Start: 2025-04-19 | End: 2025-04-26

## 2025-04-19 RX ORDER — ACETAMINOPHEN 325 MG/1
650 TABLET ORAL EVERY 6 HOURS PRN
Status: DISCONTINUED | OUTPATIENT
Start: 2025-04-19 | End: 2025-05-06 | Stop reason: HOSPADM

## 2025-04-19 RX ORDER — GABAPENTIN 100 MG/1
100 CAPSULE ORAL ONCE
Status: COMPLETED | OUTPATIENT
Start: 2025-04-19 | End: 2025-04-19

## 2025-04-19 RX ORDER — ACETAMINOPHEN 650 MG/1
650 SUPPOSITORY RECTAL EVERY 6 HOURS PRN
Status: DISCONTINUED | OUTPATIENT
Start: 2025-04-19 | End: 2025-05-06 | Stop reason: HOSPADM

## 2025-04-19 RX ORDER — LIDOCAINE HYDROCHLORIDE 10 MG/ML
20 INJECTION, SOLUTION EPIDURAL; INFILTRATION; INTRACAUDAL; PERINEURAL ONCE
Status: DISCONTINUED | OUTPATIENT
Start: 2025-04-19 | End: 2025-04-24

## 2025-04-19 RX ORDER — ACETAMINOPHEN 500 MG
1000 TABLET ORAL EVERY 8 HOURS PRN
Qty: 30 TABLET | Refills: 0 | Status: SHIPPED | OUTPATIENT
Start: 2025-04-19

## 2025-04-19 RX ORDER — KETOROLAC TROMETHAMINE 30 MG/ML
30 INJECTION, SOLUTION INTRAMUSCULAR; INTRAVENOUS ONCE
Status: COMPLETED | OUTPATIENT
Start: 2025-04-19 | End: 2025-04-19

## 2025-04-19 RX ORDER — INSULIN ASPART 100 [IU]/ML
INJECTION, SOLUTION INTRAVENOUS; SUBCUTANEOUS
COMMUNITY

## 2025-04-19 RX ORDER — MAGNESIUM SULFATE IN WATER 40 MG/ML
2000 INJECTION, SOLUTION INTRAVENOUS PRN
Status: DISCONTINUED | OUTPATIENT
Start: 2025-04-19 | End: 2025-05-06 | Stop reason: HOSPADM

## 2025-04-19 RX ORDER — KETOROLAC TROMETHAMINE 15 MG/ML
15 INJECTION, SOLUTION INTRAMUSCULAR; INTRAVENOUS EVERY 6 HOURS PRN
Status: DISCONTINUED | OUTPATIENT
Start: 2025-04-19 | End: 2025-04-24

## 2025-04-19 RX ORDER — OXYCODONE AND ACETAMINOPHEN 5; 325 MG/1; MG/1
2 TABLET ORAL EVERY 4 HOURS PRN
Refills: 0 | Status: DISCONTINUED | OUTPATIENT
Start: 2025-04-19 | End: 2025-04-22

## 2025-04-19 RX ORDER — DOXYCYCLINE HYCLATE 100 MG
100 TABLET ORAL 2 TIMES DAILY
Qty: 14 TABLET | Refills: 0 | Status: SHIPPED | OUTPATIENT
Start: 2025-04-19 | End: 2025-04-26 | Stop reason: HOSPADM

## 2025-04-19 RX ORDER — ENOXAPARIN SODIUM 100 MG/ML
40 INJECTION SUBCUTANEOUS DAILY
Status: DISCONTINUED | OUTPATIENT
Start: 2025-04-19 | End: 2025-05-02

## 2025-04-19 RX ORDER — ONDANSETRON 4 MG/1
4 TABLET, ORALLY DISINTEGRATING ORAL EVERY 8 HOURS PRN
Status: DISCONTINUED | OUTPATIENT
Start: 2025-04-19 | End: 2025-05-06 | Stop reason: HOSPADM

## 2025-04-19 RX ORDER — INSULIN LISPRO 100 [IU]/ML
0-16 INJECTION, SOLUTION INTRAVENOUS; SUBCUTANEOUS
Status: DISCONTINUED | OUTPATIENT
Start: 2025-04-19 | End: 2025-04-24

## 2025-04-19 RX ORDER — GLUCAGON 1 MG/ML
1 KIT INJECTION PRN
Status: DISCONTINUED | OUTPATIENT
Start: 2025-04-19 | End: 2025-05-06 | Stop reason: HOSPADM

## 2025-04-19 RX ADMIN — ACETAMINOPHEN 1000 MG: 500 TABLET ORAL at 03:40

## 2025-04-19 RX ADMIN — QUETIAPINE FUMARATE 150 MG: 100 TABLET ORAL at 20:16

## 2025-04-19 RX ADMIN — ROSUVASTATIN CALCIUM 10 MG: 10 TABLET, FILM COATED ORAL at 12:46

## 2025-04-19 RX ADMIN — ASPIRIN 81 MG: 81 TABLET, COATED ORAL at 12:46

## 2025-04-19 RX ADMIN — ENOXAPARIN SODIUM 40 MG: 100 INJECTION SUBCUTANEOUS at 12:46

## 2025-04-19 RX ADMIN — KETOROLAC TROMETHAMINE 30 MG: 30 INJECTION, SOLUTION INTRAMUSCULAR at 08:37

## 2025-04-19 RX ADMIN — OXYCODONE HYDROCHLORIDE AND ACETAMINOPHEN 2 TABLET: 5; 325 TABLET ORAL at 15:29

## 2025-04-19 RX ADMIN — INSULIN LISPRO 16 UNITS: 100 INJECTION, SOLUTION INTRAVENOUS; SUBCUTANEOUS at 17:09

## 2025-04-19 RX ADMIN — INSULIN LISPRO 12 UNITS: 100 INJECTION, SOLUTION INTRAVENOUS; SUBCUTANEOUS at 20:27

## 2025-04-19 RX ADMIN — ACETAMINOPHEN 1000 MG: 500 TABLET ORAL at 08:34

## 2025-04-19 RX ADMIN — GABAPENTIN 100 MG: 100 CAPSULE ORAL at 03:40

## 2025-04-19 RX ADMIN — LISINOPRIL 2.5 MG: 5 TABLET ORAL at 18:06

## 2025-04-19 RX ADMIN — OXYCODONE HYDROCHLORIDE AND ACETAMINOPHEN 2 TABLET: 5; 325 TABLET ORAL at 20:15

## 2025-04-19 RX ADMIN — LINEZOLID 600 MG: 600 TABLET, FILM COATED ORAL at 20:16

## 2025-04-19 RX ADMIN — INSULIN GLARGINE 10 UNITS: 100 INJECTION, SOLUTION SUBCUTANEOUS at 17:09

## 2025-04-19 RX ADMIN — SODIUM CHLORIDE, PRESERVATIVE FREE 10 ML: 5 INJECTION INTRAVENOUS at 20:37

## 2025-04-19 RX ADMIN — CEFEPIME 2000 MG: 2 INJECTION, POWDER, FOR SOLUTION INTRAVENOUS at 20:30

## 2025-04-19 RX ADMIN — CEFEPIME 2000 MG: 2 INJECTION, POWDER, FOR SOLUTION INTRAVENOUS at 08:38

## 2025-04-19 RX ADMIN — KETOROLAC TROMETHAMINE 15 MG: 15 INJECTION, SOLUTION INTRAMUSCULAR; INTRAVENOUS at 12:46

## 2025-04-19 RX ADMIN — VANCOMYCIN HYDROCHLORIDE 1000 MG: 1 INJECTION, POWDER, LYOPHILIZED, FOR SOLUTION INTRAVENOUS at 10:16

## 2025-04-19 ASSESSMENT — PAIN SCALES - GENERAL
PAINLEVEL_OUTOF10: 6
PAINLEVEL_OUTOF10: 9
PAINLEVEL_OUTOF10: 2
PAINLEVEL_OUTOF10: 9
PAINLEVEL_OUTOF10: 9
PAINLEVEL_OUTOF10: 10
PAINLEVEL_OUTOF10: 10
PAINLEVEL_OUTOF10: 0
PAINLEVEL_OUTOF10: 5
PAINLEVEL_OUTOF10: 4

## 2025-04-19 ASSESSMENT — PAIN DESCRIPTION - DESCRIPTORS
DESCRIPTORS: ACHING
DESCRIPTORS: ACHING;SHOOTING
DESCRIPTORS: SHOOTING;ACHING

## 2025-04-19 ASSESSMENT — PAIN DESCRIPTION - ORIENTATION
ORIENTATION: RIGHT

## 2025-04-19 ASSESSMENT — PAIN DESCRIPTION - LOCATION
LOCATION: FOOT
LOCATION: TOE (COMMENT WHICH ONE)
LOCATION: LEG
LOCATION: FOOT
LOCATION: TOE (COMMENT WHICH ONE)
LOCATION: TOE (COMMENT WHICH ONE)

## 2025-04-19 ASSESSMENT — PAIN SCALES - WONG BAKER: WONGBAKER_NUMERICALRESPONSE: NO HURT

## 2025-04-19 NOTE — PLAN OF CARE
Problem: Chronic Conditions and Co-morbidities  Goal: Patient's chronic conditions and co-morbidity symptoms are monitored and maintained or improved  Outcome: Progressing     Problem: Pain  Goal: Verbalizes/displays adequate comfort level or baseline comfort level  Outcome: Progressing     Problem: Skin/Tissue Integrity  Goal: Skin integrity remains intact  Description: 1.  Monitor for areas of redness and/or skin breakdown2.  Assess vascular access sites hourly3.  Every 4-6 hours minimum:  Change oxygen saturation probe site4.  Every 4-6 hours:  If on nasal continuous positive airway pressure, respiratory therapy assess nares and determine need for appliance change or resting period  Outcome: Progressing     Problem: Safety - Adult  Goal: Free from fall injury  Outcome: Progressing

## 2025-04-19 NOTE — H&P
[facility]     Department of Internal Medicine - Staff Internal Medicine Teaching Service          ADMISSION NOTE/HISTORY AND PHYSICAL EXAMINATION   Date: 4/19/2025  Patient Name: Mikael Estevez  Date of admission: 4/19/2025  3:20 AM  YOB: 1970  PCP: Ciro Martinez Jr., MD  History Obtained From:  patient, electronic medical record    CHIEF COMPLAINT     Worsening right toe pain    HISTORY OF PRESENTING ILLNESS     The patient is a 55 y.o. female with PMHx of diabetes mellitus type 2  Hypertension  Bipolar type I  Active cigarette smoker  Single-vessel CAD of RCA.    They present to the ED with a chief complaint of worsening right toe pain.  According the patient she was all right then 2 months ago she had hit her foot and she does not remember how she hit her foot, since then the toe pain started, it progressively worsened and eventually on April presented to ER for toe pain, the workup at that time was unremarkable for osteomyelitis from x-ray and patient was discharged on Keflex however she continuously to have worsening pain and discharge, she completed 7-day course of antibiotics, her pain is still same and even worsening, she can squeeze some discharge out, she also tried to self medicate with pain management however she could not help, she denied for fever, chills, abnormal sensation in lower extremities, she does have diabetes and does not monitors her blood sugar at home she is also noncompliant with diabetic diet.      Review of Systems   Constitutional:  Negative for chills, fatigue and fever.   Respiratory:  Negative for shortness of breath.    Gastrointestinal:  Negative for constipation and diarrhea.   Genitourinary:  Negative for dysuria.   Musculoskeletal:  Negative for back pain and gait problem.   Skin:  Positive for color change and wound.   Neurological:  Negative for headaches.   Psychiatric/Behavioral:  Negative for confusion.         Initial

## 2025-04-19 NOTE — ED NOTES
ED to inpatient nurses report      Chief Complaint:  Chief Complaint   Patient presents with    Toe Pain     Present to ED from: Home     MOA:     LOC: alert and orientated to name, place, date  Mobility: Independent  Oxygen Baseline: % room air     Current needs required: n/a   Pending ED orders: n/a  Present condition: Stable     Why did the patient come to the ED? Right greater toe pain. X-ray reveals concern for osteomyelitis.    What is the plan? Admit to inpatient for antibiotic therapy.   Any procedures or intervention occur? N/a  Any safety concerns?? Diabetic    Mental Status:       Psych Assessment:   Psychosocial  Psychosocial (WDL): Within Defined Limits  Vital signs   Vitals:    04/19/25 0245   BP: 133/74   Pulse: 93   Resp: 16   Temp: 98.4 °F (36.9 °C)   TempSrc: Oral   SpO2: 97%   Weight: 59.9 kg (132 lb)        Vitals:  Patient Vitals for the past 24 hrs:   BP Temp Temp src Pulse Resp SpO2 Weight   04/19/25 0245 133/74 98.4 °F (36.9 °C) Oral 93 16 97 % 59.9 kg (132 lb)      Visit Vitals  /74   Pulse 93   Temp 98.4 °F (36.9 °C) (Oral)   Resp 16   Wt 59.9 kg (132 lb)   SpO2 97%   BMI 20.67 kg/m²        LDAs:      Ambulatory Status:  Presents to emergency department  because of falls (Syncope, seizure, or loss of consciousness): No, Age > 70: No, Altered Mental Status, Intoxication with alcohol or substance confusion (Disorientation, impaired judgment, poor safety awaremess, or inability to follow instructions): No, Impaired Mobility: Ambulates or transfers with assistive devices or assistance; Unable to ambulate or transer.: Yes, Nursing Judgement: Yes    Diagnosis:  DISPOSITION Decision To Admit 04/19/2025 08:07:55 AM   Final diagnoses:   Right foot pain   Osteomyelitis of great toe (HCC)        Consults:  IP CONSULT TO PODIATRY  IP CONSULT TO INTERNAL MEDICINE     Treatment Team:   Treatment Team:   Kendell Delarosa DO Wilkinson, Khase A, DPM Canterbury, Andrew, Unruly Roper,

## 2025-04-19 NOTE — ED NOTES
Pt resting in bed with personal items and call light within reach. No acute distress noted. Resp even and non labored. Will continue with plan of care.

## 2025-04-19 NOTE — ED TRIAGE NOTES
Patient presents to the ED via Triage. Patient states that she bumped her right greater toe several weeks ago and noticed that it started to have drainage tonight with increased pain. Patient is in NAD, respirations are even and unlabored, bed in lowest position and call light with in reach. Resident is bedside for evaluation. Writer will continue with plan of care.

## 2025-04-19 NOTE — CODE DOCUMENTATION
Resuscitation/Code Status Note on Mikael sEtevez (YOB: 1970)    At 11:36 AM on April 19, 2025, resuscitation/code status decision was based on a thorough discussion with the patient.  The code status was made DNR-CCA with intubation.    Electronically signed by Cherise Segovia MD on 4/19/25 at 11:36 AM EDT

## 2025-04-19 NOTE — ED NOTES
Pt up and ambulated to the bathroom without assistance. Steady gait observed. Will continue with plan of care.

## 2025-04-19 NOTE — ED PROVIDER NOTES
[facility]     Emergency Department     Faculty Attestation  5:01 AM EDT      I performed a history and physical examination of the patient and discussed management with the resident. I have reviewed and agree with the resident’s findings including all diagnostic interpretations, and treatment plans as written. Any areas of disagreement are noted on the chart. I was personally present for the key portions of any procedures. I have documented in the chart those procedures where I was not present during the key portions. I have reviewed the emergency nurses triage note. I agree with the chief complaint, past medical history, past surgical history, allergies, medications, social and family history as documented unless otherwise noted below. Documentation of the HPI, Physical Exam and Medical Decision Making performed by [email or web address] is based on my personal performance of the HPI, PE and MDM. For Physician Assistant/ Nurse Practitioner cases/documentation I have personally evaluated this patient and have completed at least one if not all key elements of the E/M (history, physical exam, and MDM). Additional findings are as noted.    Right big toe pain that has been getting worse patient reports now increased swelling no history of gout denies any trauma to the area.  Does have podiatry appointment in 2 days.  Has tried NSAIDs at home but ongoing pain and came into t be evaluated    Patient does have a chronic changes to her big toenail, which appears to be very mobile and not really attached at the skin.  There is no drainage noted from underneath the nailbed, patient does have edema noted from her proximal phalanx to her distal phalanx and tenderness to palpation no increased warmth.  No lymphangitic streaking noted    Patient with concern for infection.  It does look in her chart like she was started on antibiotics on the 10th.  Due to concern for toe infection.  
        [facility] EMERGENCY DEPARTMENT  Emergency Department Encounter  Emergency Medicine Resident     Pt Name:Mikael Estevez  MRN: [id number]  Birthdate 1970  Date of evaluation: 4/19/25  PCP:  Ciro Martinez Jr., MD  Note Started: 3:36 AM EDT      CHIEF COMPLAINT       Chief Complaint   Patient presents with    Toe Pain       HISTORY OF PRESENT ILLNESS  (Location/Symptom, Timing/Onset, Context/Setting, Quality, Duration, Modifying Factors, Severity.)      Mikael Estevez is a 55 y.o. female past medical history of diabetes, hyperlipidemia, presenting for assessment of right foot and great toe pain.  The symptoms been present for approximately 1 month.  Patient was recently seen in the emergency department for similar complaint where she reported that there was drainage coming from the foot and great toe.  She was given ibuprofen and Keflex which she states she has taken.  She reports that areas of her foot in question I once again worsened and pain.  She is not having any fevers or chills or other systemic symptoms.  PAST MEDICAL / SURGICAL / SOCIAL / FAMILY HISTORY      has a past medical history of Back pain, Bipolar 1 disorder (HCC), Diabetes mellitus (HCC), Disc disorder, and Hypertension.        has a past surgical history that includes Cholecystectomy and back surgery.       Social History     Socioeconomic History    Marital status: Single     Spouse name: Not on file    Number of children: Not on file    Years of education: Not on file    Highest education level: Not on file   Occupational History    Not on file   Tobacco Use    Smoking status: Every Day     Current packs/day: 0.50     Average packs/day: 0.5 packs/day for 13.0 years (6.5 ttl pk-yrs)     Types: Cigarettes    Smokeless tobacco: Never   Vaping Use    Vaping status: Never Used   Substance and Sexual Activity    Alcohol use: No    Drug use: Not Currently     Types: Cocaine     Comment: pt states she has been clean from cocaine 
 Faculty Sign-Out Attestation  Handoff taken on the following patient from prior Attending Physician: Cecil  Note Started: 7:36 AM EDT    I was available and discussed any additional care issues that arose and coordinated the management plans with the resident(s) caring for the patient during my duty period. Any areas of disagreement with resident’s documentation of care or procedures are noted on the chart. I was personally present for the key portions of any/all procedures during my duty period. I have documented in the chart those procedures where I was not present during the key portions.    55-year-old female presents emergency department with right great toe pain.  Plain films concerning for possible osteomyelitis.  No leukocytosis, other labs are pending.  Pending podiatry consult and disposition.    Kendell Delarosa DO  Attending Physician       Kendell Delarosa DO  04/19/25 0734    
noted of the great toe tuft.  Otherwise no acute abnormality. Joints: No dislocation. Soft tissues: Swelling of the great toe.     Permeative changes of the great toe tuft concerning for osteomyelitis.     XR FOOT RIGHT (MIN 3 VIEWS)  Result Date: 4/10/2025  EXAMINATION: THREE XRAY VIEWS OF THE RIGHT FOOT 4/10/2025 3:41 pm COMPARISON: None. HISTORY: ORDERING SYSTEM PROVIDED HISTORY: pain of foot TECHNOLOGIST PROVIDED HISTORY: pain of foot FINDINGS: No fracture or dislocation.  Probable pes planus, although alignment is not well evaluated on nonweightbearing radiographs.  Normal joint spaces.     No acute osseous abnormality.       RECENT VITALS:     Temp: 98.4 °F (36.9 °C),  Pulse: 93, Respirations: 16, BP: 133/74, SpO2: 97 %    This patient is a 55 y.o. Female with     Right big toe pain   Pain worsening  Osteo?   F/u podiatry   ED Course as of 04/19/25 0925   Sat Apr 19, 2025   0739 WBC: 10.8 [BG]   0739 Resident sign-out, care assumed from Dr. Rooney [BG]   0758 XR FOOT RIGHT (MIN 3 VIEWS)  IMPRESSION:  Permeative changes of the great toe tuft concerning for osteomyelitis.   [BG]   0758 No leukocytosis, WBC 10.8.  Moderate elevation of inflammatory markers.  CRP 41.3.  Sed rate 71 [BG]   0810 Podiatry recommending IV antibiotics and admission for anticipated amputation of big toe. [BG]   0820 Discussed treatment plan with patient, she is amenable for admission.  All questions were answered.  She continues having some pain, wishes to defer narcotic analgesics as she has a history of prior drug abuse.  She is requesting Tylenol, orders placed. [BG]   0843 Internal medicine accepting for admission  [BG]      ED Course User Index  [BG] Unruly Concepcion MD       OUTSTANDING TASKS / RECOMMENDATIONS:    F/u Labs  Dispo     FINAL IMPRESSION:     1. Right foot pain        DISPOSITION:         DISPOSITION:  []  Discharge   []  Transfer -    [x]  Admission -  IM   []  Against Medical Advice   []  Eloped   FOLLOW-UP:

## 2025-04-20 ENCOUNTER — APPOINTMENT (OUTPATIENT)
Dept: CT IMAGING | Age: 55
DRG: 617 | End: 2025-04-20
Payer: MEDICARE

## 2025-04-20 ENCOUNTER — APPOINTMENT (OUTPATIENT)
Dept: MRI IMAGING | Age: 55
DRG: 617 | End: 2025-04-20
Payer: MEDICARE

## 2025-04-20 PROBLEM — I73.9 PERIPHERAL VASCULAR DISEASE: Status: ACTIVE | Noted: 2025-04-20

## 2025-04-20 PROBLEM — L97.509 TYPE 2 DIABETES MELLITUS WITH DIABETIC TOE ULCER (HCC): Status: ACTIVE | Noted: 2025-04-20

## 2025-04-20 PROBLEM — E11.621 TYPE 2 DIABETES MELLITUS WITH DIABETIC TOE ULCER (HCC): Status: ACTIVE | Noted: 2025-04-20

## 2025-04-20 PROBLEM — M79.671 RIGHT FOOT PAIN: Status: ACTIVE | Noted: 2025-04-20

## 2025-04-20 PROBLEM — L97.514 ULCER OF GREAT TOE, RIGHT, WITH NECROSIS OF BONE (HCC): Status: ACTIVE | Noted: 2025-04-20

## 2025-04-20 LAB
ALBUMIN SERPL-MCNC: 3.5 G/DL (ref 3.5–5.2)
ALBUMIN/GLOB SERPL: 1.2 {RATIO} (ref 1–2.5)
ALP SERPL-CCNC: 111 U/L (ref 35–104)
ALT SERPL-CCNC: 28 U/L (ref 10–35)
ANION GAP SERPL CALCULATED.3IONS-SCNC: 10 MMOL/L (ref 9–16)
AST SERPL-CCNC: 30 U/L (ref 10–35)
BASOPHILS # BLD: 0.05 K/UL (ref 0–0.2)
BASOPHILS NFR BLD: 1 % (ref 0–2)
BILIRUB SERPL-MCNC: 0.2 MG/DL (ref 0–1.2)
BUN SERPL-MCNC: 26 MG/DL (ref 6–20)
CALCIUM SERPL-MCNC: 8.8 MG/DL (ref 8.6–10.4)
CHLORIDE SERPL-SCNC: 101 MMOL/L (ref 98–107)
CO2 SERPL-SCNC: 23 MMOL/L (ref 20–31)
CREAT SERPL-MCNC: 0.8 MG/DL (ref 0.6–0.9)
CRP SERPL HS-MCNC: 31.9 MG/L (ref 0–5)
EKG ATRIAL RATE: 92 BPM
EKG P AXIS: 69 DEGREES
EKG P-R INTERVAL: 156 MS
EKG Q-T INTERVAL: 386 MS
EKG QRS DURATION: 98 MS
EKG QTC CALCULATION (BAZETT): 477 MS
EKG R AXIS: -36 DEGREES
EKG T AXIS: 16 DEGREES
EKG VENTRICULAR RATE: 92 BPM
EOSINOPHIL # BLD: 0.25 K/UL (ref 0–0.44)
EOSINOPHILS RELATIVE PERCENT: 4 % (ref 1–4)
ERYTHROCYTE [DISTWIDTH] IN BLOOD BY AUTOMATED COUNT: 15 % (ref 11.8–14.4)
ERYTHROCYTE [SEDIMENTATION RATE] IN BLOOD BY PHOTOMETRIC METHOD: 58 MM/HR (ref 0–30)
GFR, ESTIMATED: 86 ML/MIN/1.73M2
GLUCOSE BLD-MCNC: 155 MG/DL (ref 65–105)
GLUCOSE BLD-MCNC: 156 MG/DL (ref 65–105)
GLUCOSE BLD-MCNC: 243 MG/DL (ref 65–105)
GLUCOSE BLD-MCNC: 369 MG/DL (ref 65–105)
GLUCOSE SERPL-MCNC: 332 MG/DL (ref 74–99)
HCT VFR BLD AUTO: 39.5 % (ref 36.3–47.1)
HGB BLD-MCNC: 12.8 G/DL (ref 11.9–15.1)
IMM GRANULOCYTES # BLD AUTO: 0.03 K/UL (ref 0–0.3)
IMM GRANULOCYTES NFR BLD: 0 %
LACTIC ACID, WHOLE BLOOD: 1.7 MMOL/L (ref 0.7–2.1)
LYMPHOCYTES NFR BLD: 2.18 K/UL (ref 1.1–3.7)
LYMPHOCYTES RELATIVE PERCENT: 32 % (ref 24–43)
MCH RBC QN AUTO: 27.6 PG (ref 25.2–33.5)
MCHC RBC AUTO-ENTMCNC: 32.4 G/DL (ref 28.4–34.8)
MCV RBC AUTO: 85.3 FL (ref 82.6–102.9)
MONOCYTES NFR BLD: 0.54 K/UL (ref 0.1–1.2)
MONOCYTES NFR BLD: 8 % (ref 3–12)
MRSA, DNA, NASAL: NEGATIVE
NEUTROPHILS NFR BLD: 55 % (ref 36–65)
NEUTS SEG NFR BLD: 3.72 K/UL (ref 1.5–8.1)
NRBC BLD-RTO: 0 PER 100 WBC
PLATELET # BLD AUTO: 396 K/UL (ref 138–453)
PMV BLD AUTO: 9.1 FL (ref 8.1–13.5)
POTASSIUM SERPL-SCNC: 4.4 MMOL/L (ref 3.7–5.3)
PROT SERPL-MCNC: 6.5 G/DL (ref 6.6–8.7)
RBC # BLD AUTO: 4.63 M/UL (ref 3.95–5.11)
RBC # BLD: ABNORMAL 10*6/UL
SODIUM SERPL-SCNC: 134 MMOL/L (ref 136–145)
SPECIMEN DESCRIPTION: NORMAL
WBC OTHER # BLD: 6.8 K/UL (ref 3.5–11.3)

## 2025-04-20 PROCEDURE — 99231 SBSQ HOSP IP/OBS SF/LOW 25: CPT | Performed by: PODIATRIST

## 2025-04-20 PROCEDURE — 6370000000 HC RX 637 (ALT 250 FOR IP): Performed by: INTERNAL MEDICINE

## 2025-04-20 PROCEDURE — 6370000000 HC RX 637 (ALT 250 FOR IP)

## 2025-04-20 PROCEDURE — 83605 ASSAY OF LACTIC ACID: CPT

## 2025-04-20 PROCEDURE — 73720 MRI LWR EXTREMITY W/O&W/DYE: CPT

## 2025-04-20 PROCEDURE — 6360000002 HC RX W HCPCS

## 2025-04-20 PROCEDURE — 99233 SBSQ HOSP IP/OBS HIGH 50: CPT | Performed by: INTERNAL MEDICINE

## 2025-04-20 PROCEDURE — 36415 COLL VENOUS BLD VENIPUNCTURE: CPT

## 2025-04-20 PROCEDURE — 82947 ASSAY GLUCOSE BLOOD QUANT: CPT

## 2025-04-20 PROCEDURE — 75635 CT ANGIO ABDOMINAL ARTERIES: CPT

## 2025-04-20 PROCEDURE — 2580000003 HC RX 258: Performed by: INTERNAL MEDICINE

## 2025-04-20 PROCEDURE — 86140 C-REACTIVE PROTEIN: CPT

## 2025-04-20 PROCEDURE — A9576 INJ PROHANCE MULTIPACK: HCPCS | Performed by: INTERNAL MEDICINE

## 2025-04-20 PROCEDURE — 6360000004 HC RX CONTRAST MEDICATION

## 2025-04-20 PROCEDURE — 80053 COMPREHEN METABOLIC PANEL: CPT

## 2025-04-20 PROCEDURE — 85025 COMPLETE CBC W/AUTO DIFF WBC: CPT

## 2025-04-20 PROCEDURE — 1200000000 HC SEMI PRIVATE

## 2025-04-20 PROCEDURE — 6360000002 HC RX W HCPCS: Performed by: INTERNAL MEDICINE

## 2025-04-20 PROCEDURE — 85652 RBC SED RATE AUTOMATED: CPT

## 2025-04-20 PROCEDURE — 6360000004 HC RX CONTRAST MEDICATION: Performed by: INTERNAL MEDICINE

## 2025-04-20 RX ORDER — HYDRALAZINE HYDROCHLORIDE 10 MG/1
10 TABLET, FILM COATED ORAL 4 TIMES DAILY PRN
Status: DISCONTINUED | OUTPATIENT
Start: 2025-04-20 | End: 2025-04-28

## 2025-04-20 RX ORDER — IOPAMIDOL 755 MG/ML
125 INJECTION, SOLUTION INTRAVASCULAR
Status: COMPLETED | OUTPATIENT
Start: 2025-04-20 | End: 2025-04-20

## 2025-04-20 RX ORDER — INSULIN GLARGINE 100 [IU]/ML
10 INJECTION, SOLUTION SUBCUTANEOUS NIGHTLY
Status: DISCONTINUED | OUTPATIENT
Start: 2025-04-20 | End: 2025-04-20

## 2025-04-20 RX ADMIN — LINEZOLID 600 MG: 600 TABLET, FILM COATED ORAL at 21:57

## 2025-04-20 RX ADMIN — OXYCODONE HYDROCHLORIDE AND ACETAMINOPHEN 2 TABLET: 5; 325 TABLET ORAL at 18:02

## 2025-04-20 RX ADMIN — CEFEPIME 2000 MG: 2 INJECTION, POWDER, FOR SOLUTION INTRAVENOUS at 08:22

## 2025-04-20 RX ADMIN — INSULIN LISPRO 16 UNITS: 100 INJECTION, SOLUTION INTRAVENOUS; SUBCUTANEOUS at 08:16

## 2025-04-20 RX ADMIN — ROSUVASTATIN CALCIUM 10 MG: 10 TABLET, FILM COATED ORAL at 08:15

## 2025-04-20 RX ADMIN — INSULIN LISPRO 4 UNITS: 100 INJECTION, SOLUTION INTRAVENOUS; SUBCUTANEOUS at 22:19

## 2025-04-20 RX ADMIN — KETOROLAC TROMETHAMINE 15 MG: 15 INJECTION, SOLUTION INTRAMUSCULAR; INTRAVENOUS at 12:22

## 2025-04-20 RX ADMIN — ENOXAPARIN SODIUM 40 MG: 100 INJECTION SUBCUTANEOUS at 08:17

## 2025-04-20 RX ADMIN — LISINOPRIL 2.5 MG: 5 TABLET ORAL at 08:16

## 2025-04-20 RX ADMIN — IOPAMIDOL 125 ML: 755 INJECTION, SOLUTION INTRAVENOUS at 16:17

## 2025-04-20 RX ADMIN — INSULIN GLARGINE 20 UNITS: 100 INJECTION, SOLUTION SUBCUTANEOUS at 08:16

## 2025-04-20 RX ADMIN — OXYCODONE HYDROCHLORIDE AND ACETAMINOPHEN 2 TABLET: 5; 325 TABLET ORAL at 09:27

## 2025-04-20 RX ADMIN — QUETIAPINE FUMARATE 150 MG: 100 TABLET ORAL at 21:56

## 2025-04-20 RX ADMIN — ASPIRIN 81 MG: 81 TABLET, COATED ORAL at 08:16

## 2025-04-20 RX ADMIN — OXYCODONE HYDROCHLORIDE AND ACETAMINOPHEN 2 TABLET: 5; 325 TABLET ORAL at 00:55

## 2025-04-20 RX ADMIN — CEFEPIME 2000 MG: 2 INJECTION, POWDER, FOR SOLUTION INTRAVENOUS at 22:18

## 2025-04-20 RX ADMIN — GADOTERIDOL 11 ML: 279.3 INJECTION, SOLUTION INTRAVENOUS at 17:45

## 2025-04-20 RX ADMIN — OXYCODONE HYDROCHLORIDE AND ACETAMINOPHEN 2 TABLET: 5; 325 TABLET ORAL at 05:19

## 2025-04-20 RX ADMIN — KETOROLAC TROMETHAMINE 15 MG: 15 INJECTION, SOLUTION INTRAMUSCULAR; INTRAVENOUS at 22:11

## 2025-04-20 RX ADMIN — LINEZOLID 600 MG: 600 TABLET, FILM COATED ORAL at 08:16

## 2025-04-20 ASSESSMENT — PAIN DESCRIPTION - DESCRIPTORS
DESCRIPTORS: ACHING;DISCOMFORT
DESCRIPTORS: ACHING;BURNING

## 2025-04-20 ASSESSMENT — PAIN DESCRIPTION - ORIENTATION
ORIENTATION: RIGHT
ORIENTATION: RIGHT

## 2025-04-20 ASSESSMENT — PAIN DESCRIPTION - PAIN TYPE: TYPE: ACUTE PAIN

## 2025-04-20 ASSESSMENT — PAIN DESCRIPTION - LOCATION
LOCATION: FOOT
LOCATION: TOE (COMMENT WHICH ONE);LEG
LOCATION: LEG

## 2025-04-20 ASSESSMENT — PAIN SCALES - GENERAL
PAINLEVEL_OUTOF10: 4
PAINLEVEL_OUTOF10: 5
PAINLEVEL_OUTOF10: 9
PAINLEVEL_OUTOF10: 2
PAINLEVEL_OUTOF10: 3
PAINLEVEL_OUTOF10: 2
PAINLEVEL_OUTOF10: 8
PAINLEVEL_OUTOF10: 8
PAINLEVEL_OUTOF10: 10
PAINLEVEL_OUTOF10: 8
PAINLEVEL_OUTOF10: 10
PAINLEVEL_OUTOF10: 10
PAINLEVEL_OUTOF10: 5
PAINLEVEL_OUTOF10: 10

## 2025-04-20 ASSESSMENT — PAIN SCALES - WONG BAKER
WONGBAKER_NUMERICALRESPONSE: NO HURT

## 2025-04-20 ASSESSMENT — PAIN DESCRIPTION - FREQUENCY: FREQUENCY: CONTINUOUS

## 2025-04-20 NOTE — PLAN OF CARE
Problem: Chronic Conditions and Co-morbidities  Goal: Patient's chronic conditions and co-morbidity symptoms are monitored and maintained or improved  4/20/2025 1242 by Renata Sue RN  Outcome: Progressing  4/20/2025 0547 by Simone Nicholas RN  Outcome: Progressing  Flowsheets (Taken 4/19/2025 2000)  Care Plan - Patient's Chronic Conditions and Co-Morbidity Symptoms are Monitored and Maintained or Improved: Monitor and assess patient's chronic conditions and comorbid symptoms for stability, deterioration, or improvement     Problem: Pain  Goal: Verbalizes/displays adequate comfort level or baseline comfort level  4/20/2025 1242 by Renata Sue RN  Outcome: Progressing  4/20/2025 0547 by Simone Nicholas RN  Outcome: Progressing     Problem: Skin/Tissue Integrity  Goal: Skin integrity remains intact  Description: 1.  Monitor for areas of redness and/or skin breakdown2.  Assess vascular access sites hourly3.  Every 4-6 hours minimum:  Change oxygen saturation probe site4.  Every 4-6 hours:  If on nasal continuous positive airway pressure, respiratory therapy assess nares and determine need for appliance change or resting period  4/20/2025 1242 by Renata Sue, RN  Outcome: Progressing  4/20/2025 0547 by Simone Nicholas RN  Outcome: Progressing  Flowsheets (Taken 4/19/2025 2000)  Skin Integrity Remains Intact: Monitor for areas of redness and/or skin breakdown     Problem: Safety - Adult  Goal: Free from fall injury  4/20/2025 1242 by Renata Sue RN  Outcome: Progressing  4/20/2025 0547 by Simone Nicholas RN  Outcome: Progressing

## 2025-04-20 NOTE — PLAN OF CARE
Problem: Chronic Conditions and Co-morbidities  Goal: Patient's chronic conditions and co-morbidity symptoms are monitored and maintained or improved  4/20/2025 0547 by Simone Nicholas RN  Outcome: Progressing  Flowsheets (Taken 4/19/2025 2000)  Care Plan - Patient's Chronic Conditions and Co-Morbidity Symptoms are Monitored and Maintained or Improved: Monitor and assess patient's chronic conditions and comorbid symptoms for stability, deterioration, or improvement  4/19/2025 1742 by Kevin Chandler RN  Outcome: Progressing     Problem: Discharge Planning  Goal: Discharge to home or other facility with appropriate resources  Outcome: Progressing  Flowsheets (Taken 4/19/2025 2000)  Discharge to home or other facility with appropriate resources: Identify barriers to discharge with patient and caregiver     Problem: Pain  Goal: Verbalizes/displays adequate comfort level or baseline comfort level  4/20/2025 0547 by Simone Nicholas RN  Outcome: Progressing  4/19/2025 1742 by Kevin Chandler RN  Outcome: Progressing     Problem: Skin/Tissue Integrity  Goal: Skin integrity remains intact  Description: 1.  Monitor for areas of redness and/or skin breakdown2.  Assess vascular access sites hourly3.  Every 4-6 hours minimum:  Change oxygen saturation probe site4.  Every 4-6 hours:  If on nasal continuous positive airway pressure, respiratory therapy assess nares and determine need for appliance change or resting period  4/20/2025 0547 by Simone Nicholas RN  Outcome: Progressing  Flowsheets (Taken 4/19/2025 2000)  Skin Integrity Remains Intact: Monitor for areas of redness and/or skin breakdown  4/19/2025 1742 by Kevin Chandler RN  Outcome: Progressing     Problem: Safety - Adult  Goal: Free from fall injury  4/20/2025 0547 by Simone Nicholas RN  Outcome: Progressing  4/19/2025 1742 by Kevin Chandler RN  Outcome: Progressing

## 2025-04-21 ENCOUNTER — ANESTHESIA (OUTPATIENT)
Dept: OPERATING ROOM | Age: 55
End: 2025-04-21
Payer: MEDICARE

## 2025-04-21 ENCOUNTER — ANESTHESIA EVENT (OUTPATIENT)
Dept: OPERATING ROOM | Age: 55
End: 2025-04-21
Payer: MEDICARE

## 2025-04-21 ENCOUNTER — APPOINTMENT (OUTPATIENT)
Age: 55
DRG: 617 | End: 2025-04-21
Payer: MEDICARE

## 2025-04-21 PROBLEM — R79.82 CRP ELEVATED: Status: ACTIVE | Noted: 2025-04-21

## 2025-04-21 PROBLEM — E11.628 TYPE 2 DIABETES MELLITUS WITH RIGHT DIABETIC FOOT INFECTION (HCC): Status: ACTIVE | Noted: 2025-04-21

## 2025-04-21 PROBLEM — R70.0 ESR RAISED: Status: ACTIVE | Noted: 2025-04-21

## 2025-04-21 PROBLEM — L08.9 TYPE 2 DIABETES MELLITUS WITH RIGHT DIABETIC FOOT INFECTION (HCC): Status: ACTIVE | Noted: 2025-04-21

## 2025-04-21 PROBLEM — I50.20 HFREF (HEART FAILURE WITH REDUCED EJECTION FRACTION) (HCC): Status: ACTIVE | Noted: 2025-04-21

## 2025-04-21 LAB
ALBUMIN SERPL-MCNC: 3.4 G/DL (ref 3.5–5.2)
ALBUMIN/GLOB SERPL: 1.3 {RATIO} (ref 1–2.5)
ALP SERPL-CCNC: 101 U/L (ref 35–104)
ALT SERPL-CCNC: 22 U/L (ref 10–35)
ANION GAP SERPL CALCULATED.3IONS-SCNC: 11 MMOL/L (ref 9–16)
AST SERPL-CCNC: 19 U/L (ref 10–35)
BASOPHILS # BLD: 0.03 K/UL (ref 0–0.2)
BASOPHILS NFR BLD: 1 % (ref 0–2)
BILIRUB SERPL-MCNC: 0.2 MG/DL (ref 0–1.2)
BUN SERPL-MCNC: 27 MG/DL (ref 6–20)
CALCIUM SERPL-MCNC: 8.6 MG/DL (ref 8.6–10.4)
CHLORIDE SERPL-SCNC: 103 MMOL/L (ref 98–107)
CO2 SERPL-SCNC: 22 MMOL/L (ref 20–31)
CREAT SERPL-MCNC: 0.8 MG/DL (ref 0.6–0.9)
CRP SERPL HS-MCNC: 21.9 MG/L (ref 0–5)
ECHO AO ROOT DIAM: 2.8 CM
ECHO AO ROOT INDEX: 1.79 CM/M2
ECHO AV AREA PEAK VELOCITY: 1.4 CM2
ECHO AV AREA VTI: 1.7 CM2
ECHO AV AREA/BSA PEAK VELOCITY: 0.9 CM2/M2
ECHO AV AREA/BSA VTI: 1.1 CM2/M2
ECHO AV MEAN GRADIENT: 4 MMHG
ECHO AV MEAN VELOCITY: 0.9 M/S
ECHO AV PEAK GRADIENT: 8 MMHG
ECHO AV PEAK VELOCITY: 1.4 M/S
ECHO AV VELOCITY RATIO: 0.5
ECHO AV VTI: 28.7 CM
ECHO BSA: 1.59 M2
ECHO EST RA PRESSURE: 3 MMHG
ECHO LA AREA 2C: 20 CM2
ECHO LA AREA 4C: 16.2 CM2
ECHO LA DIAMETER INDEX: 1.67 CM/M2
ECHO LA DIAMETER: 2.6 CM
ECHO LA MAJOR AXIS: 5.6 CM
ECHO LA MINOR AXIS: 5.6 CM
ECHO LA TO AORTIC ROOT RATIO: 0.93
ECHO LA VOL BP: 48 ML (ref 22–52)
ECHO LA VOL MOD A2C: 61 ML (ref 22–52)
ECHO LA VOL MOD A4C: 40 ML (ref 22–52)
ECHO LA VOL/BSA BIPLANE: 31 ML/M2 (ref 16–34)
ECHO LA VOLUME INDEX MOD A2C: 39 ML/M2 (ref 16–34)
ECHO LA VOLUME INDEX MOD A4C: 26 ML/M2 (ref 16–34)
ECHO LV E' LATERAL VELOCITY: 10 CM/S
ECHO LV E' SEPTAL VELOCITY: 5.22 CM/S
ECHO LV EDV A2C: 72 ML
ECHO LV EDV A4C: 66 ML
ECHO LV EDV INDEX A4C: 42 ML/M2
ECHO LV EDV NDEX A2C: 46 ML/M2
ECHO LV EF PHYSICIAN: 35 %
ECHO LV EJECTION FRACTION A2C: 42 %
ECHO LV EJECTION FRACTION A4C: 18 %
ECHO LV EJECTION FRACTION BIPLANE: 31 % (ref 55–100)
ECHO LV ESV A2C: 42 ML
ECHO LV ESV A4C: 54 ML
ECHO LV ESV INDEX A2C: 27 ML/M2
ECHO LV ESV INDEX A4C: 35 ML/M2
ECHO LV FRACTIONAL SHORTENING: 24 % (ref 28–44)
ECHO LV INTERNAL DIMENSION DIASTOLE INDEX: 3.14 CM/M2
ECHO LV INTERNAL DIMENSION DIASTOLIC: 4.9 CM (ref 3.9–5.3)
ECHO LV INTERNAL DIMENSION SYSTOLIC INDEX: 2.37 CM/M2
ECHO LV INTERNAL DIMENSION SYSTOLIC: 3.7 CM
ECHO LV IVSD: 1 CM (ref 0.6–0.9)
ECHO LV MASS 2D: 176 G (ref 67–162)
ECHO LV MASS INDEX 2D: 112.8 G/M2 (ref 43–95)
ECHO LV POSTERIOR WALL DIASTOLIC: 1 CM (ref 0.6–0.9)
ECHO LV RELATIVE WALL THICKNESS RATIO: 0.41
ECHO LVOT AREA: 2.8 CM2
ECHO LVOT AV VTI INDEX: 0.59
ECHO LVOT DIAM: 1.9 CM
ECHO LVOT MEAN GRADIENT: 1 MMHG
ECHO LVOT PEAK GRADIENT: 2 MMHG
ECHO LVOT PEAK VELOCITY: 0.7 M/S
ECHO LVOT STROKE VOLUME INDEX: 30.5 ML/M2
ECHO LVOT SV: 47.6 ML
ECHO LVOT VTI: 16.8 CM
ECHO MV A VELOCITY: 0.79 M/S
ECHO MV AREA VTI: 1.9 CM2
ECHO MV E DECELERATION TIME (DT): 227 MS
ECHO MV E VELOCITY: 0.8 M/S
ECHO MV E/A RATIO: 1.01
ECHO MV E/E' LATERAL: 8
ECHO MV E/E' RATIO (AVERAGED): 11.66
ECHO MV E/E' SEPTAL: 15.33
ECHO MV LVOT VTI INDEX: 1.52
ECHO MV MAX VELOCITY: 1 M/S
ECHO MV MEAN GRADIENT: 2 MMHG
ECHO MV MEAN VELOCITY: 0.6 M/S
ECHO MV PEAK GRADIENT: 4 MMHG
ECHO MV VTI: 25.6 CM
ECHO PV MAX VELOCITY: 1 M/S
ECHO PV PEAK GRADIENT: 4 MMHG
ECHO RIGHT VENTRICULAR SYSTOLIC PRESSURE (RVSP): 35 MMHG
ECHO RV FREE WALL PEAK S': 10.9 CM/S
ECHO RV INTERNAL DIMENSION: 3.1 CM
ECHO RV TAPSE: 2 CM (ref 1.7–?)
ECHO TV REGURGITANT MAX VELOCITY: 2.81 M/S
ECHO TV REGURGITANT PEAK GRADIENT: 32 MMHG
EOSINOPHIL # BLD: 0.2 K/UL (ref 0–0.44)
EOSINOPHILS RELATIVE PERCENT: 3 % (ref 1–4)
ERYTHROCYTE [DISTWIDTH] IN BLOOD BY AUTOMATED COUNT: 15.1 % (ref 11.8–14.4)
ERYTHROCYTE [SEDIMENTATION RATE] IN BLOOD BY PHOTOMETRIC METHOD: 48 MM/HR (ref 0–30)
GFR, ESTIMATED: 86 ML/MIN/1.73M2
GLUCOSE BLD-MCNC: 232 MG/DL (ref 65–105)
GLUCOSE BLD-MCNC: 245 MG/DL (ref 65–105)
GLUCOSE SERPL-MCNC: 275 MG/DL (ref 74–99)
HCT VFR BLD AUTO: 38.6 % (ref 36.3–47.1)
HGB BLD-MCNC: 12.3 G/DL (ref 11.9–15.1)
IMM GRANULOCYTES # BLD AUTO: <0.03 K/UL (ref 0–0.3)
IMM GRANULOCYTES NFR BLD: 0 %
LYMPHOCYTES NFR BLD: 2.68 K/UL (ref 1.1–3.7)
LYMPHOCYTES RELATIVE PERCENT: 45 % (ref 24–43)
MCH RBC QN AUTO: 27.4 PG (ref 25.2–33.5)
MCHC RBC AUTO-ENTMCNC: 31.9 G/DL (ref 28.4–34.8)
MCV RBC AUTO: 86 FL (ref 82.6–102.9)
MONOCYTES NFR BLD: 0.5 K/UL (ref 0.1–1.2)
MONOCYTES NFR BLD: 8 % (ref 3–12)
NEUTROPHILS NFR BLD: 43 % (ref 36–65)
NEUTS SEG NFR BLD: 2.61 K/UL (ref 1.5–8.1)
NRBC BLD-RTO: 0 PER 100 WBC
PLATELET # BLD AUTO: 390 K/UL (ref 138–453)
PMV BLD AUTO: 9.3 FL (ref 8.1–13.5)
POTASSIUM SERPL-SCNC: 4.3 MMOL/L (ref 3.7–5.3)
PROT SERPL-MCNC: 6 G/DL (ref 6.6–8.7)
RBC # BLD AUTO: 4.49 M/UL (ref 3.95–5.11)
RBC # BLD: ABNORMAL 10*6/UL
SODIUM SERPL-SCNC: 136 MMOL/L (ref 136–145)
WBC OTHER # BLD: 6 K/UL (ref 3.5–11.3)

## 2025-04-21 PROCEDURE — 6370000000 HC RX 637 (ALT 250 FOR IP)

## 2025-04-21 PROCEDURE — 99233 SBSQ HOSP IP/OBS HIGH 50: CPT | Performed by: STUDENT IN AN ORGANIZED HEALTH CARE EDUCATION/TRAINING PROGRAM

## 2025-04-21 PROCEDURE — 6370000000 HC RX 637 (ALT 250 FOR IP): Performed by: INTERNAL MEDICINE

## 2025-04-21 PROCEDURE — 85652 RBC SED RATE AUTOMATED: CPT

## 2025-04-21 PROCEDURE — 99232 SBSQ HOSP IP/OBS MODERATE 35: CPT

## 2025-04-21 PROCEDURE — 93306 TTE W/DOPPLER COMPLETE: CPT | Performed by: STUDENT IN AN ORGANIZED HEALTH CARE EDUCATION/TRAINING PROGRAM

## 2025-04-21 PROCEDURE — 36415 COLL VENOUS BLD VENIPUNCTURE: CPT

## 2025-04-21 PROCEDURE — 6360000002 HC RX W HCPCS

## 2025-04-21 PROCEDURE — 1200000000 HC SEMI PRIVATE

## 2025-04-21 PROCEDURE — 93306 TTE W/DOPPLER COMPLETE: CPT

## 2025-04-21 PROCEDURE — 6360000002 HC RX W HCPCS: Performed by: INTERNAL MEDICINE

## 2025-04-21 PROCEDURE — 2500000003 HC RX 250 WO HCPCS

## 2025-04-21 PROCEDURE — 85025 COMPLETE CBC W/AUTO DIFF WBC: CPT

## 2025-04-21 PROCEDURE — G0108 DIAB MANAGE TRN  PER INDIV: HCPCS

## 2025-04-21 PROCEDURE — 99232 SBSQ HOSP IP/OBS MODERATE 35: CPT | Performed by: INTERNAL MEDICINE

## 2025-04-21 PROCEDURE — 2580000003 HC RX 258: Performed by: INTERNAL MEDICINE

## 2025-04-21 PROCEDURE — 86140 C-REACTIVE PROTEIN: CPT

## 2025-04-21 PROCEDURE — 2580000003 HC RX 258

## 2025-04-21 PROCEDURE — 80053 COMPREHEN METABOLIC PANEL: CPT

## 2025-04-21 PROCEDURE — 82947 ASSAY GLUCOSE BLOOD QUANT: CPT

## 2025-04-21 RX ORDER — INSULIN GLARGINE 100 [IU]/ML
15 INJECTION, SOLUTION SUBCUTANEOUS 2 TIMES DAILY
Status: DISCONTINUED | OUTPATIENT
Start: 2025-04-21 | End: 2025-04-21

## 2025-04-21 RX ORDER — INSULIN GLARGINE 100 [IU]/ML
15 INJECTION, SOLUTION SUBCUTANEOUS 2 TIMES DAILY
Status: DISCONTINUED | OUTPATIENT
Start: 2025-04-21 | End: 2025-04-24

## 2025-04-21 RX ORDER — INSULIN LISPRO 100 [IU]/ML
3 INJECTION, SOLUTION INTRAVENOUS; SUBCUTANEOUS
Status: DISCONTINUED | OUTPATIENT
Start: 2025-04-21 | End: 2025-04-21

## 2025-04-21 RX ORDER — LISINOPRIL 5 MG/1
5 TABLET ORAL DAILY
Status: DISCONTINUED | OUTPATIENT
Start: 2025-04-21 | End: 2025-04-22

## 2025-04-21 RX ORDER — ATORVASTATIN CALCIUM 40 MG/1
40 TABLET, FILM COATED ORAL NIGHTLY
Status: DISCONTINUED | OUTPATIENT
Start: 2025-04-22 | End: 2025-05-06 | Stop reason: HOSPADM

## 2025-04-21 RX ORDER — INSULIN GLARGINE 100 [IU]/ML
10 INJECTION, SOLUTION SUBCUTANEOUS ONCE
Status: COMPLETED | OUTPATIENT
Start: 2025-04-21 | End: 2025-04-21

## 2025-04-21 RX ADMIN — CEFEPIME 2000 MG: 2 INJECTION, POWDER, FOR SOLUTION INTRAVENOUS at 09:19

## 2025-04-21 RX ADMIN — OXYCODONE HYDROCHLORIDE AND ACETAMINOPHEN 2 TABLET: 5; 325 TABLET ORAL at 09:24

## 2025-04-21 RX ADMIN — CEFEPIME 2000 MG: 2 INJECTION, POWDER, FOR SOLUTION INTRAVENOUS at 22:13

## 2025-04-21 RX ADMIN — KETOROLAC TROMETHAMINE 15 MG: 15 INJECTION, SOLUTION INTRAMUSCULAR; INTRAVENOUS at 07:03

## 2025-04-21 RX ADMIN — LINEZOLID 600 MG: 600 TABLET, FILM COATED ORAL at 21:54

## 2025-04-21 RX ADMIN — SODIUM CHLORIDE, PRESERVATIVE FREE 10 ML: 5 INJECTION INTRAVENOUS at 09:21

## 2025-04-21 RX ADMIN — QUETIAPINE FUMARATE 150 MG: 100 TABLET ORAL at 23:31

## 2025-04-21 RX ADMIN — ASPIRIN 81 MG: 81 TABLET, COATED ORAL at 09:20

## 2025-04-21 RX ADMIN — ROSUVASTATIN CALCIUM 10 MG: 10 TABLET, FILM COATED ORAL at 09:20

## 2025-04-21 RX ADMIN — OXYCODONE HYDROCHLORIDE AND ACETAMINOPHEN 2 TABLET: 5; 325 TABLET ORAL at 21:54

## 2025-04-21 RX ADMIN — INSULIN GLARGINE 10 UNITS: 100 INJECTION, SOLUTION SUBCUTANEOUS at 09:20

## 2025-04-21 RX ADMIN — OXYCODONE HYDROCHLORIDE AND ACETAMINOPHEN 2 TABLET: 5; 325 TABLET ORAL at 16:08

## 2025-04-21 RX ADMIN — INSULIN LISPRO 4 UNITS: 100 INJECTION, SOLUTION INTRAVENOUS; SUBCUTANEOUS at 17:46

## 2025-04-21 RX ADMIN — INSULIN LISPRO 4 UNITS: 100 INJECTION, SOLUTION INTRAVENOUS; SUBCUTANEOUS at 21:56

## 2025-04-21 RX ADMIN — KETOROLAC TROMETHAMINE 15 MG: 15 INJECTION, SOLUTION INTRAMUSCULAR; INTRAVENOUS at 18:49

## 2025-04-21 RX ADMIN — INSULIN GLARGINE 15 UNITS: 100 INJECTION, SOLUTION SUBCUTANEOUS at 21:55

## 2025-04-21 RX ADMIN — LISINOPRIL 5 MG: 5 TABLET ORAL at 09:20

## 2025-04-21 RX ADMIN — LINEZOLID 600 MG: 600 TABLET, FILM COATED ORAL at 09:20

## 2025-04-21 ASSESSMENT — PAIN SCALES - GENERAL
PAINLEVEL_OUTOF10: 10
PAINLEVEL_OUTOF10: 4
PAINLEVEL_OUTOF10: 5
PAINLEVEL_OUTOF10: 8
PAINLEVEL_OUTOF10: 7

## 2025-04-21 ASSESSMENT — PAIN DESCRIPTION - LOCATION
LOCATION: FOOT
LOCATION: FOOT

## 2025-04-21 ASSESSMENT — PAIN DESCRIPTION - ORIENTATION: ORIENTATION: RIGHT

## 2025-04-21 ASSESSMENT — PAIN - FUNCTIONAL ASSESSMENT: PAIN_FUNCTIONAL_ASSESSMENT: 0-10

## 2025-04-21 NOTE — PLAN OF CARE
Problem: Chronic Conditions and Co-morbidities  Goal: Patient's chronic conditions and co-morbidity symptoms are monitored and maintained or improved  Outcome: Progressing  Flowsheets (Taken 4/20/2025 2000)  Care Plan - Patient's Chronic Conditions and Co-Morbidity Symptoms are Monitored and Maintained or Improved: Monitor and assess patient's chronic conditions and comorbid symptoms for stability, deterioration, or improvement     Problem: Discharge Planning  Goal: Discharge to home or other facility with appropriate resources  Outcome: Progressing  Flowsheets (Taken 4/20/2025 2000)  Discharge to home or other facility with appropriate resources: Identify barriers to discharge with patient and caregiver     Problem: Pain  Goal: Verbalizes/displays adequate comfort level or baseline comfort level  Outcome: Progressing     Problem: Skin/Tissue Integrity  Goal: Skin integrity remains intact  Description: 1.  Monitor for areas of redness and/or skin breakdown2.  Assess vascular access sites hourly3.  Every 4-6 hours minimum:  Change oxygen saturation probe site4.  Every 4-6 hours:  If on nasal continuous positive airway pressure, respiratory therapy assess nares and determine need for appliance change or resting period  Outcome: Progressing     Problem: Safety - Adult  Goal: Free from fall injury  Outcome: Progressing  Flowsheets (Taken 4/20/2025 9903)  Free From Fall Injury: Instruct family/caregiver on patient safety

## 2025-04-21 NOTE — CODE DOCUMENTATION
At 4/21/25 at [facility], resuscitation/code status decision was based on a thorough discussion with the patient.  The code status was made Full Code No additional code details.    Also discussed with sister Kathy    Electronically signed by Betsy Chandler MD on 4/21/25 at 3:58 PM EDT     Betsy Chandler MD  Internal Medicine Resident, PGY-2  [place], [place]  4/21/2025,3:58 PM

## 2025-04-22 PROBLEM — I42.9 CARDIOMYOPATHY (HCC): Status: ACTIVE | Noted: 2025-04-19

## 2025-04-22 LAB
ALBUMIN SERPL-MCNC: 3.2 G/DL (ref 3.5–5.2)
ALBUMIN/GLOB SERPL: 1.2 {RATIO} (ref 1–2.5)
ALP SERPL-CCNC: 88 U/L (ref 35–104)
ALT SERPL-CCNC: 20 U/L (ref 10–35)
ANION GAP SERPL CALCULATED.3IONS-SCNC: 10 MMOL/L (ref 9–16)
AST SERPL-CCNC: 20 U/L (ref 10–35)
BASOPHILS # BLD: 0.03 K/UL (ref 0–0.2)
BASOPHILS NFR BLD: 0 % (ref 0–2)
BILIRUB SERPL-MCNC: <0.2 MG/DL (ref 0–1.2)
BUN SERPL-MCNC: 25 MG/DL (ref 6–20)
CA-I BLD-SCNC: 1.1 MMOL/L (ref 1.13–1.33)
CALCIUM SERPL-MCNC: 8.1 MG/DL (ref 8.6–10.4)
CHLORIDE SERPL-SCNC: 105 MMOL/L (ref 98–107)
CO2 SERPL-SCNC: 22 MMOL/L (ref 20–31)
CREAT SERPL-MCNC: 0.8 MG/DL (ref 0.6–0.9)
CRP SERPL HS-MCNC: 13.5 MG/L (ref 0–5)
ECHO BSA: 1.59 M2
EOSINOPHIL # BLD: 0.25 K/UL (ref 0–0.44)
EOSINOPHILS RELATIVE PERCENT: 4 % (ref 1–4)
ERYTHROCYTE [DISTWIDTH] IN BLOOD BY AUTOMATED COUNT: 15 % (ref 11.8–14.4)
ERYTHROCYTE [SEDIMENTATION RATE] IN BLOOD BY PHOTOMETRIC METHOD: 44 MM/HR (ref 0–30)
GFR, ESTIMATED: 86 ML/MIN/1.73M2
GLUCOSE BLD-MCNC: 112 MG/DL (ref 65–105)
GLUCOSE BLD-MCNC: 118 MG/DL (ref 65–105)
GLUCOSE BLD-MCNC: 139 MG/DL (ref 65–105)
GLUCOSE BLD-MCNC: 161 MG/DL (ref 65–105)
GLUCOSE SERPL-MCNC: 128 MG/DL (ref 74–99)
HCT VFR BLD AUTO: 36.4 % (ref 36.3–47.1)
HGB BLD-MCNC: 11.6 G/DL (ref 11.9–15.1)
IMM GRANULOCYTES # BLD AUTO: <0.03 K/UL (ref 0–0.3)
IMM GRANULOCYTES NFR BLD: 0 %
LYMPHOCYTES NFR BLD: 2.64 K/UL (ref 1.1–3.7)
LYMPHOCYTES RELATIVE PERCENT: 38 % (ref 24–43)
MCH RBC QN AUTO: 27.1 PG (ref 25.2–33.5)
MCHC RBC AUTO-ENTMCNC: 31.9 G/DL (ref 28.4–34.8)
MCV RBC AUTO: 85 FL (ref 82.6–102.9)
MICROORGANISM SPEC CULT: ABNORMAL
MICROORGANISM SPEC CULT: ABNORMAL
MICROORGANISM/AGENT SPEC: ABNORMAL
MICROORGANISM/AGENT SPEC: ABNORMAL
MONOCYTES NFR BLD: 0.51 K/UL (ref 0.1–1.2)
MONOCYTES NFR BLD: 7 % (ref 3–12)
NEUTROPHILS NFR BLD: 51 % (ref 36–65)
NEUTS SEG NFR BLD: 3.43 K/UL (ref 1.5–8.1)
NRBC BLD-RTO: 0 PER 100 WBC
PLATELET # BLD AUTO: 381 K/UL (ref 138–453)
PMV BLD AUTO: 9 FL (ref 8.1–13.5)
POTASSIUM SERPL-SCNC: 4.1 MMOL/L (ref 3.7–5.3)
PROT SERPL-MCNC: 5.8 G/DL (ref 6.6–8.7)
RBC # BLD AUTO: 4.28 M/UL (ref 3.95–5.11)
RBC # BLD: ABNORMAL 10*6/UL
SERVICE CMNT-IMP: ABNORMAL
SODIUM SERPL-SCNC: 137 MMOL/L (ref 136–145)
SPECIMEN DESCRIPTION: ABNORMAL
WBC OTHER # BLD: 6.9 K/UL (ref 3.5–11.3)

## 2025-04-22 PROCEDURE — C1894 INTRO/SHEATH, NON-LASER: HCPCS | Performed by: INTERNAL MEDICINE

## 2025-04-22 PROCEDURE — 6370000000 HC RX 637 (ALT 250 FOR IP)

## 2025-04-22 PROCEDURE — 99153 MOD SED SAME PHYS/QHP EA: CPT | Performed by: INTERNAL MEDICINE

## 2025-04-22 PROCEDURE — 86140 C-REACTIVE PROTEIN: CPT

## 2025-04-22 PROCEDURE — 2500000003 HC RX 250 WO HCPCS: Performed by: INTERNAL MEDICINE

## 2025-04-22 PROCEDURE — 6360000002 HC RX W HCPCS

## 2025-04-22 PROCEDURE — 99232 SBSQ HOSP IP/OBS MODERATE 35: CPT

## 2025-04-22 PROCEDURE — 2580000003 HC RX 258

## 2025-04-22 PROCEDURE — 1200000000 HC SEMI PRIVATE

## 2025-04-22 PROCEDURE — 93458 L HRT ARTERY/VENTRICLE ANGIO: CPT | Performed by: INTERNAL MEDICINE

## 2025-04-22 PROCEDURE — B2151ZZ FLUOROSCOPY OF LEFT HEART USING LOW OSMOLAR CONTRAST: ICD-10-PCS | Performed by: INTERNAL MEDICINE

## 2025-04-22 PROCEDURE — 4A023N7 MEASUREMENT OF CARDIAC SAMPLING AND PRESSURE, LEFT HEART, PERCUTANEOUS APPROACH: ICD-10-PCS | Performed by: INTERNAL MEDICINE

## 2025-04-22 PROCEDURE — 80053 COMPREHEN METABOLIC PANEL: CPT

## 2025-04-22 PROCEDURE — C1769 GUIDE WIRE: HCPCS | Performed by: INTERNAL MEDICINE

## 2025-04-22 PROCEDURE — 85652 RBC SED RATE AUTOMATED: CPT

## 2025-04-22 PROCEDURE — 82330 ASSAY OF CALCIUM: CPT

## 2025-04-22 PROCEDURE — 99152 MOD SED SAME PHYS/QHP 5/>YRS: CPT | Performed by: INTERNAL MEDICINE

## 2025-04-22 PROCEDURE — 85025 COMPLETE CBC W/AUTO DIFF WBC: CPT

## 2025-04-22 PROCEDURE — 2709999900 HC NON-CHARGEABLE SUPPLY: Performed by: INTERNAL MEDICINE

## 2025-04-22 PROCEDURE — 36415 COLL VENOUS BLD VENIPUNCTURE: CPT

## 2025-04-22 PROCEDURE — 82947 ASSAY GLUCOSE BLOOD QUANT: CPT

## 2025-04-22 PROCEDURE — 99232 SBSQ HOSP IP/OBS MODERATE 35: CPT | Performed by: INTERNAL MEDICINE

## 2025-04-22 PROCEDURE — 6360000004 HC RX CONTRAST MEDICATION: Performed by: INTERNAL MEDICINE

## 2025-04-22 PROCEDURE — 99151 MOD SED SAME PHYS/QHP <5 YRS: CPT | Performed by: INTERNAL MEDICINE

## 2025-04-22 PROCEDURE — 6370000000 HC RX 637 (ALT 250 FOR IP): Performed by: STUDENT IN AN ORGANIZED HEALTH CARE EDUCATION/TRAINING PROGRAM

## 2025-04-22 PROCEDURE — 6360000002 HC RX W HCPCS: Performed by: INTERNAL MEDICINE

## 2025-04-22 PROCEDURE — 99222 1ST HOSP IP/OBS MODERATE 55: CPT | Performed by: STUDENT IN AN ORGANIZED HEALTH CARE EDUCATION/TRAINING PROGRAM

## 2025-04-22 PROCEDURE — B2111ZZ FLUOROSCOPY OF MULTIPLE CORONARY ARTERIES USING LOW OSMOLAR CONTRAST: ICD-10-PCS | Performed by: INTERNAL MEDICINE

## 2025-04-22 RX ORDER — SODIUM CHLORIDE 0.9 % (FLUSH) 0.9 %
5-40 SYRINGE (ML) INJECTION PRN
Status: CANCELLED | OUTPATIENT
Start: 2025-04-22

## 2025-04-22 RX ORDER — NICOTINE 21 MG/24HR
1 PATCH, TRANSDERMAL 24 HOURS TRANSDERMAL DAILY
Status: DISCONTINUED | OUTPATIENT
Start: 2025-04-22 | End: 2025-04-23

## 2025-04-22 RX ORDER — BUPROPION HYDROCHLORIDE 100 MG/1
100 TABLET ORAL 2 TIMES DAILY
Status: DISCONTINUED | OUTPATIENT
Start: 2025-04-22 | End: 2025-04-22

## 2025-04-22 RX ORDER — OXYCODONE HYDROCHLORIDE 5 MG/1
10 TABLET ORAL 4 TIMES DAILY
Refills: 0 | Status: DISCONTINUED | OUTPATIENT
Start: 2025-04-22 | End: 2025-04-27

## 2025-04-22 RX ORDER — IOPAMIDOL 755 MG/ML
INJECTION, SOLUTION INTRAVASCULAR PRN
Status: DISCONTINUED | OUTPATIENT
Start: 2025-04-22 | End: 2025-04-22 | Stop reason: HOSPADM

## 2025-04-22 RX ORDER — SODIUM CHLORIDE 9 MG/ML
INJECTION, SOLUTION INTRAVENOUS PRN
Status: CANCELLED | OUTPATIENT
Start: 2025-04-22

## 2025-04-22 RX ORDER — FENTANYL CITRATE 50 UG/ML
25 INJECTION, SOLUTION INTRAMUSCULAR; INTRAVENOUS ONCE
Status: COMPLETED | OUTPATIENT
Start: 2025-04-22 | End: 2025-04-22

## 2025-04-22 RX ORDER — METOPROLOL SUCCINATE 25 MG/1
25 TABLET, EXTENDED RELEASE ORAL DAILY
Status: DISCONTINUED | OUTPATIENT
Start: 2025-04-22 | End: 2025-04-29

## 2025-04-22 RX ORDER — ISOSORBIDE MONONITRATE 30 MG/1
30 TABLET, EXTENDED RELEASE ORAL DAILY
Status: DISCONTINUED | OUTPATIENT
Start: 2025-04-22 | End: 2025-05-06 | Stop reason: HOSPADM

## 2025-04-22 RX ORDER — FENTANYL CITRATE 50 UG/ML
25 INJECTION, SOLUTION INTRAMUSCULAR; INTRAVENOUS
Status: DISCONTINUED | OUTPATIENT
Start: 2025-04-22 | End: 2025-04-27

## 2025-04-22 RX ORDER — SODIUM CHLORIDE 0.9 % (FLUSH) 0.9 %
5-40 SYRINGE (ML) INJECTION EVERY 12 HOURS SCHEDULED
Status: CANCELLED | OUTPATIENT
Start: 2025-04-22

## 2025-04-22 RX ORDER — LISINOPRIL 10 MG/1
10 TABLET ORAL DAILY
Status: DISCONTINUED | OUTPATIENT
Start: 2025-04-23 | End: 2025-04-23

## 2025-04-22 RX ORDER — OXYCODONE AND ACETAMINOPHEN 5; 325 MG/1; MG/1
2 TABLET ORAL 4 TIMES DAILY
Refills: 0 | Status: DISCONTINUED | OUTPATIENT
Start: 2025-04-22 | End: 2025-04-22

## 2025-04-22 RX ORDER — MIDAZOLAM 1 MG/ML
INJECTION INTRAMUSCULAR; INTRAVENOUS PRN
Status: DISCONTINUED | OUTPATIENT
Start: 2025-04-22 | End: 2025-04-22 | Stop reason: HOSPADM

## 2025-04-22 RX ORDER — SODIUM CHLORIDE 9 MG/ML
INJECTION, SOLUTION INTRAVENOUS CONTINUOUS
Status: CANCELLED | OUTPATIENT
Start: 2025-04-22

## 2025-04-22 RX ADMIN — CEFEPIME 2000 MG: 2 INJECTION, POWDER, FOR SOLUTION INTRAVENOUS at 21:40

## 2025-04-22 RX ADMIN — CEFEPIME 2000 MG: 2 INJECTION, POWDER, FOR SOLUTION INTRAVENOUS at 08:53

## 2025-04-22 RX ADMIN — ENOXAPARIN SODIUM 40 MG: 100 INJECTION SUBCUTANEOUS at 08:31

## 2025-04-22 RX ADMIN — ASPIRIN 81 MG: 81 TABLET, COATED ORAL at 08:29

## 2025-04-22 RX ADMIN — ISOSORBIDE MONONITRATE 30 MG: 30 TABLET, EXTENDED RELEASE ORAL at 17:26

## 2025-04-22 RX ADMIN — LISINOPRIL 5 MG: 5 TABLET ORAL at 08:30

## 2025-04-22 RX ADMIN — INSULIN GLARGINE 15 UNITS: 100 INJECTION, SOLUTION SUBCUTANEOUS at 21:50

## 2025-04-22 RX ADMIN — INSULIN GLARGINE 15 UNITS: 100 INJECTION, SOLUTION SUBCUTANEOUS at 08:30

## 2025-04-22 RX ADMIN — OXYCODONE HYDROCHLORIDE 10 MG: 5 TABLET ORAL at 21:41

## 2025-04-22 RX ADMIN — METOPROLOL SUCCINATE 25 MG: 25 TABLET, EXTENDED RELEASE ORAL at 17:26

## 2025-04-22 RX ADMIN — LINEZOLID 600 MG: 600 TABLET, FILM COATED ORAL at 21:42

## 2025-04-22 RX ADMIN — ACETAMINOPHEN 650 MG: 325 TABLET ORAL at 18:43

## 2025-04-22 RX ADMIN — LINEZOLID 600 MG: 600 TABLET, FILM COATED ORAL at 08:30

## 2025-04-22 RX ADMIN — FENTANYL CITRATE 25 MCG: 50 INJECTION, SOLUTION INTRAMUSCULAR; INTRAVENOUS at 10:13

## 2025-04-22 RX ADMIN — OXYCODONE HYDROCHLORIDE 10 MG: 5 TABLET ORAL at 12:52

## 2025-04-22 RX ADMIN — ATORVASTATIN CALCIUM 40 MG: 40 TABLET, FILM COATED ORAL at 21:42

## 2025-04-22 RX ADMIN — QUETIAPINE FUMARATE 150 MG: 100 TABLET ORAL at 21:42

## 2025-04-22 RX ADMIN — OXYCODONE HYDROCHLORIDE 10 MG: 5 TABLET ORAL at 17:25

## 2025-04-22 ASSESSMENT — PAIN DESCRIPTION - LOCATION
LOCATION: HEAD
LOCATION: FOOT;TOE (COMMENT WHICH ONE)
LOCATION: FOOT;TOE (COMMENT WHICH ONE)

## 2025-04-22 ASSESSMENT — PAIN SCALES - GENERAL
PAINLEVEL_OUTOF10: 10
PAINLEVEL_OUTOF10: 10
PAINLEVEL_OUTOF10: 4
PAINLEVEL_OUTOF10: 10
PAINLEVEL_OUTOF10: 7
PAINLEVEL_OUTOF10: 5
PAINLEVEL_OUTOF10: 9

## 2025-04-22 ASSESSMENT — PAIN DESCRIPTION - DESCRIPTORS: DESCRIPTORS: ACHING;THROBBING

## 2025-04-22 ASSESSMENT — PAIN DESCRIPTION - ORIENTATION
ORIENTATION: RIGHT
ORIENTATION: RIGHT

## 2025-04-22 NOTE — PLAN OF CARE
Problem: Chronic Conditions and Co-morbidities  Goal: Patient's chronic conditions and co-morbidity symptoms are monitored and maintained or improved  Outcome: Progressing     Problem: Discharge Planning  Goal: Discharge to home or other facility with appropriate resources  Outcome: Progressing     Problem: Pain  Goal: Verbalizes/displays adequate comfort level or baseline comfort level  Outcome: Progressing     Problem: Skin/Tissue Integrity  Goal: Skin integrity remains intact  Description: 1.  Monitor for areas of redness and/or skin breakdown2.  Assess vascular access sites hourly3.  Every 4-6 hours minimum:  Change oxygen saturation probe site4.  Every 4-6 hours:  If on nasal continuous positive airway pressure, respiratory therapy assess nares and determine need for appliance change or resting period  Outcome: Progressing     Problem: Safety - Adult  Goal: Free from fall injury  Outcome: Progressing

## 2025-04-23 LAB
ALBUMIN SERPL-MCNC: 3.2 G/DL (ref 3.5–5.2)
ALBUMIN/GLOB SERPL: 1.2 {RATIO} (ref 1–2.5)
ALP SERPL-CCNC: 95 U/L (ref 35–104)
ALT SERPL-CCNC: 18 U/L (ref 10–35)
ANION GAP SERPL CALCULATED.3IONS-SCNC: 8 MMOL/L (ref 9–16)
AST SERPL-CCNC: 20 U/L (ref 10–35)
BASOPHILS # BLD: <0.03 K/UL (ref 0–0.2)
BASOPHILS NFR BLD: 0 % (ref 0–2)
BILIRUB SERPL-MCNC: 0.2 MG/DL (ref 0–1.2)
BUN SERPL-MCNC: 15 MG/DL (ref 6–20)
CALCIUM SERPL-MCNC: 8.3 MG/DL (ref 8.6–10.4)
CHLORIDE SERPL-SCNC: 107 MMOL/L (ref 98–107)
CO2 SERPL-SCNC: 22 MMOL/L (ref 20–31)
CREAT SERPL-MCNC: 0.7 MG/DL (ref 0.6–0.9)
CRP SERPL HS-MCNC: 24.1 MG/L (ref 0–5)
EOSINOPHIL # BLD: 0.31 K/UL (ref 0–0.44)
EOSINOPHILS RELATIVE PERCENT: 4 % (ref 1–4)
ERYTHROCYTE [DISTWIDTH] IN BLOOD BY AUTOMATED COUNT: 15.4 % (ref 11.8–14.4)
ERYTHROCYTE [SEDIMENTATION RATE] IN BLOOD BY PHOTOMETRIC METHOD: 52 MM/HR (ref 0–30)
GFR, ESTIMATED: >90 ML/MIN/1.73M2
GLUCOSE BLD-MCNC: 101 MG/DL (ref 65–105)
GLUCOSE BLD-MCNC: 105 MG/DL (ref 65–105)
GLUCOSE BLD-MCNC: 157 MG/DL (ref 65–105)
GLUCOSE BLD-MCNC: 86 MG/DL (ref 65–105)
GLUCOSE SERPL-MCNC: 132 MG/DL (ref 74–99)
HCT VFR BLD AUTO: 35.3 % (ref 36.3–47.1)
HGB BLD-MCNC: 11.3 G/DL (ref 11.9–15.1)
IMM GRANULOCYTES # BLD AUTO: <0.03 K/UL (ref 0–0.3)
IMM GRANULOCYTES NFR BLD: 0 %
LYMPHOCYTES NFR BLD: 2.83 K/UL (ref 1.1–3.7)
LYMPHOCYTES RELATIVE PERCENT: 33 % (ref 24–43)
MCH RBC QN AUTO: 27.4 PG (ref 25.2–33.5)
MCHC RBC AUTO-ENTMCNC: 32 G/DL (ref 28.4–34.8)
MCV RBC AUTO: 85.5 FL (ref 82.6–102.9)
MONOCYTES NFR BLD: 0.66 K/UL (ref 0.1–1.2)
MONOCYTES NFR BLD: 8 % (ref 3–12)
NEUTROPHILS NFR BLD: 55 % (ref 36–65)
NEUTS SEG NFR BLD: 4.71 K/UL (ref 1.5–8.1)
NRBC BLD-RTO: 0 PER 100 WBC
PLATELET # BLD AUTO: 396 K/UL (ref 138–453)
PMV BLD AUTO: 9.3 FL (ref 8.1–13.5)
POTASSIUM SERPL-SCNC: 4.2 MMOL/L (ref 3.7–5.3)
PROT SERPL-MCNC: 5.9 G/DL (ref 6.6–8.7)
RBC # BLD AUTO: 4.13 M/UL (ref 3.95–5.11)
RBC # BLD: ABNORMAL 10*6/UL
SODIUM SERPL-SCNC: 137 MMOL/L (ref 136–145)
WBC OTHER # BLD: 8.6 K/UL (ref 3.5–11.3)

## 2025-04-23 PROCEDURE — 2500000003 HC RX 250 WO HCPCS

## 2025-04-23 PROCEDURE — 86140 C-REACTIVE PROTEIN: CPT

## 2025-04-23 PROCEDURE — 6360000002 HC RX W HCPCS

## 2025-04-23 PROCEDURE — 6370000000 HC RX 637 (ALT 250 FOR IP)

## 2025-04-23 PROCEDURE — 2580000003 HC RX 258

## 2025-04-23 PROCEDURE — 99233 SBSQ HOSP IP/OBS HIGH 50: CPT | Performed by: STUDENT IN AN ORGANIZED HEALTH CARE EDUCATION/TRAINING PROGRAM

## 2025-04-23 PROCEDURE — 85025 COMPLETE CBC W/AUTO DIFF WBC: CPT

## 2025-04-23 PROCEDURE — 1200000000 HC SEMI PRIVATE

## 2025-04-23 PROCEDURE — 85652 RBC SED RATE AUTOMATED: CPT

## 2025-04-23 PROCEDURE — 36415 COLL VENOUS BLD VENIPUNCTURE: CPT

## 2025-04-23 PROCEDURE — 99232 SBSQ HOSP IP/OBS MODERATE 35: CPT | Performed by: INTERNAL MEDICINE

## 2025-04-23 PROCEDURE — 82947 ASSAY GLUCOSE BLOOD QUANT: CPT

## 2025-04-23 PROCEDURE — 6370000000 HC RX 637 (ALT 250 FOR IP): Performed by: STUDENT IN AN ORGANIZED HEALTH CARE EDUCATION/TRAINING PROGRAM

## 2025-04-23 PROCEDURE — 80053 COMPREHEN METABOLIC PANEL: CPT

## 2025-04-23 RX ORDER — NICOTINE 21 MG/24HR
1 PATCH, TRANSDERMAL 24 HOURS TRANSDERMAL DAILY
Status: DISCONTINUED | OUTPATIENT
Start: 2025-04-23 | End: 2025-05-06 | Stop reason: HOSPADM

## 2025-04-23 RX ORDER — LISINOPRIL 10 MG/1
30 TABLET ORAL DAILY
Status: DISCONTINUED | OUTPATIENT
Start: 2025-04-23 | End: 2025-04-29

## 2025-04-23 RX ORDER — POLYETHYLENE GLYCOL 3350 17 G
2 POWDER IN PACKET (EA) ORAL
Status: DISCONTINUED | OUTPATIENT
Start: 2025-04-23 | End: 2025-05-06 | Stop reason: HOSPADM

## 2025-04-23 RX ORDER — MICONAZOLE NITRATE 20 MG/G
CREAM TOPICAL 2 TIMES DAILY
Status: DISPENSED | OUTPATIENT
Start: 2025-04-23 | End: 2025-04-26

## 2025-04-23 RX ADMIN — LINEZOLID 600 MG: 600 TABLET, FILM COATED ORAL at 08:03

## 2025-04-23 RX ADMIN — OXYCODONE HYDROCHLORIDE 10 MG: 5 TABLET ORAL at 08:02

## 2025-04-23 RX ADMIN — MICONAZOLE NITRATE: 20 CREAM TOPICAL at 12:37

## 2025-04-23 RX ADMIN — FENTANYL CITRATE 25 MCG: 50 INJECTION, SOLUTION INTRAMUSCULAR; INTRAVENOUS at 04:50

## 2025-04-23 RX ADMIN — HYDROMORPHONE HYDROCHLORIDE 0.5 MG: 1 INJECTION, SOLUTION INTRAMUSCULAR; INTRAVENOUS; SUBCUTANEOUS at 09:33

## 2025-04-23 RX ADMIN — OXYCODONE HYDROCHLORIDE 10 MG: 5 TABLET ORAL at 12:34

## 2025-04-23 RX ADMIN — METOPROLOL SUCCINATE 25 MG: 25 TABLET, EXTENDED RELEASE ORAL at 08:02

## 2025-04-23 RX ADMIN — QUETIAPINE FUMARATE 150 MG: 100 TABLET ORAL at 21:16

## 2025-04-23 RX ADMIN — SODIUM CHLORIDE, PRESERVATIVE FREE 10 ML: 5 INJECTION INTRAVENOUS at 21:16

## 2025-04-23 RX ADMIN — ATORVASTATIN CALCIUM 40 MG: 40 TABLET, FILM COATED ORAL at 21:16

## 2025-04-23 RX ADMIN — OXYCODONE HYDROCHLORIDE 10 MG: 5 TABLET ORAL at 21:15

## 2025-04-23 RX ADMIN — ENOXAPARIN SODIUM 40 MG: 100 INJECTION SUBCUTANEOUS at 08:02

## 2025-04-23 RX ADMIN — CEFEPIME 2000 MG: 2 INJECTION, POWDER, FOR SOLUTION INTRAVENOUS at 21:10

## 2025-04-23 RX ADMIN — LINEZOLID 600 MG: 600 TABLET, FILM COATED ORAL at 21:15

## 2025-04-23 RX ADMIN — CEFEPIME 2000 MG: 2 INJECTION, POWDER, FOR SOLUTION INTRAVENOUS at 08:10

## 2025-04-23 RX ADMIN — ASPIRIN 81 MG: 81 TABLET, COATED ORAL at 08:02

## 2025-04-23 RX ADMIN — OXYCODONE HYDROCHLORIDE 10 MG: 5 TABLET ORAL at 16:34

## 2025-04-23 RX ADMIN — INSULIN GLARGINE 15 UNITS: 100 INJECTION, SOLUTION SUBCUTANEOUS at 08:21

## 2025-04-23 RX ADMIN — ISOSORBIDE MONONITRATE 30 MG: 30 TABLET, EXTENDED RELEASE ORAL at 08:02

## 2025-04-23 RX ADMIN — LISINOPRIL 30 MG: 20 TABLET ORAL at 08:02

## 2025-04-23 ASSESSMENT — PAIN SCALES - GENERAL
PAINLEVEL_OUTOF10: 7
PAINLEVEL_OUTOF10: 10
PAINLEVEL_OUTOF10: 4
PAINLEVEL_OUTOF10: 10

## 2025-04-23 ASSESSMENT — PAIN DESCRIPTION - DESCRIPTORS: DESCRIPTORS: ACHING

## 2025-04-23 ASSESSMENT — PAIN DESCRIPTION - ORIENTATION: ORIENTATION: RIGHT

## 2025-04-23 ASSESSMENT — PAIN DESCRIPTION - LOCATION
LOCATION: TOE (COMMENT WHICH ONE)
LOCATION: FOOT

## 2025-04-23 NOTE — PLAN OF CARE
Problem: Chronic Conditions and Co-morbidities  Goal: Patient's chronic conditions and co-morbidity symptoms are monitored and maintained or improved  4/23/2025 1659 by Celia Gilmore RN  Outcome: Progressing  Flowsheets (Taken 4/23/2025 0800)  Care Plan - Patient's Chronic Conditions and Co-Morbidity Symptoms are Monitored and Maintained or Improved: Monitor and assess patient's chronic conditions and comorbid symptoms for stability, deterioration, or improvement  4/23/2025 0506 by Nadia Campbell RN  Outcome: Progressing     Problem: Discharge Planning  Goal: Discharge to home or other facility with appropriate resources  4/23/2025 1659 by Celia Gilmore RN  Outcome: Progressing  Flowsheets (Taken 4/23/2025 0800)  Discharge to home or other facility with appropriate resources:   Refer to discharge planning if patient needs post-hospital services based on physician order or complex needs related to functional status, cognitive ability or social support system   Identify barriers to discharge with patient and caregiver  4/23/2025 0506 by Nadia Campbell RN  Outcome: Progressing     Problem: Pain  Goal: Verbalizes/displays adequate comfort level or baseline comfort level  4/23/2025 1659 by Celia Gilmore RN  Outcome: Progressing  4/23/2025 0506 by Nadia Campbell RN  Outcome: Progressing     Problem: Skin/Tissue Integrity  Goal: Skin integrity remains intact  Description: 1.  Monitor for areas of redness and/or skin breakdown2.  Assess vascular access sites hourly3.  Every 4-6 hours minimum:  Change oxygen saturation probe site4.  Every 4-6 hours:  If on nasal continuous positive airway pressure, respiratory therapy assess nares and determine need for appliance change or resting period  4/23/2025 1659 by Celia Gilmore RN  Outcome: Progressing  Flowsheets (Taken 4/23/2025 1003)  Skin Integrity Remains Intact: Monitor for areas of redness and/or skin breakdown  4/23/2025 0506 by Nadia Campbell

## 2025-04-24 LAB
BASOPHILS # BLD: 0.03 K/UL (ref 0–0.2)
BASOPHILS NFR BLD: 0 % (ref 0–2)
CRP SERPL HS-MCNC: 36.7 MG/L (ref 0–5)
EOSINOPHIL # BLD: 0.32 K/UL (ref 0–0.44)
EOSINOPHILS RELATIVE PERCENT: 4 % (ref 1–4)
ERYTHROCYTE [DISTWIDTH] IN BLOOD BY AUTOMATED COUNT: 15.4 % (ref 11.8–14.4)
ERYTHROCYTE [SEDIMENTATION RATE] IN BLOOD BY PHOTOMETRIC METHOD: 69 MM/HR (ref 0–30)
GLUCOSE BLD-MCNC: 124 MG/DL (ref 65–105)
GLUCOSE BLD-MCNC: 136 MG/DL (ref 65–105)
GLUCOSE BLD-MCNC: 148 MG/DL (ref 65–105)
GLUCOSE BLD-MCNC: 97 MG/DL (ref 65–105)
HCT VFR BLD AUTO: 38.4 % (ref 36.3–47.1)
HGB BLD-MCNC: 12.2 G/DL (ref 11.9–15.1)
IMM GRANULOCYTES # BLD AUTO: 0.04 K/UL (ref 0–0.3)
IMM GRANULOCYTES NFR BLD: 0 %
LYMPHOCYTES NFR BLD: 3.22 K/UL (ref 1.1–3.7)
LYMPHOCYTES RELATIVE PERCENT: 36 % (ref 24–43)
MCH RBC QN AUTO: 27.4 PG (ref 25.2–33.5)
MCHC RBC AUTO-ENTMCNC: 31.8 G/DL (ref 28.4–34.8)
MCV RBC AUTO: 86.3 FL (ref 82.6–102.9)
MONOCYTES NFR BLD: 0.6 K/UL (ref 0.1–1.2)
MONOCYTES NFR BLD: 7 % (ref 3–12)
NEUTROPHILS NFR BLD: 53 % (ref 36–65)
NEUTS SEG NFR BLD: 4.68 K/UL (ref 1.5–8.1)
NRBC BLD-RTO: 0 PER 100 WBC
PLATELET # BLD AUTO: 412 K/UL (ref 138–453)
PMV BLD AUTO: 9.2 FL (ref 8.1–13.5)
RBC # BLD AUTO: 4.45 M/UL (ref 3.95–5.11)
RBC # BLD: ABNORMAL 10*6/UL
WBC OTHER # BLD: 8.9 K/UL (ref 3.5–11.3)

## 2025-04-24 PROCEDURE — 6370000000 HC RX 637 (ALT 250 FOR IP)

## 2025-04-24 PROCEDURE — 36415 COLL VENOUS BLD VENIPUNCTURE: CPT

## 2025-04-24 PROCEDURE — 85025 COMPLETE CBC W/AUTO DIFF WBC: CPT

## 2025-04-24 PROCEDURE — 99232 SBSQ HOSP IP/OBS MODERATE 35: CPT | Performed by: INTERNAL MEDICINE

## 2025-04-24 PROCEDURE — 2500000003 HC RX 250 WO HCPCS

## 2025-04-24 PROCEDURE — 6360000002 HC RX W HCPCS

## 2025-04-24 PROCEDURE — 99233 SBSQ HOSP IP/OBS HIGH 50: CPT | Performed by: NURSE PRACTITIONER

## 2025-04-24 PROCEDURE — 85652 RBC SED RATE AUTOMATED: CPT

## 2025-04-24 PROCEDURE — 82947 ASSAY GLUCOSE BLOOD QUANT: CPT

## 2025-04-24 PROCEDURE — 99233 SBSQ HOSP IP/OBS HIGH 50: CPT | Performed by: INTERNAL MEDICINE

## 2025-04-24 PROCEDURE — 6370000000 HC RX 637 (ALT 250 FOR IP): Performed by: STUDENT IN AN ORGANIZED HEALTH CARE EDUCATION/TRAINING PROGRAM

## 2025-04-24 PROCEDURE — 99232 SBSQ HOSP IP/OBS MODERATE 35: CPT

## 2025-04-24 PROCEDURE — 2580000003 HC RX 258

## 2025-04-24 PROCEDURE — 86140 C-REACTIVE PROTEIN: CPT

## 2025-04-24 PROCEDURE — 1200000000 HC SEMI PRIVATE

## 2025-04-24 RX ORDER — INSULIN LISPRO 100 [IU]/ML
0-8 INJECTION, SOLUTION INTRAVENOUS; SUBCUTANEOUS
Status: DISCONTINUED | OUTPATIENT
Start: 2025-04-24 | End: 2025-05-06 | Stop reason: HOSPADM

## 2025-04-24 RX ORDER — INSULIN GLARGINE 100 [IU]/ML
15 INJECTION, SOLUTION SUBCUTANEOUS DAILY
Status: DISCONTINUED | OUTPATIENT
Start: 2025-04-24 | End: 2025-04-24

## 2025-04-24 RX ORDER — VARENICLINE TARTRATE 0.5 MG/1
0.5 TABLET, FILM COATED ORAL 2 TIMES DAILY
Status: DISCONTINUED | OUTPATIENT
Start: 2025-04-24 | End: 2025-04-25

## 2025-04-24 RX ORDER — SPIRONOLACTONE 25 MG/1
12.5 TABLET ORAL DAILY
Status: DISCONTINUED | OUTPATIENT
Start: 2025-04-24 | End: 2025-05-03

## 2025-04-24 RX ORDER — INSULIN GLARGINE 100 [IU]/ML
5 INJECTION, SOLUTION SUBCUTANEOUS DAILY
Status: DISCONTINUED | OUTPATIENT
Start: 2025-04-24 | End: 2025-04-27

## 2025-04-24 RX ADMIN — MICONAZOLE NITRATE: 20 CREAM TOPICAL at 21:36

## 2025-04-24 RX ADMIN — CEFEPIME 2000 MG: 2 INJECTION, POWDER, FOR SOLUTION INTRAVENOUS at 10:20

## 2025-04-24 RX ADMIN — ATORVASTATIN CALCIUM 40 MG: 40 TABLET, FILM COATED ORAL at 21:25

## 2025-04-24 RX ADMIN — LINEZOLID 600 MG: 600 TABLET, FILM COATED ORAL at 08:02

## 2025-04-24 RX ADMIN — ENOXAPARIN SODIUM 40 MG: 100 INJECTION SUBCUTANEOUS at 08:02

## 2025-04-24 RX ADMIN — KETOROLAC TROMETHAMINE 15 MG: 15 INJECTION, SOLUTION INTRAMUSCULAR; INTRAVENOUS at 01:30

## 2025-04-24 RX ADMIN — CEFEPIME 2000 MG: 2 INJECTION, POWDER, FOR SOLUTION INTRAVENOUS at 21:32

## 2025-04-24 RX ADMIN — SODIUM CHLORIDE, PRESERVATIVE FREE 10 ML: 5 INJECTION INTRAVENOUS at 21:25

## 2025-04-24 RX ADMIN — OXYCODONE HYDROCHLORIDE 10 MG: 5 TABLET ORAL at 21:25

## 2025-04-24 RX ADMIN — ISOSORBIDE MONONITRATE 30 MG: 30 TABLET, EXTENDED RELEASE ORAL at 08:02

## 2025-04-24 RX ADMIN — FENTANYL CITRATE 25 MCG: 50 INJECTION, SOLUTION INTRAMUSCULAR; INTRAVENOUS at 03:08

## 2025-04-24 RX ADMIN — LINEZOLID 600 MG: 600 TABLET, FILM COATED ORAL at 21:25

## 2025-04-24 RX ADMIN — MICONAZOLE NITRATE: 20 CREAM TOPICAL at 08:03

## 2025-04-24 RX ADMIN — VARENICLINE TARTRATE 0.5 MG: 0.5 TABLET, FILM COATED ORAL at 21:29

## 2025-04-24 RX ADMIN — QUETIAPINE FUMARATE 150 MG: 100 TABLET ORAL at 21:29

## 2025-04-24 RX ADMIN — METOPROLOL SUCCINATE 25 MG: 25 TABLET, EXTENDED RELEASE ORAL at 08:02

## 2025-04-24 RX ADMIN — OXYCODONE HYDROCHLORIDE 10 MG: 5 TABLET ORAL at 08:02

## 2025-04-24 RX ADMIN — VARENICLINE TARTRATE 0.5 MG: 0.5 TABLET, FILM COATED ORAL at 14:46

## 2025-04-24 RX ADMIN — OXYCODONE HYDROCHLORIDE 10 MG: 5 TABLET ORAL at 18:02

## 2025-04-24 RX ADMIN — ASPIRIN 81 MG: 81 TABLET, COATED ORAL at 08:02

## 2025-04-24 RX ADMIN — LISINOPRIL 30 MG: 20 TABLET ORAL at 08:03

## 2025-04-24 RX ADMIN — OXYCODONE HYDROCHLORIDE 10 MG: 5 TABLET ORAL at 13:08

## 2025-04-24 RX ADMIN — SPIRONOLACTONE 12.5 MG: 25 TABLET, FILM COATED ORAL at 08:02

## 2025-04-24 ASSESSMENT — PAIN DESCRIPTION - LOCATION
LOCATION: FOOT
LOCATION: TOE (COMMENT WHICH ONE)

## 2025-04-24 ASSESSMENT — PAIN DESCRIPTION - DESCRIPTORS
DESCRIPTORS: ACHING

## 2025-04-24 ASSESSMENT — PAIN DESCRIPTION - ORIENTATION
ORIENTATION: RIGHT

## 2025-04-24 ASSESSMENT — PAIN SCALES - GENERAL
PAINLEVEL_OUTOF10: 9
PAINLEVEL_OUTOF10: 4
PAINLEVEL_OUTOF10: 4
PAINLEVEL_OUTOF10: 9
PAINLEVEL_OUTOF10: 10
PAINLEVEL_OUTOF10: 10

## 2025-04-24 NOTE — PLAN OF CARE
Problem: Chronic Conditions and Co-morbidities  Goal: Patient's chronic conditions and co-morbidity symptoms are monitored and maintained or improved  4/24/2025 1803 by Domingo Holder RN  Outcome: Progressing  4/24/2025 0514 by Lili Doe RN  Outcome: Progressing     Problem: Discharge Planning  Goal: Discharge to home or other facility with appropriate resources  4/24/2025 1803 by Domingo Holder RN  Outcome: Progressing  4/24/2025 0514 by Lili Doe RN  Outcome: Progressing     Problem: Pain  Goal: Verbalizes/displays adequate comfort level or baseline comfort level  4/24/2025 1803 by Domingo Holder RN  Outcome: Progressing  4/24/2025 0514 by Lili Doe RN  Outcome: Progressing     Problem: Skin/Tissue Integrity  Goal: Skin integrity remains intact  Description: 1.  Monitor for areas of redness and/or skin breakdown2.  Assess vascular access sites hourly3.  Every 4-6 hours minimum:  Change oxygen saturation probe site4.  Every 4-6 hours:  If on nasal continuous positive airway pressure, respiratory therapy assess nares and determine need for appliance change or resting period  4/24/2025 1803 by Domingo Holder RN  Outcome: Progressing  4/24/2025 0514 by Lili Doe RN  Outcome: Progressing     Problem: Safety - Adult  Goal: Free from fall injury  Outcome: Progressing     Problem: ABCDS Injury Assessment  Goal: Absence of physical injury  Outcome: Progressing     Problem: Nutrition Deficit:  Goal: Optimize nutritional status  Outcome: Progressing     Problem: Chronic Conditions and Co-morbidities  Goal: Patient's chronic conditions and co-morbidity symptoms are monitored and maintained or improved  4/24/2025 1803 by Domingo Holder RN  Outcome: Progressing  4/24/2025 0514 by Lili Doe RN  Outcome: Progressing     Problem: Discharge Planning  Goal: Discharge to home or other facility with appropriate resources  4/24/2025 1803 by Domingo Holder

## 2025-04-24 NOTE — PLAN OF CARE
Problem: Chronic Conditions and Co-morbidities  Goal: Patient's chronic conditions and co-morbidity symptoms are monitored and maintained or improved  4/24/2025 0514 by Lili Doe RN  Outcome: Progressing     Problem: Discharge Planning  Goal: Discharge to home or other facility with appropriate resources  4/24/2025 0514 by Lili Doe RN  Outcome: Progressing     Problem: Pain  Goal: Verbalizes/displays adequate comfort level or baseline comfort level  4/24/2025 0514 by Lili Doe RN  Outcome: Progressing     Problem: Skin/Tissue Integrity  Goal: Skin integrity remains intact  Description: 1.  Monitor for areas of redness and/or skin breakdown2.  Assess vascular access sites hourly3.  Every 4-6 hours minimum:  Change oxygen saturation probe site4.  Every 4-6 hours:  If on nasal continuous positive airway pressure, respiratory therapy assess nares and determine need for appliance change or resting period  4/24/2025 0514 by Lili Doe, RN  Outcome: Progressing

## 2025-04-25 ENCOUNTER — ANESTHESIA EVENT (OUTPATIENT)
Dept: OPERATING ROOM | Age: 55
End: 2025-04-25
Payer: MEDICARE

## 2025-04-25 ENCOUNTER — APPOINTMENT (OUTPATIENT)
Dept: GENERAL RADIOLOGY | Age: 55
DRG: 617 | End: 2025-04-25
Payer: MEDICARE

## 2025-04-25 ENCOUNTER — ANESTHESIA (OUTPATIENT)
Dept: OPERATING ROOM | Age: 55
End: 2025-04-25
Payer: MEDICARE

## 2025-04-25 LAB
ANION GAP SERPL CALCULATED.3IONS-SCNC: 13 MMOL/L (ref 9–16)
BASOPHILS # BLD: 0.05 K/UL (ref 0–0.2)
BASOPHILS NFR BLD: 1 % (ref 0–2)
BUN SERPL-MCNC: 12 MG/DL (ref 6–20)
CALCIUM SERPL-MCNC: 9.2 MG/DL (ref 8.6–10.4)
CHLORIDE SERPL-SCNC: 103 MMOL/L (ref 98–107)
CO2 SERPL-SCNC: 22 MMOL/L (ref 20–31)
CREAT SERPL-MCNC: 0.7 MG/DL (ref 0.6–0.9)
CRP SERPL HS-MCNC: 27.8 MG/L (ref 0–5)
EOSINOPHIL # BLD: 0.33 K/UL (ref 0–0.44)
EOSINOPHILS RELATIVE PERCENT: 3 % (ref 1–4)
ERYTHROCYTE [DISTWIDTH] IN BLOOD BY AUTOMATED COUNT: 15.2 % (ref 11.8–14.4)
ERYTHROCYTE [SEDIMENTATION RATE] IN BLOOD BY PHOTOMETRIC METHOD: 82 MM/HR (ref 0–30)
GFR, ESTIMATED: >90 ML/MIN/1.73M2
GLUCOSE BLD-MCNC: 134 MG/DL (ref 65–105)
GLUCOSE BLD-MCNC: 150 MG/DL (ref 65–105)
GLUCOSE BLD-MCNC: 188 MG/DL (ref 65–105)
GLUCOSE BLD-MCNC: 211 MG/DL (ref 65–105)
GLUCOSE SERPL-MCNC: 141 MG/DL (ref 74–99)
HCT VFR BLD AUTO: 40.5 % (ref 36.3–47.1)
HGB BLD-MCNC: 12.7 G/DL (ref 11.9–15.1)
IMM GRANULOCYTES # BLD AUTO: 0.04 K/UL (ref 0–0.3)
IMM GRANULOCYTES NFR BLD: 0 %
LYMPHOCYTES NFR BLD: 3.54 K/UL (ref 1.1–3.7)
LYMPHOCYTES RELATIVE PERCENT: 36 % (ref 24–43)
MCH RBC QN AUTO: 26.9 PG (ref 25.2–33.5)
MCHC RBC AUTO-ENTMCNC: 31.4 G/DL (ref 28.4–34.8)
MCV RBC AUTO: 85.8 FL (ref 82.6–102.9)
MONOCYTES NFR BLD: 0.68 K/UL (ref 0.1–1.2)
MONOCYTES NFR BLD: 7 % (ref 3–12)
NEUTROPHILS NFR BLD: 53 % (ref 36–65)
NEUTS SEG NFR BLD: 5.2 K/UL (ref 1.5–8.1)
NRBC BLD-RTO: 0 PER 100 WBC
PLATELET # BLD AUTO: 400 K/UL (ref 138–453)
PMV BLD AUTO: 9.2 FL (ref 8.1–13.5)
POTASSIUM SERPL-SCNC: 4.5 MMOL/L (ref 3.7–5.3)
RBC # BLD AUTO: 4.72 M/UL (ref 3.95–5.11)
RBC # BLD: ABNORMAL 10*6/UL
SODIUM SERPL-SCNC: 138 MMOL/L (ref 136–145)
WBC OTHER # BLD: 9.8 K/UL (ref 3.5–11.3)

## 2025-04-25 PROCEDURE — 80048 BASIC METABOLIC PNL TOTAL CA: CPT

## 2025-04-25 PROCEDURE — 87205 SMEAR GRAM STAIN: CPT

## 2025-04-25 PROCEDURE — 6360000002 HC RX W HCPCS

## 2025-04-25 PROCEDURE — 3600000004 HC SURGERY LEVEL 4 BASE: Performed by: PODIATRIST

## 2025-04-25 PROCEDURE — 6360000002 HC RX W HCPCS: Performed by: PODIATRIST

## 2025-04-25 PROCEDURE — 85652 RBC SED RATE AUTOMATED: CPT

## 2025-04-25 PROCEDURE — 87206 SMEAR FLUORESCENT/ACID STAI: CPT

## 2025-04-25 PROCEDURE — 3700000000 HC ANESTHESIA ATTENDED CARE: Performed by: PODIATRIST

## 2025-04-25 PROCEDURE — 7100000001 HC PACU RECOVERY - ADDTL 15 MIN: Performed by: PODIATRIST

## 2025-04-25 PROCEDURE — 85025 COMPLETE CBC W/AUTO DIFF WBC: CPT

## 2025-04-25 PROCEDURE — 2709999900 HC NON-CHARGEABLE SUPPLY: Performed by: PODIATRIST

## 2025-04-25 PROCEDURE — 87075 CULTR BACTERIA EXCEPT BLOOD: CPT

## 2025-04-25 PROCEDURE — 2500000003 HC RX 250 WO HCPCS: Performed by: NURSE ANESTHETIST, CERTIFIED REGISTERED

## 2025-04-25 PROCEDURE — 86140 C-REACTIVE PROTEIN: CPT

## 2025-04-25 PROCEDURE — 1200000000 HC SEMI PRIVATE

## 2025-04-25 PROCEDURE — 6360000002 HC RX W HCPCS: Performed by: NURSE ANESTHETIST, CERTIFIED REGISTERED

## 2025-04-25 PROCEDURE — 2580000003 HC RX 258

## 2025-04-25 PROCEDURE — 87070 CULTURE OTHR SPECIMN AEROBIC: CPT

## 2025-04-25 PROCEDURE — 2500000003 HC RX 250 WO HCPCS

## 2025-04-25 PROCEDURE — 99232 SBSQ HOSP IP/OBS MODERATE 35: CPT | Performed by: STUDENT IN AN ORGANIZED HEALTH CARE EDUCATION/TRAINING PROGRAM

## 2025-04-25 PROCEDURE — 3600000014 HC SURGERY LEVEL 4 ADDTL 15MIN: Performed by: PODIATRIST

## 2025-04-25 PROCEDURE — 88311 DECALCIFY TISSUE: CPT

## 2025-04-25 PROCEDURE — 6370000000 HC RX 637 (ALT 250 FOR IP)

## 2025-04-25 PROCEDURE — 6370000000 HC RX 637 (ALT 250 FOR IP): Performed by: STUDENT IN AN ORGANIZED HEALTH CARE EDUCATION/TRAINING PROGRAM

## 2025-04-25 PROCEDURE — 0QBQ0ZX EXCISION OF RIGHT TOE PHALANX, OPEN APPROACH, DIAGNOSTIC: ICD-10-PCS | Performed by: PODIATRIST

## 2025-04-25 PROCEDURE — 3700000001 HC ADD 15 MINUTES (ANESTHESIA): Performed by: PODIATRIST

## 2025-04-25 PROCEDURE — 28825 PARTIAL AMPUTATION OF TOE: CPT | Performed by: PODIATRIST

## 2025-04-25 PROCEDURE — 82947 ASSAY GLUCOSE BLOOD QUANT: CPT

## 2025-04-25 PROCEDURE — 36415 COLL VENOUS BLD VENIPUNCTURE: CPT

## 2025-04-25 PROCEDURE — 87176 TISSUE HOMOGENIZATION CULTR: CPT

## 2025-04-25 PROCEDURE — 88305 TISSUE EXAM BY PATHOLOGIST: CPT

## 2025-04-25 PROCEDURE — 86403 PARTICLE AGGLUT ANTBDY SCRN: CPT

## 2025-04-25 PROCEDURE — 7100000000 HC PACU RECOVERY - FIRST 15 MIN: Performed by: PODIATRIST

## 2025-04-25 PROCEDURE — 73630 X-RAY EXAM OF FOOT: CPT

## 2025-04-25 PROCEDURE — 2500000003 HC RX 250 WO HCPCS: Performed by: PODIATRIST

## 2025-04-25 PROCEDURE — 20245 BONE BIOPSY OPEN DEEP: CPT | Performed by: PODIATRIST

## 2025-04-25 PROCEDURE — 2580000003 HC RX 258: Performed by: NURSE ANESTHETIST, CERTIFIED REGISTERED

## 2025-04-25 PROCEDURE — 99232 SBSQ HOSP IP/OBS MODERATE 35: CPT | Performed by: INTERNAL MEDICINE

## 2025-04-25 PROCEDURE — 0Y6P0Z3 DETACHMENT AT RIGHT 1ST TOE, LOW, OPEN APPROACH: ICD-10-PCS | Performed by: PODIATRIST

## 2025-04-25 RX ORDER — SODIUM CHLORIDE 9 MG/ML
INJECTION, SOLUTION INTRAVENOUS PRN
Status: DISCONTINUED | OUTPATIENT
Start: 2025-04-25 | End: 2025-04-25 | Stop reason: HOSPADM

## 2025-04-25 RX ORDER — SODIUM CHLORIDE, SODIUM LACTATE, POTASSIUM CHLORIDE, CALCIUM CHLORIDE 600; 310; 30; 20 MG/100ML; MG/100ML; MG/100ML; MG/100ML
INJECTION, SOLUTION INTRAVENOUS
Status: DISCONTINUED | OUTPATIENT
Start: 2025-04-25 | End: 2025-04-25 | Stop reason: SDUPTHER

## 2025-04-25 RX ORDER — NALOXONE HYDROCHLORIDE 0.4 MG/ML
INJECTION, SOLUTION INTRAMUSCULAR; INTRAVENOUS; SUBCUTANEOUS PRN
Status: DISCONTINUED | OUTPATIENT
Start: 2025-04-25 | End: 2025-04-25 | Stop reason: HOSPADM

## 2025-04-25 RX ORDER — MIDAZOLAM HYDROCHLORIDE 1 MG/ML
INJECTION, SOLUTION INTRAMUSCULAR; INTRAVENOUS
Status: DISCONTINUED | OUTPATIENT
Start: 2025-04-25 | End: 2025-04-25 | Stop reason: SDUPTHER

## 2025-04-25 RX ORDER — FENTANYL CITRATE 50 UG/ML
25 INJECTION, SOLUTION INTRAMUSCULAR; INTRAVENOUS EVERY 5 MIN PRN
Status: DISCONTINUED | OUTPATIENT
Start: 2025-04-25 | End: 2025-04-25 | Stop reason: HOSPADM

## 2025-04-25 RX ORDER — ETOMIDATE 2 MG/ML
INJECTION INTRAVENOUS
Status: DISCONTINUED | OUTPATIENT
Start: 2025-04-25 | End: 2025-04-25 | Stop reason: SDUPTHER

## 2025-04-25 RX ORDER — ONDANSETRON 2 MG/ML
4 INJECTION INTRAMUSCULAR; INTRAVENOUS
Status: DISCONTINUED | OUTPATIENT
Start: 2025-04-25 | End: 2025-04-25 | Stop reason: HOSPADM

## 2025-04-25 RX ORDER — CEFEPIME HYDROCHLORIDE 2 G/1
INJECTION, POWDER, FOR SOLUTION INTRAVENOUS
Status: DISCONTINUED | OUTPATIENT
Start: 2025-04-25 | End: 2025-04-25 | Stop reason: SDUPTHER

## 2025-04-25 RX ORDER — MAGNESIUM HYDROXIDE 1200 MG/15ML
LIQUID ORAL CONTINUOUS PRN
Status: COMPLETED | OUTPATIENT
Start: 2025-04-25 | End: 2025-04-25

## 2025-04-25 RX ORDER — HYDRALAZINE HYDROCHLORIDE 20 MG/ML
10 INJECTION INTRAMUSCULAR; INTRAVENOUS
Status: DISCONTINUED | OUTPATIENT
Start: 2025-04-25 | End: 2025-04-25 | Stop reason: HOSPADM

## 2025-04-25 RX ORDER — PHENYLEPHRINE HCL IN 0.9% NACL 1 MG/10 ML
SYRINGE (ML) INTRAVENOUS
Status: DISCONTINUED | OUTPATIENT
Start: 2025-04-25 | End: 2025-04-25 | Stop reason: SDUPTHER

## 2025-04-25 RX ORDER — SODIUM HYPOCHLORITE 1.25 MG/ML
SOLUTION TOPICAL DAILY PRN
Status: DISCONTINUED | OUTPATIENT
Start: 2025-04-25 | End: 2025-05-06 | Stop reason: HOSPADM

## 2025-04-25 RX ORDER — SODIUM CHLORIDE 0.9 % (FLUSH) 0.9 %
5-40 SYRINGE (ML) INJECTION PRN
Status: DISCONTINUED | OUTPATIENT
Start: 2025-04-25 | End: 2025-04-25 | Stop reason: HOSPADM

## 2025-04-25 RX ORDER — BUPIVACAINE HYDROCHLORIDE 5 MG/ML
INJECTION, SOLUTION EPIDURAL; INTRACAUDAL; PERINEURAL PRN
Status: DISCONTINUED | OUTPATIENT
Start: 2025-04-25 | End: 2025-04-25 | Stop reason: ALTCHOICE

## 2025-04-25 RX ORDER — LIDOCAINE HYDROCHLORIDE 10 MG/ML
INJECTION, SOLUTION EPIDURAL; INFILTRATION; INTRACAUDAL; PERINEURAL PRN
Status: DISCONTINUED | OUTPATIENT
Start: 2025-04-25 | End: 2025-04-25 | Stop reason: ALTCHOICE

## 2025-04-25 RX ORDER — METOCLOPRAMIDE HYDROCHLORIDE 5 MG/ML
10 INJECTION INTRAMUSCULAR; INTRAVENOUS
Status: DISCONTINUED | OUTPATIENT
Start: 2025-04-25 | End: 2025-04-25 | Stop reason: HOSPADM

## 2025-04-25 RX ORDER — VARENICLINE TARTRATE 0.5 MG/1
0.5 TABLET, FILM COATED ORAL 2 TIMES DAILY
Status: DISCONTINUED | OUTPATIENT
Start: 2025-04-25 | End: 2025-05-06 | Stop reason: HOSPADM

## 2025-04-25 RX ORDER — SODIUM CHLORIDE 0.9 % (FLUSH) 0.9 %
5-40 SYRINGE (ML) INJECTION EVERY 12 HOURS SCHEDULED
Status: DISCONTINUED | OUTPATIENT
Start: 2025-04-25 | End: 2025-04-25 | Stop reason: HOSPADM

## 2025-04-25 RX ORDER — LABETALOL HYDROCHLORIDE 5 MG/ML
10 INJECTION, SOLUTION INTRAVENOUS
Status: DISCONTINUED | OUTPATIENT
Start: 2025-04-25 | End: 2025-04-25 | Stop reason: HOSPADM

## 2025-04-25 RX ORDER — IPRATROPIUM BROMIDE AND ALBUTEROL SULFATE 2.5; .5 MG/3ML; MG/3ML
1 SOLUTION RESPIRATORY (INHALATION)
Status: DISCONTINUED | OUTPATIENT
Start: 2025-04-25 | End: 2025-04-25 | Stop reason: HOSPADM

## 2025-04-25 RX ADMIN — METOPROLOL SUCCINATE 25 MG: 25 TABLET, EXTENDED RELEASE ORAL at 07:41

## 2025-04-25 RX ADMIN — ETOMIDATE 2 MG: 2 INJECTION INTRAVENOUS at 12:46

## 2025-04-25 RX ADMIN — OXYCODONE HYDROCHLORIDE 10 MG: 5 TABLET ORAL at 07:41

## 2025-04-25 RX ADMIN — QUETIAPINE FUMARATE 150 MG: 100 TABLET ORAL at 21:36

## 2025-04-25 RX ADMIN — LISINOPRIL 30 MG: 20 TABLET ORAL at 07:41

## 2025-04-25 RX ADMIN — FENTANYL CITRATE 25 MCG: 50 INJECTION, SOLUTION INTRAMUSCULAR; INTRAVENOUS at 15:07

## 2025-04-25 RX ADMIN — LINEZOLID 600 MG: 600 TABLET, FILM COATED ORAL at 07:41

## 2025-04-25 RX ADMIN — SODIUM CHLORIDE, POTASSIUM CHLORIDE, SODIUM LACTATE AND CALCIUM CHLORIDE: 600; 310; 30; 20 INJECTION, SOLUTION INTRAVENOUS at 12:42

## 2025-04-25 RX ADMIN — FENTANYL CITRATE 25 MCG: 50 INJECTION, SOLUTION INTRAMUSCULAR; INTRAVENOUS at 02:20

## 2025-04-25 RX ADMIN — MICONAZOLE NITRATE: 20 CREAM TOPICAL at 07:43

## 2025-04-25 RX ADMIN — VARENICLINE TARTRATE 0.5 MG: 0.5 TABLET, FILM COATED ORAL at 21:36

## 2025-04-25 RX ADMIN — MIDAZOLAM 1 MG: 1 INJECTION INTRAMUSCULAR; INTRAVENOUS at 12:49

## 2025-04-25 RX ADMIN — FENTANYL CITRATE 25 MCG: 50 INJECTION, SOLUTION INTRAMUSCULAR; INTRAVENOUS at 06:00

## 2025-04-25 RX ADMIN — Medication 100 MCG: at 12:56

## 2025-04-25 RX ADMIN — LINEZOLID 600 MG: 600 TABLET, FILM COATED ORAL at 21:33

## 2025-04-25 RX ADMIN — MICONAZOLE NITRATE: 20 CREAM TOPICAL at 21:34

## 2025-04-25 RX ADMIN — ATORVASTATIN CALCIUM 40 MG: 40 TABLET, FILM COATED ORAL at 21:33

## 2025-04-25 RX ADMIN — SODIUM CHLORIDE, PRESERVATIVE FREE 10 ML: 5 INJECTION INTRAVENOUS at 21:34

## 2025-04-25 RX ADMIN — INSULIN LISPRO 2 UNITS: 100 INJECTION, SOLUTION INTRAVENOUS; SUBCUTANEOUS at 21:33

## 2025-04-25 RX ADMIN — ISOSORBIDE MONONITRATE 30 MG: 30 TABLET, EXTENDED RELEASE ORAL at 07:41

## 2025-04-25 RX ADMIN — VARENICLINE TARTRATE 0.5 MG: 0.5 TABLET, FILM COATED ORAL at 07:41

## 2025-04-25 RX ADMIN — FENTANYL CITRATE 25 MCG: 50 INJECTION, SOLUTION INTRAMUSCULAR; INTRAVENOUS at 19:50

## 2025-04-25 RX ADMIN — ETOMIDATE 2 MG: 2 INJECTION INTRAVENOUS at 12:49

## 2025-04-25 RX ADMIN — CEFEPIME 2 G: 2 INJECTION, POWDER, FOR SOLUTION INTRAVENOUS at 12:53

## 2025-04-25 RX ADMIN — MIDAZOLAM 1 MG: 1 INJECTION INTRAMUSCULAR; INTRAVENOUS at 12:43

## 2025-04-25 RX ADMIN — OXYCODONE HYDROCHLORIDE 10 MG: 5 TABLET ORAL at 21:33

## 2025-04-25 RX ADMIN — SPIRONOLACTONE 12.5 MG: 25 TABLET, FILM COATED ORAL at 07:41

## 2025-04-25 RX ADMIN — CEFEPIME 2000 MG: 2 INJECTION, POWDER, FOR SOLUTION INTRAVENOUS at 21:09

## 2025-04-25 RX ADMIN — OXYCODONE HYDROCHLORIDE 10 MG: 5 TABLET ORAL at 17:52

## 2025-04-25 ASSESSMENT — PAIN DESCRIPTION - DESCRIPTORS
DESCRIPTORS: ACHING

## 2025-04-25 ASSESSMENT — PAIN DESCRIPTION - ORIENTATION
ORIENTATION: RIGHT

## 2025-04-25 ASSESSMENT — PAIN DESCRIPTION - LOCATION
LOCATION: GENERALIZED
LOCATION: ABDOMEN
LOCATION: TOE (COMMENT WHICH ONE)
LOCATION: ABDOMEN;FOOT;GENERALIZED
LOCATION: TOE (COMMENT WHICH ONE)
LOCATION: TOE (COMMENT WHICH ONE)

## 2025-04-25 ASSESSMENT — PAIN SCALES - GENERAL
PAINLEVEL_OUTOF10: 3
PAINLEVEL_OUTOF10: 8
PAINLEVEL_OUTOF10: 9
PAINLEVEL_OUTOF10: 9
PAINLEVEL_OUTOF10: 4
PAINLEVEL_OUTOF10: 9
PAINLEVEL_OUTOF10: 10
PAINLEVEL_OUTOF10: 3

## 2025-04-25 ASSESSMENT — LIFESTYLE VARIABLES: SMOKING_STATUS: 1

## 2025-04-25 ASSESSMENT — PAIN - FUNCTIONAL ASSESSMENT: PAIN_FUNCTIONAL_ASSESSMENT: NONE - DENIES PAIN

## 2025-04-25 NOTE — PLAN OF CARE
Problem: Chronic Conditions and Co-morbidities  Goal: Patient's chronic conditions and co-morbidity symptoms are monitored and maintained or improved  4/25/2025 0555 by Lili Doe RN  Outcome: Progressing     Problem: Discharge Planning  Goal: Discharge to home or other facility with appropriate resources  4/25/2025 0555 by Lili Doe RN  Outcome: Progressing     Problem: Pain  Goal: Verbalizes/displays adequate comfort level or baseline comfort level  4/25/2025 0555 by Lili Doe RN  Outcome: Progressing

## 2025-04-25 NOTE — ANESTHESIA PRE PROCEDURE
Readings from Last 3 Encounters:   04/25/25 (!) 147/68   04/10/25 (!) 104/54   08/21/24 (!) 140/46       NPO Status: Time of last liquid consumption: 2330                        Time of last solid consumption: 2330                        Date of last liquid consumption: 04/20/25                        Date of last solid food consumption: 04/20/25    BMI:   Wt Readings from Last 3 Encounters:   04/25/25 59.9 kg (132 lb)   04/19/25 59.9 kg (132 lb)   08/19/24 59 kg (130 lb)     Body mass index is 25.78 kg/m².    CBC:   Lab Results   Component Value Date/Time    WBC 9.8 04/25/2025 07:14 AM    RBC 4.72 04/25/2025 07:14 AM    RBC 4.02 10/02/2011 11:41 PM    HGB 12.7 04/25/2025 07:14 AM    HCT 40.5 04/25/2025 07:14 AM    MCV 85.8 04/25/2025 07:14 AM    RDW 15.2 04/25/2025 07:14 AM     04/25/2025 07:14 AM     10/02/2011 11:41 PM       CMP:   Lab Results   Component Value Date/Time     04/25/2025 07:14 AM    K 4.5 04/25/2025 07:14 AM     04/25/2025 07:14 AM    CO2 22 04/25/2025 07:14 AM    BUN 12 04/25/2025 07:14 AM    CREATININE 0.7 04/25/2025 07:14 AM    GFRAA >60 05/27/2022 11:48 AM    LABGLOM >90 04/25/2025 07:14 AM    LABGLOM >60 12/19/2023 05:05 AM    GLUCOSE 141 04/25/2025 07:14 AM    GLUCOSE 191 10/02/2011 11:41 PM    CALCIUM 9.2 04/25/2025 07:14 AM    BILITOT 0.2 04/23/2025 06:15 AM    ALKPHOS 95 04/23/2025 06:15 AM    AST 20 04/23/2025 06:15 AM    ALT 18 04/23/2025 06:15 AM       POC Tests:   Recent Labs     04/25/25  0607   POCGLU 134*       Coags:   Lab Results   Component Value Date/Time    PROTIME 12.9 06/03/2024 09:53 PM    PROTIME 9.9 10/02/2011 11:41 PM    INR 1.0 06/03/2024 09:53 PM    APTT 26.8 08/06/2023 01:09 AM       HCG (If Applicable):   Lab Results   Component Value Date    PREGTESTUR negative 10/21/2016    PREGSERUM NEGATIVE 10/24/2022    HCGQUANT <2 08/14/2013        ABGs: No results found for: \"PHART\", \"PO2ART\", \"GQY4DBI\", \"AZA4EXF\", \"BEART\", \"Y1DLYTKE\"     Type & Screen

## 2025-04-25 NOTE — ANESTHESIA POSTPROCEDURE EVALUATION
Department of Anesthesiology  Postprocedure Note    Patient: Mikael Estevez  MRN: [id number]  YOB: 1970  Date of evaluation: 4/25/2025    Procedure Summary       Date: 04/25/25 Room / Location: [address]    Anesthesia Start: 1242 Anesthesia Stop: 1326    Procedure: AMPUTATION OF DISTAL PHALANX OF HALLUX,RESECTION OF PROXIMAL PHALANX (Right) Diagnosis:       Acute hematogenous osteomyelitis, unspecified site (HCC)      (Acute hematogenous osteomyelitis, unspecified site (HCC) [M86.00])    Surgeons: Izzy Jeffries DPM Responsible Provider: Marcia Unger MD    Anesthesia Type: MAC ASA Status: 4            Anesthesia Type: No value filed.    Annamaria Phase I: Annamaria Score: 10    Annamaria Phase II: Annamaria Score: 10    Anesthesia Post Evaluation    Patient location during evaluation: PACU  Patient participation: complete - patient participated  Level of consciousness: awake and alert  Pain score: 2  Airway patency: patent  Nausea & Vomiting: no nausea and no vomiting  Cardiovascular status: hemodynamically stable  Respiratory status: acceptable  Hydration status: euvolemic  Pain management: adequate    No notable events documented.

## 2025-04-25 NOTE — BRIEF OP NOTE
PODIATRY BRIEF OP NOTE    PATIENT NAME: Mikael Estevez  YOB: 1970  -  55 y.o. female  MRN: [id number]  DATE: 4/25/2025  BILLING #: [id number]    Surgeon(s):  Izzy Jeffries DPM     ASSISTANTS: Amita Collins DPM, PGY-1    PRE-OP DIAGNOSIS:   Osteomyelitis, right hallux  Full-thickness ulceration with exposed bone, right foot  Diabetes mellitus, type II  Peripheral vascular disease    POST-OP DIAGNOSIS: Same as above.    PROCEDURE:   Toe amputation at the interphalangeal joint, right hallux  Excisional biopsy, proximal phalanx right hallux    ANESTHESIA: MAC with local    HEMOSTASIS: Pneumatic ankle tourniquet was applied but not inflated.  Hemostasis obtained with direct pressure.    ESTIMATED BLOOD LOSS: Less than 5 cc.    MATERIALS: None    INJECTABLES: 8 cc of 1:1 racemic mixture of 1% Lidocaine Plain and 0.5% Marcaine Plain preoperatively, 6.5 cc of 1% lidocaine plain intraoperatively    SPECIMEN:   ID Type Source Tests Collected by Time Destination   1 : RIGHT GREAT TOE Bone Toe FUNGAL STAIN, CULTURE, ANAEROBIC AND AEROBIC Izzy Jeffries DPM 4/25/2025 1306    A : RIGHT PROXIMAL PHALANX Tissue Toe SURGICAL PATHOLOGY Izzy Jeffries DPM 4/25/2025 1315        COMPLICATIONS: None    FINDINGS:  Infection Present At Time Of Surgery (PATOS) (choose all levels that have infection present):  - Organ Space infection (below fascia) present as evidenced by osteomyelitis  Other Findings: The right hallux was disarticulated at the interphalangeal joint and passed to the table as specimen.  An excisional biopsy of the proximal phalanx was then performed.  Distal extent of proximal phalanx was removed with bone cutter and smoothed with rongeur.  Dressings: Iodoform packing, 4x4 gauze, Kerlix, tape      POST-OPERATIVE PLAN:  Due to patient's poor blood supply and conversations with the vascular surgery team, we left the surgical site open at this time to prevent necrosis of the incision

## 2025-04-26 PROBLEM — M86.00 ACUTE HEMATOGENOUS OSTEOMYELITIS (HCC): Status: ACTIVE | Noted: 2025-04-19

## 2025-04-26 LAB
ANION GAP SERPL CALCULATED.3IONS-SCNC: 11 MMOL/L (ref 9–16)
BASOPHILS # BLD: 0.05 K/UL (ref 0–0.2)
BASOPHILS NFR BLD: 1 % (ref 0–2)
BUN SERPL-MCNC: 13 MG/DL (ref 6–20)
CALCIUM SERPL-MCNC: 8.6 MG/DL (ref 8.6–10.4)
CHLORIDE SERPL-SCNC: 105 MMOL/L (ref 98–107)
CO2 SERPL-SCNC: 18 MMOL/L (ref 20–31)
CREAT SERPL-MCNC: 0.6 MG/DL (ref 0.6–0.9)
CRP SERPL HS-MCNC: 25.8 MG/L (ref 0–5)
EOSINOPHIL # BLD: 0.26 K/UL (ref 0–0.44)
EOSINOPHILS RELATIVE PERCENT: 3 % (ref 1–4)
ERYTHROCYTE [DISTWIDTH] IN BLOOD BY AUTOMATED COUNT: 15.1 % (ref 11.8–14.4)
ERYTHROCYTE [SEDIMENTATION RATE] IN BLOOD BY PHOTOMETRIC METHOD: 56 MM/HR (ref 0–30)
GFR, ESTIMATED: >90 ML/MIN/1.73M2
GLUCOSE BLD-MCNC: 163 MG/DL (ref 65–105)
GLUCOSE BLD-MCNC: 198 MG/DL (ref 65–105)
GLUCOSE SERPL-MCNC: 162 MG/DL (ref 74–99)
HCT VFR BLD AUTO: 37.6 % (ref 36.3–47.1)
HGB BLD-MCNC: 11.4 G/DL (ref 11.9–15.1)
IMM GRANULOCYTES # BLD AUTO: 0.03 K/UL (ref 0–0.3)
IMM GRANULOCYTES NFR BLD: 0 %
LYMPHOCYTES NFR BLD: 3.14 K/UL (ref 1.1–3.7)
LYMPHOCYTES RELATIVE PERCENT: 38 % (ref 24–43)
MCH RBC QN AUTO: 27.1 PG (ref 25.2–33.5)
MCHC RBC AUTO-ENTMCNC: 30.3 G/DL (ref 28.4–34.8)
MCV RBC AUTO: 89.5 FL (ref 82.6–102.9)
MICROORGANISM/AGENT SPEC: NORMAL
MONOCYTES NFR BLD: 0.67 K/UL (ref 0.1–1.2)
MONOCYTES NFR BLD: 8 % (ref 3–12)
NEUTROPHILS NFR BLD: 50 % (ref 36–65)
NEUTS SEG NFR BLD: 4.04 K/UL (ref 1.5–8.1)
NRBC BLD-RTO: 0 PER 100 WBC
PLATELET # BLD AUTO: 367 K/UL (ref 138–453)
PMV BLD AUTO: 8.9 FL (ref 8.1–13.5)
POTASSIUM SERPL-SCNC: 4.3 MMOL/L (ref 3.7–5.3)
RBC # BLD AUTO: 4.2 M/UL (ref 3.95–5.11)
RBC # BLD: ABNORMAL 10*6/UL
SERVICE CMNT-IMP: NORMAL
SODIUM SERPL-SCNC: 134 MMOL/L (ref 136–145)
SPECIMEN DESCRIPTION: NORMAL
WBC OTHER # BLD: 8.2 K/UL (ref 3.5–11.3)

## 2025-04-26 PROCEDURE — 6370000000 HC RX 637 (ALT 250 FOR IP)

## 2025-04-26 PROCEDURE — 80048 BASIC METABOLIC PNL TOTAL CA: CPT

## 2025-04-26 PROCEDURE — 97116 GAIT TRAINING THERAPY: CPT

## 2025-04-26 PROCEDURE — 99232 SBSQ HOSP IP/OBS MODERATE 35: CPT | Performed by: INTERNAL MEDICINE

## 2025-04-26 PROCEDURE — 2500000003 HC RX 250 WO HCPCS

## 2025-04-26 PROCEDURE — 36415 COLL VENOUS BLD VENIPUNCTURE: CPT

## 2025-04-26 PROCEDURE — 82947 ASSAY GLUCOSE BLOOD QUANT: CPT

## 2025-04-26 PROCEDURE — 6360000002 HC RX W HCPCS

## 2025-04-26 PROCEDURE — 6370000000 HC RX 637 (ALT 250 FOR IP): Performed by: NURSE PRACTITIONER

## 2025-04-26 PROCEDURE — 85652 RBC SED RATE AUTOMATED: CPT

## 2025-04-26 PROCEDURE — 97535 SELF CARE MNGMENT TRAINING: CPT

## 2025-04-26 PROCEDURE — 1200000000 HC SEMI PRIVATE

## 2025-04-26 PROCEDURE — 2580000003 HC RX 258

## 2025-04-26 PROCEDURE — 99233 SBSQ HOSP IP/OBS HIGH 50: CPT | Performed by: NURSE PRACTITIONER

## 2025-04-26 PROCEDURE — 97162 PT EVAL MOD COMPLEX 30 MIN: CPT

## 2025-04-26 PROCEDURE — 86140 C-REACTIVE PROTEIN: CPT

## 2025-04-26 PROCEDURE — 97166 OT EVAL MOD COMPLEX 45 MIN: CPT

## 2025-04-26 PROCEDURE — 99232 SBSQ HOSP IP/OBS MODERATE 35: CPT | Performed by: STUDENT IN AN ORGANIZED HEALTH CARE EDUCATION/TRAINING PROGRAM

## 2025-04-26 PROCEDURE — 85025 COMPLETE CBC W/AUTO DIFF WBC: CPT

## 2025-04-26 RX ADMIN — VARENICLINE TARTRATE 0.5 MG: 0.5 TABLET, FILM COATED ORAL at 09:21

## 2025-04-26 RX ADMIN — OXYCODONE HYDROCHLORIDE 10 MG: 5 TABLET ORAL at 14:28

## 2025-04-26 RX ADMIN — HYDRALAZINE HYDROCHLORIDE 10 MG: 10 TABLET ORAL at 21:11

## 2025-04-26 RX ADMIN — LINEZOLID 600 MG: 600 TABLET, FILM COATED ORAL at 09:21

## 2025-04-26 RX ADMIN — CEFEPIME 2000 MG: 2 INJECTION, POWDER, FOR SOLUTION INTRAVENOUS at 09:19

## 2025-04-26 RX ADMIN — FENTANYL CITRATE 25 MCG: 50 INJECTION, SOLUTION INTRAMUSCULAR; INTRAVENOUS at 00:58

## 2025-04-26 RX ADMIN — ISOSORBIDE MONONITRATE 30 MG: 30 TABLET, EXTENDED RELEASE ORAL at 09:21

## 2025-04-26 RX ADMIN — SODIUM CHLORIDE, PRESERVATIVE FREE 10 ML: 5 INJECTION INTRAVENOUS at 21:00

## 2025-04-26 RX ADMIN — OXYCODONE HYDROCHLORIDE 10 MG: 5 TABLET ORAL at 21:00

## 2025-04-26 RX ADMIN — FENTANYL CITRATE 25 MCG: 50 INJECTION, SOLUTION INTRAMUSCULAR; INTRAVENOUS at 12:48

## 2025-04-26 RX ADMIN — ACETAMINOPHEN 650 MG: 325 TABLET ORAL at 18:34

## 2025-04-26 RX ADMIN — AMOXICILLIN AND CLAVULANATE POTASSIUM 1 TABLET: 875; 125 TABLET, FILM COATED ORAL at 21:00

## 2025-04-26 RX ADMIN — FENTANYL CITRATE 25 MCG: 50 INJECTION, SOLUTION INTRAMUSCULAR; INTRAVENOUS at 18:18

## 2025-04-26 RX ADMIN — VARENICLINE TARTRATE 0.5 MG: 0.5 TABLET, FILM COATED ORAL at 18:17

## 2025-04-26 RX ADMIN — METOPROLOL SUCCINATE 25 MG: 25 TABLET, EXTENDED RELEASE ORAL at 09:21

## 2025-04-26 RX ADMIN — LISINOPRIL 30 MG: 20 TABLET ORAL at 09:21

## 2025-04-26 RX ADMIN — OXYCODONE HYDROCHLORIDE 10 MG: 5 TABLET ORAL at 18:17

## 2025-04-26 RX ADMIN — FENTANYL CITRATE 25 MCG: 50 INJECTION, SOLUTION INTRAMUSCULAR; INTRAVENOUS at 23:12

## 2025-04-26 RX ADMIN — ATORVASTATIN CALCIUM 40 MG: 40 TABLET, FILM COATED ORAL at 21:00

## 2025-04-26 RX ADMIN — OXYCODONE HYDROCHLORIDE 10 MG: 5 TABLET ORAL at 09:20

## 2025-04-26 RX ADMIN — QUETIAPINE FUMARATE 150 MG: 100 TABLET ORAL at 21:11

## 2025-04-26 RX ADMIN — SPIRONOLACTONE 12.5 MG: 25 TABLET, FILM COATED ORAL at 09:21

## 2025-04-26 RX ADMIN — ASPIRIN 81 MG: 81 TABLET, COATED ORAL at 09:21

## 2025-04-26 ASSESSMENT — PAIN SCALES - GENERAL
PAINLEVEL_OUTOF10: 10
PAINLEVEL_OUTOF10: 0
PAINLEVEL_OUTOF10: 0
PAINLEVEL_OUTOF10: 8
PAINLEVEL_OUTOF10: 10
PAINLEVEL_OUTOF10: 8
PAINLEVEL_OUTOF10: 10
PAINLEVEL_OUTOF10: 10
PAINLEVEL_OUTOF10: 3
PAINLEVEL_OUTOF10: 10
PAINLEVEL_OUTOF10: 8

## 2025-04-26 ASSESSMENT — PAIN DESCRIPTION - LOCATION
LOCATION: ABDOMEN
LOCATION: FOOT
LOCATION: TOE (COMMENT WHICH ONE)
LOCATION: TOE (COMMENT WHICH ONE);LEG
LOCATION: FOOT
LOCATION: TOE (COMMENT WHICH ONE);LEG
LOCATION: FOOT

## 2025-04-26 ASSESSMENT — PAIN DESCRIPTION - ORIENTATION
ORIENTATION: RIGHT
ORIENTATION: LEFT

## 2025-04-26 ASSESSMENT — PAIN DESCRIPTION - DESCRIPTORS
DESCRIPTORS: ACHING

## 2025-04-26 ASSESSMENT — PAIN SCALES - WONG BAKER
WONGBAKER_NUMERICALRESPONSE: NO HURT
WONGBAKER_NUMERICALRESPONSE: NO HURT

## 2025-04-26 NOTE — OP NOTE
PODIATRY OPERATIVE NOTE    PATIENT NAME: Mikael Estevez  YOB: 1970  -  55 y.o. female  MRN: [id number]  DATE: 4/26/2025  BILLING #: [id number]    Surgeon(s):  Izzy Jeffries DPM     ASSISTANTS: Amita Collins DPM, PGY-1     PRE-OP DIAGNOSIS:   Osteomyelitis, right hallux  Full-thickness ulceration with exposed bone, right foot  Diabetes mellitus, type II  Peripheral vascular disease     POST-OP DIAGNOSIS: Same as above.     PROCEDURE:   Toe amputation at the interphalangeal joint, right hallux  Excisional biopsy, proximal phalanx right hallux     ANESTHESIA: MAC with local     HEMOSTASIS: Pneumatic ankle tourniquet was applied but not inflated.  Hemostasis obtained with direct pressure.     ESTIMATED BLOOD LOSS: Less than 5 cc.     MATERIALS: None     INJECTABLES: 8 cc of 1:1 racemic mixture of 1% Lidocaine Plain and 0.5% Marcaine Plain preoperatively, 6.5 cc of 1% lidocaine plain intraoperatively    SPECIMEN:   ID Type Source Tests Collected by Time Destination   1 : RIGHT GREAT TOE Bone Toe FUNGAL STAIN, CULTURE, ANAEROBIC AND AEROBIC Izzy Jeffries DPM 4/25/2025 1306    A : RIGHT PROXIMAL PHALANX Tissue Toe SURGICAL PATHOLOGY Izzy Jeffries DPM 4/25/2025 1315        COMPLICATIONS: None    Findings:  Infection Present At Time Of Surgery (PATOS) (choose all levels that have infection present):  - Organ Space infection (below fascia) present as evidenced by osteomyelitis  Other Findings: The right hallux was disarticulated at the interphalangeal joint and passed to the table as specimen.  An excisional biopsy of the proximal phalanx was then performed.  Distal extent of proximal phalanx was removed with bone cutter and smoothed with rongeur.  Dressings: Iodoform packing, 4x4 gauze, Kerlix, tape        INDICATION FOR PROCEDURE:     The patient has had an infection of the right distal hallux for some time. Plain film radiographs show osteolytic changes with infection extending

## 2025-04-26 NOTE — PLAN OF CARE
Problem: Chronic Conditions and Co-morbidities  Goal: Patient's chronic conditions and co-morbidity symptoms are monitored and maintained or improved  4/26/2025 1727 by Domingo Holder RN  Outcome: Progressing  4/26/2025 0515 by Lili Doe RN  Outcome: Progressing     Problem: Discharge Planning  Goal: Discharge to home or other facility with appropriate resources  Outcome: Progressing     Problem: Pain  Goal: Verbalizes/displays adequate comfort level or baseline comfort level  4/26/2025 1727 by Domingo Holder RN  Outcome: Progressing  4/26/2025 0515 by Lili Doe RN  Outcome: Progressing     Problem: Skin/Tissue Integrity  Goal: Skin integrity remains intact  Description: 1.  Monitor for areas of redness and/or skin breakdown2.  Assess vascular access sites hourly3.  Every 4-6 hours minimum:  Change oxygen saturation probe site4.  Every 4-6 hours:  If on nasal continuous positive airway pressure, respiratory therapy assess nares and determine need for appliance change or resting period  Outcome: Progressing     Problem: Safety - Adult  Goal: Free from fall injury  4/26/2025 1727 by Domingo Holder RN  Outcome: Progressing  4/26/2025 0515 by Lili Doe RN  Outcome: Progressing     Problem: ABCDS Injury Assessment  Goal: Absence of physical injury  4/26/2025 1727 by Domingo Holder RN  Outcome: Progressing  4/26/2025 0515 by Lili Doe RN  Outcome: Progressing     Problem: Nutrition Deficit:  Goal: Optimize nutritional status  Outcome: Progressing     Problem: Discharge Planning  Goal: Discharge to home or other facility with appropriate resources  Outcome: Progressing     Problem: Pain  Goal: Verbalizes/displays adequate comfort level or baseline comfort level  4/26/2025 1727 by Domingo Holder RN  Outcome: Progressing  4/26/2025 0515 by Lili Doe RN  Outcome: Progressing     Problem: Skin/Tissue Integrity  Goal: Skin integrity remains intact  Description:

## 2025-04-27 LAB
ANION GAP SERPL CALCULATED.3IONS-SCNC: 12 MMOL/L (ref 9–16)
ATYPICAL LYMPHOCYTE ABSOLUTE COUNT: 0.16 K/UL
ATYPICAL LYMPHOCYTES: 2 %
BASOPHILS # BLD: 0 K/UL (ref 0–0.2)
BASOPHILS NFR BLD: 0 % (ref 0–2)
BUN SERPL-MCNC: 16 MG/DL (ref 6–20)
CALCIUM SERPL-MCNC: 9.1 MG/DL (ref 8.6–10.4)
CHLORIDE SERPL-SCNC: 103 MMOL/L (ref 98–107)
CO2 SERPL-SCNC: 22 MMOL/L (ref 20–31)
CREAT SERPL-MCNC: 0.8 MG/DL (ref 0.6–0.9)
CRP SERPL HS-MCNC: 20.2 MG/L (ref 0–5)
EOSINOPHIL # BLD: 0.55 K/UL (ref 0–0.4)
EOSINOPHILS RELATIVE PERCENT: 7 % (ref 1–4)
ERYTHROCYTE [DISTWIDTH] IN BLOOD BY AUTOMATED COUNT: 15 % (ref 11.8–14.4)
ERYTHROCYTE [SEDIMENTATION RATE] IN BLOOD BY PHOTOMETRIC METHOD: 66 MM/HR (ref 0–30)
GFR, ESTIMATED: 86 ML/MIN/1.73M2
GLUCOSE BLD-MCNC: 127 MG/DL (ref 65–105)
GLUCOSE BLD-MCNC: 134 MG/DL (ref 65–105)
GLUCOSE BLD-MCNC: 137 MG/DL (ref 65–105)
GLUCOSE BLD-MCNC: 144 MG/DL (ref 65–105)
GLUCOSE SERPL-MCNC: 138 MG/DL (ref 74–99)
HCT VFR BLD AUTO: 35.9 % (ref 36.3–47.1)
HGB BLD-MCNC: 11.4 G/DL (ref 11.9–15.1)
IMM GRANULOCYTES # BLD AUTO: 0 K/UL (ref 0–0.3)
IMM GRANULOCYTES NFR BLD: 0 %
LYMPHOCYTES NFR BLD: 3.16 K/UL (ref 1–4.8)
LYMPHOCYTES RELATIVE PERCENT: 40 % (ref 24–44)
MCH RBC QN AUTO: 27.3 PG (ref 25.2–33.5)
MCHC RBC AUTO-ENTMCNC: 31.8 G/DL (ref 28.4–34.8)
MCV RBC AUTO: 86.1 FL (ref 82.6–102.9)
MONOCYTES NFR BLD: 0.55 K/UL (ref 0.1–0.8)
MONOCYTES NFR BLD: 7 % (ref 1–7)
MORPHOLOGY: ABNORMAL
NEUTROPHILS NFR BLD: 44 % (ref 36–66)
NEUTS SEG NFR BLD: 3.48 K/UL (ref 1.8–7.7)
NRBC BLD-RTO: 0 PER 100 WBC
PLATELET # BLD AUTO: 369 K/UL (ref 138–453)
PMV BLD AUTO: 8.7 FL (ref 8.1–13.5)
POTASSIUM SERPL-SCNC: 4.1 MMOL/L (ref 3.7–5.3)
RBC # BLD AUTO: 4.17 M/UL (ref 3.95–5.11)
SODIUM SERPL-SCNC: 137 MMOL/L (ref 136–145)
WBC OTHER # BLD: 7.9 K/UL (ref 3.5–11.3)

## 2025-04-27 PROCEDURE — 85025 COMPLETE CBC W/AUTO DIFF WBC: CPT

## 2025-04-27 PROCEDURE — 99233 SBSQ HOSP IP/OBS HIGH 50: CPT | Performed by: NURSE PRACTITIONER

## 2025-04-27 PROCEDURE — 6370000000 HC RX 637 (ALT 250 FOR IP)

## 2025-04-27 PROCEDURE — 6370000000 HC RX 637 (ALT 250 FOR IP): Performed by: NURSE PRACTITIONER

## 2025-04-27 PROCEDURE — 99232 SBSQ HOSP IP/OBS MODERATE 35: CPT | Performed by: STUDENT IN AN ORGANIZED HEALTH CARE EDUCATION/TRAINING PROGRAM

## 2025-04-27 PROCEDURE — 6360000002 HC RX W HCPCS

## 2025-04-27 PROCEDURE — 82947 ASSAY GLUCOSE BLOOD QUANT: CPT

## 2025-04-27 PROCEDURE — 80048 BASIC METABOLIC PNL TOTAL CA: CPT

## 2025-04-27 PROCEDURE — 36415 COLL VENOUS BLD VENIPUNCTURE: CPT

## 2025-04-27 PROCEDURE — 1200000000 HC SEMI PRIVATE

## 2025-04-27 PROCEDURE — 99232 SBSQ HOSP IP/OBS MODERATE 35: CPT | Performed by: INTERNAL MEDICINE

## 2025-04-27 PROCEDURE — 2500000003 HC RX 250 WO HCPCS

## 2025-04-27 PROCEDURE — 85652 RBC SED RATE AUTOMATED: CPT

## 2025-04-27 PROCEDURE — 93005 ELECTROCARDIOGRAM TRACING: CPT

## 2025-04-27 PROCEDURE — 86140 C-REACTIVE PROTEIN: CPT

## 2025-04-27 RX ORDER — OXYCODONE HYDROCHLORIDE 5 MG/1
10 TABLET ORAL EVERY 6 HOURS PRN
Refills: 0 | Status: DISCONTINUED | OUTPATIENT
Start: 2025-04-27 | End: 2025-05-02

## 2025-04-27 RX ORDER — OXYCODONE HYDROCHLORIDE 5 MG/1
10 TABLET ORAL EVERY 6 HOURS
Refills: 0 | Status: DISCONTINUED | OUTPATIENT
Start: 2025-04-27 | End: 2025-04-27

## 2025-04-27 RX ADMIN — FENTANYL CITRATE 25 MCG: 50 INJECTION, SOLUTION INTRAMUSCULAR; INTRAVENOUS at 01:16

## 2025-04-27 RX ADMIN — QUETIAPINE FUMARATE 150 MG: 100 TABLET ORAL at 21:13

## 2025-04-27 RX ADMIN — METOPROLOL SUCCINATE 25 MG: 25 TABLET, EXTENDED RELEASE ORAL at 09:37

## 2025-04-27 RX ADMIN — LISINOPRIL 30 MG: 20 TABLET ORAL at 09:37

## 2025-04-27 RX ADMIN — AMOXICILLIN AND CLAVULANATE POTASSIUM 1 TABLET: 875; 125 TABLET, FILM COATED ORAL at 21:13

## 2025-04-27 RX ADMIN — SODIUM CHLORIDE, PRESERVATIVE FREE 10 ML: 5 INJECTION INTRAVENOUS at 21:13

## 2025-04-27 RX ADMIN — VARENICLINE TARTRATE 0.5 MG: 0.5 TABLET, FILM COATED ORAL at 09:44

## 2025-04-27 RX ADMIN — AMOXICILLIN AND CLAVULANATE POTASSIUM 1 TABLET: 875; 125 TABLET, FILM COATED ORAL at 09:38

## 2025-04-27 RX ADMIN — HYDROMORPHONE HYDROCHLORIDE 0.25 MG: 1 INJECTION, SOLUTION INTRAMUSCULAR; INTRAVENOUS; SUBCUTANEOUS at 18:30

## 2025-04-27 RX ADMIN — Medication 5 MG: at 03:00

## 2025-04-27 RX ADMIN — HYDROMORPHONE HYDROCHLORIDE 0.25 MG: 1 INJECTION, SOLUTION INTRAMUSCULAR; INTRAVENOUS; SUBCUTANEOUS at 14:52

## 2025-04-27 RX ADMIN — OXYCODONE 10 MG: 5 TABLET ORAL at 16:24

## 2025-04-27 RX ADMIN — ISOSORBIDE MONONITRATE 30 MG: 30 TABLET, EXTENDED RELEASE ORAL at 09:37

## 2025-04-27 RX ADMIN — ACETAMINOPHEN 650 MG: 325 TABLET ORAL at 16:38

## 2025-04-27 RX ADMIN — SODIUM CHLORIDE, PRESERVATIVE FREE 10 ML: 5 INJECTION INTRAVENOUS at 09:44

## 2025-04-27 RX ADMIN — OXYCODONE 10 MG: 5 TABLET ORAL at 09:37

## 2025-04-27 RX ADMIN — SPIRONOLACTONE 12.5 MG: 25 TABLET, FILM COATED ORAL at 09:37

## 2025-04-27 RX ADMIN — OXYCODONE 10 MG: 5 TABLET ORAL at 03:00

## 2025-04-27 RX ADMIN — ASPIRIN 81 MG: 81 TABLET, COATED ORAL at 09:36

## 2025-04-27 RX ADMIN — VARENICLINE TARTRATE 0.5 MG: 0.5 TABLET, FILM COATED ORAL at 18:30

## 2025-04-27 RX ADMIN — DAKIN'S SOLUTION 0.125% (QUARTER STRENGTH): 0.12 SOLUTION at 16:26

## 2025-04-27 RX ADMIN — ATORVASTATIN CALCIUM 40 MG: 40 TABLET, FILM COATED ORAL at 21:13

## 2025-04-27 ASSESSMENT — PAIN SCALES - GENERAL
PAINLEVEL_OUTOF10: 0
PAINLEVEL_OUTOF10: 8
PAINLEVEL_OUTOF10: 10
PAINLEVEL_OUTOF10: 10
PAINLEVEL_OUTOF10: 6
PAINLEVEL_OUTOF10: 6
PAINLEVEL_OUTOF10: 9
PAINLEVEL_OUTOF10: 8
PAINLEVEL_OUTOF10: 5
PAINLEVEL_OUTOF10: 0
PAINLEVEL_OUTOF10: 7
PAINLEVEL_OUTOF10: 10
PAINLEVEL_OUTOF10: 0

## 2025-04-27 ASSESSMENT — PAIN DESCRIPTION - ORIENTATION
ORIENTATION: RIGHT

## 2025-04-27 ASSESSMENT — PAIN DESCRIPTION - LOCATION
LOCATION: FOOT
LOCATION: LEG;TOE (COMMENT WHICH ONE)
LOCATION: FOOT
LOCATION: LEG;TOE (COMMENT WHICH ONE)
LOCATION: FOOT
LOCATION: FOOT

## 2025-04-27 ASSESSMENT — PAIN DESCRIPTION - DESCRIPTORS
DESCRIPTORS: DISCOMFORT
DESCRIPTORS: DISCOMFORT
DESCRIPTORS: ACHING;BURNING
DESCRIPTORS: DISCOMFORT
DESCRIPTORS: ACHING
DESCRIPTORS: DISCOMFORT

## 2025-04-27 ASSESSMENT — PAIN SCALES - WONG BAKER
WONGBAKER_NUMERICALRESPONSE: NO HURT

## 2025-04-27 NOTE — PLAN OF CARE
Problem: Chronic Conditions and Co-morbidities  Goal: Patient's chronic conditions and co-morbidity symptoms are monitored and maintained or improved  4/26/2025 2202 by Donis Matamoros RN  Outcome: Progressing  4/26/2025 1727 by Domingo Holder RN  Outcome: Progressing     Problem: Discharge Planning  Goal: Discharge to home or other facility with appropriate resources  4/26/2025 2202 by Donis Matamoros RN  Outcome: Progressing  4/26/2025 1727 by Domingo Holder RN  Outcome: Progressing     Problem: Pain  Goal: Verbalizes/displays adequate comfort level or baseline comfort level  4/26/2025 2202 by Donis Matamoros RN  Outcome: Progressing  4/26/2025 1727 by Domingo Holder RN  Outcome: Progressing     Problem: Skin/Tissue Integrity  Goal: Skin integrity remains intact  Description: 1.  Monitor for areas of redness and/or skin breakdown2.  Assess vascular access sites hourly3.  Every 4-6 hours minimum:  Change oxygen saturation probe site4.  Every 4-6 hours:  If on nasal continuous positive airway pressure, respiratory therapy assess nares and determine need for appliance change or resting period  4/26/2025 2202 by Donis Matamoros, RN  Outcome: Progressing  4/26/2025 1727 by Domingo Holder RN  Outcome: Progressing     Problem: Safety - Adult  Goal: Free from fall injury  4/26/2025 2202 by Donis Matamoros RN  Outcome: Progressing  4/26/2025 1727 by Domingo Holder RN  Outcome: Progressing     Problem: ABCDS Injury Assessment  Goal: Absence of physical injury  4/26/2025 2202 by Donis Matamoros RN  Outcome: Progressing  4/26/2025 1727 by Domingo Holder RN  Outcome: Progressing     Problem: Nutrition Deficit:  Goal: Optimize nutritional status  4/26/2025 2202 by Donis Matamoros RN  Outcome: Progressing  4/26/2025 1727 by Domingo Holder RN  Outcome: Progressing

## 2025-04-27 NOTE — PLAN OF CARE
Problem: Chronic Conditions and Co-morbidities  Goal: Patient's chronic conditions and co-morbidity symptoms are monitored and maintained or improved  Outcome: Progressing     Problem: Discharge Planning  Goal: Discharge to home or other facility with appropriate resources  Outcome: Progressing     Problem: Pain  Goal: Verbalizes/displays adequate comfort level or baseline comfort level  Outcome: Progressing     Problem: Skin/Tissue Integrity  Goal: Skin integrity remains intact  Description: 1.  Monitor for areas of redness and/or skin breakdown2.  Assess vascular access sites hourly3.  Every 4-6 hours minimum:  Change oxygen saturation probe site4.  Every 4-6 hours:  If on nasal continuous positive airway pressure, respiratory therapy assess nares and determine need for appliance change or resting period  Outcome: Progressing     Problem: Safety - Adult  Goal: Free from fall injury  Outcome: Progressing     Problem: ABCDS Injury Assessment  Goal: Absence of physical injury  Outcome: Progressing     Problem: Nutrition Deficit:  Goal: Optimize nutritional status  Outcome: Progressing

## 2025-04-27 NOTE — PLAN OF CARE
Plan of Care Note  Foot and Ankle Surgery     Plan of Care      Patient was not in room and was taking a smoke break during attempts to round.  Will evaluate during rounds tomorrow.  Continue with twice daily dressing changes with Dakin's wet-to-dry to surgical site per nursing.  Please page podiatry resident on-call with any questions.    Amita Collins DPM  Foot and Ankle Surgery   04/27/25 at 11:20 AM

## 2025-04-28 ENCOUNTER — APPOINTMENT (OUTPATIENT)
Age: 55
DRG: 617 | End: 2025-04-28
Attending: STUDENT IN AN ORGANIZED HEALTH CARE EDUCATION/TRAINING PROGRAM
Payer: MEDICARE

## 2025-04-28 ENCOUNTER — ANESTHESIA EVENT (OUTPATIENT)
Dept: OPERATING ROOM | Age: 55
End: 2025-04-28
Payer: MEDICARE

## 2025-04-28 ENCOUNTER — ANESTHESIA (OUTPATIENT)
Dept: OPERATING ROOM | Age: 55
End: 2025-04-28
Payer: MEDICARE

## 2025-04-28 LAB
ABO + RH BLD: NORMAL
ANION GAP SERPL CALCULATED.3IONS-SCNC: 12 MMOL/L (ref 9–16)
ARM BAND NUMBER: NORMAL
BASOPHILS # BLD: 0.04 K/UL (ref 0–0.2)
BASOPHILS NFR BLD: 0 % (ref 0–2)
BLOOD BANK SAMPLE EXPIRATION: NORMAL
BLOOD GROUP ANTIBODIES SERPL: NEGATIVE
BUN BLD-MCNC: 16 MG/DL (ref 8–26)
BUN SERPL-MCNC: 16 MG/DL (ref 6–20)
CA-I BLD-SCNC: 1.17 MMOL/L (ref 1.15–1.33)
CALCIUM SERPL-MCNC: 9.2 MG/DL (ref 8.6–10.4)
CHLORIDE BLD-SCNC: 107 MMOL/L (ref 98–107)
CHLORIDE SERPL-SCNC: 103 MMOL/L (ref 98–107)
CO2 BLD CALC-SCNC: 22 MMOL/L (ref 22–30)
CO2 SERPL-SCNC: 22 MMOL/L (ref 20–31)
CREAT SERPL-MCNC: 0.7 MG/DL (ref 0.6–0.9)
CRP SERPL HS-MCNC: 22.7 MG/L (ref 0–5)
ECHO BSA: 1.59 M2
EGFR, POC: >90 ML/MIN/1.73M2
EKG ATRIAL RATE: 63 BPM
EKG P AXIS: 68 DEGREES
EKG P-R INTERVAL: 152 MS
EKG Q-T INTERVAL: 426 MS
EKG QRS DURATION: 96 MS
EKG QTC CALCULATION (BAZETT): 435 MS
EKG R AXIS: 2 DEGREES
EKG T AXIS: -25 DEGREES
EKG VENTRICULAR RATE: 63 BPM
EOSINOPHIL # BLD: 0.31 K/UL (ref 0–0.44)
EOSINOPHILS RELATIVE PERCENT: 3 % (ref 1–4)
ERYTHROCYTE [DISTWIDTH] IN BLOOD BY AUTOMATED COUNT: 14.8 % (ref 11.8–14.4)
ERYTHROCYTE [SEDIMENTATION RATE] IN BLOOD BY PHOTOMETRIC METHOD: 73 MM/HR (ref 0–30)
GFR, ESTIMATED: >90 ML/MIN/1.73M2
GLUCOSE BLD-MCNC: 180 MG/DL (ref 74–100)
GLUCOSE BLD-MCNC: 186 MG/DL (ref 65–105)
GLUCOSE BLD-MCNC: 270 MG/DL (ref 65–105)
GLUCOSE BLD-MCNC: 342 MG/DL (ref 65–105)
GLUCOSE SERPL-MCNC: 178 MG/DL (ref 74–99)
HCT VFR BLD AUTO: 35.8 % (ref 36.3–47.1)
HCT VFR BLD AUTO: 36 % (ref 36–46)
HGB BLD-MCNC: 11.8 G/DL (ref 11.9–15.1)
IMM GRANULOCYTES # BLD AUTO: 0.05 K/UL (ref 0–0.3)
IMM GRANULOCYTES NFR BLD: 1 %
LYMPHOCYTES NFR BLD: 3.08 K/UL (ref 1.1–3.7)
LYMPHOCYTES RELATIVE PERCENT: 33 % (ref 24–43)
MCH RBC QN AUTO: 27.7 PG (ref 25.2–33.5)
MCHC RBC AUTO-ENTMCNC: 33 G/DL (ref 28.4–34.8)
MCV RBC AUTO: 84 FL (ref 82.6–102.9)
MONOCYTES NFR BLD: 0.7 K/UL (ref 0.1–1.2)
MONOCYTES NFR BLD: 8 % (ref 3–12)
NEGATIVE BASE EXCESS, ART: 2.8 MMOL/L (ref 0–2)
NEUTROPHILS NFR BLD: 55 % (ref 36–65)
NEUTS SEG NFR BLD: 5.06 K/UL (ref 1.5–8.1)
NRBC BLD-RTO: 0 PER 100 WBC
PLATELET # BLD AUTO: 393 K/UL (ref 138–453)
PMV BLD AUTO: 8.7 FL (ref 8.1–13.5)
POC ANION GAP: 12 MMOL/L (ref 7–16)
POC CREATININE: 0.7 MG/DL (ref 0.51–1.19)
POC HCO3: 22.4 MMOL/L (ref 21–28)
POC HEMOGLOBIN (CALC): 12.1 G/DL (ref 12–16)
POC LACTIC ACID: 0.7 MMOL/L (ref 0.56–1.39)
POC O2 SATURATION: 99.7 % (ref 94–98)
POC PCO2: 39.4 MM HG (ref 35–48)
POC PH: 7.36 (ref 7.35–7.45)
POC PO2: 210.7 MM HG (ref 83–108)
POTASSIUM BLD-SCNC: 4 MMOL/L (ref 3.5–4.5)
POTASSIUM SERPL-SCNC: 4.5 MMOL/L (ref 3.7–5.3)
RBC # BLD AUTO: 4.26 M/UL (ref 3.95–5.11)
RBC # BLD: ABNORMAL 10*6/UL
SODIUM BLD-SCNC: 140 MMOL/L (ref 138–146)
SODIUM SERPL-SCNC: 137 MMOL/L (ref 136–145)
SURGICAL PATHOLOGY REPORT: NORMAL
WBC OTHER # BLD: 9.2 K/UL (ref 3.5–11.3)

## 2025-04-28 PROCEDURE — 93010 ELECTROCARDIOGRAM REPORT: CPT | Performed by: INTERNAL MEDICINE

## 2025-04-28 PROCEDURE — 84520 ASSAY OF UREA NITROGEN: CPT

## 2025-04-28 PROCEDURE — 6360000002 HC RX W HCPCS: Performed by: NURSE ANESTHETIST, CERTIFIED REGISTERED

## 2025-04-28 PROCEDURE — 36415 COLL VENOUS BLD VENIPUNCTURE: CPT

## 2025-04-28 PROCEDURE — 3700000000 HC ANESTHESIA ATTENDED CARE: Performed by: STUDENT IN AN ORGANIZED HEALTH CARE EDUCATION/TRAINING PROGRAM

## 2025-04-28 PROCEDURE — 6370000000 HC RX 637 (ALT 250 FOR IP)

## 2025-04-28 PROCEDURE — 82565 ASSAY OF CREATININE: CPT

## 2025-04-28 PROCEDURE — 83605 ASSAY OF LACTIC ACID: CPT

## 2025-04-28 PROCEDURE — 6360000002 HC RX W HCPCS

## 2025-04-28 PROCEDURE — C1892 INTRO/SHEATH,FIXED,PEEL-AWAY: HCPCS | Performed by: STUDENT IN AN ORGANIZED HEALTH CARE EDUCATION/TRAINING PROGRAM

## 2025-04-28 PROCEDURE — 85652 RBC SED RATE AUTOMATED: CPT

## 2025-04-28 PROCEDURE — 6360000002 HC RX W HCPCS: Performed by: STUDENT IN AN ORGANIZED HEALTH CARE EDUCATION/TRAINING PROGRAM

## 2025-04-28 PROCEDURE — 80048 BASIC METABOLIC PNL TOTAL CA: CPT

## 2025-04-28 PROCEDURE — 88304 TISSUE EXAM BY PATHOLOGIST: CPT

## 2025-04-28 PROCEDURE — C1887 CATHETER, GUIDING: HCPCS | Performed by: STUDENT IN AN ORGANIZED HEALTH CARE EDUCATION/TRAINING PROGRAM

## 2025-04-28 PROCEDURE — 04UK3JZ SUPPLEMENT RIGHT FEMORAL ARTERY WITH SYNTHETIC SUBSTITUTE, PERCUTANEOUS APPROACH: ICD-10-PCS | Performed by: UROLOGY

## 2025-04-28 PROCEDURE — 86900 BLOOD TYPING SEROLOGIC ABO: CPT

## 2025-04-28 PROCEDURE — 82947 ASSAY GLUCOSE BLOOD QUANT: CPT

## 2025-04-28 PROCEDURE — 6370000000 HC RX 637 (ALT 250 FOR IP): Performed by: STUDENT IN AN ORGANIZED HEALTH CARE EDUCATION/TRAINING PROGRAM

## 2025-04-28 PROCEDURE — 7100000001 HC PACU RECOVERY - ADDTL 15 MIN

## 2025-04-28 PROCEDURE — 82330 ASSAY OF CALCIUM: CPT

## 2025-04-28 PROCEDURE — 2500000003 HC RX 250 WO HCPCS: Performed by: NURSE ANESTHETIST, CERTIFIED REGISTERED

## 2025-04-28 PROCEDURE — 2500000003 HC RX 250 WO HCPCS: Performed by: STUDENT IN AN ORGANIZED HEALTH CARE EDUCATION/TRAINING PROGRAM

## 2025-04-28 PROCEDURE — C1769 GUIDE WIRE: HCPCS | Performed by: STUDENT IN AN ORGANIZED HEALTH CARE EDUCATION/TRAINING PROGRAM

## 2025-04-28 PROCEDURE — 35371 RECHANNELING OF ARTERY: CPT | Performed by: STUDENT IN AN ORGANIZED HEALTH CARE EDUCATION/TRAINING PROGRAM

## 2025-04-28 PROCEDURE — 3600000017 HC SURGERY HYBRID ADDL 15MIN: Performed by: STUDENT IN AN ORGANIZED HEALTH CARE EDUCATION/TRAINING PROGRAM

## 2025-04-28 PROCEDURE — 82803 BLOOD GASES ANY COMBINATION: CPT

## 2025-04-28 PROCEDURE — 2580000003 HC RX 258: Performed by: NURSE ANESTHETIST, CERTIFIED REGISTERED

## 2025-04-28 PROCEDURE — C1757 CATH, THROMBECTOMY/EMBOLECT: HCPCS | Performed by: STUDENT IN AN ORGANIZED HEALTH CARE EDUCATION/TRAINING PROGRAM

## 2025-04-28 PROCEDURE — C1894 INTRO/SHEATH, NON-LASER: HCPCS | Performed by: STUDENT IN AN ORGANIZED HEALTH CARE EDUCATION/TRAINING PROGRAM

## 2025-04-28 PROCEDURE — 86850 RBC ANTIBODY SCREEN: CPT

## 2025-04-28 PROCEDURE — C1768 GRAFT, VASCULAR: HCPCS | Performed by: STUDENT IN AN ORGANIZED HEALTH CARE EDUCATION/TRAINING PROGRAM

## 2025-04-28 PROCEDURE — 3600000007 HC SURGERY HYBRID BASE: Performed by: STUDENT IN AN ORGANIZED HEALTH CARE EDUCATION/TRAINING PROGRAM

## 2025-04-28 PROCEDURE — 86901 BLOOD TYPING SEROLOGIC RH(D): CPT

## 2025-04-28 PROCEDURE — 86140 C-REACTIVE PROTEIN: CPT

## 2025-04-28 PROCEDURE — 6360000004 HC RX CONTRAST MEDICATION: Performed by: STUDENT IN AN ORGANIZED HEALTH CARE EDUCATION/TRAINING PROGRAM

## 2025-04-28 PROCEDURE — 3700000001 HC ADD 15 MINUTES (ANESTHESIA): Performed by: STUDENT IN AN ORGANIZED HEALTH CARE EDUCATION/TRAINING PROGRAM

## 2025-04-28 PROCEDURE — B41FZZZ FLUOROSCOPY OF RIGHT LOWER EXTREMITY ARTERIES: ICD-10-PCS | Performed by: UROLOGY

## 2025-04-28 PROCEDURE — 7100000000 HC PACU RECOVERY - FIRST 15 MIN

## 2025-04-28 PROCEDURE — C1889 IMPLANT/INSERT DEVICE, NOC: HCPCS | Performed by: STUDENT IN AN ORGANIZED HEALTH CARE EDUCATION/TRAINING PROGRAM

## 2025-04-28 PROCEDURE — 99024 POSTOP FOLLOW-UP VISIT: CPT

## 2025-04-28 PROCEDURE — 85025 COMPLETE CBC W/AUTO DIFF WBC: CPT

## 2025-04-28 PROCEDURE — 2720000010 HC SURG SUPPLY STERILE: Performed by: STUDENT IN AN ORGANIZED HEALTH CARE EDUCATION/TRAINING PROGRAM

## 2025-04-28 PROCEDURE — 04CK3ZZ EXTIRPATION OF MATTER FROM RIGHT FEMORAL ARTERY, PERCUTANEOUS APPROACH: ICD-10-PCS | Performed by: UROLOGY

## 2025-04-28 PROCEDURE — 2000000000 HC ICU R&B

## 2025-04-28 PROCEDURE — 2709999900 HC NON-CHARGEABLE SUPPLY: Performed by: STUDENT IN AN ORGANIZED HEALTH CARE EDUCATION/TRAINING PROGRAM

## 2025-04-28 PROCEDURE — 85014 HEMATOCRIT: CPT

## 2025-04-28 PROCEDURE — 80051 ELECTROLYTE PANEL: CPT

## 2025-04-28 DEVICE — XENOSURE BIOLOGIC PATCH, 0.8CM X 8CM, EIFU
Type: IMPLANTABLE DEVICE | Site: GROIN | Status: FUNCTIONAL
Brand: XENOSURE BIOLOGIC PATCH

## 2025-04-28 RX ORDER — MIDAZOLAM HYDROCHLORIDE 1 MG/ML
INJECTION, SOLUTION INTRAMUSCULAR; INTRAVENOUS
Status: DISCONTINUED | OUTPATIENT
Start: 2025-04-28 | End: 2025-04-28 | Stop reason: SDUPTHER

## 2025-04-28 RX ORDER — NALOXONE HYDROCHLORIDE 0.4 MG/ML
INJECTION, SOLUTION INTRAMUSCULAR; INTRAVENOUS; SUBCUTANEOUS PRN
Status: DISCONTINUED | OUTPATIENT
Start: 2025-04-28 | End: 2025-04-28

## 2025-04-28 RX ORDER — SODIUM CHLORIDE 0.9 % (FLUSH) 0.9 %
5-40 SYRINGE (ML) INJECTION PRN
Status: DISCONTINUED | OUTPATIENT
Start: 2025-04-28 | End: 2025-04-28

## 2025-04-28 RX ORDER — PHENYLEPHRINE HYDROCHLORIDE 10 MG/ML
INJECTION INTRAVENOUS
Status: DISCONTINUED | OUTPATIENT
Start: 2025-04-28 | End: 2025-04-28 | Stop reason: SDUPTHER

## 2025-04-28 RX ORDER — FENTANYL CITRATE 50 UG/ML
INJECTION, SOLUTION INTRAMUSCULAR; INTRAVENOUS
Status: DISCONTINUED | OUTPATIENT
Start: 2025-04-28 | End: 2025-04-28 | Stop reason: SDUPTHER

## 2025-04-28 RX ORDER — ROCURONIUM BROMIDE 10 MG/ML
INJECTION, SOLUTION INTRAVENOUS
Status: DISCONTINUED | OUTPATIENT
Start: 2025-04-28 | End: 2025-04-28 | Stop reason: SDUPTHER

## 2025-04-28 RX ORDER — LABETALOL HYDROCHLORIDE 5 MG/ML
INJECTION, SOLUTION INTRAVENOUS
Status: DISCONTINUED | OUTPATIENT
Start: 2025-04-28 | End: 2025-04-28 | Stop reason: SDUPTHER

## 2025-04-28 RX ORDER — HEPARIN SODIUM 1000 [USP'U]/ML
INJECTION, SOLUTION INTRAVENOUS; SUBCUTANEOUS
Status: DISCONTINUED | OUTPATIENT
Start: 2025-04-28 | End: 2025-04-28 | Stop reason: SDUPTHER

## 2025-04-28 RX ORDER — HYDRALAZINE HYDROCHLORIDE 10 MG/1
10 TABLET, FILM COATED ORAL EVERY 6 HOURS PRN
Status: DISCONTINUED | OUTPATIENT
Start: 2025-04-28 | End: 2025-05-06 | Stop reason: HOSPADM

## 2025-04-28 RX ORDER — LABETALOL HYDROCHLORIDE 5 MG/ML
10 INJECTION, SOLUTION INTRAVENOUS EVERY 6 HOURS PRN
Status: DISCONTINUED | OUTPATIENT
Start: 2025-04-28 | End: 2025-05-06 | Stop reason: HOSPADM

## 2025-04-28 RX ORDER — PROPOFOL 10 MG/ML
INJECTION, EMULSION INTRAVENOUS
Status: DISCONTINUED | OUTPATIENT
Start: 2025-04-28 | End: 2025-04-28 | Stop reason: SDUPTHER

## 2025-04-28 RX ORDER — WATER 10 ML/10ML
INJECTION INTRAMUSCULAR; INTRAVENOUS; SUBCUTANEOUS
Status: DISPENSED
Start: 2025-04-28 | End: 2025-04-28

## 2025-04-28 RX ORDER — FENTANYL CITRATE 50 UG/ML
25 INJECTION, SOLUTION INTRAMUSCULAR; INTRAVENOUS EVERY 5 MIN PRN
Status: DISCONTINUED | OUTPATIENT
Start: 2025-04-28 | End: 2025-04-28

## 2025-04-28 RX ORDER — CEFAZOLIN SODIUM 1 G/3ML
INJECTION, POWDER, FOR SOLUTION INTRAMUSCULAR; INTRAVENOUS
Status: DISCONTINUED | OUTPATIENT
Start: 2025-04-28 | End: 2025-04-28 | Stop reason: SDUPTHER

## 2025-04-28 RX ORDER — DIPHENHYDRAMINE HYDROCHLORIDE 50 MG/ML
12.5 INJECTION, SOLUTION INTRAMUSCULAR; INTRAVENOUS
Status: DISCONTINUED | OUTPATIENT
Start: 2025-04-28 | End: 2025-04-28

## 2025-04-28 RX ORDER — IODIXANOL 320 MG/ML
INJECTION, SOLUTION INTRAVASCULAR
Status: DISPENSED
Start: 2025-04-28 | End: 2025-04-28

## 2025-04-28 RX ORDER — SODIUM CHLORIDE 0.9 % (FLUSH) 0.9 %
5-40 SYRINGE (ML) INJECTION EVERY 12 HOURS SCHEDULED
Status: DISCONTINUED | OUTPATIENT
Start: 2025-04-28 | End: 2025-04-28

## 2025-04-28 RX ORDER — LIDOCAINE HYDROCHLORIDE 20 MG/ML
INJECTION, SOLUTION INTRAVENOUS
Status: DISCONTINUED | OUTPATIENT
Start: 2025-04-28 | End: 2025-04-28 | Stop reason: SDUPTHER

## 2025-04-28 RX ORDER — ONDANSETRON 2 MG/ML
4 INJECTION INTRAMUSCULAR; INTRAVENOUS
Status: DISCONTINUED | OUTPATIENT
Start: 2025-04-28 | End: 2025-04-28

## 2025-04-28 RX ORDER — SODIUM CHLORIDE 9 MG/ML
INJECTION, SOLUTION INTRAVENOUS
Status: DISCONTINUED | OUTPATIENT
Start: 2025-04-28 | End: 2025-04-28 | Stop reason: SDUPTHER

## 2025-04-28 RX ORDER — SODIUM CHLORIDE 9 MG/ML
INJECTION, SOLUTION INTRAVENOUS PRN
Status: DISCONTINUED | OUTPATIENT
Start: 2025-04-28 | End: 2025-04-28

## 2025-04-28 RX ORDER — METHYLPREDNISOLONE SODIUM SUCCINATE 125 MG/2ML
INJECTION INTRAMUSCULAR; INTRAVENOUS
Status: DISPENSED
Start: 2025-04-28 | End: 2025-04-28

## 2025-04-28 RX ORDER — ONDANSETRON 2 MG/ML
INJECTION INTRAMUSCULAR; INTRAVENOUS
Status: DISCONTINUED | OUTPATIENT
Start: 2025-04-28 | End: 2025-04-28 | Stop reason: SDUPTHER

## 2025-04-28 RX ORDER — IODIXANOL 320 MG/ML
INJECTION, SOLUTION INTRAVASCULAR PRN
Status: DISCONTINUED | OUTPATIENT
Start: 2025-04-28 | End: 2025-04-28 | Stop reason: ALTCHOICE

## 2025-04-28 RX ORDER — SODIUM CHLORIDE, SODIUM LACTATE, POTASSIUM CHLORIDE, CALCIUM CHLORIDE 600; 310; 30; 20 MG/100ML; MG/100ML; MG/100ML; MG/100ML
INJECTION, SOLUTION INTRAVENOUS
Status: DISCONTINUED | OUTPATIENT
Start: 2025-04-28 | End: 2025-04-28 | Stop reason: SDUPTHER

## 2025-04-28 RX ORDER — FENTANYL CITRATE 50 UG/ML
50 INJECTION, SOLUTION INTRAMUSCULAR; INTRAVENOUS EVERY 5 MIN PRN
Status: DISCONTINUED | OUTPATIENT
Start: 2025-04-28 | End: 2025-04-28

## 2025-04-28 RX ORDER — DIPHENHYDRAMINE HYDROCHLORIDE 50 MG/ML
INJECTION, SOLUTION INTRAMUSCULAR; INTRAVENOUS
Status: DISCONTINUED | OUTPATIENT
Start: 2025-04-28 | End: 2025-04-28 | Stop reason: SDUPTHER

## 2025-04-28 RX ADMIN — PHENYLEPHRINE HYDROCHLORIDE 100 MCG: 10 INJECTION INTRAVENOUS at 08:05

## 2025-04-28 RX ADMIN — FENTANYL CITRATE 50 MCG: 50 INJECTION, SOLUTION INTRAMUSCULAR; INTRAVENOUS at 08:01

## 2025-04-28 RX ADMIN — DIPHENHYDRAMINE HYDROCHLORIDE 50 MG: 50 INJECTION, SOLUTION INTRAMUSCULAR; INTRAVENOUS at 08:38

## 2025-04-28 RX ADMIN — SODIUM CHLORIDE: 0.9 INJECTION, SOLUTION INTRAVENOUS at 10:03

## 2025-04-28 RX ADMIN — METHYLPREDNISOLONE SODIUM SUCCINATE 125 MG: 125 INJECTION, POWDER, FOR SOLUTION INTRAMUSCULAR; INTRAVENOUS at 08:50

## 2025-04-28 RX ADMIN — HYDROMORPHONE HYDROCHLORIDE 0.25 MG: 1 INJECTION, SOLUTION INTRAMUSCULAR; INTRAVENOUS; SUBCUTANEOUS at 22:01

## 2025-04-28 RX ADMIN — Medication 10 MG: at 23:19

## 2025-04-28 RX ADMIN — HEPARIN SODIUM 3000 UNITS: 1000 INJECTION, SOLUTION INTRAVENOUS; SUBCUTANEOUS at 10:42

## 2025-04-28 RX ADMIN — ACETAMINOPHEN 650 MG: 325 TABLET ORAL at 21:28

## 2025-04-28 RX ADMIN — INSULIN LISPRO 4 UNITS: 100 INJECTION, SOLUTION INTRAVENOUS; SUBCUTANEOUS at 17:18

## 2025-04-28 RX ADMIN — HYDROMORPHONE HYDROCHLORIDE 0.25 MG: 1 INJECTION, SOLUTION INTRAMUSCULAR; INTRAVENOUS; SUBCUTANEOUS at 03:39

## 2025-04-28 RX ADMIN — AMOXICILLIN AND CLAVULANATE POTASSIUM 1 TABLET: 875; 125 TABLET, FILM COATED ORAL at 20:47

## 2025-04-28 RX ADMIN — LIDOCAINE HYDROCHLORIDE 50 MG: 20 INJECTION, SOLUTION INTRAVENOUS at 08:02

## 2025-04-28 RX ADMIN — SODIUM CHLORIDE: 0.9 INJECTION, SOLUTION INTRAVENOUS at 08:30

## 2025-04-28 RX ADMIN — QUETIAPINE FUMARATE 150 MG: 100 TABLET ORAL at 20:47

## 2025-04-28 RX ADMIN — SODIUM CHLORIDE, POTASSIUM CHLORIDE, SODIUM LACTATE AND CALCIUM CHLORIDE: 600; 310; 30; 20 INJECTION, SOLUTION INTRAVENOUS at 07:50

## 2025-04-28 RX ADMIN — PHENYLEPHRINE HYDROCHLORIDE 200 MCG: 10 INJECTION INTRAVENOUS at 08:29

## 2025-04-28 RX ADMIN — HYDROMORPHONE HYDROCHLORIDE 0.25 MG: 1 INJECTION, SOLUTION INTRAMUSCULAR; INTRAVENOUS; SUBCUTANEOUS at 13:45

## 2025-04-28 RX ADMIN — PROPOFOL 30 MG: 10 INJECTION, EMULSION INTRAVENOUS at 11:57

## 2025-04-28 RX ADMIN — ROCURONIUM BROMIDE 50 MG: 10 INJECTION INTRAVENOUS at 08:03

## 2025-04-28 RX ADMIN — PROPOFOL 100 MG: 10 INJECTION, EMULSION INTRAVENOUS at 08:02

## 2025-04-28 RX ADMIN — OXYCODONE 10 MG: 5 TABLET ORAL at 16:47

## 2025-04-28 RX ADMIN — PHENYLEPHRINE HYDROCHLORIDE 100 MCG: 10 INJECTION INTRAVENOUS at 08:39

## 2025-04-28 RX ADMIN — HYDRALAZINE HYDROCHLORIDE 10 MG: 10 TABLET ORAL at 18:14

## 2025-04-28 RX ADMIN — INSULIN LISPRO 6 UNITS: 100 INJECTION, SOLUTION INTRAVENOUS; SUBCUTANEOUS at 20:52

## 2025-04-28 RX ADMIN — HEPARIN SODIUM 6000 UNITS: 1000 INJECTION, SOLUTION INTRAVENOUS; SUBCUTANEOUS at 09:42

## 2025-04-28 RX ADMIN — CEFAZOLIN 2 G: 1 INJECTION, POWDER, FOR SOLUTION INTRAMUSCULAR; INTRAVENOUS at 08:18

## 2025-04-28 RX ADMIN — ONDANSETRON 4 MG: 2 INJECTION INTRAMUSCULAR; INTRAVENOUS at 12:00

## 2025-04-28 RX ADMIN — DAKIN'S SOLUTION 0.125% (QUARTER STRENGTH): 0.12 SOLUTION at 04:43

## 2025-04-28 RX ADMIN — ATORVASTATIN CALCIUM 40 MG: 40 TABLET, FILM COATED ORAL at 20:48

## 2025-04-28 RX ADMIN — OXYCODONE 10 MG: 5 TABLET ORAL at 04:42

## 2025-04-28 RX ADMIN — HYDROMORPHONE HYDROCHLORIDE 0.25 MG: 1 INJECTION, SOLUTION INTRAMUSCULAR; INTRAVENOUS; SUBCUTANEOUS at 18:05

## 2025-04-28 RX ADMIN — PHENYLEPHRINE HYDROCHLORIDE 200 MCG: 10 INJECTION INTRAVENOUS at 08:21

## 2025-04-28 RX ADMIN — VARENICLINE TARTRATE 0.5 MG: 0.5 TABLET, FILM COATED ORAL at 21:13

## 2025-04-28 RX ADMIN — FENTANYL CITRATE 50 MCG: 50 INJECTION, SOLUTION INTRAMUSCULAR; INTRAVENOUS at 09:41

## 2025-04-28 RX ADMIN — PHENYLEPHRINE HYDROCHLORIDE 50 MCG/MIN: 10 INJECTION INTRAVENOUS at 08:26

## 2025-04-28 RX ADMIN — MIDAZOLAM 2 MG: 1 INJECTION INTRAMUSCULAR; INTRAVENOUS at 07:56

## 2025-04-28 RX ADMIN — Medication 5 MG: at 11:59

## 2025-04-28 RX ADMIN — PHENYLEPHRINE HYDROCHLORIDE 200 MCG: 10 INJECTION INTRAVENOUS at 08:13

## 2025-04-28 ASSESSMENT — PAIN DESCRIPTION - LOCATION
LOCATION: FOOT;LEG
LOCATION: LEG;FOOT
LOCATION: LEG;FOOT
LOCATION: FOOT

## 2025-04-28 ASSESSMENT — PAIN SCALES - GENERAL
PAINLEVEL_OUTOF10: 9
PAINLEVEL_OUTOF10: 5
PAINLEVEL_OUTOF10: 4
PAINLEVEL_OUTOF10: 10
PAINLEVEL_OUTOF10: 8
PAINLEVEL_OUTOF10: 5
PAINLEVEL_OUTOF10: 3
PAINLEVEL_OUTOF10: 8
PAINLEVEL_OUTOF10: 10
PAINLEVEL_OUTOF10: 10
PAINLEVEL_OUTOF10: 2
PAINLEVEL_OUTOF10: 9

## 2025-04-28 ASSESSMENT — LIFESTYLE VARIABLES: SMOKING_STATUS: 1

## 2025-04-28 ASSESSMENT — PAIN DESCRIPTION - ORIENTATION
ORIENTATION: RIGHT

## 2025-04-28 ASSESSMENT — PAIN DESCRIPTION - DESCRIPTORS
DESCRIPTORS: BURNING;ACHING
DESCRIPTORS: BURNING
DESCRIPTORS: BURNING

## 2025-04-28 NOTE — PLAN OF CARE
Problem: Chronic Conditions and Co-morbidities  Goal: Patient's chronic conditions and co-morbidity symptoms are monitored and maintained or improved  Outcome: Progressing     Problem: Discharge Planning  Goal: Discharge to home or other facility with appropriate resources  Outcome: Progressing     Problem: Pain  Goal: Verbalizes/displays adequate comfort level or baseline comfort level  Outcome: Progressing     Problem: Skin/Tissue Integrity  Goal: Skin integrity remains intact  Description: 1.  Monitor for areas of redness and/or skin breakdown2.  Assess vascular access sites hourly3.  Every 4-6 hours minimum:  Change oxygen saturation probe site4.  Every 4-6 hours:  If on nasal continuous positive airway pressure, respiratory therapy assess nares and determine need for appliance change or resting period  Outcome: Progressing     Problem: Safety - Adult  Goal: Free from fall injury  Outcome: Progressing     Problem: ABCDS Injury Assessment  Goal: Absence of physical injury  Outcome: Progressing     Problem: Nutrition Deficit:  Goal: Optimize nutritional status  Outcome: Progressing  Flowsheets (Taken 4/28/2025 1147 by Mary Cook, RD, LD)  Nutrient intake appropriate for improving, restoring, or maintaining nutritional needs:   Assess nutritional status and recommend course of action   Monitor oral intake, labs, and treatment plans   Recommend appropriate diets, oral nutritional supplements, and vitamin/mineral supplements

## 2025-04-28 NOTE — ANESTHESIA POSTPROCEDURE EVALUATION
Department of Anesthesiology  Postprocedure Note    Patient: Mikael Estevez  MRN: [id number]  YOB: 1970  Date of evaluation: 4/28/2025    Procedure Summary       Date: 04/28/25 Room / Location: [place] [address] / [facility]    Anesthesia Start: 0754 Anesthesia Stop: 1255    Procedure: RIGHT  FEMORAL ENDATRERECTOMY XENOSURE PATCH 0.8X8, ANGIOGRAM (Right: Leg Lower) Diagnosis:       Open wound of right great toe, initial encounter      (Open wound of right great toe, initial encounter [S91.101A])    Surgeons: Robert Jay MD Responsible Provider: Randal Flanenry MD    Anesthesia Type: general ASA Status: 4            Anesthesia Type: No value filed.    Annamaria Phase I: Annamaria Score: 10    Annamaria Phase II: Annamaria Score: 10    Anesthesia Post Evaluation    Patient location during evaluation: PACU  Patient participation: complete - patient participated  Level of consciousness: awake  Pain score: 1  Airway patency: patent  Nausea & Vomiting: no nausea and no vomiting  Cardiovascular status: blood pressure returned to baseline and hemodynamically stable  Respiratory status: acceptable  Hydration status: euvolemic  Pain management: adequate    No notable events documented.

## 2025-04-28 NOTE — OP NOTE
Operative Note      Patient: Mikael Estevez  YOB: 1970  MRN: [id number]    Date of Procedure: 4/28/2025    Pre-Op Diagnosis Codes:      * Open wound of right great toe, initial encounter [S91.101A]    Post-Op Diagnosis: Same       Procedures:  1) Right common femoral, profunda and superficial femoral endarterectomy with bovine pericardium patch angioplasty  2) Right leg angiogram with catheter in right SFA    Surgeon(s):  Robert Jay MD    Assistant: Dave Crandall DO (Resident)    Anesthesia: General    Estimated Blood Loss (mL): 75 mL    Complications: None    Specimens: Right femoral arterial plaque        Implants:  Implant Name Type Inv. Item Serial No.  Lot No. LRB No. Used Action   GRAFT VASC W0.8XL8CM THK0.35-0.75MM CAR PERICARD PROC BOV - [id number] Vascular grafts GRAFT VASC W0.8XL8CM THK0.35-0.75MM CAR PERICARD PROC BOV [id number] [employer]WD [id number] Right 1 Implanted   CLIP LIG M MARYANN TI HRT SHP WIRE HORZ 6 CLIPS PER PK - [id number]  CLIP LIG M MARYANN TI HRT SHP WIRE HORZ 6 CLIPS PER PK  [employer]- [id number] Right 1 Implanted         Drains:   Urinary Catheter 04/28/25 Long (Active)       Findings:  Infection Present At Time Of Surgery (PATOS) (choose all levels that have infection present):  No infection present  Other Findings: Severe plaque in distal right CFA with subacute thrombus present. Two patches were used in pantaloon fashion to patch both the proximal SFA and profunda arteries in addition to the right CFA. Right leg angiogram reveals patent right EIA, CFA and profunda arteries. The SFA is occluded. The popliteal reconstitutes just above the knee joint. There is only one vessel run off via peroneal artery. There is collateral flow to the plantar vessels at the foot. No vessels are seen in mid to distal foot. The DP is not patent. After intervention she had right DP/PT signals present.    Detailed Description of

## 2025-04-28 NOTE — PLAN OF CARE
Problem: Chronic Conditions and Co-morbidities  Goal: Patient's chronic conditions and co-morbidity symptoms are monitored and maintained or improved  4/28/2025 0225 by Nieves Das RN  Outcome: Progressing  4/27/2025 1711 by Petar Guerrero RN  Outcome: Progressing     Problem: Discharge Planning  Goal: Discharge to home or other facility with appropriate resources  4/28/2025 0225 by Nieves Das RN  Outcome: Progressing  4/27/2025 1711 by Petar Guerrero RN  Outcome: Progressing     Problem: Pain  Goal: Verbalizes/displays adequate comfort level or baseline comfort level  4/28/2025 0225 by Nieves Das RN  Outcome: Progressing  4/27/2025 1711 by Petar Guerrero RN  Outcome: Progressing     Problem: Skin/Tissue Integrity  Goal: Skin integrity remains intact  Description: 1.  Monitor for areas of redness and/or skin breakdown2.  Assess vascular access sites hourly3.  Every 4-6 hours minimum:  Change oxygen saturation probe site4.  Every 4-6 hours:  If on nasal continuous positive airway pressure, respiratory therapy assess nares and determine need for appliance change or resting period  4/28/2025 0225 by Nieves Das RN  Outcome: Progressing  4/27/2025 1711 by Petar Guerrero RN  Outcome: Progressing     Problem: Safety - Adult  Goal: Free from fall injury  4/28/2025 0225 by Nieves Das RN  Outcome: Progressing  4/27/2025 1711 by Petar Guerrero RN  Outcome: Progressing     Problem: ABCDS Injury Assessment  Goal: Absence of physical injury  4/28/2025 0225 by Nieves Das RN  Outcome: Progressing  4/27/2025 1711 by Petar Guerrero RN  Outcome: Progressing     Problem: Nutrition Deficit:  Goal: Optimize nutritional status  4/28/2025 0225 by Nieves Das RN  Outcome: Progressing  4/27/2025 1711 by Petar Guerrero RN  Outcome: Progressing

## 2025-04-28 NOTE — PLAN OF CARE
Plan of care note  Foot and Ankle Surgery     Plan of care      Patient in OR during rounding with vascular surgery for right femoral endarterectomy with right leg angiogram.  Chart, vitals, and labs reviewed.  Continue with twice daily dressing changes with Dakin's wet-to-dry to surgical site per nursing.  Nursing to take daily photo of the wound and place in chart.  Please page podiatry resident on-call with any questions.       Geetha Keene DPM  Foot and Ankle Surgery   04/28/25 at 8:39 AM

## 2025-04-29 LAB
ANION GAP SERPL CALCULATED.3IONS-SCNC: 12 MMOL/L (ref 9–16)
BASOPHILS # BLD: 0.03 K/UL (ref 0–0.2)
BASOPHILS NFR BLD: 0 % (ref 0–2)
BUN SERPL-MCNC: 15 MG/DL (ref 6–20)
CALCIUM SERPL-MCNC: 9 MG/DL (ref 8.6–10.4)
CHLORIDE SERPL-SCNC: 100 MMOL/L (ref 98–107)
CO2 SERPL-SCNC: 24 MMOL/L (ref 20–31)
CREAT SERPL-MCNC: 0.7 MG/DL (ref 0.6–0.9)
CRP SERPL HS-MCNC: 21.9 MG/L (ref 0–5)
EOSINOPHIL # BLD: 0.15 K/UL (ref 0–0.44)
EOSINOPHILS RELATIVE PERCENT: 1 % (ref 1–4)
ERYTHROCYTE [DISTWIDTH] IN BLOOD BY AUTOMATED COUNT: 14.7 % (ref 11.8–14.4)
ERYTHROCYTE [SEDIMENTATION RATE] IN BLOOD BY PHOTOMETRIC METHOD: 65 MM/HR (ref 0–30)
GFR, ESTIMATED: >90 ML/MIN/1.73M2
GLUCOSE BLD-MCNC: 234 MG/DL (ref 65–105)
GLUCOSE BLD-MCNC: 291 MG/DL (ref 65–105)
GLUCOSE SERPL-MCNC: 350 MG/DL (ref 74–99)
HCT VFR BLD AUTO: 32.2 % (ref 36.3–47.1)
HGB BLD-MCNC: 10.5 G/DL (ref 11.9–15.1)
IMM GRANULOCYTES # BLD AUTO: 0.06 K/UL (ref 0–0.3)
IMM GRANULOCYTES NFR BLD: 0 %
LYMPHOCYTES NFR BLD: 3.03 K/UL (ref 1.1–3.7)
LYMPHOCYTES RELATIVE PERCENT: 21 % (ref 24–43)
MCH RBC QN AUTO: 27.2 PG (ref 25.2–33.5)
MCHC RBC AUTO-ENTMCNC: 32.6 G/DL (ref 28.4–34.8)
MCV RBC AUTO: 83.4 FL (ref 82.6–102.9)
MONOCYTES NFR BLD: 1.25 K/UL (ref 0.1–1.2)
MONOCYTES NFR BLD: 9 % (ref 3–12)
NEUTROPHILS NFR BLD: 69 % (ref 36–65)
NEUTS SEG NFR BLD: 9.9 K/UL (ref 1.5–8.1)
NRBC BLD-RTO: 0 PER 100 WBC
PLATELET # BLD AUTO: 376 K/UL (ref 138–453)
PMV BLD AUTO: 8.5 FL (ref 8.1–13.5)
POTASSIUM SERPL-SCNC: 4.4 MMOL/L (ref 3.7–5.3)
RBC # BLD AUTO: 3.86 M/UL (ref 3.95–5.11)
RBC # BLD: ABNORMAL 10*6/UL
SODIUM SERPL-SCNC: 136 MMOL/L (ref 136–145)
SURGICAL PATHOLOGY REPORT: NORMAL
WBC OTHER # BLD: 14.4 K/UL (ref 3.5–11.3)

## 2025-04-29 PROCEDURE — 6370000000 HC RX 637 (ALT 250 FOR IP)

## 2025-04-29 PROCEDURE — 86140 C-REACTIVE PROTEIN: CPT

## 2025-04-29 PROCEDURE — 85025 COMPLETE CBC W/AUTO DIFF WBC: CPT

## 2025-04-29 PROCEDURE — 2500000003 HC RX 250 WO HCPCS

## 2025-04-29 PROCEDURE — 6360000002 HC RX W HCPCS

## 2025-04-29 PROCEDURE — 2060000000 HC ICU INTERMEDIATE R&B

## 2025-04-29 PROCEDURE — 99232 SBSQ HOSP IP/OBS MODERATE 35: CPT | Performed by: INTERNAL MEDICINE

## 2025-04-29 PROCEDURE — 97530 THERAPEUTIC ACTIVITIES: CPT

## 2025-04-29 PROCEDURE — 80048 BASIC METABOLIC PNL TOTAL CA: CPT

## 2025-04-29 PROCEDURE — 99024 POSTOP FOLLOW-UP VISIT: CPT

## 2025-04-29 PROCEDURE — 93005 ELECTROCARDIOGRAM TRACING: CPT

## 2025-04-29 PROCEDURE — 85652 RBC SED RATE AUTOMATED: CPT

## 2025-04-29 PROCEDURE — 82947 ASSAY GLUCOSE BLOOD QUANT: CPT

## 2025-04-29 PROCEDURE — 99233 SBSQ HOSP IP/OBS HIGH 50: CPT | Performed by: INTERNAL MEDICINE

## 2025-04-29 RX ORDER — IBUPROFEN 600 MG/1
600 TABLET, FILM COATED ORAL ONCE
Status: COMPLETED | OUTPATIENT
Start: 2025-04-29 | End: 2025-04-29

## 2025-04-29 RX ORDER — AMLODIPINE BESYLATE 10 MG/1
10 TABLET ORAL DAILY
Status: DISCONTINUED | OUTPATIENT
Start: 2025-04-29 | End: 2025-05-06 | Stop reason: HOSPADM

## 2025-04-29 RX ORDER — LISINOPRIL 20 MG/1
40 TABLET ORAL DAILY
Status: DISCONTINUED | OUTPATIENT
Start: 2025-04-30 | End: 2025-05-06 | Stop reason: HOSPADM

## 2025-04-29 RX ORDER — CARVEDILOL 12.5 MG/1
12.5 TABLET ORAL 2 TIMES DAILY WITH MEALS
Status: DISCONTINUED | OUTPATIENT
Start: 2025-04-29 | End: 2025-05-03

## 2025-04-29 RX ORDER — INSULIN GLARGINE 100 [IU]/ML
20 INJECTION, SOLUTION SUBCUTANEOUS DAILY
Status: DISCONTINUED | OUTPATIENT
Start: 2025-04-29 | End: 2025-05-06 | Stop reason: HOSPADM

## 2025-04-29 RX ORDER — PANTOPRAZOLE SODIUM 40 MG/1
40 TABLET, DELAYED RELEASE ORAL
Status: DISCONTINUED | OUTPATIENT
Start: 2025-04-29 | End: 2025-05-06 | Stop reason: HOSPADM

## 2025-04-29 RX ADMIN — HYDROMORPHONE HYDROCHLORIDE 0.25 MG: 1 INJECTION, SOLUTION INTRAMUSCULAR; INTRAVENOUS; SUBCUTANEOUS at 09:40

## 2025-04-29 RX ADMIN — AMOXICILLIN AND CLAVULANATE POTASSIUM 1 TABLET: 875; 125 TABLET, FILM COATED ORAL at 20:55

## 2025-04-29 RX ADMIN — QUETIAPINE FUMARATE 150 MG: 100 TABLET ORAL at 20:55

## 2025-04-29 RX ADMIN — AMLODIPINE BESYLATE 10 MG: 10 TABLET ORAL at 16:21

## 2025-04-29 RX ADMIN — IBUPROFEN 600 MG: 600 TABLET, FILM COATED ORAL at 19:54

## 2025-04-29 RX ADMIN — HYDROMORPHONE HYDROCHLORIDE 0.25 MG: 1 INJECTION, SOLUTION INTRAMUSCULAR; INTRAVENOUS; SUBCUTANEOUS at 02:46

## 2025-04-29 RX ADMIN — INSULIN GLARGINE 20 UNITS: 100 INJECTION, SOLUTION SUBCUTANEOUS at 09:09

## 2025-04-29 RX ADMIN — ASPIRIN 81 MG: 81 TABLET, COATED ORAL at 08:16

## 2025-04-29 RX ADMIN — ISOSORBIDE MONONITRATE 30 MG: 30 TABLET, EXTENDED RELEASE ORAL at 08:54

## 2025-04-29 RX ADMIN — ACETAMINOPHEN 650 MG: 325 TABLET ORAL at 17:07

## 2025-04-29 RX ADMIN — HYDROMORPHONE HYDROCHLORIDE 0.25 MG: 1 INJECTION, SOLUTION INTRAMUSCULAR; INTRAVENOUS; SUBCUTANEOUS at 20:57

## 2025-04-29 RX ADMIN — ONDANSETRON 4 MG: 2 INJECTION INTRAMUSCULAR; INTRAVENOUS at 08:25

## 2025-04-29 RX ADMIN — METOPROLOL SUCCINATE 25 MG: 25 TABLET, EXTENDED RELEASE ORAL at 08:16

## 2025-04-29 RX ADMIN — LISINOPRIL 30 MG: 20 TABLET ORAL at 08:15

## 2025-04-29 RX ADMIN — AMOXICILLIN AND CLAVULANATE POTASSIUM 1 TABLET: 875; 125 TABLET, FILM COATED ORAL at 08:54

## 2025-04-29 RX ADMIN — ACETAMINOPHEN 650 MG: 325 TABLET ORAL at 08:15

## 2025-04-29 RX ADMIN — ATORVASTATIN CALCIUM 40 MG: 40 TABLET, FILM COATED ORAL at 20:55

## 2025-04-29 RX ADMIN — Medication 10 MG: at 15:13

## 2025-04-29 RX ADMIN — CARVEDILOL 12.5 MG: 12.5 TABLET, FILM COATED ORAL at 16:22

## 2025-04-29 RX ADMIN — ONDANSETRON 4 MG: 2 INJECTION INTRAMUSCULAR; INTRAVENOUS at 14:52

## 2025-04-29 RX ADMIN — INSULIN LISPRO 4 UNITS: 100 INJECTION, SOLUTION INTRAVENOUS; SUBCUTANEOUS at 08:16

## 2025-04-29 RX ADMIN — SODIUM CHLORIDE, PRESERVATIVE FREE 10 ML: 5 INJECTION INTRAVENOUS at 08:16

## 2025-04-29 RX ADMIN — SODIUM CHLORIDE, PRESERVATIVE FREE 10 ML: 5 INJECTION INTRAVENOUS at 20:55

## 2025-04-29 RX ADMIN — INSULIN LISPRO 4 UNITS: 100 INJECTION, SOLUTION INTRAVENOUS; SUBCUTANEOUS at 17:07

## 2025-04-29 RX ADMIN — SPIRONOLACTONE 12.5 MG: 25 TABLET, FILM COATED ORAL at 08:15

## 2025-04-29 ASSESSMENT — PAIN DESCRIPTION - LOCATION
LOCATION: FOOT
LOCATION: FOOT
LOCATION: LEG;FOOT
LOCATION: HEAD;FOOT

## 2025-04-29 ASSESSMENT — PAIN SCALES - GENERAL
PAINLEVEL_OUTOF10: 9
PAINLEVEL_OUTOF10: 10
PAINLEVEL_OUTOF10: 10
PAINLEVEL_OUTOF10: 9
PAINLEVEL_OUTOF10: 0
PAINLEVEL_OUTOF10: 2
PAINLEVEL_OUTOF10: 3

## 2025-04-29 ASSESSMENT — PAIN SCALES - WONG BAKER: WONGBAKER_NUMERICALRESPONSE: NO HURT

## 2025-04-29 ASSESSMENT — PAIN DESCRIPTION - ORIENTATION: ORIENTATION: RIGHT

## 2025-04-29 NOTE — PLAN OF CARE
I was paged to come to bedside as patient was asking to leave.  She would not tell the nurse why she wanted to speak with a doctor at that time.  I was told she was refusing all of her antihypertensive medications and notified that her systolic blood pressure was above the target goal of 140.  At that time, I let the nurse know that we are currently busy and will come to bedside whenever able.  When I came to examine the patient at bedside, she was on the phone speaking with somebody, but I do not know who it was.  I asked her what her question was, but she refused to tell me why she called me to bedside.  She just told me that she was making phone calls this morning.  I explained to her the risk of her leaving the hospital with this wound and possible infection.  She did not seem to care 1 where the other.  I also explained to her that blood pressure control is important to prevent damage to the new patch that was placed over her blood vessel.  I told her that if her blood pressure is not controlled that there is a chance that the patch could get damaged from the increased blood pressure and she could bleed out.  Again, she did not seem to be worried at all about this.  During this entire conversation, she never really expressed why she called me to bedside and seemed frustrated with the staff along with me.  She told me \"I do not need blood pressure control, I feel fine \"and continued on with her phone conversation.    Sam Madison D.O.  General Surgery PGY-1  [facility]

## 2025-04-29 NOTE — PLAN OF CARE
Problem: Chronic Conditions and Co-morbidities  Goal: Patient's chronic conditions and co-morbidity symptoms are monitored and maintained or improved  4/28/2025 2249 by Trey Quiles RN  Outcome: Progressing  4/28/2025 1837 by Rigo Parker RN  Outcome: Progressing     Problem: Discharge Planning  Goal: Discharge to home or other facility with appropriate resources  4/28/2025 2249 by Trey Quiles RN  Outcome: Progressing  4/28/2025 1837 by Rigo Parker RN  Outcome: Progressing     Problem: Pain  Goal: Verbalizes/displays adequate comfort level or baseline comfort level  4/28/2025 2249 by Trey Quiles RN  Outcome: Progressing  4/28/2025 1837 by Rigo Parker RN  Outcome: Progressing     Problem: Skin/Tissue Integrity  Goal: Skin integrity remains intact  Description: 1.  Monitor for areas of redness and/or skin breakdown2.  Assess vascular access sites hourly3.  Every 4-6 hours minimum:  Change oxygen saturation probe site4.  Every 4-6 hours:  If on nasal continuous positive airway pressure, respiratory therapy assess nares and determine need for appliance change or resting period  4/28/2025 2249 by Trey Quiles RN  Outcome: Progressing  4/28/2025 1837 by Rigo Parker RN  Outcome: Progressing     Problem: Safety - Adult  Goal: Free from fall injury  4/28/2025 2249 by Trey Quiles RN  Outcome: Progressing  4/28/2025 1837 by Rigo Parker RN  Outcome: Progressing     Problem: ABCDS Injury Assessment  Goal: Absence of physical injury  4/28/2025 2249 by Trey Quiles RN  Outcome: Progressing  4/28/2025 1837 by Rigo Parker RN  Outcome: Progressing     Problem: Nutrition Deficit:  Goal: Optimize nutritional status  4/28/2025 2249 by Trey Qulies RN  Outcome: Progressing  4/28/2025 1837 by Rigo Parker RN  Outcome: Progressing  Flowsheets (Taken 4/28/2025 1147 by Mary Cook, RD, LD)  Nutrient intake appropriate for improving, restoring, or maintaining

## 2025-04-29 NOTE — PLAN OF CARE
Problem: Chronic Conditions and Co-morbidities  Goal: Patient's chronic conditions and co-morbidity symptoms are monitored and maintained or improved  Outcome: Progressing     Problem: Discharge Planning  Goal: Discharge to home or other facility with appropriate resources  Outcome: Progressing     Problem: Pain  Goal: Verbalizes/displays adequate comfort level or baseline comfort level  Outcome: Progressing     Problem: Skin/Tissue Integrity  Goal: Skin integrity remains intact  Description: 1.  Monitor for areas of redness and/or skin breakdown2.  Assess vascular access sites hourly3.  Every 4-6 hours minimum:  Change oxygen saturation probe site4.  Every 4-6 hours:  If on nasal continuous positive airway pressure, respiratory therapy assess nares and determine need for appliance change or resting period  Outcome: Progressing     Problem: ABCDS Injury Assessment  Goal: Absence of physical injury  Outcome: Progressing

## 2025-04-30 ENCOUNTER — APPOINTMENT (OUTPATIENT)
Dept: VASCULAR LAB | Age: 55
DRG: 617 | End: 2025-04-30
Payer: MEDICARE

## 2025-04-30 LAB
ANION GAP SERPL CALCULATED.3IONS-SCNC: 9 MMOL/L (ref 9–16)
BASOPHILS # BLD: 0.06 K/UL (ref 0–0.2)
BASOPHILS NFR BLD: 0 % (ref 0–2)
BUN SERPL-MCNC: 11 MG/DL (ref 6–20)
CALCIUM SERPL-MCNC: 8.6 MG/DL (ref 8.6–10.4)
CHLORIDE SERPL-SCNC: 103 MMOL/L (ref 98–107)
CO2 SERPL-SCNC: 24 MMOL/L (ref 20–31)
CREAT SERPL-MCNC: 0.7 MG/DL (ref 0.6–0.9)
CRP SERPL HS-MCNC: 35 MG/L (ref 0–5)
EKG ATRIAL RATE: 74 BPM
EKG P AXIS: 61 DEGREES
EKG P-R INTERVAL: 164 MS
EKG Q-T INTERVAL: 390 MS
EKG QRS DURATION: 100 MS
EKG QTC CALCULATION (BAZETT): 432 MS
EKG R AXIS: 8 DEGREES
EKG T AXIS: -32 DEGREES
EKG VENTRICULAR RATE: 74 BPM
EOSINOPHIL # BLD: 0.38 K/UL (ref 0–0.44)
EOSINOPHILS RELATIVE PERCENT: 3 % (ref 1–4)
ERYTHROCYTE [DISTWIDTH] IN BLOOD BY AUTOMATED COUNT: 15.2 % (ref 11.8–14.4)
ERYTHROCYTE [SEDIMENTATION RATE] IN BLOOD BY PHOTOMETRIC METHOD: 64 MM/HR (ref 0–30)
GFR, ESTIMATED: >90 ML/MIN/1.73M2
GLUCOSE BLD-MCNC: 127 MG/DL (ref 65–105)
GLUCOSE BLD-MCNC: 129 MG/DL (ref 65–105)
GLUCOSE BLD-MCNC: 139 MG/DL (ref 65–105)
GLUCOSE BLD-MCNC: 145 MG/DL (ref 65–105)
GLUCOSE BLD-MCNC: 264 MG/DL (ref 65–105)
GLUCOSE SERPL-MCNC: 145 MG/DL (ref 74–99)
HCT VFR BLD AUTO: 32.8 % (ref 36.3–47.1)
HGB BLD-MCNC: 10.2 G/DL (ref 11.9–15.1)
IMM GRANULOCYTES # BLD AUTO: 0.07 K/UL (ref 0–0.3)
IMM GRANULOCYTES NFR BLD: 1 %
LYMPHOCYTES NFR BLD: 4.09 K/UL (ref 1.1–3.7)
LYMPHOCYTES RELATIVE PERCENT: 30 % (ref 24–43)
MCH RBC QN AUTO: 27.2 PG (ref 25.2–33.5)
MCHC RBC AUTO-ENTMCNC: 31.1 G/DL (ref 28.4–34.8)
MCV RBC AUTO: 87.5 FL (ref 82.6–102.9)
MONOCYTES NFR BLD: 1.26 K/UL (ref 0.1–1.2)
MONOCYTES NFR BLD: 9 % (ref 3–12)
NEUTROPHILS NFR BLD: 57 % (ref 36–65)
NEUTS SEG NFR BLD: 7.94 K/UL (ref 1.5–8.1)
NRBC BLD-RTO: 0 PER 100 WBC
PLATELET # BLD AUTO: 358 K/UL (ref 138–453)
PMV BLD AUTO: 9.2 FL (ref 8.1–13.5)
POTASSIUM SERPL-SCNC: 4.3 MMOL/L (ref 3.7–5.3)
RBC # BLD AUTO: 3.75 M/UL (ref 3.95–5.11)
RBC # BLD: ABNORMAL 10*6/UL
SODIUM SERPL-SCNC: 136 MMOL/L (ref 136–145)
TROPONIN I SERPL HS-MCNC: 15 NG/L (ref 0–14)
WBC OTHER # BLD: 13.8 K/UL (ref 3.5–11.3)

## 2025-04-30 PROCEDURE — 93925 LOWER EXTREMITY STUDY: CPT

## 2025-04-30 PROCEDURE — 93010 ELECTROCARDIOGRAM REPORT: CPT | Performed by: INTERNAL MEDICINE

## 2025-04-30 PROCEDURE — 80048 BASIC METABOLIC PNL TOTAL CA: CPT

## 2025-04-30 PROCEDURE — 2500000003 HC RX 250 WO HCPCS

## 2025-04-30 PROCEDURE — 6370000000 HC RX 637 (ALT 250 FOR IP)

## 2025-04-30 PROCEDURE — 84484 ASSAY OF TROPONIN QUANT: CPT

## 2025-04-30 PROCEDURE — 99232 SBSQ HOSP IP/OBS MODERATE 35: CPT | Performed by: INTERNAL MEDICINE

## 2025-04-30 PROCEDURE — 86140 C-REACTIVE PROTEIN: CPT

## 2025-04-30 PROCEDURE — 85652 RBC SED RATE AUTOMATED: CPT

## 2025-04-30 PROCEDURE — 36415 COLL VENOUS BLD VENIPUNCTURE: CPT

## 2025-04-30 PROCEDURE — 85025 COMPLETE CBC W/AUTO DIFF WBC: CPT

## 2025-04-30 PROCEDURE — 93970 EXTREMITY STUDY: CPT

## 2025-04-30 PROCEDURE — 2060000000 HC ICU INTERMEDIATE R&B

## 2025-04-30 RX ADMIN — VARENICLINE TARTRATE 0.5 MG: 0.5 TABLET, FILM COATED ORAL at 08:06

## 2025-04-30 RX ADMIN — ACETAMINOPHEN 650 MG: 325 TABLET ORAL at 08:07

## 2025-04-30 RX ADMIN — CARVEDILOL 12.5 MG: 12.5 TABLET, FILM COATED ORAL at 16:42

## 2025-04-30 RX ADMIN — OXYCODONE 10 MG: 5 TABLET ORAL at 21:28

## 2025-04-30 RX ADMIN — CARVEDILOL 12.5 MG: 12.5 TABLET, FILM COATED ORAL at 08:08

## 2025-04-30 RX ADMIN — ONDANSETRON 4 MG: 4 TABLET, ORALLY DISINTEGRATING ORAL at 14:59

## 2025-04-30 RX ADMIN — AMOXICILLIN AND CLAVULANATE POTASSIUM 1 TABLET: 875; 125 TABLET, FILM COATED ORAL at 21:28

## 2025-04-30 RX ADMIN — VARENICLINE TARTRATE 0.5 MG: 0.5 TABLET, FILM COATED ORAL at 16:42

## 2025-04-30 RX ADMIN — ASPIRIN 81 MG: 81 TABLET, COATED ORAL at 08:07

## 2025-04-30 RX ADMIN — QUETIAPINE FUMARATE 150 MG: 100 TABLET ORAL at 21:28

## 2025-04-30 RX ADMIN — AMOXICILLIN AND CLAVULANATE POTASSIUM 1 TABLET: 875; 125 TABLET, FILM COATED ORAL at 08:06

## 2025-04-30 RX ADMIN — SPIRONOLACTONE 12.5 MG: 25 TABLET, FILM COATED ORAL at 08:06

## 2025-04-30 RX ADMIN — ATORVASTATIN CALCIUM 40 MG: 40 TABLET, FILM COATED ORAL at 21:29

## 2025-04-30 RX ADMIN — SODIUM CHLORIDE, PRESERVATIVE FREE 10 ML: 5 INJECTION INTRAVENOUS at 21:34

## 2025-04-30 RX ADMIN — LISINOPRIL 40 MG: 20 TABLET ORAL at 08:07

## 2025-04-30 RX ADMIN — ACETAMINOPHEN 650 MG: 325 TABLET ORAL at 16:47

## 2025-04-30 RX ADMIN — SODIUM CHLORIDE, PRESERVATIVE FREE 10 ML: 5 INJECTION INTRAVENOUS at 08:09

## 2025-04-30 RX ADMIN — ISOSORBIDE MONONITRATE 30 MG: 30 TABLET, EXTENDED RELEASE ORAL at 08:08

## 2025-04-30 RX ADMIN — INSULIN GLARGINE 20 UNITS: 100 INJECTION, SOLUTION SUBCUTANEOUS at 08:05

## 2025-04-30 RX ADMIN — AMLODIPINE BESYLATE 10 MG: 10 TABLET ORAL at 08:07

## 2025-04-30 ASSESSMENT — PAIN SCALES - GENERAL
PAINLEVEL_OUTOF10: 4
PAINLEVEL_OUTOF10: 5
PAINLEVEL_OUTOF10: 9
PAINLEVEL_OUTOF10: 3

## 2025-04-30 ASSESSMENT — PAIN DESCRIPTION - LOCATION
LOCATION: FOOT
LOCATION: FOOT

## 2025-04-30 ASSESSMENT — PAIN DESCRIPTION - ORIENTATION
ORIENTATION: RIGHT
ORIENTATION: LEFT;RIGHT

## 2025-04-30 ASSESSMENT — PAIN DESCRIPTION - DESCRIPTORS
DESCRIPTORS: GNAWING
DESCRIPTORS: STABBING

## 2025-04-30 NOTE — DISCHARGE INSTR - COC
Continuity of Care Form    Patient Name: Mikael Estevez   :  1970  MRN:  [id number]    Admit date:  2025  Discharge date:  2025    Code Status Order: Full Code   Advance Directives:    Date/Time Healthcare Directive Type of Healthcare Directive Copy in Chart Healthcare Agent Appointed Healthcare Agent's Name Healthcare Agent's Phone Number    25 1227 No, patient does not have an advance directive for healthcare treatment  --  --  --  --  --             Admitting Physician:  Zay Brown MD  PCP: Ciro Martinez Jr., MD    Discharging Nurse: Arianna OH  Discharging Hospital Unit/Room#: 0423/0423-01  Discharging Unit Phone Number: [phone]    Emergency Contact:   Extended Emergency Contact Information  Primary Emergency Contact: Julio Estevez  Home Phone: [phone]  Relation: Child   needed? No  Secondary Emergency Contact: Basim Herculeskapilkrishna  Address:   Home Phone: [phone]  Mobile Phone: [phone]  Relation: Child    Past Surgical History:  Past Surgical History:   Procedure Laterality Date    AMPUTATION Right 2025    AMPUTATION OF DISTAL PHALANX OF HALLUX,RESECTION OF PROXIMAL PHALANX - Right    BACK SURGERY      CARDIAC PROCEDURE N/A 2025    aubrie / Left heart cath / coronary angiography / rm 236 performed by Deisy Henry MD at [facility] CARDIAC CATH LAB    CHOLECYSTECTOMY      FEMORAL BYPASS Right 2025    RIGHT  FEMORAL ENDATRERECTOMY XENOSURE PATCH 0.8X8, ANGIOGRAM performed by Robert Jay MD at [facility] CVOR    TOE AMPUTATION Right 2025    AMPUTATION OF DISTAL PHALANX OF HALLUX,RESECTION OF PROXIMAL PHALANX performed by Izzy Jeffries DPM at [facility] OR       Immunization History:     There is no immunization history on file for this patient.    Active Problems:  Patient Active Problem List   Diagnosis Code    Migraine without aura and without status migrainosus, not intractable G43.009    Type 2 diabetes mellitus with

## 2025-04-30 NOTE — PLAN OF CARE
Problem: Chronic Conditions and Co-morbidities  Goal: Patient's chronic conditions and co-morbidity symptoms are monitored and maintained or improved  4/29/2025 2141 by Wanda Flores RN  Outcome: Progressing  4/29/2025 1723 by Lindsay Trujillo RN  Outcome: Progressing     Problem: Discharge Planning  Goal: Discharge to home or other facility with appropriate resources  4/29/2025 2141 by Wanda Flores RN  Outcome: Progressing  4/29/2025 1723 by Lindsay Trujillo RN  Outcome: Progressing     Problem: Pain  Goal: Verbalizes/displays adequate comfort level or baseline comfort level  4/29/2025 2141 by Wanda Flores RN  Outcome: Progressing  4/29/2025 1723 by Lindsay Trujillo RN  Outcome: Progressing     Problem: Skin/Tissue Integrity  Goal: Skin integrity remains intact  Description: 1.  Monitor for areas of redness and/or skin breakdown2.  Assess vascular access sites hourly3.  Every 4-6 hours minimum:  Change oxygen saturation probe site4.  Every 4-6 hours:  If on nasal continuous positive airway pressure, respiratory therapy assess nares and determine need for appliance change or resting period  4/29/2025 2141 by Wanda Flores RN  Outcome: Progressing  4/29/2025 1723 by Lindsay Trujillo RN  Outcome: Progressing     Problem: Safety - Adult  Goal: Free from fall injury  4/29/2025 2141 by Wanda Flores RN  Outcome: Progressing  4/29/2025 1723 by Lindsay Trujillo RN  Outcome: Progressing     Problem: ABCDS Injury Assessment  Goal: Absence of physical injury  4/29/2025 2141 by Wanda Flores RN  Outcome: Progressing  4/29/2025 1723 by Lindsay Trujillo RN  Outcome: Progressing     Problem: Nutrition Deficit:  Goal: Optimize nutritional status  4/29/2025 2141 by Wanda Flores RN  Outcome: Progressing  4/29/2025 1723 by Lindsay Trujillo RN  Outcome: Progressing

## 2025-04-30 NOTE — DISCHARGE INSTRUCTIONS
You were admitted and treated for toe infection and gangrene, you underwent right hallux of big toe amputation, you were also treated for occlusion of right leg vessel with angiography and angioplasty and bypass.  Please follow-up with vascular surgery and treating  Please continue taking Eliquis (blood thinner).  Please complete antibiotic course till 5/25.  Follow-up with infectious disease doctor as outpatient.   Please avoid smoking  Please watch for any signs of bleeding.   If you fall or hit your head, please come to the emergency department for evaluation.       Please follow-up with your PCP for management of diabetes mellitus, high blood pressure.  Please continue taking Lantus and please monitor your blood glucose  Please continue taking glucose control diet  Your blood pressure medications has been adjusted.  Please continue taking all the new medications that are prescribed.  You need to follow-up with PCP for further adjustment of the medication as your blood pressure was labile in the hospital.     For new onset worsening of heart failure please follow-up with cardiology and please continue to wear LifeVest at least for 3 months and follow-up with cardiology for further management  You are started on Aldactone, lisinopril, Coreg and statins please continue taking as prescribed    Please visit urgent care or call 911 for worsening of shortness of breath, breathing difficulty, fever and chest pains.    Podiatry:  - Please make a follow-up visit with Dr. Jeffries at [facility] podiatry clinic outpatient 1 week after discharge  - Home care to do twice daily dressing changes to right hallux consisting of: Dakin's wet-to-dry, 4 x 4 gauze, ABD, light Kerlix, light Ace  - Weightbearing status: Nonweightbearing to right foot in forefoot offloading surgical shoe.  Assistive devices per PT/OT.  - Please take all prescribed medications as instructed  - Please restart all home medications as prescribed  - Please return

## 2025-04-30 NOTE — PLAN OF CARE
Problem: Chronic Conditions and Co-morbidities  Goal: Patient's chronic conditions and co-morbidity symptoms are monitored and maintained or improved  4/30/2025 1003 by Yumi Aldana RN  Outcome: Progressing  4/29/2025 2141 by Wanda Flores RN  Outcome: Progressing     Problem: Discharge Planning  Goal: Discharge to home or other facility with appropriate resources  4/30/2025 1003 by Yumi Aldana RN  Outcome: Progressing  4/29/2025 2141 by Wanda Flores RN  Outcome: Progressing     Problem: Pain  Goal: Verbalizes/displays adequate comfort level or baseline comfort level  4/30/2025 1003 by Yumi Aldana RN  Outcome: Progressing  4/29/2025 2141 by Wanda Flores RN  Outcome: Progressing     Problem: Skin/Tissue Integrity  Goal: Skin integrity remains intact  Description: 1.  Monitor for areas of redness and/or skin breakdown2.  Assess vascular access sites hourly3.  Every 4-6 hours minimum:  Change oxygen saturation probe site4.  Every 4-6 hours:  If on nasal continuous positive airway pressure, respiratory therapy assess nares and determine need for appliance change or resting period  4/30/2025 1003 by Yumi Aldana RN  Outcome: Progressing  4/29/2025 2141 by Wanda Flores RN  Outcome: Progressing     Problem: Safety - Adult  Goal: Free from fall injury  4/30/2025 1003 by Yumi Aldana RN  Outcome: Progressing  4/29/2025 2141 by Wanda Flores RN  Outcome: Progressing     Problem: ABCDS Injury Assessment  Goal: Absence of physical injury  4/30/2025 1003 by Yumi Aldana RN  Outcome: Progressing  4/29/2025 2141 by Wanda Flores RN  Outcome: Progressing     Problem: Nutrition Deficit:  Goal: Optimize nutritional status  4/30/2025 1003 by Yumi Aldana RN  Outcome: Progressing  4/29/2025 2141 by Wanda Flores RN  Outcome: Progressing

## 2025-05-01 ENCOUNTER — TELEPHONE (OUTPATIENT)
Dept: PULMONOLOGY | Age: 55
End: 2025-05-01

## 2025-05-01 ENCOUNTER — ANESTHESIA EVENT (OUTPATIENT)
Dept: OPERATING ROOM | Age: 55
End: 2025-05-01
Payer: MEDICARE

## 2025-05-01 LAB
ANION GAP SERPL CALCULATED.3IONS-SCNC: 13 MMOL/L (ref 9–16)
BASOPHILS # BLD: 0.07 K/UL (ref 0–0.2)
BASOPHILS NFR BLD: 1 % (ref 0–2)
BUN SERPL-MCNC: 13 MG/DL (ref 6–20)
CALCIUM SERPL-MCNC: 8.7 MG/DL (ref 8.6–10.4)
CHLORIDE SERPL-SCNC: 100 MMOL/L (ref 98–107)
CO2 SERPL-SCNC: 24 MMOL/L (ref 20–31)
CREAT SERPL-MCNC: 0.8 MG/DL (ref 0.6–0.9)
CRP SERPL HS-MCNC: 56.5 MG/L (ref 0–5)
ECHO BSA: 1.59 M2
ECHO BSA: 1.59 M2
EOSINOPHIL # BLD: 0.48 K/UL (ref 0–0.44)
EOSINOPHILS RELATIVE PERCENT: 4 % (ref 1–4)
ERYTHROCYTE [DISTWIDTH] IN BLOOD BY AUTOMATED COUNT: 15.4 % (ref 11.8–14.4)
ERYTHROCYTE [SEDIMENTATION RATE] IN BLOOD BY PHOTOMETRIC METHOD: 90 MM/HR (ref 0–30)
GFR, ESTIMATED: 86 ML/MIN/1.73M2
GLUCOSE BLD-MCNC: 139 MG/DL (ref 65–105)
GLUCOSE BLD-MCNC: 152 MG/DL (ref 65–105)
GLUCOSE BLD-MCNC: 165 MG/DL (ref 65–105)
GLUCOSE BLD-MCNC: 188 MG/DL (ref 65–105)
GLUCOSE SERPL-MCNC: 146 MG/DL (ref 74–99)
HCT VFR BLD AUTO: 36.6 % (ref 36.3–47.1)
HGB BLD-MCNC: 11.7 G/DL (ref 11.9–15.1)
IMM GRANULOCYTES # BLD AUTO: 0.08 K/UL (ref 0–0.3)
IMM GRANULOCYTES NFR BLD: 1 %
LYMPHOCYTES NFR BLD: 4.49 K/UL (ref 1.1–3.7)
LYMPHOCYTES RELATIVE PERCENT: 36 % (ref 24–43)
MCH RBC QN AUTO: 27.8 PG (ref 25.2–33.5)
MCHC RBC AUTO-ENTMCNC: 32 G/DL (ref 28.4–34.8)
MCV RBC AUTO: 86.9 FL (ref 82.6–102.9)
MICROORGANISM SPEC CULT: ABNORMAL
MICROORGANISM SPEC CULT: ABNORMAL
MICROORGANISM/AGENT SPEC: ABNORMAL
MICROORGANISM/AGENT SPEC: ABNORMAL
MONOCYTES NFR BLD: 1.26 K/UL (ref 0.1–1.2)
MONOCYTES NFR BLD: 10 % (ref 3–12)
NEUTROPHILS NFR BLD: 48 % (ref 36–65)
NEUTS SEG NFR BLD: 6.1 K/UL (ref 1.5–8.1)
NRBC BLD-RTO: 0 PER 100 WBC
PLATELET # BLD AUTO: 388 K/UL (ref 138–453)
PMV BLD AUTO: 8.4 FL (ref 8.1–13.5)
POTASSIUM SERPL-SCNC: 3.8 MMOL/L (ref 3.7–5.3)
RBC # BLD AUTO: 4.21 M/UL (ref 3.95–5.11)
RBC # BLD: ABNORMAL 10*6/UL
SERVICE CMNT-IMP: ABNORMAL
SODIUM SERPL-SCNC: 137 MMOL/L (ref 136–145)
SPECIMEN DESCRIPTION: ABNORMAL
VAS LEFT ABI: 0.56
VAS LEFT ANKLE BP: 76 MMHG
VAS LEFT ARM BP: 134 MMHG
VAS LEFT ATA MID PSV: 10.4 CM/S
VAS LEFT CFA PROX PSV: 142 CM/S
VAS LEFT CFA VEL RATIO: 1.14
VAS LEFT DORSALIS PEDIS BP: 68 MMHG
VAS LEFT EXT ILIAC DIST PSV: 125 CM/S
VAS LEFT GSV AT KNEE DIAM: 1.91 MM
VAS LEFT GSV BK DIST DIAM: 1.98 MM
VAS LEFT GSV BK MID DIAM: 2.36 MM
VAS LEFT GSV BK PROX DIAM: 1.86 MM
VAS LEFT GSV THIGH MID DIAM: 2.17 MM
VAS LEFT GSV THIGH PROX DIAM: 3.35 MM
VAS LEFT PERONEAL MID PSV: 0 CM/S
VAS LEFT PFA PROX PSV: 176 CM/S
VAS LEFT POP A DIST PSV: 29.2 CM/S
VAS LEFT POP A PROX PSV: 24.6 CM/S
VAS LEFT POP A PROX VEL RATIO: 0.96
VAS LEFT PTA BP: 76 MMHG
VAS LEFT PTA MID PSV: 25 CM/S
VAS LEFT SFA DIST PSV: 25.6 CM/S
VAS LEFT SFA MID PSV: 0 CM/S
VAS LEFT SFA PROX PSV: 0 CM/S
VAS LEFT SFA PROX VEL RATIO: 0
VAS LEFT SSV DIST DIAM: 1.14 MM
VAS LEFT SSV MID DIAM: 1.75 MM
VAS LEFT SSV PROX DIAM: 1.3 MM
VAS LEFT TBI: 0.44
VAS LEFT TOE PRESSURE: 60 MMHG
VAS RIGHT ABI: 0.46
VAS RIGHT ANKLE BP: 62 MMHG
VAS RIGHT ARM BP: 135 MMHG
VAS RIGHT ATA MID PSV: 14.6 CM/S
VAS RIGHT DORSALIS PEDIS BP: 54 MMHG
VAS RIGHT GSV AT KNEE DIAM: 3.89 MM
VAS RIGHT GSV BK DIST DIAM: 1.19 MM
VAS RIGHT GSV BK MID DIAM: 1.18 MM
VAS RIGHT GSV BK PROX DIAM: 1.83 MM
VAS RIGHT GSV THIGH MID DIAM: 2.68 MM
VAS RIGHT GSV THIGH PROX DIAM: 4.63 MM
VAS RIGHT PERONEAL MID PSV: 85.5 CM/S
VAS RIGHT POP A DIST PSV: 94.1 CM/S
VAS RIGHT POP A PROX PSV: 68.3 CM/S
VAS RIGHT POP A PROX VEL RATIO: 1.09
VAS RIGHT PTA BP: 62 MMHG
VAS RIGHT PTA MID PSV: 0 CM/S
VAS RIGHT SFA DIST PSV: 62.4 CM/S
VAS RIGHT SFA DIST VEL RATIO: 1.26
VAS RIGHT SFA MID PSV: 49.6 CM/S
VAS RIGHT SFA MID VEL RATIO: 0.4
VAS RIGHT SFA PROX PSV: 116 CM/S
VAS RIGHT SSV DIST DIAM: 2.02 MM
VAS RIGHT SSV MID DIAM: 2.01 MM
VAS RIGHT SSV PROX DIAM: 2.14 MM
WBC OTHER # BLD: 12.5 K/UL (ref 3.5–11.3)

## 2025-05-01 PROCEDURE — 36415 COLL VENOUS BLD VENIPUNCTURE: CPT

## 2025-05-01 PROCEDURE — 97116 GAIT TRAINING THERAPY: CPT

## 2025-05-01 PROCEDURE — 2500000003 HC RX 250 WO HCPCS

## 2025-05-01 PROCEDURE — 6370000000 HC RX 637 (ALT 250 FOR IP)

## 2025-05-01 PROCEDURE — 86923 COMPATIBILITY TEST ELECTRIC: CPT

## 2025-05-01 PROCEDURE — 2060000000 HC ICU INTERMEDIATE R&B

## 2025-05-01 PROCEDURE — 99232 SBSQ HOSP IP/OBS MODERATE 35: CPT | Performed by: INTERNAL MEDICINE

## 2025-05-01 PROCEDURE — 86900 BLOOD TYPING SEROLOGIC ABO: CPT

## 2025-05-01 PROCEDURE — 97530 THERAPEUTIC ACTIVITIES: CPT

## 2025-05-01 PROCEDURE — 93971 EXTREMITY STUDY: CPT | Performed by: STUDENT IN AN ORGANIZED HEALTH CARE EDUCATION/TRAINING PROGRAM

## 2025-05-01 PROCEDURE — 85025 COMPLETE CBC W/AUTO DIFF WBC: CPT

## 2025-05-01 PROCEDURE — 97535 SELF CARE MNGMENT TRAINING: CPT

## 2025-05-01 PROCEDURE — 93925 LOWER EXTREMITY STUDY: CPT | Performed by: STUDENT IN AN ORGANIZED HEALTH CARE EDUCATION/TRAINING PROGRAM

## 2025-05-01 PROCEDURE — 6360000002 HC RX W HCPCS

## 2025-05-01 PROCEDURE — 85652 RBC SED RATE AUTOMATED: CPT

## 2025-05-01 PROCEDURE — 82947 ASSAY GLUCOSE BLOOD QUANT: CPT

## 2025-05-01 PROCEDURE — 86901 BLOOD TYPING SEROLOGIC RH(D): CPT

## 2025-05-01 PROCEDURE — 86850 RBC ANTIBODY SCREEN: CPT

## 2025-05-01 PROCEDURE — 86140 C-REACTIVE PROTEIN: CPT

## 2025-05-01 PROCEDURE — 80048 BASIC METABOLIC PNL TOTAL CA: CPT

## 2025-05-01 RX ORDER — POLYETHYLENE GLYCOL 3350 17 G
2 POWDER IN PACKET (EA) ORAL
Qty: 100 EACH | Refills: 3 | Status: CANCELLED | OUTPATIENT
Start: 2025-05-01

## 2025-05-01 RX ORDER — OXYCODONE HYDROCHLORIDE 10 MG/1
10 TABLET ORAL EVERY 6 HOURS PRN
Qty: 9 TABLET | Refills: 0 | Status: CANCELLED | OUTPATIENT
Start: 2025-05-01 | End: 2025-05-04

## 2025-05-01 RX ORDER — SPIRONOLACTONE 25 MG/1
12.5 TABLET ORAL DAILY
Qty: 30 TABLET | Refills: 3 | Status: CANCELLED | OUTPATIENT
Start: 2025-05-01

## 2025-05-01 RX ORDER — LISINOPRIL 40 MG/1
40 TABLET ORAL DAILY
Qty: 30 TABLET | Refills: 3 | Status: CANCELLED | OUTPATIENT
Start: 2025-05-01

## 2025-05-01 RX ORDER — CARVEDILOL 12.5 MG/1
12.5 TABLET ORAL 2 TIMES DAILY WITH MEALS
Qty: 60 TABLET | Refills: 3 | Status: CANCELLED | OUTPATIENT
Start: 2025-05-01

## 2025-05-01 RX ORDER — ISOSORBIDE MONONITRATE 30 MG/1
30 TABLET, EXTENDED RELEASE ORAL DAILY
Qty: 30 TABLET | Refills: 3 | Status: CANCELLED | OUTPATIENT
Start: 2025-05-01

## 2025-05-01 RX ORDER — NICOTINE 21 MG/24HR
1 PATCH, TRANSDERMAL 24 HOURS TRANSDERMAL DAILY
Qty: 30 PATCH | Refills: 3 | Status: CANCELLED | OUTPATIENT
Start: 2025-05-01

## 2025-05-01 RX ORDER — AMLODIPINE BESYLATE 10 MG/1
10 TABLET ORAL DAILY
Qty: 30 TABLET | Refills: 3 | Status: CANCELLED | OUTPATIENT
Start: 2025-05-01

## 2025-05-01 RX ORDER — VARENICLINE TARTRATE 0.5 MG/1
0.5 TABLET, FILM COATED ORAL 2 TIMES DAILY
Qty: 60 TABLET | Refills: 3 | Status: CANCELLED | OUTPATIENT
Start: 2025-05-01

## 2025-05-01 RX ORDER — QUETIAPINE FUMARATE 50 MG/1
50 TABLET, FILM COATED ORAL NIGHTLY
Qty: 30 TABLET | Refills: 0 | Status: CANCELLED | OUTPATIENT
Start: 2025-05-01

## 2025-05-01 RX ORDER — ATORVASTATIN CALCIUM 40 MG/1
40 TABLET, FILM COATED ORAL NIGHTLY
Qty: 30 TABLET | Refills: 3 | Status: CANCELLED | OUTPATIENT
Start: 2025-05-01

## 2025-05-01 RX ADMIN — INSULIN GLARGINE 20 UNITS: 100 INJECTION, SOLUTION SUBCUTANEOUS at 09:35

## 2025-05-01 RX ADMIN — DAKIN'S SOLUTION 0.125% (QUARTER STRENGTH): 0.12 SOLUTION at 05:15

## 2025-05-01 RX ADMIN — AMLODIPINE BESYLATE 10 MG: 10 TABLET ORAL at 09:35

## 2025-05-01 RX ADMIN — ASPIRIN 81 MG: 81 TABLET, COATED ORAL at 09:35

## 2025-05-01 RX ADMIN — ATORVASTATIN CALCIUM 40 MG: 40 TABLET, FILM COATED ORAL at 21:28

## 2025-05-01 RX ADMIN — VARENICLINE TARTRATE 0.5 MG: 0.5 TABLET, FILM COATED ORAL at 09:34

## 2025-05-01 RX ADMIN — VARENICLINE TARTRATE 0.5 MG: 0.5 TABLET, FILM COATED ORAL at 17:25

## 2025-05-01 RX ADMIN — SODIUM CHLORIDE, PRESERVATIVE FREE 10 ML: 5 INJECTION INTRAVENOUS at 21:28

## 2025-05-01 RX ADMIN — PANTOPRAZOLE SODIUM 40 MG: 40 TABLET, DELAYED RELEASE ORAL at 05:14

## 2025-05-01 RX ADMIN — AMOXICILLIN AND CLAVULANATE POTASSIUM 1 TABLET: 875; 125 TABLET, FILM COATED ORAL at 09:36

## 2025-05-01 RX ADMIN — QUETIAPINE FUMARATE 150 MG: 100 TABLET ORAL at 21:28

## 2025-05-01 RX ADMIN — LISINOPRIL 40 MG: 20 TABLET ORAL at 09:35

## 2025-05-01 RX ADMIN — ISOSORBIDE MONONITRATE 30 MG: 30 TABLET, EXTENDED RELEASE ORAL at 09:36

## 2025-05-01 RX ADMIN — SODIUM CHLORIDE, PRESERVATIVE FREE 10 ML: 5 INJECTION INTRAVENOUS at 09:36

## 2025-05-01 RX ADMIN — CARVEDILOL 12.5 MG: 12.5 TABLET, FILM COATED ORAL at 17:25

## 2025-05-01 RX ADMIN — ENOXAPARIN SODIUM 40 MG: 100 INJECTION SUBCUTANEOUS at 09:34

## 2025-05-01 RX ADMIN — AMOXICILLIN AND CLAVULANATE POTASSIUM 1 TABLET: 875; 125 TABLET, FILM COATED ORAL at 21:28

## 2025-05-01 RX ADMIN — ACETAMINOPHEN 650 MG: 325 TABLET ORAL at 05:14

## 2025-05-01 RX ADMIN — OXYCODONE 10 MG: 5 TABLET ORAL at 21:28

## 2025-05-01 RX ADMIN — CARVEDILOL 12.5 MG: 12.5 TABLET, FILM COATED ORAL at 09:36

## 2025-05-01 RX ADMIN — SPIRONOLACTONE 12.5 MG: 25 TABLET, FILM COATED ORAL at 09:35

## 2025-05-01 RX ADMIN — OXYCODONE 10 MG: 5 TABLET ORAL at 11:30

## 2025-05-01 RX ADMIN — ACETAMINOPHEN 650 MG: 325 TABLET ORAL at 11:29

## 2025-05-01 ASSESSMENT — PAIN SCALES - GENERAL
PAINLEVEL_OUTOF10: 3
PAINLEVEL_OUTOF10: 4
PAINLEVEL_OUTOF10: 2
PAINLEVEL_OUTOF10: 2
PAINLEVEL_OUTOF10: 6
PAINLEVEL_OUTOF10: 5
PAINLEVEL_OUTOF10: 3

## 2025-05-01 ASSESSMENT — PAIN DESCRIPTION - LOCATION
LOCATION: FOOT

## 2025-05-01 ASSESSMENT — PAIN DESCRIPTION - ORIENTATION
ORIENTATION: RIGHT

## 2025-05-01 ASSESSMENT — LIFESTYLE VARIABLES: SMOKING_STATUS: 1

## 2025-05-01 ASSESSMENT — PAIN DESCRIPTION - DESCRIPTORS
DESCRIPTORS: BURNING
DESCRIPTORS: STABBING;BURNING
DESCRIPTORS: BURNING;THROBBING

## 2025-05-01 NOTE — PLAN OF CARE
Problem: Chronic Conditions and Co-morbidities  Goal: Patient's chronic conditions and co-morbidity symptoms are monitored and maintained or improved  5/1/2025 1032 by Yumi Aldana RN  Outcome: Progressing  5/1/2025 0152 by Antonietta Caruso RN  Outcome: Progressing  Flowsheets (Taken 4/30/2025 2000)  Care Plan - Patient's Chronic Conditions and Co-Morbidity Symptoms are Monitored and Maintained or Improved:   Monitor and assess patient's chronic conditions and comorbid symptoms for stability, deterioration, or improvement   Collaborate with multidisciplinary team to address chronic and comorbid conditions and prevent exacerbation or deterioration   Update acute care plan with appropriate goals if chronic or comorbid symptoms are exacerbated and prevent overall improvement and discharge     Problem: Discharge Planning  Goal: Discharge to home or other facility with appropriate resources  5/1/2025 1032 by Yumi Aldana RN  Outcome: Progressing  5/1/2025 0152 by Antonietta Caruso RN  Outcome: Progressing  Flowsheets (Taken 4/30/2025 2000)  Discharge to home or other facility with appropriate resources:   Identify barriers to discharge with patient and caregiver   Identify discharge learning needs (meds, wound care, etc)   Arrange for needed discharge resources and transportation as appropriate   Refer to discharge planning if patient needs post-hospital services based on physician order or complex needs related to functional status, cognitive ability or social support system     Problem: Pain  Goal: Verbalizes/displays adequate comfort level or baseline comfort level  5/1/2025 1032 by Yumi Aldana RN  Outcome: Progressing  5/1/2025 0152 by Antonietta Caruso RN  Outcome: Progressing  Flowsheets (Taken 4/30/2025 1939)  Verbalizes/displays adequate comfort level or baseline comfort level:   Encourage patient to monitor pain and request assistance   Assess pain using appropriate pain scale   Administer analgesics based

## 2025-05-01 NOTE — PLAN OF CARE
Problem: Chronic Conditions and Co-morbidities  Goal: Patient's chronic conditions and co-morbidity symptoms are monitored and maintained or improved  Outcome: Progressing     Problem: Discharge Planning  Goal: Discharge to home or other facility with appropriate resources  Outcome: Progressing     Problem: Pain  Goal: Verbalizes/displays adequate comfort level or baseline comfort level  Outcome: Progressing  Flowsheets (Taken 4/30/2025 1939)  Verbalizes/displays adequate comfort level or baseline comfort level:   Encourage patient to monitor pain and request assistance   Assess pain using appropriate pain scale   Administer analgesics based on type and severity of pain and evaluate response   Implement non-pharmacological measures as appropriate and evaluate response   Consider cultural and social influences on pain and pain management   Notify Licensed Independent Practitioner if interventions unsuccessful or patient reports new pain     Problem: Skin/Tissue Integrity  Goal: Skin integrity remains intact  Description: 1.  Monitor for areas of redness and/or skin breakdown2.  Assess vascular access sites hourly3.  Every 4-6 hours minimum:  Change oxygen saturation probe site4.  Every 4-6 hours:  If on nasal continuous positive airway pressure, respiratory therapy assess nares and determine need for appliance change or resting period  Outcome: Progressing  Flowsheets (Taken 5/1/2025 0116)  Skin Integrity Remains Intact:   Monitor for areas of redness and/or skin breakdown   Assess vascular access sites hourly   Every 4-6 hours minimum:  Change oxygen saturation probe site   Positioning devices   Assess need for specialty bed   Turn and reposition as indicated     Problem: Safety - Adult  Goal: Free from fall injury  Outcome: Progressing  Flowsheets (Taken 5/1/2025 0116)  Free From Fall Injury:   Instruct family/caregiver on patient safety   Based on caregiver fall risk screen, instruct family/caregiver to ask for

## 2025-05-01 NOTE — TELEPHONE ENCOUNTER
----- Message from Dr. Shemar Chappell MD sent at 5/1/2025  9:09 AM EDT -----  Please schedule follow up in clinic with dr abdalla  in 2 weeks .   Thank you   SHEMAR CHAPPELL MD

## 2025-05-01 NOTE — ANESTHESIA PRE PROCEDURE
results found for: \"HIV\", \"HEPCAB\"    COVID-19 Screening (If Applicable):   Lab Results   Component Value Date/Time    COVID19 DETECTED 08/19/2024 03:39 PM           Anesthesia Evaluation  Patient summary reviewed   no history of anesthetic complications:   Airway: Mallampati: II          Dental:    (+) edentulous      Pulmonary:Negative Pulmonary ROS breath sounds clear to auscultation  (+)           current smoker                           Cardiovascular:    (+) hypertension:, CAD:        Rhythm: regular  Rate: normal                 ROS comment: ·  Moderate coronary disease  ·  Mid LAD has 40 to 50% stenosis, unchanged from previous heart catheterization  ·  OM 3 has focal severe stenosis 70% but it small caliber vessel, recommend medical treat  ·  Proximal and mid RCA has 50% stenosis, recommend medical treatment      Left Ventricle: Moderately reduced left ventricular systolic function with a visually estimated EF of 30 - 35%. EF by 2D Simpsons Biplane is 31%. Left ventricle size is normal. Normal wall thickness. Grade I diastolic dysfunction with normal LAP.  ·  Right Ventricle: Right ventricle size is normal. Normal systolic function. TAPSE is normal. TAPSE is 2.0 cm. RV Peak S' is 10.9 cm/s.  ·  Tricuspid Valve: Normal RVSP. The estimated RVSP is 35 mmHg.         Neuro/Psych:   (+) CVA:, TIA, headaches:, psychiatric history:depression/anxiety             GI/Hepatic/Renal:             Endo/Other:    (+) DiabetesType II DM.                 Abdominal:         (-) obese       Vascular:          Other Findings:             Anesthesia Plan      general     ASA 4       Induction: intravenous.    MIPS: Postoperative opioids intended, Prophylactic antiemetics administered and Postoperative trial extubation.  Anesthetic plan and risks discussed with patient.      Plan discussed with CRNA.            Narrative & Impression    Sinus rhythm with marked sinus arrhythmia  Otherwise normal ECG  When compared with ECG of

## 2025-05-02 ENCOUNTER — ANESTHESIA (OUTPATIENT)
Dept: OPERATING ROOM | Age: 55
End: 2025-05-02
Payer: MEDICARE

## 2025-05-02 LAB
ANION GAP SERPL CALCULATED.3IONS-SCNC: 9 MMOL/L (ref 9–16)
ANION GAP SERPL CALCULATED.3IONS-SCNC: 9 MMOL/L (ref 9–16)
ANTI-XA UNFRAC HEPARIN: 0.37 IU/L
ARTERIAL PATENCY WRIST A: ABNORMAL
BASOPHILS # BLD: 0.1 K/UL (ref 0–0.2)
BASOPHILS NFR BLD: 1 % (ref 0–2)
BODY TEMPERATURE: 37
BUN SERPL-MCNC: 16 MG/DL (ref 6–20)
BUN SERPL-MCNC: 17 MG/DL (ref 6–20)
CA-I BLD-SCNC: 1.12 MMOL/L (ref 1.13–1.33)
CA-I BLD-SCNC: 1.18 MMOL/L (ref 1.13–1.33)
CALCIUM SERPL-MCNC: 9 MG/DL (ref 8.6–10.4)
CALCIUM SERPL-MCNC: 9.3 MG/DL (ref 8.6–10.4)
CHLORIDE SERPL-SCNC: 103 MMOL/L (ref 98–107)
CHLORIDE SERPL-SCNC: 103 MMOL/L (ref 98–107)
CHLORIDE, WHOLE BLOOD: 104 MMOL/L (ref 98–110)
CO2 SERPL-SCNC: 25 MMOL/L (ref 20–31)
CO2 SERPL-SCNC: 25 MMOL/L (ref 20–31)
COHGB MFR BLD: 0.6 % (ref 0–5)
CREAT SERPL-MCNC: 0.7 MG/DL (ref 0.6–0.9)
CREAT SERPL-MCNC: 0.7 MG/DL (ref 0.6–0.9)
CRP SERPL HS-MCNC: 36.3 MG/L (ref 0–5)
EOSINOPHIL # BLD: 0.51 K/UL (ref 0–0.44)
EOSINOPHILS RELATIVE PERCENT: 4 % (ref 1–4)
ERYTHROCYTE [DISTWIDTH] IN BLOOD BY AUTOMATED COUNT: 15 % (ref 11.8–14.4)
ERYTHROCYTE [DISTWIDTH] IN BLOOD BY AUTOMATED COUNT: 15.1 % (ref 11.8–14.4)
ERYTHROCYTE [SEDIMENTATION RATE] IN BLOOD BY PHOTOMETRIC METHOD: 75 MM/HR (ref 0–30)
FIO2 ON VENT: ABNORMAL %
GFR, ESTIMATED: >90 ML/MIN/1.73M2
GFR, ESTIMATED: >90 ML/MIN/1.73M2
GLUCOSE BLD-MCNC: 135 MG/DL (ref 65–105)
GLUCOSE BLD-MCNC: 159 MG/DL (ref 65–105)
GLUCOSE BLD-MCNC: 212 MG/DL (ref 65–105)
GLUCOSE BLD-MCNC: 262 MG/DL (ref 65–105)
GLUCOSE SERPL-MCNC: 164 MG/DL (ref 74–99)
GLUCOSE SERPL-MCNC: 166 MG/DL (ref 74–99)
HCO3 ARTERIAL: 25.8 MMOL/L (ref 22–27)
HCT VFR BLD AUTO: 30.4 % (ref 36.3–47.1)
HCT VFR BLD AUTO: 31.6 % (ref 36.3–47.1)
HCT VFR BLD CALC: 32 % (ref 36.3–47.1)
HEMOGLOBIN: 10.8 GM/DL (ref 11.9–15.1)
HGB BLD-MCNC: 10 G/DL (ref 11.9–15.1)
HGB BLD-MCNC: 9.9 G/DL (ref 11.9–15.1)
IMM GRANULOCYTES # BLD AUTO: 0.12 K/UL (ref 0–0.3)
IMM GRANULOCYTES NFR BLD: 1 %
INR PPP: 1.1
LACTIC ACID, WHOLE BLOOD: 0.8 MMOL/L (ref 0.7–2.1)
LACTIC ACID, WHOLE BLOOD: 1 MMOL/L (ref 0.7–2.1)
LYMPHOCYTES NFR BLD: 4.42 K/UL (ref 1.1–3.7)
LYMPHOCYTES RELATIVE PERCENT: 31 % (ref 24–43)
MAGNESIUM SERPL-MCNC: 2.1 MG/DL (ref 1.6–2.6)
MCH RBC QN AUTO: 26.8 PG (ref 25.2–33.5)
MCH RBC QN AUTO: 27.8 PG (ref 25.2–33.5)
MCHC RBC AUTO-ENTMCNC: 31.3 G/DL (ref 28.4–34.8)
MCHC RBC AUTO-ENTMCNC: 32.9 G/DL (ref 28.4–34.8)
MCV RBC AUTO: 84.4 FL (ref 82.6–102.9)
MCV RBC AUTO: 85.6 FL (ref 82.6–102.9)
MONOCYTES NFR BLD: 0.95 K/UL (ref 0.1–1.2)
MONOCYTES NFR BLD: 7 % (ref 3–12)
NEUTROPHILS NFR BLD: 56 % (ref 36–65)
NEUTS SEG NFR BLD: 8.25 K/UL (ref 1.5–8.1)
NRBC BLD-RTO: 0 PER 100 WBC
NRBC BLD-RTO: 0 PER 100 WBC
O2 SAT, ARTERIAL: 99.1 % (ref 94–100)
PARTIAL THROMBOPLASTIN TIME: 75.2 SEC (ref 23–36.5)
PCO2 ARTERIAL: 39.8 MMHG (ref 32–45)
PH ARTERIAL: 7.43 (ref 7.35–7.45)
PHOSPHATE SERPL-MCNC: 4.8 MG/DL (ref 2.5–4.5)
PLATELET # BLD AUTO: 416 K/UL (ref 138–453)
PLATELET # BLD AUTO: 429 K/UL (ref 138–453)
PMV BLD AUTO: 8.6 FL (ref 8.1–13.5)
PMV BLD AUTO: 9.1 FL (ref 8.1–13.5)
PO2 ARTERIAL: 156.3 MMHG (ref 75–95)
POSITIVE BASE EXCESS, ART: 1.5 MMOL/L (ref 0–2)
POTASSIUM SERPL-SCNC: 4.3 MMOL/L (ref 3.7–5.3)
POTASSIUM SERPL-SCNC: 4.3 MMOL/L (ref 3.7–5.3)
POTASSIUM, WHOLE BLOOD: 3.8 MMOL/L (ref 3.6–5)
PROTHROMBIN TIME: 14.2 SEC (ref 11.7–14.9)
RBC # BLD AUTO: 3.6 M/UL (ref 3.95–5.11)
RBC # BLD AUTO: 3.69 M/UL (ref 3.95–5.11)
RBC # BLD: ABNORMAL 10*6/UL
SODIUM SERPL-SCNC: 137 MMOL/L (ref 136–145)
SODIUM SERPL-SCNC: 137 MMOL/L (ref 136–145)
SODIUM, WHOLE BLOOD: 136 MMOL/L (ref 136–145)
WBC OTHER # BLD: 13.6 K/UL (ref 3.5–11.3)
WBC OTHER # BLD: 14.4 K/UL (ref 3.5–11.3)

## 2025-05-02 PROCEDURE — 85520 HEPARIN ASSAY: CPT

## 2025-05-02 PROCEDURE — 84132 ASSAY OF SERUM POTASSIUM: CPT

## 2025-05-02 PROCEDURE — 85014 HEMATOCRIT: CPT

## 2025-05-02 PROCEDURE — C1894 INTRO/SHEATH, NON-LASER: HCPCS | Performed by: STUDENT IN AN ORGANIZED HEALTH CARE EDUCATION/TRAINING PROGRAM

## 2025-05-02 PROCEDURE — 99233 SBSQ HOSP IP/OBS HIGH 50: CPT | Performed by: INTERNAL MEDICINE

## 2025-05-02 PROCEDURE — 6360000002 HC RX W HCPCS

## 2025-05-02 PROCEDURE — 85027 COMPLETE CBC AUTOMATED: CPT

## 2025-05-02 PROCEDURE — 85025 COMPLETE CBC W/AUTO DIFF WBC: CPT

## 2025-05-02 PROCEDURE — 6370000000 HC RX 637 (ALT 250 FOR IP)

## 2025-05-02 PROCEDURE — 35656 BPG FEMORAL-POPLITEAL: CPT | Performed by: STUDENT IN AN ORGANIZED HEALTH CARE EDUCATION/TRAINING PROGRAM

## 2025-05-02 PROCEDURE — 2720000010 HC SURG SUPPLY STERILE: Performed by: STUDENT IN AN ORGANIZED HEALTH CARE EDUCATION/TRAINING PROGRAM

## 2025-05-02 PROCEDURE — 2709999900 HC NON-CHARGEABLE SUPPLY: Performed by: STUDENT IN AN ORGANIZED HEALTH CARE EDUCATION/TRAINING PROGRAM

## 2025-05-02 PROCEDURE — 3600000015 HC SURGERY LEVEL 5 ADDTL 15MIN: Performed by: STUDENT IN AN ORGANIZED HEALTH CARE EDUCATION/TRAINING PROGRAM

## 2025-05-02 PROCEDURE — 85610 PROTHROMBIN TIME: CPT

## 2025-05-02 PROCEDURE — C1889 IMPLANT/INSERT DEVICE, NOC: HCPCS | Performed by: STUDENT IN AN ORGANIZED HEALTH CARE EDUCATION/TRAINING PROGRAM

## 2025-05-02 PROCEDURE — 2500000003 HC RX 250 WO HCPCS: Performed by: STUDENT IN AN ORGANIZED HEALTH CARE EDUCATION/TRAINING PROGRAM

## 2025-05-02 PROCEDURE — 86140 C-REACTIVE PROTEIN: CPT

## 2025-05-02 PROCEDURE — 3700000000 HC ANESTHESIA ATTENDED CARE: Performed by: STUDENT IN AN ORGANIZED HEALTH CARE EDUCATION/TRAINING PROGRAM

## 2025-05-02 PROCEDURE — 83735 ASSAY OF MAGNESIUM: CPT

## 2025-05-02 PROCEDURE — 82947 ASSAY GLUCOSE BLOOD QUANT: CPT

## 2025-05-02 PROCEDURE — 7100000001 HC PACU RECOVERY - ADDTL 15 MIN

## 2025-05-02 PROCEDURE — 2500000003 HC RX 250 WO HCPCS

## 2025-05-02 PROCEDURE — 2000000000 HC ICU R&B

## 2025-05-02 PROCEDURE — 3600000005 HC SURGERY LEVEL 5 BASE: Performed by: STUDENT IN AN ORGANIZED HEALTH CARE EDUCATION/TRAINING PROGRAM

## 2025-05-02 PROCEDURE — 2580000003 HC RX 258

## 2025-05-02 PROCEDURE — 36415 COLL VENOUS BLD VENIPUNCTURE: CPT

## 2025-05-02 PROCEDURE — 85652 RBC SED RATE AUTOMATED: CPT

## 2025-05-02 PROCEDURE — 82330 ASSAY OF CALCIUM: CPT

## 2025-05-02 PROCEDURE — 85730 THROMBOPLASTIN TIME PARTIAL: CPT

## 2025-05-02 PROCEDURE — 7100000000 HC PACU RECOVERY - FIRST 15 MIN

## 2025-05-02 PROCEDURE — C1762 CONN TISS, HUMAN(INC FASCIA): HCPCS | Performed by: STUDENT IN AN ORGANIZED HEALTH CARE EDUCATION/TRAINING PROGRAM

## 2025-05-02 PROCEDURE — 6360000002 HC RX W HCPCS: Performed by: STUDENT IN AN ORGANIZED HEALTH CARE EDUCATION/TRAINING PROGRAM

## 2025-05-02 PROCEDURE — 83605 ASSAY OF LACTIC ACID: CPT

## 2025-05-02 PROCEDURE — 2580000003 HC RX 258: Performed by: STUDENT IN AN ORGANIZED HEALTH CARE EDUCATION/TRAINING PROGRAM

## 2025-05-02 PROCEDURE — 80048 BASIC METABOLIC PNL TOTAL CA: CPT

## 2025-05-02 PROCEDURE — 6370000000 HC RX 637 (ALT 250 FOR IP): Performed by: STUDENT IN AN ORGANIZED HEALTH CARE EDUCATION/TRAINING PROGRAM

## 2025-05-02 PROCEDURE — 82805 BLOOD GASES W/O2 SATURATION: CPT

## 2025-05-02 PROCEDURE — 85018 HEMOGLOBIN: CPT

## 2025-05-02 PROCEDURE — 84100 ASSAY OF PHOSPHORUS: CPT

## 2025-05-02 PROCEDURE — 041K0ZL BYPASS RIGHT FEMORAL ARTERY TO POPLITEAL ARTERY, OPEN APPROACH: ICD-10-PCS | Performed by: UROLOGY

## 2025-05-02 PROCEDURE — 3700000001 HC ADD 15 MINUTES (ANESTHESIA): Performed by: STUDENT IN AN ORGANIZED HEALTH CARE EDUCATION/TRAINING PROGRAM

## 2025-05-02 DEVICE — CLIP LIG M BLU TI HRT SHP WIRE HORZ 6 CLIPS PER PK: Type: IMPLANTABLE DEVICE | Status: FUNCTIONAL

## 2025-05-02 DEVICE — IMPLANT HUM TISS L20-80CM DIA3-6MM SAPH VEIN CRYOPRESERVED: Type: IMPLANTABLE DEVICE | Site: LEG | Status: FUNCTIONAL

## 2025-05-02 RX ORDER — ROCURONIUM BROMIDE 10 MG/ML
INJECTION, SOLUTION INTRAVENOUS
Status: DISCONTINUED | OUTPATIENT
Start: 2025-05-02 | End: 2025-05-02 | Stop reason: SDUPTHER

## 2025-05-02 RX ORDER — LORAZEPAM 2 MG/ML
0.5 INJECTION INTRAMUSCULAR
Status: DISCONTINUED | OUTPATIENT
Start: 2025-05-02 | End: 2025-05-05 | Stop reason: HOSPADM

## 2025-05-02 RX ORDER — IODIXANOL 320 MG/ML
INJECTION, SOLUTION INTRAVASCULAR
Status: DISCONTINUED
Start: 2025-05-02 | End: 2025-05-02 | Stop reason: WASHOUT

## 2025-05-02 RX ORDER — OXYCODONE HYDROCHLORIDE 5 MG/1
5 TABLET ORAL PRN
Status: DISCONTINUED | OUTPATIENT
Start: 2025-05-02 | End: 2025-05-02

## 2025-05-02 RX ORDER — OXYCODONE HYDROCHLORIDE 5 MG/1
5 TABLET ORAL EVERY 6 HOURS PRN
Status: DISCONTINUED | OUTPATIENT
Start: 2025-05-02 | End: 2025-05-02

## 2025-05-02 RX ORDER — SODIUM CHLORIDE, SODIUM LACTATE, POTASSIUM CHLORIDE, AND CALCIUM CHLORIDE .6; .31; .03; .02 G/100ML; G/100ML; G/100ML; G/100ML
500 INJECTION, SOLUTION INTRAVENOUS ONCE
Status: DISCONTINUED | OUTPATIENT
Start: 2025-05-02 | End: 2025-05-02

## 2025-05-02 RX ORDER — OXYCODONE HYDROCHLORIDE 5 MG/1
5 TABLET ORAL EVERY 4 HOURS PRN
Refills: 0 | Status: DISCONTINUED | OUTPATIENT
Start: 2025-05-02 | End: 2025-05-06 | Stop reason: HOSPADM

## 2025-05-02 RX ORDER — HEPARIN SODIUM 1000 [USP'U]/ML
INJECTION, SOLUTION INTRAVENOUS; SUBCUTANEOUS
Status: DISCONTINUED | OUTPATIENT
Start: 2025-05-02 | End: 2025-05-02 | Stop reason: SDUPTHER

## 2025-05-02 RX ORDER — NALOXONE HYDROCHLORIDE 0.4 MG/ML
INJECTION, SOLUTION INTRAMUSCULAR; INTRAVENOUS; SUBCUTANEOUS PRN
Status: DISCONTINUED | OUTPATIENT
Start: 2025-05-02 | End: 2025-05-05 | Stop reason: HOSPADM

## 2025-05-02 RX ORDER — ONDANSETRON 2 MG/ML
INJECTION INTRAMUSCULAR; INTRAVENOUS
Status: DISCONTINUED | OUTPATIENT
Start: 2025-05-02 | End: 2025-05-02 | Stop reason: SDUPTHER

## 2025-05-02 RX ORDER — FENTANYL CITRATE 50 UG/ML
25 INJECTION, SOLUTION INTRAMUSCULAR; INTRAVENOUS EVERY 5 MIN PRN
Status: DISCONTINUED | OUTPATIENT
Start: 2025-05-02 | End: 2025-05-02

## 2025-05-02 RX ORDER — SODIUM CHLORIDE 9 MG/ML
INJECTION, SOLUTION INTRAVENOUS PRN
Status: DISCONTINUED | OUTPATIENT
Start: 2025-05-02 | End: 2025-05-05 | Stop reason: HOSPADM

## 2025-05-02 RX ORDER — SODIUM CHLORIDE, SODIUM LACTATE, POTASSIUM CHLORIDE, CALCIUM CHLORIDE 600; 310; 30; 20 MG/100ML; MG/100ML; MG/100ML; MG/100ML
INJECTION, SOLUTION INTRAVENOUS CONTINUOUS
Status: DISCONTINUED | OUTPATIENT
Start: 2025-05-02 | End: 2025-05-02

## 2025-05-02 RX ORDER — DEXTROSE, SODIUM CHLORIDE, SODIUM LACTATE, POTASSIUM CHLORIDE, AND CALCIUM CHLORIDE 5; .6; .31; .03; .02 G/100ML; G/100ML; G/100ML; G/100ML; G/100ML
INJECTION, SOLUTION INTRAVENOUS
Status: DISPENSED
Start: 2025-05-02 | End: 2025-05-02

## 2025-05-02 RX ORDER — SODIUM CHLORIDE 0.9 % (FLUSH) 0.9 %
5-40 SYRINGE (ML) INJECTION EVERY 12 HOURS SCHEDULED
Status: DISCONTINUED | OUTPATIENT
Start: 2025-05-02 | End: 2025-05-05 | Stop reason: HOSPADM

## 2025-05-02 RX ORDER — GABAPENTIN 300 MG/1
300 CAPSULE ORAL EVERY 8 HOURS
Status: DISCONTINUED | OUTPATIENT
Start: 2025-05-02 | End: 2025-05-06 | Stop reason: HOSPADM

## 2025-05-02 RX ORDER — LIDOCAINE HYDROCHLORIDE 10 MG/ML
INJECTION, SOLUTION EPIDURAL; INFILTRATION; INTRACAUDAL; PERINEURAL
Status: DISCONTINUED | OUTPATIENT
Start: 2025-05-02 | End: 2025-05-02 | Stop reason: SDUPTHER

## 2025-05-02 RX ORDER — MIDAZOLAM HYDROCHLORIDE 1 MG/ML
INJECTION, SOLUTION INTRAMUSCULAR; INTRAVENOUS
Status: DISCONTINUED | OUTPATIENT
Start: 2025-05-02 | End: 2025-05-02 | Stop reason: SDUPTHER

## 2025-05-02 RX ORDER — DEXTROSE, SODIUM CHLORIDE, SODIUM LACTATE, POTASSIUM CHLORIDE, AND CALCIUM CHLORIDE 5; .6; .31; .03; .02 G/100ML; G/100ML; G/100ML; G/100ML; G/100ML
INJECTION, SOLUTION INTRAVENOUS CONTINUOUS PRN
Status: COMPLETED | OUTPATIENT
Start: 2025-05-02 | End: 2025-05-02

## 2025-05-02 RX ORDER — OXYCODONE HYDROCHLORIDE 5 MG/1
10 TABLET ORAL EVERY 4 HOURS PRN
Status: DISCONTINUED | OUTPATIENT
Start: 2025-05-02 | End: 2025-05-02

## 2025-05-02 RX ORDER — SODIUM CHLORIDE, SODIUM LACTATE, POTASSIUM CHLORIDE, CALCIUM CHLORIDE 600; 310; 30; 20 MG/100ML; MG/100ML; MG/100ML; MG/100ML
INJECTION, SOLUTION INTRAVENOUS
Status: DISCONTINUED | OUTPATIENT
Start: 2025-05-02 | End: 2025-05-02 | Stop reason: SDUPTHER

## 2025-05-02 RX ORDER — CALCIUM CHLORIDE 100 MG/ML
INJECTION INTRAVENOUS; INTRAVENTRICULAR
Status: DISCONTINUED | OUTPATIENT
Start: 2025-05-02 | End: 2025-05-02 | Stop reason: SDUPTHER

## 2025-05-02 RX ORDER — OXYCODONE HYDROCHLORIDE 5 MG/1
10 TABLET ORAL EVERY 4 HOURS PRN
Refills: 0 | Status: DISCONTINUED | OUTPATIENT
Start: 2025-05-02 | End: 2025-05-06 | Stop reason: HOSPADM

## 2025-05-02 RX ORDER — LABETALOL HYDROCHLORIDE 5 MG/ML
10 INJECTION, SOLUTION INTRAVENOUS
Status: DISCONTINUED | OUTPATIENT
Start: 2025-05-02 | End: 2025-05-04

## 2025-05-02 RX ORDER — OXYCODONE HYDROCHLORIDE 5 MG/1
10 TABLET ORAL PRN
Status: DISCONTINUED | OUTPATIENT
Start: 2025-05-02 | End: 2025-05-02

## 2025-05-02 RX ORDER — DEXAMETHASONE SODIUM PHOSPHATE 10 MG/ML
INJECTION, SOLUTION INTRA-ARTICULAR; INTRALESIONAL; INTRAMUSCULAR; INTRAVENOUS; SOFT TISSUE
Status: DISCONTINUED | OUTPATIENT
Start: 2025-05-02 | End: 2025-05-02 | Stop reason: SDUPTHER

## 2025-05-02 RX ORDER — EPHEDRINE SULFATE/0.9% NACL/PF 25 MG/5 ML
SYRINGE (ML) INTRAVENOUS
Status: DISCONTINUED | OUTPATIENT
Start: 2025-05-02 | End: 2025-05-02 | Stop reason: SDUPTHER

## 2025-05-02 RX ORDER — PROPOFOL 10 MG/ML
INJECTION, EMULSION INTRAVENOUS
Status: DISCONTINUED | OUTPATIENT
Start: 2025-05-02 | End: 2025-05-02 | Stop reason: SDUPTHER

## 2025-05-02 RX ORDER — SODIUM CHLORIDE 0.9 % (FLUSH) 0.9 %
5-40 SYRINGE (ML) INJECTION PRN
Status: DISCONTINUED | OUTPATIENT
Start: 2025-05-02 | End: 2025-05-05 | Stop reason: HOSPADM

## 2025-05-02 RX ORDER — DIPHENHYDRAMINE HYDROCHLORIDE 50 MG/ML
12.5 INJECTION, SOLUTION INTRAMUSCULAR; INTRAVENOUS
Status: DISCONTINUED | OUTPATIENT
Start: 2025-05-02 | End: 2025-05-05 | Stop reason: HOSPADM

## 2025-05-02 RX ORDER — LABETALOL HYDROCHLORIDE 5 MG/ML
INJECTION, SOLUTION INTRAVENOUS
Status: DISCONTINUED | OUTPATIENT
Start: 2025-05-02 | End: 2025-05-02 | Stop reason: SDUPTHER

## 2025-05-02 RX ORDER — HEPARIN SODIUM 10000 [USP'U]/100ML
5-30 INJECTION, SOLUTION INTRAVENOUS CONTINUOUS
Status: DISCONTINUED | OUTPATIENT
Start: 2025-05-02 | End: 2025-05-06

## 2025-05-02 RX ORDER — HEPARIN SODIUM 1000 [USP'U]/ML
INJECTION, SOLUTION INTRAVENOUS; SUBCUTANEOUS
Status: COMPLETED
Start: 2025-05-02 | End: 2025-05-02

## 2025-05-02 RX ORDER — DROPERIDOL 2.5 MG/ML
0.62 INJECTION, SOLUTION INTRAMUSCULAR; INTRAVENOUS
Status: DISCONTINUED | OUTPATIENT
Start: 2025-05-02 | End: 2025-05-05 | Stop reason: HOSPADM

## 2025-05-02 RX ORDER — SODIUM CHLORIDE, SODIUM LACTATE, POTASSIUM CHLORIDE, CALCIUM CHLORIDE 600; 310; 30; 20 MG/100ML; MG/100ML; MG/100ML; MG/100ML
INJECTION, SOLUTION INTRAVENOUS CONTINUOUS
Status: CANCELLED | OUTPATIENT
Start: 2025-05-02

## 2025-05-02 RX ORDER — METHOCARBAMOL 750 MG/1
750 TABLET, FILM COATED ORAL
Status: DISCONTINUED | OUTPATIENT
Start: 2025-05-02 | End: 2025-05-06 | Stop reason: HOSPADM

## 2025-05-02 RX ORDER — SODIUM CHLORIDE, SODIUM LACTATE, POTASSIUM CHLORIDE, AND CALCIUM CHLORIDE .6; .31; .03; .02 G/100ML; G/100ML; G/100ML; G/100ML
500 INJECTION, SOLUTION INTRAVENOUS ONCE
Status: COMPLETED | OUTPATIENT
Start: 2025-05-02 | End: 2025-05-02

## 2025-05-02 RX ORDER — OXYCODONE HYDROCHLORIDE 5 MG/1
5 TABLET ORAL EVERY 4 HOURS PRN
Status: DISCONTINUED | OUTPATIENT
Start: 2025-05-02 | End: 2025-05-02

## 2025-05-02 RX ORDER — HYDRALAZINE HYDROCHLORIDE 20 MG/ML
10 INJECTION INTRAMUSCULAR; INTRAVENOUS
Status: DISCONTINUED | OUTPATIENT
Start: 2025-05-02 | End: 2025-05-04

## 2025-05-02 RX ORDER — ACETAMINOPHEN 500 MG
1000 TABLET ORAL EVERY 8 HOURS SCHEDULED
Status: DISCONTINUED | OUTPATIENT
Start: 2025-05-02 | End: 2025-05-06 | Stop reason: HOSPADM

## 2025-05-02 RX ORDER — PROCHLORPERAZINE EDISYLATE 5 MG/ML
5 INJECTION INTRAMUSCULAR; INTRAVENOUS
Status: DISCONTINUED | OUTPATIENT
Start: 2025-05-02 | End: 2025-05-05 | Stop reason: HOSPADM

## 2025-05-02 RX ORDER — FENTANYL CITRATE 50 UG/ML
INJECTION, SOLUTION INTRAMUSCULAR; INTRAVENOUS
Status: DISCONTINUED | OUTPATIENT
Start: 2025-05-02 | End: 2025-05-02 | Stop reason: SDUPTHER

## 2025-05-02 RX ADMIN — LISINOPRIL 40 MG: 20 TABLET ORAL at 13:21

## 2025-05-02 RX ADMIN — HYDROMORPHONE HYDROCHLORIDE 1 MG: 1 INJECTION, SOLUTION INTRAMUSCULAR; INTRAVENOUS; SUBCUTANEOUS at 20:28

## 2025-05-02 RX ADMIN — HEPARIN SODIUM 12 UNITS/KG/HR: 10000 INJECTION, SOLUTION INTRAVENOUS at 16:53

## 2025-05-02 RX ADMIN — EPHEDRINE SULFATE 5 MG: 5 INJECTION INTRAVENOUS at 08:06

## 2025-05-02 RX ADMIN — GABAPENTIN 300 MG: 300 CAPSULE ORAL at 13:04

## 2025-05-02 RX ADMIN — PHENYLEPHRINE HYDROCHLORIDE 200 MCG: 10 INJECTION INTRAVENOUS at 07:45

## 2025-05-02 RX ADMIN — PHENYLEPHRINE HYDROCHLORIDE 100 MCG: 10 INJECTION INTRAVENOUS at 07:41

## 2025-05-02 RX ADMIN — ONDANSETRON 4 MG: 2 INJECTION INTRAMUSCULAR; INTRAVENOUS at 11:56

## 2025-05-02 RX ADMIN — SODIUM CHLORIDE, SODIUM LACTATE, POTASSIUM CHLORIDE, AND CALCIUM CHLORIDE 500 ML: .6; .31; .03; .02 INJECTION, SOLUTION INTRAVENOUS at 22:52

## 2025-05-02 RX ADMIN — AMLODIPINE BESYLATE 10 MG: 10 TABLET ORAL at 13:21

## 2025-05-02 RX ADMIN — CALCIUM CHLORIDE 0.2 G: 100 INJECTION INTRAVENOUS; INTRAVENTRICULAR at 11:02

## 2025-05-02 RX ADMIN — PANTOPRAZOLE SODIUM 40 MG: 40 TABLET, DELAYED RELEASE ORAL at 13:22

## 2025-05-02 RX ADMIN — HYDROMORPHONE HYDROCHLORIDE 1 MG: 1 INJECTION, SOLUTION INTRAMUSCULAR; INTRAVENOUS; SUBCUTANEOUS at 18:09

## 2025-05-02 RX ADMIN — CALCIUM CHLORIDE 0.2 G: 100 INJECTION INTRAVENOUS; INTRAVENTRICULAR at 10:42

## 2025-05-02 RX ADMIN — PHENYLEPHRINE HYDROCHLORIDE 50 MCG/MIN: 10 INJECTION INTRAVENOUS at 07:42

## 2025-05-02 RX ADMIN — HEPARIN SODIUM 6000 UNITS: 1000 INJECTION INTRAVENOUS; SUBCUTANEOUS at 09:14

## 2025-05-02 RX ADMIN — MIDAZOLAM 2 MG: 1 INJECTION INTRAMUSCULAR; INTRAVENOUS at 07:27

## 2025-05-02 RX ADMIN — EPHEDRINE SULFATE 10 MG: 5 INJECTION INTRAVENOUS at 07:46

## 2025-05-02 RX ADMIN — PROPOFOL 110 MG: 10 INJECTION, EMULSION INTRAVENOUS at 07:34

## 2025-05-02 RX ADMIN — ROCURONIUM BROMIDE 10 MG: 10 INJECTION INTRAVENOUS at 09:50

## 2025-05-02 RX ADMIN — Medication 10 MG: at 10:09

## 2025-05-02 RX ADMIN — LIDOCAINE HYDROCHLORIDE 50 MG: 10 INJECTION, SOLUTION EPIDURAL; INFILTRATION; INTRACAUDAL; PERINEURAL at 07:33

## 2025-05-02 RX ADMIN — ONDANSETRON 4 MG: 2 INJECTION INTRAMUSCULAR; INTRAVENOUS at 15:46

## 2025-05-02 RX ADMIN — CARVEDILOL 12.5 MG: 12.5 TABLET, FILM COATED ORAL at 17:01

## 2025-05-02 RX ADMIN — Medication 20 MG: at 08:37

## 2025-05-02 RX ADMIN — Medication 2 G: at 11:46

## 2025-05-02 RX ADMIN — EPHEDRINE SULFATE 5 MG: 5 INJECTION INTRAVENOUS at 10:21

## 2025-05-02 RX ADMIN — ATORVASTATIN CALCIUM 40 MG: 40 TABLET, FILM COATED ORAL at 20:18

## 2025-05-02 RX ADMIN — HYDROMORPHONE HYDROCHLORIDE 1 MG: 1 INJECTION, SOLUTION INTRAMUSCULAR; INTRAVENOUS; SUBCUTANEOUS at 12:52

## 2025-05-02 RX ADMIN — ACETAMINOPHEN 1000 MG: 500 TABLET, FILM COATED ORAL at 13:04

## 2025-05-02 RX ADMIN — GABAPENTIN 300 MG: 300 CAPSULE ORAL at 20:17

## 2025-05-02 RX ADMIN — SODIUM CHLORIDE, POTASSIUM CHLORIDE, SODIUM LACTATE AND CALCIUM CHLORIDE: 600; 310; 30; 20 INJECTION, SOLUTION INTRAVENOUS at 09:16

## 2025-05-02 RX ADMIN — HEPARIN SODIUM 3000 UNITS: 1000 INJECTION INTRAVENOUS; SUBCUTANEOUS at 10:15

## 2025-05-02 RX ADMIN — EPHEDRINE SULFATE 2.5 MG: 5 INJECTION INTRAVENOUS at 09:25

## 2025-05-02 RX ADMIN — OXYCODONE 10 MG: 5 TABLET ORAL at 13:06

## 2025-05-02 RX ADMIN — QUETIAPINE FUMARATE 150 MG: 100 TABLET ORAL at 20:18

## 2025-05-02 RX ADMIN — SPIRONOLACTONE 12.5 MG: 25 TABLET, FILM COATED ORAL at 13:21

## 2025-05-02 RX ADMIN — SODIUM CHLORIDE, POTASSIUM CHLORIDE, SODIUM LACTATE AND CALCIUM CHLORIDE: 600; 310; 30; 20 INJECTION, SOLUTION INTRAVENOUS at 07:23

## 2025-05-02 RX ADMIN — PHENYLEPHRINE HYDROCHLORIDE 150 MCG: 10 INJECTION INTRAVENOUS at 07:43

## 2025-05-02 RX ADMIN — ACETAMINOPHEN 1000 MG: 500 TABLET, FILM COATED ORAL at 20:18

## 2025-05-02 RX ADMIN — AMOXICILLIN AND CLAVULANATE POTASSIUM 1 TABLET: 875; 125 TABLET, FILM COATED ORAL at 20:17

## 2025-05-02 RX ADMIN — INSULIN LISPRO 4 UNITS: 100 INJECTION, SOLUTION INTRAVENOUS; SUBCUTANEOUS at 20:18

## 2025-05-02 RX ADMIN — METHOCARBAMOL 750 MG: 750 TABLET ORAL at 20:17

## 2025-05-02 RX ADMIN — EPHEDRINE SULFATE 2.5 MG: 5 INJECTION INTRAVENOUS at 09:21

## 2025-05-02 RX ADMIN — AMOXICILLIN AND CLAVULANATE POTASSIUM 1 TABLET: 875; 125 TABLET, FILM COATED ORAL at 13:21

## 2025-05-02 RX ADMIN — Medication 2 G: at 07:54

## 2025-05-02 RX ADMIN — Medication 10 MG: at 13:18

## 2025-05-02 RX ADMIN — SODIUM CHLORIDE, POTASSIUM CHLORIDE, SODIUM LACTATE AND CALCIUM CHLORIDE: 600; 310; 30; 20 INJECTION, SOLUTION INTRAVENOUS at 13:18

## 2025-05-02 RX ADMIN — DEXAMETHASONE SODIUM PHOSPHATE 4 MG: 10 INJECTION INTRAMUSCULAR; INTRAVENOUS at 11:56

## 2025-05-02 RX ADMIN — ROCURONIUM BROMIDE 20 MG: 10 INJECTION INTRAVENOUS at 11:13

## 2025-05-02 RX ADMIN — CALCIUM CHLORIDE 0.2 G: 100 INJECTION INTRAVENOUS; INTRAVENTRICULAR at 10:31

## 2025-05-02 RX ADMIN — DAKIN'S SOLUTION 0.125% (QUARTER STRENGTH): 0.12 SOLUTION at 06:01

## 2025-05-02 RX ADMIN — NICARDIPINE HYDROCHLORIDE 5 MG/HR: 25 INJECTION INTRAVENOUS at 13:48

## 2025-05-02 RX ADMIN — INSULIN LISPRO 2 UNITS: 100 INJECTION, SOLUTION INTRAVENOUS; SUBCUTANEOUS at 16:59

## 2025-05-02 RX ADMIN — SUGAMMADEX 200 MG: 100 INJECTION, SOLUTION INTRAVENOUS at 12:04

## 2025-05-02 RX ADMIN — Medication 5 MG: at 20:17

## 2025-05-02 RX ADMIN — VARENICLINE TARTRATE 0.5 MG: 0.5 TABLET, FILM COATED ORAL at 18:33

## 2025-05-02 RX ADMIN — SODIUM CHLORIDE, SODIUM LACTATE, POTASSIUM CHLORIDE, CALCIUM CHLORIDE: 600; 310; 30; 20 INJECTION, SOLUTION INTRAVENOUS at 07:41

## 2025-05-02 RX ADMIN — PHENYLEPHRINE HYDROCHLORIDE 25 MCG/MIN: 10 INJECTION INTRAVENOUS at 07:50

## 2025-05-02 RX ADMIN — ONDANSETRON 4 MG: 2 INJECTION INTRAMUSCULAR; INTRAVENOUS at 20:20

## 2025-05-02 RX ADMIN — ISOSORBIDE MONONITRATE 30 MG: 30 TABLET, EXTENDED RELEASE ORAL at 13:23

## 2025-05-02 RX ADMIN — ROCURONIUM BROMIDE 100 MG: 10 INJECTION INTRAVENOUS at 07:34

## 2025-05-02 RX ADMIN — OXYCODONE 10 MG: 5 TABLET ORAL at 17:00

## 2025-05-02 RX ADMIN — FENTANYL CITRATE 100 MCG: 50 INJECTION, SOLUTION INTRAMUSCULAR; INTRAVENOUS at 07:34

## 2025-05-02 RX ADMIN — HYDROMORPHONE HYDROCHLORIDE 1 MG: 1 INJECTION, SOLUTION INTRAMUSCULAR; INTRAVENOUS; SUBCUTANEOUS at 15:46

## 2025-05-02 RX ADMIN — LABETALOL HYDROCHLORIDE 5 MG: 5 INJECTION, SOLUTION INTRAVENOUS at 12:07

## 2025-05-02 RX ADMIN — METHOCARBAMOL 750 MG: 750 TABLET ORAL at 13:04

## 2025-05-02 ASSESSMENT — PAIN DESCRIPTION - ORIENTATION
ORIENTATION: RIGHT

## 2025-05-02 ASSESSMENT — PAIN SCALES - GENERAL
PAINLEVEL_OUTOF10: 4
PAINLEVEL_OUTOF10: 10
PAINLEVEL_OUTOF10: 10
PAINLEVEL_OUTOF10: 3
PAINLEVEL_OUTOF10: 6
PAINLEVEL_OUTOF10: 7
PAINLEVEL_OUTOF10: 10
PAINLEVEL_OUTOF10: 10

## 2025-05-02 ASSESSMENT — PAIN DESCRIPTION - LOCATION
LOCATION: LEG;FOOT
LOCATION: LEG;FOOT
LOCATION: LEG

## 2025-05-02 ASSESSMENT — PAIN DESCRIPTION - DESCRIPTORS: DESCRIPTORS: DISCOMFORT;SORE;PRESSURE

## 2025-05-02 NOTE — PLAN OF CARE
Foot and Ankle Surgery Plan of Care    Patient was in bypass during bedside wounds.  Will return tomorrow.      Steven Ornelas DPM   Foot and Ankle Surgery  [facility]  5/2/2025 at 10:06 AM

## 2025-05-02 NOTE — ANESTHESIA POSTPROCEDURE EVALUATION
Department of Anesthesiology  Postprocedure Note    Patient: Mikael Estevez  MRN: [id number]  YOB: 1970  Date of evaluation: 5/2/2025    Procedure Summary       Date: 05/02/25 Room / Location: [place] [id number] / [facility]    Anesthesia Start: 0723 Anesthesia Stop: 1216    Procedure: FEMORAL POPLITEAL BYPASS WITH CRYO VEIN (Right: Leg Lower) Diagnosis:       Penetrating wound of right foot, initial encounter      Acute hematogenous osteomyelitis, unspecified site (HCC)      (Penetrating wound of right foot, initial encounter [S91.331A])      (Acute hematogenous osteomyelitis, unspecified site (HCC) [M86.00])    Surgeons: Robert Jay MD Responsible Provider: Jw Schroeder MD    Anesthesia Type: general ASA Status: 4            Anesthesia Type: No value filed.    Annamaria Phase I: Annamaria Score: 10    Annamaria Phase II: Annamaria Score: 10    Anesthesia Post Evaluation    Patient location during evaluation: PACU  Patient participation: complete - patient participated  Level of consciousness: awake and alert  Airway patency: patent  Nausea & Vomiting: no nausea and no vomiting  Cardiovascular status: hemodynamically stable  Respiratory status: acceptable  Hydration status: euvolemic  Pain management: adequate    No notable events documented.

## 2025-05-02 NOTE — PLAN OF CARE
Problem: Chronic Conditions and Co-morbidities  Goal: Patient's chronic conditions and co-morbidity symptoms are monitored and maintained or improved  5/1/2025 2228 by Antonietta Caruso, RN  Outcome: Progressing  Flowsheets (Taken 5/1/2025 2000)  Care Plan - Patient's Chronic Conditions and Co-Morbidity Symptoms are Monitored and Maintained or Improved:   Monitor and assess patient's chronic conditions and comorbid symptoms for stability, deterioration, or improvement   Collaborate with multidisciplinary team to address chronic and comorbid conditions and prevent exacerbation or deterioration   Update acute care plan with appropriate goals if chronic or comorbid symptoms are exacerbated and prevent overall improvement and discharge  5/1/2025 1032 by Yumi Aldana RN  Outcome: Progressing     Problem: Discharge Planning  Goal: Discharge to home or other facility with appropriate resources  5/1/2025 2228 by Antonietta Caruso RN  Outcome: Progressing  Flowsheets (Taken 5/1/2025 2000)  Discharge to home or other facility with appropriate resources:   Identify barriers to discharge with patient and caregiver   Arrange for needed discharge resources and transportation as appropriate   Identify discharge learning needs (meds, wound care, etc)   Refer to discharge planning if patient needs post-hospital services based on physician order or complex needs related to functional status, cognitive ability or social support system  5/1/2025 1032 by Yumi Aldana RN  Outcome: Progressing     Problem: Pain  Goal: Verbalizes/displays adequate comfort level or baseline comfort level  5/1/2025 2228 by Antonietta Crauso, RN  Outcome: Progressing  Flowsheets (Taken 5/1/2025 2000)  Verbalizes/displays adequate comfort level or baseline comfort level:   Encourage patient to monitor pain and request assistance   Assess pain using appropriate pain scale   Administer analgesics based on type and severity of pain and evaluate response   Implement

## 2025-05-02 NOTE — OP NOTE
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 Item Serial No.  Lot No. LRB No. Used Action   CLIP LIG M MARYANN TI HRT SHP WIRE HORZ 6 CLIPS PER PK - [id number]  CLIP LIG M MARYANN TI HRT SHP WIRE HORZ 6 CLIPS PER PK  [employer]-  Right 1 Implanted   CLIP LIG M MARYANN TI HRT SHP WIRE HORZ 6 CLIPS PER PK - [id number]  CLIP LIG M MARYANN TI HRT SHP WIRE HORZ 6 CLIPS PER PK  [employer]-  Right 1 Implanted   IMPLANT HUM TISS [id number] DIA3-6MM SAPH VEIN CRYOPRESERVED - [id number]  IMPLANT HUM TISS [id number] DIA3-6MM SAPH VEIN CRYOPRESERVED [id number] [employer]- [id number] Right 1 Implanted         Drains:   Urinary Catheter 05/02/25 Long-Temperature (Active)       [REMOVED] Urinary Catheter 04/28/25 Long (Removed)   Catheter Indications Perioperative use for selected surgical procedures 04/29/25 1100   Site Assessment [place] 04/29/25 1100   Urine Color Yellow 04/29/25 1100   Urine Appearance Clear 04/29/25 1100   Collection Container Standard 04/29/25 1100   Securement Method Leg strap 04/29/25 1100   Catheter Care  Soap and water 04/29/25 0800   Catheter Best Practices  Drainage tube clipped to bed;Catheter secured to thigh;Tamper seal intact;Bag below bladder;Bag not on floor;Lack of dependent loop in tubing;Drainage bag less than half full 04/29/25 1100   Output (mL) 175 mL 04/29/25 1100       Findings:  Infection Present At Time Of Surgery (PATOS)

## 2025-05-02 NOTE — CONSULTS
Division of Vascular Surgery        New Consult      Physician Requesting Consult:  Dr. Collins    Reason for Consult:   RLE wound with diminished BL HANNAH    Chief Complaint:      Right big toe wound    History of Present Illness:      Mikael Estevez is a 55 y.o. female with history of diabetes, hyperlipidemia presenting with 1 to 2 months of pain and wound of her left great toe.  She states that she remembers kicking something with her big toe.  She has also noticed blood in pus draining from the toe over the last several weeks.  X-ray shows concern for osteomyelitis of the right great toe.  She was recently seen in the ER on 4/10 where she received ibuprofen and Keflex.  ABIs reveal right HANNAH 0.31, left HANNAH 0.46.  Patient has palpable femoral pulses bilaterally, strong left PT and left DP signals, and a weak right PT signal.  Patient is able to move both feet, however sensation and motor is decreased in the right foot.  Patient has a loop recorder, but is unsure if she has had previous vascular surgical intervention.  Patient states that she does not take her home medications as prescribed.  She does state however that she takes aspirin.  Patient has been a diabetic for a long time, and blood sugars are in the 400s today.  Patient was started on vancomycin, cefepime, linezolid.    Medical History:     Past Medical History:   Diagnosis Date    Back pain     Bipolar 1 disorder (HCC)     Diabetes mellitus (HCC)     Disc disorder     Hypertension        Surgical History:     Past Surgical History:   Procedure Laterality Date    BACK SURGERY      CHOLECYSTECTOMY         Family History:     No family history on file.    Allergies:       Codeine and Fish-derived products    Medications:      Current Facility-Administered Medications   Medication Dose Route Frequency Provider Last Rate Last Admin    sodium chloride flush 0.9 % injection 5-40 mL  5-40 mL IntraVENous 2 times per day Cherise Segovia MD        
     Nutrition Note    Consult for general nutr management: ONS. Allergies reviewed in EPIC/ [employer].  Pt okay for all ONS in formulary. Added high kcal, high protein once a day.    Electronically signed by Rachel Parra RD on 5/2/25 at 3:44 PM EDT    Contact: [phone]    
  Consult Note  Foot and Ankle Surgery    CC/Reason for consult: Right foot wound    HPI:    The patient is a 55 y.o. female seen at [facility] ED for a right foot wound.  The patient states approximately 2 months ago she kicked something with her left big toe.  The last few weeks she has noticed blood and pus draining from the toe.  She has been having increased pain and swelling to the digit so she presented to the ED for further evaluation.  Upon evaluation the toenail of her right hallux was barely intact.  There appeared to be a wound underneath.  Radiographs obtained in the emergency department show concern for osteomyelitis of the distal tuft of the right hallux.  The patient recently finished a 7-day course of antibiotics without relief.  The patient was admitted to medicine for failure of outpatient antibiotic therapy and osteomyelitis of the hallux.  Patient is a longtime diabetic.  She states she does not know her most recent hemoglobin A1c.  But she does monitor her sugars at home.  Patient admits to extensive history of peripheral neuropathy.  Patient is a community ambulator at baseline and walks unassisted.  Patient lives with her daughter in an apartment complex.  Patient is afebrile and denies constitutional symptoms.  Patient's past medical history significant for bipolar 1 disorder, diabetes mellitus, hypertension.  Patient smokes half pack per day.  Denies any illicit drug use, has a history of cocaine abuse.  Patient has vascular history of critical limb ischemia of both lower extremities.  Patient underwent bilateral lower extremity angiogram with stent placement at [facility] in October 2022.  Patient states she has not had any vascular complications since.  Foot and Ankle Surgery was consulted for further surgical evaluation.    PCP is Ciro Martinez Jr., MD    ROS:   Review of Systems   Constitutional:  Negative for chills, fatigue and fever.   Respiratory:  Negative for chest tightness and 
It such instances, actual meaningcan be extrapolated by contextual diversion.    Yumi Mayer MD  Office: [phone]  Perfect serve / office [phone]          
remission    Chest pain    Acute ischemic stroke (HCC)    Cerebrovascular accident (CVA) of pontine structure (HCC)    Conversion disorder    TIA (transient ischemic attack)    Right sided weakness    Dizziness    Noncompliance with medications    Drug abuse    Acute nonintractable headache    Acute osteomyelitis (HCC)    Osteomyelitis of great toe (HCC)    Right foot pain    Peripheral vascular disease    Ulcer of great toe, right, with necrosis of bone (HCC)    Type 2 diabetes mellitus with diabetic toe ulcer (HCC)    HFrEF (heart failure with reduced ejection fraction) (HCC)    Type 2 diabetes mellitus with right diabetic foot infection (HCC)    CRP elevated    ESR raised    Cardiomyopathy     - Right toe osteomyelitis  - Decreased left ventricular ejection fraction  - CAD with moderate disease in distal RCA  - Chronic  - Bipolar 1  - Essential hypertension    RECOMMENDATIONS:  Considering decreased in LVEF, will order cardiac cath for risk stratification  Resume home medication   N.p.o. for cardiac catheterization  Keep K+> 4 and Mg 2+> 2  Risk factor modification  Final recommendations after attending attestation        Alex Poole MD  Internal Medicine Resident, PGY-3  [facility]; [place], [place]  4/22/2025,11:47 AM       [facility]      [phone]

## 2025-05-03 LAB
ABO/RH: NORMAL
ANION GAP SERPL CALCULATED.3IONS-SCNC: 9 MMOL/L (ref 9–16)
ANTI-XA UNFRAC HEPARIN: 0.15 IU/L
ANTI-XA UNFRAC HEPARIN: 0.38 IU/L
ANTI-XA UNFRAC HEPARIN: 0.39 IU/L
ANTIBODY SCREEN: NEGATIVE
ARM BAND NUMBER: NORMAL
BASOPHILS # BLD: <0.03 K/UL (ref 0–0.2)
BASOPHILS NFR BLD: 0 % (ref 0–2)
BLOOD BANK DISPENSE STATUS: NORMAL
BLOOD BANK DISPENSE STATUS: NORMAL
BLOOD BANK SAMPLE EXPIRATION: NORMAL
BPU ID: NORMAL
BPU ID: NORMAL
BUN SERPL-MCNC: 22 MG/DL (ref 6–20)
CALCIUM SERPL-MCNC: 8.6 MG/DL (ref 8.6–10.4)
CHLORIDE SERPL-SCNC: 100 MMOL/L (ref 98–107)
CO2 SERPL-SCNC: 26 MMOL/L (ref 20–31)
COMPONENT: NORMAL
COMPONENT: NORMAL
CREAT SERPL-MCNC: 0.8 MG/DL (ref 0.6–0.9)
CROSSMATCH RESULT: NORMAL
CROSSMATCH RESULT: NORMAL
CRP SERPL HS-MCNC: 54.9 MG/L (ref 0–5)
EOSINOPHIL # BLD: <0.03 K/UL (ref 0–0.44)
EOSINOPHILS RELATIVE PERCENT: 0 % (ref 1–4)
ERYTHROCYTE [DISTWIDTH] IN BLOOD BY AUTOMATED COUNT: 14.9 % (ref 11.8–14.4)
ERYTHROCYTE [SEDIMENTATION RATE] IN BLOOD BY PHOTOMETRIC METHOD: 58 MM/HR (ref 0–30)
GFR, ESTIMATED: 86 ML/MIN/1.73M2
GLUCOSE BLD-MCNC: 219 MG/DL (ref 65–105)
GLUCOSE BLD-MCNC: 226 MG/DL (ref 65–105)
GLUCOSE BLD-MCNC: 272 MG/DL (ref 65–105)
GLUCOSE BLD-MCNC: 320 MG/DL (ref 65–105)
GLUCOSE SERPL-MCNC: 279 MG/DL (ref 74–99)
HCT VFR BLD AUTO: 25.2 % (ref 36.3–47.1)
HGB BLD-MCNC: 8.2 G/DL (ref 11.9–15.1)
IMM GRANULOCYTES # BLD AUTO: 0.08 K/UL (ref 0–0.3)
IMM GRANULOCYTES NFR BLD: 1 %
LYMPHOCYTES NFR BLD: 2.51 K/UL (ref 1.1–3.7)
LYMPHOCYTES RELATIVE PERCENT: 21 % (ref 24–43)
MCH RBC QN AUTO: 27.2 PG (ref 25.2–33.5)
MCHC RBC AUTO-ENTMCNC: 32.5 G/DL (ref 28.4–34.8)
MCV RBC AUTO: 83.7 FL (ref 82.6–102.9)
MONOCYTES NFR BLD: 1.11 K/UL (ref 0.1–1.2)
MONOCYTES NFR BLD: 9 % (ref 3–12)
NEUTROPHILS NFR BLD: 69 % (ref 36–65)
NEUTS SEG NFR BLD: 8.03 K/UL (ref 1.5–8.1)
NRBC BLD-RTO: 0 PER 100 WBC
PLATELET # BLD AUTO: 346 K/UL (ref 138–453)
PMV BLD AUTO: 8.8 FL (ref 8.1–13.5)
POTASSIUM SERPL-SCNC: 4.8 MMOL/L (ref 3.7–5.3)
RBC # BLD AUTO: 3.01 M/UL (ref 3.95–5.11)
RBC # BLD: ABNORMAL 10*6/UL
SODIUM SERPL-SCNC: 135 MMOL/L (ref 136–145)
TRANSFUSION STATUS: NORMAL
TRANSFUSION STATUS: NORMAL
UNIT DIVISION: 0
UNIT DIVISION: 0
WBC OTHER # BLD: 11.8 K/UL (ref 3.5–11.3)

## 2025-05-03 PROCEDURE — 99232 SBSQ HOSP IP/OBS MODERATE 35: CPT | Performed by: INTERNAL MEDICINE

## 2025-05-03 PROCEDURE — 82947 ASSAY GLUCOSE BLOOD QUANT: CPT

## 2025-05-03 PROCEDURE — 6370000000 HC RX 637 (ALT 250 FOR IP)

## 2025-05-03 PROCEDURE — 80048 BASIC METABOLIC PNL TOTAL CA: CPT

## 2025-05-03 PROCEDURE — 2500000003 HC RX 250 WO HCPCS: Performed by: ANESTHESIOLOGY

## 2025-05-03 PROCEDURE — 85652 RBC SED RATE AUTOMATED: CPT

## 2025-05-03 PROCEDURE — 2500000003 HC RX 250 WO HCPCS

## 2025-05-03 PROCEDURE — 6360000002 HC RX W HCPCS

## 2025-05-03 PROCEDURE — 86140 C-REACTIVE PROTEIN: CPT

## 2025-05-03 PROCEDURE — 85520 HEPARIN ASSAY: CPT

## 2025-05-03 PROCEDURE — 2000000000 HC ICU R&B

## 2025-05-03 PROCEDURE — 99233 SBSQ HOSP IP/OBS HIGH 50: CPT | Performed by: INTERNAL MEDICINE

## 2025-05-03 PROCEDURE — 2580000003 HC RX 258

## 2025-05-03 PROCEDURE — 85025 COMPLETE CBC W/AUTO DIFF WBC: CPT

## 2025-05-03 RX ORDER — SODIUM CHLORIDE, SODIUM LACTATE, POTASSIUM CHLORIDE, AND CALCIUM CHLORIDE .6; .31; .03; .02 G/100ML; G/100ML; G/100ML; G/100ML
500 INJECTION, SOLUTION INTRAVENOUS ONCE
Status: COMPLETED | OUTPATIENT
Start: 2025-05-03 | End: 2025-05-03

## 2025-05-03 RX ORDER — SPIRONOLACTONE 25 MG/1
12.5 TABLET ORAL DAILY
Status: DISCONTINUED | OUTPATIENT
Start: 2025-05-03 | End: 2025-05-06 | Stop reason: HOSPADM

## 2025-05-03 RX ORDER — CARVEDILOL 12.5 MG/1
12.5 TABLET ORAL 2 TIMES DAILY WITH MEALS
Status: DISCONTINUED | OUTPATIENT
Start: 2025-05-03 | End: 2025-05-06 | Stop reason: HOSPADM

## 2025-05-03 RX ORDER — AMLODIPINE BESYLATE 5 MG/1
5 TABLET ORAL ONCE
Status: COMPLETED | OUTPATIENT
Start: 2025-05-03 | End: 2025-05-03

## 2025-05-03 RX ORDER — INSULIN GLARGINE 100 [IU]/ML
10 INJECTION, SOLUTION SUBCUTANEOUS ONCE
Status: DISCONTINUED | OUTPATIENT
Start: 2025-05-03 | End: 2025-05-03

## 2025-05-03 RX ADMIN — QUETIAPINE FUMARATE 150 MG: 100 TABLET ORAL at 21:31

## 2025-05-03 RX ADMIN — GABAPENTIN 300 MG: 300 CAPSULE ORAL at 12:23

## 2025-05-03 RX ADMIN — OXYCODONE 10 MG: 5 TABLET ORAL at 01:10

## 2025-05-03 RX ADMIN — HYDROMORPHONE HYDROCHLORIDE 1 MG: 1 INJECTION, SOLUTION INTRAMUSCULAR; INTRAVENOUS; SUBCUTANEOUS at 19:14

## 2025-05-03 RX ADMIN — HYDROMORPHONE HYDROCHLORIDE 0.5 MG: 1 INJECTION, SOLUTION INTRAMUSCULAR; INTRAVENOUS; SUBCUTANEOUS at 06:43

## 2025-05-03 RX ADMIN — ONDANSETRON 4 MG: 2 INJECTION INTRAMUSCULAR; INTRAVENOUS at 19:11

## 2025-05-03 RX ADMIN — VARENICLINE TARTRATE 0.5 MG: 0.5 TABLET, FILM COATED ORAL at 08:24

## 2025-05-03 RX ADMIN — OXYCODONE 10 MG: 5 TABLET ORAL at 13:04

## 2025-05-03 RX ADMIN — HYDROMORPHONE HYDROCHLORIDE 1 MG: 1 INJECTION, SOLUTION INTRAMUSCULAR; INTRAVENOUS; SUBCUTANEOUS at 15:03

## 2025-05-03 RX ADMIN — GABAPENTIN 300 MG: 300 CAPSULE ORAL at 06:36

## 2025-05-03 RX ADMIN — METHOCARBAMOL 750 MG: 750 TABLET ORAL at 20:06

## 2025-05-03 RX ADMIN — VARENICLINE TARTRATE 0.5 MG: 0.5 TABLET, FILM COATED ORAL at 17:06

## 2025-05-03 RX ADMIN — SODIUM CHLORIDE, PRESERVATIVE FREE 10 ML: 5 INJECTION INTRAVENOUS at 09:00

## 2025-05-03 RX ADMIN — HYDROMORPHONE HYDROCHLORIDE 1 MG: 1 INJECTION, SOLUTION INTRAMUSCULAR; INTRAVENOUS; SUBCUTANEOUS at 10:41

## 2025-05-03 RX ADMIN — METHOCARBAMOL 750 MG: 750 TABLET ORAL at 06:36

## 2025-05-03 RX ADMIN — HYDROMORPHONE HYDROCHLORIDE 1 MG: 1 INJECTION, SOLUTION INTRAMUSCULAR; INTRAVENOUS; SUBCUTANEOUS at 12:47

## 2025-05-03 RX ADMIN — SPIRONOLACTONE 12.5 MG: 25 TABLET, FILM COATED ORAL at 12:21

## 2025-05-03 RX ADMIN — METHOCARBAMOL 750 MG: 750 TABLET ORAL at 14:27

## 2025-05-03 RX ADMIN — PANTOPRAZOLE SODIUM 40 MG: 40 TABLET, DELAYED RELEASE ORAL at 06:36

## 2025-05-03 RX ADMIN — ATORVASTATIN CALCIUM 40 MG: 40 TABLET, FILM COATED ORAL at 21:31

## 2025-05-03 RX ADMIN — SODIUM CHLORIDE, SODIUM LACTATE, POTASSIUM CHLORIDE, AND CALCIUM CHLORIDE 500 ML: .6; .31; .03; .02 INJECTION, SOLUTION INTRAVENOUS at 02:19

## 2025-05-03 RX ADMIN — INSULIN LISPRO 2 UNITS: 100 INJECTION, SOLUTION INTRAVENOUS; SUBCUTANEOUS at 12:14

## 2025-05-03 RX ADMIN — ACETAMINOPHEN 1000 MG: 500 TABLET, FILM COATED ORAL at 06:36

## 2025-05-03 RX ADMIN — INSULIN LISPRO 6 UNITS: 100 INJECTION, SOLUTION INTRAVENOUS; SUBCUTANEOUS at 17:05

## 2025-05-03 RX ADMIN — ACETAMINOPHEN 1000 MG: 500 TABLET, FILM COATED ORAL at 21:30

## 2025-05-03 RX ADMIN — ASPIRIN 81 MG: 81 TABLET, COATED ORAL at 08:59

## 2025-05-03 RX ADMIN — ACETAMINOPHEN 650 MG: 325 TABLET ORAL at 10:14

## 2025-05-03 RX ADMIN — OXYCODONE 10 MG: 5 TABLET ORAL at 09:05

## 2025-05-03 RX ADMIN — Medication 5 MG: at 23:35

## 2025-05-03 RX ADMIN — INSULIN LISPRO 4 UNITS: 100 INJECTION, SOLUTION INTRAVENOUS; SUBCUTANEOUS at 06:36

## 2025-05-03 RX ADMIN — AMOXICILLIN AND CLAVULANATE POTASSIUM 1 TABLET: 875; 125 TABLET, FILM COATED ORAL at 08:59

## 2025-05-03 RX ADMIN — HYDROMORPHONE HYDROCHLORIDE 1 MG: 1 INJECTION, SOLUTION INTRAMUSCULAR; INTRAVENOUS; SUBCUTANEOUS at 08:41

## 2025-05-03 RX ADMIN — HEPARIN SODIUM 14 UNITS/KG/HR: 10000 INJECTION, SOLUTION INTRAVENOUS at 17:14

## 2025-05-03 RX ADMIN — AMOXICILLIN AND CLAVULANATE POTASSIUM 1 TABLET: 875; 125 TABLET, FILM COATED ORAL at 21:31

## 2025-05-03 RX ADMIN — SODIUM CHLORIDE, PRESERVATIVE FREE 10 ML: 5 INJECTION INTRAVENOUS at 21:32

## 2025-05-03 RX ADMIN — OXYCODONE 10 MG: 5 TABLET ORAL at 17:17

## 2025-05-03 RX ADMIN — INSULIN GLARGINE 20 UNITS: 100 INJECTION, SOLUTION SUBCUTANEOUS at 09:05

## 2025-05-03 RX ADMIN — GABAPENTIN 300 MG: 300 CAPSULE ORAL at 21:30

## 2025-05-03 RX ADMIN — CARVEDILOL 12.5 MG: 12.5 TABLET, FILM COATED ORAL at 10:23

## 2025-05-03 RX ADMIN — HYDROMORPHONE HYDROCHLORIDE 1 MG: 1 INJECTION, SOLUTION INTRAMUSCULAR; INTRAVENOUS; SUBCUTANEOUS at 17:07

## 2025-05-03 RX ADMIN — OXYCODONE 5 MG: 5 TABLET ORAL at 23:34

## 2025-05-03 RX ADMIN — HYDROMORPHONE HYDROCHLORIDE 1 MG: 1 INJECTION, SOLUTION INTRAMUSCULAR; INTRAVENOUS; SUBCUTANEOUS at 21:45

## 2025-05-03 RX ADMIN — INSULIN LISPRO 2 UNITS: 100 INJECTION, SOLUTION INTRAVENOUS; SUBCUTANEOUS at 21:44

## 2025-05-03 RX ADMIN — ONDANSETRON 4 MG: 2 INJECTION INTRAMUSCULAR; INTRAVENOUS at 08:34

## 2025-05-03 RX ADMIN — AMLODIPINE BESYLATE 5 MG: 5 TABLET ORAL at 10:23

## 2025-05-03 ASSESSMENT — PAIN DESCRIPTION - DESCRIPTORS
DESCRIPTORS: ACHING
DESCRIPTORS: DISCOMFORT;SORE
DESCRIPTORS: ACHING

## 2025-05-03 ASSESSMENT — PAIN SCALES - GENERAL
PAINLEVEL_OUTOF10: 6
PAINLEVEL_OUTOF10: 5
PAINLEVEL_OUTOF10: 10
PAINLEVEL_OUTOF10: 0
PAINLEVEL_OUTOF10: 10
PAINLEVEL_OUTOF10: 9
PAINLEVEL_OUTOF10: 6
PAINLEVEL_OUTOF10: 10

## 2025-05-03 ASSESSMENT — PAIN DESCRIPTION - ORIENTATION
ORIENTATION: RIGHT

## 2025-05-03 ASSESSMENT — PAIN DESCRIPTION - ONSET
ONSET: ON-GOING
ONSET: ON-GOING

## 2025-05-03 ASSESSMENT — PAIN DESCRIPTION - LOCATION
LOCATION: LEG

## 2025-05-03 ASSESSMENT — PAIN - FUNCTIONAL ASSESSMENT
PAIN_FUNCTIONAL_ASSESSMENT: ACTIVITIES ARE NOT PREVENTED

## 2025-05-03 ASSESSMENT — PAIN DESCRIPTION - FREQUENCY
FREQUENCY: CONTINUOUS
FREQUENCY: CONTINUOUS

## 2025-05-03 NOTE — PLAN OF CARE
Problem: Chronic Conditions and Co-morbidities  Goal: Patient's chronic conditions and co-morbidity symptoms are monitored and maintained or improved  5/3/2025 0810 by Tierra Castle RN  Outcome: Progressing  5/3/2025 0439 by Shena Ferrera RN  Outcome: Progressing     Problem: Discharge Planning  Goal: Discharge to home or other facility with appropriate resources  5/3/2025 0810 by Tierra Caslte RN  Outcome: Progressing  5/3/2025 0439 by Shena Ferrera RN  Outcome: Progressing     Problem: Pain  Goal: Verbalizes/displays adequate comfort level or baseline comfort level  5/3/2025 0810 by Tierra Castle RN  Outcome: Progressing  Flowsheets (Taken 5/3/2025 0800)  Verbalizes/displays adequate comfort level or baseline comfort level:   Encourage patient to monitor pain and request assistance   Assess pain using appropriate pain scale  5/3/2025 0439 by Shena Ferrera RN  Outcome: Progressing     Problem: Skin/Tissue Integrity  Goal: Skin integrity remains intact  Description: 1.  Monitor for areas of redness and/or skin breakdown2.  Assess vascular access sites hourly3.  Every 4-6 hours minimum:  Change oxygen saturation probe site4.  Every 4-6 hours:  If on nasal continuous positive airway pressure, respiratory therapy assess nares and determine need for appliance change or resting period  5/3/2025 0810 by Tierra Castle RN  Outcome: Progressing  5/3/2025 0439 by Shena Ferrera RN  Outcome: Progressing     Problem: Safety - Adult  Goal: Free from fall injury  5/3/2025 0810 by Tierra Castle RN  Outcome: Progressing  5/3/2025 0439 by Shena Ferrera RN  Outcome: Progressing     Problem: ABCDS Injury Assessment  Goal: Absence of physical injury  5/3/2025 0810 by Tierra Castle RN  Outcome: Progressing  5/3/2025 0439 by Shena Ferrera RN  Outcome: Progressing     Problem: Nutrition Deficit:  Goal: Optimize nutritional status  5/3/2025 0810 by

## 2025-05-03 NOTE — PLAN OF CARE
Problem: Chronic Conditions and Co-morbidities  Goal: Patient's chronic conditions and co-morbidity symptoms are monitored and maintained or improved  5/3/2025 0439 by Shena Ferrera RN  Outcome: Progressing  5/2/2025 1708 by Krystyna Gutierrez RN  Outcome: Progressing     Problem: Discharge Planning  Goal: Discharge to home or other facility with appropriate resources  5/3/2025 0439 by Shena Ferrera RN  Outcome: Progressing  5/2/2025 1708 by Krystyna Gutierrez RN  Outcome: Progressing     Problem: Pain  Goal: Verbalizes/displays adequate comfort level or baseline comfort level  5/3/2025 0439 by Shena Ferrera RN  Outcome: Progressing  5/2/2025 1708 by Krystyna Gutierrez RN  Outcome: Progressing     Problem: Skin/Tissue Integrity  Goal: Skin integrity remains intact  Description: 1.  Monitor for areas of redness and/or skin breakdown2.  Assess vascular access sites hourly3.  Every 4-6 hours minimum:  Change oxygen saturation probe site4.  Every 4-6 hours:  If on nasal continuous positive airway pressure, respiratory therapy assess nares and determine need for appliance change or resting period  5/3/2025 0439 by Shena Ferrera RN  Outcome: Progressing  5/2/2025 1708 by Krystyna Gutierrez RN  Outcome: Progressing  Flowsheets (Taken 5/2/2025 1708)  Skin Integrity Remains Intact:   Monitor for areas of redness and/or skin breakdown   Every 4-6 hours minimum:  Change oxygen saturation probe site   Assess vascular access sites hourly     Problem: Safety - Adult  Goal: Free from fall injury  5/3/2025 0439 by Shena Ferrera RN  Outcome: Progressing  5/2/2025 1708 by Krystyna Gutierrez RN  Outcome: Progressing     Problem: ABCDS Injury Assessment  Goal: Absence of physical injury  5/3/2025 0439 by Shena Ferrera RN  Outcome: Progressing  5/2/2025 1708 by Krystyna Gutierrez RN  Outcome: Progressing     Problem: Nutrition Deficit:  Goal: Optimize nutritional status  5/3/2025 0439 by Shena Ferrera RN  Outcome:

## 2025-05-04 LAB
ANION GAP SERPL CALCULATED.3IONS-SCNC: 8 MMOL/L (ref 9–16)
ANTI-XA UNFRAC HEPARIN: 0.18 IU/L
ANTI-XA UNFRAC HEPARIN: 0.29 IU/L
BASOPHILS # BLD: 0 K/UL (ref 0–0.2)
BASOPHILS NFR BLD: 0 % (ref 0–2)
BUN SERPL-MCNC: 15 MG/DL (ref 6–20)
CALCIUM SERPL-MCNC: 8.7 MG/DL (ref 8.6–10.4)
CHLORIDE SERPL-SCNC: 103 MMOL/L (ref 98–107)
CO2 SERPL-SCNC: 28 MMOL/L (ref 20–31)
CREAT SERPL-MCNC: 0.7 MG/DL (ref 0.6–0.9)
CRP SERPL HS-MCNC: 44.7 MG/L (ref 0–5)
EOSINOPHIL # BLD: 0 K/UL (ref 0–0.4)
EOSINOPHILS RELATIVE PERCENT: 0 % (ref 1–4)
ERYTHROCYTE [DISTWIDTH] IN BLOOD BY AUTOMATED COUNT: 15 % (ref 11.8–14.4)
ERYTHROCYTE [SEDIMENTATION RATE] IN BLOOD BY PHOTOMETRIC METHOD: 78 MM/HR (ref 0–30)
GFR, ESTIMATED: >90 ML/MIN/1.73M2
GLUCOSE BLD-MCNC: 125 MG/DL (ref 65–105)
GLUCOSE BLD-MCNC: 175 MG/DL (ref 65–105)
GLUCOSE BLD-MCNC: 217 MG/DL (ref 65–105)
GLUCOSE BLD-MCNC: 316 MG/DL (ref 65–105)
GLUCOSE SERPL-MCNC: 161 MG/DL (ref 74–99)
HCT VFR BLD AUTO: 24.4 % (ref 36.3–47.1)
HGB BLD-MCNC: 8.1 G/DL (ref 11.9–15.1)
IMM GRANULOCYTES # BLD AUTO: 0 K/UL (ref 0–0.3)
IMM GRANULOCYTES NFR BLD: 0 %
LYMPHOCYTES NFR BLD: 6.53 K/UL (ref 1–4.8)
LYMPHOCYTES RELATIVE PERCENT: 51 % (ref 24–44)
MAGNESIUM SERPL-MCNC: 1.9 MG/DL (ref 1.6–2.6)
MCH RBC QN AUTO: 28.2 PG (ref 25.2–33.5)
MCHC RBC AUTO-ENTMCNC: 33.2 G/DL (ref 28.4–34.8)
MCV RBC AUTO: 85 FL (ref 82.6–102.9)
MONOCYTES NFR BLD: 0.77 K/UL (ref 0.1–0.8)
MONOCYTES NFR BLD: 6 % (ref 1–7)
MORPHOLOGY: ABNORMAL
NEUTROPHILS NFR BLD: 43 % (ref 36–66)
NEUTS SEG NFR BLD: 5.5 K/UL (ref 1.8–7.7)
NRBC BLD-RTO: 0 PER 100 WBC
NUCLEATED RED BLOOD CELLS: 1 PER 100 WBC
PLATELET # BLD AUTO: 384 K/UL (ref 138–453)
PMV BLD AUTO: 8.8 FL (ref 8.1–13.5)
POTASSIUM SERPL-SCNC: 4.3 MMOL/L (ref 3.7–5.3)
RBC # BLD AUTO: 2.87 M/UL (ref 3.95–5.11)
SODIUM SERPL-SCNC: 139 MMOL/L (ref 136–145)
WBC OTHER # BLD: 12.8 K/UL (ref 3.5–11.3)

## 2025-05-04 PROCEDURE — 6370000000 HC RX 637 (ALT 250 FOR IP)

## 2025-05-04 PROCEDURE — 85652 RBC SED RATE AUTOMATED: CPT

## 2025-05-04 PROCEDURE — 36415 COLL VENOUS BLD VENIPUNCTURE: CPT

## 2025-05-04 PROCEDURE — 85025 COMPLETE CBC W/AUTO DIFF WBC: CPT

## 2025-05-04 PROCEDURE — 86140 C-REACTIVE PROTEIN: CPT

## 2025-05-04 PROCEDURE — 6360000002 HC RX W HCPCS

## 2025-05-04 PROCEDURE — 83735 ASSAY OF MAGNESIUM: CPT

## 2025-05-04 PROCEDURE — 2060000000 HC ICU INTERMEDIATE R&B

## 2025-05-04 PROCEDURE — 85520 HEPARIN ASSAY: CPT

## 2025-05-04 PROCEDURE — 99232 SBSQ HOSP IP/OBS MODERATE 35: CPT | Performed by: INTERNAL MEDICINE

## 2025-05-04 PROCEDURE — 80048 BASIC METABOLIC PNL TOTAL CA: CPT

## 2025-05-04 PROCEDURE — 2500000003 HC RX 250 WO HCPCS: Performed by: ANESTHESIOLOGY

## 2025-05-04 PROCEDURE — 99233 SBSQ HOSP IP/OBS HIGH 50: CPT | Performed by: INTERNAL MEDICINE

## 2025-05-04 PROCEDURE — 82947 ASSAY GLUCOSE BLOOD QUANT: CPT

## 2025-05-04 RX ORDER — LANOLIN ALCOHOL/MO/W.PET/CERES
400 CREAM (GRAM) TOPICAL 2 TIMES DAILY
Status: COMPLETED | OUTPATIENT
Start: 2025-05-04 | End: 2025-05-05

## 2025-05-04 RX ORDER — POLYETHYLENE GLYCOL 3350 17 G/17G
17 POWDER, FOR SOLUTION ORAL DAILY
Status: DISCONTINUED | OUTPATIENT
Start: 2025-05-04 | End: 2025-05-06 | Stop reason: HOSPADM

## 2025-05-04 RX ORDER — SENNA AND DOCUSATE SODIUM 50; 8.6 MG/1; MG/1
2 TABLET, FILM COATED ORAL 2 TIMES DAILY
Status: DISCONTINUED | OUTPATIENT
Start: 2025-05-04 | End: 2025-05-06 | Stop reason: HOSPADM

## 2025-05-04 RX ADMIN — METHOCARBAMOL 750 MG: 750 TABLET ORAL at 14:47

## 2025-05-04 RX ADMIN — ACETAMINOPHEN 1000 MG: 500 TABLET, FILM COATED ORAL at 21:19

## 2025-05-04 RX ADMIN — ACETAMINOPHEN 1000 MG: 500 TABLET, FILM COATED ORAL at 14:49

## 2025-05-04 RX ADMIN — SPIRONOLACTONE 12.5 MG: 25 TABLET, FILM COATED ORAL at 07:27

## 2025-05-04 RX ADMIN — HYDROMORPHONE HYDROCHLORIDE 1 MG: 1 INJECTION, SOLUTION INTRAMUSCULAR; INTRAVENOUS; SUBCUTANEOUS at 01:45

## 2025-05-04 RX ADMIN — Medication 400 MG: at 21:20

## 2025-05-04 RX ADMIN — POLYETHYLENE GLYCOL 3350 17 G: 17 POWDER, FOR SOLUTION ORAL at 11:14

## 2025-05-04 RX ADMIN — SODIUM CHLORIDE, PRESERVATIVE FREE 10 ML: 5 INJECTION INTRAVENOUS at 12:21

## 2025-05-04 RX ADMIN — INSULIN LISPRO 6 UNITS: 100 INJECTION, SOLUTION INTRAVENOUS; SUBCUTANEOUS at 21:26

## 2025-05-04 RX ADMIN — GABAPENTIN 300 MG: 300 CAPSULE ORAL at 05:15

## 2025-05-04 RX ADMIN — QUETIAPINE FUMARATE 150 MG: 100 TABLET ORAL at 21:22

## 2025-05-04 RX ADMIN — METHOCARBAMOL 750 MG: 750 TABLET ORAL at 01:43

## 2025-05-04 RX ADMIN — CARVEDILOL 12.5 MG: 12.5 TABLET, FILM COATED ORAL at 07:27

## 2025-05-04 RX ADMIN — AMOXICILLIN AND CLAVULANATE POTASSIUM 1 TABLET: 875; 125 TABLET, FILM COATED ORAL at 07:42

## 2025-05-04 RX ADMIN — HEPARIN SODIUM 18 UNITS/KG/HR: 10000 INJECTION, SOLUTION INTRAVENOUS at 17:50

## 2025-05-04 RX ADMIN — PANTOPRAZOLE SODIUM 40 MG: 40 TABLET, DELAYED RELEASE ORAL at 07:27

## 2025-05-04 RX ADMIN — METHOCARBAMOL 750 MG: 750 TABLET ORAL at 21:20

## 2025-05-04 RX ADMIN — CARVEDILOL 12.5 MG: 12.5 TABLET, FILM COATED ORAL at 17:36

## 2025-05-04 RX ADMIN — HYDROMORPHONE HYDROCHLORIDE 1 MG: 1 INJECTION, SOLUTION INTRAMUSCULAR; INTRAVENOUS; SUBCUTANEOUS at 08:03

## 2025-05-04 RX ADMIN — GABAPENTIN 300 MG: 300 CAPSULE ORAL at 14:54

## 2025-05-04 RX ADMIN — HYDROMORPHONE HYDROCHLORIDE 1 MG: 1 INJECTION, SOLUTION INTRAMUSCULAR; INTRAVENOUS; SUBCUTANEOUS at 12:20

## 2025-05-04 RX ADMIN — SENNOSIDES AND DOCUSATE SODIUM 2 TABLET: 50; 8.6 TABLET ORAL at 11:15

## 2025-05-04 RX ADMIN — OXYCODONE 10 MG: 5 TABLET ORAL at 07:27

## 2025-05-04 RX ADMIN — VARENICLINE TARTRATE 0.5 MG: 0.5 TABLET, FILM COATED ORAL at 07:34

## 2025-05-04 RX ADMIN — VARENICLINE TARTRATE 0.5 MG: 0.5 TABLET, FILM COATED ORAL at 17:36

## 2025-05-04 RX ADMIN — Medication 400 MG: at 11:15

## 2025-05-04 RX ADMIN — METHOCARBAMOL 750 MG: 750 TABLET ORAL at 07:27

## 2025-05-04 RX ADMIN — INSULIN GLARGINE 20 UNITS: 100 INJECTION, SOLUTION SUBCUTANEOUS at 07:26

## 2025-05-04 RX ADMIN — GABAPENTIN 300 MG: 300 CAPSULE ORAL at 21:20

## 2025-05-04 RX ADMIN — ISOSORBIDE MONONITRATE 30 MG: 30 TABLET, EXTENDED RELEASE ORAL at 07:26

## 2025-05-04 RX ADMIN — HYDROMORPHONE HYDROCHLORIDE 1 MG: 1 INJECTION, SOLUTION INTRAMUSCULAR; INTRAVENOUS; SUBCUTANEOUS at 05:16

## 2025-05-04 RX ADMIN — AMOXICILLIN AND CLAVULANATE POTASSIUM 1 TABLET: 875; 125 TABLET, FILM COATED ORAL at 21:23

## 2025-05-04 RX ADMIN — INSULIN LISPRO 2 UNITS: 100 INJECTION, SOLUTION INTRAVENOUS; SUBCUTANEOUS at 11:13

## 2025-05-04 RX ADMIN — ACETAMINOPHEN 1000 MG: 500 TABLET, FILM COATED ORAL at 05:16

## 2025-05-04 RX ADMIN — SENNOSIDES AND DOCUSATE SODIUM 2 TABLET: 50; 8.6 TABLET ORAL at 14:48

## 2025-05-04 RX ADMIN — ASPIRIN 81 MG: 81 TABLET, COATED ORAL at 07:27

## 2025-05-04 RX ADMIN — HYDROMORPHONE HYDROCHLORIDE 1 MG: 1 INJECTION, SOLUTION INTRAMUSCULAR; INTRAVENOUS; SUBCUTANEOUS at 16:46

## 2025-05-04 RX ADMIN — ONDANSETRON 4 MG: 4 TABLET, ORALLY DISINTEGRATING ORAL at 21:20

## 2025-05-04 RX ADMIN — ATORVASTATIN CALCIUM 40 MG: 40 TABLET, FILM COATED ORAL at 21:20

## 2025-05-04 ASSESSMENT — PAIN SCALES - GENERAL
PAINLEVEL_OUTOF10: 10
PAINLEVEL_OUTOF10: 8
PAINLEVEL_OUTOF10: 9
PAINLEVEL_OUTOF10: 5
PAINLEVEL_OUTOF10: 5
PAINLEVEL_OUTOF10: 10
PAINLEVEL_OUTOF10: 6
PAINLEVEL_OUTOF10: 10
PAINLEVEL_OUTOF10: 8

## 2025-05-04 ASSESSMENT — PAIN DESCRIPTION - LOCATION
LOCATION: LEG
LOCATION: LEG;FOOT
LOCATION: LEG

## 2025-05-04 ASSESSMENT — PAIN DESCRIPTION - DESCRIPTORS
DESCRIPTORS: ACHING;BURNING
DESCRIPTORS: ACHING
DESCRIPTORS: ACHING;THROBBING;NAGGING
DESCRIPTORS: ACHING
DESCRIPTORS: ACHING;BURNING

## 2025-05-04 ASSESSMENT — PAIN DESCRIPTION - ORIENTATION
ORIENTATION: RIGHT

## 2025-05-04 ASSESSMENT — PAIN SCALES - WONG BAKER
WONGBAKER_NUMERICALRESPONSE: HURTS A LITTLE BIT
WONGBAKER_NUMERICALRESPONSE: HURTS A LITTLE BIT

## 2025-05-04 NOTE — PLAN OF CARE
Problem: Chronic Conditions and Co-morbidities  Goal: Patient's chronic conditions and co-morbidity symptoms are monitored and maintained or improved  5/4/2025 1311 by Adrian Kaur RN  Outcome: Progressing  5/4/2025 0732 by Eloy Simental RN  Outcome: Progressing  Flowsheets (Taken 5/3/2025 2000)  Care Plan - Patient's Chronic Conditions and Co-Morbidity Symptoms are Monitored and Maintained or Improved: Monitor and assess patient's chronic conditions and comorbid symptoms for stability, deterioration, or improvement     Problem: Discharge Planning  Goal: Discharge to home or other facility with appropriate resources  5/4/2025 1311 by Adrian Karu RN  Outcome: Progressing  5/4/2025 0732 by Eloy Simental RN  Outcome: Progressing  Flowsheets (Taken 5/3/2025 2000)  Discharge to home or other facility with appropriate resources: Identify barriers to discharge with patient and caregiver     Problem: Pain  Goal: Verbalizes/displays adequate comfort level or baseline comfort level  5/4/2025 1311 by Adrian Kaur RN  Outcome: Progressing  5/4/2025 0732 by Eloy Simental RN  Outcome: Progressing     Problem: Skin/Tissue Integrity  Goal: Skin integrity remains intact  Description: 1.  Monitor for areas of redness and/or skin breakdown2.  Assess vascular access sites hourly3.  Every 4-6 hours minimum:  Change oxygen saturation probe site4.  Every 4-6 hours:  If on nasal continuous positive airway pressure, respiratory therapy assess nares and determine need for appliance change or resting period  5/4/2025 1311 by Adrian Kaur RN  Outcome: Progressing  5/4/2025 0732 by Eloy Simental RN  Outcome: Progressing  Flowsheets (Taken 5/3/2025 2000)  Skin Integrity Remains Intact: Monitor for areas of redness and/or skin breakdown     Problem: Safety - Adult  Goal: Free from fall injury  5/4/2025 1311 by Adrian Kaur RN  Outcome: Progressing  5/4/2025

## 2025-05-04 NOTE — PLAN OF CARE
Problem: Chronic Conditions and Co-morbidities  Goal: Patient's chronic conditions and co-morbidity symptoms are monitored and maintained or improved  Outcome: Progressing  Flowsheets (Taken 5/3/2025 2000)  Care Plan - Patient's Chronic Conditions and Co-Morbidity Symptoms are Monitored and Maintained or Improved: Monitor and assess patient's chronic conditions and comorbid symptoms for stability, deterioration, or improvement     Problem: Discharge Planning  Goal: Discharge to home or other facility with appropriate resources  Outcome: Progressing  Flowsheets (Taken 5/3/2025 2000)  Discharge to home or other facility with appropriate resources: Identify barriers to discharge with patient and caregiver     Problem: Pain  Goal: Verbalizes/displays adequate comfort level or baseline comfort level  Outcome: Progressing     Problem: Skin/Tissue Integrity  Goal: Skin integrity remains intact  Description: 1.  Monitor for areas of redness and/or skin breakdown2.  Assess vascular access sites hourly3.  Every 4-6 hours minimum:  Change oxygen saturation probe site4.  Every 4-6 hours:  If on nasal continuous positive airway pressure, respiratory therapy assess nares and determine need for appliance change or resting period  Outcome: Progressing  Flowsheets (Taken 5/3/2025 2000)  Skin Integrity Remains Intact: Monitor for areas of redness and/or skin breakdown     Problem: Safety - Adult  Goal: Free from fall injury  Outcome: Progressing     Problem: ABCDS Injury Assessment  Goal: Absence of physical injury  Outcome: Progressing  Flowsheets (Taken 5/3/2025 2000)  Absence of Physical Injury: Implement safety measures based on patient assessment     Problem: Nutrition Deficit:  Goal: Optimize nutritional status  Outcome: Progressing

## 2025-05-05 ENCOUNTER — ANESTHESIA (OUTPATIENT)
Dept: OPERATING ROOM | Age: 55
DRG: 617 | End: 2025-05-05
Payer: MEDICARE

## 2025-05-05 ENCOUNTER — ANESTHESIA EVENT (OUTPATIENT)
Dept: OPERATING ROOM | Age: 55
DRG: 617 | End: 2025-05-05
Payer: MEDICARE

## 2025-05-05 PROBLEM — I73.9 PERIPHERAL VASCULAR DISEASE: Status: ACTIVE | Noted: 2025-05-05

## 2025-05-05 LAB
ANION GAP SERPL CALCULATED.3IONS-SCNC: 10 MMOL/L (ref 9–16)
ANTI-XA UNFRAC HEPARIN: 0.27 IU/L
ANTI-XA UNFRAC HEPARIN: 0.42 IU/L
BASOPHILS # BLD: 0.05 K/UL (ref 0–0.2)
BASOPHILS NFR BLD: 1 % (ref 0–2)
BUN SERPL-MCNC: 10 MG/DL (ref 6–20)
CALCIUM SERPL-MCNC: 8.1 MG/DL (ref 8.6–10.4)
CHLORIDE SERPL-SCNC: 104 MMOL/L (ref 98–107)
CO2 SERPL-SCNC: 25 MMOL/L (ref 20–31)
CREAT SERPL-MCNC: 0.6 MG/DL (ref 0.6–0.9)
CRP SERPL HS-MCNC: 29.9 MG/L (ref 0–5)
EOSINOPHIL # BLD: 0.45 K/UL (ref 0–0.44)
EOSINOPHILS RELATIVE PERCENT: 4 % (ref 1–4)
ERYTHROCYTE [DISTWIDTH] IN BLOOD BY AUTOMATED COUNT: 15.2 % (ref 11.8–14.4)
ERYTHROCYTE [SEDIMENTATION RATE] IN BLOOD BY PHOTOMETRIC METHOD: 79 MM/HR (ref 0–30)
GFR, ESTIMATED: >90 ML/MIN/1.73M2
GLUCOSE BLD-MCNC: 108 MG/DL (ref 65–105)
GLUCOSE BLD-MCNC: 108 MG/DL (ref 65–105)
GLUCOSE BLD-MCNC: 135 MG/DL (ref 65–105)
GLUCOSE BLD-MCNC: 242 MG/DL (ref 65–105)
GLUCOSE SERPL-MCNC: 119 MG/DL (ref 74–99)
HCT VFR BLD AUTO: 28.2 % (ref 36.3–47.1)
HCT VFR BLD AUTO: 28.3 % (ref 36.3–47.1)
HGB BLD-MCNC: 8.6 G/DL (ref 11.9–15.1)
HGB BLD-MCNC: 9.1 G/DL (ref 11.9–15.1)
IMM GRANULOCYTES # BLD AUTO: 0.09 K/UL (ref 0–0.3)
IMM GRANULOCYTES NFR BLD: 1 %
LYMPHOCYTES NFR BLD: 3.76 K/UL (ref 1.1–3.7)
LYMPHOCYTES RELATIVE PERCENT: 36 % (ref 24–43)
MCH RBC QN AUTO: 26.9 PG (ref 25.2–33.5)
MCHC RBC AUTO-ENTMCNC: 30.5 G/DL (ref 28.4–34.8)
MCV RBC AUTO: 88.1 FL (ref 82.6–102.9)
MONOCYTES NFR BLD: 0.82 K/UL (ref 0.1–1.2)
MONOCYTES NFR BLD: 8 % (ref 3–12)
NEUTROPHILS NFR BLD: 50 % (ref 36–65)
NEUTS SEG NFR BLD: 5.41 K/UL (ref 1.5–8.1)
NRBC BLD-RTO: 0 PER 100 WBC
PLATELET # BLD AUTO: 407 K/UL (ref 138–453)
PMV BLD AUTO: 9.3 FL (ref 8.1–13.5)
POTASSIUM SERPL-SCNC: 4.2 MMOL/L (ref 3.7–5.3)
RBC # BLD AUTO: 3.2 M/UL (ref 3.95–5.11)
RBC # BLD: ABNORMAL 10*6/UL
SODIUM SERPL-SCNC: 139 MMOL/L (ref 136–145)
WBC OTHER # BLD: 10.6 K/UL (ref 3.5–11.3)

## 2025-05-05 PROCEDURE — 13160 SEC CLSR SURG WND/DEHSN XTN: CPT | Performed by: PODIATRIST

## 2025-05-05 PROCEDURE — 2580000003 HC RX 258

## 2025-05-05 PROCEDURE — 6370000000 HC RX 637 (ALT 250 FOR IP)

## 2025-05-05 PROCEDURE — 3600000014 HC SURGERY LEVEL 4 ADDTL 15MIN: Performed by: PODIATRIST

## 2025-05-05 PROCEDURE — 36415 COLL VENOUS BLD VENIPUNCTURE: CPT

## 2025-05-05 PROCEDURE — 28820 AMPUTATION OF TOE: CPT | Performed by: PODIATRIST

## 2025-05-05 PROCEDURE — 6360000002 HC RX W HCPCS: Performed by: STUDENT IN AN ORGANIZED HEALTH CARE EDUCATION/TRAINING PROGRAM

## 2025-05-05 PROCEDURE — 7100000000 HC PACU RECOVERY - FIRST 15 MIN: Performed by: PODIATRIST

## 2025-05-05 PROCEDURE — 6370000000 HC RX 637 (ALT 250 FOR IP): Performed by: STUDENT IN AN ORGANIZED HEALTH CARE EDUCATION/TRAINING PROGRAM

## 2025-05-05 PROCEDURE — 85014 HEMATOCRIT: CPT

## 2025-05-05 PROCEDURE — 82947 ASSAY GLUCOSE BLOOD QUANT: CPT

## 2025-05-05 PROCEDURE — 86140 C-REACTIVE PROTEIN: CPT

## 2025-05-05 PROCEDURE — 3600000004 HC SURGERY LEVEL 4 BASE: Performed by: PODIATRIST

## 2025-05-05 PROCEDURE — 6370000000 HC RX 637 (ALT 250 FOR IP): Performed by: ANESTHESIOLOGY

## 2025-05-05 PROCEDURE — 0JQQ0ZZ REPAIR RIGHT FOOT SUBCUTANEOUS TISSUE AND FASCIA, OPEN APPROACH: ICD-10-PCS | Performed by: PODIATRIST

## 2025-05-05 PROCEDURE — 3700000001 HC ADD 15 MINUTES (ANESTHESIA): Performed by: PODIATRIST

## 2025-05-05 PROCEDURE — 1200000000 HC SEMI PRIVATE

## 2025-05-05 PROCEDURE — 2580000003 HC RX 258: Performed by: ANESTHESIOLOGY

## 2025-05-05 PROCEDURE — 3700000000 HC ANESTHESIA ATTENDED CARE: Performed by: PODIATRIST

## 2025-05-05 PROCEDURE — 0Y6P0Z0 DETACHMENT AT RIGHT 1ST TOE, COMPLETE, OPEN APPROACH: ICD-10-PCS | Performed by: PODIATRIST

## 2025-05-05 PROCEDURE — 2500000003 HC RX 250 WO HCPCS: Performed by: STUDENT IN AN ORGANIZED HEALTH CARE EDUCATION/TRAINING PROGRAM

## 2025-05-05 PROCEDURE — 2500000003 HC RX 250 WO HCPCS: Performed by: PODIATRIST

## 2025-05-05 PROCEDURE — 80048 BASIC METABOLIC PNL TOTAL CA: CPT

## 2025-05-05 PROCEDURE — 99232 SBSQ HOSP IP/OBS MODERATE 35: CPT | Performed by: INTERNAL MEDICINE

## 2025-05-05 PROCEDURE — 85018 HEMOGLOBIN: CPT

## 2025-05-05 PROCEDURE — 99233 SBSQ HOSP IP/OBS HIGH 50: CPT | Performed by: INTERNAL MEDICINE

## 2025-05-05 PROCEDURE — 85520 HEPARIN ASSAY: CPT

## 2025-05-05 PROCEDURE — 6360000002 HC RX W HCPCS: Performed by: PODIATRIST

## 2025-05-05 PROCEDURE — 6360000002 HC RX W HCPCS

## 2025-05-05 PROCEDURE — 2709999900 HC NON-CHARGEABLE SUPPLY: Performed by: PODIATRIST

## 2025-05-05 PROCEDURE — 2500000003 HC RX 250 WO HCPCS

## 2025-05-05 PROCEDURE — 85025 COMPLETE CBC W/AUTO DIFF WBC: CPT

## 2025-05-05 PROCEDURE — 7100000001 HC PACU RECOVERY - ADDTL 15 MIN: Performed by: PODIATRIST

## 2025-05-05 PROCEDURE — 88311 DECALCIFY TISSUE: CPT

## 2025-05-05 PROCEDURE — 88305 TISSUE EXAM BY PATHOLOGIST: CPT

## 2025-05-05 PROCEDURE — 85652 RBC SED RATE AUTOMATED: CPT

## 2025-05-05 PROCEDURE — 6360000002 HC RX W HCPCS: Performed by: ANESTHESIOLOGY

## 2025-05-05 PROCEDURE — 99024 POSTOP FOLLOW-UP VISIT: CPT

## 2025-05-05 DEVICE — DRESSING WND MICRONIZED PARTIC 500 MG MATRISTEM MICROMATRIX: Type: IMPLANTABLE DEVICE | Site: FIRST TOE | Status: FUNCTIONAL

## 2025-05-05 RX ORDER — SODIUM CHLORIDE 9 MG/ML
INJECTION, SOLUTION INTRAVENOUS PRN
Status: DISCONTINUED | OUTPATIENT
Start: 2025-05-05 | End: 2025-05-05 | Stop reason: HOSPADM

## 2025-05-05 RX ORDER — PROPOFOL 10 MG/ML
INJECTION, EMULSION INTRAVENOUS
Status: DISCONTINUED | OUTPATIENT
Start: 2025-05-05 | End: 2025-05-05

## 2025-05-05 RX ORDER — BUPIVACAINE HYDROCHLORIDE 5 MG/ML
INJECTION, SOLUTION EPIDURAL; INTRACAUDAL; PERINEURAL PRN
Status: DISCONTINUED | OUTPATIENT
Start: 2025-05-05 | End: 2025-05-05 | Stop reason: HOSPADM

## 2025-05-05 RX ORDER — MIDAZOLAM HYDROCHLORIDE 1 MG/ML
INJECTION, SOLUTION INTRAMUSCULAR; INTRAVENOUS
Status: DISCONTINUED | OUTPATIENT
Start: 2025-05-05 | End: 2025-05-05 | Stop reason: SDUPTHER

## 2025-05-05 RX ORDER — SODIUM CHLORIDE, SODIUM LACTATE, POTASSIUM CHLORIDE, CALCIUM CHLORIDE 600; 310; 30; 20 MG/100ML; MG/100ML; MG/100ML; MG/100ML
INJECTION, SOLUTION INTRAVENOUS CONTINUOUS
Status: ACTIVE | OUTPATIENT
Start: 2025-05-05 | End: 2025-05-05

## 2025-05-05 RX ORDER — FENTANYL CITRATE 50 UG/ML
INJECTION, SOLUTION INTRAMUSCULAR; INTRAVENOUS
Status: DISCONTINUED | OUTPATIENT
Start: 2025-05-05 | End: 2025-05-05 | Stop reason: SDUPTHER

## 2025-05-05 RX ORDER — MAGNESIUM HYDROXIDE 1200 MG/15ML
LIQUID ORAL CONTINUOUS PRN
Status: DISCONTINUED | OUTPATIENT
Start: 2025-05-05 | End: 2025-05-05 | Stop reason: HOSPADM

## 2025-05-05 RX ORDER — SODIUM CHLORIDE 0.9 % (FLUSH) 0.9 %
5-40 SYRINGE (ML) INJECTION EVERY 12 HOURS SCHEDULED
Status: DISCONTINUED | OUTPATIENT
Start: 2025-05-05 | End: 2025-05-05 | Stop reason: HOSPADM

## 2025-05-05 RX ORDER — PROPOFOL 10 MG/ML
INJECTION, EMULSION INTRAVENOUS
Status: DISCONTINUED | OUTPATIENT
Start: 2025-05-05 | End: 2025-05-05 | Stop reason: SDUPTHER

## 2025-05-05 RX ORDER — CEFAZOLIN SODIUM 1 G/3ML
INJECTION, POWDER, FOR SOLUTION INTRAMUSCULAR; INTRAVENOUS
Status: DISCONTINUED | OUTPATIENT
Start: 2025-05-05 | End: 2025-05-05 | Stop reason: SDUPTHER

## 2025-05-05 RX ORDER — SODIUM CHLORIDE 0.9 % (FLUSH) 0.9 %
5-40 SYRINGE (ML) INJECTION PRN
Status: DISCONTINUED | OUTPATIENT
Start: 2025-05-05 | End: 2025-05-05 | Stop reason: HOSPADM

## 2025-05-05 RX ORDER — NALOXONE HYDROCHLORIDE 0.4 MG/ML
INJECTION, SOLUTION INTRAMUSCULAR; INTRAVENOUS; SUBCUTANEOUS PRN
Status: DISCONTINUED | OUTPATIENT
Start: 2025-05-05 | End: 2025-05-05 | Stop reason: HOSPADM

## 2025-05-05 RX ORDER — LIDOCAINE HYDROCHLORIDE 10 MG/ML
INJECTION, SOLUTION EPIDURAL; INFILTRATION; INTRACAUDAL; PERINEURAL PRN
Status: DISCONTINUED | OUTPATIENT
Start: 2025-05-05 | End: 2025-05-05 | Stop reason: HOSPADM

## 2025-05-05 RX ORDER — IPRATROPIUM BROMIDE AND ALBUTEROL SULFATE 2.5; .5 MG/3ML; MG/3ML
1 SOLUTION RESPIRATORY (INHALATION) ONCE
Status: COMPLETED | OUTPATIENT
Start: 2025-05-05 | End: 2025-05-05

## 2025-05-05 RX ADMIN — METHOCARBAMOL 750 MG: 750 TABLET ORAL at 21:08

## 2025-05-05 RX ADMIN — METHOCARBAMOL 750 MG: 750 TABLET ORAL at 09:07

## 2025-05-05 RX ADMIN — PROPOFOL 30 MG: 10 INJECTION, EMULSION INTRAVENOUS at 12:03

## 2025-05-05 RX ADMIN — INSULIN LISPRO 2 UNITS: 100 INJECTION, SOLUTION INTRAVENOUS; SUBCUTANEOUS at 21:12

## 2025-05-05 RX ADMIN — GABAPENTIN 300 MG: 300 CAPSULE ORAL at 21:08

## 2025-05-05 RX ADMIN — HYDROMORPHONE HYDROCHLORIDE 1 MG: 1 INJECTION, SOLUTION INTRAMUSCULAR; INTRAVENOUS; SUBCUTANEOUS at 21:10

## 2025-05-05 RX ADMIN — SODIUM CHLORIDE: 9 INJECTION, SOLUTION INTRAVENOUS at 11:50

## 2025-05-05 RX ADMIN — POLYETHYLENE GLYCOL 3350 17 G: 17 POWDER, FOR SOLUTION ORAL at 09:04

## 2025-05-05 RX ADMIN — SPIRONOLACTONE 12.5 MG: 25 TABLET, FILM COATED ORAL at 09:08

## 2025-05-05 RX ADMIN — MIDAZOLAM 2 MG: 1 INJECTION INTRAMUSCULAR; INTRAVENOUS at 11:56

## 2025-05-05 RX ADMIN — HEPARIN SODIUM 20 UNITS/KG/HR: 10000 INJECTION, SOLUTION INTRAVENOUS at 17:13

## 2025-05-05 RX ADMIN — SODIUM CHLORIDE, PRESERVATIVE FREE 10 ML: 5 INJECTION INTRAVENOUS at 09:08

## 2025-05-05 RX ADMIN — FENTANYL CITRATE 25 MCG: 50 INJECTION, SOLUTION INTRAMUSCULAR; INTRAVENOUS at 12:02

## 2025-05-05 RX ADMIN — OXYCODONE 10 MG: 5 TABLET ORAL at 16:16

## 2025-05-05 RX ADMIN — ACETAMINOPHEN 1000 MG: 500 TABLET, FILM COATED ORAL at 21:07

## 2025-05-05 RX ADMIN — ASPIRIN 81 MG: 81 TABLET, COATED ORAL at 09:07

## 2025-05-05 RX ADMIN — PHENYLEPHRINE HYDROCHLORIDE 100 MCG: 10 INJECTION INTRAVENOUS at 12:07

## 2025-05-05 RX ADMIN — PROPOFOL 80 MCG/KG/MIN: 10 INJECTION, EMULSION INTRAVENOUS at 12:00

## 2025-05-05 RX ADMIN — HYDROMORPHONE HYDROCHLORIDE 0.25 MG: 1 INJECTION, SOLUTION INTRAMUSCULAR; INTRAVENOUS; SUBCUTANEOUS at 12:56

## 2025-05-05 RX ADMIN — CEFAZOLIN 2 G: 1 INJECTION, POWDER, FOR SOLUTION INTRAMUSCULAR; INTRAVENOUS at 11:58

## 2025-05-05 RX ADMIN — Medication 400 MG: at 09:06

## 2025-05-05 RX ADMIN — CARVEDILOL 12.5 MG: 12.5 TABLET, FILM COATED ORAL at 17:16

## 2025-05-05 RX ADMIN — FENTANYL CITRATE 50 MCG: 50 INJECTION, SOLUTION INTRAMUSCULAR; INTRAVENOUS at 11:56

## 2025-05-05 RX ADMIN — HYDROMORPHONE HYDROCHLORIDE 1 MG: 1 INJECTION, SOLUTION INTRAMUSCULAR; INTRAVENOUS; SUBCUTANEOUS at 09:24

## 2025-05-05 RX ADMIN — Medication 400 MG: at 21:08

## 2025-05-05 RX ADMIN — ATORVASTATIN CALCIUM 40 MG: 40 TABLET, FILM COATED ORAL at 21:07

## 2025-05-05 RX ADMIN — SODIUM CHLORIDE, PRESERVATIVE FREE 10 ML: 5 INJECTION INTRAVENOUS at 09:27

## 2025-05-05 RX ADMIN — HYDROMORPHONE HYDROCHLORIDE 1 MG: 1 INJECTION, SOLUTION INTRAMUSCULAR; INTRAVENOUS; SUBCUTANEOUS at 17:20

## 2025-05-05 RX ADMIN — VARENICLINE TARTRATE 0.5 MG: 0.5 TABLET, FILM COATED ORAL at 09:27

## 2025-05-05 RX ADMIN — IPRATROPIUM BROMIDE AND ALBUTEROL SULFATE 1 DOSE: .5; 2.5 SOLUTION RESPIRATORY (INHALATION) at 11:35

## 2025-05-05 RX ADMIN — AMOXICILLIN AND CLAVULANATE POTASSIUM 1 TABLET: 875; 125 TABLET, FILM COATED ORAL at 21:07

## 2025-05-05 RX ADMIN — SODIUM CHLORIDE, SODIUM LACTATE, POTASSIUM CHLORIDE, AND CALCIUM CHLORIDE: .6; .31; .03; .02 INJECTION, SOLUTION INTRAVENOUS at 09:23

## 2025-05-05 RX ADMIN — CARVEDILOL 12.5 MG: 12.5 TABLET, FILM COATED ORAL at 09:07

## 2025-05-05 RX ADMIN — ISOSORBIDE MONONITRATE 30 MG: 30 TABLET, EXTENDED RELEASE ORAL at 09:08

## 2025-05-05 RX ADMIN — AMOXICILLIN AND CLAVULANATE POTASSIUM 1 TABLET: 875; 125 TABLET, FILM COATED ORAL at 09:09

## 2025-05-05 RX ADMIN — INSULIN GLARGINE 20 UNITS: 100 INJECTION, SOLUTION SUBCUTANEOUS at 09:26

## 2025-05-05 RX ADMIN — QUETIAPINE FUMARATE 150 MG: 100 TABLET ORAL at 21:07

## 2025-05-05 RX ADMIN — VARENICLINE TARTRATE 0.5 MG: 0.5 TABLET, FILM COATED ORAL at 21:12

## 2025-05-05 RX ADMIN — HYDROMORPHONE HYDROCHLORIDE 0.25 MG: 1 INJECTION, SOLUTION INTRAMUSCULAR; INTRAVENOUS; SUBCUTANEOUS at 12:42

## 2025-05-05 RX ADMIN — SODIUM CHLORIDE, PRESERVATIVE FREE 10 ML: 5 INJECTION INTRAVENOUS at 22:27

## 2025-05-05 RX ADMIN — FENTANYL CITRATE 100 MCG: 50 INJECTION, SOLUTION INTRAMUSCULAR; INTRAVENOUS at 12:29

## 2025-05-05 RX ADMIN — FENTANYL CITRATE 25 MCG: 50 INJECTION, SOLUTION INTRAMUSCULAR; INTRAVENOUS at 12:01

## 2025-05-05 ASSESSMENT — ENCOUNTER SYMPTOMS
WHEEZING: 0
EYE ITCHING: 0
CHOKING: 0
ABDOMINAL PAIN: 0
EYE PAIN: 0
COLOR CHANGE: 1
CHEST TIGHTNESS: 0
COUGH: 0
EYE REDNESS: 0
EYE DISCHARGE: 0
PHOTOPHOBIA: 0
APNEA: 0
STRIDOR: 0

## 2025-05-05 ASSESSMENT — PAIN DESCRIPTION - DESCRIPTORS: DESCRIPTORS: DISCOMFORT;SHOOTING

## 2025-05-05 ASSESSMENT — PAIN SCALES - GENERAL
PAINLEVEL_OUTOF10: 7
PAINLEVEL_OUTOF10: 10
PAINLEVEL_OUTOF10: 7
PAINLEVEL_OUTOF10: 0
PAINLEVEL_OUTOF10: 5
PAINLEVEL_OUTOF10: 7
PAINLEVEL_OUTOF10: 10
PAINLEVEL_OUTOF10: 5
PAINLEVEL_OUTOF10: 10

## 2025-05-05 ASSESSMENT — PAIN DESCRIPTION - ORIENTATION
ORIENTATION: RIGHT

## 2025-05-05 ASSESSMENT — PAIN DESCRIPTION - LOCATION
LOCATION: LEG;TOE (COMMENT WHICH ONE)
LOCATION: INCISION;FOOT
LOCATION: FOOT
LOCATION: GENERALIZED

## 2025-05-05 ASSESSMENT — PAIN SCALES - WONG BAKER
WONGBAKER_NUMERICALRESPONSE: NO HURT

## 2025-05-05 ASSESSMENT — PAIN - FUNCTIONAL ASSESSMENT: PAIN_FUNCTIONAL_ASSESSMENT: PREVENTS OR INTERFERES SOME ACTIVE ACTIVITIES AND ADLS

## 2025-05-05 ASSESSMENT — LIFESTYLE VARIABLES: SMOKING_STATUS: 1

## 2025-05-05 NOTE — PLAN OF CARE
Problem: Chronic Conditions and Co-morbidities  Goal: Patient's chronic conditions and co-morbidity symptoms are monitored and maintained or improved  5/5/2025 1650 by Shy Davis RN  Outcome: Progressing  5/5/2025 0326 by Rachel Fish RN  Outcome: Progressing     Problem: Discharge Planning  Goal: Discharge to home or other facility with appropriate resources  5/5/2025 1650 by Shy Davis RN  Outcome: Progressing  5/5/2025 0326 by Rachel Fish RN  Outcome: Progressing     Problem: Pain  Goal: Verbalizes/displays adequate comfort level or baseline comfort level  5/5/2025 1650 by Shy Davis RN  Outcome: Progressing  5/5/2025 0326 by Rachel Fish RN  Outcome: Progressing     Problem: Skin/Tissue Integrity  Goal: Skin integrity remains intact  Description: 1.  Monitor for areas of redness and/or skin breakdown2.  Assess vascular access sites hourly3.  Every 4-6 hours minimum:  Change oxygen saturation probe site4.  Every 4-6 hours:  If on nasal continuous positive airway pressure, respiratory therapy assess nares and determine need for appliance change or resting period  5/5/2025 1650 by Shy Davis RN  Outcome: Progressing  5/5/2025 0326 by Rachel Fish RN  Outcome: Progressing     Problem: Safety - Adult  Goal: Free from fall injury  5/5/2025 1650 by Shy Davis RN  Outcome: Progressing  5/5/2025 0326 by Rachel Fish RN  Outcome: Progressing     Problem: ABCDS Injury Assessment  Goal: Absence of physical injury  5/5/2025 1650 by Shy Davis RN  Outcome: Progressing  5/5/2025 0326 by Rachel Fish RN  Outcome: Progressing     Problem: Nutrition Deficit:  Goal: Optimize nutritional status  5/5/2025 1650 by Shy Davis RN  Outcome: Progressing  5/5/2025 0326 by Rachel Fish RN  Outcome: Progressing

## 2025-05-05 NOTE — BRIEF OP NOTE
PODIATRY BRIEF OP NOTE    PATIENT NAME: Mikael Estevez  YOB: 1970  -  55 y.o. female  MRN: [id number]  DATE: 5/5/2025  BILLING #: [id number]    Surgeon(s):  Izzy Jeffries DPM     ASSISTANTS: Jackie Escudero DPM PGY1    PRE-OP DIAGNOSIS:   Osteomyelitis hallux, right foot    POST-OP DIAGNOSIS: Same as above.    PROCEDURE:   Amputation of first digit at MPJ, right foot  Delayed primary closure, right foot    ANESTHESIA: MAC    HEMOSTASIS: Direct pressure    ESTIMATED BLOOD LOSS: Less than 5cc.    MATERIALS:   Implant Name Type Inv. Item Serial No.  Lot No. LRB No. Used Action   DRESSING WND MICRONIZED PARTIC 500 MG MATRISTEM MICROMATRIX - [id number]  DRESSING WND MICRONIZED PARTIC 500 MG MATRISTEM MICROMATRIX  [employer]WD [id number] Right 1 Implanted       INJECTABLES: 10 cc 1% lidocaine plain and 0.5% marcaine plain    SPECIMEN:   ID Type Source Tests Collected by Time Destination   A : RIGHT HALLUX Tissue Toe SURGICAL PATHOLOGY Izzy Jeffries DPM 5/5/2025 1215        COMPLICATIONS: None    Findings:  Infection Present At Time Of Surgery (PATOS) (choose all levels that have infection present):  - Organ Space infection (below fascia) present as evidenced by osteomyelitis  Other Findings: Amputation of right hallux at first MPJ. Closed in layers with overlying dressings: adaptic, gauze, kerlix, ace. Patient is stable for discharge.      Jackie Escudero DPM PGY1  Podiatric Medicine & Surgery   5/5/2025 at 12:41 PM

## 2025-05-05 NOTE — OP NOTE
PODIATRY OP NOTE     PATIENT NAME: Mikael Estevez  YOB: 1970  -  55 y.o. female  MRN: [id number]  DATE: 5/5/2025  BILLING #: [id number]     Surgeon(s):  Izzy Jeffries DPM      ASSISTANTS: Jackie Escudero DPM PGY1     PRE-OP DIAGNOSIS:   Osteomyelitis hallux, right foot     POST-OP DIAGNOSIS: Same as above.     PROCEDURE:   Amputation of first digit at MPJ, right foot  Delayed primary closure, right foot     ANESTHESIA: MAC     HEMOSTASIS: Direct pressure     ESTIMATED BLOOD LOSS: Less than 5cc.     MATERIALS:   Implant Name Type Inv. Item Serial No.  Lot No. LRB No. Used Action   DRESSING WND MICRONIZED PARTIC 500 MG MATRISTEM MICROMATRIX - [id number]   DRESSING WND MICRONIZED PARTIC 500 MG MATRISTEM MICROMATRIX   [employer]WD [id number] Right 1 Implanted         INJECTABLES: 10 cc 1% lidocaine plain and 0.5% marcaine plain     SPECIMEN:   ID Type Source Tests Collected by Time Destination   A : RIGHT HALLUX Tissue Toe SURGICAL PATHOLOGY Izzy Jeffries DPM 5/5/2025 1215           COMPLICATIONS: None     Findings:  Infection Present At Time Of Surgery (PATOS) (choose all levels that have infection present):  - Organ Space infection (below fascia) present as evidenced by osteomyelitis  Other Findings: Amputation of right hallux at first MPJ. Closed in layers with overlying dressings: adaptic, gauze, kerlix, ace. Patient is stable for discharge.    INDICATION FOR PROCEDURE: The patient has had an infection of the right hallux for some time. This has failed conservative treatments thus far, and the patient elects to undergo surgical correction. She previously underwent partial hallux amputation but due to poor blood flow needed urgent vascular intervention via bypass. It was discussed with the patient in detail given their compromised vascular status that they are at high risk of further amputation and or limb loss. Patient has been on antibiotics:  No guarantees were

## 2025-05-05 NOTE — ANESTHESIA PRE PROCEDURE
bypass with CryoVein on the 5/2/25).  - DVT and PE.      Other Findings:         Anesthesia Plan      MAC     ASA 4       Induction: intravenous.    MIPS: Postoperative opioids intended and Prophylactic antiemetics administered.  Anesthetic plan and risks discussed with patient.    Use of blood products discussed with patient whom consented to blood products.    Plan discussed with resident.                  Cheyenne Crawley MD   5/5/2025

## 2025-05-05 NOTE — ANESTHESIA POSTPROCEDURE EVALUATION
Department of Anesthesiology  Postprocedure Note    Patient: Mikael Estevez  MRN: [id number]  YOB: 1970  Date of evaluation: 5/5/2025    Procedure Summary       Date: 05/05/25 Room / Location: [address]    Anesthesia Start: 1150 Anesthesia Stop: 1232    Procedure: RIGHT HALLUX AMPUTATION WITH INTEGRA GRAFT, INTEGRA (Right) Diagnosis:       Osteomyelitis of right foot, unspecified type (HCC)      (Osteomyelitis of right foot, unspecified type (HCC) [M86.9])    Surgeons: Izzy Jeffries DPM Responsible Provider: Cheyenne Crawley MD    Anesthesia Type: MAC ASA Status: 4            Anesthesia Type: No value filed.    Annamaria Phase I: Annamaria Score: 10    Annamaria Phase II: Annamaria Score: 10    Anesthesia Post Evaluation    Patient location during evaluation: PACU  Patient participation: complete - patient participated  Level of consciousness: awake  Airway patency: patent  Nausea & Vomiting: no nausea and no vomiting  Cardiovascular status: blood pressure returned to baseline  Respiratory status: acceptable  Hydration status: euvolemic  Pain management: adequate    No notable events documented.

## 2025-05-06 VITALS
DIASTOLIC BLOOD PRESSURE: 71 MMHG | HEIGHT: 60 IN | SYSTOLIC BLOOD PRESSURE: 126 MMHG | BODY MASS INDEX: 33.15 KG/M2 | TEMPERATURE: 97.8 F | RESPIRATION RATE: 17 BRPM | OXYGEN SATURATION: 97 % | HEART RATE: 76 BPM | WEIGHT: 168.87 LBS

## 2025-05-06 LAB
ANION GAP SERPL CALCULATED.3IONS-SCNC: 9 MMOL/L (ref 9–16)
ANTI-XA UNFRAC HEPARIN: 0.32 IU/L
BUN SERPL-MCNC: 10 MG/DL (ref 6–20)
CALCIUM SERPL-MCNC: 8.1 MG/DL (ref 8.6–10.4)
CHLORIDE SERPL-SCNC: 101 MMOL/L (ref 98–107)
CO2 SERPL-SCNC: 26 MMOL/L (ref 20–31)
CREAT SERPL-MCNC: 0.6 MG/DL (ref 0.6–0.9)
CRP SERPL HS-MCNC: 21.4 MG/L (ref 0–5)
ERYTHROCYTE [SEDIMENTATION RATE] IN BLOOD BY PHOTOMETRIC METHOD: 64 MM/HR (ref 0–30)
GFR, ESTIMATED: >90 ML/MIN/1.73M2
GLUCOSE BLD-MCNC: 233 MG/DL (ref 65–105)
GLUCOSE BLD-MCNC: 243 MG/DL (ref 65–105)
GLUCOSE BLD-MCNC: 324 MG/DL (ref 65–105)
GLUCOSE SERPL-MCNC: 286 MG/DL (ref 74–99)
POTASSIUM SERPL-SCNC: 4.4 MMOL/L (ref 3.7–5.3)
SODIUM SERPL-SCNC: 136 MMOL/L (ref 136–145)
SURGICAL PATHOLOGY REPORT: NORMAL

## 2025-05-06 PROCEDURE — 6370000000 HC RX 637 (ALT 250 FOR IP): Performed by: STUDENT IN AN ORGANIZED HEALTH CARE EDUCATION/TRAINING PROGRAM

## 2025-05-06 PROCEDURE — 97530 THERAPEUTIC ACTIVITIES: CPT

## 2025-05-06 PROCEDURE — 86140 C-REACTIVE PROTEIN: CPT

## 2025-05-06 PROCEDURE — 85520 HEPARIN ASSAY: CPT

## 2025-05-06 PROCEDURE — 85652 RBC SED RATE AUTOMATED: CPT

## 2025-05-06 PROCEDURE — 97116 GAIT TRAINING THERAPY: CPT

## 2025-05-06 PROCEDURE — 99024 POSTOP FOLLOW-UP VISIT: CPT

## 2025-05-06 PROCEDURE — 6360000002 HC RX W HCPCS: Performed by: STUDENT IN AN ORGANIZED HEALTH CARE EDUCATION/TRAINING PROGRAM

## 2025-05-06 PROCEDURE — 80048 BASIC METABOLIC PNL TOTAL CA: CPT

## 2025-05-06 PROCEDURE — 82947 ASSAY GLUCOSE BLOOD QUANT: CPT

## 2025-05-06 PROCEDURE — 99239 HOSP IP/OBS DSCHRG MGMT >30: CPT | Performed by: INTERNAL MEDICINE

## 2025-05-06 PROCEDURE — 2500000003 HC RX 250 WO HCPCS: Performed by: STUDENT IN AN ORGANIZED HEALTH CARE EDUCATION/TRAINING PROGRAM

## 2025-05-06 PROCEDURE — 97535 SELF CARE MNGMENT TRAINING: CPT

## 2025-05-06 PROCEDURE — 6370000000 HC RX 637 (ALT 250 FOR IP)

## 2025-05-06 PROCEDURE — 36415 COLL VENOUS BLD VENIPUNCTURE: CPT

## 2025-05-06 RX ORDER — SENNA AND DOCUSATE SODIUM 50; 8.6 MG/1; MG/1
2 TABLET, FILM COATED ORAL 2 TIMES DAILY
Qty: 30 TABLET | Refills: 0 | Status: SHIPPED | OUTPATIENT
Start: 2025-05-06

## 2025-05-06 RX ORDER — ISOSORBIDE MONONITRATE 30 MG/1
30 TABLET, EXTENDED RELEASE ORAL DAILY
Qty: 30 TABLET | Refills: 3 | Status: SHIPPED | OUTPATIENT
Start: 2025-05-07

## 2025-05-06 RX ORDER — METHOCARBAMOL 750 MG/1
750 TABLET, FILM COATED ORAL
Qty: 40 TABLET | Refills: 0 | Status: SHIPPED | OUTPATIENT
Start: 2025-05-06 | End: 2025-05-16

## 2025-05-06 RX ORDER — OXYCODONE HYDROCHLORIDE 5 MG/1
5 TABLET ORAL EVERY 6 HOURS PRN
Qty: 12 TABLET | Refills: 0 | Status: SHIPPED | OUTPATIENT
Start: 2025-05-06 | End: 2025-05-09

## 2025-05-06 RX ORDER — GABAPENTIN 300 MG/1
300 CAPSULE ORAL EVERY 8 HOURS
Qty: 90 CAPSULE | Refills: 0 | Status: SHIPPED | OUTPATIENT
Start: 2025-05-06 | End: 2025-06-05

## 2025-05-06 RX ORDER — GUAIFENESIN 600 MG/1
600 TABLET, EXTENDED RELEASE ORAL 2 TIMES DAILY PRN
Qty: 12 TABLET | Refills: 0 | Status: SHIPPED | OUTPATIENT
Start: 2025-05-06

## 2025-05-06 RX ORDER — ATORVASTATIN CALCIUM 40 MG/1
40 TABLET, FILM COATED ORAL NIGHTLY
Qty: 30 TABLET | Refills: 3 | Status: SHIPPED | OUTPATIENT
Start: 2025-05-06

## 2025-05-06 RX ORDER — BENZONATATE 100 MG/1
100-200 CAPSULE ORAL 3 TIMES DAILY PRN
Qty: 30 CAPSULE | Refills: 0 | Status: SHIPPED | OUTPATIENT
Start: 2025-05-06

## 2025-05-06 RX ORDER — SPIRONOLACTONE 25 MG/1
12.5 TABLET ORAL DAILY
Qty: 30 TABLET | Refills: 3 | Status: SHIPPED | OUTPATIENT
Start: 2025-05-07

## 2025-05-06 RX ORDER — CARVEDILOL 12.5 MG/1
12.5 TABLET ORAL 2 TIMES DAILY WITH MEALS
Qty: 60 TABLET | Refills: 3 | Status: SHIPPED | OUTPATIENT
Start: 2025-05-06

## 2025-05-06 RX ADMIN — GABAPENTIN 300 MG: 300 CAPSULE ORAL at 11:14

## 2025-05-06 RX ADMIN — ACETAMINOPHEN 1000 MG: 500 TABLET, FILM COATED ORAL at 14:50

## 2025-05-06 RX ADMIN — OXYCODONE 10 MG: 5 TABLET ORAL at 11:14

## 2025-05-06 RX ADMIN — VARENICLINE TARTRATE 0.5 MG: 0.5 TABLET, FILM COATED ORAL at 08:28

## 2025-05-06 RX ADMIN — SENNOSIDES AND DOCUSATE SODIUM 2 TABLET: 50; 8.6 TABLET ORAL at 08:29

## 2025-05-06 RX ADMIN — ISOSORBIDE MONONITRATE 30 MG: 30 TABLET, EXTENDED RELEASE ORAL at 08:29

## 2025-05-06 RX ADMIN — INSULIN LISPRO 2 UNITS: 100 INJECTION, SOLUTION INTRAVENOUS; SUBCUTANEOUS at 08:27

## 2025-05-06 RX ADMIN — INSULIN LISPRO 2 UNITS: 100 INJECTION, SOLUTION INTRAVENOUS; SUBCUTANEOUS at 15:46

## 2025-05-06 RX ADMIN — METHOCARBAMOL 750 MG: 750 TABLET ORAL at 02:14

## 2025-05-06 RX ADMIN — AMOXICILLIN AND CLAVULANATE POTASSIUM 1 TABLET: 875; 125 TABLET, FILM COATED ORAL at 08:28

## 2025-05-06 RX ADMIN — METHOCARBAMOL 750 MG: 750 TABLET ORAL at 14:51

## 2025-05-06 RX ADMIN — HEPARIN SODIUM 20 UNITS/KG/HR: 10000 INJECTION, SOLUTION INTRAVENOUS at 08:34

## 2025-05-06 RX ADMIN — ASPIRIN 81 MG: 81 TABLET, COATED ORAL at 08:28

## 2025-05-06 RX ADMIN — CARVEDILOL 12.5 MG: 12.5 TABLET, FILM COATED ORAL at 08:28

## 2025-05-06 RX ADMIN — INSULIN GLARGINE 20 UNITS: 100 INJECTION, SOLUTION SUBCUTANEOUS at 08:26

## 2025-05-06 RX ADMIN — APIXABAN 5 MG: 5 TABLET, FILM COATED ORAL at 14:56

## 2025-05-06 RX ADMIN — GABAPENTIN 300 MG: 300 CAPSULE ORAL at 06:26

## 2025-05-06 RX ADMIN — OXYCODONE 10 MG: 5 TABLET ORAL at 00:41

## 2025-05-06 RX ADMIN — SPIRONOLACTONE 12.5 MG: 25 TABLET, FILM COATED ORAL at 08:29

## 2025-05-06 RX ADMIN — SODIUM CHLORIDE, PRESERVATIVE FREE 10 ML: 5 INJECTION INTRAVENOUS at 08:29

## 2025-05-06 RX ADMIN — SENNOSIDES AND DOCUSATE SODIUM 2 TABLET: 50; 8.6 TABLET ORAL at 14:51

## 2025-05-06 RX ADMIN — ACETAMINOPHEN 1000 MG: 500 TABLET, FILM COATED ORAL at 06:26

## 2025-05-06 RX ADMIN — METHOCARBAMOL 750 MG: 750 TABLET ORAL at 08:28

## 2025-05-06 RX ADMIN — PANTOPRAZOLE SODIUM 40 MG: 40 TABLET, DELAYED RELEASE ORAL at 06:26

## 2025-05-06 RX ADMIN — HYDROMORPHONE HYDROCHLORIDE 1 MG: 1 INJECTION, SOLUTION INTRAMUSCULAR; INTRAVENOUS; SUBCUTANEOUS at 14:54

## 2025-05-06 RX ADMIN — HYDROMORPHONE HYDROCHLORIDE 1 MG: 1 INJECTION, SOLUTION INTRAMUSCULAR; INTRAVENOUS; SUBCUTANEOUS at 08:21

## 2025-05-06 RX ADMIN — CARVEDILOL 12.5 MG: 12.5 TABLET, FILM COATED ORAL at 15:46

## 2025-05-06 ASSESSMENT — PAIN DESCRIPTION - LOCATION
LOCATION: LEG;FOOT
LOCATION: LEG;FOOT
LOCATION: LEG
LOCATION: LEG;FOOT
LOCATION: LEG;FOOT

## 2025-05-06 ASSESSMENT — PAIN DESCRIPTION - DESCRIPTORS
DESCRIPTORS: ACHING
DESCRIPTORS: DISCOMFORT;ACHING

## 2025-05-06 ASSESSMENT — PAIN - FUNCTIONAL ASSESSMENT: PAIN_FUNCTIONAL_ASSESSMENT: PREVENTS OR INTERFERES SOME ACTIVE ACTIVITIES AND ADLS

## 2025-05-06 ASSESSMENT — PAIN DESCRIPTION - ORIENTATION
ORIENTATION: RIGHT

## 2025-05-06 ASSESSMENT — PAIN DESCRIPTION - FREQUENCY: FREQUENCY: CONTINUOUS

## 2025-05-06 ASSESSMENT — ENCOUNTER SYMPTOMS
CHEST TIGHTNESS: 0
EYE ITCHING: 0
COUGH: 0
EYE DISCHARGE: 0
WHEEZING: 0
EYE REDNESS: 0
APNEA: 0
COLOR CHANGE: 1
EYE PAIN: 0
CHOKING: 0
PHOTOPHOBIA: 0
ABDOMINAL PAIN: 0
STRIDOR: 0

## 2025-05-06 ASSESSMENT — PAIN SCALES - GENERAL
PAINLEVEL_OUTOF10: 7
PAINLEVEL_OUTOF10: 4
PAINLEVEL_OUTOF10: 3
PAINLEVEL_OUTOF10: 8
PAINLEVEL_OUTOF10: 10
PAINLEVEL_OUTOF10: 2
PAINLEVEL_OUTOF10: 10

## 2025-05-06 ASSESSMENT — PAIN SCALES - WONG BAKER
WONGBAKER_NUMERICALRESPONSE: HURTS A LITTLE BIT
WONGBAKER_NUMERICALRESPONSE: HURTS LITTLE MORE

## 2025-05-06 ASSESSMENT — PAIN DESCRIPTION - PAIN TYPE: TYPE: ACUTE PAIN

## 2025-05-06 NOTE — PLAN OF CARE
Problem: Chronic Conditions and Co-morbidities  Goal: Patient's chronic conditions and co-morbidity symptoms are monitored and maintained or improved  5/6/2025 0645 by Dennise Bolton RN  Outcome: Progressing  5/5/2025 1650 by Shy Davis RN  Outcome: Progressing     Problem: Discharge Planning  Goal: Discharge to home or other facility with appropriate resources  5/6/2025 0645 by Dennise Bolton RN  Outcome: Progressing  5/5/2025 1650 by Shy Davis RN  Outcome: Progressing     Problem: Pain  Goal: Verbalizes/displays adequate comfort level or baseline comfort level  5/6/2025 0645 by Dennise Bolton RN  Outcome: Progressing  5/5/2025 1650 by Shy Davis RN  Outcome: Progressing     Problem: Skin/Tissue Integrity  Goal: Skin integrity remains intact  Description: 1.  Monitor for areas of redness and/or skin breakdown2.  Assess vascular access sites hourly3.  Every 4-6 hours minimum:  Change oxygen saturation probe site4.  Every 4-6 hours:  If on nasal continuous positive airway pressure, respiratory therapy assess nares and determine need for appliance change or resting period  5/6/2025 0645 by Dennise Bolton RN  Outcome: Progressing  5/5/2025 1650 by Shy Davis RN  Outcome: Progressing     Problem: Safety - Adult  Goal: Free from fall injury  5/6/2025 0645 by Dennise Bolton RN  Outcome: Progressing  5/5/2025 1650 by Shy Davis RN  Outcome: Progressing     Problem: ABCDS Injury Assessment  Goal: Absence of physical injury  5/6/2025 0645 by Dennise Bolton RN  Outcome: Progressing  5/5/2025 1650 by Shy Davis RN  Outcome: Progressing     Problem: Nutrition Deficit:  Goal: Optimize nutritional status  5/6/2025 0645 by Dennise Bolton RN  Outcome: Progressing  5/5/2025 1650 by Shy Davis RN  Outcome: Progressing

## 2025-05-06 NOTE — PLAN OF CARE
Problem: Chronic Conditions and Co-morbidities  Goal: Patient's chronic conditions and co-morbidity symptoms are monitored and maintained or improved  5/6/2025 1423 by Arianna Boothe RN  Outcome: Completed  5/6/2025 1001 by Arianna Boothe RN  Outcome: Progressing  5/6/2025 0645 by Dennise Bolton RN  Outcome: Progressing     Problem: Discharge Planning  Goal: Discharge to home or other facility with appropriate resources  5/6/2025 1423 by Arianna Boothe RN  Outcome: Completed  5/6/2025 1001 by Arianna Boothe RN  Outcome: Progressing  5/6/2025 0645 by Dennise Bolton RN  Outcome: Progressing     Problem: Pain  Goal: Verbalizes/displays adequate comfort level or baseline comfort level  5/6/2025 1423 by Arianna Boothe RN  Outcome: Completed  5/6/2025 1001 by Arianna Boothe RN  Outcome: Progressing  5/6/2025 0645 by Dennise Bolton RN  Outcome: Progressing     Problem: Skin/Tissue Integrity  Goal: Skin integrity remains intact  Description: 1.  Monitor for areas of redness and/or skin breakdown2.  Assess vascular access sites hourly3.  Every 4-6 hours minimum:  Change oxygen saturation probe site4.  Every 4-6 hours:  If on nasal continuous positive airway pressure, respiratory therapy assess nares and determine need for appliance change or resting period  5/6/2025 1423 by Arianna Boothe RN  Outcome: Completed  5/6/2025 1001 by Arianna Boothe RN  Outcome: Progressing  Flowsheets (Taken 5/6/2025 0700)  Skin Integrity Remains Intact:   Monitor for areas of redness and/or skin breakdown   Every 4-6 hours minimum:  Change oxygen saturation probe site   Assess vascular access sites hourly  5/6/2025 0645 by Dennise Bolton RN  Outcome: Progressing     Problem: Safety - Adult  Goal: Free from fall injury  5/6/2025 1423 by Arianna Boothe RN  Outcome: Completed  5/6/2025 1001 by Arianna Boothe RN  Outcome: Progressing  5/6/2025 0645 by Dennise Bolton RN  Outcome: Progressing     Problem: ABCDS Injury

## 2025-05-06 NOTE — PROGRESS NOTES
[facility] -   Infectious diseases evaluation  admission date 4/19/2025    reason for consultation:   Foot infection    Impression :   Current:  R hallux toe diab infection and osteomyelitis  Hallux tip deep wound draining pus - tender  Diabetic neuropathy  CRP elevation 43  ESR elevation 71  CKD    Other:  Smoker - bipolar  CAD RCA  - EF 55  Hx CVA  Discussion / summary of stay / plan of care/ Recommendations:     HENCE:     Keep cefepime and zyvox   Defer vanco due to CKD  Podiatry planning for hallux amp pend card clearance  4/22 card cath - pend report    Infection Control Recommendations   [place] Precautions      Antimicrobial Stewardship Recommendations   Simplification of therapy  Targeted therapy      History of Present Illness:   Initial history:  Mikael Estevez is a 55 y.o.-year-old female  w DM and admitted for R hallux discoloration and pain progressing x 2 weeks - failed po keflex as outpt - and xray suggesting OM of the hallux - CRP ESR elevation -  No fever and WBC normal  ID called for AB  Started on broad AB          4/21  No pain - awaiting surg  CRP 21  ESR 48  X toe GPC         Interval changes  4/22/2025   Patient Vitals for the past 8 hrs:   BP Temp Temp src Pulse Resp SpO2   04/22/25 1412 (!) 157/73 98.1 °F (36.7 °C) Oral (!) 103 19 100 %   04/22/25 1322 -- -- -- -- 19 --   04/22/25 1043 -- -- -- -- 18 --   04/22/25 0715 116/63 98.4 °F (36.9 °C) Oral 83 18 99 %   4/22  No fever  no chills - abd soft - post cath R wrist -   Toe pain and cx w finegoldia still     Summary of relevant labs:  Labs:    Micro:  Foot cx  4/19  Nasal MRSA       Procedures      Cardiology      Imaging:  MRI foot 4/20  Osteomyelitis involving the tuft of the distal phalanx of the great toe.  Soft tissue emphysema involving the dorsal distal great toe. No localized collection.  Minimal tenosynovitis involving the flexor tendon of the great toe.  Mild dorsal foot soft 
          [facility] -   Infectious diseases evaluation  admission date 4/19/2025    reason for consultation:   Foot infection    Impression :   Current:  R hallux toe diab infection and osteomyelitis  Hallux tip deep wound draining pus - tender  Cx drainage  4/19 finegoldia  PAD - 4/28 R femoral endarterectomy   Post op bandemia 14  Diabetic neuropathy  CRP elevation 43 - 24 - 56  ESR elevation 71  CKD    Other:  Smoker - bipolar  CAD RCA  - EF 55  Hx CVA  Discussion / summary of stay / plan of care/ Recommendations:     HENCE:   Cx drainage  4/19 finegoldia  Cx OR/ hallux tip amputation  4/25 Staphylococcus coagulase negative  in broth, likely contamination  04/28 RIGHT FEMORAL ENDATRERECTOMY XENOSURE PATCH       4/26/25: Cefepime and Zyvox discontinued.PO Augmentin 875-125 mg BID till 5/10 tentatively. reconsiled  Toe Wound care with Dakins - still open   5/2  fem pop bypass Dr Jay  Podiatry toe amp monday      Infection Control Recommendations   [place] Precautions      Antimicrobial Stewardship Recommendations   Simplification of therapy  Targeted therapy      History of Present Illness:   Initial history:  Mikael Estevez is a 55 y.o.-year-old female  w DM and admitted for R hallux discoloration and pain progressing x 2 weeks - failed po keflex as outpt - and xray suggesting OM of the hallux - CRP ESR elevation -  No fever and WBC normal  ID called for AB  Started on broad AB      4/29 4/21  No pain - awaiting surg  CRP 21  ESR 48  X toe GPC         Interval changes  5/2/2025   Patient Vitals for the past 8 hrs:   BP Temp Temp src Pulse Resp SpO2   05/02/25 0515 117/77 99 °F (37.2 °C) Oral 73 19 99 %   4/22  No fever  no chills - abd soft - post cath R wrist -   Toe pain and cx w finegoldia still     4/23  No fever and walking ok - forefoot swollen and bit and sore to touch - no cellulitis - CRP 24    4/24  No fever and no chills - pain toe present - 
          [facility] -   Infectious diseases evaluation  admission date 4/19/2025    reason for consultation:   Foot infection    Impression :   Current:  R hallux toe diab infection and osteomyelitis  Hallux tip deep wound draining pus - tender  Cx drainage  4/19 finegoldia  PAD -   4/28 R femoral endarterectomy   5/2 R fem pop bypass  Post op bandemia 14  Diabetic neuropathy  CRP elevation 43 - 24 - 56  ESR elevation 71  CKD    Other:  Smoker - bipolar  CAD RCA  - EF 55  Hx CVA  Discussion / summary of stay / plan of care/ Recommendations:     HENCE:   Cx drainage  4/19 finegoldia  Cx OR/ hallux tip amputation  4/25 Staphylococcus coagulase negative  in broth, likely contamination  04/28 RIGHT FEMORAL ENDATRERECTOMY XENOSURE PATCH   5/2  fem pop bypass Dr Jay  5/5 Podiatry right hallux amputation with podiatry    4/26/25: Cefepime and Zyvox discontinued  Augmentin 875-125 mg BID till 5/25 tentatively. Reconsiled  Will see wound of the foot  once opened        Infection Control Recommendations   [place] Precautions      Antimicrobial Stewardship Recommendations   Simplification of therapy  Targeted therapy      History of Present Illness:   Initial history:  Mikael Estevez is a 55 y.o.-year-old female  w DM and admitted for R hallux discoloration and pain progressing x 2 weeks - failed po keflex as outpt - and xray suggesting OM of the hallux - CRP ESR elevation -  No fever and WBC normal  ID called for AB  Started on broad AB      4/29 4/21  No pain - awaiting surg  CRP 21  ESR 48  X toe GPC         Interval changes  5/6/2025   Patient Vitals for the past 8 hrs:   BP Temp Temp src Pulse Resp SpO2   05/06/25 0815 126/71 -- -- 76 17 --   05/06/25 0800 (!) 150/56 -- -- 65 18 --   05/06/25 0500 (!) 115/38 -- -- 71 13 --   05/06/25 0454 (!) 93/51 97.9 °F (36.6 °C) Oral 75 12 97 %   05/06/25 0111 -- -- -- -- 14 --   4/22  No fever  no chills - abd soft - post 
          [facility] -   Infectious diseases evaluation  admission date 4/19/2025    reason for consultation:   Foot infection    Impression :   Current:  R hallux toe diab infection and osteomyelitis  Hallux tip deep wound draining pus - tender  Diabetic neuropathy  CRP elevation 43 - 24  ESR elevation 71  CKD    Other:  Smoker - bipolar  CAD RCA  - EF 55  Hx CVA  Discussion / summary of stay / plan of care/ Recommendations:     HENCE:     Keep cefepime and zyvox   Defer vanco due to CKD  Podiatry planning for hallux amp 4/25/25  + card clearance  4/22 card cath - pend report   4/23 CRP 24 and R toe same - R forefoot edema and  pain and no cellulitis     Infection Control Recommendations   [place] Precautions      Antimicrobial Stewardship Recommendations   Simplification of therapy  Targeted therapy      History of Present Illness:   Initial history:  Mikael Estevez is a 55 y.o.-year-old female  w DM and admitted for R hallux discoloration and pain progressing x 2 weeks - failed po keflex as outpt - and xray suggesting OM of the hallux - CRP ESR elevation -  No fever and WBC normal  ID called for AB  Started on broad AB          4/21  No pain - awaiting surg  CRP 21  ESR 48  X toe GPC         Interval changes  4/23/2025   Patient Vitals for the past 8 hrs:   BP Temp Temp src Pulse Resp SpO2   04/23/25 1102 -- 98.6 °F (37 °C) Oral 70 16 --   04/23/25 1003 -- -- -- -- 18 --   04/23/25 0832 -- -- -- -- 17 --   04/23/25 0737 (!) 127/55 98.8 °F (37.1 °C) Oral 85 -- 100 %   04/23/25 0520 -- -- -- -- 18 --   4/22  No fever  no chills - abd soft - post cath R wrist -   Toe pain and cx w finegoldia still     4/23  No fever and walking ok - forefoot swollen and bit and sore to touch - no cellulitis - CRP 24    Summary of relevant labs:  Labs:    Micro:  Foot cx  4/19  Nasal MRSA       Procedures      Cardiology      Imaging:  MRI foot 4/20  Osteomyelitis involving the tuft of the 
          [facility] -   Infectious diseases evaluation  admission date 4/19/2025    reason for consultation:   Foot infection    Impression :   Current:  R hallux toe diab infection and osteomyelitis  Hallux tip deep wound draining pus - tender  Cx drainage  4/19 finegoldia  PAD - 4/28 R femoral endarterectomy   Post op bandemia 14  Diabetic neuropathy  CRP elevation 43 - 24  ESR elevation 71  CKD    Other:  Smoker - bipolar  CAD RCA  - EF 55  Hx CVA  Discussion / summary of stay / plan of care/ Recommendations:   04/28 RIGHT FEMORAL ENDATRERECTOMY XENOSURE PATCH .  HENCE:   Cx drainage  4/19 finegoldia  Cx OR/ hallux amputation  4/25 neg  04/28 RIGHT FEMORAL ENDATRERECTOMY XENOSURE PATCH   4/26/25: Cefepime and Zyvox discontinued.PO Augmentin 875-125 mg BID x 14 days. reconsiled  Wound care with Dakins  Planned for wound VAC  Office f/up in 2 weeks with Dr Mayer for infection. Please call [phone] for appointment    4/29 agitated wants to leaves AMA - refusing her BP meds  Infection Control Recommendations   [place] Precautions      Antimicrobial Stewardship Recommendations   Simplification of therapy  Targeted therapy      History of Present Illness:   Initial history:  Mikael Estevez is a 55 y.o.-year-old female  w DM and admitted for R hallux discoloration and pain progressing x 2 weeks - failed po keflex as outpt - and xray suggesting OM of the hallux - CRP ESR elevation -  No fever and WBC normal  ID called for AB  Started on broad AB          4/21  No pain - awaiting surg  CRP 21  ESR 48  X toe GPC         Interval changes  4/29/2025   Patient Vitals for the past 8 hrs:   BP Temp Temp src Pulse Resp SpO2   04/29/25 0900 (!) 157/59 -- -- 81 16 --   04/29/25 0815 (!) 152/63 -- -- 83 15 99 %   04/29/25 0800 (!) 160/60 98.2 °F (36.8 °C) Oral 80 12 97 %   04/29/25 0700 (!) 172/52 -- -- 79 12 98 %   04/29/25 0600 (!) 158/54 -- -- 81 19 96 %   04/29/25 0500 (!) 159/46 
          [facility] -   Infectious diseases evaluation  admission date 4/19/2025    reason for consultation:   Foot infection    Impression :   Current:  R hallux toe diab infection and osteomyelitis  Hallux tip deep wound draining pus - tender  Diabetic neuropathy  CRP elevation 43 - 24  ESR elevation 71  CKD    Other:  Smoker - bipolar  CAD RCA  - EF 55  Hx CVA  Discussion / summary of stay / plan of care/ Recommendations:     HENCE:     Keep cefepime and zyvox   Defer vanco due to CKD  Podiatry planning for hallux amp 4/25/25      Infection Control Recommendations   [place] Precautions      Antimicrobial Stewardship Recommendations   Simplification of therapy  Targeted therapy      History of Present Illness:   Initial history:  Mikael Estevez is a 55 y.o.-year-old female  w DM and admitted for R hallux discoloration and pain progressing x 2 weeks - failed po keflex as outpt - and xray suggesting OM of the hallux - CRP ESR elevation -  No fever and WBC normal  ID called for AB  Started on broad AB          4/21  No pain - awaiting surg  CRP 21  ESR 48  X toe GPC         Interval changes  4/24/2025   Patient Vitals for the past 8 hrs:   BP Temp Temp src Pulse Resp SpO2   04/24/25 2125 (!) 175/71 97.8 °F (36.6 °C) Oral 71 15 97 %   04/24/25 1610 138/79 98.1 °F (36.7 °C) Oral 80 16 98 %   4/22  No fever  no chills - abd soft - post cath R wrist -   Toe pain and cx w finegoldia still     4/23  No fever and walking ok - forefoot swollen and bit and sore to touch - no cellulitis - CRP 24    4/24  No fever and no chills - pain toe present - planned for amp in am    Summary of relevant labs:  Labs:    Micro:  Foot cx  4/19  Nasal MRSA       Procedures      Cardiology      Imaging:  MRI foot 4/20  Osteomyelitis involving the tuft of the distal phalanx of the great toe.  Soft tissue emphysema involving the dorsal distal great toe. No localized collection.  Minimal tenosynovitis 
          [facility] -   Infectious diseases evaluation  admission date 4/19/2025    reason for consultation:   Foot infection    Impression :   Current:  R hallux toe diab infection and osteomyelitis  Hallux tip deep wound draining pus - tender  Cx drainage  4/19 finegoldia  PAD - 4/28 R femoral endarterectomy   Post op bandemia 14  Diabetic neuropathy  CRP elevation 43 - 24 - 56  ESR elevation 71  CKD    Other:  Smoker - bipolar  CAD RCA  - EF 55  Hx CVA  Discussion / summary of stay / plan of care/ Recommendations:     HENCE:   Cx drainage  4/19 finegoldia  Cx OR/ hallux tip amputation  4/25 Staphylococcus coagulase negative  in broth, likely contamination  04/28 RIGHT FEMORAL ENDATRERECTOMY XENOSURE PATCH       4/26/25: Cefepime and Zyvox discontinued.PO Augmentin 875-125 mg BID till 5/10 tentatively. reconsiled  Toe Wound care with Dakins - still open   5/2  fem pop bypass Dr Jay  Podiatry toe amp monday      Infection Control Recommendations   [place] Precautions      Antimicrobial Stewardship Recommendations   Simplification of therapy  Targeted therapy      History of Present Illness:   Initial history:  Mikael Estevez is a 55 y.o.-year-old female  w DM and admitted for R hallux discoloration and pain progressing x 2 weeks - failed po keflex as outpt - and xray suggesting OM of the hallux - CRP ESR elevation -  No fever and WBC normal  ID called for AB  Started on broad AB      4/29 4/21  No pain - awaiting surg  CRP 21  ESR 48  X toe GPC         Interval changes  5/4/2025   Patient Vitals for the past 8 hrs:   BP Temp Temp src Pulse Resp SpO2   05/04/25 1205 (!) 130/49 98.2 °F (36.8 °C) Oral -- -- --   05/04/25 1100 (!) 137/43 -- -- 66 17 --   05/04/25 0900 (!) 99/48 -- -- 64 12 96 %   4/22  No fever  no chills - abd soft - post cath R wrist -   Toe pain and cx w finegoldia still     4/23  No fever and walking ok - forefoot swollen and bit and sore 
          [facility] -   Infectious diseases evaluation  admission date 4/19/2025    reason for consultation:   Foot infection    Impression :   Current:  R hallux toe diab infection and osteomyelitis  Hallux tip deep wound draining pus - tender  Cx drainage  4/19 finegoldia  Diabetic neuropathy  CRP elevation 43 - 24  ESR elevation 71  CKD    Other:  Smoker - bipolar  CAD RCA  - EF 55  Hx CVA  Discussion / summary of stay / plan of care/ Recommendations:     HENCE:   Cx drainahge  4/19 finegoldia  Cx OR/ hallux amputation  4/25 pend  Keep cefepime and zyvox  - adjust to final OR cx  Defer vanco due to CKD      Infection Control Recommendations   [place] Precautions      Antimicrobial Stewardship Recommendations   Simplification of therapy  Targeted therapy      History of Present Illness:   Initial history:  Mikael Estevez is a 55 y.o.-year-old female  w DM and admitted for R hallux discoloration and pain progressing x 2 weeks - failed po keflex as outpt - and xray suggesting OM of the hallux - CRP ESR elevation -  No fever and WBC normal  ID called for AB  Started on broad AB          4/21  No pain - awaiting surg  CRP 21  ESR 48  X toe GPC         Interval changes  4/25/2025   Patient Vitals for the past 8 hrs:   BP Temp Temp src Pulse Resp SpO2   04/25/25 1410 (!) 156/70 98.6 °F (37 °C) Oral 70 15 96 %   04/25/25 1348 (!) 150/67 -- -- 67 15 99 %   04/25/25 1345 (!) 150/67 -- -- 67 19 99 %   04/25/25 1330 128/65 -- -- 69 15 100 %   04/25/25 1325 131/66 96.8 °F (36 °C) Temporal 68 13 100 %   4/22  No fever  no chills - abd soft - post cath R wrist -   Toe pain and cx w finegoldia still     4/23  No fever and walking ok - forefoot swollen and bit and sore to touch - no cellulitis - CRP 24    4/24  No fever and no chills - pain toe present - planned for amp in am    4/25  Toe amputated today - no pain - lungs clear - no fever    Summary of relevant 
          [facility] -   Infectious diseases evaluation  admission date 4/19/2025    reason for consultation:   Foot infection    Impression :   Current:  R hallux toe diab infection and osteomyelitis  Hallux tip deep wound draining pus - tender  Cx drainage  4/19 finegoldia  PAD - 4/28 R femoral endarterectomy   Post op bandemia 14  Diabetic neuropathy  CRP elevation 43 - 24 - 56  ESR elevation 71  CKD    Other:  Smoker - bipolar  CAD RCA  - EF 55  Hx CVA  Discussion / summary of stay / plan of care/ Recommendations:     HENCE:   Cx drainage  4/19 finegoldia  Cx OR/ hallux tip amputation  4/25 Staphylococcus coagulase negative  in broth, likely contamination  04/28 RIGHT FEMORAL ENDATRERECTOMY XENOSURE PATCH   5/2 planned for fem pop bypass Dr Jay    4/26/25: Cefepime and Zyvox discontinued.PO Augmentin 875-125 mg BID till 5/10 tentatively. reconsiled  Toe Wound care with Dakins - still open   5/2 planned for fem pop bypass Dr Jay  Podiatry waiting for vasc results - no clear inpatient surg plans for now      Infection Control Recommendations   [place] Precautions      Antimicrobial Stewardship Recommendations   Simplification of therapy  Targeted therapy      History of Present Illness:   Initial history:  Mikael Estevez is a 55 y.o.-year-old female  w DM and admitted for R hallux discoloration and pain progressing x 2 weeks - failed po keflex as outpt - and xray suggesting OM of the hallux - CRP ESR elevation -  No fever and WBC normal  ID called for AB  Started on broad AB      4/29 4/21  No pain - awaiting surg  CRP 21  ESR 48  X toe GPC         Interval changes  5/1/2025   Patient Vitals for the past 8 hrs:   BP Temp Temp src Pulse Resp   05/01/25 1210 (!) 112/57 98.7 °F (37.1 °C) Oral 77 13   05/01/25 0818 103/73 98.6 °F (37 °C) Oral 75 14   4/22  No fever  no chills - abd soft - post cath R wrist -   Toe pain and cx w finegoldia still     4/23  No 
          [facility] -   Infectious diseases evaluation  admission date 4/19/2025    reason for consultation:   Foot infection    Impression :   Current:  R hallux toe diab infection and osteomyelitis  Hallux tip deep wound draining pus - tender  Cx drainage  4/19 finegoldia  PAD -   4/28 R femoral endarterectomy   5/2 R fem pop bypass  Post op bandemia 14  Diabetic neuropathy  CRP elevation 43 - 24 - 56  ESR elevation 71  CKD    Other:  Smoker - bipolar  CAD RCA  - EF 55  Hx CVA  Discussion / summary of stay / plan of care/ Recommendations:     HENCE:   Cx drainage  4/19 finegoldia  Cx OR/ hallux tip amputation  4/25 Staphylococcus coagulase negative  in broth, likely contamination  04/28 RIGHT FEMORAL ENDATRERECTOMY XENOSURE PATCH   5/2  fem pop bypass Dr Jay  5/5 Podiatry toe amp today 5/5 4/26/25: Cefepime and Zyvox discontinued  Augmentin 875-125 mg BID till 5/25 tentatively. Reconsiled  Will see wound of the foot  once opened        Infection Control Recommendations   [place] Precautions      Antimicrobial Stewardship Recommendations   Simplification of therapy  Targeted therapy      History of Present Illness:   Initial history:  Mikael Estevez is a 55 y.o.-year-old female  w DM and admitted for R hallux discoloration and pain progressing x 2 weeks - failed po keflex as outpt - and xray suggesting OM of the hallux - CRP ESR elevation -  No fever and WBC normal  ID called for AB  Started on broad AB      4/29 4/21  No pain - awaiting surg  CRP 21  ESR 48  X toe GPC         Interval changes  5/5/2025   Patient Vitals for the past 8 hrs:   BP Temp Temp src Pulse Resp SpO2   05/05/25 0924 -- -- -- -- 18 --   05/05/25 0727 (!) 111/46 98 °F (36.7 °C) -- 76 17 96 %   05/05/25 0350 (!) 93/45 98.1 °F (36.7 °C) Oral 63 11 --   4/22  No fever  no chills - abd soft - post cath R wrist -   Toe pain and cx w finegoldia still     4/23  No fever and walking ok - 
      ATTESTATION:    I have discussed the case, including pertinent history and exam findings with the APRN. I have evaluated the  History, physical findings and pictures of the patient and the key elements of the encounter have been performed by me. I have reviewed the laboratory data, other diagnostic studies and discussed them with the APRN. I have updated the medical record where necessary.    I agree with the assessment, plan and orders as documented by the APRN.    In addition diagnostic and decision making elements, performed by the Attending Physician, are included in the Diagnostic and Decision Making Section of the text.    Elements of Medical Decision Making:  Note: I have independently performed the steps listed below as part of the medical decision making and evaluation.   Examined and discussed with patient.  Right hallux diabetic foot infection with osteomyelitis  Status post amputation on 4/25/2025  Culture of the drainage 4/19/2025 Finegoldia magna  Diabetic peripheral neuropathy  CRP elevation  ESR elevation  CKD  Bipolar disease  Peripheral arterial disease  Labs, medications, radiologic studies were reviewed with personal review of films  Radiologic studies  Lab work  Cultures  Finegoldia magna from wound culture  Large amounts of data were reviewed  Please see history of present illness and progress notes  Discussed with nursing Staff, Discharge planner  Dr Little  Infection Control and Prevention measures reviewed  [place] precaution  All prior entries were reviewed  Internal medicine podiatry notes reviewed  Administer medications as ordered  Switch treatment to oral Augmentin and topical Dakin's solution wet-to-dry dressings  Prognosis:   Guarded  Discharge planning reviewed  Follow up as outpatient.    Zachariah Daniel MD.      4/26/2025            [facility] - Daily Progress Note  Today's Date and Time: 4/26/2025, 12:17 PM    Impression : 
     Division of Vascular Surgery             Progress Note      Name: Mikael Estevez  MRN: [id number]         Overnight Events:      No acute overnight events.       Subjective:     Patient seen and examined at bedside for abnormal ABIs and right big toe wound. Afebrile, VS stable. Reports continued pain of the R first toe but states it is tolerable and well-controlled. Has been NPO since midnight. Potential OR with podiatry today for R hallux debridement pending OR availability. Denies fever, chills, n/v/d, chest pain, SOB, abd pain. She endorses that she continues to smoke. No other acute complaints.    Physical Exam:     Vitals:  /63   Pulse 86   Temp 98.1 °F (36.7 °C) (Oral)   Resp 16   Ht 1.702 m (5' 7.01\")   Wt 59.9 kg (132 lb)   SpO2 95%   BMI 20.67 kg/m²     Physical Exam  Constitutional:       Appearance: Normal appearance.   HENT:      Head: Normocephalic.   Eyes:      Pupils: Pupils are equal, round, and reactive to light.   Cardiovascular:      Rate and Rhythm: Normal rate and regular rhythm.      Comments: Palpable bilateral femoral pulses. Strong PT and AT signals in left foot, weak PT signal in right foot.   Pulmonary:      Effort: Pulmonary effort is normal.   Abdominal:      General: There is no distension.      Palpations: Abdomen is soft.   Musculoskeletal:      Cervical back: Normal range of motion.      Comments: Open wound with exposed soft tissue of right hallux dressed. Sensation and motor intact but diminished over right lower extremity.  Skin:     General: Skin is warm and dry.   Neurological:      Mental Status: She is alert and oriented to person, place, and time.     Imaging:   XR FOOT RIGHT (MIN 3 VIEWS)  Result Date: 4/19/2025  Permeative changes of the great toe tuft concerning for osteomyelitis.     CTA ABDOMINAL AORTA W BILAT RUNOFF WW CONTRAST  Result date: 4/20/2025  Moderate to significant atherosclerotic disease involving the abdominal aorta  and its branch 
     Division of Vascular Surgery             Progress Note      Name: Mikael Estevez  MRN: [id number]         Overnight Events:      S/p R hallux toe amputation, POD #1.      Subjective:     Patient seen and examined at bedside for follow up s/p R hallux toe amputation on 5/5/25, s/p right femoral to popliteal bypass with CryoVein on the 5/2/25, and s/p femoral, profunda, and superficial femoral endarterectomy with bovine pericardium patch angioplasty on 4/30/25. She was found resting in bed, not in any acute distress. Patient states that she is having numbness to her right posterior calf that is constant without progression. She endorses new onset dizziness that began this morning with change of position. She states that the dizziness began around 6 am and that it feels like the room is spinning. She denies nausea or vomiting with these symptoms. She denies fever, chills, chest pain, or shortness of breath.     Physical Exam:     Vitals:  BP (!) 115/38   Pulse 71   Temp 97.9 °F (36.6 °C) (Oral)   Resp 13   Ht 1.524 m (5')   Wt 76.6 kg (168 lb 14 oz)   SpO2 97%   BMI 32.98 kg/m²     Physical Exam  Constitutional:       Appearance: Normal appearance.   HENT:      Head: Normocephalic.   Eyes:      Pupils: Pupils are equal, round, and reactive to light.   Cardiovascular:      Rate and Rhythm: Normal rate and regular rhythm.      Comments: Palpable bilateral femoral pulses. Left DP and PT signals.  Pulmonary:      Effort: Pulmonary effort is normal.   Abdominal:      General: There is no distension.      Palpations: Abdomen is soft.   Musculoskeletal:      Cervical back: Normal range of motion.      Comments: Right foot dressing intact. Sensation and motor intact to bilateral lower extremities. Incisions clean dry and intact.  Skin:     General: Skin is warm and dry.   Neurological:      Mental Status: She is alert and oriented to person, place, and time.       Imaging:   XR FOOT RIGHT (MIN 3 VIEWS)  Result 
     Division of Vascular Surgery             Progress Note      Name: Mikael Estevez  MRN: [id number]         Overnight Events:      No acute overnight events.       Subjective:     Patient seen and examined at bedside for abnormal ABIs and right big toe wound. Afebrile, VS stable. Reports continued pain of the R first toe but states it is tolerable and well-controlled. She endorses that podiatry cancelled surgery yesterday due to echocardiogram results. Patient denies chest pain or shortness of breath. She endorses that she continues to smoke.  No other acute complaints.    Physical Exam:     Vitals:  /63   Pulse 83   Temp 98.4 °F (36.9 °C) (Oral)   Resp 18   Ht 1.702 m (5' 7.01\")   Wt 59.9 kg (132 lb)   SpO2 99%   BMI 20.67 kg/m²     Physical Exam  Constitutional:       Appearance: Normal appearance.   HENT:      Head: Normocephalic.   Eyes:      Pupils: Pupils are equal, round, and reactive to light.   Cardiovascular:      Rate and Rhythm: Normal rate and regular rhythm.      Comments: Palpable bilateral femoral pulses. Strong PT and AT signals in left foot, weak PT signal in right foot.   Pulmonary:      Effort: Pulmonary effort is normal.   Abdominal:      General: There is no distension.      Palpations: Abdomen is soft.   Musculoskeletal:      Cervical back: Normal range of motion.      Comments: Open wound with exposed soft tissue of right hallux dressed. Sensation and motor intact but diminished over right lower extremity.  Skin:     General: Skin is warm and dry.   Neurological:      Mental Status: She is alert and oriented to person, place, and time.     Imaging:   XR FOOT RIGHT (MIN 3 VIEWS)  Result Date: 4/19/2025  Permeative changes of the great toe tuft concerning for osteomyelitis.     CTA ABDOMINAL AORTA W BILAT RUNOFF WW CONTRAST  Result date: 4/20/2025  Moderate to significant atherosclerotic disease involving the abdominal aorta  and its branch structures.     Moderate to 
     Division of Vascular Surgery             Progress Note      Name: Mikael Estevez  MRN: [id number]         Overnight Events:      No acute overnight events.       Subjective:     Patient seen and examined at bedside. Patient has no new complaints at this time. Continues to complain of right foot pain, but states that it is well controlled on current pain regimen.     Physical Exam:     Vitals:  /72   Pulse 90   Temp 98.9 °F (37.2 °C) (Oral)   Resp 16   Ht 1.702 m (5' 7.01\")   Wt 59.9 kg (132 lb)   SpO2 94%   BMI 20.67 kg/m²     Physical Exam  Constitutional:       Appearance: Normal appearance.   HENT:      Head: Normocephalic.   Eyes:      Pupils: Pupils are equal, round, and reactive to light.   Cardiovascular:      Rate and Rhythm: Normal rate and regular rhythm.      Comments: Palpable femoral pulses  Strong PT and AT signals in left foot, weak PT signal in right foot  Possible arteriography next week  Pulmonary:      Effort: Pulmonary effort is normal.   Abdominal:      General: There is no distension.      Palpations: Abdomen is soft.   Musculoskeletal:      Cervical back: Normal range of motion.      Comments: Open wound with exposed soft tissue of right hallux  Sensation and motor intact but diminished over right lower extremity   Skin:     General: Skin is warm and dry.   Neurological:      Mental Status: She is alert and oriented to person, place, and time.     Imaging:   XR FOOT RIGHT (MIN 3 VIEWS)  Result Date: 4/19/2025  Permeative changes of the great toe tuft concerning for osteomyelitis.          Assessment:     56 y/o female who presents with nonhealing wounds of the right foot.       Plan:     Will plan for lower extremity angiography in the next few days. Pending OR timing.   CTA of abdomen/pelvis with bilateral lower extremity runoff.   Wound care per podiatry.   Continue IV antibiotics.   Remainder of care per primary team.     Maximo Stein D.O.  General Surgery Resident, 
     Division of Vascular Surgery            Progress Note      Name: Mikael Estevez  MRN: [id number]         Overnight Events:     No acute events overnight      Subjective:     Patient seen and examined at bedside for b/l abnormal ABIs and R hallux wound. She is s/p right common femoral, profunda, and superficial femoral endarterectomy. She is resting comfortably in bed. Afebrile, hypertensive.  Patient has been n.p.o., scheduled for right femoral to peroneal bypass with CryoVein    Physical Exam:     Vitals:  /77   Pulse 73   Temp 99 °F (37.2 °C) (Oral)   Resp 19   Ht 1.524 m (5')   Wt 59.9 kg (132 lb)   SpO2 99%   BMI 25.78 kg/m²     Physical Exam  Constitutional:       Appearance: Normal appearance.   HENT:      Head: Normocephalic.   Eyes:      Pupils: Pupils are equal, round, and reactive to light.   Cardiovascular:      Rate and Rhythm: Normal rate and regular rhythm.      Comments: Palpable bilateral femoral pulses. B/L PT and DP doppler signal present.    Pulmonary:      Effort: Pulmonary effort is normal.   Abdominal:      General: There is no distension.      Palpations: Abdomen is soft.   Musculoskeletal:      Cervical back: Normal range of motion.      Comments: Open wound with exposed soft tissue of right hallux dressed. Sensation and motor intact to bilateral lower extremities. Incision to Right groin with dressing in place - no drainage noted.  Skin:     General: Skin is warm and dry.   Neurological:      Mental Status: She is alert and oriented to person, place, and time.     Imaging:   XR FOOT RIGHT (MIN 3 VIEWS)  Result Date: 4/19/2025  Permeative changes of the great toe tuft concerning for osteomyelitis.     CTA ABDOMINAL AORTA W BILAT RUNOFF WW CONTRAST  Result date: 4/20/2025  Moderate to significant atherosclerotic disease involving the abdominal aorta  and its branch structures.     Moderate to significant stenosis of the distal superior mesenteric artery.     Occlusion of the 
     Division of Vascular Surgery            Progress Note      Name: Mikael Estevez  MRN: [id number]         Overnight Events:     No acute events overnight      Subjective:     Patient seen and examined at bedside for b/l abnormal ABIs and R hallux wound. She is s/p right common femoral, profunda, and superficial femoral endarterectomy. She is resting comfortably in bed. Afebrile, hypertensive. She has recently refused medications post-operatively but since has been more cooperative and understanding. She states she \"feels fine.\"  Discussed at length importance of medication compliance. Patient verbalizes understanding of potential harm to the patch that was placed over her blood vessel. At this time, she is willing to take tylenol, ASA, Lisinopril, Aldactone, and Lopressor. She was agreeing to have blood glucose rechecked but not wanting insulin coverage. Denies CP, SOB, abd pain, n/v/d, fever, chills. No other acute complaints. Dressing intact to Right lower extremity.     Physical Exam:     Vitals:  /71   Pulse 76   Temp 99.3 °F (37.4 °C) (Oral)   Resp 16   Ht 1.524 m (5')   Wt 59.9 kg (132 lb)   SpO2 98%   BMI 25.78 kg/m²     Physical Exam  Constitutional:       Appearance: Normal appearance.   HENT:      Head: Normocephalic.   Eyes:      Pupils: Pupils are equal, round, and reactive to light.   Cardiovascular:      Rate and Rhythm: Normal rate and regular rhythm.      Comments: Palpable bilateral femoral pulses. B/L PT and DP doppler signal present.    Pulmonary:      Effort: Pulmonary effort is normal.   Abdominal:      General: There is no distension.      Palpations: Abdomen is soft.   Musculoskeletal:      Cervical back: Normal range of motion.      Comments: Open wound with exposed soft tissue of right hallux dressed. Sensation and motor intact to bilateral lower extremities. Incision to Right groin with dressing in place - no drainage noted.  Skin:     General: Skin is warm and dry. 
     Division of Vascular Surgery            Progress Note      Name: Mikael Estevez  MRN: [id number]         Overnight Events:     Patient reports numbness to posterior calf on right leg     Subjective:     Patient seen and examined at bedside for follow up s/p femoral, profunda, and superficial femoral endarterectomy with bovine pericardium patch angioplasty 4/30 and s/p right femoral to popliteal bypass with CryoVein on the 5/2/25 . Patient complaining of numbness to posterior calf region on right lower extremity. She endorses that is started yesterday and has not progressed. She has sensation and motor function to right foot. Podiatry following. She states she has not smoked in 3 days. She denies fever, chills, chest pain, or shortness of breath.   Physical Exam:     Vitals:  BP (!) 111/46   Pulse 76   Temp 98 °F (36.7 °C)   Resp 18   Ht 1.524 m (5')   Wt 76.6 kg (168 lb 14 oz)   SpO2 96%   BMI 32.98 kg/m²     Physical Exam  Constitutional:       Appearance: Normal appearance.   HENT:      Head: Normocephalic.   Eyes:      Pupils: Pupils are equal, round, and reactive to light.   Cardiovascular:      Rate and Rhythm: Normal rate and regular rhythm.      Comments: Palpable bilateral femoral pulses.  Bilateral DP and PT signals.  Pulmonary:      Effort: Pulmonary effort is normal.   Abdominal:      General: There is no distension.      Palpations: Abdomen is soft.   Musculoskeletal:      Cervical back: Normal range of motion.      Comments: Right foot dressing intact. Sensation and motor intact to bilateral lower extremities.  Incisions clean dry and intact.  Skin:     General: Skin is warm and dry.   Neurological:      Mental Status: She is alert and oriented to person, place, and time.     Imaging:   XR FOOT RIGHT (MIN 3 VIEWS)  Result Date: 4/19/2025  Permeative changes of the great toe tuft concerning for osteomyelitis.     CTA ABDOMINAL AORTA W BILAT RUNOFF WW CONTRAST  Result date: 
     Division of Vascular Surgery            Progress Note      Name: Mikael Estevez  MRN: [id number]         Overnight Events:      No acute overnight events.       Subjective:     Patient seen and examined at bedside for bilateral abnormal ABIs and R hallux open wound. Afebrile, VS stable. States the pain of her R hallux is worse today and radiating upward into her RLE, but she is resting comfortably in bed. She is receiving scheduled and PRN pain medications. L heart cath done yesterday 4/22 showed moderate CAD, cardiology recommends aggressive risk factor modification, optimal medical management, and smoking cessation, states she may proceed as moderate-high cardiac risk. Denies CP, SOB, fever, chills, n/v/d. No other acute complaints.    Physical Exam:     Vitals:  BP (!) 103/57   Pulse 74   Temp 98.4 °F (36.9 °C) (Oral)   Resp 18   Ht 1.702 m (5' 7.01\")   Wt 59.9 kg (132 lb)   SpO2 99%   BMI 20.67 kg/m²     Physical Exam  Constitutional:       Appearance: Normal appearance.   HENT:      Head: Normocephalic.   Eyes:      Pupils: Pupils are equal, round, and reactive to light.   Cardiovascular:      Rate and Rhythm: Normal rate and regular rhythm.      Comments: Palpable bilateral femoral pulses. Strong PT and AT signals in left foot, weak PT signal in right foot.   Pulmonary:      Effort: Pulmonary effort is normal.   Abdominal:      General: There is no distension.      Palpations: Abdomen is soft.   Musculoskeletal:      Cervical back: Normal range of motion.      Comments: Open wound with exposed soft tissue of right hallux dressed. Sensation and motor intact but diminished over right lower extremity.  Skin:     General: Skin is warm and dry.   Neurological:      Mental Status: She is alert and oriented to person, place, and time.     Imaging:   XR FOOT RIGHT (MIN 3 VIEWS)  Result Date: 4/19/2025  Permeative changes of the great toe tuft concerning for osteomyelitis.     CTA ABDOMINAL AORTA W BILAT 
     Division of Vascular Surgery            Progress Note      Name: Mikael Estevez  MRN: [id number]         Overnight Events:      No acute overnight events.       Subjective:     Patient seen and examined at bedside for b/l abnormal ABIs and R hallux wound. She is resting comfortably in bed. Afebrile, VS stable. Her pain today is mostly unchanged from yesterday. Cardiac clearance was obtained, she is mod-high cardiac risk. Plan for OR today with podiatry for R hallux amputation and OR 4/28 with Dr. Jay for femoral endarterectomy and angiogram. Denies CP, SOB, abd pain, n/v/d, fever, chills. No other acute complaints.    Physical Exam:     Vitals:  BP (!) 156/70   Pulse 70   Temp 98.6 °F (37 °C) (Oral)   Resp 15   Ht 1.524 m (5')   Wt 59.9 kg (132 lb)   SpO2 96%   BMI 25.78 kg/m²     Physical Exam  Constitutional:       Appearance: Normal appearance.   HENT:      Head: Normocephalic.   Eyes:      Pupils: Pupils are equal, round, and reactive to light.   Cardiovascular:      Rate and Rhythm: Normal rate and regular rhythm.      Comments: Palpable bilateral femoral pulses. Strong PT and AT signals in left foot, weak PT signal in right foot.   Pulmonary:      Effort: Pulmonary effort is normal.   Abdominal:      General: There is no distension.      Palpations: Abdomen is soft.   Musculoskeletal:      Cervical back: Normal range of motion.      Comments: Open wound with exposed soft tissue of right hallux dressed. Sensation and motor intact but diminished over right lower extremity.  Skin:     General: Skin is warm and dry.   Neurological:      Mental Status: She is alert and oriented to person, place, and time.     Imaging:   XR FOOT RIGHT (MIN 3 VIEWS)  Result Date: 4/19/2025  Permeative changes of the great toe tuft concerning for osteomyelitis.     CTA ABDOMINAL AORTA W BILAT RUNOFF WW CONTRAST  Result date: 4/20/2025  Moderate to significant atherosclerotic disease involving the abdominal aorta  and its 
     Division of Vascular Surgery            Progress Note      Name: Mikael Estevez  MRN: [id number]         Overnight Events:      Patient refusing medications overnight. She reports wanting to leave AMA.       Subjective:     Patient seen and examined at bedside for b/l abnormal ABIs and R hallux wound. She is s/p right common femoral, profunda, and superficial femoral endarterectomy. She is resting comfortably in bed. Afebrile, hypertensive. She has refused medications post-operatively. She states she \"feels fine.\"  She is requesting to be discharged. Discussed at length importance of medication compliance. Patient verbalizes understanding of potential harm to the patch that was placed over her blood vessel. At this time, she is willing to take tylenol, ASA, Lisinopril, Aldactone, and Lopressor. She was agreeing to have blood glucose rechecked but not wanting insulin coverage. Denies CP, SOB, abd pain, n/v/d, fever, chills. No other acute complaints. Dressing intact to Right lower extremity.     Physical Exam:     Vitals:  BP (!) 158/54   Pulse 81   Temp 98.2 °F (36.8 °C) (Oral)   Resp 19   Ht 1.524 m (5')   Wt 59.9 kg (132 lb)   SpO2 96%   BMI 25.78 kg/m²     Physical Exam  Constitutional:       Appearance: Normal appearance.   HENT:      Head: Normocephalic.   Eyes:      Pupils: Pupils are equal, round, and reactive to light.   Cardiovascular:      Rate and Rhythm: Normal rate and regular rhythm.      Comments: Palpable bilateral femoral pulses. B/L PT and DP doppler signal present.    Pulmonary:      Effort: Pulmonary effort is normal.   Abdominal:      General: There is no distension.      Palpations: Abdomen is soft.   Musculoskeletal:      Cervical back: Normal range of motion.      Comments: Open wound with exposed soft tissue of right hallux dressed. Sensation and motor intact to bilateral lower extremities. Incision to Right groin with dressing in place - no drainage noted.  Skin:     General: 
     Division of Vascular Surgery            Progress Note      Name: Mkiael Estevez  MRN: [id number]         Overnight Events:      No acute overnight events.       Subjective:     Patient seen and examined at bedside for b/l abnormal ABIs and R hallux wound. She is resting comfortably in bed. Afebrile, VS stable. Cardiac clearance was obtained, she is mod-high cardiac risk. S/p OR with podiatry for R hallux amputation on 4/25. Planning for OR 4/28 with Dr. Jay for femoral endarterectomy and angiogram. Denies CP, SOB, abd pain, n/v/d, fever, chills. No other acute complaints. Dressing intact to Right lower extremity. Discussed upcoming surgery, all questions answered at this time.     Physical Exam:     Vitals:  /65   Pulse 79   Temp 98.1 °F (36.7 °C) (Oral)   Resp 16   Ht 1.524 m (5')   Wt 59.9 kg (132 lb)   SpO2 99%   BMI 25.78 kg/m²     Physical Exam  Constitutional:       Appearance: Normal appearance.   HENT:      Head: Normocephalic.   Eyes:      Pupils: Pupils are equal, round, and reactive to light.   Cardiovascular:      Rate and Rhythm: Normal rate and regular rhythm.      Comments: Palpable bilateral femoral pulses. Strong PT and AT signals in left foot, weak PT signal in right foot.   Pulmonary:      Effort: Pulmonary effort is normal.   Abdominal:      General: There is no distension.      Palpations: Abdomen is soft.   Musculoskeletal:      Cervical back: Normal range of motion.      Comments: Open wound with exposed soft tissue of right hallux dressed. Sensation and motor intact but diminished over right lower extremity.  Skin:     General: Skin is warm and dry.   Neurological:      Mental Status: She is alert and oriented to person, place, and time.     Imaging:   XR FOOT RIGHT (MIN 3 VIEWS)  Result Date: 4/19/2025  Permeative changes of the great toe tuft concerning for osteomyelitis.     CTA ABDOMINAL AORTA W BILAT RUNOFF WW CONTRAST  Result date: 4/20/2025  Moderate to significant 
    [facility]  Internal Medicine Teaching Residency Program  Inpatient Daily Progress Note  ______________________________________________________________________________    Patient: Mikael Estevez  YOB: 1970   MRN:[id number]    Acct: [id number]     Room: 0236/0236-01  Admit date: 4/19/2025  Today's date: 04/26/25  Number of days in the hospital: 7  CODE STATUS: Full Code     SUBJECTIVE   Admitting Diagnosis: Acute osteomyelitis (HCC)  CC: Worsening right toe pain   Pt examined at bedside. Chart & results reviewed.     -Overnight events: NAEON  -Current hemodynamic status: HDS  -Urine output: 1 unmeasured urine output volumes.    Morning labs: Pending  BMP: Unremarkable  CBC: stable  Glucose: 163 received 2 units of lispro  CRP is trending up to 13.5->24.1->27.8    TTE: EF 30-35 %, s/p cardiac cath showed mild LAD 40 to 50% stenosis, severe OM 3 stenosis 75, mid RCA 50%    Wound culture from left big toe is growing MANY GRAM POSITIVE COCCI IN PAIRS Abnormal and FINEGOLDIA MAGNA     Diet: ADULT DIET; Regular; 4 carb choices (60 gm/meal)  ADULT ORAL NUTRITION SUPPLEMENT; Breakfast, Lunch, Dinner; Standard High Calorie/High Protein Oral Supplement     Review of Systems as mentioned above      Specialty recommendations:    Podiatry:  s/p toe amputation at interphalangeal joint of right hallux with excisional biopsy of proximal phalanx of right hallux for left big toe amputation due to osteomyelitis, initially plan to do the procedure on 4/21 however was canceled due to reduced EF new onset.    Vascular surgery: Due to diminished peripheral pulses and limb salvage, Right HANNAH: 0.31, TBI 0.00; Left HANNAH: 0.46, TBI 0.00, planning to do right femoral endarterectomy and right leg angiogram on 4/28    Cardiology: For new onset EF dropped from 60% to 35%, for risk stratification.  Moderate to high risk for surgery, continue GDMT, LifeVest and follow-up after 
    [facility]  Internal Medicine Teaching Residency Program  Inpatient Daily Progress Note  ______________________________________________________________________________    Patient: Mikael Estevez  YOB: 1970   MRN:[id number]    Acct: [id number]     Room: 0423/0423-01  Admit date: 4/19/2025  Today's date: 05/02/25  Number of days in the hospital: 13  CODE STATUS: Full Code     SUBJECTIVE   Admitting Diagnosis: Acute osteomyelitis (HCC)  CC: Worsening right toe pain   Pt examined at bedside. Chart & results reviewed.     -Overnight - no acute events   -Current hemodynamic status: HDS - one low reading of BP 94/51 - patient has been asymptomatic   -Patient feeling well, has been mobilizing, no active symptoms or concerns,   - Vascular is planning bypass surgery today     Morning labs:   BMP: unremarkable  CBC:  leukocytosis improving  Glucose: 139  CRP is trending up to 13.5->24.1->27.8->35 > 56     TTE: EF 30-35 %, s/p cardiac cath showed mild LAD 40 to 50% stenosis, severe OM 3 stenosis 75, mid RCA 50%    Wound culture from left big toe on 4/19 is growing MANY GRAM POSITIVE COCCI IN PAIRS Abnormal and FINEGOLDIA MAGNA     Diet: Diet NPO Exceptions are: Sips of Water with Meds     Review of Systems as mentioned above      Specialty recommendations:    Podiatry:  s/p toe amputation at interphalangeal joint of right hallux with excisional biopsy of proximal phalanx of right hallux for left big toe amputation due to osteomyelitis, initially plan to do the procedure on 4/21 however was canceled due to reduced EF new onset.    Vascular surgery: Due to diminished peripheral pulses and limb salvage, Right HANNAH: 0.31, TBI 0.00; Left HANNAH: 0.46, TBI 0.00, s/p right femoral endarterectomy and right leg angiogram on 4/28 - plan for right fem-pop bypass on 5/2     Cardiology: For new onset EF dropped from 60% to 35%, for risk stratification.  Moderate to high risk for 
    [facility]  Internal Medicine Teaching Residency Program  Inpatient Daily Progress Note  ______________________________________________________________________________    Patient: Mikael Estevez  YOB: 1970   MRN:[id number]    Acct: [id number]     Room: 0236/0236-01  Admit date: 4/19/2025  Today's date: 04/22/25  Number of days in the hospital: 3  CODE STATUS: Full Code     SUBJECTIVE   Admitting Diagnosis: Acute osteomyelitis (HCC)  CC: Worsening right toe pain   Pt examined at bedside. Chart & results reviewed.     -Overnight events: NAEON  -Current hemodynamic status: HDS  -Urine output: 3 unmeasured urine output volumes.    Morning labs:   Glucose: 112, received 25 units of lantus and 8 units of lispro.  CRP is trending down to 13.5    TTE: EF 30-35 %, cardiology consulted for new onset worsening EF.    Wound culture from left big toe is growing MANY GRAM POSITIVE COCCI IN PAIRS Abnormal      Diet: ADULT DIET; Regular; 4 carb choices (60 gm/meal)     Review of Systems as mentioned above      Specialty recommendations:    Podiatry: For left big toe amputation due to osteomyelitis, initially plan to do the procedure on 4/21 however was canceled due to reduced EF new onset, awaiting cardiology clearance for surgery    Vascular surgery: Due to diminished peripheral pulses and limb salvage, Right HANNAH: 0.31, TBI 0.00; Left HANNAH: 0.46, TBI 0.00, planning to do right femoral endarterectomy and right leg angiogram on Thursday awaiting cardiology recommendations    Cardiology: For new onset EF dropped from 60% to 35%, for risk stratification.      Plan:  - follow up on cardiology recommendations for risk stratification.  - podiatry reccs for amputation timing.      BRIEF HISTORY     The patient is a 55 y.o. female with PMHx of diabetes mellitus type 2  Hypertension  Bipolar type I  Active cigarette smoker  Single-vessel CAD of RCA.     They present to the ED with 
    [facility]  Internal Medicine Teaching Residency Program  Inpatient Daily Progress Note  ______________________________________________________________________________    Patient: Mikael Estevez  YOB: 1970   MRN:[id number]    Acct: [id number]     Room: 0236/0236-01  Admit date: 4/19/2025  Today's date: 04/25/25  Number of days in the hospital: 6  CODE STATUS: Full Code     SUBJECTIVE   Admitting Diagnosis: Acute osteomyelitis (HCC)  CC: Worsening right toe pain   Pt examined at bedside. Chart & results reviewed.     -Overnight events: NAEON  -Current hemodynamic status: HDS  -Urine output: 2 unmeasured urine output volumes.    Morning labs: Pending  BMP: Unremarkable  CBC:  Glucose: 134, did not receive lantus .  CRP is trending up to 13.5->24.1->27.8    TTE: EF 30-35 %, s/p cardiac cath showed mild LAD 40 to 50% stenosis, severe OM 3 stenosis 75, mid RCA 50%    Wound culture from left big toe is growing MANY GRAM POSITIVE COCCI IN PAIRS Abnormal and FINEGOLDIA MAGNA     Diet: Diet NPO Exceptions are: Sips of Water with Meds     Review of Systems as mentioned above      Specialty recommendations:    Podiatry: For left big toe amputation due to osteomyelitis, initially plan to do the procedure on 4/21 however was canceled due to reduced EF new onset, possible procedure today    Vascular surgery: Due to diminished peripheral pulses and limb salvage, Right HANNAH: 0.31, TBI 0.00; Left HANNAH: 0.46, TBI 0.00, planning to do right femoral endarterectomy and right leg angiogram on 4/28    Cardiology: For new onset EF dropped from 60% to 35%, for risk stratification.  Moderate to high risk for surgery, continue GDMT, LifeVest and follow-up after discharge      Plan:  - Continue pain management    BRIEF HISTORY     The patient is a 55 y.o. female with PMHx of diabetes mellitus type 2  Hypertension  Bipolar type I  Active cigarette smoker  Single-vessel CAD of RCA.   
    [facility]  Internal Medicine Teaching Residency Program  Inpatient Daily Progress Note  ______________________________________________________________________________    Patient: Mikael Estevez  YOB: 1970   MRN:[id number]    Acct: [id number]     Room: 0236/0236-01  Admit date: 4/19/2025  Today's date: 04/27/25  Number of days in the hospital: 8  CODE STATUS: Full Code     SUBJECTIVE   Admitting Diagnosis: Acute osteomyelitis (HCC)  CC: Worsening right toe pain   Pt examined at bedside. Chart & results reviewed.     -Overnight events: NAEON  -Current hemodynamic status: HDS  -Urine output: 960 and 1 unmeasured urine output volumes.    Morning labs: Pending  BMP: Unremarkable  CBC: stable  Glucose: 144   CRP is trending up to 13.5->24.1->27.8    TTE: EF 30-35 %, s/p cardiac cath showed mild LAD 40 to 50% stenosis, severe OM 3 stenosis 75, mid RCA 50%    Wound culture from left big toe is growing MANY GRAM POSITIVE COCCI IN PAIRS Abnormal and FINEGOLDIA MAGNA     Diet: ADULT DIET; Regular; 4 carb choices (60 gm/meal)  ADULT ORAL NUTRITION SUPPLEMENT; Breakfast, Lunch, Dinner; Standard High Calorie/High Protein Oral Supplement  Diet NPO Exceptions are: Sips of Water with Meds     Review of Systems as mentioned above      Specialty recommendations:    Podiatry:  s/p toe amputation at interphalangeal joint of right hallux with excisional biopsy of proximal phalanx of right hallux for left big toe amputation due to osteomyelitis, initially plan to do the procedure on 4/21 however was canceled due to reduced EF new onset.    Vascular surgery: Due to diminished peripheral pulses and limb salvage, Right HANNAH: 0.31, TBI 0.00; Left HANNAH: 0.46, TBI 0.00, planning to do right femoral endarterectomy and right leg angiogram on 4/28    Cardiology: For new onset EF dropped from 60% to 35%, for risk stratification.  Moderate to high risk for surgery, continue GDMT, LifeVest 
    [facility]  Internal Medicine Teaching Residency Program  Inpatient Daily Progress Note  ______________________________________________________________________________    Patient: Mikael Estevez  YOB: 1970   MRN:[id number]    Acct: [id number]     Room: 43/0543-01  Admit date: 4/19/2025  Today's date: 05/05/25  Number of days in the hospital: 16  CODE STATUS: Full Code     SUBJECTIVE   Admitting Diagnosis: Acute osteomyelitis (HCC)  CC: Worsening right toe pain   Pt examined at bedside. Chart & results reviewed.     -Patient had right femoropopliteal bypass 5/2  - Vitals okay except low readings around 3 AM, latest 111/46, fluid bolus not given overnight, no dizziness   - Patient feeling better - no active concerns or symptoms, no further episodes of NSVT  - No hematoma or swelling over the groin  - No drainage from the bypass site    In: 480   Out: 250 [Urine:250]      Morning labs:   BMP: unremarkable  CBC:  leukocytosis improving  Glucose: 139  CRP is trending up to 13.5->24.1->27.8->35 > 56     TTE: EF 30-35 %, s/p cardiac cath showed mild LAD 40 to 50% stenosis, severe OM 3 stenosis 75, mid RCA 50%    Wound culture from left big toe on 4/19 is growing MANY GRAM POSITIVE COCCI IN PAIRS Abnormal and FINEGOLDIA MAGNA     Diet: Diet NPO     Review of Systems as mentioned above      Specialty recommendations:    Podiatry:  s/p toe amputation at interphalangeal joint of right hallux with excisional biopsy of proximal phalanx of right hallux for left big toe amputation due to osteomyelitis, initially plan to do the procedure on 4/21 however was canceled due to reduced EF new onset.    Vascular surgery: Due to diminished peripheral pulses and limb salvage, Right HANNAH: 0.31, TBI 0.00; Left HANNAH: 0.46, TBI 0.00, s/p right femoral endarterectomy and right leg angiogram on 4/28 - plan for right fem-pop bypass on 5/2     Cardiology: For new onset EF dropped from 
    [facility]  Internal Medicine Teaching Residency Program  Inpatient Daily Progress Note  ______________________________________________________________________________    Patient: Mikael Estevez  YOB: 1970   MRN:[id number]    Acct: [id number]     Room: 0543/0543-01  Admit date: 4/19/2025  Today's date: 05/06/25  Number of days in the hospital: 17  CODE STATUS: Full Code     SUBJECTIVE   Admitting Diagnosis: Acute osteomyelitis (HCC)  CC: Worsening right toe pain   Pt examined at bedside. Chart & results reviewed.     -Patient had right femoropopliteal bypass 5/2  - Vitals okay, BP controlled better  - Complained of dizziness this morning   - Patient feeling better - no other active concerns or symptoms, no further episodes of NSVT  - No hematoma or swelling over the groin  - No drainage from the bypass site    In: 860   Out: -       Morning labs:   BMP: unremarkable  CBC:  leukocytosis improving  Glucose: 139  CRP is trending up to 13.5->24.1->27.8->35 > 56     TTE: EF 30-35 %, s/p cardiac cath showed mild LAD 40 to 50% stenosis, severe OM 3 stenosis 75, mid RCA 50%    Wound culture from left big toe on 4/19 is growing MANY GRAM POSITIVE COCCI IN PAIRS Abnormal and FINEGOLDIA MAGNA     Diet: ADULT DIET; Regular; 4 carb choices (60 gm/meal)     Review of Systems as mentioned above      Specialty recommendations:    Podiatry:  s/p toe amputation at interphalangeal joint of right hallux with excisional biopsy of proximal phalanx of right hallux for left big toe amputation due to osteomyelitis, initially plan to do the procedure on 4/21 however was canceled due to reduced EF new onset.    Vascular surgery: Due to diminished peripheral pulses and limb salvage, Right HANNAH: 0.31, TBI 0.00; Left HANNAH: 0.46, TBI 0.00, s/p right femoral endarterectomy and right leg angiogram on 4/28 - plan for right fem-pop bypass on 5/2     Cardiology: For new onset EF dropped from 
    [facility]  Internal Medicine Teaching Residency Program  Inpatient Daily Progress Note  ______________________________________________________________________________    Patient: Mikael Estevez  YOB: 1970   MRN:[id number]    Acct: [id number]     Room: 0236/0236-01  Admit date: 4/19/2025  Today's date: 04/20/25  Number of days in the hospital: 1  CODE STATUS: [unfilled]     SUBJECTIVE   Admitting Diagnosis: Acute osteomyelitis (HCC)  CC: Worsening right toe pain   Pt examined at bedside. Chart & results reviewed.     -Overnight events: NAEON  -Current hemodynamic status: HDS  -Urine output: not charted    Morning labs: pending    Diet: ADULT DIET; Regular; 4 carb choices (60 gm/meal); Low Sodium (2 gm)     Review of Systems as mentioned above      BRIEF HISTORY     The patient is a 55 y.o. female with PMHx of diabetes mellitus type 2  Hypertension  Bipolar type I  Active cigarette smoker  Single-vessel CAD of RCA.     They present to the ED with a chief complaint of worsening right toe pain.  According the patient she was all right then 2 months ago she had hit her foot and she does not remember how she hit her foot, since then the toe pain started, it progressively worsened and eventually on April presented to ER for toe pain, the workup at that time was unremarkable for osteomyelitis from x-ray and patient was discharged on Keflex however she continuously to have worsening pain and discharge, she completed 7-day course of antibiotics, her pain is still same and even worsening, she can squeeze some discharge out, she also tried to self medicate with pain management however she could not help, she denied for fever, chills, abnormal sensation in lower extremities, she does have diabetes and does not monitors her blood sugar at home she is also noncompliant with diabetic diet.        Review of Systems   Constitutional:  Negative for chills, fatigue and 
    [facility]  Internal Medicine Teaching Residency Program  Inpatient Daily Progress Note  ______________________________________________________________________________    Patient: Mikael Estevez  YOB: 1970   MRN:[id number]    Acct: [id number]     Room: 0236/0236-01  Admit date: 4/19/2025  Today's date: 04/24/25  Number of days in the hospital: 5  CODE STATUS: Full Code     SUBJECTIVE   Admitting Diagnosis: Acute osteomyelitis (HCC)  CC: Worsening right toe pain   Pt examined at bedside. Chart & results reviewed.     -Overnight events: NAEON  -Current hemodynamic status: HDS  -Urine output: 1 unmeasured urine output volumes.    Morning labs:   Glucose: 124, received 15 units of lantus .  CRP is trending up to 13.5->24.1    TTE: EF 30-35 %, s/p cardiac cath showed mild LAD 40 to 50% stenosis, severe OM 3 stenosis 75, mid RCA 50%    Wound culture from left big toe is growing MANY GRAM POSITIVE COCCI IN PAIRS Abnormal and FINEGOLDIA MAGNA     Diet: ADULT DIET; Regular; 4 carb choices (60 gm/meal)  Diet NPO Exceptions are: Sips of Water with Meds  ADULT ORAL NUTRITION SUPPLEMENT; Breakfast, AM Snack, Lunch, PM Snack, Dinner, HS Snack; Diabetic Oral Supplement     Review of Systems as mentioned above      Specialty recommendations:    Podiatry: For left big toe amputation due to osteomyelitis, initially plan to do the procedure on 4/21 however was canceled due to reduced EF new onset, possible procedure today    Vascular surgery: Due to diminished peripheral pulses and limb salvage, Right HANNAH: 0.31, TBI 0.00; Left HANNAH: 0.46, TBI 0.00, planning to do right femoral endarterectomy and right leg angiogram on Thursday    Cardiology: For new onset EF dropped from 60% to 35%, for risk stratification.  Moderate to high risk for surgery, continue GDMT      Plan:  - Continue pain management    BRIEF HISTORY     The patient is a 55 y.o. female with PMHx of diabetes 
    [facility]  Internal Medicine Teaching Residency Program  Inpatient Daily Progress Note  ______________________________________________________________________________    Patient: Mikael Estevez  YOB: 1970   MRN:[id number]    Acct: [id number]     Room: 0236/0236-01  Admit date: 4/19/2025  Today's date: 04/21/25  Number of days in the hospital: 2  CODE STATUS: [unfilled]     SUBJECTIVE   Admitting Diagnosis: Acute osteomyelitis (HCC)  CC: Worsening right toe pain   Pt examined at bedside. Chart & results reviewed.   Patient examined chart reviewed  Blood pressure 125/67  Saturating well on room air  Point-of-care glucose 275  NPO only 10 units Lantus given  Patient has incision debridement by podiatry versus potential right hallux amputation today at 1 PM  Plan on not closing the surgical site due to diminished blood flow  Vascular consulted for PAD, likely planning on inpatient arteriogram.  CRP trending down 21  Continues to be on cefepime and Zyvox      ROS negative except the findings mentioned above  BRIEF HISTORY     The patient is a 55 y.o. female with PMHx of diabetes mellitus type 2  Hypertension  Bipolar type I  Active cigarette smoker  Single-vessel CAD of RCA.     They present to the ED with a chief complaint of worsening right toe pain.  According the patient she was all right then 2 months ago she had hit her foot and she does not remember how she hit her foot, since then the toe pain started, it progressively worsened and eventually on April presented to ER for toe pain, the workup at that time was unremarkable for osteomyelitis from x-ray and patient was discharged on Keflex however she continuously to have worsening pain and discharge, she completed 7-day course of antibiotics, her pain is still same and even worsening, she can squeeze some discharge out, she also tried to self medicate with pain management however she could not help, 
    [facility]  Internal Medicine Teaching Residency Program  Inpatient Daily Progress Note  ______________________________________________________________________________    Patient: Mikael Estevez  YOB: 1970   MRN:[id number]    Acct: [id number]     Room: 0423/0423-01  Admit date: 4/19/2025  Today's date: 04/30/25  Number of days in the hospital: 11  CODE STATUS: Full Code     SUBJECTIVE   Admitting Diagnosis: Acute osteomyelitis (HCC)  CC: Worsening right toe pain   Pt examined at bedside. Chart & results reviewed.     -Overnight events: NAEON  -Current hemodynamic status: HDS  -Urine output: 375 ml and 1 unmeasured urine output volume.    Morning labs:   BMP: unremarkable  CBC:  leukocytosis improving  Glucose: 139  CRP is trending up to 13.5->24.1->27.8->35    TTE: EF 30-35 %, s/p cardiac cath showed mild LAD 40 to 50% stenosis, severe OM 3 stenosis 75, mid RCA 50%    Wound culture from left big toe on 4/19 is growing MANY GRAM POSITIVE COCCI IN PAIRS Abnormal and FINEGOLDIA MAGNA     Diet: ADULT DIET; Regular; 4 carb choices (60 gm/meal)  ADULT ORAL NUTRITION SUPPLEMENT; Breakfast, Lunch, Dinner; Standard High Calorie/High Protein Oral Supplement     Review of Systems as mentioned above      Specialty recommendations:    Podiatry:  s/p toe amputation at interphalangeal joint of right hallux with excisional biopsy of proximal phalanx of right hallux for left big toe amputation due to osteomyelitis, initially plan to do the procedure on 4/21 however was canceled due to reduced EF new onset.    Vascular surgery: Due to diminished peripheral pulses and limb salvage, Right HANNAH: 0.31, TBI 0.00; Left HANNAH: 0.46, TBI 0.00, s/p right femoral endarterectomy and right leg angiogram on 4/28    Cardiology: For new onset EF dropped from 60% to 35%, for risk stratification.  Moderate to high risk for surgery, continue GDMT, LifeVest and follow-up after 
  [facility] - Daily Progress Note  Today's Date and Time: 4/27/2025, 4:35 PM    Impression :   Current:  Right hallux toe diabetic infection with osteomyelitis  Deep wound draining pus - tender  S/p amputation on 4/25/25  Cx drainage:  4/19 finegoldia  Diabetic neuropathy  CRP elevation 43 - 24  ESR elevation 71  CKD     Other:  Smoker  Bipolar  CAD RCA  - EF 55  Hx CVA    Recommendations:     4/26/25: PO Augmentin 875-125 mg BID x 14 days  Cefepime and Zyvox discontinued  No growth on surgical cultures thus far  Finegoldia on initial wound culture.   Wound care with Dakins  Office f/up in 2 weeks with Dr Mayer for infection. Please call [phone] for appointment      Medical Decision Making:     Elements of Medical Decision Making:  Note: I have independently performed the steps listed below as part of the medical decision making and evaluation.   Examined and discussed with patient.  Right hallux diabetic foot infection with osteomyelitis  Status post amputation on 4/25/2025  Culture of the drainage 4/19/2025 Finegoldia magna  Diabetic peripheral neuropathy  CRP elevation  ESR elevation  CKD  Bipolar disease  Peripheral arterial disease  Labs, medications, radiologic studies were reviewed with personal review of films  Radiologic studies  Lab work  Cultures  Finegoldia magna from wound culture  Large amounts of data were reviewed  Please see history of present illness and progress notes  Discussed with nursing Staff, Discharge planner  Dr Little  Infection Control and Prevention measures reviewed  Universal precaution  All prior entries were reviewed  Internal medicine podiatry notes reviewed  Administer medications as ordered  Switch treatment to oral Augmentin and topical Dakin's solution wet-to-dry dressings  Prognosis:   Guarded  Discharge planning reviewed  Follow up as outpatient.        Interval History:     Mikael Estevez is a 55 y.o.-year-old female with a 
  Progress Note  Foot and Ankle Surgery    CC/Reason for consult:   Right foot wound     Due to the complexity of the patient's chief complaint, the need for staged procedures, and their complicated past medical history, an extended hospital stay is anticipated for optimal management and recovery    Interval history:  - Patient scheduled for a right hallux debridement versus amputation yesterday for 2125.  Echo revealed decreased EF at 30 to 35%.  Surgery is therefore canceled and cardiac clearance is needed.  Cardiology consulted.  Will reschedule.    Vitals reviewed, afebrile    Updated Labs:     WBC:   Lab Results   Component Value Date    WBC 6.9 04/22/2025     ESR:  Lab Results   Component Value Date    SEDRATE 44 (H) 04/22/2025     CRP:  Lab Results   Component Value Date    CRP 13.5 (H) 04/22/2025       HPI:   See consult note.     ROS: Denies N/V/F/C/SOB/CP.  Otherwise negative except at stated in the HPI in consultation note.   Vitals:  Vitals:    04/22/25 0715   BP: 116/63   Pulse: 83   Resp: 18   Temp: 98.4 °F (36.9 °C)   SpO2: 99%     No intake/output data recorded.  Labs:  Recent Labs     04/22/25  0408   WBC 6.9   HGB 11.6*   HCT 36.4         K 4.1   BUN 25*   CREATININE 0.8   GLUCOSE 128*   SEDRATE 44*   CRP 13.5*      CBC:  Recent Labs     04/20/25  0923 04/20/25  2108 04/21/25  0551 04/22/25  0408   WBC  --  6.8 6.0 6.9   HGB  --  12.8 12.3 11.6*   HCT  --  39.5 38.6 36.4   PLT  --  396 390 381   CRP 31.9*  --  21.9* 13.5*      BMP:  Recent Labs     04/20/25  0923 04/21/25  0551 04/22/25  0408   * 136 137   K 4.4 4.3 4.1    103 105   CO2 23 22 22   BUN 26* 27* 25*   CREATININE 0.8 0.8 0.8   GLUCOSE 332* 275* 128*   CALCIUM 8.8 8.6 8.1*      Coags:  No results for input(s): \"APTT\", \"INR\" in the last 72 hours.    Invalid input(s): \"PROT\"  Lab Results   Component Value Date    SEDRATE 44 (H) 04/22/2025     Recent Labs     04/20/25  0923 04/21/25  0551 04/22/25  0408   CRP 31.9* 
  Progress Note  Foot and Ankle Surgery    CC/Reason for consult:   Right foot wound     Due to the complexity of the patient's chief complaint, the need for staged procedures, and their complicated past medical history, an extended hospital stay is anticipated for optimal management and recovery    Interval history:  - Patient seen and examined at bedside  - No acute events overnight  - No consitutional symptoms  - Pain well controlled  - Plan for OR today at 1 PM for incision and drainage of the right hallux vs amputation.  Cofirrmed NPO.     Vitals reviewed, afebrile    Updated Labs:     WBC:   Lab Results   Component Value Date    WBC 6.0 04/21/2025     ESR:  Lab Results   Component Value Date    SEDRATE 48 (H) 04/21/2025     CRP:  Lab Results   Component Value Date    CRP 21.9 (H) 04/21/2025       HPI:   See consult note.     ROS: Denies N/V/F/C/SOB/CP.  Otherwise negative except at stated in the HPI in consultation note.   Vitals:  Vitals:    04/21/25 0920   BP: 120/67   Pulse: 84   Resp: 18   Temp: 98.1 °F (36.7 °C)   SpO2: 97%     I/O last 3 completed shifts:  In: 2040 [P.O.:2040]  Out: -   Labs:  Recent Labs     04/21/25  0551   WBC 6.0   HGB 12.3   HCT 38.6         K 4.3   BUN 27*   CREATININE 0.8   GLUCOSE 275*   SEDRATE 48*   CRP 21.9*      CBC:  Recent Labs     04/19/25  0718 04/20/25 0923 04/20/25 2108 04/21/25  0551   WBC 10.8  --  6.8 6.0   HGB 13.9  --  12.8 12.3   HCT 41.4  --  39.5 38.6     --  396 390   CRP 41.3* 31.9*  --  21.9*      BMP:  Recent Labs     04/19/25  0718 04/20/25  0923 04/21/25  0551   * 134* 136   K 4.1 4.4 4.3   CL 96* 101 103   CO2 24 23 22   BUN 17 26* 27*   CREATININE 0.7 0.8 0.8   GLUCOSE 359* 332* 275*   CALCIUM 9.5 8.8 8.6      Coags:  No results for input(s): \"APTT\", \"INR\" in the last 72 hours.    Invalid input(s): \"PROT\"  Lab Results   Component Value Date    SEDRATE 48 (H) 04/21/2025     Recent Labs     04/19/25  0718 04/20/25  0981 
  Progress Note  Foot and Ankle Surgery    CC/Reason for consult:   Right foot wound     Due to the complexity of the patient's chief complaint, the need for staged procedures, and their complicated past medical history, an extended hospital stay is anticipated for optimal management and recovery    Interval history:  Patient seen and evaluated at bedside  ESR/CRP continues to uptrend  Patient denies pain or constitutional symptoms  Cardiology has provided clearance for surgical intervention, patient is moderate to high cardiac risk  Plan for OR tomorrow for right hallux amputation at 12:30 PM    Vitals reviewed, afebrile    Updated Labs:     WBC:   Lab Results   Component Value Date    WBC 8.9 04/24/2025     ESR:  Lab Results   Component Value Date    SEDRATE 69 (H) 04/24/2025     CRP:  Lab Results   Component Value Date    CRP 36.7 (H) 04/24/2025       HPI:   See consult note.     ROS: Denies N/V/F/C/SOB/CP.  Otherwise negative except at stated in the HPI in consultation note.   Vitals:  Vitals:    04/24/25 1220   BP: 132/65   Pulse: 75   Resp: 15   Temp: 98.3 °F (36.8 °C)   SpO2: 100%     I/O last 3 completed shifts:  In: 840 [P.O.:840]  Out: -   Labs:  Recent Labs     04/23/25  0615 04/24/25  0747   WBC 8.6 8.9   HGB 11.3* 12.2   HCT 35.3* 38.4    412     --    K 4.2  --    BUN 15  --    CREATININE 0.7  --    GLUCOSE 132*  --    SEDRATE 52* 69*   CRP 24.1* 36.7*      CBC:  Recent Labs     04/22/25  0408 04/23/25  0615 04/24/25  0747   WBC 6.9 8.6 8.9   HGB 11.6* 11.3* 12.2   HCT 36.4 35.3* 38.4    396 412   CRP 13.5* 24.1* 36.7*      BMP:  Recent Labs     04/22/25  0408 04/23/25  0615    137   K 4.1 4.2    107   CO2 22 22   BUN 25* 15   CREATININE 0.8 0.7   GLUCOSE 128* 132*   CALCIUM 8.1* 8.3*      Coags:  No results for input(s): \"APTT\", \"INR\" in the last 72 hours.    Invalid input(s): \"PROT\"  Lab Results   Component Value Date    SEDRATE 69 (H) 04/24/2025     Recent Labs     
  Progress Note  Foot and Ankle Surgery    CC/Reason for consult:   Right foot wound     Due to the complexity of the patient's chief complaint, the need for staged procedures, and their complicated past medical history, an extended hospital stay is anticipated for optimal management and recovery    Interval history:  Patient seen and evaluated at bedside  ESR/CRP uptrending slightly  Patient denies pain or constitutional symptoms  Patient underwent left heart cath/ coronary angiography yesterday  Cardiology has provided clearance for surgical intervention, patient is moderate to high cardiac risk    Vitals reviewed, afebrile    Updated Labs:     WBC:   Lab Results   Component Value Date    WBC 8.6 04/23/2025     ESR:  Lab Results   Component Value Date    SEDRATE 52 (H) 04/23/2025     CRP:  Lab Results   Component Value Date    CRP 24.1 (H) 04/23/2025       HPI:   See consult note.     ROS: Denies N/V/F/C/SOB/CP.  Otherwise negative except at stated in the HPI in consultation note.   Vitals:  Vitals:    04/23/25 1102   BP:    Pulse: 70   Resp: 16   Temp: 98.6 °F (37 °C)   SpO2:      I/O last 3 completed shifts:  In: 720 [P.O.:720]  Out: 5 [Blood:5]  Labs:  Recent Labs     04/23/25  0615   WBC 8.6   HGB 11.3*   HCT 35.3*         K 4.2   BUN 15   CREATININE 0.7   GLUCOSE 132*   SEDRATE 52*   CRP 24.1*      CBC:  Recent Labs     04/21/25  0551 04/22/25  0408 04/23/25  0615   WBC 6.0 6.9 8.6   HGB 12.3 11.6* 11.3*   HCT 38.6 36.4 35.3*    381 396   CRP 21.9* 13.5* 24.1*      BMP:  Recent Labs     04/21/25  0551 04/22/25  0408 04/23/25  0615    137 137   K 4.3 4.1 4.2    105 107   CO2 22 22 22   BUN 27* 25* 15   CREATININE 0.8 0.8 0.7   GLUCOSE 275* 128* 132*   CALCIUM 8.6 8.1* 8.3*      Coags:  No results for input(s): \"APTT\", \"INR\" in the last 72 hours.    Invalid input(s): \"PROT\"  Lab Results   Component Value Date    SEDRATE 52 (H) 04/23/2025     Recent Labs     04/21/25  0551 
  Progress Note  Foot and Ankle Surgery    CC/Reason for consult:   Right foot wound     Due to the complexity of the patient's chief complaint, the need for staged procedures, and their complicated past medical history, an extended hospital stay is anticipated for optimal management and recovery    Interval history:  Patient seen and evaluated at bedside prior to surgery  Patient denies pain or constitutional symptoms  Cardiology has provided clearance for surgical intervention, patient is moderate to high cardiac risk  Plan for OR today for right hallux amputation at 12:30 PM  Patient confirms she has been NPO since midnight  Dakins solution ordered for post operative wound care    Vitals reviewed, afebrile    Updated Labs:     WBC:   Lab Results   Component Value Date    WBC 9.8 04/25/2025     ESR:  Lab Results   Component Value Date    SEDRATE 69 (H) 04/24/2025     CRP:  Lab Results   Component Value Date    CRP 36.7 (H) 04/24/2025       HPI:   See consult note.     ROS: Denies N/V/F/C/SOB/CP.  Otherwise negative except at stated in the HPI in consultation note.   Vitals:  Vitals:    04/25/25 0630   BP:    Pulse:    Resp: 18   Temp:    SpO2:      I/O last 3 completed shifts:  In: 150 [P.O.:150]  Out: -   Labs:  Recent Labs     04/23/25  0615 04/24/25  0747 04/25/25  0714   WBC 8.6 8.9 9.8   HGB 11.3* 12.2 12.7   HCT 35.3* 38.4 40.5    412 400     --   --    K 4.2  --   --    BUN 15  --   --    CREATININE 0.7  --   --    GLUCOSE 132*  --   --    SEDRATE 52* 69*  --    CRP 24.1* 36.7*  --       CBC:  Recent Labs     04/23/25  0615 04/24/25  0747 04/25/25  0714   WBC 8.6 8.9 9.8   HGB 11.3* 12.2 12.7   HCT 35.3* 38.4 40.5    412 400   CRP 24.1* 36.7*  --       BMP:  Recent Labs     04/23/25  0615      K 4.2      CO2 22   BUN 15   CREATININE 0.7   GLUCOSE 132*   CALCIUM 8.3*      Coags:  No results for input(s): \"APTT\", \"INR\" in the last 72 hours.    Invalid input(s): \"PROT\"  Lab 
  Progress Note  Foot and Ankle Surgery    CC/Reason for consult:   Right foot wound     Due to the complexity of the patient's chief complaint, the need for staged procedures, and their complicated past medical history, an extended hospital stay is anticipated for optimal management and recovery    Interval history:  Patient seen and examined  No issue overnight  Patient endorses pain to right foot, but states it is controlled with current pain regimen  Denies fever, HA, CP, SOB, N/V, dysuria.    Vitals reviewed, afebrile    Updated Labs:     WBC:   Lab Results   Component Value Date    WBC 10.8 04/19/2025     ESR:  Lab Results   Component Value Date    SEDRATE 71 (H) 04/19/2025     CRP:  Lab Results   Component Value Date    CRP 41.3 (H) 04/19/2025       HPI:   The patient is a 55 y.o. female seen at [facility] ED for a right foot wound.  The patient states approximately 2 months ago she kicked something with her left big toe.  The last few weeks she has noticed blood and pus draining from the toe.  She has been having increased pain and swelling to the digit so she presented to the ED for further evaluation.  Upon evaluation the toenail of her right hallux was barely intact.  There appeared to be a wound underneath.  Radiographs obtained in the emergency department show concern for osteomyelitis of the distal tuft of the right hallux.  The patient recently finished a 7-day course of antibiotics without relief.  The patient was admitted to medicine for failure of outpatient antibiotic therapy and osteomyelitis of the hallux.  Patient is a longtime diabetic.  She states she does not know her most recent hemoglobin A1c.  But she does monitor her sugars at home.  Patient admits to extensive history of peripheral neuropathy.  Patient is a community ambulator at baseline and walks unassisted.  Patient lives with her daughter in an apartment complex.  Patient is afebrile and denies constitutional symptoms.  Patient's 
  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, right foot (DOS: 4/25/2025)    Due to the complexity of the patient's chief complaint, the need for staged procedures, and their complicated past medical history, an extended hospital stay is anticipated for optimal management and recovery    Interval history:  - Patient seen and examined at bedside with PT present  - No acute events overnight  - No consitutional symptoms  - Pain well controlled  - Discussed with patient again importance of smoking cessation in order for increased chances at flap survival  - Discussed with patient that hospital is out of forefoot offloading surgical shoes and she will need to pick 1 up outpatient at [facility].  Order placed     Vitals reviewed, afebrile    Updated Labs:     WBC:   Lab Results   Component Value Date    WBC 12.5 (H) 05/01/2025     ESR:  Lab Results   Component Value Date    SEDRATE 90 (H) 05/01/2025     CRP:  Lab Results   Component Value Date    CRP 56.5 (H) 05/01/2025       HPI:   See consult note.     ROS: Denies N/V/F/C/SOB/CP.  Otherwise negative except at stated in the HPI in consultation note.   Vitals:  Vitals:    05/01/25 0818   BP: 103/73   Pulse: 75   Resp: 14   Temp: 98.6 °F (37 °C)   SpO2:      I/O last 3 completed shifts:  In: 130 [P.O.:120; I.V.:10]  Out: -   Labs:  Recent Labs     05/01/25  0738   WBC 12.5*   HGB 11.7*   HCT 36.6         K 3.8   BUN 13   CREATININE 0.8   GLUCOSE 146*   SEDRATE 90*   CRP 56.5*      CBC:  Recent Labs     04/29/25  0530 04/30/25 0312 05/01/25  0738   WBC 14.4* 13.8* 12.5*   HGB 10.5* 10.2* 11.7*   HCT 32.2* 32.8* 36.6    358 388   CRP 21.9* 35.0* 56.5*      BMP:  Recent Labs     04/29/25  0530 04/30/25  0312 05/01/25  0738    136 137   K 4.4 4.3 3.8    103 100   CO2 24 24 24   BUN 15 11 13   CREATININE 0.7 0.7 0.8   GLUCOSE 350* 145* 146* 
  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  Patient agreeable.  - Upon evaluation patient resting in bed, dressings to the right lower extremity are not in place.  Dressings to the right thigh also not in place.  - Vitals and labs reviewed, afebrile    Updated Labs:     WBC:   Lab Results   Component Value Date    WBC 12.8 (H) 05/04/2025     ESR:  Lab Results   Component Value Date    SEDRATE 78 (H) 05/04/2025     CRP:  Lab Results   Component Value Date    CRP 44.7 (H) 05/04/2025       HPI:   See consult note.     ROS: Denies N/V/F/C/SOB/CP.  Otherwise negative except at stated in the HPI in consultation note.   Vitals:  Vitals:    05/04/25 1205   BP: (!) 130/49   Pulse:    Resp:    Temp: 98.2 °F (36.8 °C)   SpO2:      I/O last 3 completed shifts:  In: 1607.9 [P.O.:1280; I.V.:232.7; IV Piggyback:95.2]  Out: 795 [Urine:795]  Labs:  Recent Labs     05/02/25  1239 05/02/25  1255 05/04/25  0500   WBC 13.6*   < > 12.8*   HGB 10.0*   < > 8.1*   HCT 30.4*   < > 24.4*      < > 384   INR 1.1  --   --       < > 139   K 4.3   < > 4.3   BUN 16   < > 15   CREATININE 0.7   < > 0.7   GLUCOSE 164*   < > 161*   SEDRATE 75*   < > 78*   CRP 36.3*   < > 44.7*    < > = values in this interval not displayed.      CBC:  Recent Labs     05/02/25  1239 05/02/25  1255 05/03/25  0521 05/03/25  1432 05/04/25  0500   WBC 13.6* 14.4* 11.8*  --  12.8*   HGB 10.0* 9.9* 8.2*  --  8.1*   HCT 30.4* 31.6* 25.2*  --  24.4*    429 346  --  384   CRP 36.3*  --   --  54.9* 44.7*      BMP:  Recent Labs     05/02/25  1255 05/03/25  0521 05/04/25  0500    135* 139   K 4.3 4.8 4.3    100 103   CO2 25 26 28   BUN 17 22* 15 
  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  - Upon evaluation patient resting in bed. Complains of pain to the left lower extremity.  - Denies constitutional symptoms  - Dressings to RLE clean and intact    -Vitals reviewed, afebrile.    Updated Labs:     WBC:   Lab Results   Component Value Date    WBC 11.8 (H) 05/03/2025     ESR:  Lab Results   Component Value Date    SEDRATE 75 (H) 05/02/2025     CRP:  Lab Results   Component Value Date    CRP 36.3 (H) 05/02/2025       HPI:   See consult note.     ROS: Denies N/V/F/C/SOB/CP.  Otherwise negative except at stated in the HPI in consultation note.   Vitals:  Vitals:    05/03/25 1300   BP:    Pulse: 67   Resp: 12   Temp: 97.8 °F (36.6 °C)   SpO2: 96%     I/O last 3 completed shifts:  In: 1904.6 [P.O.:300; I.V.:1509.4; IV Piggyback:95.2]  Out: 910 [Urine:910]  Labs:  Recent Labs     05/02/25  1239 05/02/25  1255 05/03/25  0521   WBC 13.6*   < > 11.8*   HGB 10.0*   < > 8.2*   HCT 30.4*   < > 25.2*      < > 346   INR 1.1  --   --       < > 135*   K 4.3   < > 4.8   BUN 16   < > 22*   CREATININE 0.7   < > 0.8   GLUCOSE 164*   < > 279*   SEDRATE 75*  --   --    CRP 36.3*  --   --     < > = values in this interval not displayed.      CBC:  Recent Labs     05/01/25  0738 05/02/25  1020 05/02/25  1239 05/02/25  1255 05/03/25  0521   WBC 12.5*  --  13.6* 14.4* 11.8*   HGB 11.7*   < > 10.0* 9.9* 8.2*   HCT 36.6   < > 30.4* 31.6* 25.2*     --  416 429 346   CRP 56.5*  --  36.3*  --   --     < > = values in this interval not displayed.      BMP:  Recent Labs     05/02/25  1239 05/02/25  1255 05/03/25  0521    137 135*   K 
  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     ROS: Denies N/V/F/C/SOB/CP.  Otherwise negative except at stated in the HPI in consultation note.   Vitals:  Vitals:    05/05/25 0954   BP:    Pulse:    Resp: 16   Temp:    SpO2:      I/O last 3 completed shifts:  In: 680 [P.O.:680]  Out: 250 [Urine:250]  Labs:  Recent Labs     05/02/25  1239 05/02/25  1255 05/05/25  0912   WBC 13.6*   < > 10.6   HGB 10.0*   < > 8.6*   HCT 30.4*   < > 28.2*      < > 407   INR 1.1  --   --       < > 139   K 4.3   < > 4.2   BUN 16   < > 10   CREATININE 0.7   < > 0.6   GLUCOSE 164*   < > 119*   SEDRATE 75*   < > 79*   CRP 36.3*   < > 29.9*    < > = values in this interval not displayed.      CBC:  Recent Labs     05/03/25  0521 05/03/25  1432 05/04/25  0500 05/05/25  0912   WBC 11.8*  --  12.8* 10.6   HGB 8.2*  --  8.1* 8.6*   HCT 25.2*  --  24.4* 28.2*     --  384 407   CRP  --  54.9* 44.7* 29.9*      BMP:  Recent Labs     05/03/25  0521 05/04/25  0500 05/05/25  0912   * 139 139   K 4.8 4.3 4.2    103 104   CO2 26 28 25   BUN 22* 15 10   CREATININE 0.8 0.7 0.6   GLUCOSE 279* 161* 119*   CALCIUM 8.6 8.7 8.1*      Coags:  Recent Labs     05/02/25  1239   APTT 75.2*   INR 1.1     Lab Results   Component Value Date    SEDRATE 79 (H) 05/05/2025     Recent Labs     05/03/25  1432 05/04/25  0500 05/05/25  0912   CRP 54.9* 44.7* 29.9*       PE:   Gen: Alert and oriented, NAD, cooperative.  Head: 
  Progress Note  Foot and Ankle Surgery    CC/Reason for consult:   Right foot wound   S/p right hallux amputation at the level of the IPJ (DOS: 4/25/2025)    Due to the complexity of the patient's chief complaint, the need for staged procedures, and their complicated past medical history, an extended hospital stay is anticipated for optimal management and recovery    Interval history:  Patient seen and evaluated at bedside   Patient states she is having some pain to the operative site today  Postoperative dressings are clean, dry, intact with some strikethrough bleeding appreciated  Patient denies constitutional symptoms    Vitals reviewed, afebrile    Updated Labs:     WBC:   Lab Results   Component Value Date    WBC 8.2 04/26/2025     ESR:  Lab Results   Component Value Date    SEDRATE 56 (H) 04/26/2025     CRP:  Lab Results   Component Value Date    CRP 25.8 (H) 04/26/2025       HPI:   See consult note.     ROS: Denies N/V/F/C/SOB/CP.  Otherwise negative except at stated in the HPI in consultation note.   Vitals:  Vitals:    04/26/25 0128   BP:    Pulse:    Resp: 15   Temp:    SpO2:      I/O last 3 completed shifts:  In: 320 [I.V.:320]  Out: -   Labs:  Recent Labs     04/26/25  0642   WBC 8.2   HGB 11.4*   HCT 37.6      *   K 4.3   BUN 13   CREATININE 0.6   GLUCOSE 162*   SEDRATE 56*   CRP 25.8*      CBC:  Recent Labs     04/24/25  0747 04/25/25  0714 04/26/25  0642   WBC 8.9 9.8 8.2   HGB 12.2 12.7 11.4*   HCT 38.4 40.5 37.6    400 367   CRP 36.7* 27.8* 25.8*      BMP:  Recent Labs     04/25/25  0714 04/26/25  0642    134*   K 4.5 4.3    105   CO2 22 18*   BUN 12 13   CREATININE 0.7 0.6   GLUCOSE 141* 162*   CALCIUM 9.2 8.6      Coags:  No results for input(s): \"APTT\", \"INR\" in the last 72 hours.    Invalid input(s): \"PROT\"  Lab Results   Component Value Date    SEDRATE 56 (H) 04/26/2025     Recent Labs     04/24/25  0747 04/25/25  0714 04/26/25  0642   CRP 36.7* 27.8* 25.8* 
  [facility]  Occupational Therapy Not Seen Note    DATE: 2025    NAME: Mikael Estevez  MRN: [id number]   : 1970      Patient not seen this date for Occupational Therapy due to:    Other: Patient off floor upon attempts;     Next Scheduled Treatment: Ck     Electronically signed by Lisa Laird OT on 2025 at 10:03 AM    
0300H - Patient refused to take hypertensive meds.  .Electronically signed by Trey Quiles RN on 4/29/2025 at 3:13 AM   
0340H - Patient is upset and she doesn't want to do any care, she said she wants to leave the hospital and go out and talk with the doctor. She still refused to take hypertensive medicine. Informed vascular on duty.  0355H - Dr. Madison came in the bedside and talked with her, but patient refused for any care and still refused to take any hypertensive drug. Dr. Madison explained to her the importance of hypertensive medicine but still she refused.  .Electronically signed by Trey Quiles RN on 4/29/2025 at 4:01 AM   
0500H - Patient still refused to take any of hypertensive medicine. She also refused to clean her wound on the right foot and refused to do any care. Explained to her the importance of hypertensive medicine and wound care but still she refused. She said she will leave the hospital this morning and she wants to be discharge.    0530H - She allowed only to take blood from the arterial line for the the labs this morning, but still refused to do any care.She also refused to checked her pulses.  .Electronically signed by Trey Quiles RN on 4/29/2025 at 6:01 AM   
8 rx delivered to bedside- no copay; pt did not have $ for the over the counter med.    
[facility]   Pharmacy Pharmacokinetic Monitoring Service - Vancomycin     Mikael Estevez is a 55 y.o. female starting on vancomycin therapy for osteommyelitis . Pharmacy consulted by Dr Concepcion for monitoring and adjustment.    Target Concentration: Goal AUC/LAYLA 400-600 mg*hr/L    Additional Antimicrobials:     Pertinent Laboratory Values:   Wt Readings from Last 1 Encounters:   04/19/25 59.9 kg (132 lb)     Temp Readings from Last 1 Encounters:   04/19/25 98.4 °F (36.9 °C) (Oral)     Estimated Creatinine Clearance: 84 mL/min (based on SCr of 0.7 mg/dL).  Recent Labs     04/19/25  0718   CREATININE 0.7   BUN 17   WBC 10.8     Procalcitonin:     Pertinent Cultures:  Culture Date Source Results        MRSA Nasal Swab:     Plan:  Dosing recommendations based on Bayesian software  Start vancomycin 1000 mg q 12 hrs   Anticipated AUC of 511 and trough concentration of 12.8 at steady state  Renal labs as indicated   Vancomycin concentration not ordered    Pharmacy will continue to monitor patient and adjust therapy as indicated    Thank you for the consult,  Rita Cornejo RPH  4/19/2025 11:07 AM   
Attempted to see patient, patient continues to leave unit to go outside and smoke.     Shy Bell, APRN - NP   
Cancelled due to needing cardiac clearance.  
Comprehensive Nutrition Assessment    Type and Reason for Visit:  Initial, Consult (General Nutrition Management/Oral Supplements)    Nutrition Recommendations/Plan:   Continue current diet.  Provide Ensure oral supplements with all meals.  Encourage/monitor intakes as tolerated.  Will monitor labs, weights, and plan of care.     Malnutrition Assessment:  Malnutrition Status:  Insufficient data (04/24/25 1412)    Context:  Acute Illness     Findings of the 6 clinical characteristics of malnutrition:  Energy Intake:  75% or less of estimated energy requirements for 7 or more days  Weight Loss:  Unable to assess     Body Fat Loss:  Unable to assess   Muscle Mass Loss:  Unable to assess  Fluid Accumulation:  No fluid accumulation   Strength:  Not Performed    Nutrition Assessment:    Consulted for appropriate oral supplements due to allergy.  Pt off unit smoking x attempted visits.  RN reports pt does not have an appetite and has not been eating much.  Noted at bedside untouched sandwich.  RN reports pt asking for Ensure supplements - will provide oral supplements with all meals per pt request and to boost PO intakes.  Admitted for worsening right toe pain.  PMH: HTN, DM, h/o CVA.  No recent weights per chart review.  Labs/Meds reviewed.    Nutrition Related Findings:    Labs/Meds reviewed.  No edema.   Wound Type:  (Wound to right toe.)       Current Nutrition Intake & Therapies:    Average Meal Intake: 0%, 1-25%  Average Supplements Intake: 0%, 1-25%  ADULT DIET; Regular; 4 carb choices (60 gm/meal)  Diet NPO Exceptions are: Sips of Water with Meds  ADULT ORAL NUTRITION SUPPLEMENT; Breakfast, AM Snack, Lunch, PM Snack, Dinner, HS Snack; Diabetic Oral Supplement    Anthropometric Measures:  Height: 152.4 cm (5')  Ideal Body Weight (IBW): 100 lbs (45 kg)    Admission Body Weight: 59.9 kg (132 lb 0.9 oz)  Current Body Weight: 59.9 kg (132 lb 0.9 oz), 132.1 % IBW. Weight Source: Bed scale  Current BMI (kg/m2): 25.8   
Comprehensive Nutrition Assessment    Type and Reason for Visit:  Reassess    Nutrition Recommendations/Plan:   Continue NPO.  Monitor for restart of oral diet and continue to provide Ensure ONS as able.  Will monitor labs, weights, and plan of care.     Malnutrition Assessment:  Malnutrition Status:  Insufficient data (04/24/25 1412)    Context:  Acute Illness     Findings of the 6 clinical characteristics of malnutrition:  Energy Intake:  75% or less of estimated energy requirements for 7 or more days  Weight Loss:  Unable to assess     Body Fat Loss:  Unable to assess   Muscle Mass Loss:  Unable to assess  Fluid Accumulation:  No fluid accumulation   Strength:  Not Performed    Nutrition Assessment:    Pt off unit in OR for right femoral endarterectomy with right leg angiogram.  Per chart review, recorded intakes of 25-75% noted along with pt taking fluids well.  Pt was drinking Ensure supplements.  Will monitor for restart of oral diet and continue Ensure supplements as able.  Labs/Meds reviewed.    Nutrition Related Findings:    Labs/Meds reviewed.  No edema. Wound Type: Multiple, Surgical Incision       Current Nutrition Intake & Therapies:    Average Meal Intake: NPO (recorded intakes 25-75%)  Average Supplements Intake: NPO (taking 50%)  Diet NPO Exceptions are: Sips of Water with Meds  Additional Calorie Sources:  None    Anthropometric Measures:  Height: 152.4 cm (5')  Ideal Body Weight (IBW): 100 lbs (45 kg)    Admission Body Weight: 59.9 kg (132 lb 0.9 oz)  Current Body Weight: 59.9 kg (132 lb 0.9 oz), 132.1 % IBW. Weight Source: Bed scale  Current BMI (kg/m2): 25.8           Weight Adjustment For: No Adjustment                 BMI Categories: Overweight (BMI 25.0-29.9)    Estimated Daily Nutrient Needs:  Energy Requirements Based On: Kcal/kg  Weight Used for Energy Requirements: Admission  Energy (kcal/day): 4092-3251 kcals/day  Weight Used for Protein Requirements: Admission  Protein (g/day): 65-80 
Comprehensive Nutrition Assessment    Type and Reason for Visit:  Reassess    Nutrition Recommendations/Plan:   Weigh as able  Continue diet and ONS as ordered  ?LBM. Consider bowel regimen?  Monitor skin integrity, weight, labs, GI status, intakes, ONS.     Malnutrition Assessment:  Malnutrition Status:  Insufficient data (04/24/25 1412)    Context:  Acute Illness       Nutrition Assessment:    Pt sleepy at visit. Reports low appetite and intakes at meals. Noted 26-50% of most meals per nursing. Pt reports not liking foods during admission, provided menu and discussed options and carb controlled diet r/t blood glucose and wound healing. Glu 129-159. Pt has ONS and reports drinking though unsure of amounts. No new weights to assess.    Nutrition Related Findings:    Meds/labs reviewed. Trace RLE edema. LBM unknown? Wound Type: Multiple, Surgical Incision       Current Nutrition Intake & Therapies:    Average Meal Intake: 26-50%  Average Supplements Intake: 26-50% (previously drinking 50%)  ADULT DIET; Regular; 4 carb choices (60 gm/meal)  ADULT ORAL NUTRITION SUPPLEMENT; Breakfast, Lunch, Dinner; Standard High Calorie/High Protein Oral Supplement  Additional Calorie Sources:  None    Anthropometric Measures:  Height: 152.4 cm (5')  Ideal Body Weight (IBW): 100 lbs (45 kg)    Admission Body Weight: 59.9 kg (132 lb 0.9 oz)  Current Body Weight: 59.9 kg (132 lb 0.9 oz), 132.1 % IBW. Weight Source: Bed scale  Current BMI (kg/m2): 25.8  Weight Adjustment For: No Adjustment  BMI Categories: Overweight (BMI 25.0-29.9)    Estimated Daily Nutrient Needs:  Energy Requirements Based On: Kcal/kg  Weight Used for Energy Requirements: Admission  Energy (kcal/day): 7345-8673 kcals/day  Weight Used for Protein Requirements: Admission  Protein (g/day): 65-80 gm pro/day  Method Used for Fluid Requirements: 1 ml/kcal  Fluid (ml/day): 5730-7001 mL/day or per MD    Nutrition Diagnosis:   Inadequate oral intake related to decreased 
Congestive Heart Failure Education completed and charted. CHF booklet given. Patient was receptive to education.    Discussed the  importance of medication compliance.    Discussed the importance of a heart healthy diet. Discussed 2000 mg sodium-restricted daily diet.  Patient instructed to limit fluid intake to  1.5 to 2 liters per day.    Patient instructed to weigh self at the same time of each day each morning, reinforced teaching to monitor for 3-5 lb weight increase over 1-2 days notify physician if change noted.      Signs and symptoms of CHF discussed with patient, such as feeling more tired than normal, feeling short of breath, coughing that increases when lying down, sudden weight gain, swelling of the feet, legs or belly.  Patient verbalized understanding to notify physician office if these symptoms occur.  EF 30-35%   
Dr. Kirby at bedside ok for patient to go to stepdown  
[facility]   Progress Note                   Date:   4/24/2025  Patient name: Mikael Estevez  Date of admission:  4/19/2025  3:20 AM  MRN:   [id number]  YOB: 1970  PCP: Ciro Martinez Jr., MD    Reason for Admission: Acute osteomyelitis (HCC) [M86.10]  Right foot pain [M79.671]  Osteomyelitis of great toe (HCC) [M86.9]    Subjective:       Clinical Changes / Abnormalities: Pt seen and examined in the room.  Patient resting in bed. Pt denies any CP or sob.  Labs, vitals and tele reviewed- SR   S/P LHC via radial access    Medications:   Scheduled Meds:   insulin lispro  0-8 Units SubCUTAneous 4x Daily AC & HS    spironolactone  12.5 mg Oral Daily    [Held by provider] insulin glargine  5 Units SubCUTAneous Daily    lisinopril  30 mg Oral Daily    nicotine  1 patch TransDERmal Daily    miconazole   Topical BID    oxyCODONE  10 mg Oral 4x Daily    isosorbide mononitrate  30 mg Oral Daily    metoprolol succinate  25 mg Oral Daily    atorvastatin  40 mg Oral Nightly    sodium chloride flush  5-40 mL IntraVENous 2 times per day    enoxaparin  40 mg SubCUTAneous Daily    QUEtiapine  150 mg Oral Nightly    aspirin  81 mg Oral Daily    linezolid  600 mg Oral 2 times per day    cefepime  2,000 mg IntraVENous Q12H     Continuous Infusions:   sodium chloride      dextrose       CBC:   Recent Labs     04/22/25  0408 04/23/25  0615 04/24/25  0747   WBC 6.9 8.6 8.9   HGB 11.6* 11.3* 12.2    396 412     BMP:    Recent Labs     04/22/25  0408 04/23/25  0615    137   K 4.1 4.2    107   CO2 22 22   BUN 25* 15   CREATININE 0.8 0.7   GLUCOSE 128* 132*     Hepatic:   Recent Labs     04/22/25  0408 04/23/25  0615   AST 20 20   ALT 20 18   BILITOT <0.2 0.2   ALKPHOS 88 95     Troponin: No results for input(s): \"TROPHS\" in the last 72 hours.  BNP: No results for input(s): \"BNP\" in the last 72 hours.  Lipids: No results for input(s): \"CHOL\", \"HDL\" in the last 72 hours.    Invalid 
[facility]   Progress Note                   Date:   4/26/2025  Patient name: Mikael Estevez  Date of admission:  4/19/2025  3:20 AM  MRN:   [id number]  YOB: 1970  PCP: Ciro Martinez Jr., MD    Reason for Admission: Acute osteomyelitis (HCC) [M86.10]  Right foot pain [M79.671]  Osteomyelitis of great toe (HCC) [M86.9]    Subjective:       Clinical Changes / Abnormalities: Pt seen and examined in the hallway in wheelchair.  Patient resting in wheelchair. Pt denies any CP or sob.  Labs, vitals and tele reviewed- SR     Medications:   Scheduled Meds:   varenicline  0.5 mg Oral BID    insulin lispro  0-8 Units SubCUTAneous 4x Daily AC & HS    spironolactone  12.5 mg Oral Daily    [Held by provider] insulin glargine  5 Units SubCUTAneous Daily    lisinopril  30 mg Oral Daily    nicotine  1 patch TransDERmal Daily    miconazole   Topical BID    oxyCODONE  10 mg Oral 4x Daily    isosorbide mononitrate  30 mg Oral Daily    metoprolol succinate  25 mg Oral Daily    atorvastatin  40 mg Oral Nightly    sodium chloride flush  5-40 mL IntraVENous 2 times per day    enoxaparin  40 mg SubCUTAneous Daily    QUEtiapine  150 mg Oral Nightly    aspirin  81 mg Oral Daily    linezolid  600 mg Oral 2 times per day    cefepime  2,000 mg IntraVENous Q12H     Continuous Infusions:   sodium chloride      dextrose       CBC:   Recent Labs     04/24/25  0747 04/25/25  0714   WBC 8.9 9.8   HGB 12.2 12.7    400     BMP:    Recent Labs     04/25/25  0714      K 4.5      CO2 22   BUN 12   CREATININE 0.7   GLUCOSE 141*     Hepatic:   No results for input(s): \"AST\", \"ALT\", \"BILITOT\", \"ALKPHOS\" in the last 72 hours.    Invalid input(s): \"ALB\"    Troponin: No results for input(s): \"TROPHS\" in the last 72 hours.  BNP: No results for input(s): \"BNP\" in the last 72 hours.  Lipids: No results for input(s): \"CHOL\", \"HDL\" in the last 72 hours.    Invalid input(s): \"LDLCALCU\"  INR: No results for 
[facility]   Progress Note                   Date:   4/27/2025  Patient name: Mikael Estevez  Date of admission:  4/19/2025  3:20 AM  MRN:   [id number]  YOB: 1970  PCP: Ciro Martinez Jr., MD    Reason for Admission: Acute osteomyelitis (HCC) [M86.10]  Right foot pain [M79.671]  Osteomyelitis of great toe (HCC) [M86.9]    Subjective:       Clinical Changes / Abnormalities: Pt seen and examined in the room.  Patient resting in bed. Pt denies any CP or sob.  Labs, vitals and tele reviewed- Regular- not wearing telemetry    Medications:   Scheduled Meds:   oxyCODONE  10 mg Oral Q6H    amoxicillin-clavulanate  1 tablet Oral 2 times per day    varenicline  0.5 mg Oral BID    insulin lispro  0-8 Units SubCUTAneous 4x Daily AC & HS    spironolactone  12.5 mg Oral Daily    [Held by provider] insulin glargine  5 Units SubCUTAneous Daily    lisinopril  30 mg Oral Daily    nicotine  1 patch TransDERmal Daily    isosorbide mononitrate  30 mg Oral Daily    metoprolol succinate  25 mg Oral Daily    atorvastatin  40 mg Oral Nightly    sodium chloride flush  5-40 mL IntraVENous 2 times per day    enoxaparin  40 mg SubCUTAneous Daily    QUEtiapine  150 mg Oral Nightly    aspirin  81 mg Oral Daily     Continuous Infusions:   sodium chloride      dextrose       CBC:   Recent Labs     04/24/25  0747 04/25/25  0714 04/26/25  0642   WBC 8.9 9.8 8.2   HGB 12.2 12.7 11.4*    400 367     BMP:    Recent Labs     04/25/25  0714 04/26/25  0642    134*   K 4.5 4.3    105   CO2 22 18*   BUN 12 13   CREATININE 0.7 0.6   GLUCOSE 141* 162*     Hepatic:   No results for input(s): \"AST\", \"ALT\", \"BILITOT\", \"ALKPHOS\" in the last 72 hours.    Invalid input(s): \"ALB\"    Troponin: No results for input(s): \"TROPHS\" in the last 72 hours.  BNP: No results for input(s): \"BNP\" in the last 72 hours.  Lipids: No results for input(s): \"CHOL\", \"HDL\" in the last 72 hours.    Invalid input(s): \"LDLCALCU\"  INR: 
Inpatient Diabetes  Education     Type and Reason for Visit: Patient Education  Met with patient at bedside. She stated known diabetes for several years, she has Ciro Martinez Jr., MD at [facility].   This admission is for wound on her foot / toe that has not been healing.     Meals/ smoking:   She expressed awareness she needs to stop smoking for her health, and that when she cuts back on smoking she tends to eat more carbs/ sweets. She stated aware need to watch her meal and had more questions about how to eat - especially fruit. She also was feeling bad that she has to not eat the easter candy her family brought into her. Writer helped patient with tips for healthy eating, use of plate method. Writer also moved candy basket from tray area to patient room closet as per patient request.  Monitoring:  Patient stated also she has not been checking BG at home, due to her meter lost part and is in need of a new device. She has used CGM in past as well and stated open to use again. Writer gave patient BG log and A1C teaching sheet, encouraged her to call to Ciro Martinez Jr., MD for follow up/ restart CGM. Discussed CGM and BG finger pokes are both tools to use and are needed both for BG care at home. Pateint did expressed general understanding of high and low BG symptoms and self care.  Medications:   Patient stated use of insulin pens, with main use of long acting insulin. She did stated she had insulin pen jam up. Discussed how to change insulin pen tips and if needed take pen to outpatient pharmacy for check of flow. Reinforced importance of not skipping insulin shots and to all doses of insulin as instructed.       Verbally reviewed following information with patient  Survival skills A1C, Blood glucose targets, hypo and hyperglycemia, importance of home blood glucose monitoring, heathy eating  plate method for CHO control portions, be active as recommended by health care providers, take medications oral 
Internal medicine resident on call notified of blood sugar 439.  
Messaged podiatry for heparin. OK to leave heparin running for surgery.  
Noted referral placed to [facility] for \"right toe wound VAC\".  Conferred with RN as chart indicates Podiatry service is managing.  [facility] Nurse will sign off; will await Podiatry request for assistance as needed; please re-consult if indicated.  
Occupational Therapy    [facility]  Occupational Therapy Not Seen Note    DATE: 2025    NAME: Mikael Estevez  MRN: [id number]   : 1970      Patient not seen this date for Occupational Therapy due to:    Patient Declined: Patient politely refused, reporting too much pain to right foot. Encouragement given, however, patient continued to decline. Surgery planned today for   *ADD ON REQ AFTER NOON* HALLUX AMPUTATION (PULSE LAVAGE). RN notified.        Next Scheduled Treatment:Will continue to follow as able     Electronically signed by GERARD Parra on 2025 at 8:43 AM   
Occupational Therapy    [facility]  Occupational Therapy Not Seen Note    DATE: 2025    NAME: Mikael Estevez  MRN: [id number]   : 1970      Patient not seen this date for Occupational Therapy due to:    Surgery/Procedure: Pt plan for OR this date with Podiatry.    Next Scheduled Treatment: 25    Electronically signed by CHEN Miranda on 2025 at 10:02 AM    
Occupational Therapy    [facility]  Occupational Therapy Not Seen Note    DATE: 2025    NAME: Mikael Estevez  MRN: [id number]   : 1970      Patient not seen this date for Occupational Therapy due to:    Surgery/Procedure: Pt off unit in OR for FEMORAL ENDATRERECTOMY, ANGIOGRAM     Next Scheduled Treatment: 25    Electronically signed by CHEN VILLASEÑOR on 2025 at 8:41 AM    
Occupational Therapy Initial Evaluation  Facility/Department: [address] ORTHO/MED SURG   Patient Name: Mikael Estevez        MRN: [id number]    : 1970    Date of Service: 2025    Chief Complaint   Patient presents with    Toe Pain     Past Medical History:  has a past medical history of Back pain, Bipolar 1 disorder (HCC), Diabetes mellitus (HCC), Disc disorder, and Hypertension.  Past Surgical History:  has a past surgical history that includes Cholecystectomy; back surgery; Cardiac procedure (N/A, 2025); and amputation (Right, 2025).    Discharge Recommendations  Discharge Recommendations: Patient would benefit from continued therapy after discharge  OT Equipment Recommendations  Equipment Needed: Yes  Mobility Devices: ADL Assistive Devices, Walker  Walker: Rolling    Assessment  Performance deficits / Impairments: Decreased functional mobility ;Decreased ADL status;Decreased endurance;Decreased high-level IADLs;Decreased balance;Decreased safe awareness  Assessment: Patient presents NWB to the R LE without sx shoe to complete transfer using RW for support.Pt completed transfers at [facility] and mobility using RW bathroom distance, maintaining NWB to the R LE to complete toilet transfer and LB dressing task with verbal cues for placement of the R LE to maintain precautions. Pt provided with significant education on strategies for WB adherence with good return. Patient would benefit from continued acute OT services to address functional deficits through skilled intervention to promote independence and safety with ADL/IADLs and functional transfers/mobility for safe return to prior living environment and level of function.  Prognosis: Good  Decision Making: Medium Complexity  REQUIRES OT FOLLOW-UP: Yes  Activity Tolerance  Activity Tolerance: Patient Tolerated treatment well  Safety Devices  Type of Devices: Nurse notified;Gait belt (Left in Wheelchair to leave floor)  Restraints  Restraints 
Patient admitted, consent signed and questions answered. Patient ready for procedure. Call light to reach with side rails up 2 of 2. Bilateral groins clipped with writer and ten present.  No family at bedside with patient.  History and physical updated.  
Patient arrived to unit approximately at 2015 during a mock code blue. Before nurse could enter room to assess patient and hook up to monitor, patient had left the floor to walk a family member out. Will assess when patient returns.    Patient returned to unit at 2045. Placed on monitor and assessed.  
Patient called out stating she was in pain. Fentanyl was administered as ordered. Less than an hour later, patient called again requesting additional pain medication. Nurse explained that the patient would need to wait an additional hour for further pain relief. Patient became upset and began yelling. Nurse reassured patient that they would contact the doctor to request more pain medication.  Doctor was consulted and stated no further pain medications could be given at this time. Nurse informed patient of the doctor’s decision. Patient called out again, and when the nurse attempted to explain, patient became increasingly upset, accusing the nurse of not wanting to give her pain meds. Nurse continued to assure the patient that he was doing everything possible to manage her discomfort. Patient refused to have vitals taken as per the routine q4h assessments. Patient then sat in her wheelchair and stepped out to smoke. Charge nurse was notified, and nurse continues to communicate with the doctor to determine the best course of action for managing patient’s pain.  
Patient called out writer went in the patients room.. writer asked what patient wanted she said she wants to speak to a doctor writer asked what she needed to speak to the doctor about.. she said \" she wants to leave, she wants discharged\". Writer messaged internal med resident and said patient wants to speak to him.     Bri Hernadez RN  
Patient in room at this time. Patient is sleepy, but oriented. VS stable. Handoff was completed. Right groin was assessed, bandage dry and intact. No hematoma present upon palpation. Right foot dressing was undone, pulses checked, and re-wrapped to allow for future assessments with doppler. DP and PT pulses present.   
Patient may proceed as moderate to high cardiac risk for surgery      PRITI Durbin NP   [facility]  [phone]    
Patient picked up from another RN, writer informed that patient wants her dilaudid at 1045, writer enters room to assess pain and offer dilaudid, patient ignores writer's question and requests to be \"unhooked\" so she can get up and walk around the room, writer informs pt that another patient has meds due at 1100 and that writer would be available at 1105 to walk with patient as she is unsteady, pt states \"I don't want dilaudid, I don't want anything\".  Writer offers if there is anything else that the patient wants or needs at this time and pt ignored writer saying nothing  
Patient returned from smoke break and allowed nurse to take her vitals. Pain medication was administered as ordered. Patient then apologized to the nurse, stating that her behavior earlier was due to the pain she was experiencing. Nurse acknowledged the patient’s apology and reassured her that the goal is to manage her pain as effectively as possible. Patient appeared more calm and stated she understood the situation better. Continue to monitor patient’s pain and response to medication.  
Patient taken off the floor to surgery before writer could complete morning assessment. Patient transferred self to [facility] and left with surgery RN.  
Patient's blood sugar was measured at 198 mg/dL. The patient was scheduled to receive 1 unit of Humalog insulin; however, the patient refused the injection. Risks and benefits of insulin administration were explained to the patient. Provider was notified of the refusal.  
Physical Therapy        Physical Therapy Cancel Note      DATE: 2025    NAME: Mikael Estevez  MRN: [id number]   : 1970      Patient not seen this date for Physical Therapy due to:    Patient Declined: Patient politely refused, reporting too much pain to right foot. Encouragement given, however, patient continued to decline. Surgery planned today for   *ADD ON REQ AFTER NOON* HALLUX AMPUTATION (PULSE LAVAGE). RN notified.     Will check back as able / appropriate.       Electronically signed by Aria Shearer PTA on 2025 at 8:37 AM      
Physical Therapy        Physical Therapy Cancel Note      DATE: 2025    NAME: Mikael Estevez  MRN: [id number]   : 1970      Patient not seen this date for Physical Therapy due to:    Surgery/Procedure: Pt off unit in OR for FEMORAL ENDATRERECTOMY, ANGIOGRAM, Will pursue as able.       Electronically signed by Jimenez Crump PTA on 2025 at 9:41 AM      
Physical Therapy        Physical Therapy Cancel Note      DATE: 2025    NAME: Mikael Estevez  MRN: [id number]   : 1970      Patient not seen this date for Physical Therapy due to:    Patient Declined: Pt states she is in to much pain, is having sx tomorrow and would prefer to resume PT after sx.        Electronically signed by DUGLAS WILKERSON PTA on 2025 at 2:16 PM      
Physical Therapy        Physical Therapy Cancel Note      DATE: 2025    NAME: Mikael Estevez  MRN: [id number]   : 1970      Patient not seen this date for Physical Therapy due to:    Patient Declined: Patient with reports of feeling sick, nauseous, requesting zofran. Therapist provided patient with basin and informed RN.     Will check back as able / appropriate.       Electronically signed by Aria Shearer PTA on 2025 at 2:58 PM      
Physical Therapy        Physical Therapy Cancel Note      DATE: 2025    NAME: Mikael Estevez  MRN: [id number]   : 1970      Patient not seen this date for Physical Therapy due to:    Surgery/Procedure:      R great toe amp   25      Electronically signed by Bernard Hernandez PT on 2025 at 8:20 AM      
Physical Therapy        Physical Therapy Cancel Note      DATE: 2025    NAME: Mikael Estevez  MRN: [id number]   : 1970      Patient not seen this date for Physical Therapy due to:    Patient Declined:    I just don't feel like doing it. I'm not doing it      Electronically signed by Bernard Hernandez PT on 2025 at 8:01 AM      
Physical Therapy        Physical Therapy Cancel Note      DATE: 2025    NAME: Mikael Estevez  MRN: [id number]   : 1970      Patient not seen this date for Physical Therapy due to:    Surgery/Procedure: FEMORAL POPLITEAL BYPASS     Will check back as able.      Electronically signed by Aria Shearer PTA on 2025 at 8:44 AM      
Physical Therapy  DATE: 2025    NAME: Mikael Estevez  MRN: [id number]   : 1970    Patient not seen this date for Physical Therapy due to:      [x] Cancel by RN or physician due to:RN deferred state pt getting med, abx and plan to go outside after request PT return later. PT to continue to follow and further address as able.     [] Hemodialysis    [] Critical Lab Value Level     [] Blood transfusion in progress    [] Acute or unstable cardiovascular status   _MAP < 55 or more than >115  _HR < 40 or > 130    [] Acute or unstable pulmonary status   -FiO2 > 60%   _RR < 5 or >40    _O2 sats < 85%    [] Strict Bedrest    [] Off Unit for surgery or procedure    [] Off Unit for testing       [] Pending imaging to R/O fracture    [] Refusal by Patient      [] Other      [] PT being discontinued at this time. Patient independent. No further needs.     [] PT being discontinued at this time as the patient has been transferred to hospice care. No further needs.      Nieves Garcia, PT , DPT    
Physical Therapy  Facility/Department: [address] ORTHO/MED SURG   Physical Therapy Initial Evaluation    Patient Name: Mikael Estevez        MRN: [id number]    : 1970    Date of Service: 2025    Chief Complaint   Patient presents with    Toe Pain     Past Medical History:  has a past medical history of Back pain, Bipolar 1 disorder (HCC), Diabetes mellitus (HCC), Disc disorder, and Hypertension.  Past Surgical History:  has a past surgical history that includes Cholecystectomy; back surgery; Cardiac procedure (N/A, 2025); and amputation (Right, 2025).    Discharge Recommendations  Discharge Recommendations: Patient would benefit from continued therapy after discharge  PT Equipment Recommendations  Equipment Needed: Yes  Mobility Devices: Walker  Walker: Rolling  Other: wheelchair    Assessment  Body Structures, Functions, Activity Limitations Requiring Skilled Therapeutic Intervention: Decreased functional mobility , Decreased cognition  Assessment: Patient denies awareness of NWB status.  Educated thoroughly in NWB, doctor's consent for up to 3 steps at a time in the special shoe (not her surgical shoe present), the rationale for compliance (wound healing, avoiding more of her foot being cut off), use of walker and wheelchair at home.  Patient verbalized understanding.  Able to mobilize with NWB technique well with walker.  Therapy Prognosis: Good  Decision Making: Medium Complexity  Requires PT Follow-Up: Yes  Activity Tolerance  Activity Tolerance: Patient tolerated evaluation without incident  Safety Devices  Type of Devices: Nurse notified, Gait belt, Call light within reach (Left in wheelchair because patient says she is going downstairs to smoke.)  Restraints  Restraints Initially in Place: No    AM-PAC  AM-PAC Basic Mobility - Inpatient   How much help is needed turning from your back to your side while in a flat bed without using bedrails?: None  How much help is needed moving from 
Physical Therapy  Facility/Department: [address] STEPDOWN   Physical Therapy Daily Treatment Note    Patient Name: Mikael Estevez        MRN: [id number]    : 1970    Date of Service: 2025    Chief Complaint   Patient presents with    Toe Pain     Past Medical History:  has a past medical history of Back pain, Bipolar 1 disorder (HCC), Diabetes mellitus (HCC), Disc disorder, and Hypertension.  Past Surgical History:  has a past surgical history that includes Cholecystectomy; back surgery; Cardiac procedure (N/A, 2025); amputation (Right, 2025); Toe amputation (Right, 2025); femoral bypass (Right, 2025); amputation (Right, 2025); and femoral bypass (Right, 2025).    Discharge Recommendations  Discharge Recommendations: Patient would benefit from continued therapy after discharge, Patient able to tolerate 3 hours of therapy per day  Further therapy recommended at discharge.The patient should be able to tolerate at least 3 hours of therapy per day over 5 days or 15 hours over 7 days.   This patient may benefit from a Physical Medicine and Rehab consult.   PT Equipment Recommendations  Equipment Needed: Yes  Mobility Devices: Walker  Walker: Rolling  Other: wheelchair, knee scooter    Assessment  Body Structures, Functions, Activity Limitations Requiring Skilled Therapeutic Intervention: Decreased functional mobility ;Decreased cognition  Assessment: Patient completed bed mobility with SBA, increased time d/t reports of right foot pain. Transfers completed with use of RW and knee scooter. Patient inconsistent with compliance of NWB to R foot, demo non-compliance during sit->stand and compliance during stand-sit and stand-pivot transfers. Patient ambulated with use of RW, 25ft , CGA, compliant with NWB R foot. Patient completed knee scooter mobility, 125ft, with CGA, minor unsteadiness, however, able to tolerate further distance and consistently maintain NWB to R foot throughout 
Physical Therapy  Facility/Department: [address] STEPDOWN   Physical Therapy Daily Treatment Note    Patient Name: Mikael Estevez        MRN: [id number]    : 1970    Date of Service: 2025    Chief Complaint   Patient presents with    Toe Pain     Past Medical History:  has a past medical history of Back pain, Bipolar 1 disorder (HCC), Diabetes mellitus (HCC), Disc disorder, and Hypertension.  Past Surgical History:  has a past surgical history that includes Cholecystectomy; back surgery; Cardiac procedure (N/A, 2025); amputation (Right, 2025); Toe amputation (Right, 2025); and femoral bypass (Right, 2025).    Discharge Recommendations  Discharge Recommendations: Patient would benefit from continued therapy after discharge  PT Equipment Recommendations  Equipment Needed: Yes  Mobility Devices: Walker  Walker: Rolling  Other: wheelchair    Assessment  Body Structures, Functions, Activity Limitations Requiring Skilled Therapeutic Intervention: Decreased functional mobility ;Decreased cognition  Assessment: Pt completed bed mobility with SUP, and sit to stands with SBA with RW. Pt demo FAIR compliance with NWB R foot without wedge shoe. Podiatry present during session, will order wedge shoe outpatient and pt to maintain NWB R foot during mobility until obtained. Pt was able to ambulate 40' and 50' with RW CGA, with NWB R foot, no LOB. Pt would benefit from further therapy to address functional mobility deficits within weight bearing restrictions and establish support at home for DC planning.  Therapy Prognosis: Good  Activity Tolerance  Activity Tolerance: Patient limited by fatigue;Patient limited by endurance;Patient tolerated treatment well  Safety Devices  Type of Devices: Nurse notified;Gait belt;Call light within reach;Left in bed  Restraints  Restraints Initially in Place: No    AM-PAC  AM-PAC Basic Mobility - Inpatient   How much help is needed turning from your back to your side 
Physical Therapy  Facility/Department: [facility]- SICU   Physical Therapy Daily Treatment Note    Patient Name: Mikael Estevez        MRN: [id number]    : 1970    Date of Service: 2025    Chief Complaint   Patient presents with    Toe Pain     Past Medical History:  has a past medical history of Back pain, Bipolar 1 disorder (HCC), Diabetes mellitus (HCC), Disc disorder, and Hypertension.  Past Surgical History:  has a past surgical history that includes Cholecystectomy; back surgery; Cardiac procedure (N/A, 2025); amputation (Right, 2025); and Toe amputation (Right, 2025).    Discharge Recommendations  Discharge Recommendations: Patient would benefit from continued therapy after discharge  PT Equipment Recommendations  Equipment Needed: Yes  Mobility Devices: Walker  Walker: Rolling  Other: wheelchair    Assessment  Body Structures, Functions, Activity Limitations Requiring Skilled Therapeutic Intervention: Decreased functional mobility ;Decreased cognition  Assessment: Pt amb 5ft + 15ft x2 with RW CGA, pt able to maintain NWB status with RW. Pt growing increasingly agitated throughout session and denying need for assistance, educated on safety precautions and waiting for assistance to get up. RN made aware and present in room upon end of session. Pt would benefit from continued skilled therapy to address deficits.  Therapy Prognosis: Good  Requires PT Follow-Up: Yes  Activity Tolerance  Activity Tolerance: Treatment limited secondary to agitation;Patient limited by fatigue;Patient limited by endurance  Activity Tolerance Comments: Pt growing increasingly agitated throughout session, stating she will \"get up and remove chair alarm when we leave\" despite education on safety precautions and fall risk. RN made aware and present upon end of session.  Safety Devices  Type of Devices: Nurse notified;Gait belt;Call light within reach;Left in chair;Chair alarm in place  Restraints  Restraints 
Pt declined to allow writer to place a new IV.  Dressing changed on previous IV.  
Pt educated on NWB status on R LE and importance of compliance.  Pt expressed understanding but continues to be noncompliant.  Pt ambulating in room and outside of room without AD.   
Pt not compliant with NWB order to right foot. Had to be reeducated multiple times throughout shift to not bear weight on that foot.  
Pt removed recently applied Nicoderm patch.  
Vascular Surgery    Patient's rest pain has resolved and initially her wound appeared to be on track to heal with evidence of bleeding. However since then it is questionable if she will be able to heal well. She also refuses to work on her smoking. She has been smoking during her hospital stay without any reduction. Overall she is at high risk for eventual limb loss as she has one vessel run off with no significant vessels seen in the foot with active tobacco use and non-healing wound. For this reason I have discussed proceeding with fem-pop bypass as this might give her the best chance to heal although with poor outflow I have discussed that the patency long term will not be high. Vein mapping shows marginal GSV in right leg from the knee down. Plan will be to look in the operating room and if GSV in the right leg appears viable, I will perform bypass with right GSV. However if it is questionable in quality or size, then I will proceed with cryovein. Risks include bleeding, infection, nerve injury, vessel damage, embolization and heart attack. After long discussion including with her family, she is agreeable to proceed with right femoral to below knee popliteal artery bypass on 5/2/25. NPO after midnight.    Electronically signed by NICOLAS SWANN MD on 5/1/2025 at 6:28 PM   
Xray still busy at the moment  Will do xray in the pt's room  
I  Active cigarette smoker  Single-vessel CAD of RCA.     They present to the ED with a chief complaint of worsening right toe pain.  According the patient she was all right then 2 months ago she had hit her foot and she does not remember how she hit her foot, since then the toe pain started, it progressively worsened and eventually on April presented to ER for toe pain, the workup at that time was unremarkable for osteomyelitis from x-ray and patient was discharged on Keflex however she continuously to have worsening pain and discharge, she completed 7-day course of antibiotics, her pain is still same and even worsening, she can squeeze some discharge out, she also tried to self medicate with pain management however she could not help, she denied for fever, chills, abnormal sensation in lower extremities, she does have diabetes and does not monitors her blood sugar at home she is also noncompliant with diabetic diet.        Review of Systems   Constitutional:  Negative for chills, fatigue and fever.   Respiratory:  Negative for shortness of breath.    Gastrointestinal:  Negative for constipation and diarrhea.   Genitourinary:  Negative for dysuria.   Musculoskeletal:  Negative for back pain and gait problem.   Skin:  Positive for color change and wound.   Neurological:  Negative for headaches.   Psychiatric/Behavioral:  Negative for confusion.          Initial Vitals on Presentation:  Temp: Afebrile, RR: 16, HR 93, /70, SPO2 97% on room     Initial Course in the ED:   - Pt presented with above complaint, found to be having acute osteomyelitis of left big toe.   - Lab work showing creatinine 0.7, blood glucose 359, CRP 41.3, WBC count 10.8, hemoglobin 13.9.   - Imaging study with x-ray of right foot showing permeative changes of the great toe tuft concerning for osteomyelitis.  - Patient was treated in ED with ketorolac, cefepime and vancomycin.    OBJECTIVE     Vital Signs:  BP (!) 103/52   Pulse 76   Temp 
left superficial femoral artery.     Soft tissue emphysema involving the distal great toe right foot. Area of  ulceration which appears to extend to the distal phalanx of the great toe  right foot concerning for osteomyelitis.      MRI FOOT RIGHT W WO CONTRAST  Result date: 4/20/2025  Osteomyelitis involving the tuft of the distal phalanx of the great toe.     Soft tissue emphysema involving the dorsal distal great toe. No localized  collection.     Minimal tenosynovitis involving the flexor tendon of the great toe.     Mild dorsal foot soft tissue edema or cellulitis.         Assessment:     54 y/o female who presents with nonhealing wounds of the right foot. Open wound of the R hallux concerning for osteomyelitis.       Plan:     CTA of abdomen/pelvis with bilateral lower extremity runoff shows occlusion of bilateral SFA with collateral blood flow. S/p right common femoral, profunda, and superficial femoral endarterectomy with bovine pericardium patch angioplasty. S/p right femoral to popliteal bypass with CryoVein on the 5/2/25    Reduced LVEF on echocardiogram. Cardiology consulted. L heart cath done 4/22 shows moderate CAD, they recommend aggressive risk factor modification, optimal medical management, and smoking cessation  Wound care of R first toe per podiatry, appreciate recommendation management.  Plan for OR tomorrow.  Okay to work with PT OT  Resume home medication Coreg and spironolactone, continue to hold lisinopril and amiodarone due to hypotension.  Okay for transfer to stepdown from vascular surgery perspective  Continue oral augmentin per ID.   Smoking cessation education provided.   Remainder of care per primary team.       Electronically signed by Miesha Kirby DO on 5/4/2025 at 6:57 AM          
to significant stenosis of the distal superior mesenteric artery.     Occlusion of the right superficial femoral artery.     Occlusion of the left superficial femoral artery.     Collateral blood flow in the right thigh results in opacification of the  right popliteal artery.  Collateral blood flow in the left thigh results in  opacification of the distal left superficial femoral artery.     Soft tissue emphysema involving the distal great toe right foot. Area of  ulceration which appears to extend to the distal phalanx of the great toe  right foot concerning for osteomyelitis.      MRI FOOT RIGHT W WO CONTRAST  Result date: 4/20/2025  Osteomyelitis involving the tuft of the distal phalanx of the great toe.     Soft tissue emphysema involving the dorsal distal great toe. No localized  collection.     Minimal tenosynovitis involving the flexor tendon of the great toe.     Mild dorsal foot soft tissue edema or cellulitis.         Assessment:     54 y/o female who presents with nonhealing wounds of the right foot. Open wound of the R hallux concerning for osteomyelitis.       Plan:     CTA of abdomen/pelvis with bilateral lower extremity runoff shows occlusion of bilateral SFA with collateral blood flow. S/p right common femoral, profunda, and superficial femoral endarterectomy with bovine pericardium patch angioplasty. S/p right femoral to popliteal bypass with CryoVein on the 5/2/25    Reduced LVEF on echocardiogram. Cardiology consulted. L heart cath done 4/22 shows moderate CAD, they recommend aggressive risk factor modification, optimal medical management, and smoking cessation  Wound care of R first toe per podiatry, appreciate recommendation management  Okay to work with PT OT  Resume home medication Coreg and spironolactone, continue to hold lisinopril and amiodarone due to hypotension.  We will Long catheter, Q6 bladder scan after Long removal.  If more than 350, place patient primary team, with straight 
(Room air)    Subjective  General  Patient assessed for rehabilitation services?: Yes  Subjective  Subjective: RN ok'd patient for OT session. Pt pleasant, cooperative and agreeable.  Pain  Pre-Pain: 0  Post-Pain: 0       Objective  Orientation  Overall Orientation Status: Within Normal Limits  Orientation Level: Oriented X4  Cognition  Overall Cognitive Status: Exceptions  Arousal/Alertness: Appears intact  Following Commands: Appears intact  Attention Span: Appears intact  Memory: Decreased recall of precautions  Safety Judgement: Decreased awareness of need for safety;Decreased awareness of need for assistance  Problem Solving: Assistance required to identify errors made  Insights: Decreased awareness of deficits  Initiation: Appears intact  Sequencing: Appears intact  Cognition Comment: Pt initailly understanding and compliant of NWB, after sx team arrival and presented pt with ed on more sx needed on RLE, pt became tearful and began walking in RLE without AD.    Activities of Daily Living  Grooming Skilled Clinical Factors: pt reports completes grooming at sink sitting on toilet prior to arrival.  LE Dressing Skilled Clinical Factors: Pt was ed with visual demo on LBD sitting in chair with R/L lateral leans to prevent standing on RLE and loss of balance.  Additional Comments: Upon arrival pt in bed, reports just got finished walking with PT. Pt was ed on LBD techs for safety with visual demo. Pt was ed on on use of shower chair for bathing with handout for recourses. Pt was ed on RW use for standing balance and maintaining NWB, until obtaining offloading boot for heel WB only. Towards ed of session sx team presented pt with ed on more amputation needed on RLE, pt becomes tearful, provided emotional support and theraputic use of self. Pt apears upset and completes mobility without AD and non compliant to NWB RLE, pt was ed on importance of NWB at this time with fair understanding. Pt was presented with RW for 
[phone]      Electronically signed by PRITI Mckee CNP on 4/24/2025 at 10:47 AM    I spent a total of approximately 35 minutes with the patient today, answering questions regarding symptoms and treatment plan, conducting an examination, reviewing chart, and documenting in the chart.          
Daily    QUEtiapine, 150 mg, Oral, Nightly    aspirin, 81 mg, Oral, Daily  Continuous Meds:    sodium chloride    dextrose  PRN Meds:  hydrALAZINE, labetalol, melatonin, HYDROmorphone, oxyCODONE, sodium hypochlorite, nicotine polacrilex, sodium chloride flush, sodium chloride, potassium chloride **OR** potassium alternative oral replacement **OR** potassium chloride, magnesium sulfate, ondansetron **OR** ondansetron, polyethylene glycol, acetaminophen **OR** acetaminophen, glucose, dextrose bolus **OR** dextrose bolus, glucagon (rDNA), dextrose     Imaging:    XR FOOT RIGHT (MIN 3 VIEWS)  Result Date: 4/19/2025  Permeative changes of the great toe tuft concerning for osteomyelitis.       ASSESSMENT & PLAN     ASSESSMENT / PLAN:     Principal Problem:    Acute osteomyelitis (HCC)  Active Problems:    Cocaine use disorder, severe, in early remission (HCC)    Type 2 diabetes mellitus with hyperglycemia (HCC)    Bipolar 1 disorder (HCC)    Hypertension    Cerebrovascular accident (CVA) of pontine structure (HCC)    Drug abuse (HCC)    Osteomyelitis of great toe (HCC)    Right foot pain    Peripheral vascular disease    Ulcer of great toe, right, with necrosis of bone (HCC)    Type 2 diabetes mellitus with diabetic toe ulcer (HCC)    HFrEF (heart failure with reduced ejection fraction) (HCC)    Type 2 diabetes mellitus with right diabetic foot infection (HCC)    CRP elevated    ESR raised  Resolved Problems:    * No resolved hospital problems. *     Acute osteomyelitis of right big toe s/p right hallux amputation at interphalangeal joint with excisional biopsy of proximal phalanx on 4/25:  Patient had pain out of proportion to right big toe  -X-ray of right foot consistent with permeative changes in right big toe at consistent with osteomyelitis  MRI of right foot showed osteomyelitis of the tuft of distal phalanx of great toe, soft tissue emphysema of dorsal distal great toe with localized collection, minimal 
superficial femoral artery.      Occlusion of the left superficial femoral artery.      Collateral blood flow in the right thigh results in opacification of the   right popliteal artery.  Collateral blood flow in the left thigh results in   opacification of the distal left superficial femoral artery.      Soft tissue emphysema involving the distal great toe right foot. Area of   ulceration which appears to extend to the distal phalanx of the great toe   right foot concerning for osteomyelitis.      Hepatic steatosis.      Diverticulosis without evidence of diverticulitis.      Additional findings noted above.         Vascular lower extremity arterial segmental pressures w PPG   Final Result      XR FOOT RIGHT (MIN 3 VIEWS)   Final Result   Permeative changes of the great toe tuft concerning for osteomyelitis.           Cultures:   - Surgical pathology right hallux 4/25/2025: Acute purulent osteomyelitis, focal acute osteomyelitis present at surgical margin, benign distal active ulcer with acute cellulitis, skin and soft tissue at the surgical margin appear viable  - Fungal stain 4/25/2025: No fungal elements  - Culture bone 4/25/2025: No growth- preliminary  - Swab cultures right foot 4/19/25: Finegoldia magna    Assessment    55 y.o. female being seen for:     - S/p amputation of hallux at IPJ, right foot (DOS: 4/25/2025)  - Full-thickness ulceration with exposed bone, right foot  - Osteomyelitis, right hallux  - Diabetes mellitus type 2    Principal Problem:    Acute osteomyelitis (HCC)  Active Problems:    Cocaine use disorder, severe, in early remission (HCC)    Type 2 diabetes mellitus with hyperglycemia (HCC)    Bipolar 1 disorder (HCC)    Hypertension    Cerebrovascular accident (CVA) of pontine structure (HCC)    Drug abuse (HCC)    Osteomyelitis of great toe (HCC)    Right foot pain    PAD (peripheral artery disease)    Ulcer of great toe, right, with necrosis of bone (HCC)    Type 2 diabetes mellitus with 
Right foot pain    Peripheral vascular disease    Ulcer of great toe, right, with necrosis of bone (HCC)    Type 2 diabetes mellitus with diabetic toe ulcer (HCC)    HFrEF (heart failure with reduced ejection fraction) (HCC)    Type 2 diabetes mellitus with right diabetic foot infection (HCC)    CRP elevated    ESR raised  Resolved Problems:    * No resolved hospital problems. *     Acute osteomyelitis of right big toe:  -Patient has pain out of proportion to right big toe  - X-ray of right foot consistent with permeative changes in right big toe at consistent with osteomyelitis  MRI of right foot showed osteomyelitis of the tuft of distal phalanx of great toe, soft tissue emphysema of dorsal distal great toe with localized collection, minimal tenosynovitis involving the flexor tendon of the great toe and mild ulcer foot soft tissue edema  ESR 71, CRP 41.3 on admit  Infectious diseases is on board  Continue cefepime and vancomycin  Continue pain management with Tylenol and Toradol, monitor creatinine clearance, patient refuses for opioid medications.  Podiatry planning amputation of toe on Monday was canceled due to worsening EF, awaiting cardiology clearance for surgery    Chronic bilateral SFA occlusion s/p balloon angioplasty and stenting of left SFA in July 2020  Patient had decreased pulsation in bilateral lower extremities, vascular surgery was consulted.  Most recent HANNAH studies on 4/19 revealed resting HANNAH 0.31, resting TBI 0.00 on right side, resting HANNAH on left side 0.46 and TBI 0.00  CTA abdominal aorta with bilateral runoff with contrast showed moderate to significant atherosclerotic disease involving abdominal aorta and its branches, moderate to significant stenosis of distal superior mesenteric artery, occlusion of right and left SFA, collateral blood flow in right thigh leading to opacification of right popliteal artery and collateral blood flow in left thigh leads to calcification of distal left 
ERICKSON Parra/L on 5/6/25 at 2:00 PM EDT   
during the key portions. I have reviewed the Podiatry Resident progress note. I agree with the chief complaint, past medical history, past surgical history, allergies, medications, social and family history as documented unless otherwise noted below. Documentation of the HPI, Physical Exam and Medical Decision Making performed by medical students or scribes is based on my personal performance of the HPI, PE and MDM. I have personally evaluated this patient and have completed at least one if not all key elements of the E/M (history, physical exam, and MDM). Additional findings are as noted.     Izzy Jeffries DPM on 4/30/2025 at 12:56 PM  Board Certified, American Board of Podiatric Surgery  Fellow, [employer] Foot [employer]    
tenosynovitis involving the flexor tendon of the great toe and mild ulcer foot soft tissue edema  ESR 71, CRP 41.3 on admit  Infectious diseases is on board,  Initially started cefepime and vancomycin, changed to Augmentin on 4/27 to be completed on May 10, 2025  Follow up with ID as OP   Continue pain management   Tylenol and continue Roxicodone 10 mg every 6 hour as needed    Chronic bilateral SFA occlusion s/p balloon angioplasty and stenting of left SFA in July 2020  Patient had decreased pulsation in bilateral lower extremities, vascular surgery was consulted.  Most recent HANNAH studies on 4/19 revealed resting HANNAH 0.31, resting TBI 0.00 on right side, resting HANNAH on left side 0.46 and TBI 0.00  CTA abdominal aorta with bilateral runoff with contrast showed moderate to significant atherosclerotic disease involving abdominal aorta and its branches, moderate to significant stenosis of distal superior mesenteric artery, occlusion of right and left SFA, collateral blood flow in right thigh leading to opacification of right popliteal artery and collateral blood flow in left thigh leads to calcification of distal left superficial femoral  S/p right common femoral, profunda and superficial femoral endarterectomy with bovine pericardium patch angioplasty on 4/28  Continue aspirin 81mg  HANNAH and vein mapping ordered for potential bypass if poor wound healing  Continue Chantix for smoking cessation and counseled for smoking cessation  Planning on bypass surgery on 5/2/25     History of loop recorder for CVA from left dorsal pontine and December 2023:   CAD:  HFrEF with EF 30 to 35%  Patient has history of CAD of RCA, moderate disease only  S/p repeat cardiac cath on 4/23 showed mild LAD 40 to 50% stenosis, severe OM 3 stenosis 75, mid RCA 50%  Continue goal-directed medical therapy with lisinopril 30 mg, metoprolol succinate 25 mg and Imdur 30 mg daily  To be discharged on LifeVest, orders placed by cardiology 
hours.    Invalid input(s): \"PROT\"    No results for input(s): \"RPR\" in the last 72 hours.  No results for input(s): \"HIV\" in the last 72 hours.  No results for input(s): \"BC\" in the last 72 hours.  Lab Results   Component Value Date/Time    CREATININE 0.7 04/30/2025 03:12 AM    GLUCOSE 145 04/30/2025 03:12 AM    GLUCOSE 191 10/02/2011 11:41 PM       Detailed results:        Thank you for allowing us to participate in the care of this patient.Please call with questions.    This note is created with the assistance of a speech recognition program.  While intending to generate adocument that actually reflects the content of the visit, the document can still have some errors including those of syntax and sound a like substitutions which may escape proof reading.  It such instances, actual meaningcan be extrapolated by contextual diversion.    Yumi Mayer MD  Office: [phone]  Perfect serve / office [phone]        I have discussed the care of the patient, including pertinent history and exam findings,  with the resident., student or CNP- I have seen and examined the patient and the key elements of all parts of the encounter have been performed by me.  I have decided the assessment, plan and orders as documented by the resident.student or CNP    Yumi Mayer, Infectious Diseases     
NUTRITION SUPPLEMENT; Breakfast; Standard High Calorie/High Protein Oral Supplement   Telemetry: Not indicated  DVT ppx: Lovenox subcu  GI ppx: Not indicated     PT/OT: On board  Discharge Planning/SW: Home with home health care    Benedict Chatman MD  5/3/2025 8:03 AM    Attending Physician Statement  I have discussed the care of Mikael Estevez, including pertinent history and exam findings with the resident. I have reviewed the key elements of all parts of the encounter with the resident. I have seen and examined the patient with the resident.  I agree with the assessment and plan and status of the problem list as documented.    I saw the patient during today in the cardiovascular ICU.  Overnight events noted.  Patient initially was Cardene drip and then it was weaned off overnight she became hypotensive and received 2 fluid boluses.  Her renal function is okay this morning hemoglobin is 8.2 WBC kidneys coming down.  Her blood sugar is high we will continue with Lantus.  She is hypertensive again asking for pain medication getting Dilaudid and oxycodone.  Will resume medication Norvasc and then will add other medication again later today as blood pressure remains elevated.    Discussed with nursing staff, treatment and plan discussed.         This note is created with the assistance of a speech recognition program.  While intent was to generate a document that actually reflects the content of the visit, the document can still have some errors including those of syntax and sound-alike substitutions which may escape proof reading.  It such instances, actual meaning can be extrapolated by contextual diversion.     Armen Dubose MD  5/3/2025 10:29 AM   
currently smoking  Patient is also on Chantix  Expressing willingness to quit    Diet: ADULT DIET; Regular  ADULT ORAL NUTRITION SUPPLEMENT; Breakfast; Standard High Calorie/High Protein Oral Supplement   Telemetry: Not indicated  DVT ppx: Lovenox subcu  GI ppx: Not indicated     PT/OT: On board  Discharge Planning/SW: Home with home health care    Benedict Chatman MD  5/4/2025 7:26 AM      Attending Physician Statement  I have discussed the care of Mikael Estevez, including pertinent history and exam findings with the resident. I have reviewed the key elements of all parts of the encounter with the resident. I have seen and examined the patient with the resident.  I agree with the assessment and plan and status of the problem list as documented.    I saw the patient during the round today, chart reviewed overnight events noted per  Patient has been asking about Dilaudid more frequently and she is also on oxycodone.  Patient blood pressure has been fluctuant depending upon her pain and her pain medication.  Systolic blood pressure is above 110 at this time not getting hypertensive.  Norvasc and lisinopril is on hold she is on Coreg and isosorbide mononitrate.  Will give magnesium.  Will check EKG for QTc.  She is continued on Lantus 20 units and blood sugars in 200s she will be likely n.p.o. postmidnight as vascular planning to OR tomorrow.    This note is created with the assistance of a speech recognition program.  While intent was to generate a document that actually reflects the content of the visit, the document can still have some errors including those of syntax and sound-alike substitutions which may escape proof reading.  It such instances, actual meaning can be extrapolated by contextual diversion.         Armen Dubose MD  5/4/2025 11:26 AM   
or CINDY Mayer, Infectious Diseases

## 2025-05-06 NOTE — PLAN OF CARE
Problem: Chronic Conditions and Co-morbidities  Goal: Patient's chronic conditions and co-morbidity symptoms are monitored and maintained or improved  5/6/2025 1001 by Arianna Boothe RN  Outcome: Progressing  5/6/2025 0645 by Dennise Bolton RN  Outcome: Progressing     Problem: Discharge Planning  Goal: Discharge to home or other facility with appropriate resources  5/6/2025 1001 by Arianna Boothe RN  Outcome: Progressing  5/6/2025 0645 by Dennise Bolton RN  Outcome: Progressing     Problem: Pain  Goal: Verbalizes/displays adequate comfort level or baseline comfort level  5/6/2025 1001 by Arianna Boothe RN  Outcome: Progressing  5/6/2025 0645 by Dennise Bolton RN  Outcome: Progressing     Problem: Skin/Tissue Integrity  Goal: Skin integrity remains intact  Description: 1.  Monitor for areas of redness and/or skin breakdown2.  Assess vascular access sites hourly3.  Every 4-6 hours minimum:  Change oxygen saturation probe site4.  Every 4-6 hours:  If on nasal continuous positive airway pressure, respiratory therapy assess nares and determine need for appliance change or resting period  5/6/2025 1001 by Arianna Boothe RN  Outcome: Progressing  5/6/2025 0645 by Dennise Bolton RN  Outcome: Progressing     Problem: Safety - Adult  Goal: Free from fall injury  5/6/2025 1001 by Arianna Boothe RN  Outcome: Progressing  5/6/2025 0645 by Dennise Bolton RN  Outcome: Progressing     Problem: ABCDS Injury Assessment  Goal: Absence of physical injury  5/6/2025 1001 by Arianna Boothe RN  Outcome: Progressing  5/6/2025 0645 by Dennise Bolton RN  Outcome: Progressing     Problem: Nutrition Deficit:  Goal: Optimize nutritional status  5/6/2025 1001 by Arianna Boothe RN  Outcome: Progressing  5/6/2025 0645 by Dennise Bolton RN  Outcome: Progressing

## 2025-05-06 NOTE — PLAN OF CARE
Plan of care note  Foot and Ankle Surgery     Plan of care      Patient is POD 1 status post hallux amputation. Dressings were applied in surgery, there are to remain intact until follow up visit. Patient to follow up outpatient within one week from discharge. Discharge instructions and follow up have been placed. Patient is responding well to post operative period.    Podiatry will sign off at this time. No other intervention needed from our standpoint. Abx recommendations per ID. Please perfect serve on call resident with questions. Patient to remain weight bearing to heel only in surgical shoe.     Jackie Escudero DPM PGY1  Foot and Ankle Surgery   05/06/25 at 2:02 PM

## 2025-05-07 NOTE — CARE COORDINATION
05/01/25 1702   Readmission Assessment   Number of Days since last admission? 1-7 days  (RAC NA)   Previous Disposition Other (comment)  (RAC NA)   Who is being Interviewed Patient  (RAC NA)   What was the patient's/caregiver's perception as to why they think they needed to return back to the hospital? Other (Comment)  (RAC NA)   Did you visit your Primary Care Physician after you left the hospital, before you returned this time? Yes  (RAC NA)   Did you see a specialist, such as Cardiac, Pulmonary, Orthopedic Physician, etc. after you left the hospital? Other (Comment)  (RAC NA)   Who advised the patient to return to the hospital? Other (Comment)  (RAC NA)   Does the patient report anything that got in the way of taking their medications? No  (RAC NA)   In our efforts to provide the best possible care to you and others like you, can you think of anything that we could have done to help you after you left the hospital the first time, so that you might not have needed to return so soon? Other (Comment)  (RAC NA)       
Case Management   Daily Progress Note       Patient Name: Mikael Estevez                   YOB: 1970  Diagnosis: Acute osteomyelitis (HCC) [M86.10]  Right foot pain [M79.671]  Osteomyelitis of great toe (HCC) [M86.9]                       GMLOS: 3 days  Length of Stay: 4  days    Anticipated Discharge Date: Two or more days before discharge    Readmission Risk (Low < 19, Mod (19-27), High > 27): Readmission Risk Score: 15        Current Transitional Plan    [x] Home Independently    [] Home with HC    [] Skilled Nursing Facility    [] Acute Rehabilitation    [] Long Term Acute Care (LTAC)    [] Other:     Plan for the Stay (Medical Management) :    Needs heart clearance to have surgery on toe. Surgery planned for 4/25 at 1230.  Per cardiology note , patient goes off the floor to smoke. ID following. Watch for IV antibiotics at discharge?      Workflow Continuation (Additional Notes) :    Spoke to patient about plan for discharge. Plan is to go to daughter's house.     Candy Hernandez RN  April 23, 2025         
Case Management   Daily Progress Note       Patient Name: Mikael Estevez                   YOB: 1970  Diagnosis: Acute osteomyelitis (HCC) [M86.10]  Right foot pain [M79.671]  Osteomyelitis of great toe (HCC) [M86.9]                       GMLOS: 3 days  Length of Stay: 5  days    Anticipated Discharge Date: Two or more days before discharge    Readmission Risk (Low < 19, Mod (19-27), High > 27): Readmission Risk Score: 14.4        Current Transitional Plan    [x] Home Independently    [] Home with HC    [] Skilled Nursing Facility    [] Acute Rehabilitation    [] Long Term Acute Care (LTAC)    [] Other:     Plan for the Stay (Medical Management) : Surgery tomorrow 4-25 for  Right hallux amputation.          Workflow Continuation (Additional Notes) : Faxed face sheet, order for wearable cardiac defibrillator, Echo and cardiac cath results, and cardiology notes to [facility] [phone].        Ingris Borges RN  April 24, 2025        
Case Management   Daily Progress Note       Patient Name: Mikael Estevez                   YOB: 1970  Diagnosis: Acute osteomyelitis (HCC) [M86.10]  Right foot pain [M79.671]  Osteomyelitis of great toe (HCC) [M86.9]                       GMLOS: 3 days  Length of Stay: 6  days    Anticipated Discharge Date: To be determined    Readmission Risk (Low < 19, Mod (19-27), High > 27): Readmission Risk Score: 14.1        Current Transitional Plan    [x] Home Independently    [] Home with HC    [] Skilled Nursing Facility    [] Acute Rehabilitation    [] Long Term Acute Care (LTAC)    [] Other:     Plan for the Stay (Medical Management) : Right toe amp today per podiatry.          Workflow Continuation (Additional Notes) : Call from Ronnie at [facility], [employer] is approved, updated that patient is not ready for discharge, plan for femoral endarterectomy 4-28.     Patient plan is to still go to daughter's home when discharged. Continue to watch for IV antibiotic needs at discharge.      Ingris Borges RN  April 25, 2025        
Case Management   Daily Progress Note       Patient Name: Mikael Estevez                   YOB: 1970  Diagnosis: Acute osteomyelitis (HCC) [M86.10]  Right foot pain [M79.671]  Osteomyelitis of great toe (HCC) [M86.9]                       GMLOS: 3 days  Length of Stay: 8  days    Anticipated Discharge Date: Two or more days before discharge    Readmission Risk (Low < 19, Mod (19-27), High > 27): Readmission Risk Score: 11.8        Current Transitional Plan    [] Home Independently    [] Home with HC    [] Skilled Nursing Facility    [] Acute Rehabilitation    [] Long Term Acute Care (LTAC)    [x] Other: home with daughter    Plan for the Stay (Medical Management) :    femoral endarterectomy and angiogram 4/28      Workflow Continuation (Additional Notes) :    Po antibiotics    FERCHO ANDERSON RN  April 27, 2025        
Case Management   Daily Progress Note       Patient Name: Mikael Estevez                   YOB: 1970  Diagnosis: Acute osteomyelitis (HCC) [M86.10]  Right foot pain [M79.671]  Osteomyelitis of great toe (HCC) [M86.9]                       GMLOS: 5.7 days  Length of Stay: 11  days    Anticipated Discharge Date: To be determined    Readmission Risk (Low < 19, Mod (19-27), High > 27): Readmission Risk Score: 14.8        Current Transitional Plan    [] Home Independently    [x] Home with HC    [] Skilled Nursing Facility    [] Acute Rehabilitation    [] Long Term Acute Care (LTAC)    [] Other:     Plan for the Stay (Medical Management) :          Workflow Continuation (Additional Notes) :    Plan is home with home healthcare. Patient agreeable to any home healthcare company able to work with her insurance. Referrals sent to [address], [facility], [facility], and [facility]. Patient may need wound vac at time of discharge. Wound vac order form placed in patient's chart for podiatry to complete if needed for home. Patient agreeable to plan.     1450: spoke with [facility] and they are unable to accept    Mila Shrestha RN  April 30, 2025         
Case Management   Daily Progress Note       Patient Name: Mikael Estevez                   YOB: 1970  Diagnosis: Acute osteomyelitis (HCC) [M86.10]  Right foot pain [M79.671]  Osteomyelitis of great toe (HCC) [M86.9]                       GMLOS: 5.7 days  Length of Stay: 12  days    Anticipated Discharge Date: One day until discharge    Readmission Risk (Low < 19, Mod (19-27), High > 27): Readmission Risk Score: 14.7        Current Transitional Plan    [] Home Independently    [x] Home with HC    [] Skilled Nursing Facility    [] Acute Rehabilitation    [] Long Term Acute Care (LTAC)    [] Other:     Plan for the Stay (Medical Management) :  Per podiatry pt not going home w wound vac.         Workflow Continuation (Additional Notes) :  Call to Shena at [facility] who states they are unable to accept pt d/t hx drug use   Call to Alee at [facility]- states can't accept calls  Spoke w Renata at [facility] - they are able to accept for RN and therapy, plans to d/c today. Requested TEVIN to be faxed to [phone]-Contacted Ronnie at [facility] and let know plans for pt to discharge today, he states she was fitted and all set to discharge .   Surgery states pt needs bypass and is planned for tomorrow . Call to Renata at [facility] and notified of delay in discharge .  Pt updated on above and denies any questions at this time.     RIGOBERTO CARTER  May 1, 2025        
Case Management   Daily Progress Note       Patient Name: Mikael Estevez                   YOB: 1970  Diagnosis: Acute osteomyelitis (HCC) [M86.10]  Right foot pain [M79.671]  Osteomyelitis of great toe (HCC) [M86.9]                       GMLOS: 5.7 days  Length of Stay: 15  days    Anticipated Discharge Date: To be determined    Readmission Risk (Low < 19, Mod (19-27), High > 27): Readmission Risk Score: 15.7        Current Transitional Plan    [] Home Independently    [x] Home with HC [facility] accepts fax AVS to     [] Skilled Nursing Facility    [] Acute Rehabilitation    [] Long Term Acute Care (LTAC)    [] Other:     Plan for the Stay (Medical Management) :  Need updated pt/ot notes  Return to OR tomorrow with podiatry      Workflow Continuation (Additional Notes) :  Plan was home with daughter with [facility]      KODAK VELASQUEZ RN  May 4, 2025        
Case Management   Daily Progress Note       Patient Name: Mikael Estevez                   YOB: 1970  Diagnosis: Acute osteomyelitis (HCC) [M86.10]  Right foot pain [M79.671]  Osteomyelitis of great toe (HCC) [M86.9]                       GMLOS: 5.7 days  Length of Stay: 16  days    Anticipated Discharge Date: One day until discharge    Readmission Risk (Low < 19, Mod (19-27), High > 27): Readmission Risk Score: 16.9        Current Transitional Plan    [] Home Independently    [x] Home with HC    [] Skilled Nursing Facility    [] Acute Rehabilitation    [] Long Term Acute Care (LTAC)    [] Other:     Plan for the Stay (Medical Management) :          Workflow Continuation (Additional Notes) : Home with daughter and [facility], switched to PO ATB. Had surgery today, await PT/OT re-evals. Has life vest in room. Would like a knee scooter. [facility] center does not have any at this time. Updated pt that she can rent one from [facility] pharmacy for $80 or private purchase.          KODAK HAQUE RN  May 5, 2025        
Discharge Report    [facility]  Clinical Case Management Department  Written by: KODAK HAQUE RN    Patient Name: Mikael Estevez  Attending Provider: Zay Brown MD  Admit Date: 2025  3:20 AM  MRN: [id number]  Account: [id number]                     : 1970  Discharge Date:       Disposition: Home with [facility], notified of discharge and HC order and TEVIN/AVS faxed as requested. Pts family renting her a knee scooter and she denies further needs.     KODAK HAQUE RN    
Post Acute Facility/Agency List     Provided patient with the following list, the list includes the overall star ratings obtained from CMS per the Medicare Web site (www.Medicare.gov):     [] Long Term Acute Care Facilities  [] Acute Inpatient Rehabilitation Facilities  [] Skilled Nursing Facilities  [x] Home Care  [] Patient's Insurance specific coverage list for SNF    Provided verbal instructions on how to utilize the QR Code to obtain additional detailed star ratings from www.Medicare.gov     offered to print and provide the detailed list:    [x]Accepted   []Declined    Ronnie from [employer] plans to fit pt for Life Vest this afternoon.     PS Dr EL Chandler to request order for walker- agreeable.   
Transitional Plan    1320 Pt off unit in Vascular Lab will try again once back on unit.   
Transitional planning-call from [facility]-did not receive discharge paperwork. Faxed AVS and Home Care order to [facility].  
expects to discharge to: Apartment  Plan for transportation at discharge: Cab    Financial    Payor: [facility] MEDICARE / Plan: [employer] DUAL COMPLETE / Product Type: *No Product type* /     Does insurance require precert for SNF: Yes    Potential assistance Purchasing Medications: No  Meds-to-Beds request:  yes      [facility] - [place], [place] - [address] - P [phone] - F [phone]  [address] [place] [address]  Phone: [phone] Fax: [phone]      Notes:    Factors facilitating achievement of predicted outcomes: Cooperative    Barriers to discharge: clinical status    Additional Case Management Notes: lives with family, plan OR tomorrow, on cefepime    The Plan for Transition of Care is related to the following treatment goals of Acute osteomyelitis (HCC) [M86.10]  Right foot pain [M79.671]  Osteomyelitis of great toe (HCC) [M86.9]    IF APPLICABLE: The Patient and/or patient representative Mikael and her family were provided with a choice of provider and agrees with the discharge plan. Freedom of choice list with basic dialogue that supports the patient's individualized plan of care/goals and shares the quality data associated with the providers was provided to: Patient     The Patient and/or Patient Representative Agree with the Discharge Plan? Yes    KODAK VELASQUEZ RN  Case Management Department

## 2025-05-08 NOTE — TELEPHONE ENCOUNTER
P/O of Dr. Mayer she would like to see this patient on 5/13/25 at 1:00.     A message was left on patients machine to call back and make this appt.

## 2025-05-08 NOTE — DISCHARGE SUMMARY
[facility]     Department of Internal Medicine - Staff Internal Medicine Teaching Service    INPATIENT DISCHARGE SUMMARY      Patient Identification:  Mikael Estevez is a 55 y.o. female.  :  1970  MRN: [id number]     Acct: [id number]   PCP: Ciro Martinez Jr., MD  Admit Date:  2025  Discharge date and time: 2025  6:44 PM   Attending Provider: No att. providers found                                     ACTIVE DISCHARGE DIAGNOSES     Hospital Problem Lists:  Principal Problem:    Acute osteomyelitis (HCC)  Active Problems:    Cocaine use disorder, severe, in early remission (HCC)    Type 2 diabetes mellitus with hyperglycemia (HCC)    Bipolar 1 disorder (HCC)    Hypertension    Cerebrovascular accident (CVA) of pontine structure (HCC)    Drug abuse (HCC)    Osteomyelitis of great toe (HCC)    Right foot pain    PAD (peripheral artery disease)    Ulcer of great toe, right, with necrosis of bone (HCC)    Type 2 diabetes mellitus with diabetic toe ulcer (HCC)    HFrEF (heart failure with reduced ejection fraction) (HCC)    Type 2 diabetes mellitus with right diabetic foot infection (HCC)    CRP elevated    ESR raised    Peripheral vascular disease  Resolved Problems:    * No resolved hospital problems. *      HOSPITAL STAY     Brief Inpatient course:   Mikael Estevez is a 55 y.o. female who was admitted for the management of Acute osteomyelitis (HCC), presented to the emergency department with worsening right toe pain.  Patient was diagnosed with osteomyelitis on MRI.  She underwent amputation of the right hallux initially and was then followed up with repeat surgery towards the end of her hospital stay.  Patient was reviewed by infectious disease tumor in the hospital and was initially on IV antibiotics.  It has been switched to Augmentin to complete the course on .    Patient is also known to have chronic bilateral SFA occlusion and history of balloon

## 2025-05-13 NOTE — TELEPHONE ENCOUNTER
LEFT message on pt's machine to call back and make a f/u       Also sent a letter to home address.

## 2025-05-15 ENCOUNTER — TELEPHONE (OUTPATIENT)
Dept: PODIATRY | Age: 55
End: 2025-05-15

## 2025-05-15 NOTE — TELEPHONE ENCOUNTER
[facility] called to schedule appointment to have stitches removed but unsure when patient is to follow up no date listed on discharge  Please return their call ask for Jackie [phone]  Thank you

## 2025-05-29 ENCOUNTER — TELEPHONE (OUTPATIENT)
Dept: INFECTIOUS DISEASES | Age: 55
End: 2025-05-29

## 2025-05-30 NOTE — TELEPHONE ENCOUNTER
Patient called to schedule a 3 week hospital follow up but it looks like you wanted to see patient back on 5/13/25 and Tawny could not get a hold of her.  Please advise when you would like to see patient   
Spoke with patient and there was an opening for next Wednesday.  
Normal rate, regular rhythm.  Heart sounds S1, S2.  No murmurs, rubs or gallops.

## 2025-06-14 ENCOUNTER — APPOINTMENT (OUTPATIENT)
Dept: CT IMAGING | Age: 55
DRG: 271 | End: 2025-06-14
Payer: MEDICARE

## 2025-06-14 ENCOUNTER — HOSPITAL ENCOUNTER (INPATIENT)
Age: 55
LOS: 10 days | Discharge: NURSING, REHAB OR LONG-TERM CARE | DRG: 271 | End: 2025-06-24
Attending: EMERGENCY MEDICINE | Admitting: INTERNAL MEDICINE
Payer: MEDICARE

## 2025-06-14 ENCOUNTER — APPOINTMENT (OUTPATIENT)
Dept: GENERAL RADIOLOGY | Age: 55
DRG: 271 | End: 2025-06-14
Payer: MEDICARE

## 2025-06-14 DIAGNOSIS — Z89.611 RIGHT ABOVE-KNEE AMPUTEE (HCC): ICD-10-CM

## 2025-06-14 DIAGNOSIS — I70.90 ARTERIAL OCCLUSION: Primary | ICD-10-CM

## 2025-06-14 DIAGNOSIS — S98.111A AMPUTATION OF RIGHT GREAT TOE: ICD-10-CM

## 2025-06-14 DIAGNOSIS — I73.9 PAD (PERIPHERAL ARTERY DISEASE): ICD-10-CM

## 2025-06-14 DIAGNOSIS — I73.9 PVD (PERIPHERAL VASCULAR DISEASE): ICD-10-CM

## 2025-06-14 DIAGNOSIS — I73.9 PERIPHERAL VASCULAR DISEASE: ICD-10-CM

## 2025-06-14 DIAGNOSIS — Z89.611 S/P AKA (ABOVE KNEE AMPUTATION) UNILATERAL, RIGHT (HCC): ICD-10-CM

## 2025-06-14 PROBLEM — I25.10 CAD (CORONARY ARTERY DISEASE): Status: ACTIVE | Noted: 2025-06-14

## 2025-06-14 PROBLEM — Z87.39 H/O OSTEOMYELITIS: Status: ACTIVE | Noted: 2025-06-14

## 2025-06-14 PROBLEM — Z98.890 HISTORY OF FEMOROPOPLITEAL BYPASS: Status: ACTIVE | Noted: 2025-06-14

## 2025-06-14 PROBLEM — G89.18 POST-OP PAIN: Status: ACTIVE | Noted: 2025-06-14

## 2025-06-14 PROBLEM — K21.9 GERD (GASTROESOPHAGEAL REFLUX DISEASE): Status: ACTIVE | Noted: 2025-06-14

## 2025-06-14 PROBLEM — E78.49 OTHER HYPERLIPIDEMIA: Status: ACTIVE | Noted: 2025-06-14

## 2025-06-14 LAB
ALBUMIN SERPL-MCNC: 4 G/DL (ref 3.5–5.2)
ALBUMIN/GLOB SERPL: 1.2 {RATIO} (ref 1–2.5)
ALP SERPL-CCNC: 157 U/L (ref 35–104)
ALT SERPL-CCNC: 6 U/L (ref 10–35)
ANION GAP SERPL CALCULATED.3IONS-SCNC: 12 MMOL/L (ref 9–16)
AST SERPL-CCNC: 12 U/L (ref 10–35)
BASOPHILS # BLD: 0.05 K/UL (ref 0–0.2)
BASOPHILS NFR BLD: 1 % (ref 0–2)
BILIRUB SERPL-MCNC: <0.2 MG/DL (ref 0–1.2)
BUN SERPL-MCNC: 11 MG/DL (ref 6–20)
CALCIUM SERPL-MCNC: 10 MG/DL (ref 8.6–10.4)
CHLORIDE SERPL-SCNC: 94 MMOL/L (ref 98–107)
CO2 SERPL-SCNC: 26 MMOL/L (ref 20–31)
CREAT SERPL-MCNC: 0.7 MG/DL (ref 0.6–0.9)
CRP SERPL HS-MCNC: 11.2 MG/L (ref 0–5)
EOSINOPHIL # BLD: 0.2 K/UL (ref 0–0.44)
EOSINOPHILS RELATIVE PERCENT: 3 % (ref 1–4)
ERYTHROCYTE [DISTWIDTH] IN BLOOD BY AUTOMATED COUNT: 16.2 % (ref 11.8–14.4)
ERYTHROCYTE [SEDIMENTATION RATE] IN BLOOD BY PHOTOMETRIC METHOD: 58 MM/HR (ref 0–30)
GFR, ESTIMATED: >90 ML/MIN/1.73M2
GLUCOSE BLD-MCNC: 283 MG/DL (ref 65–105)
GLUCOSE SERPL-MCNC: 539 MG/DL (ref 74–99)
HCT VFR BLD AUTO: 40.1 % (ref 36.3–47.1)
HGB BLD-MCNC: 13 G/DL (ref 11.9–15.1)
IMM GRANULOCYTES # BLD AUTO: 0.03 K/UL (ref 0–0.3)
IMM GRANULOCYTES NFR BLD: 0 %
LYMPHOCYTES NFR BLD: 3.13 K/UL (ref 1.1–3.7)
LYMPHOCYTES RELATIVE PERCENT: 46 % (ref 24–43)
MCH RBC QN AUTO: 27.9 PG (ref 25.2–33.5)
MCHC RBC AUTO-ENTMCNC: 32.4 G/DL (ref 28.4–34.8)
MCV RBC AUTO: 86.1 FL (ref 82.6–102.9)
MONOCYTES NFR BLD: 0.51 K/UL (ref 0.1–1.2)
MONOCYTES NFR BLD: 8 % (ref 3–12)
NEUTROPHILS NFR BLD: 42 % (ref 36–65)
NEUTS SEG NFR BLD: 2.85 K/UL (ref 1.5–8.1)
NRBC BLD-RTO: 0 PER 100 WBC
PARTIAL THROMBOPLASTIN TIME: 25.9 SEC (ref 23–36.5)
PLATELET # BLD AUTO: 496 K/UL (ref 138–453)
PMV BLD AUTO: 9.3 FL (ref 8.1–13.5)
POTASSIUM SERPL-SCNC: 3.9 MMOL/L (ref 3.7–5.3)
PROT SERPL-MCNC: 7.3 G/DL (ref 6.6–8.7)
RBC # BLD AUTO: 4.66 M/UL (ref 3.95–5.11)
RBC # BLD: ABNORMAL 10*6/UL
SODIUM SERPL-SCNC: 132 MMOL/L (ref 136–145)
WBC OTHER # BLD: 6.8 K/UL (ref 3.5–11.3)

## 2025-06-14 PROCEDURE — 6370000000 HC RX 637 (ALT 250 FOR IP)

## 2025-06-14 PROCEDURE — 96374 THER/PROPH/DIAG INJ IV PUSH: CPT

## 2025-06-14 PROCEDURE — 6360000002 HC RX W HCPCS

## 2025-06-14 PROCEDURE — 6360000002 HC RX W HCPCS: Performed by: EMERGENCY MEDICINE

## 2025-06-14 PROCEDURE — 73630 X-RAY EXAM OF FOOT: CPT

## 2025-06-14 PROCEDURE — 2060000000 HC ICU INTERMEDIATE R&B

## 2025-06-14 PROCEDURE — 96375 TX/PRO/DX INJ NEW DRUG ADDON: CPT

## 2025-06-14 PROCEDURE — 87040 BLOOD CULTURE FOR BACTERIA: CPT

## 2025-06-14 PROCEDURE — 80307 DRUG TEST PRSMV CHEM ANLYZR: CPT

## 2025-06-14 PROCEDURE — 85730 THROMBOPLASTIN TIME PARTIAL: CPT

## 2025-06-14 PROCEDURE — 80053 COMPREHEN METABOLIC PANEL: CPT

## 2025-06-14 PROCEDURE — 96376 TX/PRO/DX INJ SAME DRUG ADON: CPT

## 2025-06-14 PROCEDURE — 99285 EMERGENCY DEPT VISIT HI MDM: CPT

## 2025-06-14 PROCEDURE — 6360000004 HC RX CONTRAST MEDICATION

## 2025-06-14 PROCEDURE — 85652 RBC SED RATE AUTOMATED: CPT

## 2025-06-14 PROCEDURE — 86140 C-REACTIVE PROTEIN: CPT

## 2025-06-14 PROCEDURE — 2580000003 HC RX 258

## 2025-06-14 PROCEDURE — 85025 COMPLETE CBC W/AUTO DIFF WBC: CPT

## 2025-06-14 PROCEDURE — 75635 CT ANGIO ABDOMINAL ARTERIES: CPT

## 2025-06-14 PROCEDURE — 2500000003 HC RX 250 WO HCPCS

## 2025-06-14 PROCEDURE — 82947 ASSAY GLUCOSE BLOOD QUANT: CPT

## 2025-06-14 RX ORDER — ONDANSETRON 2 MG/ML
4 INJECTION INTRAMUSCULAR; INTRAVENOUS EVERY 6 HOURS PRN
Status: DISCONTINUED | OUTPATIENT
Start: 2025-06-14 | End: 2025-06-24 | Stop reason: HOSPADM

## 2025-06-14 RX ORDER — FENTANYL CITRATE 50 UG/ML
25 INJECTION, SOLUTION INTRAMUSCULAR; INTRAVENOUS ONCE
Refills: 0 | Status: COMPLETED | OUTPATIENT
Start: 2025-06-14 | End: 2025-06-14

## 2025-06-14 RX ORDER — IOPAMIDOL 755 MG/ML
125 INJECTION, SOLUTION INTRAVASCULAR
Status: COMPLETED | OUTPATIENT
Start: 2025-06-14 | End: 2025-06-14

## 2025-06-14 RX ORDER — GABAPENTIN 300 MG/1
300 CAPSULE ORAL EVERY 8 HOURS
Status: DISCONTINUED | OUTPATIENT
Start: 2025-06-14 | End: 2025-06-24 | Stop reason: HOSPADM

## 2025-06-14 RX ORDER — LISINOPRIL 5 MG/1
2.5 TABLET ORAL DAILY
Status: DISCONTINUED | OUTPATIENT
Start: 2025-06-15 | End: 2025-06-24 | Stop reason: HOSPADM

## 2025-06-14 RX ORDER — HEPARIN SODIUM 1000 [USP'U]/ML
4000 INJECTION, SOLUTION INTRAVENOUS; SUBCUTANEOUS PRN
Status: DISCONTINUED | OUTPATIENT
Start: 2025-06-14 | End: 2025-06-14

## 2025-06-14 RX ORDER — 0.9 % SODIUM CHLORIDE 0.9 %
500 INTRAVENOUS SOLUTION INTRAVENOUS ONCE
Status: COMPLETED | OUTPATIENT
Start: 2025-06-14 | End: 2025-06-14

## 2025-06-14 RX ORDER — SENNA AND DOCUSATE SODIUM 50; 8.6 MG/1; MG/1
2 TABLET, FILM COATED ORAL 2 TIMES DAILY
Status: DISCONTINUED | OUTPATIENT
Start: 2025-06-15 | End: 2025-06-24 | Stop reason: HOSPADM

## 2025-06-14 RX ORDER — POLYETHYLENE GLYCOL 3350 17 G/17G
17 POWDER, FOR SOLUTION ORAL DAILY PRN
Status: DISCONTINUED | OUTPATIENT
Start: 2025-06-14 | End: 2025-06-18

## 2025-06-14 RX ORDER — ACETAMINOPHEN 325 MG/1
650 TABLET ORAL EVERY 6 HOURS PRN
Status: DISCONTINUED | OUTPATIENT
Start: 2025-06-14 | End: 2025-06-17

## 2025-06-14 RX ORDER — INSULIN LISPRO 100 [IU]/ML
0-16 INJECTION, SOLUTION INTRAVENOUS; SUBCUTANEOUS
Status: DISCONTINUED | OUTPATIENT
Start: 2025-06-14 | End: 2025-06-15

## 2025-06-14 RX ORDER — SODIUM CHLORIDE 9 MG/ML
INJECTION, SOLUTION INTRAVENOUS PRN
Status: DISCONTINUED | OUTPATIENT
Start: 2025-06-14 | End: 2025-06-24 | Stop reason: HOSPADM

## 2025-06-14 RX ORDER — ISOSORBIDE MONONITRATE 30 MG/1
30 TABLET, EXTENDED RELEASE ORAL DAILY
Status: DISCONTINUED | OUTPATIENT
Start: 2025-06-15 | End: 2025-06-24 | Stop reason: HOSPADM

## 2025-06-14 RX ORDER — SODIUM CHLORIDE 0.9 % (FLUSH) 0.9 %
5-40 SYRINGE (ML) INJECTION PRN
Status: DISCONTINUED | OUTPATIENT
Start: 2025-06-14 | End: 2025-06-24 | Stop reason: HOSPADM

## 2025-06-14 RX ORDER — ASPIRIN 81 MG/1
81 TABLET ORAL DAILY
Status: DISCONTINUED | OUTPATIENT
Start: 2025-06-15 | End: 2025-06-24 | Stop reason: HOSPADM

## 2025-06-14 RX ORDER — ONDANSETRON 4 MG/1
4 TABLET, ORALLY DISINTEGRATING ORAL EVERY 8 HOURS PRN
Status: DISCONTINUED | OUTPATIENT
Start: 2025-06-14 | End: 2025-06-24 | Stop reason: HOSPADM

## 2025-06-14 RX ORDER — ATORVASTATIN CALCIUM 40 MG/1
40 TABLET, FILM COATED ORAL NIGHTLY
Status: DISCONTINUED | OUTPATIENT
Start: 2025-06-14 | End: 2025-06-24 | Stop reason: HOSPADM

## 2025-06-14 RX ORDER — POTASSIUM CHLORIDE 1500 MG/1
40 TABLET, EXTENDED RELEASE ORAL PRN
Status: DISCONTINUED | OUTPATIENT
Start: 2025-06-14 | End: 2025-06-24 | Stop reason: HOSPADM

## 2025-06-14 RX ORDER — POTASSIUM CHLORIDE 7.45 MG/ML
10 INJECTION INTRAVENOUS PRN
Status: DISCONTINUED | OUTPATIENT
Start: 2025-06-14 | End: 2025-06-24 | Stop reason: HOSPADM

## 2025-06-14 RX ORDER — OXYCODONE HYDROCHLORIDE 5 MG/1
5 TABLET ORAL EVERY 6 HOURS PRN
Refills: 0 | Status: DISCONTINUED | OUTPATIENT
Start: 2025-06-14 | End: 2025-06-15

## 2025-06-14 RX ORDER — HEPARIN SODIUM 1000 [USP'U]/ML
4000 INJECTION, SOLUTION INTRAVENOUS; SUBCUTANEOUS PRN
Status: DISCONTINUED | OUTPATIENT
Start: 2025-06-14 | End: 2025-06-18

## 2025-06-14 RX ORDER — SODIUM CHLORIDE 0.9 % (FLUSH) 0.9 %
5-40 SYRINGE (ML) INJECTION EVERY 12 HOURS SCHEDULED
Status: DISCONTINUED | OUTPATIENT
Start: 2025-06-14 | End: 2025-06-24 | Stop reason: HOSPADM

## 2025-06-14 RX ORDER — MAGNESIUM SULFATE IN WATER 40 MG/ML
2000 INJECTION, SOLUTION INTRAVENOUS PRN
Status: DISCONTINUED | OUTPATIENT
Start: 2025-06-14 | End: 2025-06-24 | Stop reason: HOSPADM

## 2025-06-14 RX ORDER — HEPARIN SODIUM 1000 [USP'U]/ML
2000 INJECTION, SOLUTION INTRAVENOUS; SUBCUTANEOUS PRN
Status: DISCONTINUED | OUTPATIENT
Start: 2025-06-14 | End: 2025-06-14

## 2025-06-14 RX ORDER — INSULIN LISPRO 100 [IU]/ML
10 INJECTION, SOLUTION INTRAVENOUS; SUBCUTANEOUS ONCE
Status: COMPLETED | OUTPATIENT
Start: 2025-06-14 | End: 2025-06-14

## 2025-06-14 RX ORDER — SPIRONOLACTONE 25 MG/1
12.5 TABLET ORAL DAILY
Status: DISCONTINUED | OUTPATIENT
Start: 2025-06-15 | End: 2025-06-24 | Stop reason: HOSPADM

## 2025-06-14 RX ORDER — HEPARIN SODIUM 1000 [USP'U]/ML
2000 INJECTION, SOLUTION INTRAVENOUS; SUBCUTANEOUS PRN
Status: DISCONTINUED | OUTPATIENT
Start: 2025-06-14 | End: 2025-06-18

## 2025-06-14 RX ORDER — IOPAMIDOL 755 MG/ML
75 INJECTION, SOLUTION INTRAVASCULAR
Status: DISCONTINUED | OUTPATIENT
Start: 2025-06-14 | End: 2025-06-14

## 2025-06-14 RX ORDER — CARVEDILOL 12.5 MG/1
12.5 TABLET ORAL 2 TIMES DAILY WITH MEALS
Status: DISCONTINUED | OUTPATIENT
Start: 2025-06-15 | End: 2025-06-24 | Stop reason: HOSPADM

## 2025-06-14 RX ORDER — HEPARIN SODIUM 10000 [USP'U]/100ML
5-30 INJECTION, SOLUTION INTRAVENOUS CONTINUOUS
Status: DISCONTINUED | OUTPATIENT
Start: 2025-06-14 | End: 2025-06-14

## 2025-06-14 RX ORDER — FENTANYL CITRATE 50 UG/ML
50 INJECTION, SOLUTION INTRAMUSCULAR; INTRAVENOUS ONCE
Status: COMPLETED | OUTPATIENT
Start: 2025-06-14 | End: 2025-06-14

## 2025-06-14 RX ORDER — ACETAMINOPHEN 650 MG/1
650 SUPPOSITORY RECTAL EVERY 6 HOURS PRN
Status: DISCONTINUED | OUTPATIENT
Start: 2025-06-14 | End: 2025-06-17

## 2025-06-14 RX ORDER — HEPARIN SODIUM 10000 [USP'U]/100ML
5-30 INJECTION, SOLUTION INTRAVENOUS CONTINUOUS
Status: DISCONTINUED | OUTPATIENT
Start: 2025-06-14 | End: 2025-06-18

## 2025-06-14 RX ORDER — GLUCAGON 1 MG/ML
1 KIT INJECTION PRN
Status: DISCONTINUED | OUTPATIENT
Start: 2025-06-14 | End: 2025-06-24 | Stop reason: HOSPADM

## 2025-06-14 RX ORDER — DEXTROSE MONOHYDRATE 100 MG/ML
INJECTION, SOLUTION INTRAVENOUS CONTINUOUS PRN
Status: DISCONTINUED | OUTPATIENT
Start: 2025-06-14 | End: 2025-06-24 | Stop reason: HOSPADM

## 2025-06-14 RX ORDER — HEPARIN SODIUM 1000 [USP'U]/ML
4000 INJECTION, SOLUTION INTRAVENOUS; SUBCUTANEOUS ONCE
Status: COMPLETED | OUTPATIENT
Start: 2025-06-14 | End: 2025-06-14

## 2025-06-14 RX ORDER — HEPARIN SODIUM 1000 [USP'U]/ML
4000 INJECTION, SOLUTION INTRAVENOUS; SUBCUTANEOUS ONCE
Status: DISCONTINUED | OUTPATIENT
Start: 2025-06-14 | End: 2025-06-14

## 2025-06-14 RX ORDER — PANTOPRAZOLE SODIUM 40 MG/1
40 TABLET, DELAYED RELEASE ORAL
Status: DISCONTINUED | OUTPATIENT
Start: 2025-06-15 | End: 2025-06-24 | Stop reason: HOSPADM

## 2025-06-14 RX ADMIN — ATORVASTATIN CALCIUM 40 MG: 80 TABLET, FILM COATED ORAL at 21:27

## 2025-06-14 RX ADMIN — HEPARIN SODIUM 4000 UNITS: 1000 INJECTION INTRAVENOUS; SUBCUTANEOUS at 20:42

## 2025-06-14 RX ADMIN — QUETIAPINE FUMARATE 150 MG: 100 TABLET ORAL at 23:41

## 2025-06-14 RX ADMIN — FENTANYL CITRATE 50 MCG: 50 INJECTION INTRAMUSCULAR; INTRAVENOUS at 20:44

## 2025-06-14 RX ADMIN — SODIUM CHLORIDE 500 ML: 0.9 INJECTION, SOLUTION INTRAVENOUS at 21:27

## 2025-06-14 RX ADMIN — HEPARIN SODIUM 4000 UNITS: 1000 INJECTION INTRAVENOUS; SUBCUTANEOUS at 23:49

## 2025-06-14 RX ADMIN — INSULIN LISPRO 10 UNITS: 100 INJECTION, SOLUTION INTRAVENOUS; SUBCUTANEOUS at 19:20

## 2025-06-14 RX ADMIN — SODIUM CHLORIDE, PRESERVATIVE FREE 5 ML: 5 INJECTION INTRAVENOUS at 21:06

## 2025-06-14 RX ADMIN — FENTANYL CITRATE 25 MCG: 50 INJECTION INTRAMUSCULAR; INTRAVENOUS at 18:53

## 2025-06-14 RX ADMIN — IOPAMIDOL 125 ML: 755 INJECTION, SOLUTION INTRAVENOUS at 20:17

## 2025-06-14 RX ADMIN — HEPARIN SODIUM 12 UNITS/KG/HR: 10000 INJECTION, SOLUTION INTRAVENOUS at 20:41

## 2025-06-14 RX ADMIN — AMOXICILLIN AND CLAVULANATE POTASSIUM 1 TABLET: 875; 125 TABLET, FILM COATED ORAL at 21:27

## 2025-06-14 ASSESSMENT — PAIN SCALES - GENERAL: PAINLEVEL_OUTOF10: 10

## 2025-06-14 ASSESSMENT — PAIN DESCRIPTION - LOCATION: LOCATION: LEG;FOOT

## 2025-06-14 ASSESSMENT — PAIN - FUNCTIONAL ASSESSMENT: PAIN_FUNCTIONAL_ASSESSMENT: 0-10

## 2025-06-14 ASSESSMENT — PAIN DESCRIPTION - ORIENTATION: ORIENTATION: RIGHT

## 2025-06-14 ASSESSMENT — PAIN DESCRIPTION - DESCRIPTORS: DESCRIPTORS: PRESSURE;SORE

## 2025-06-14 NOTE — ED PROVIDER NOTES
[facility]  Emergency Department  Faculty Attestation     I performed a history and physical examination of the patient and discussed management with the resident. I reviewed the resident’s note and agree with the documented findings and plan of care. Any areas of disagreement are noted on the chart. I was personally present for the key portions of any procedures. I have documented in the chart those procedures where I was not present during the key portions. I have reviewed the emergency nurses triage note. I agree with the chief complaint, past medical history, past surgical history, allergies, medications, social and family history as documented unless otherwise noted below.    For Physician Assistant/ Nurse Practitioner cases/documentation I have personally evaluated this patient and have completed at least one if not all key elements of the E/M (history, physical exam, and MDM). Additional findings are as noted.    Preliminary note started at 6:42 PM EDT    Primary Care Physician:  Ciro Martinez Jr., MD    Screenings:  [unfilled]    CHIEF COMPLAINT       Chief Complaint   Patient presents with    Post-op Problem     Discharge from femoral surgical site from fem pop and right great toe amputation         RECENT VITALS:   There were no vitals taken for this visit.    LABS:  Labs Reviewed   CBC WITH AUTO DIFFERENTIAL   COMPREHENSIVE METABOLIC PANEL       Radiology  No orders to display       CRITICAL CARE: There was a high probability of clinically significant/life threatening deterioration in this patient's condition which required my urgent intervention.  Total critical care time was none minutes.  This excludes any time for separately reportable procedures.     EKG:      Attending Physician Additional  Notes    Patient is status post right great toe amputation and right lower extremity vascular procedure on 5/5/2025.  She had drainage from her wound but is described

## 2025-06-14 NOTE — ED TRIAGE NOTES
Pt. Arrives to ED via walk-in for c/o surgical site infection.  Pt. Is post-op fem pop and right  great toe amputation with vascular and podiatry in May.  Pt. Reports that in her groin she has noticed that it is seeping and she is getting discharge from it that is malodorous.  Pt. States that in her right foot \"it feels like there is a fingernail inside where they cut\".  Pt. Denies fevers, chills at home.

## 2025-06-14 NOTE — ED PROVIDER NOTES
[facility]A STEPDOWN  Emergency Department Encounter  Emergency Medicine Resident     Pt Name:Mikael Estevez  MRN: [id number]  Birthdate 1970  Date of evaluation: 6/14/25  PCP:  Ciro Martinez Jr., MD  Note Started: 6:30 PM EDT      CHIEF COMPLAINT       Chief Complaint   Patient presents with    Post-op Problem     Discharge from femoral surgical site from fem pop and right great toe amputation         HISTORY OF PRESENT ILLNESS  (Location/Symptom, Timing/Onset, Context/Setting, Quality, Duration, Modifying Factors, Severity.)      Mikael Estevez is a 55 y.o. female who presents with concerns of wound complication after femoral bypass done months ago, patient has right big toe amputation by podiatry couple weeks ago, patient is complaining of pain in the wound area in the groin and in the big toe.  Patient denies fall or trauma, patient denies fever, abdominal pain, chest pain, diarrhea, vomiting    PAST MEDICAL / SURGICAL / SOCIAL / FAMILY HISTORY      has a past medical history of Back pain, Bipolar 1 disorder (HCC), Diabetes mellitus (HCC), Disc disorder, and Hypertension.       has a past surgical history that includes Cholecystectomy; back surgery; Cardiac procedure (N/A, 04/22/2025); amputation (Right, 04/25/2025); Toe amputation (Right, 04/25/2025); femoral bypass (Right, 04/28/2025); amputation (Right, 05/05/2025); femoral bypass (Right, 05/02/2025); Toe amputation (Right, 05/05/2025); above knee amputation (Right, 06/17/2025); vascular surgery (Right, 6/16/2025); and Leg Surgery (Right, 6/17/2025).      Social History     Socioeconomic History    Marital status: Single     Spouse name: Not on file    Number of children: Not on file    Years of education: Not on file    Highest education level: Not on file   Occupational History    Not on file   Tobacco Use    Smoking status: Every Day     Current packs/day: 0.50     Average packs/day: 0.5 packs/day for 13.0 years (6.5 ttl pk-yrs)     Types:

## 2025-06-15 LAB
AMPHET UR QL SCN: NEGATIVE
BARBITURATES UR QL SCN: NEGATIVE
BENZODIAZ UR QL: NEGATIVE
CANNABINOIDS UR QL SCN: NEGATIVE
COCAINE UR QL SCN: NEGATIVE
FENTANYL UR QL: POSITIVE
GLUCOSE BLD-MCNC: 122 MG/DL (ref 65–105)
GLUCOSE BLD-MCNC: 133 MG/DL (ref 65–105)
GLUCOSE BLD-MCNC: 192 MG/DL (ref 65–105)
GLUCOSE BLD-MCNC: 234 MG/DL (ref 65–105)
GLUCOSE BLD-MCNC: 255 MG/DL (ref 65–105)
GLUCOSE BLD-MCNC: 371 MG/DL (ref 65–105)
GLUCOSE BLD-MCNC: 388 MG/DL (ref 65–105)
METHADONE UR QL: NEGATIVE
OPIATES UR QL SCN: NEGATIVE
OXYCODONE UR QL SCN: POSITIVE
PARTIAL THROMBOPLASTIN TIME: 32.8 SEC (ref 23–36.5)
PARTIAL THROMBOPLASTIN TIME: 83 SEC (ref 23–36.5)
PARTIAL THROMBOPLASTIN TIME: >180 SEC (ref 23–36.5)
PCP UR QL SCN: NEGATIVE
TEST INFORMATION: ABNORMAL

## 2025-06-15 PROCEDURE — 6360000002 HC RX W HCPCS

## 2025-06-15 PROCEDURE — 6360000002 HC RX W HCPCS: Performed by: EMERGENCY MEDICINE

## 2025-06-15 PROCEDURE — 6370000000 HC RX 637 (ALT 250 FOR IP)

## 2025-06-15 PROCEDURE — 36415 COLL VENOUS BLD VENIPUNCTURE: CPT

## 2025-06-15 PROCEDURE — 82947 ASSAY GLUCOSE BLOOD QUANT: CPT

## 2025-06-15 PROCEDURE — 2500000003 HC RX 250 WO HCPCS

## 2025-06-15 PROCEDURE — 99223 1ST HOSP IP/OBS HIGH 75: CPT | Performed by: INTERNAL MEDICINE

## 2025-06-15 PROCEDURE — 80307 DRUG TEST PRSMV CHEM ANLYZR: CPT

## 2025-06-15 PROCEDURE — 85730 THROMBOPLASTIN TIME PARTIAL: CPT

## 2025-06-15 PROCEDURE — 2060000000 HC ICU INTERMEDIATE R&B

## 2025-06-15 RX ORDER — INSULIN GLARGINE 100 [IU]/ML
15 INJECTION, SOLUTION SUBCUTANEOUS DAILY
Status: DISCONTINUED | OUTPATIENT
Start: 2025-06-15 | End: 2025-06-15

## 2025-06-15 RX ORDER — HYDRALAZINE HYDROCHLORIDE 20 MG/ML
10 INJECTION INTRAMUSCULAR; INTRAVENOUS EVERY 6 HOURS PRN
Status: DISCONTINUED | OUTPATIENT
Start: 2025-06-15 | End: 2025-06-24 | Stop reason: HOSPADM

## 2025-06-15 RX ORDER — OXYCODONE HYDROCHLORIDE 5 MG/1
10 TABLET ORAL EVERY 6 HOURS PRN
Status: DISCONTINUED | OUTPATIENT
Start: 2025-06-15 | End: 2025-06-17

## 2025-06-15 RX ORDER — INSULIN GLARGINE 100 [IU]/ML
15 INJECTION, SOLUTION SUBCUTANEOUS ONCE
Status: COMPLETED | OUTPATIENT
Start: 2025-06-15 | End: 2025-06-15

## 2025-06-15 RX ORDER — OXYCODONE HYDROCHLORIDE 5 MG/1
5 TABLET ORAL ONCE
Status: COMPLETED | OUTPATIENT
Start: 2025-06-15 | End: 2025-06-15

## 2025-06-15 RX ORDER — GABAPENTIN 300 MG/1
300 CAPSULE ORAL ONCE
Status: DISCONTINUED | OUTPATIENT
Start: 2025-06-15 | End: 2025-06-15

## 2025-06-15 RX ORDER — INSULIN LISPRO 100 [IU]/ML
0-16 INJECTION, SOLUTION INTRAVENOUS; SUBCUTANEOUS
Status: DISCONTINUED | OUTPATIENT
Start: 2025-06-15 | End: 2025-06-22

## 2025-06-15 RX ORDER — LABETALOL HYDROCHLORIDE 5 MG/ML
10 INJECTION, SOLUTION INTRAVENOUS EVERY 6 HOURS PRN
Status: DISCONTINUED | OUTPATIENT
Start: 2025-06-15 | End: 2025-06-24 | Stop reason: HOSPADM

## 2025-06-15 RX ORDER — INSULIN GLARGINE 100 [IU]/ML
15 INJECTION, SOLUTION SUBCUTANEOUS NIGHTLY
Status: DISCONTINUED | OUTPATIENT
Start: 2025-06-15 | End: 2025-06-18

## 2025-06-15 RX ADMIN — HEPARIN SODIUM 4000 UNITS: 1000 INJECTION INTRAVENOUS; SUBCUTANEOUS at 17:05

## 2025-06-15 RX ADMIN — CARVEDILOL 12.5 MG: 12.5 TABLET, FILM COATED ORAL at 17:09

## 2025-06-15 RX ADMIN — OXYCODONE 5 MG: 5 TABLET ORAL at 02:14

## 2025-06-15 RX ADMIN — CARVEDILOL 12.5 MG: 12.5 TABLET, FILM COATED ORAL at 08:00

## 2025-06-15 RX ADMIN — HYDROMORPHONE HYDROCHLORIDE 0.25 MG: 1 INJECTION, SOLUTION INTRAMUSCULAR; INTRAVENOUS; SUBCUTANEOUS at 11:49

## 2025-06-15 RX ADMIN — INSULIN GLARGINE 15 UNITS: 100 INJECTION, SOLUTION SUBCUTANEOUS at 06:26

## 2025-06-15 RX ADMIN — AMOXICILLIN AND CLAVULANATE POTASSIUM 1 TABLET: 875; 125 TABLET, FILM COATED ORAL at 09:41

## 2025-06-15 RX ADMIN — ASPIRIN 81 MG: 81 TABLET, COATED ORAL at 09:41

## 2025-06-15 RX ADMIN — AMOXICILLIN AND CLAVULANATE POTASSIUM 1 TABLET: 875; 125 TABLET, FILM COATED ORAL at 22:18

## 2025-06-15 RX ADMIN — QUETIAPINE FUMARATE 150 MG: 100 TABLET ORAL at 22:18

## 2025-06-15 RX ADMIN — GABAPENTIN 300 MG: 300 CAPSULE ORAL at 22:18

## 2025-06-15 RX ADMIN — SODIUM CHLORIDE, PRESERVATIVE FREE 10 ML: 5 INJECTION INTRAVENOUS at 22:08

## 2025-06-15 RX ADMIN — OXYCODONE 10 MG: 5 TABLET ORAL at 14:32

## 2025-06-15 RX ADMIN — INSULIN LISPRO 16 UNITS: 100 INJECTION, SOLUTION INTRAVENOUS; SUBCUTANEOUS at 02:59

## 2025-06-15 RX ADMIN — INSULIN LISPRO 4 UNITS: 100 INJECTION, SOLUTION INTRAVENOUS; SUBCUTANEOUS at 12:14

## 2025-06-15 RX ADMIN — OXYCODONE 10 MG: 5 TABLET ORAL at 22:18

## 2025-06-15 RX ADMIN — ISOSORBIDE MONONITRATE 30 MG: 30 TABLET, EXTENDED RELEASE ORAL at 08:00

## 2025-06-15 RX ADMIN — SENNOSIDES AND DOCUSATE SODIUM 2 TABLET: 50; 8.6 TABLET ORAL at 08:00

## 2025-06-15 RX ADMIN — HYDROMORPHONE HYDROCHLORIDE 0.25 MG: 1 INJECTION, SOLUTION INTRAMUSCULAR; INTRAVENOUS; SUBCUTANEOUS at 17:09

## 2025-06-15 RX ADMIN — ATORVASTATIN CALCIUM 40 MG: 80 TABLET, FILM COATED ORAL at 22:18

## 2025-06-15 RX ADMIN — SPIRONOLACTONE 12.5 MG: 25 TABLET, FILM COATED ORAL at 08:01

## 2025-06-15 RX ADMIN — OXYCODONE 5 MG: 5 TABLET ORAL at 00:29

## 2025-06-15 RX ADMIN — GABAPENTIN 300 MG: 300 CAPSULE ORAL at 14:32

## 2025-06-15 RX ADMIN — INSULIN LISPRO 4 UNITS: 100 INJECTION, SOLUTION INTRAVENOUS; SUBCUTANEOUS at 17:08

## 2025-06-15 RX ADMIN — HEPARIN SODIUM 20 UNITS/KG/HR: 10000 INJECTION, SOLUTION INTRAVENOUS at 22:21

## 2025-06-15 RX ADMIN — HYDROMORPHONE HYDROCHLORIDE 0.25 MG: 1 INJECTION, SOLUTION INTRAMUSCULAR; INTRAVENOUS; SUBCUTANEOUS at 06:26

## 2025-06-15 RX ADMIN — PANTOPRAZOLE SODIUM 40 MG: 40 TABLET, DELAYED RELEASE ORAL at 06:26

## 2025-06-15 RX ADMIN — SODIUM CHLORIDE, PRESERVATIVE FREE 10 ML: 5 INJECTION INTRAVENOUS at 08:01

## 2025-06-15 ASSESSMENT — PAIN SCALES - GENERAL
PAINLEVEL_OUTOF10: 5
PAINLEVEL_OUTOF10: 3
PAINLEVEL_OUTOF10: 5
PAINLEVEL_OUTOF10: 4
PAINLEVEL_OUTOF10: 10
PAINLEVEL_OUTOF10: 2
PAINLEVEL_OUTOF10: 10
PAINLEVEL_OUTOF10: 10
PAINLEVEL_OUTOF10: 2
PAINLEVEL_OUTOF10: 5
PAINLEVEL_OUTOF10: 6
PAINLEVEL_OUTOF10: 10

## 2025-06-15 ASSESSMENT — PAIN DESCRIPTION - LOCATION
LOCATION: TOE (COMMENT WHICH ONE)
LOCATION: LEG;ARM
LOCATION: LEG
LOCATION: TOE (COMMENT WHICH ONE)
LOCATION: LEG;TOE (COMMENT WHICH ONE)

## 2025-06-15 ASSESSMENT — PAIN DESCRIPTION - DESCRIPTORS
DESCRIPTORS: ACHING;DISCOMFORT
DESCRIPTORS: DISCOMFORT;NAGGING
DESCRIPTORS: ACHING;DISCOMFORT
DESCRIPTORS: ACHING;DISCOMFORT

## 2025-06-15 ASSESSMENT — PAIN DESCRIPTION - ORIENTATION
ORIENTATION: RIGHT
ORIENTATION: LEFT

## 2025-06-15 ASSESSMENT — PAIN SCALES - WONG BAKER
WONGBAKER_NUMERICALRESPONSE: HURTS EVEN MORE
WONGBAKER_NUMERICALRESPONSE: HURTS A LITTLE BIT
WONGBAKER_NUMERICALRESPONSE: HURTS EVEN MORE
WONGBAKER_NUMERICALRESPONSE: HURTS A LITTLE BIT

## 2025-06-15 NOTE — CARE COORDINATION
Case Management Assessment  Initial Evaluation    Date/Time of Evaluation: 6/15/2025 11:35 AM  Assessment Completed by: LEENA CHAVEZ RN    If patient is discharged prior to next notation, then this note serves as note for discharge by case management.    Patient Name: Mikael Estevez                   YOB: 1970  Diagnosis: Post-op pain [G89.18]                   Date / Time: 6/14/2025  6:22 PM    Patient Admission Status: Inpatient   Readmission Risk (Low < 19, Mod (19-27), High > 27): Readmission Risk Score: 19.7    Current PCP: Ciro Martinez Jr., MD  PCP verified by CM? (P) Yes    Chart Reviewed: Yes      History Provided by: (P) Patient  Patient Orientation: (P) Alert and Oriented    Patient Cognition: (P) Alert    Hospitalization in the last 30 days (Readmission):  No    If yes, Readmission Assessment in CM Navigator will be completed.    Advance Directives:      Code Status: Full Code   Patient's Primary Decision Maker is: (P) Legal Next of Kin      Discharge Planning:    Patient lives with: (P) Other (Comment) (homeless) Type of Home: (P) Homeless  Primary Care Giver: (P) Self  Patient Support Systems include: (P) Children   Current Financial resources: (P) Medicare  Current community resources: (P) Transportation  Current services prior to admission: (P) Durable Medical Equipment            Current DME: (P) Walker            Type of Home Care services:  (P) None    ADLS  Prior functional level: (P) Independent in ADLs/IADLs  Current functional level: (P) Independent in ADLs/IADLs    PT AM-PAC:   /24  OT AM-PAC:   /24    Family can provide assistance at DC: (P) No  Would you like Case Management to discuss the discharge plan with any other family members/significant others, and if so, who? (P) No  Plans to Return to Present Housing: (P) Other (see comment) (state homeless)  Other Identified Issues/Barriers to RETURNING to current housing: homeless  Potential Assistance needed at

## 2025-06-15 NOTE — PLAN OF CARE
Problem: Chronic Conditions and Co-morbidities  Goal: Patient's chronic conditions and co-morbidity symptoms are monitored and maintained or improved  Outcome: Progressing  Flowsheets (Taken 6/14/2025 2245)  Care Plan - Patient's Chronic Conditions and Co-Morbidity Symptoms are Monitored and Maintained or Improved:   Monitor and assess patient's chronic conditions and comorbid symptoms for stability, deterioration, or improvement   Collaborate with multidisciplinary team to address chronic and comorbid conditions and prevent exacerbation or deterioration     Problem: Discharge Planning  Goal: Discharge to home or other facility with appropriate resources  Outcome: Progressing  Flowsheets (Taken 6/14/2025 2245)  Discharge to home or other facility with appropriate resources:   Identify barriers to discharge with patient and caregiver   Arrange for needed discharge resources and transportation as appropriate   Identify discharge learning needs (meds, wound care, etc)     Problem: Pain  Goal: Verbalizes/displays adequate comfort level or baseline comfort level  Outcome: Progressing     Problem: Safety - Adult  Goal: Free from fall injury  Outcome: Progressing     Problem: ABCDS Injury Assessment  Goal: Absence of physical injury  Outcome: Progressing

## 2025-06-15 NOTE — FLOWSHEET NOTE
Reached out to resident to discuss the patients' elevated blood sugar levels. The patient refused insulin stating that her sugar \"will go down, it always does.\" Patient is Aox4 and is refusing insulin and gabapentin currently. Patient is agreeable to heparin drip continuing. Educated patient on the risks and benefits of uncontrolled blood sugar levels.

## 2025-06-15 NOTE — ED NOTES
ED to inpatient nurses report      Chief Complaint:  Chief Complaint   Patient presents with    Post-op Problem     Discharge from femoral surgical site from fem pop and right great toe amputation       Present to ED from: home    MOA:     LOC: alert and orientated to name, place, date  Mobility: Independent  Oxygen Baseline: RA    Current needs required: RA   Pending ED orders: none  Present condition: stable    Why did the patient come to the ED? Pt. Arrives to ED via walk-in for c/o surgical site infection. Pt. Is post-op fem pop and right great toe amputation with vascular and podiatry in May. Pt. Reports that in her groin she has noticed that it is seeping and she is getting discharge from it that is malodorous. Pt. States that in her right foot \"it feels like there is a fingernail inside where they cut\". Pt. Denies fevers, chills at home.   What is the plan? Admit  Any procedures or intervention occur? Vascular consult, heparin drip, labs, XR, CT  Any safety concerns??    Mental Status:  Level of Consciousness: Alert (0)    Psych Assessment:   Psychosocial  Psychosocial (WDL): Within Defined Limits  Vital signs   Vitals:    06/14/25 1915 06/14/25 1930 06/14/25 1945 06/14/25 2004   BP: (!) 169/67 (!) 166/73 (!) 148/49    Pulse: 78 79 77    Resp: 19 17 14    Temp:       TempSrc:       SpO2: 99% 99% 98%    Weight:    76.6 kg (168 lb 14 oz)        Vitals:  Patient Vitals for the past 24 hrs:   BP Temp Temp src Pulse Resp SpO2 Weight   06/14/25 2004 -- -- -- -- -- -- 76.6 kg (168 lb 14 oz)   06/14/25 1945 (!) 148/49 -- -- 77 14 98 % --   06/14/25 1930 (!) 166/73 -- -- 79 17 99 % --   06/14/25 1915 (!) 169/67 -- -- 78 19 99 % --   06/14/25 1900 (!) 188/65 -- -- 74 13 99 % --   06/14/25 1846 -- -- -- -- 12 -- --   06/14/25 1845 (!) 192/70 97.9 °F (36.6 °C) Oral 79 14 -- --   06/14/25 1830 (!) 171/76 -- -- 86 15 -- --   06/14/25 1827 (!) 181/88 -- -- -- -- -- --      Visit Vitals  BP (!) 148/49   Pulse 77   Temp 97.9

## 2025-06-15 NOTE — PLAN OF CARE
Problem: Chronic Conditions and Co-morbidities  Goal: Patient's chronic conditions and co-morbidity symptoms are monitored and maintained or improved  Outcome: Progressing     Problem: Discharge Planning  Goal: Discharge to home or other facility with appropriate resources  Outcome: Progressing     Problem: Pain  Goal: Verbalizes/displays adequate comfort level or baseline comfort level  Outcome: Progressing     Problem: Safety - Adult  Goal: Free from fall injury  Outcome: Progressing  Flowsheets (Taken 6/15/2025 0800)  Free From Fall Injury: Instruct family/caregiver on patient safety     Problem: ABCDS Injury Assessment  Goal: Absence of physical injury  Outcome: Progressing  Flowsheets (Taken 6/15/2025 0800)  Absence of Physical Injury: Implement safety measures based on patient assessment     Problem: Nutrition Deficit:  Goal: Optimize nutritional status  Outcome: Progressing

## 2025-06-15 NOTE — H&P
bedtime    Provider, MD Chuy   aspirin 81 MG EC tablet Take 1 tablet by mouth daily 23   Naldo Bang DO   lisinopril (PRINIVIL;ZESTRIL) 2.5 MG tablet Take 1 tablet by mouth daily 10/24/22   Kiko Pat MD       SOCIAL HISTORY     Tobacco: +Tobacco abuse  Alcohol: None per pt report  Illicits: None per pt report    FAMILY HISTORY     History reviewed. No pertinent family history.    PHYSICAL EXAM     Vitals: BP (!) 148/49   Pulse 79   Temp 97.9 °F (36.6 °C) (Oral)   Resp 17   Wt 76.6 kg (168 lb 14 oz)   SpO2 100%   BMI 32.98 kg/m²   Tmax: Temp (24hrs), Av.9 °F (36.6 °C), Min:97.9 °F (36.6 °C), Max:97.9 °F (36.6 °C)    Last Body weight:   Wt Readings from Last 3 Encounters:   25 76.6 kg (168 lb 14 oz)   25 76.6 kg (168 lb 14 oz)   25 59.9 kg (132 lb)     Body Mass Index : Body mass index is 32.98 kg/m².      Physical Exam  Vitals reviewed.   Constitutional:       Appearance: Normal appearance.   HENT:      Head: Normocephalic and atraumatic.   Cardiovascular:      Rate and Rhythm: Normal rate and regular rhythm.      Pulses: Normal pulses.      Heart sounds: Normal heart sounds. No murmur heard.     No friction rub. No gallop.   Pulmonary:      Effort: Pulmonary effort is normal. No respiratory distress.      Breath sounds: Normal breath sounds. No stridor. No wheezing, rhonchi or rales.   Abdominal:      General: Abdomen is flat. Bowel sounds are normal. There is no distension.      Palpations: Abdomen is soft.      Tenderness: There is no abdominal tenderness.   Musculoskeletal:      Comments:  R great toe is without redness or drainage. R. Groin does have some redness and swelling. Sutures remain in place from prior procedure on R leg   Skin:     Capillary Refill: Capillary refill takes less than 2 seconds.   Neurological:      General: No focal deficit present.      Mental Status: She is alert and oriented to person, place, and time. Mental status is at baseline.

## 2025-06-15 NOTE — FLOWSHEET NOTE
Patient signed paper to go off the unit to smoke. Podiatry came by to assess the patient but she was downstairs. Patient is able to ambulate without any assistive devices and is Aox4.

## 2025-06-16 ENCOUNTER — ANESTHESIA (OUTPATIENT)
Dept: OPERATING ROOM | Age: 55
End: 2025-06-16
Payer: MEDICARE

## 2025-06-16 ENCOUNTER — ANESTHESIA EVENT (OUTPATIENT)
Dept: OPERATING ROOM | Age: 55
End: 2025-06-16
Payer: MEDICARE

## 2025-06-16 ENCOUNTER — APPOINTMENT (OUTPATIENT)
Age: 55
DRG: 271 | End: 2025-06-16
Attending: STUDENT IN AN ORGANIZED HEALTH CARE EDUCATION/TRAINING PROGRAM
Payer: MEDICARE

## 2025-06-16 PROBLEM — I70.90 ARTERIAL OCCLUSION: Status: ACTIVE | Noted: 2025-06-16

## 2025-06-16 LAB
ABO + RH BLD: NORMAL
ANION GAP SERPL CALCULATED.3IONS-SCNC: 12 MMOL/L (ref 9–16)
ARM BAND NUMBER: NORMAL
BASOPHILS # BLD: 0.05 K/UL (ref 0–0.2)
BASOPHILS NFR BLD: 1 % (ref 0–2)
BLOOD BANK SAMPLE EXPIRATION: NORMAL
BLOOD GROUP ANTIBODIES SERPL: NEGATIVE
BUN SERPL-MCNC: 12 MG/DL (ref 6–20)
CALCIUM SERPL-MCNC: 8.9 MG/DL (ref 8.6–10.4)
CHLORIDE SERPL-SCNC: 100 MMOL/L (ref 98–107)
CO2 SERPL-SCNC: 24 MMOL/L (ref 20–31)
CREAT SERPL-MCNC: 0.7 MG/DL (ref 0.6–0.9)
ECHO BSA: 1.67 M2
EOSINOPHIL # BLD: 0.26 K/UL (ref 0–0.44)
EOSINOPHILS RELATIVE PERCENT: 3 % (ref 1–4)
ERYTHROCYTE [DISTWIDTH] IN BLOOD BY AUTOMATED COUNT: 16.9 % (ref 11.8–14.4)
GFR, ESTIMATED: >90 ML/MIN/1.73M2
GLUCOSE BLD-MCNC: 279 MG/DL (ref 65–105)
GLUCOSE SERPL-MCNC: 231 MG/DL (ref 74–99)
HCT VFR BLD AUTO: 39.8 % (ref 36.3–47.1)
HGB BLD-MCNC: 12.5 G/DL (ref 11.9–15.1)
IMM GRANULOCYTES # BLD AUTO: 0.03 K/UL (ref 0–0.3)
IMM GRANULOCYTES NFR BLD: 0 %
LYMPHOCYTES NFR BLD: 4.02 K/UL (ref 1.1–3.7)
LYMPHOCYTES RELATIVE PERCENT: 50 % (ref 24–43)
MCH RBC QN AUTO: 28 PG (ref 25.2–33.5)
MCHC RBC AUTO-ENTMCNC: 31.4 G/DL (ref 28.4–34.8)
MCV RBC AUTO: 89 FL (ref 82.6–102.9)
MONOCYTES NFR BLD: 0.42 K/UL (ref 0.1–1.2)
MONOCYTES NFR BLD: 5 % (ref 3–12)
NEUTROPHILS NFR BLD: 41 % (ref 36–65)
NEUTS SEG NFR BLD: 3.35 K/UL (ref 1.5–8.1)
NRBC BLD-RTO: 0 PER 100 WBC
PARTIAL THROMBOPLASTIN TIME: 52.8 SEC (ref 23–36.5)
PLATELET # BLD AUTO: 502 K/UL (ref 138–453)
PMV BLD AUTO: 9.1 FL (ref 8.1–13.5)
POTASSIUM SERPL-SCNC: 4.7 MMOL/L (ref 3.7–5.3)
RBC # BLD AUTO: 4.47 M/UL (ref 3.95–5.11)
RBC # BLD: ABNORMAL 10*6/UL
SODIUM SERPL-SCNC: 136 MMOL/L (ref 136–145)
WBC OTHER # BLD: 8.1 K/UL (ref 3.5–11.3)

## 2025-06-16 PROCEDURE — 6360000002 HC RX W HCPCS

## 2025-06-16 PROCEDURE — 2580000003 HC RX 258

## 2025-06-16 PROCEDURE — 3700000001 HC ADD 15 MINUTES (ANESTHESIA): Performed by: STUDENT IN AN ORGANIZED HEALTH CARE EDUCATION/TRAINING PROGRAM

## 2025-06-16 PROCEDURE — 7100000010 HC PHASE II RECOVERY - FIRST 15 MIN

## 2025-06-16 PROCEDURE — 6370000000 HC RX 637 (ALT 250 FOR IP)

## 2025-06-16 PROCEDURE — 6360000004 HC RX CONTRAST MEDICATION: Performed by: STUDENT IN AN ORGANIZED HEALTH CARE EDUCATION/TRAINING PROGRAM

## 2025-06-16 PROCEDURE — 7100000010 HC PHASE II RECOVERY - FIRST 15 MIN: Performed by: STUDENT IN AN ORGANIZED HEALTH CARE EDUCATION/TRAINING PROGRAM

## 2025-06-16 PROCEDURE — C1892 INTRO/SHEATH,FIXED,PEEL-AWAY: HCPCS | Performed by: STUDENT IN AN ORGANIZED HEALTH CARE EDUCATION/TRAINING PROGRAM

## 2025-06-16 PROCEDURE — 86901 BLOOD TYPING SEROLOGIC RH(D): CPT

## 2025-06-16 PROCEDURE — 3600000017 HC SURGERY HYBRID ADDL 15MIN: Performed by: STUDENT IN AN ORGANIZED HEALTH CARE EDUCATION/TRAINING PROGRAM

## 2025-06-16 PROCEDURE — B41DYZZ FLUOROSCOPY OF AORTA AND BILATERAL LOWER EXTREMITY ARTERIES USING OTHER CONTRAST: ICD-10-PCS | Performed by: STUDENT IN AN ORGANIZED HEALTH CARE EDUCATION/TRAINING PROGRAM

## 2025-06-16 PROCEDURE — 86900 BLOOD TYPING SEROLOGIC ABO: CPT

## 2025-06-16 PROCEDURE — 7100000011 HC PHASE II RECOVERY - ADDTL 15 MIN

## 2025-06-16 PROCEDURE — 2500000003 HC RX 250 WO HCPCS: Performed by: STUDENT IN AN ORGANIZED HEALTH CARE EDUCATION/TRAINING PROGRAM

## 2025-06-16 PROCEDURE — C1757 CATH, THROMBECTOMY/EMBOLECT: HCPCS | Performed by: STUDENT IN AN ORGANIZED HEALTH CARE EDUCATION/TRAINING PROGRAM

## 2025-06-16 PROCEDURE — 99232 SBSQ HOSP IP/OBS MODERATE 35: CPT | Performed by: INTERNAL MEDICINE

## 2025-06-16 PROCEDURE — 6360000002 HC RX W HCPCS: Performed by: STUDENT IN AN ORGANIZED HEALTH CARE EDUCATION/TRAINING PROGRAM

## 2025-06-16 PROCEDURE — 1200000000 HC SEMI PRIVATE

## 2025-06-16 PROCEDURE — 3600000007 HC SURGERY HYBRID BASE: Performed by: STUDENT IN AN ORGANIZED HEALTH CARE EDUCATION/TRAINING PROGRAM

## 2025-06-16 PROCEDURE — C1887 CATHETER, GUIDING: HCPCS | Performed by: STUDENT IN AN ORGANIZED HEALTH CARE EDUCATION/TRAINING PROGRAM

## 2025-06-16 PROCEDURE — C1760 CLOSURE DEV, VASC: HCPCS | Performed by: STUDENT IN AN ORGANIZED HEALTH CARE EDUCATION/TRAINING PROGRAM

## 2025-06-16 PROCEDURE — 82947 ASSAY GLUCOSE BLOOD QUANT: CPT

## 2025-06-16 PROCEDURE — 36415 COLL VENOUS BLD VENIPUNCTURE: CPT

## 2025-06-16 PROCEDURE — 6370000000 HC RX 637 (ALT 250 FOR IP): Performed by: STUDENT IN AN ORGANIZED HEALTH CARE EDUCATION/TRAINING PROGRAM

## 2025-06-16 PROCEDURE — 047M3ZZ DILATION OF RIGHT POPLITEAL ARTERY, PERCUTANEOUS APPROACH: ICD-10-PCS | Performed by: STUDENT IN AN ORGANIZED HEALTH CARE EDUCATION/TRAINING PROGRAM

## 2025-06-16 PROCEDURE — B41FYZZ FLUOROSCOPY OF RIGHT LOWER EXTREMITY ARTERIES USING OTHER CONTRAST: ICD-10-PCS | Performed by: STUDENT IN AN ORGANIZED HEALTH CARE EDUCATION/TRAINING PROGRAM

## 2025-06-16 PROCEDURE — 2709999900 HC NON-CHARGEABLE SUPPLY: Performed by: STUDENT IN AN ORGANIZED HEALTH CARE EDUCATION/TRAINING PROGRAM

## 2025-06-16 PROCEDURE — 85025 COMPLETE CBC W/AUTO DIFF WBC: CPT

## 2025-06-16 PROCEDURE — C1894 INTRO/SHEATH, NON-LASER: HCPCS | Performed by: STUDENT IN AN ORGANIZED HEALTH CARE EDUCATION/TRAINING PROGRAM

## 2025-06-16 PROCEDURE — 6360000002 HC RX W HCPCS: Performed by: ANESTHESIOLOGY

## 2025-06-16 PROCEDURE — 86850 RBC ANTIBODY SCREEN: CPT

## 2025-06-16 PROCEDURE — 7100000011 HC PHASE II RECOVERY - ADDTL 15 MIN: Performed by: STUDENT IN AN ORGANIZED HEALTH CARE EDUCATION/TRAINING PROGRAM

## 2025-06-16 PROCEDURE — 2500000003 HC RX 250 WO HCPCS

## 2025-06-16 PROCEDURE — 3700000000 HC ANESTHESIA ATTENDED CARE: Performed by: STUDENT IN AN ORGANIZED HEALTH CARE EDUCATION/TRAINING PROGRAM

## 2025-06-16 PROCEDURE — 04CM3ZZ EXTIRPATION OF MATTER FROM RIGHT POPLITEAL ARTERY, PERCUTANEOUS APPROACH: ICD-10-PCS | Performed by: STUDENT IN AN ORGANIZED HEALTH CARE EDUCATION/TRAINING PROGRAM

## 2025-06-16 PROCEDURE — C1769 GUIDE WIRE: HCPCS | Performed by: STUDENT IN AN ORGANIZED HEALTH CARE EDUCATION/TRAINING PROGRAM

## 2025-06-16 PROCEDURE — 80048 BASIC METABOLIC PNL TOTAL CA: CPT

## 2025-06-16 PROCEDURE — 99232 SBSQ HOSP IP/OBS MODERATE 35: CPT

## 2025-06-16 PROCEDURE — 85730 THROMBOPLASTIN TIME PARTIAL: CPT

## 2025-06-16 RX ORDER — IODIXANOL 320 MG/ML
INJECTION, SOLUTION INTRAVASCULAR
Status: DISPENSED
Start: 2025-06-16 | End: 2025-06-16

## 2025-06-16 RX ORDER — FENTANYL CITRATE 50 UG/ML
50 INJECTION, SOLUTION INTRAMUSCULAR; INTRAVENOUS EVERY 5 MIN PRN
Status: COMPLETED | OUTPATIENT
Start: 2025-06-16 | End: 2025-06-16

## 2025-06-16 RX ORDER — PROPOFOL 10 MG/ML
INJECTION, EMULSION INTRAVENOUS
Status: DISCONTINUED | OUTPATIENT
Start: 2025-06-16 | End: 2025-06-16 | Stop reason: SDUPTHER

## 2025-06-16 RX ORDER — INSULIN GLARGINE 100 [IU]/ML
5 INJECTION, SOLUTION SUBCUTANEOUS ONCE
Status: DISCONTINUED | OUTPATIENT
Start: 2025-06-16 | End: 2025-06-18

## 2025-06-16 RX ORDER — IODIXANOL 320 MG/ML
INJECTION, SOLUTION INTRAVASCULAR PRN
Status: DISCONTINUED | OUTPATIENT
Start: 2025-06-16 | End: 2025-06-16 | Stop reason: ALTCHOICE

## 2025-06-16 RX ORDER — HEPARIN SODIUM 1000 [USP'U]/ML
INJECTION, SOLUTION INTRAVENOUS; SUBCUTANEOUS
Status: DISCONTINUED | OUTPATIENT
Start: 2025-06-16 | End: 2025-06-16 | Stop reason: SDUPTHER

## 2025-06-16 RX ORDER — GLYCOPYRROLATE 0.2 MG/ML
INJECTION INTRAMUSCULAR; INTRAVENOUS
Status: DISCONTINUED | OUTPATIENT
Start: 2025-06-16 | End: 2025-06-16 | Stop reason: SDUPTHER

## 2025-06-16 RX ORDER — ONDANSETRON 2 MG/ML
4 INJECTION INTRAMUSCULAR; INTRAVENOUS
Status: DISCONTINUED | OUTPATIENT
Start: 2025-06-16 | End: 2025-06-16 | Stop reason: HOSPADM

## 2025-06-16 RX ORDER — FENTANYL CITRATE 50 UG/ML
25 INJECTION, SOLUTION INTRAMUSCULAR; INTRAVENOUS EVERY 5 MIN PRN
Status: DISCONTINUED | OUTPATIENT
Start: 2025-06-16 | End: 2025-06-16 | Stop reason: HOSPADM

## 2025-06-16 RX ORDER — SODIUM CHLORIDE 9 MG/ML
INJECTION, SOLUTION INTRAVENOUS
Status: DISCONTINUED | OUTPATIENT
Start: 2025-06-16 | End: 2025-06-16 | Stop reason: SDUPTHER

## 2025-06-16 RX ORDER — SODIUM CHLORIDE 0.9 % (FLUSH) 0.9 %
5-40 SYRINGE (ML) INJECTION EVERY 12 HOURS SCHEDULED
Status: DISCONTINUED | OUTPATIENT
Start: 2025-06-16 | End: 2025-06-16 | Stop reason: HOSPADM

## 2025-06-16 RX ORDER — NALOXONE HYDROCHLORIDE 0.4 MG/ML
INJECTION, SOLUTION INTRAMUSCULAR; INTRAVENOUS; SUBCUTANEOUS PRN
Status: DISCONTINUED | OUTPATIENT
Start: 2025-06-16 | End: 2025-06-16 | Stop reason: HOSPADM

## 2025-06-16 RX ORDER — LIDOCAINE HYDROCHLORIDE 10 MG/ML
INJECTION, SOLUTION EPIDURAL; INFILTRATION; INTRACAUDAL; PERINEURAL
Status: DISCONTINUED | OUTPATIENT
Start: 2025-06-16 | End: 2025-06-16 | Stop reason: SDUPTHER

## 2025-06-16 RX ORDER — LIDOCAINE HYDROCHLORIDE 10 MG/ML
INJECTION, SOLUTION EPIDURAL; INFILTRATION; INTRACAUDAL; PERINEURAL PRN
Status: DISCONTINUED | OUTPATIENT
Start: 2025-06-16 | End: 2025-06-16 | Stop reason: ALTCHOICE

## 2025-06-16 RX ORDER — MIDAZOLAM HYDROCHLORIDE 1 MG/ML
INJECTION, SOLUTION INTRAMUSCULAR; INTRAVENOUS
Status: DISCONTINUED | OUTPATIENT
Start: 2025-06-16 | End: 2025-06-16 | Stop reason: SDUPTHER

## 2025-06-16 RX ORDER — SODIUM CHLORIDE 0.9 % (FLUSH) 0.9 %
5-40 SYRINGE (ML) INJECTION PRN
Status: DISCONTINUED | OUTPATIENT
Start: 2025-06-16 | End: 2025-06-16 | Stop reason: HOSPADM

## 2025-06-16 RX ORDER — SODIUM CHLORIDE 9 MG/ML
INJECTION, SOLUTION INTRAVENOUS PRN
Status: DISCONTINUED | OUTPATIENT
Start: 2025-06-16 | End: 2025-06-16 | Stop reason: HOSPADM

## 2025-06-16 RX ADMIN — FENTANYL CITRATE 50 MCG: 50 INJECTION INTRAMUSCULAR; INTRAVENOUS at 14:43

## 2025-06-16 RX ADMIN — Medication 20 MG: at 12:49

## 2025-06-16 RX ADMIN — GABAPENTIN 300 MG: 300 CAPSULE ORAL at 05:30

## 2025-06-16 RX ADMIN — AMOXICILLIN AND CLAVULANATE POTASSIUM 1 TABLET: 875; 125 TABLET, FILM COATED ORAL at 23:16

## 2025-06-16 RX ADMIN — LIDOCAINE HYDROCHLORIDE 50 MG: 10 INJECTION, SOLUTION EPIDURAL; INFILTRATION; INTRACAUDAL; PERINEURAL at 12:09

## 2025-06-16 RX ADMIN — FENTANYL CITRATE 50 MCG: 50 INJECTION INTRAMUSCULAR; INTRAVENOUS at 14:24

## 2025-06-16 RX ADMIN — MIDAZOLAM 2 MG: 1 INJECTION INTRAMUSCULAR; INTRAVENOUS at 12:09

## 2025-06-16 RX ADMIN — PROPOFOL 25 MG: 10 INJECTION, EMULSION INTRAVENOUS at 12:09

## 2025-06-16 RX ADMIN — GABAPENTIN 300 MG: 300 CAPSULE ORAL at 23:16

## 2025-06-16 RX ADMIN — Medication 30 MG: at 12:09

## 2025-06-16 RX ADMIN — HYDROMORPHONE HYDROCHLORIDE 0.25 MG: 1 INJECTION, SOLUTION INTRAMUSCULAR; INTRAVENOUS; SUBCUTANEOUS at 01:31

## 2025-06-16 RX ADMIN — ISOSORBIDE MONONITRATE 30 MG: 30 TABLET, EXTENDED RELEASE ORAL at 08:56

## 2025-06-16 RX ADMIN — PANTOPRAZOLE SODIUM 40 MG: 40 TABLET, DELAYED RELEASE ORAL at 06:55

## 2025-06-16 RX ADMIN — ASPIRIN 81 MG: 81 TABLET, COATED ORAL at 08:55

## 2025-06-16 RX ADMIN — INSULIN LISPRO 8 UNITS: 100 INJECTION, SOLUTION INTRAVENOUS; SUBCUTANEOUS at 23:17

## 2025-06-16 RX ADMIN — OXYCODONE 10 MG: 5 TABLET ORAL at 18:34

## 2025-06-16 RX ADMIN — SODIUM CHLORIDE: 0.9 INJECTION, SOLUTION INTRAVENOUS at 12:01

## 2025-06-16 RX ADMIN — ATORVASTATIN CALCIUM 40 MG: 80 TABLET, FILM COATED ORAL at 23:16

## 2025-06-16 RX ADMIN — QUETIAPINE FUMARATE 150 MG: 100 TABLET ORAL at 23:16

## 2025-06-16 RX ADMIN — CARVEDILOL 12.5 MG: 12.5 TABLET, FILM COATED ORAL at 08:55

## 2025-06-16 RX ADMIN — CARVEDILOL 12.5 MG: 12.5 TABLET, FILM COATED ORAL at 18:35

## 2025-06-16 RX ADMIN — SPIRONOLACTONE 12.5 MG: 25 TABLET, FILM COATED ORAL at 08:56

## 2025-06-16 RX ADMIN — GLYCOPYRROLATE 0.1 MG: 0.2 INJECTION INTRAMUSCULAR; INTRAVENOUS at 12:09

## 2025-06-16 RX ADMIN — OXYCODONE 10 MG: 5 TABLET ORAL at 05:30

## 2025-06-16 RX ADMIN — HYDROMORPHONE HYDROCHLORIDE 0.25 MG: 1 INJECTION, SOLUTION INTRAMUSCULAR; INTRAVENOUS; SUBCUTANEOUS at 15:07

## 2025-06-16 RX ADMIN — PROPOFOL 30 MCG/KG/MIN: 10 INJECTION, EMULSION INTRAVENOUS at 12:10

## 2025-06-16 RX ADMIN — HEPARIN SODIUM 6000 UNITS: 1000 INJECTION INTRAVENOUS; SUBCUTANEOUS at 12:48

## 2025-06-16 RX ADMIN — AMOXICILLIN AND CLAVULANATE POTASSIUM 1 TABLET: 875; 125 TABLET, FILM COATED ORAL at 08:55

## 2025-06-16 RX ADMIN — HEPARIN SODIUM 300 UNITS: 1000 INJECTION INTRAVENOUS; SUBCUTANEOUS at 13:48

## 2025-06-16 RX ADMIN — SODIUM CHLORIDE, PRESERVATIVE FREE 10 ML: 5 INJECTION INTRAVENOUS at 23:17

## 2025-06-16 RX ADMIN — SODIUM CHLORIDE, PRESERVATIVE FREE 10 ML: 5 INJECTION INTRAVENOUS at 08:55

## 2025-06-16 ASSESSMENT — PAIN DESCRIPTION - ORIENTATION
ORIENTATION: RIGHT
ORIENTATION: RIGHT
ORIENTATION: RIGHT;UPPER;MID;LOWER

## 2025-06-16 ASSESSMENT — PAIN DESCRIPTION - LOCATION
LOCATION: LEG;FOOT
LOCATION: LEG
LOCATION: LEG

## 2025-06-16 ASSESSMENT — LIFESTYLE VARIABLES: SMOKING_STATUS: 1

## 2025-06-16 ASSESSMENT — PAIN SCALES - GENERAL
PAINLEVEL_OUTOF10: 4
PAINLEVEL_OUTOF10: 0
PAINLEVEL_OUTOF10: 10
PAINLEVEL_OUTOF10: 9
PAINLEVEL_OUTOF10: 10
PAINLEVEL_OUTOF10: 2
PAINLEVEL_OUTOF10: 7

## 2025-06-16 ASSESSMENT — PAIN DESCRIPTION - DESCRIPTORS
DESCRIPTORS: ACHING;DISCOMFORT
DESCRIPTORS: ACHING;DISCOMFORT
DESCRIPTORS: ACHING;DISCOMFORT;STABBING

## 2025-06-16 ASSESSMENT — PAIN SCALES - WONG BAKER
WONGBAKER_NUMERICALRESPONSE: NO HURT
WONGBAKER_NUMERICALRESPONSE: HURTS A LITTLE BIT

## 2025-06-16 ASSESSMENT — PAIN - FUNCTIONAL ASSESSMENT: PAIN_FUNCTIONAL_ASSESSMENT: ACTIVITIES ARE NOT PREVENTED

## 2025-06-16 NOTE — ANESTHESIA POSTPROCEDURE EVALUATION
Department of Anesthesiology  Postprocedure Note    Patient: Mikael Estevez  MRN: [id number]  YOB: 1970  Date of evaluation: 6/16/2025    Procedure Summary       Date: 06/16/25 Room / Location: [place] [address] / [facility]    Anesthesia Start: 1201 Anesthesia Stop: 1420    Procedure: RIGHT LOWER EXTREMITY ANGIOGRAM, BALLOON ANGIOPLASTY POUNCE (Right: Groin) Diagnosis:       Peripheral vascular disease      (Peripheral vascular disease [I73.9])    Surgeons: Robert Jay MD Responsible Provider: Brian Monsalve MD    Anesthesia Type: MAC ASA Status: 4            Anesthesia Type: No value filed.    Annamaria Phase I:      Annamaria Phase II: Annamaria Score: 9  POST-OP ANESTHESIA NOTE       /82   Pulse 57   Temp 97.9 °F (36.6 °C) (Oral)   Resp 16   Ht 1.676 m (5' 5.98\")   Wt 60 kg (132 lb 4.4 oz)   SpO2 99%   BMI 21.36 kg/m²    Pain Assessment: None - Denies Pain  Pain Level: 7       Anesthesia Post Evaluation    Patient location during evaluation: PACU  Patient participation: complete - patient participated  Level of consciousness: awake  Pain score: 7  Airway patency: patent  Nausea & Vomiting: no vomiting and no nausea  Cardiovascular status: hemodynamically stable  Respiratory status: acceptable  Hydration status: stable  Pain management: adequate        No notable events documented.

## 2025-06-16 NOTE — CARE COORDINATION
Case Management   Daily Progress Note       Patient Name: Mikael Estevez                   YOB: 1970  Diagnosis: Post-op pain [G89.18]                       GMLOS: 2.6 days  Length of Stay: 2  days    Anticipated Discharge Date: To be determined    Readmission Risk (Low < 19, Mod (19-27), High > 27): Readmission Risk Score: 19.9        Current Transitional Plan    [] Home Independently    [] Home with HC    [] Skilled Nursing Facility    [] Acute Rehabilitation    [] Long Term Acute Care (LTAC)    [x] Other: homeless    Plan for the Stay (Medical Management) :          Workflow Continuation (Additional Notes) :  Received message from Renata at [facility]-states patient is current. 1600 talked with patient. She states she is homeless. She might be able to go to her daughter's. 1611 called [facility] and talked with Renata-she now says she is not current with them-they couldn't get ahold of her for visits.      Lora Bell RN  June 16, 2025

## 2025-06-16 NOTE — FLOWSHEET NOTE
Brent NP ordered lantus, message sent regarding this order because pt is NPO and nurse writer reluctant to give due to this. Pt going to OR at 3 pm. Waiting for response

## 2025-06-16 NOTE — ANESTHESIA PRE PROCEDURE
Department of Anesthesiology  Preprocedure Note       Name:  Mikael Estevez   Age:  55 y.o.  :  1970                                          MRN:  [id number]         Date:  2025      Surgeon: Surgeon(s):  Robert Jay MD    Procedure: Procedure(s):  *ADD ON* LOWER EXTREMITY ANGIOGRAM    Medications prior to admission:   Prior to Admission medications    Medication Sig Start Date End Date Taking? Authorizing Provider   benzonatate (TESSALON) 100 MG capsule Take 1-2 capsules by mouth 3 times daily as needed for Cough 25   Marlen Vo MD   guaiFENesin (MUCINEX) 600 MG extended release tablet Take 1 tablet by mouth 2 times daily as needed for Congestion 25   Marlen Vo MD   isosorbide mononitrate (IMDUR) 30 MG extended release tablet Take 1 tablet by mouth daily 25   Marlen Vo MD   apixaban (ELIQUIS) 5 MG TABS tablet Take 1 tablet by mouth 2 times daily 25   Marlen Vo MD   atorvastatin (LIPITOR) 40 MG tablet Take 1 tablet by mouth nightly 25   Marlen Vo MD   carvedilol (COREG) 12.5 MG tablet Take 1 tablet by mouth 2 times daily (with meals) 25   Marlen Vo MD   spironolactone (ALDACTONE) 25 MG tablet Take 0.5 tablets by mouth daily 25   Marlen Vo MD   sennosides-docusate sodium (SENOKOT-S) 8.6-50 MG tablet Take 2 tablets by mouth 2 times daily at 0800 and 1400 25   Marlen Vo MD   gabapentin (NEURONTIN) 300 MG capsule Take 1 capsule by mouth every 8 (eight) hours for 30 days. 25  Marlen Vo MD   Misc. Devices MISC 1 each by Does not apply route once for 1 dose DME order for forefoot offloading wedge surgical shoe  Diagnosis: post operative state 25  Geetha Devries DPM   acetaminophen (TYLENOL) 500 MG tablet Take 2 tablets by mouth every 8 hours as needed for Pain 25   Kana Rooney MD   insulin glargine (LANTUS) 100 UNIT/ML injection vial Inject 24 Units into the skin nightly    Provider,

## 2025-06-16 NOTE — PLAN OF CARE
Problem: Chronic Conditions and Co-morbidities  Goal: Patient's chronic conditions and co-morbidity symptoms are monitored and maintained or improved  6/15/2025 2030 by Kush Horton RN  Outcome: Progressing  Flowsheets (Taken 6/15/2025 2000)  Care Plan - Patient's Chronic Conditions and Co-Morbidity Symptoms are Monitored and Maintained or Improved:   Collaborate with multidisciplinary team to address chronic and comorbid conditions and prevent exacerbation or deterioration   Monitor and assess patient's chronic conditions and comorbid symptoms for stability, deterioration, or improvement  6/15/2025 1800 by Avila Owen RN  Outcome: Progressing     Problem: Discharge Planning  Goal: Discharge to home or other facility with appropriate resources  6/15/2025 2030 by Kush Horton RN  Outcome: Progressing  Flowsheets (Taken 6/15/2025 2000)  Discharge to home or other facility with appropriate resources:   Identify barriers to discharge with patient and caregiver   Arrange for needed discharge resources and transportation as appropriate   Identify discharge learning needs (meds, wound care, etc)  6/15/2025 1800 by Avila Owen RN  Outcome: Progressing     Problem: Pain  Goal: Verbalizes/displays adequate comfort level or baseline comfort level  6/15/2025 2030 by Kush Horton RN  Outcome: Progressing  6/15/2025 1800 by Avila Owen RN  Outcome: Progressing     Problem: Safety - Adult  Goal: Free from fall injury  6/15/2025 2030 by Kush Horton RN  Outcome: Progressing  6/15/2025 1800 by Avila Owen RN  Outcome: Progressing  Flowsheets (Taken 6/15/2025 0800)  Free From Fall Injury: Instruct family/caregiver on patient safety     Problem: ABCDS Injury Assessment  Goal: Absence of physical injury  6/15/2025 2030 by Kush Horton RN  Outcome: Progressing  6/15/2025 1800 by Avila Owen RN  Outcome: Progressing  Flowsheets (Taken 6/15/2025 0800)  Absence of Physical Injury: Implement safety measures

## 2025-06-16 NOTE — FLOWSHEET NOTE
Pt received from post op CVOR, report at bedside and lt groin checked and intact. Pt asking to get up and told she needed to wait until 1615.

## 2025-06-16 NOTE — CARE COORDINATION
Met with pt after receiving a social work consult for Homeless.  Pt is known to social work from previous admissions.    Pt states that she has been staying with different family members. She states that she does have income from [employer].  Pt states that she was trying to call family members but her phone .  Offered to charge her phone and she was very appreciative.  She states that she will call family once she has her phone back.  Discussed shelter placement in case she cannot go to family.  She doesn't really want to go to [facility] as she states that she has been clean for 2 years. She states that [facility] is too close to all of her drug contacts.  Pt stated that she is need of some clothes.  Provided clothes and charged pt's phone.  Will check back with her tomorrow, as she is going to surgery, to see if she has a place to go at discharge.  Provided her some low income housing information as well.

## 2025-06-17 ENCOUNTER — ANESTHESIA (OUTPATIENT)
Dept: OPERATING ROOM | Age: 55
End: 2025-06-17
Payer: MEDICARE

## 2025-06-17 ENCOUNTER — ANESTHESIA EVENT (OUTPATIENT)
Dept: OPERATING ROOM | Age: 55
End: 2025-06-17
Payer: MEDICARE

## 2025-06-17 LAB
ANION GAP SERPL CALCULATED.3IONS-SCNC: 14 MMOL/L (ref 9–16)
BASOPHILS # BLD: 0.05 K/UL (ref 0–0.2)
BASOPHILS NFR BLD: 1 % (ref 0–2)
BUN SERPL-MCNC: 12 MG/DL (ref 6–20)
CALCIUM SERPL-MCNC: 9 MG/DL (ref 8.6–10.4)
CHLORIDE SERPL-SCNC: 98 MMOL/L (ref 98–107)
CO2 SERPL-SCNC: 21 MMOL/L (ref 20–31)
CREAT SERPL-MCNC: 0.6 MG/DL (ref 0.6–0.9)
EOSINOPHIL # BLD: 0.23 K/UL (ref 0–0.44)
EOSINOPHILS RELATIVE PERCENT: 3 % (ref 1–4)
ERYTHROCYTE [DISTWIDTH] IN BLOOD BY AUTOMATED COUNT: 16.4 % (ref 11.8–14.4)
GFR, ESTIMATED: >90 ML/MIN/1.73M2
GLUCOSE BLD-MCNC: 230 MG/DL (ref 65–105)
GLUCOSE BLD-MCNC: 235 MG/DL (ref 65–105)
GLUCOSE BLD-MCNC: 260 MG/DL (ref 65–105)
GLUCOSE BLD-MCNC: 269 MG/DL (ref 65–105)
GLUCOSE BLD-MCNC: 282 MG/DL (ref 65–105)
GLUCOSE SERPL-MCNC: 212 MG/DL (ref 74–99)
HCT VFR BLD AUTO: 34.8 % (ref 36.3–47.1)
HGB BLD-MCNC: 11.1 G/DL (ref 11.9–15.1)
IMM GRANULOCYTES # BLD AUTO: 0.03 K/UL (ref 0–0.3)
IMM GRANULOCYTES NFR BLD: 0 %
LYMPHOCYTES NFR BLD: 2.97 K/UL (ref 1.1–3.7)
LYMPHOCYTES RELATIVE PERCENT: 35 % (ref 24–43)
MCH RBC QN AUTO: 27.5 PG (ref 25.2–33.5)
MCHC RBC AUTO-ENTMCNC: 31.9 G/DL (ref 28.4–34.8)
MCV RBC AUTO: 86.4 FL (ref 82.6–102.9)
MONOCYTES NFR BLD: 0.56 K/UL (ref 0.1–1.2)
MONOCYTES NFR BLD: 7 % (ref 3–12)
NEUTROPHILS NFR BLD: 54 % (ref 36–65)
NEUTS SEG NFR BLD: 4.68 K/UL (ref 1.5–8.1)
NRBC BLD-RTO: 0 PER 100 WBC
PLATELET # BLD AUTO: 427 K/UL (ref 138–453)
PMV BLD AUTO: 9.2 FL (ref 8.1–13.5)
POTASSIUM SERPL-SCNC: 4.2 MMOL/L (ref 3.7–5.3)
RBC # BLD AUTO: 4.03 M/UL (ref 3.95–5.11)
RBC # BLD: ABNORMAL 10*6/UL
SODIUM SERPL-SCNC: 133 MMOL/L (ref 136–145)
WBC OTHER # BLD: 8.5 K/UL (ref 3.5–11.3)

## 2025-06-17 PROCEDURE — 2500000003 HC RX 250 WO HCPCS

## 2025-06-17 PROCEDURE — 7100000000 HC PACU RECOVERY - FIRST 15 MIN: Performed by: STUDENT IN AN ORGANIZED HEALTH CARE EDUCATION/TRAINING PROGRAM

## 2025-06-17 PROCEDURE — 3700000001 HC ADD 15 MINUTES (ANESTHESIA): Performed by: STUDENT IN AN ORGANIZED HEALTH CARE EDUCATION/TRAINING PROGRAM

## 2025-06-17 PROCEDURE — 82947 ASSAY GLUCOSE BLOOD QUANT: CPT

## 2025-06-17 PROCEDURE — 2580000003 HC RX 258: Performed by: ANESTHESIOLOGY

## 2025-06-17 PROCEDURE — 6370000000 HC RX 637 (ALT 250 FOR IP)

## 2025-06-17 PROCEDURE — 6360000002 HC RX W HCPCS

## 2025-06-17 PROCEDURE — 2060000000 HC ICU INTERMEDIATE R&B

## 2025-06-17 PROCEDURE — 2500000003 HC RX 250 WO HCPCS: Performed by: NURSE ANESTHETIST, CERTIFIED REGISTERED

## 2025-06-17 PROCEDURE — 75710 ARTERY X-RAYS ARM/LEG: CPT | Performed by: STUDENT IN AN ORGANIZED HEALTH CARE EDUCATION/TRAINING PROGRAM

## 2025-06-17 PROCEDURE — 2500000003 HC RX 250 WO HCPCS: Performed by: STUDENT IN AN ORGANIZED HEALTH CARE EDUCATION/TRAINING PROGRAM

## 2025-06-17 PROCEDURE — 7100000001 HC PACU RECOVERY - ADDTL 15 MIN: Performed by: STUDENT IN AN ORGANIZED HEALTH CARE EDUCATION/TRAINING PROGRAM

## 2025-06-17 PROCEDURE — 85025 COMPLETE CBC W/AUTO DIFF WBC: CPT

## 2025-06-17 PROCEDURE — 3700000000 HC ANESTHESIA ATTENDED CARE: Performed by: STUDENT IN AN ORGANIZED HEALTH CARE EDUCATION/TRAINING PROGRAM

## 2025-06-17 PROCEDURE — 80048 BASIC METABOLIC PNL TOTAL CA: CPT

## 2025-06-17 PROCEDURE — 88307 TISSUE EXAM BY PATHOLOGIST: CPT

## 2025-06-17 PROCEDURE — 99232 SBSQ HOSP IP/OBS MODERATE 35: CPT | Performed by: INTERNAL MEDICINE

## 2025-06-17 PROCEDURE — 2720000010 HC SURG SUPPLY STERILE: Performed by: STUDENT IN AN ORGANIZED HEALTH CARE EDUCATION/TRAINING PROGRAM

## 2025-06-17 PROCEDURE — 76937 US GUIDE VASCULAR ACCESS: CPT | Performed by: STUDENT IN AN ORGANIZED HEALTH CARE EDUCATION/TRAINING PROGRAM

## 2025-06-17 PROCEDURE — 6360000002 HC RX W HCPCS: Performed by: ANESTHESIOLOGY

## 2025-06-17 PROCEDURE — 6360000002 HC RX W HCPCS: Performed by: NURSE ANESTHETIST, CERTIFIED REGISTERED

## 2025-06-17 PROCEDURE — 6370000000 HC RX 637 (ALT 250 FOR IP): Performed by: STUDENT IN AN ORGANIZED HEALTH CARE EDUCATION/TRAINING PROGRAM

## 2025-06-17 PROCEDURE — 2709999900 HC NON-CHARGEABLE SUPPLY: Performed by: STUDENT IN AN ORGANIZED HEALTH CARE EDUCATION/TRAINING PROGRAM

## 2025-06-17 PROCEDURE — 3600000014 HC SURGERY LEVEL 4 ADDTL 15MIN: Performed by: STUDENT IN AN ORGANIZED HEALTH CARE EDUCATION/TRAINING PROGRAM

## 2025-06-17 PROCEDURE — 0Y6C0Z3 DETACHMENT AT RIGHT UPPER LEG, LOW, OPEN APPROACH: ICD-10-PCS | Performed by: STUDENT IN AN ORGANIZED HEALTH CARE EDUCATION/TRAINING PROGRAM

## 2025-06-17 PROCEDURE — 36415 COLL VENOUS BLD VENIPUNCTURE: CPT

## 2025-06-17 PROCEDURE — 37224 PR REVSC OPN/PRG FEM/POP W/ANGIOPLASTY UNI: CPT | Performed by: STUDENT IN AN ORGANIZED HEALTH CARE EDUCATION/TRAINING PROGRAM

## 2025-06-17 PROCEDURE — 37184 PRIM ART M-THRMBC 1ST VSL: CPT | Performed by: STUDENT IN AN ORGANIZED HEALTH CARE EDUCATION/TRAINING PROGRAM

## 2025-06-17 PROCEDURE — 3600000004 HC SURGERY LEVEL 4 BASE: Performed by: STUDENT IN AN ORGANIZED HEALTH CARE EDUCATION/TRAINING PROGRAM

## 2025-06-17 RX ORDER — PHENYLEPHRINE HYDROCHLORIDE 10 MG/ML
INJECTION INTRAVENOUS
Status: DISCONTINUED | OUTPATIENT
Start: 2025-06-17 | End: 2025-06-17 | Stop reason: SDUPTHER

## 2025-06-17 RX ORDER — ONDANSETRON 2 MG/ML
INJECTION INTRAMUSCULAR; INTRAVENOUS
Status: DISCONTINUED | OUTPATIENT
Start: 2025-06-17 | End: 2025-06-17 | Stop reason: SDUPTHER

## 2025-06-17 RX ORDER — FENTANYL CITRATE 50 UG/ML
25 INJECTION, SOLUTION INTRAMUSCULAR; INTRAVENOUS EVERY 5 MIN PRN
Status: COMPLETED | OUTPATIENT
Start: 2025-06-17 | End: 2025-06-17

## 2025-06-17 RX ORDER — METHOCARBAMOL 750 MG/1
750 TABLET, FILM COATED ORAL EVERY 6 HOURS SCHEDULED
Status: DISCONTINUED | OUTPATIENT
Start: 2025-06-17 | End: 2025-06-24 | Stop reason: HOSPADM

## 2025-06-17 RX ORDER — PROPOFOL 10 MG/ML
INJECTION, EMULSION INTRAVENOUS
Status: DISCONTINUED | OUTPATIENT
Start: 2025-06-17 | End: 2025-06-17 | Stop reason: SDUPTHER

## 2025-06-17 RX ORDER — ALBUTEROL SULFATE 0.83 MG/ML
2.5 SOLUTION RESPIRATORY (INHALATION) EVERY 8 HOURS PRN
Status: DISCONTINUED | OUTPATIENT
Start: 2025-06-17 | End: 2025-06-17 | Stop reason: HOSPADM

## 2025-06-17 RX ORDER — MIDAZOLAM HYDROCHLORIDE 2 MG/2ML
1 INJECTION, SOLUTION INTRAMUSCULAR; INTRAVENOUS EVERY 10 MIN PRN
Status: DISCONTINUED | OUTPATIENT
Start: 2025-06-17 | End: 2025-06-17 | Stop reason: HOSPADM

## 2025-06-17 RX ORDER — ALBUTEROL SULFATE 90 UG/1
2 INHALANT RESPIRATORY (INHALATION) EVERY 6 HOURS PRN
Status: DISCONTINUED | OUTPATIENT
Start: 2025-06-17 | End: 2025-06-17 | Stop reason: HOSPADM

## 2025-06-17 RX ORDER — OXYCODONE AND ACETAMINOPHEN 5; 325 MG/1; MG/1
2 TABLET ORAL EVERY 4 HOURS PRN
Refills: 0 | Status: DISCONTINUED | OUTPATIENT
Start: 2025-06-17 | End: 2025-06-18

## 2025-06-17 RX ORDER — FENTANYL CITRATE 50 UG/ML
50 INJECTION, SOLUTION INTRAMUSCULAR; INTRAVENOUS EVERY 5 MIN PRN
Status: COMPLETED | OUTPATIENT
Start: 2025-06-17 | End: 2025-06-17

## 2025-06-17 RX ORDER — FENTANYL CITRATE 50 UG/ML
50 INJECTION, SOLUTION INTRAMUSCULAR; INTRAVENOUS ONCE
Status: COMPLETED | OUTPATIENT
Start: 2025-06-17 | End: 2025-06-17

## 2025-06-17 RX ORDER — LIDOCAINE HYDROCHLORIDE 10 MG/ML
INJECTION, SOLUTION EPIDURAL; INFILTRATION; INTRACAUDAL; PERINEURAL
Status: DISCONTINUED | OUTPATIENT
Start: 2025-06-17 | End: 2025-06-17 | Stop reason: SDUPTHER

## 2025-06-17 RX ORDER — INSULIN LISPRO 100 [IU]/ML
INJECTION, SOLUTION INTRAVENOUS; SUBCUTANEOUS
Status: COMPLETED
Start: 2025-06-17 | End: 2025-06-17

## 2025-06-17 RX ORDER — NALOXONE HYDROCHLORIDE 0.4 MG/ML
INJECTION, SOLUTION INTRAMUSCULAR; INTRAVENOUS; SUBCUTANEOUS PRN
Status: DISCONTINUED | OUTPATIENT
Start: 2025-06-17 | End: 2025-06-17 | Stop reason: HOSPADM

## 2025-06-17 RX ORDER — DIPHENHYDRAMINE HYDROCHLORIDE 50 MG/ML
12.5 INJECTION, SOLUTION INTRAMUSCULAR; INTRAVENOUS
Status: DISCONTINUED | OUTPATIENT
Start: 2025-06-17 | End: 2025-06-17 | Stop reason: HOSPADM

## 2025-06-17 RX ORDER — OXYCODONE HYDROCHLORIDE 5 MG/1
5 TABLET ORAL EVERY 4 HOURS PRN
Refills: 0 | Status: DISCONTINUED | OUTPATIENT
Start: 2025-06-17 | End: 2025-06-18

## 2025-06-17 RX ORDER — DEXAMETHASONE SODIUM PHOSPHATE 10 MG/ML
INJECTION, SOLUTION INTRA-ARTICULAR; INTRALESIONAL; INTRAMUSCULAR; INTRAVENOUS; SOFT TISSUE
Status: DISCONTINUED | OUTPATIENT
Start: 2025-06-17 | End: 2025-06-17 | Stop reason: SDUPTHER

## 2025-06-17 RX ORDER — FENTANYL CITRATE 50 UG/ML
50 INJECTION, SOLUTION INTRAMUSCULAR; INTRAVENOUS
Refills: 0 | Status: DISCONTINUED | OUTPATIENT
Start: 2025-06-17 | End: 2025-06-17 | Stop reason: HOSPADM

## 2025-06-17 RX ORDER — MIDAZOLAM HYDROCHLORIDE 2 MG/2ML
2 INJECTION, SOLUTION INTRAMUSCULAR; INTRAVENOUS ONCE
Status: COMPLETED | OUTPATIENT
Start: 2025-06-17 | End: 2025-06-17

## 2025-06-17 RX ORDER — SODIUM CHLORIDE, SODIUM LACTATE, POTASSIUM CHLORIDE, CALCIUM CHLORIDE 600; 310; 30; 20 MG/100ML; MG/100ML; MG/100ML; MG/100ML
INJECTION, SOLUTION INTRAVENOUS CONTINUOUS
Status: DISCONTINUED | OUTPATIENT
Start: 2025-06-17 | End: 2025-06-17 | Stop reason: HOSPADM

## 2025-06-17 RX ORDER — SODIUM CHLORIDE 9 MG/ML
INJECTION, SOLUTION INTRAVENOUS PRN
Status: DISCONTINUED | OUTPATIENT
Start: 2025-06-17 | End: 2025-06-17 | Stop reason: HOSPADM

## 2025-06-17 RX ORDER — FENTANYL CITRATE 50 UG/ML
25 INJECTION, SOLUTION INTRAMUSCULAR; INTRAVENOUS
Refills: 0 | Status: DISCONTINUED | OUTPATIENT
Start: 2025-06-17 | End: 2025-06-17 | Stop reason: HOSPADM

## 2025-06-17 RX ORDER — SODIUM CHLORIDE 0.9 % (FLUSH) 0.9 %
5-40 SYRINGE (ML) INJECTION PRN
Status: DISCONTINUED | OUTPATIENT
Start: 2025-06-17 | End: 2025-06-17 | Stop reason: HOSPADM

## 2025-06-17 RX ORDER — SODIUM CHLORIDE 0.9 % (FLUSH) 0.9 %
5-40 SYRINGE (ML) INJECTION EVERY 12 HOURS SCHEDULED
Status: DISCONTINUED | OUTPATIENT
Start: 2025-06-17 | End: 2025-06-17 | Stop reason: HOSPADM

## 2025-06-17 RX ORDER — ONDANSETRON 2 MG/ML
4 INJECTION INTRAMUSCULAR; INTRAVENOUS
Status: DISCONTINUED | OUTPATIENT
Start: 2025-06-17 | End: 2025-06-17 | Stop reason: HOSPADM

## 2025-06-17 RX ORDER — MAGNESIUM HYDROXIDE 1200 MG/15ML
LIQUID ORAL CONTINUOUS PRN
Status: DISCONTINUED | OUTPATIENT
Start: 2025-06-17 | End: 2025-06-17 | Stop reason: HOSPADM

## 2025-06-17 RX ORDER — SCOPOLAMINE 1 MG/3D
1 PATCH, EXTENDED RELEASE TRANSDERMAL ONCE
Status: DISCONTINUED | OUTPATIENT
Start: 2025-06-17 | End: 2025-06-17 | Stop reason: HOSPADM

## 2025-06-17 RX ORDER — FENTANYL CITRATE 50 UG/ML
INJECTION, SOLUTION INTRAMUSCULAR; INTRAVENOUS
Status: DISCONTINUED | OUTPATIENT
Start: 2025-06-17 | End: 2025-06-17 | Stop reason: SDUPTHER

## 2025-06-17 RX ORDER — OXYCODONE HYDROCHLORIDE 5 MG/1
10 TABLET ORAL EVERY 4 HOURS PRN
Refills: 0 | Status: DISCONTINUED | OUTPATIENT
Start: 2025-06-17 | End: 2025-06-18

## 2025-06-17 RX ORDER — ACETAMINOPHEN 500 MG
1000 TABLET ORAL EVERY 6 HOURS SCHEDULED
Status: DISCONTINUED | OUTPATIENT
Start: 2025-06-17 | End: 2025-06-18

## 2025-06-17 RX ADMIN — SODIUM CHLORIDE, PRESERVATIVE FREE 10 ML: 5 INJECTION INTRAVENOUS at 20:40

## 2025-06-17 RX ADMIN — METHOCARBAMOL 750 MG: 750 TABLET ORAL at 20:39

## 2025-06-17 RX ADMIN — HYDROMORPHONE HYDROCHLORIDE 1 MG: 1 INJECTION, SOLUTION INTRAMUSCULAR; INTRAVENOUS; SUBCUTANEOUS at 11:39

## 2025-06-17 RX ADMIN — PHENYLEPHRINE HYDROCHLORIDE 75 MCG: 10 INJECTION INTRAVENOUS at 11:46

## 2025-06-17 RX ADMIN — MIDAZOLAM HYDROCHLORIDE 2 MG: 1 INJECTION, SOLUTION INTRAMUSCULAR; INTRAVENOUS at 10:28

## 2025-06-17 RX ADMIN — FENTANYL CITRATE 50 MCG: 50 INJECTION INTRAMUSCULAR; INTRAVENOUS at 13:26

## 2025-06-17 RX ADMIN — PHENYLEPHRINE HYDROCHLORIDE 100 MCG: 10 INJECTION INTRAVENOUS at 11:15

## 2025-06-17 RX ADMIN — FENTANYL CITRATE 50 MCG: 50 INJECTION INTRAMUSCULAR; INTRAVENOUS at 13:34

## 2025-06-17 RX ADMIN — INSULIN LISPRO 8 UNITS: 100 INJECTION, SOLUTION INTRAVENOUS; SUBCUTANEOUS at 14:24

## 2025-06-17 RX ADMIN — HYDROMORPHONE HYDROCHLORIDE 0.25 MG: 1 INJECTION, SOLUTION INTRAMUSCULAR; INTRAVENOUS; SUBCUTANEOUS at 20:39

## 2025-06-17 RX ADMIN — INSULIN LISPRO 4 UNITS: 100 INJECTION, SOLUTION INTRAVENOUS; SUBCUTANEOUS at 22:40

## 2025-06-17 RX ADMIN — FENTANYL CITRATE 25 MCG: 50 INJECTION INTRAMUSCULAR; INTRAVENOUS at 14:09

## 2025-06-17 RX ADMIN — SODIUM CHLORIDE, POTASSIUM CHLORIDE, SODIUM LACTATE AND CALCIUM CHLORIDE: 600; 310; 30; 20 INJECTION, SOLUTION INTRAVENOUS at 09:03

## 2025-06-17 RX ADMIN — CARVEDILOL 12.5 MG: 12.5 TABLET, FILM COATED ORAL at 16:34

## 2025-06-17 RX ADMIN — AMOXICILLIN AND CLAVULANATE POTASSIUM 1 TABLET: 875; 125 TABLET, FILM COATED ORAL at 22:39

## 2025-06-17 RX ADMIN — OXYCODONE 10 MG: 5 TABLET ORAL at 00:49

## 2025-06-17 RX ADMIN — ACETAMINOPHEN 1000 MG: 500 TABLET, FILM COATED ORAL at 20:48

## 2025-06-17 RX ADMIN — SODIUM CHLORIDE, POTASSIUM CHLORIDE, SODIUM LACTATE AND CALCIUM CHLORIDE: 600; 310; 30; 20 INJECTION, SOLUTION INTRAVENOUS at 11:29

## 2025-06-17 RX ADMIN — OXYCODONE HYDROCHLORIDE AND ACETAMINOPHEN 2 TABLET: 5; 325 TABLET ORAL at 22:38

## 2025-06-17 RX ADMIN — FENTANYL CITRATE 50 MCG: 50 INJECTION INTRAMUSCULAR; INTRAVENOUS at 10:27

## 2025-06-17 RX ADMIN — FENTANYL CITRATE 50 MCG: 50 INJECTION INTRAMUSCULAR; INTRAVENOUS at 13:46

## 2025-06-17 RX ADMIN — QUETIAPINE FUMARATE 150 MG: 100 TABLET ORAL at 22:44

## 2025-06-17 RX ADMIN — SUGAMMADEX 200 MG: 100 INJECTION, SOLUTION INTRAVENOUS at 12:55

## 2025-06-17 RX ADMIN — FENTANYL CITRATE 25 MCG: 50 INJECTION INTRAMUSCULAR; INTRAVENOUS at 13:59

## 2025-06-17 RX ADMIN — GABAPENTIN 300 MG: 300 CAPSULE ORAL at 05:41

## 2025-06-17 RX ADMIN — FENTANYL CITRATE 25 MCG: 50 INJECTION INTRAMUSCULAR; INTRAVENOUS at 14:04

## 2025-06-17 RX ADMIN — ATORVASTATIN CALCIUM 40 MG: 80 TABLET, FILM COATED ORAL at 22:38

## 2025-06-17 RX ADMIN — SODIUM CHLORIDE, PRESERVATIVE FREE 10 ML: 5 INJECTION INTRAVENOUS at 08:59

## 2025-06-17 RX ADMIN — PANTOPRAZOLE SODIUM 40 MG: 40 TABLET, DELAYED RELEASE ORAL at 05:41

## 2025-06-17 RX ADMIN — OXYCODONE 10 MG: 5 TABLET ORAL at 08:57

## 2025-06-17 RX ADMIN — ONDANSETRON 4 MG: 2 INJECTION, SOLUTION INTRAMUSCULAR; INTRAVENOUS at 12:38

## 2025-06-17 RX ADMIN — PHENYLEPHRINE HYDROCHLORIDE 50 MCG: 10 INJECTION INTRAVENOUS at 12:27

## 2025-06-17 RX ADMIN — INSULIN LISPRO 8 UNITS: 100 INJECTION, SOLUTION INTRAVENOUS; SUBCUTANEOUS at 18:10

## 2025-06-17 RX ADMIN — PROPOFOL 120 MG: 10 INJECTION, EMULSION INTRAVENOUS at 11:15

## 2025-06-17 RX ADMIN — HYDROMORPHONE HYDROCHLORIDE 0.25 MG: 1 INJECTION, SOLUTION INTRAMUSCULAR; INTRAVENOUS; SUBCUTANEOUS at 16:07

## 2025-06-17 RX ADMIN — PHENYLEPHRINE HYDROCHLORIDE 100 MCG: 10 INJECTION INTRAVENOUS at 11:30

## 2025-06-17 RX ADMIN — FENTANYL CITRATE 100 MCG: 50 INJECTION, SOLUTION INTRAMUSCULAR; INTRAVENOUS at 11:11

## 2025-06-17 RX ADMIN — FENTANYL CITRATE 50 MCG: 50 INJECTION INTRAMUSCULAR; INTRAVENOUS at 13:40

## 2025-06-17 RX ADMIN — CEFAZOLIN 2000 MG: 1 INJECTION, POWDER, FOR SOLUTION INTRAMUSCULAR; INTRAVENOUS at 11:25

## 2025-06-17 RX ADMIN — DEXAMETHASONE SODIUM PHOSPHATE 10 MG: 10 INJECTION INTRAMUSCULAR; INTRAVENOUS at 11:32

## 2025-06-17 RX ADMIN — FENTANYL CITRATE 25 MCG: 50 INJECTION INTRAMUSCULAR; INTRAVENOUS at 14:14

## 2025-06-17 RX ADMIN — LIDOCAINE HYDROCHLORIDE 50 MG: 10 INJECTION, SOLUTION EPIDURAL; INFILTRATION; INTRACAUDAL; PERINEURAL at 11:14

## 2025-06-17 ASSESSMENT — PAIN DESCRIPTION - ORIENTATION
ORIENTATION: RIGHT

## 2025-06-17 ASSESSMENT — PAIN SCALES - GENERAL
PAINLEVEL_OUTOF10: 8
PAINLEVEL_OUTOF10: 10
PAINLEVEL_OUTOF10: 3
PAINLEVEL_OUTOF10: 10
PAINLEVEL_OUTOF10: 10
PAINLEVEL_OUTOF10: 8
PAINLEVEL_OUTOF10: 9
PAINLEVEL_OUTOF10: 10
PAINLEVEL_OUTOF10: 7

## 2025-06-17 ASSESSMENT — PAIN DESCRIPTION - DESCRIPTORS
DESCRIPTORS: ACHING
DESCRIPTORS: ACHING;DISCOMFORT;THROBBING
DESCRIPTORS: ACHING;BURNING;DISCOMFORT
DESCRIPTORS: ACHING
DESCRIPTORS: ACHING;DISCOMFORT;THROBBING
DESCRIPTORS: ACHING
DESCRIPTORS: ACHING;BURNING
DESCRIPTORS: ACHING

## 2025-06-17 ASSESSMENT — PAIN DESCRIPTION - LOCATION
LOCATION: LEG
LOCATION: OTHER (COMMENT);LEG
LOCATION: FOOT;LEG
LOCATION: LEG;FOOT
LOCATION: LEG;FOOT
LOCATION: LEG
LOCATION: LEG

## 2025-06-17 ASSESSMENT — PAIN - FUNCTIONAL ASSESSMENT
PAIN_FUNCTIONAL_ASSESSMENT: ACTIVITIES ARE NOT PREVENTED
PAIN_FUNCTIONAL_ASSESSMENT: ACTIVITIES ARE NOT PREVENTED
PAIN_FUNCTIONAL_ASSESSMENT: 0-10

## 2025-06-17 NOTE — FLOWSHEET NOTE
Pt insistent on going down to smoke, encouraged pt to not leave floor and get her HCG shower for surgery. Lab at bedside. When nurse writer went back into room pt was gone at 0745

## 2025-06-17 NOTE — CARE COORDINATION
Met with pt today to follow up with her on her discharge plan.  She stated that she is going today for surgery for a AKA.  Pt states that she was told by her doctor that she will go to rehab after surgery.  Pt asked if she would have a SW at the rehab.  Explained to pt that there will be a .  Did provide pt some housing resources for after rehab.

## 2025-06-17 NOTE — PLAN OF CARE
Problem: Chronic Conditions and Co-morbidities  Goal: Patient's chronic conditions and co-morbidity symptoms are monitored and maintained or improved  6/17/2025 1702 by Meghan Rivera RN  Outcome: Progressing  6/17/2025 0447 by Terry Shaw RN  Outcome: Progressing     Problem: Discharge Planning  Goal: Discharge to home or other facility with appropriate resources  6/17/2025 1702 by Meghan Rivera RN  Outcome: Progressing  6/17/2025 0447 by Terry Shaw RN  Outcome: Progressing     Problem: Pain  Goal: Verbalizes/displays adequate comfort level or baseline comfort level  6/17/2025 1702 by Meghan Rivera RN  Outcome: Progressing  6/17/2025 0447 by Terry Shaw RN  Outcome: Progressing     Problem: Safety - Adult  Goal: Free from fall injury  6/17/2025 1702 by Meghan Rivera RN  Outcome: Progressing  6/17/2025 0447 by Terry Shaw RN  Outcome: Progressing     Problem: ABCDS Injury Assessment  Goal: Absence of physical injury  6/17/2025 1702 by Meghan Rivera RN  Outcome: Progressing  6/17/2025 0447 by Terry Shaw RN  Outcome: Progressing     Problem: Nutrition Deficit:  Goal: Optimize nutritional status  6/17/2025 1702 by Meghan Rivera RN  Outcome: Progressing  6/17/2025 0447 by Terry Shaw RN  Outcome: Progressing

## 2025-06-17 NOTE — FLOWSHEET NOTE
Pt out of bed and insisting on going down to smoke, informed that nurse writer had concerns about pt walking downstairs and the potential for her lt groin to bleed. Pt insistent on going and became agitated and left floor.

## 2025-06-17 NOTE — PLAN OF CARE
Problem: Chronic Conditions and Co-morbidities  Goal: Patient's chronic conditions and co-morbidity symptoms are monitored and maintained or improved  6/17/2025 0447 by Terry Shaw RN  Outcome: Progressing  6/16/2025 1729 by Meghan Rivera RN  Outcome: Progressing     Problem: Discharge Planning  Goal: Discharge to home or other facility with appropriate resources  6/17/2025 0447 by Terry Shaw RN  Outcome: Progressing  6/16/2025 1729 by Meghan Rivera RN  Outcome: Progressing     Problem: Pain  Goal: Verbalizes/displays adequate comfort level or baseline comfort level  6/17/2025 0447 by Terry Shaw RN  Outcome: Progressing  6/16/2025 1729 by Meghan Rivera RN  Outcome: Progressing     Problem: Safety - Adult  Goal: Free from fall injury  6/17/2025 0447 by Terry Shaw RN  Outcome: Progressing  6/16/2025 1729 by Meghan Rivera RN  Outcome: Progressing     Problem: ABCDS Injury Assessment  Goal: Absence of physical injury  6/17/2025 0447 by Terry Shaw RN  Outcome: Progressing  6/16/2025 1729 by Meghan Rivera RN  Outcome: Progressing     Problem: Nutrition Deficit:  Goal: Optimize nutritional status  6/17/2025 0447 by Terry Shaw RN  Outcome: Progressing  6/16/2025 1729 by Meghan Rivera RN  Outcome: Progressing

## 2025-06-17 NOTE — ANESTHESIA POSTPROCEDURE EVALUATION
Department of Anesthesiology  Postprocedure Note    Patient: Mikael Estevez  MRN: [id number]  YOB: 1970  Date of evaluation: 6/17/2025    Procedure Summary       Date: 06/17/25 Room / Location: [address]    Anesthesia Start: 1110 Anesthesia Stop: 1317    Procedure: ABOVE KNEE AMPUTATION (Right: Leg Upper) Diagnosis:       PVD (peripheral vascular disease)      PAD (peripheral artery disease)      Arterial occlusion      (PVD (peripheral vascular disease) [I73.9])      (PAD (peripheral artery disease) [I73.9])      (Arterial occlusion [I70.90])    Surgeons: Robert Jay MD Responsible Provider: Randal Flannery MD    Anesthesia Type: general ASA Status: 4            Anesthesia Type: No value filed.    Annamaria Phase I: Annamaria Score: 10    Annamaria Phase II: Annamaria Score: 9    Anesthesia Post Evaluation    Patient location during evaluation: PACU  Patient participation: complete - patient participated  Level of consciousness: awake  Pain score: 1  Airway patency: patent  Nausea & Vomiting: no nausea and no vomiting  Cardiovascular status: blood pressure returned to baseline and hemodynamically stable  Respiratory status: acceptable  Hydration status: euvolemic  Pain management: adequate    No notable events documented.

## 2025-06-17 NOTE — CARE COORDINATION
Case Management   Daily Progress Note       Patient Name: Mikael Estevez                   YOB: 1970  Diagnosis: Post-op pain [G89.18]                       GMLOS: 2.6 days  Length of Stay: 3  days    Anticipated Discharge Date: Two or more days before discharge    Readmission Risk (Low < 19, Mod (19-27), High > 27): Readmission Risk Score: 20.8        Current Transitional Plan    [] Home Independently    [] Home with HC    [x] Skilled Nursing Facility    [x] Acute Rehabilitation    [] Long Term Acute Care (LTAC)    [] Other:     Plan for the Stay (Medical Management) :    Pt having AKA today will need ARU vs SNF       Workflow Continuation (Additional Notes) :  Needs ARU and SNF list . Pt currently in surgery       RIGOBERTO CARTER  June 17, 2025         Problem: Adult Inpatient Plan of Care  Goal: Plan of Care Review  Outcome: Ongoing (interventions implemented as appropriate)  Pt remains free from falls and injuries. Vent,TLC, HD cath, OGT remain. Levo, vaso, propofol, fentanyl, heparin gtts infusing. Mg, phos replaced. Blood glucose monitored, supplemental insulin given as ordered. TF held for high residuals, multiple large, soft BMs. Pt repositioned frequently. Plan of care discussed with pt and family. VSS, no acute issues overnight.

## 2025-06-17 NOTE — OP NOTE
Operative Note      Patient: Mikael Estevez  YOB: 1970  MRN: [id number]    Date of Procedure: 6/17/2025    Pre-Op Diagnosis Codes:      * PVD (peripheral vascular disease) [I73.9]     * PAD (peripheral artery disease) [I73.9]     * Arterial occlusion [I70.90]    Post-Op Diagnosis: Same       Procedure: Right above knee amputation    Surgeon(s):  Robert Jay MD    Assistant:   Resident: Kassandra Joy DO; Derick Moreno DO    Anesthesia: General    Estimated Blood Loss (mL): 50 mL    Complications: None    Specimens:   ID Type Source Tests Collected by Time Destination   1 :  Tissue Tissue  Robert Jay MD 6/17/2025 0926    A : RIGHT LEG ABOVE THE KNEE AMPUTATION *FOR GROSS ONLY* Tissue Leg SURGICAL PATHOLOGY Robert Jay MD 6/17/2025 1200        Implants: None      Drains: None    Findings:  Infection Present At Time Of Surgery (PATOS) (choose all levels that have infection present):  No infection present  Other Findings: Healthy tissue and bleeding at level of right above knee amputation. No infection present. Good hemostasis.    Detailed Description of Procedure:   Ms. Estevez was brought to the operating room.  She was placed in supine position.  She was intubated by the anesthesia team.  Her right leg was prepped and draped in standard sterile fashion.  A timeout was performed.  I made a fishmouth incision starting about 2 fingerbreadths above the patella.  The angle was about 4 cm from the most distal part of the flap.  Once I got through skin with a scalpel, I use electrocautery to transect soft tissue and muscle circumferentially. I tied off the GSV.  I isolated the popliteal artery/vein and suture-ligated them.  I freed up the femur circumferentially and cleared the periosteum.  The femur was transected with a power saw just proximal to the angle of my incision. I used the rasp to smooth the anterior surface of the bone. At this point the distal leg was 
salvage the bypass graft.  I then exchanged the sheath for a short 6 Armenian sheath.  Left groin angiogram was performed.  A 6 Armenian Mynx closure device was successfully deployed.  Pressure was held for several minutes. Good hemostasis. Dressing was placed.  Patient tolerated the procedure well.    Electronically signed by NICOLAS SWANN MD on 6/16/2025 at 2:11 PM

## 2025-06-17 NOTE — ANESTHESIA PRE PROCEDURE
Department of Anesthesiology  Preprocedure Note       Name:  Mikael Estevez   Age:  55 y.o.  :  1970                                          MRN:  [id number]         Date:  2025      Surgeon: Surgeon(s):  Robert Jay MD    Procedure: Procedure(s):  ABOVE KNEE AMPUTATION    Medications prior to admission:   Prior to Admission medications    Medication Sig Start Date End Date Taking? Authorizing Provider   benzonatate (TESSALON) 100 MG capsule Take 1-2 capsules by mouth 3 times daily as needed for Cough 25   Marlen Vo MD   guaiFENesin (MUCINEX) 600 MG extended release tablet Take 1 tablet by mouth 2 times daily as needed for Congestion 25   Marlen Vo MD   isosorbide mononitrate (IMDUR) 30 MG extended release tablet Take 1 tablet by mouth daily 25   Marlen Vo MD   apixaban (ELIQUIS) 5 MG TABS tablet Take 1 tablet by mouth 2 times daily 25   Marlen Vo MD   atorvastatin (LIPITOR) 40 MG tablet Take 1 tablet by mouth nightly 25   Marlen Vo MD   carvedilol (COREG) 12.5 MG tablet Take 1 tablet by mouth 2 times daily (with meals) 25   Marlen Vo MD   spironolactone (ALDACTONE) 25 MG tablet Take 0.5 tablets by mouth daily 25   Marlen Vo MD   sennosides-docusate sodium (SENOKOT-S) 8.6-50 MG tablet Take 2 tablets by mouth 2 times daily at 0800 and 1400 25   Marlen Vo MD   gabapentin (NEURONTIN) 300 MG capsule Take 1 capsule by mouth every 8 (eight) hours for 30 days. 25  Marlen Vo MD   Misc. Devices MISC 1 each by Does not apply route once for 1 dose DME order for forefoot offloading wedge surgical shoe  Diagnosis: post operative state 25  Geetha Devries DPM   acetaminophen (TYLENOL) 500 MG tablet Take 2 tablets by mouth every 8 hours as needed for Pain 25   Kana Rooney MD   insulin glargine (LANTUS) 100 UNIT/ML injection vial Inject 24 Units into the skin nightly    Provider, Historical,

## 2025-06-18 LAB
ANION GAP SERPL CALCULATED.3IONS-SCNC: 13 MMOL/L (ref 9–16)
BASOPHILS # BLD: <0.03 K/UL (ref 0–0.2)
BASOPHILS NFR BLD: 0 % (ref 0–2)
BUN SERPL-MCNC: 10 MG/DL (ref 6–20)
CALCIUM SERPL-MCNC: 9.2 MG/DL (ref 8.6–10.4)
CHLORIDE SERPL-SCNC: 99 MMOL/L (ref 98–107)
CO2 SERPL-SCNC: 24 MMOL/L (ref 20–31)
CREAT SERPL-MCNC: 0.5 MG/DL (ref 0.6–0.9)
EOSINOPHIL # BLD: 0.04 K/UL (ref 0–0.44)
EOSINOPHILS RELATIVE PERCENT: 0 % (ref 1–4)
ERYTHROCYTE [DISTWIDTH] IN BLOOD BY AUTOMATED COUNT: 16.3 % (ref 11.8–14.4)
GFR, ESTIMATED: >90 ML/MIN/1.73M2
GLUCOSE BLD-MCNC: 100 MG/DL (ref 65–105)
GLUCOSE BLD-MCNC: 243 MG/DL (ref 65–105)
GLUCOSE BLD-MCNC: 278 MG/DL (ref 65–105)
GLUCOSE BLD-MCNC: 308 MG/DL (ref 65–105)
GLUCOSE BLD-MCNC: 89 MG/DL (ref 65–105)
GLUCOSE SERPL-MCNC: 215 MG/DL (ref 74–99)
HCT VFR BLD AUTO: 34.1 % (ref 36.3–47.1)
HGB BLD-MCNC: 10.8 G/DL (ref 11.9–15.1)
IMM GRANULOCYTES # BLD AUTO: 0.08 K/UL (ref 0–0.3)
IMM GRANULOCYTES NFR BLD: 1 %
LYMPHOCYTES NFR BLD: 2.82 K/UL (ref 1.1–3.7)
LYMPHOCYTES RELATIVE PERCENT: 22 % (ref 24–43)
MCH RBC QN AUTO: 27.6 PG (ref 25.2–33.5)
MCHC RBC AUTO-ENTMCNC: 31.7 G/DL (ref 28.4–34.8)
MCV RBC AUTO: 87.2 FL (ref 82.6–102.9)
MONOCYTES NFR BLD: 1.01 K/UL (ref 0.1–1.2)
MONOCYTES NFR BLD: 8 % (ref 3–12)
NEUTROPHILS NFR BLD: 70 % (ref 36–65)
NEUTS SEG NFR BLD: 9.12 K/UL (ref 1.5–8.1)
NRBC BLD-RTO: 0 PER 100 WBC
PLATELET # BLD AUTO: 371 K/UL (ref 138–453)
PMV BLD AUTO: 9.3 FL (ref 8.1–13.5)
POTASSIUM SERPL-SCNC: 3.7 MMOL/L (ref 3.7–5.3)
RBC # BLD AUTO: 3.91 M/UL (ref 3.95–5.11)
RBC # BLD: ABNORMAL 10*6/UL
SODIUM SERPL-SCNC: 136 MMOL/L (ref 136–145)
WBC OTHER # BLD: 13.1 K/UL (ref 3.5–11.3)

## 2025-06-18 PROCEDURE — 6370000000 HC RX 637 (ALT 250 FOR IP)

## 2025-06-18 PROCEDURE — 93005 ELECTROCARDIOGRAM TRACING: CPT

## 2025-06-18 PROCEDURE — 97162 PT EVAL MOD COMPLEX 30 MIN: CPT

## 2025-06-18 PROCEDURE — 2060000000 HC ICU INTERMEDIATE R&B

## 2025-06-18 PROCEDURE — 6360000002 HC RX W HCPCS

## 2025-06-18 PROCEDURE — 2500000003 HC RX 250 WO HCPCS

## 2025-06-18 PROCEDURE — 36415 COLL VENOUS BLD VENIPUNCTURE: CPT

## 2025-06-18 PROCEDURE — 82947 ASSAY GLUCOSE BLOOD QUANT: CPT

## 2025-06-18 PROCEDURE — 99232 SBSQ HOSP IP/OBS MODERATE 35: CPT | Performed by: STUDENT IN AN ORGANIZED HEALTH CARE EDUCATION/TRAINING PROGRAM

## 2025-06-18 PROCEDURE — 85025 COMPLETE CBC W/AUTO DIFF WBC: CPT

## 2025-06-18 PROCEDURE — 97166 OT EVAL MOD COMPLEX 45 MIN: CPT

## 2025-06-18 PROCEDURE — 97535 SELF CARE MNGMENT TRAINING: CPT

## 2025-06-18 PROCEDURE — 80048 BASIC METABOLIC PNL TOTAL CA: CPT

## 2025-06-18 PROCEDURE — 97530 THERAPEUTIC ACTIVITIES: CPT

## 2025-06-18 RX ORDER — POLYETHYLENE GLYCOL 3350 17 G/17G
17 POWDER, FOR SOLUTION ORAL DAILY
Status: DISCONTINUED | OUTPATIENT
Start: 2025-06-18 | End: 2025-06-24 | Stop reason: HOSPADM

## 2025-06-18 RX ORDER — LORAZEPAM 2 MG/ML
0.5 INJECTION INTRAMUSCULAR ONCE
Status: DISCONTINUED | OUTPATIENT
Start: 2025-06-18 | End: 2025-06-24 | Stop reason: HOSPADM

## 2025-06-18 RX ORDER — INSULIN GLARGINE 100 [IU]/ML
15 INJECTION, SOLUTION SUBCUTANEOUS DAILY
Status: DISCONTINUED | OUTPATIENT
Start: 2025-06-18 | End: 2025-06-21

## 2025-06-18 RX ORDER — OXYCODONE AND ACETAMINOPHEN 5; 325 MG/1; MG/1
1 TABLET ORAL EVERY 4 HOURS PRN
Status: DISCONTINUED | OUTPATIENT
Start: 2025-06-18 | End: 2025-06-18

## 2025-06-18 RX ORDER — OXYCODONE HYDROCHLORIDE 5 MG/1
10 TABLET ORAL EVERY 6 HOURS PRN
Refills: 0 | Status: DISCONTINUED | OUTPATIENT
Start: 2025-06-18 | End: 2025-06-19

## 2025-06-18 RX ADMIN — SENNOSIDES AND DOCUSATE SODIUM 2 TABLET: 50; 8.6 TABLET ORAL at 13:00

## 2025-06-18 RX ADMIN — METHOCARBAMOL 750 MG: 750 TABLET ORAL at 01:16

## 2025-06-18 RX ADMIN — INSULIN LISPRO 4 UNITS: 100 INJECTION, SOLUTION INTRAVENOUS; SUBCUTANEOUS at 08:44

## 2025-06-18 RX ADMIN — ASPIRIN 81 MG: 81 TABLET, COATED ORAL at 08:42

## 2025-06-18 RX ADMIN — INSULIN LISPRO 12 UNITS: 100 INJECTION, SOLUTION INTRAVENOUS; SUBCUTANEOUS at 15:56

## 2025-06-18 RX ADMIN — CARVEDILOL 12.5 MG: 12.5 TABLET, FILM COATED ORAL at 08:42

## 2025-06-18 RX ADMIN — QUETIAPINE FUMARATE 150 MG: 100 TABLET ORAL at 20:28

## 2025-06-18 RX ADMIN — ATORVASTATIN CALCIUM 40 MG: 80 TABLET, FILM COATED ORAL at 20:30

## 2025-06-18 RX ADMIN — HYDROMORPHONE HYDROCHLORIDE 0.25 MG: 1 INJECTION, SOLUTION INTRAMUSCULAR; INTRAVENOUS; SUBCUTANEOUS at 10:48

## 2025-06-18 RX ADMIN — ISOSORBIDE MONONITRATE 30 MG: 30 TABLET, EXTENDED RELEASE ORAL at 08:42

## 2025-06-18 RX ADMIN — Medication 10 MG: at 23:11

## 2025-06-18 RX ADMIN — AMOXICILLIN AND CLAVULANATE POTASSIUM 1 TABLET: 875; 125 TABLET, FILM COATED ORAL at 20:30

## 2025-06-18 RX ADMIN — CARVEDILOL 12.5 MG: 12.5 TABLET, FILM COATED ORAL at 15:52

## 2025-06-18 RX ADMIN — POLYETHYLENE GLYCOL 3350 17 G: 17 POWDER, FOR SOLUTION ORAL at 08:42

## 2025-06-18 RX ADMIN — GABAPENTIN 300 MG: 300 CAPSULE ORAL at 13:00

## 2025-06-18 RX ADMIN — PANTOPRAZOLE SODIUM 40 MG: 40 TABLET, DELAYED RELEASE ORAL at 08:41

## 2025-06-18 RX ADMIN — SPIRONOLACTONE 12.5 MG: 25 TABLET, FILM COATED ORAL at 08:42

## 2025-06-18 RX ADMIN — HYDROMORPHONE HYDROCHLORIDE 0.25 MG: 1 INJECTION, SOLUTION INTRAMUSCULAR; INTRAVENOUS; SUBCUTANEOUS at 20:35

## 2025-06-18 RX ADMIN — AMOXICILLIN AND CLAVULANATE POTASSIUM 1 TABLET: 875; 125 TABLET, FILM COATED ORAL at 08:42

## 2025-06-18 RX ADMIN — SODIUM CHLORIDE, PRESERVATIVE FREE 10 ML: 5 INJECTION INTRAVENOUS at 20:37

## 2025-06-18 RX ADMIN — OXYCODONE 10 MG: 5 TABLET ORAL at 14:33

## 2025-06-18 RX ADMIN — METHOCARBAMOL 750 MG: 750 TABLET ORAL at 20:28

## 2025-06-18 RX ADMIN — METHOCARBAMOL 750 MG: 750 TABLET ORAL at 13:00

## 2025-06-18 RX ADMIN — LISINOPRIL 2.5 MG: 5 TABLET ORAL at 08:43

## 2025-06-18 RX ADMIN — OXYCODONE 10 MG: 5 TABLET ORAL at 08:43

## 2025-06-18 RX ADMIN — SENNOSIDES AND DOCUSATE SODIUM 2 TABLET: 50; 8.6 TABLET ORAL at 08:42

## 2025-06-18 RX ADMIN — HYDROMORPHONE HYDROCHLORIDE 0.25 MG: 1 INJECTION, SOLUTION INTRAMUSCULAR; INTRAVENOUS; SUBCUTANEOUS at 15:53

## 2025-06-18 ASSESSMENT — PAIN SCALES - GENERAL
PAINLEVEL_OUTOF10: 9
PAINLEVEL_OUTOF10: 10
PAINLEVEL_OUTOF10: 9
PAINLEVEL_OUTOF10: 8
PAINLEVEL_OUTOF10: 10
PAINLEVEL_OUTOF10: 10

## 2025-06-18 ASSESSMENT — PAIN DESCRIPTION - LOCATION
LOCATION: LEG
LOCATION: LEG;KNEE
LOCATION: LEG

## 2025-06-18 ASSESSMENT — PAIN DESCRIPTION - ORIENTATION
ORIENTATION: RIGHT

## 2025-06-18 ASSESSMENT — PAIN DESCRIPTION - DESCRIPTORS
DESCRIPTORS: THROBBING;STABBING;SHOOTING
DESCRIPTORS: ACHING;TENDER
DESCRIPTORS: ACHING

## 2025-06-18 ASSESSMENT — PAIN - FUNCTIONAL ASSESSMENT: PAIN_FUNCTIONAL_ASSESSMENT: ACTIVITIES ARE NOT PREVENTED

## 2025-06-18 NOTE — PLAN OF CARE
Problem: Chronic Conditions and Co-morbidities  Goal: Patient's chronic conditions and co-morbidity symptoms are monitored and maintained or improved  6/18/2025 0234 by Adrian Kaur RN  Outcome: Progressing  6/17/2025 1702 by Meghan Rivera RN  Outcome: Progressing     Problem: Discharge Planning  Goal: Discharge to home or other facility with appropriate resources  6/18/2025 0234 by Adrian Kaur RN  Outcome: Progressing  6/17/2025 1702 by Meghan Rivera RN  Outcome: Progressing     Problem: Pain  Goal: Verbalizes/displays adequate comfort level or baseline comfort level  6/18/2025 0234 by Adrian Kaur RN  Outcome: Progressing  6/17/2025 1702 by Meghan Rivera RN  Outcome: Progressing     Problem: Safety - Adult  Goal: Free from fall injury  6/18/2025 0234 by Adrian Kaur RN  Outcome: Progressing  6/17/2025 1702 by Meghan Rivera RN  Outcome: Progressing     Problem: ABCDS Injury Assessment  Goal: Absence of physical injury  6/18/2025 0234 by Adrian Kaur RN  Outcome: Progressing  6/17/2025 1702 by Meghan Rivera RN  Outcome: Progressing     Problem: Nutrition Deficit:  Goal: Optimize nutritional status  6/18/2025 0234 by Adrian Kaur RN  Outcome: Progressing  6/17/2025 1702 by Meghan Rivera RN  Outcome: Progressing

## 2025-06-18 NOTE — CARE COORDINATION
Case Management   Daily Progress Note       Patient Name: Mikael Estevez                   YOB: 1970  Diagnosis: Post-op pain [G89.18]                       GMLOS: 2.6 days  Length of Stay: 4  days    Anticipated Discharge Date: To be determined    Readmission Risk (Low < 19, Mod (19-27), High > 27): Readmission Risk Score: 20.5        Current Transitional Plan    [] Home Independently    [] Home with HC    [] Skilled Nursing Facility    [x] Acute Rehabilitation    [] Long Term Acute Care (LTAC)    [] Other:     Plan for the Stay (Medical Management) :          Workflow Continuation (Additional Notes) :  835AM talked with patient. Wanting rehab-choices given for Acute Rehab. Chose [facility]. 0905 referral sent thru careport to [facility]. 0936 message from Kiana at [facility]-can accept.      Lora Bell RN  June 18, 2025

## 2025-06-19 LAB
ANION GAP SERPL CALCULATED.3IONS-SCNC: 12 MMOL/L (ref 9–16)
BASOPHILS # BLD: 0.03 K/UL (ref 0–0.2)
BASOPHILS NFR BLD: 0 % (ref 0–2)
BUN SERPL-MCNC: 11 MG/DL (ref 6–20)
CALCIUM SERPL-MCNC: 8.4 MG/DL (ref 8.6–10.4)
CHLORIDE SERPL-SCNC: 101 MMOL/L (ref 98–107)
CO2 SERPL-SCNC: 22 MMOL/L (ref 20–31)
CREAT SERPL-MCNC: 0.6 MG/DL (ref 0.6–0.9)
EOSINOPHIL # BLD: 0.19 K/UL (ref 0–0.44)
EOSINOPHILS RELATIVE PERCENT: 2 % (ref 1–4)
ERYTHROCYTE [DISTWIDTH] IN BLOOD BY AUTOMATED COUNT: 16.4 % (ref 11.8–14.4)
GFR, ESTIMATED: >90 ML/MIN/1.73M2
GLUCOSE BLD-MCNC: 109 MG/DL (ref 65–105)
GLUCOSE BLD-MCNC: 129 MG/DL (ref 65–105)
GLUCOSE BLD-MCNC: 192 MG/DL (ref 65–105)
GLUCOSE BLD-MCNC: 221 MG/DL (ref 65–105)
GLUCOSE SERPL-MCNC: 224 MG/DL (ref 74–99)
HCT VFR BLD AUTO: 30.8 % (ref 36.3–47.1)
HGB BLD-MCNC: 9.7 G/DL (ref 11.9–15.1)
IMM GRANULOCYTES # BLD AUTO: 0.04 K/UL (ref 0–0.3)
IMM GRANULOCYTES NFR BLD: 1 %
LYMPHOCYTES NFR BLD: 3.46 K/UL (ref 1.1–3.7)
LYMPHOCYTES RELATIVE PERCENT: 40 % (ref 24–43)
MCH RBC QN AUTO: 27.7 PG (ref 25.2–33.5)
MCHC RBC AUTO-ENTMCNC: 31.5 G/DL (ref 28.4–34.8)
MCV RBC AUTO: 88 FL (ref 82.6–102.9)
MICROORGANISM SPEC CULT: NORMAL
MICROORGANISM SPEC CULT: NORMAL
MONOCYTES NFR BLD: 0.67 K/UL (ref 0.1–1.2)
MONOCYTES NFR BLD: 8 % (ref 3–12)
NEUTROPHILS NFR BLD: 49 % (ref 36–65)
NEUTS SEG NFR BLD: 4.25 K/UL (ref 1.5–8.1)
NRBC BLD-RTO: 0 PER 100 WBC
PLATELET # BLD AUTO: 368 K/UL (ref 138–453)
PMV BLD AUTO: 9.5 FL (ref 8.1–13.5)
POTASSIUM SERPL-SCNC: 3.7 MMOL/L (ref 3.7–5.3)
RBC # BLD AUTO: 3.5 M/UL (ref 3.95–5.11)
RBC # BLD: ABNORMAL 10*6/UL
SERVICE CMNT-IMP: NORMAL
SERVICE CMNT-IMP: NORMAL
SODIUM SERPL-SCNC: 135 MMOL/L (ref 136–145)
SPECIMEN DESCRIPTION: NORMAL
SPECIMEN DESCRIPTION: NORMAL
SURGICAL PATHOLOGY REPORT: NORMAL
WBC OTHER # BLD: 8.6 K/UL (ref 3.5–11.3)

## 2025-06-19 PROCEDURE — 80048 BASIC METABOLIC PNL TOTAL CA: CPT

## 2025-06-19 PROCEDURE — 2060000000 HC ICU INTERMEDIATE R&B

## 2025-06-19 PROCEDURE — 99232 SBSQ HOSP IP/OBS MODERATE 35: CPT | Performed by: INTERNAL MEDICINE

## 2025-06-19 PROCEDURE — 6370000000 HC RX 637 (ALT 250 FOR IP)

## 2025-06-19 PROCEDURE — 82947 ASSAY GLUCOSE BLOOD QUANT: CPT

## 2025-06-19 PROCEDURE — 36415 COLL VENOUS BLD VENIPUNCTURE: CPT

## 2025-06-19 PROCEDURE — 85025 COMPLETE CBC W/AUTO DIFF WBC: CPT

## 2025-06-19 PROCEDURE — 2500000003 HC RX 250 WO HCPCS

## 2025-06-19 RX ORDER — ACETAMINOPHEN 325 MG/1
650 TABLET ORAL EVERY 4 HOURS PRN
Status: DISCONTINUED | OUTPATIENT
Start: 2025-06-19 | End: 2025-06-24 | Stop reason: HOSPADM

## 2025-06-19 RX ORDER — OXYCODONE HYDROCHLORIDE 5 MG/1
10 TABLET ORAL EVERY 4 HOURS PRN
Refills: 0 | Status: DISCONTINUED | OUTPATIENT
Start: 2025-06-19 | End: 2025-06-24 | Stop reason: HOSPADM

## 2025-06-19 RX ADMIN — ACETAMINOPHEN 650 MG: 325 TABLET ORAL at 13:10

## 2025-06-19 RX ADMIN — OXYCODONE 10 MG: 5 TABLET ORAL at 00:19

## 2025-06-19 RX ADMIN — SODIUM CHLORIDE, PRESERVATIVE FREE 10 ML: 5 INJECTION INTRAVENOUS at 20:49

## 2025-06-19 RX ADMIN — AMOXICILLIN AND CLAVULANATE POTASSIUM 1 TABLET: 875; 125 TABLET, FILM COATED ORAL at 20:49

## 2025-06-19 RX ADMIN — OXYCODONE 10 MG: 5 TABLET ORAL at 22:43

## 2025-06-19 RX ADMIN — METHOCARBAMOL 750 MG: 750 TABLET ORAL at 20:49

## 2025-06-19 RX ADMIN — ATORVASTATIN CALCIUM 40 MG: 80 TABLET, FILM COATED ORAL at 20:49

## 2025-06-19 RX ADMIN — OXYCODONE 10 MG: 5 TABLET ORAL at 13:07

## 2025-06-19 RX ADMIN — APIXABAN 5 MG: 5 TABLET, FILM COATED ORAL at 20:49

## 2025-06-19 RX ADMIN — OXYCODONE 10 MG: 5 TABLET ORAL at 06:32

## 2025-06-19 RX ADMIN — ACETAMINOPHEN 650 MG: 325 TABLET ORAL at 00:19

## 2025-06-19 RX ADMIN — OXYCODONE 10 MG: 5 TABLET ORAL at 17:33

## 2025-06-19 RX ADMIN — QUETIAPINE FUMARATE 150 MG: 100 TABLET ORAL at 22:41

## 2025-06-19 RX ADMIN — ACETAMINOPHEN 650 MG: 325 TABLET ORAL at 22:41

## 2025-06-19 RX ADMIN — PANTOPRAZOLE SODIUM 40 MG: 40 TABLET, DELAYED RELEASE ORAL at 06:30

## 2025-06-19 RX ADMIN — CARVEDILOL 12.5 MG: 12.5 TABLET, FILM COATED ORAL at 17:33

## 2025-06-19 RX ADMIN — INSULIN LISPRO 4 UNITS: 100 INJECTION, SOLUTION INTRAVENOUS; SUBCUTANEOUS at 13:07

## 2025-06-19 RX ADMIN — METHOCARBAMOL 750 MG: 750 TABLET ORAL at 13:11

## 2025-06-19 RX ADMIN — SENNOSIDES AND DOCUSATE SODIUM 2 TABLET: 50; 8.6 TABLET ORAL at 13:10

## 2025-06-19 RX ADMIN — ACETAMINOPHEN 650 MG: 325 TABLET ORAL at 06:32

## 2025-06-19 ASSESSMENT — PAIN DESCRIPTION - ORIENTATION
ORIENTATION: RIGHT

## 2025-06-19 ASSESSMENT — PAIN DESCRIPTION - DESCRIPTORS
DESCRIPTORS: ACHING

## 2025-06-19 ASSESSMENT — PAIN SCALES - WONG BAKER
WONGBAKER_NUMERICALRESPONSE: NO HURT
WONGBAKER_NUMERICALRESPONSE: NO HURT

## 2025-06-19 ASSESSMENT — PAIN SCALES - GENERAL
PAINLEVEL_OUTOF10: 9
PAINLEVEL_OUTOF10: 10
PAINLEVEL_OUTOF10: 8
PAINLEVEL_OUTOF10: 7
PAINLEVEL_OUTOF10: 8
PAINLEVEL_OUTOF10: 9
PAINLEVEL_OUTOF10: 6
PAINLEVEL_OUTOF10: 6

## 2025-06-19 ASSESSMENT — PAIN DESCRIPTION - LOCATION
LOCATION: LEG

## 2025-06-19 NOTE — PLAN OF CARE
Problem: Chronic Conditions and Co-morbidities  Goal: Patient's chronic conditions and co-morbidity symptoms are monitored and maintained or improved  Outcome: Progressing  Flowsheets (Taken 6/18/2025 1600 by Arianna Boothe, RN)  Care Plan - Patient's Chronic Conditions and Co-Morbidity Symptoms are Monitored and Maintained or Improved:   Monitor and assess patient's chronic conditions and comorbid symptoms for stability, deterioration, or improvement   Collaborate with multidisciplinary team to address chronic and comorbid conditions and prevent exacerbation or deterioration   Update acute care plan with appropriate goals if chronic or comorbid symptoms are exacerbated and prevent overall improvement and discharge     Problem: Discharge Planning  Goal: Discharge to home or other facility with appropriate resources  Outcome: Progressing  Flowsheets (Taken 6/18/2025 1600 by Arianna Boothe, RN)  Discharge to home or other facility with appropriate resources:   Identify barriers to discharge with patient and caregiver   Arrange for needed discharge resources and transportation as appropriate   Identify discharge learning needs (meds, wound care, etc)   Arrange for interpreters to assist at discharge as needed     Problem: Pain  Goal: Verbalizes/displays adequate comfort level or baseline comfort level  Outcome: Progressing     Problem: Safety - Adult  Goal: Free from fall injury  Outcome: Progressing  Flowsheets (Taken 6/18/2025 1841 by Arianna Boothe, RN)  Free From Fall Injury:   Based on caregiver fall risk screen, instruct family/caregiver to ask for assistance with transferring infant if caregiver noted to have fall risk factors   Instruct family/caregiver on patient safety     Problem: ABCDS Injury Assessment  Goal: Absence of physical injury  Outcome: Progressing  Flowsheets (Taken 6/18/2025 1841 by Arianna Boothe, RN)  Absence of Physical Injury: Implement safety measures based on patient assessment

## 2025-06-19 NOTE — DISCHARGE INSTR - DIET

## 2025-06-19 NOTE — CARE COORDINATION
Pre-Cert initiated in [facility] for [facility]. Auth ID: [id number]. Auth is Pending. CM notified.

## 2025-06-19 NOTE — DISCHARGE INSTR - COC
Continuity of Care Form    Patient Name: Mikael Estevez   :  1970  MRN:  [id number]    Admit date:  2025  Discharge date:  25    Code Status Order: Full Code   Advance Directives:    Date/Time Healthcare Directive Type of Healthcare Directive Copy in Chart Healthcare Agent Appointed Healthcare Agent's Name Healthcare Agent's Phone Number    25 1013 No, patient does not have an advance directive for healthcare treatment  --  --  --  --  --             Admitting Physician:  Armen Dubose MD  PCP: Ciro Martinez Jr., MD    Discharging Nurse: Shy MEDINA  Discharging Hospital Unit/Room#: 0504/0504-01  Discharging Unit Phone Number: [phone]    Emergency Contact:   Extended Emergency Contact Information  Primary Emergency Contact: Julio Estevez  Home Phone: [phone]  Relation: Child   needed? No  Secondary Emergency Contact: Radha Hercules  Address:   Home Phone: [phone]  Mobile Phone: [phone]  Relation: Child    Past Surgical History:  Past Surgical History:   Procedure Laterality Date    ABOVE KNEE AMPUTATION Right 2025    AMPUTATION Right 2025    AMPUTATION OF DISTAL PHALANX OF HALLUX,RESECTION OF PROXIMAL PHALANX - Right    AMPUTATION Right 2025    RIGHT HALLUX AMPUTATION WITH INTEGRA GRAFT, INTEGRA    BACK SURGERY      CARDIAC PROCEDURE N/A 2025    aubrie / Left heart cath / coronary angiography / rm 236 performed by Deisy Henry MD at [facility] CARDIAC CATH LAB    CHOLECYSTECTOMY      FEMORAL BYPASS Right 2025    RIGHT  FEMORAL ENDATRERECTOMY XENOSURE PATCH 0.8X8, ANGIOGRAM performed by Robert Jay MD at [facility] CVOR    FEMORAL BYPASS Right 2025    FEMORAL POPLITEAL BYPASS WITH CRYO VEIN performed by Robert Jay MD at [facility] CVOR    LEG SURGERY Right 2025    ABOVE KNEE AMPUTATION performed by Robert Jay MD at [facility] OR    TOE AMPUTATION Right 2025    AMPUTATION OF DISTAL PHALANX OF

## 2025-06-19 NOTE — PLAN OF CARE
Problem: Chronic Conditions and Co-morbidities  Goal: Patient's chronic conditions and co-morbidity symptoms are monitored and maintained or improved  Outcome: Progressing     Problem: Discharge Planning  Goal: Discharge to home or other facility with appropriate resources  Outcome: Progressing     Problem: Pain  Goal: Verbalizes/displays adequate comfort level or baseline comfort level  Outcome: Progressing     Problem: Safety - Adult  Goal: Free from fall injury  Outcome: Progressing     Problem: ABCDS Injury Assessment  Goal: Absence of physical injury  Outcome: Progressing     Problem: Nutrition Deficit:  Goal: Optimize nutritional status  6/19/2025 1724 by Laureen Goode RN  Outcome: Progressing  6/19/2025 1600 by Arianna Echols, RD  Outcome: Progressing  Flowsheets (Taken 6/19/2025 1553)  Nutrient intake appropriate for improving, restoring, or maintaining nutritional needs: Monitor oral intake, labs, and treatment plans

## 2025-06-19 NOTE — DISCHARGE INSTRUCTIONS
You came to the hospital due to pain in your right leg and found to have occluded bypass graft for which vascular surgery evaluated you and you underwent above-knee amputation.  Please stop smoking and continue to take your medications regularly as prescribed.  You will be discharged to rehab facility, please continue to work with therapy.  Please follow-up with vascular surgery office.  Please take your medications as prescribed  Please follow-up with your primary care provider within 5 to 7 days for continued care.   If you begin to experience any concerning symptoms such as chest pain, shortness of breath, nausea, vomiting, dizziness, drowsiness, abdominal pain, loss of consciousness, blood in stools, blood in urine or any other symptoms you find concerning please come to the ED for further evaluation.  If you have been given new medications please take them as prescribed. Do not take more medication than recommended at any given time.   Please feel free return to the hospital if your symptoms worsen or any new concerning symptoms develop.  Follow-up with your primary care physician as needed for all other the concerns.      Please do not take all your pain medications at once. Do not drive,swim or operate heavy machinery under influence of the pain medications.

## 2025-06-20 LAB
ANION GAP SERPL CALCULATED.3IONS-SCNC: 13 MMOL/L (ref 9–16)
BASOPHILS # BLD: 0.05 K/UL (ref 0–0.2)
BASOPHILS NFR BLD: 1 % (ref 0–2)
BUN SERPL-MCNC: 13 MG/DL (ref 6–20)
CALCIUM SERPL-MCNC: 8.7 MG/DL (ref 8.6–10.4)
CHLORIDE SERPL-SCNC: 99 MMOL/L (ref 98–107)
CO2 SERPL-SCNC: 22 MMOL/L (ref 20–31)
CREAT SERPL-MCNC: 0.8 MG/DL (ref 0.6–0.9)
EKG ATRIAL RATE: 76 BPM
EKG P AXIS: 63 DEGREES
EKG P-R INTERVAL: 160 MS
EKG Q-T INTERVAL: 390 MS
EKG QRS DURATION: 94 MS
EKG QTC CALCULATION (BAZETT): 438 MS
EKG R AXIS: 40 DEGREES
EKG T AXIS: -3 DEGREES
EKG VENTRICULAR RATE: 76 BPM
EOSINOPHIL # BLD: 0.25 K/UL (ref 0–0.44)
EOSINOPHILS RELATIVE PERCENT: 3 % (ref 1–4)
ERYTHROCYTE [DISTWIDTH] IN BLOOD BY AUTOMATED COUNT: 16.4 % (ref 11.8–14.4)
GFR, ESTIMATED: 86 ML/MIN/1.73M2
GLUCOSE BLD-MCNC: 147 MG/DL (ref 65–105)
GLUCOSE BLD-MCNC: 154 MG/DL (ref 65–105)
GLUCOSE BLD-MCNC: 186 MG/DL (ref 65–105)
GLUCOSE BLD-MCNC: 216 MG/DL (ref 65–105)
GLUCOSE SERPL-MCNC: 219 MG/DL (ref 74–99)
HCT VFR BLD AUTO: 33.7 % (ref 36.3–47.1)
HGB BLD-MCNC: 10.5 G/DL (ref 11.9–15.1)
IMM GRANULOCYTES # BLD AUTO: 0.04 K/UL (ref 0–0.3)
IMM GRANULOCYTES NFR BLD: 1 %
LYMPHOCYTES NFR BLD: 3.46 K/UL (ref 1.1–3.7)
LYMPHOCYTES RELATIVE PERCENT: 41 % (ref 24–43)
MCH RBC QN AUTO: 27.7 PG (ref 25.2–33.5)
MCHC RBC AUTO-ENTMCNC: 31.2 G/DL (ref 28.4–34.8)
MCV RBC AUTO: 88.9 FL (ref 82.6–102.9)
MONOCYTES NFR BLD: 0.62 K/UL (ref 0.1–1.2)
MONOCYTES NFR BLD: 7 % (ref 3–12)
NEUTROPHILS NFR BLD: 47 % (ref 36–65)
NEUTS SEG NFR BLD: 4.1 K/UL (ref 1.5–8.1)
NRBC BLD-RTO: 0 PER 100 WBC
PLATELET # BLD AUTO: 396 K/UL (ref 138–453)
PMV BLD AUTO: 9.7 FL (ref 8.1–13.5)
POTASSIUM SERPL-SCNC: 4.3 MMOL/L (ref 3.7–5.3)
RBC # BLD AUTO: 3.79 M/UL (ref 3.95–5.11)
RBC # BLD: ABNORMAL 10*6/UL
SODIUM SERPL-SCNC: 134 MMOL/L (ref 136–145)
WBC OTHER # BLD: 8.5 K/UL (ref 3.5–11.3)

## 2025-06-20 PROCEDURE — 80048 BASIC METABOLIC PNL TOTAL CA: CPT

## 2025-06-20 PROCEDURE — 36415 COLL VENOUS BLD VENIPUNCTURE: CPT

## 2025-06-20 PROCEDURE — 6370000000 HC RX 637 (ALT 250 FOR IP)

## 2025-06-20 PROCEDURE — 97530 THERAPEUTIC ACTIVITIES: CPT

## 2025-06-20 PROCEDURE — 6360000002 HC RX W HCPCS

## 2025-06-20 PROCEDURE — 2500000003 HC RX 250 WO HCPCS

## 2025-06-20 PROCEDURE — 97110 THERAPEUTIC EXERCISES: CPT

## 2025-06-20 PROCEDURE — 99232 SBSQ HOSP IP/OBS MODERATE 35: CPT | Performed by: INTERNAL MEDICINE

## 2025-06-20 PROCEDURE — 2060000000 HC ICU INTERMEDIATE R&B

## 2025-06-20 PROCEDURE — 82947 ASSAY GLUCOSE BLOOD QUANT: CPT

## 2025-06-20 PROCEDURE — 85025 COMPLETE CBC W/AUTO DIFF WBC: CPT

## 2025-06-20 RX ORDER — KETOROLAC TROMETHAMINE 15 MG/ML
15 INJECTION, SOLUTION INTRAMUSCULAR; INTRAVENOUS ONCE
Status: COMPLETED | OUTPATIENT
Start: 2025-06-20 | End: 2025-06-20

## 2025-06-20 RX ORDER — ASPIRIN 81 MG/1
81 TABLET ORAL DAILY
Qty: 30 TABLET | Refills: 0 | OUTPATIENT
Start: 2025-06-20 | End: 2025-07-20

## 2025-06-20 RX ORDER — INSULIN GLARGINE 100 [IU]/ML
15 INJECTION, SOLUTION SUBCUTANEOUS NIGHTLY
Qty: 10 ML | Refills: 3 | OUTPATIENT
Start: 2025-06-20

## 2025-06-20 RX ORDER — OXYCODONE HYDROCHLORIDE 5 MG/1
10 TABLET ORAL EVERY 8 HOURS PRN
Qty: 12 TABLET | Refills: 0 | OUTPATIENT
Start: 2025-06-20 | End: 2025-06-23

## 2025-06-20 RX ADMIN — ATORVASTATIN CALCIUM 40 MG: 80 TABLET, FILM COATED ORAL at 20:53

## 2025-06-20 RX ADMIN — OXYCODONE 10 MG: 5 TABLET ORAL at 12:51

## 2025-06-20 RX ADMIN — APIXABAN 5 MG: 5 TABLET, FILM COATED ORAL at 08:29

## 2025-06-20 RX ADMIN — KETOROLAC TROMETHAMINE 15 MG: 15 INJECTION, SOLUTION INTRAMUSCULAR; INTRAVENOUS at 01:13

## 2025-06-20 RX ADMIN — OXYCODONE 10 MG: 5 TABLET ORAL at 17:41

## 2025-06-20 RX ADMIN — APIXABAN 5 MG: 5 TABLET, FILM COATED ORAL at 20:53

## 2025-06-20 RX ADMIN — INSULIN LISPRO 4 UNITS: 100 INJECTION, SOLUTION INTRAVENOUS; SUBCUTANEOUS at 17:41

## 2025-06-20 RX ADMIN — SODIUM CHLORIDE, PRESERVATIVE FREE 10 ML: 5 INJECTION INTRAVENOUS at 19:50

## 2025-06-20 RX ADMIN — CARVEDILOL 12.5 MG: 12.5 TABLET, FILM COATED ORAL at 08:28

## 2025-06-20 RX ADMIN — OXYCODONE 10 MG: 5 TABLET ORAL at 04:26

## 2025-06-20 RX ADMIN — AMOXICILLIN AND CLAVULANATE POTASSIUM 1 TABLET: 875; 125 TABLET, FILM COATED ORAL at 20:53

## 2025-06-20 RX ADMIN — OXYCODONE 10 MG: 5 TABLET ORAL at 23:56

## 2025-06-20 RX ADMIN — METHOCARBAMOL 750 MG: 750 TABLET ORAL at 20:53

## 2025-06-20 RX ADMIN — METHOCARBAMOL 750 MG: 750 TABLET ORAL at 08:27

## 2025-06-20 RX ADMIN — ACETAMINOPHEN 650 MG: 325 TABLET ORAL at 18:09

## 2025-06-20 RX ADMIN — INSULIN GLARGINE 15 UNITS: 100 INJECTION, SOLUTION SUBCUTANEOUS at 08:25

## 2025-06-20 RX ADMIN — LISINOPRIL 2.5 MG: 5 TABLET ORAL at 08:30

## 2025-06-20 RX ADMIN — KETOROLAC TROMETHAMINE 15 MG: 15 INJECTION, SOLUTION INTRAMUSCULAR; INTRAVENOUS at 06:16

## 2025-06-20 RX ADMIN — QUETIAPINE FUMARATE 150 MG: 100 TABLET ORAL at 22:40

## 2025-06-20 RX ADMIN — METHOCARBAMOL 750 MG: 750 TABLET ORAL at 01:12

## 2025-06-20 RX ADMIN — SPIRONOLACTONE 12.5 MG: 25 TABLET, FILM COATED ORAL at 08:31

## 2025-06-20 RX ADMIN — PANTOPRAZOLE SODIUM 40 MG: 40 TABLET, DELAYED RELEASE ORAL at 06:56

## 2025-06-20 RX ADMIN — ACETAMINOPHEN 650 MG: 325 TABLET ORAL at 12:56

## 2025-06-20 RX ADMIN — SODIUM CHLORIDE, PRESERVATIVE FREE 10 ML: 5 INJECTION INTRAVENOUS at 08:29

## 2025-06-20 RX ADMIN — ISOSORBIDE MONONITRATE 30 MG: 30 TABLET, EXTENDED RELEASE ORAL at 08:29

## 2025-06-20 RX ADMIN — INSULIN LISPRO 4 UNITS: 100 INJECTION, SOLUTION INTRAVENOUS; SUBCUTANEOUS at 08:26

## 2025-06-20 RX ADMIN — Medication 10 MG: at 00:02

## 2025-06-20 RX ADMIN — CARVEDILOL 12.5 MG: 12.5 TABLET, FILM COATED ORAL at 17:41

## 2025-06-20 RX ADMIN — METHOCARBAMOL 750 MG: 750 TABLET ORAL at 15:05

## 2025-06-20 RX ADMIN — AMOXICILLIN AND CLAVULANATE POTASSIUM 1 TABLET: 875; 125 TABLET, FILM COATED ORAL at 08:28

## 2025-06-20 RX ADMIN — ASPIRIN 81 MG: 81 TABLET, COATED ORAL at 08:28

## 2025-06-20 ASSESSMENT — PAIN SCALES - GENERAL
PAINLEVEL_OUTOF10: 7
PAINLEVEL_OUTOF10: 9
PAINLEVEL_OUTOF10: 10
PAINLEVEL_OUTOF10: 9
PAINLEVEL_OUTOF10: 10
PAINLEVEL_OUTOF10: 6
PAINLEVEL_OUTOF10: 6
PAINLEVEL_OUTOF10: 7
PAINLEVEL_OUTOF10: 10

## 2025-06-20 ASSESSMENT — PAIN DESCRIPTION - LOCATION
LOCATION: LEG

## 2025-06-20 ASSESSMENT — PAIN - FUNCTIONAL ASSESSMENT
PAIN_FUNCTIONAL_ASSESSMENT: PREVENTS OR INTERFERES SOME ACTIVE ACTIVITIES AND ADLS
PAIN_FUNCTIONAL_ASSESSMENT: PREVENTS OR INTERFERES SOME ACTIVE ACTIVITIES AND ADLS

## 2025-06-20 ASSESSMENT — PAIN DESCRIPTION - ORIENTATION
ORIENTATION: RIGHT

## 2025-06-20 ASSESSMENT — PAIN DESCRIPTION - DESCRIPTORS
DESCRIPTORS: SHARP;SHOOTING
DESCRIPTORS: THROBBING;STABBING

## 2025-06-20 ASSESSMENT — PAIN SCALES - WONG BAKER: WONGBAKER_NUMERICALRESPONSE: NO HURT

## 2025-06-20 NOTE — PLAN OF CARE
Problem: Chronic Conditions and Co-morbidities  Goal: Patient's chronic conditions and co-morbidity symptoms are monitored and maintained or improved  6/20/2025 0207 by Amita Lowery RN  Outcome: Progressing  6/19/2025 1724 by Laureen Goode RN  Outcome: Progressing     Problem: Discharge Planning  Goal: Discharge to home or other facility with appropriate resources  6/20/2025 0207 by Amita Lowery RN  Outcome: Progressing  6/19/2025 1724 by Laureen Goode RN  Outcome: Progressing     Problem: Pain  Goal: Verbalizes/displays adequate comfort level or baseline comfort level  6/20/2025 0207 by Amita Lowery RN  Outcome: Progressing  6/19/2025 1724 by Laureen Goode RN  Outcome: Progressing     Problem: Safety - Adult  Goal: Free from fall injury  6/20/2025 0207 by Amita Lowery RN  Outcome: Progressing  6/19/2025 1724 by Laureen Goode RN  Outcome: Progressing     Problem: ABCDS Injury Assessment  Goal: Absence of physical injury  6/20/2025 0207 by Amita Lowery RN  Outcome: Progressing  6/19/2025 1724 by Laureen Goode RN  Outcome: Progressing     Problem: Nutrition Deficit:  Goal: Optimize nutritional status  6/20/2025 0207 by Amita Lowery RN  Outcome: Progressing  6/19/2025 1724 by Laureen Goode RN  Outcome: Progressing  6/19/2025 1600 by Arianna Echols, ZORAIDA  Outcome: Progressing  Flowsheets (Taken 6/19/2025 1553)  Nutrient intake appropriate for improving, restoring, or maintaining nutritional needs: Monitor oral intake, labs, and treatment plans

## 2025-06-20 NOTE — PLAN OF CARE
Problem: Chronic Conditions and Co-morbidities  Goal: Patient's chronic conditions and co-morbidity symptoms are monitored and maintained or improved  Outcome: Progressing  Flowsheets (Taken 6/20/2025 0815)  Care Plan - Patient's Chronic Conditions and Co-Morbidity Symptoms are Monitored and Maintained or Improved: Monitor and assess patient's chronic conditions and comorbid symptoms for stability, deterioration, or improvement     Problem: Discharge Planning  Goal: Discharge to home or other facility with appropriate resources  Outcome: Progressing  Flowsheets (Taken 6/20/2025 0815)  Discharge to home or other facility with appropriate resources:   Identify barriers to discharge with patient and caregiver   Arrange for needed discharge resources and transportation as appropriate     Problem: Pain  Goal: Verbalizes/displays adequate comfort level or baseline comfort level  Outcome: Progressing     Problem: Safety - Adult  Goal: Free from fall injury  Outcome: Progressing     Problem: ABCDS Injury Assessment  Goal: Absence of physical injury  Outcome: Progressing     Problem: Nutrition Deficit:  Goal: Optimize nutritional status  Outcome: Progressing

## 2025-06-20 NOTE — CARE COORDINATION
Case Management   Daily Progress Note       Patient Name: Mikael Estevez                   YOB: 1970  Diagnosis: Post-op pain [G89.18]                       GMLOS: 3 days  Length of Stay: 6  days    Anticipated Discharge Date: Longer than expected LOS    Readmission Risk (Low < 19, Mod (19-27), High > 27): Readmission Risk Score: 21.4        Current Transitional Plan    [] Home Independently    [] Home with HC    [] Skilled Nursing Facility    [x] Acute Rehabilitation    [] Long Term Acute Care (LTAC)    [] Other:     Plan for the Stay (Medical Management) :          Workflow Continuation (Additional Notes) :  Peer to peer offered for admission to [facility]. [phone] Opt. 5 before Monday 12 noon-2025. Need name, , and ID#[id number]. PS doctor on call- no name listed.      Lora Bell RN  2025

## 2025-06-20 NOTE — CARE COORDINATION
Pre-Cert still Pending in [facility] for [facility]. Uploaded new note from [employer]istered [employer]ian as it was the only new clinical since initiating Auth.     1355: Uploaded PT and [employer] notes into [employer] for Auth.

## 2025-06-21 LAB
ANION GAP SERPL CALCULATED.3IONS-SCNC: 11 MMOL/L (ref 9–16)
BUN SERPL-MCNC: 15 MG/DL (ref 6–20)
CA-I BLD-SCNC: 1.14 MMOL/L (ref 1.13–1.33)
CALCIUM SERPL-MCNC: 9 MG/DL (ref 8.6–10.4)
CHLORIDE SERPL-SCNC: 104 MMOL/L (ref 98–107)
CO2 SERPL-SCNC: 22 MMOL/L (ref 20–31)
CREAT SERPL-MCNC: 0.6 MG/DL (ref 0.6–0.9)
GFR, ESTIMATED: >90 ML/MIN/1.73M2
GLUCOSE BLD-MCNC: 112 MG/DL (ref 65–105)
GLUCOSE BLD-MCNC: 146 MG/DL (ref 65–105)
GLUCOSE BLD-MCNC: 198 MG/DL (ref 65–105)
GLUCOSE BLD-MCNC: 241 MG/DL (ref 65–105)
GLUCOSE SERPL-MCNC: 161 MG/DL (ref 74–99)
MAGNESIUM SERPL-MCNC: 2.1 MG/DL (ref 1.6–2.6)
POTASSIUM SERPL-SCNC: 4.1 MMOL/L (ref 3.7–5.3)
SODIUM SERPL-SCNC: 137 MMOL/L (ref 136–145)

## 2025-06-21 PROCEDURE — 82330 ASSAY OF CALCIUM: CPT

## 2025-06-21 PROCEDURE — 6370000000 HC RX 637 (ALT 250 FOR IP)

## 2025-06-21 PROCEDURE — 99232 SBSQ HOSP IP/OBS MODERATE 35: CPT | Performed by: INTERNAL MEDICINE

## 2025-06-21 PROCEDURE — 97116 GAIT TRAINING THERAPY: CPT

## 2025-06-21 PROCEDURE — 36415 COLL VENOUS BLD VENIPUNCTURE: CPT

## 2025-06-21 PROCEDURE — 97110 THERAPEUTIC EXERCISES: CPT

## 2025-06-21 PROCEDURE — 83735 ASSAY OF MAGNESIUM: CPT

## 2025-06-21 PROCEDURE — 93005 ELECTROCARDIOGRAM TRACING: CPT

## 2025-06-21 PROCEDURE — 2060000000 HC ICU INTERMEDIATE R&B

## 2025-06-21 PROCEDURE — 2500000003 HC RX 250 WO HCPCS

## 2025-06-21 PROCEDURE — 82947 ASSAY GLUCOSE BLOOD QUANT: CPT

## 2025-06-21 PROCEDURE — 80048 BASIC METABOLIC PNL TOTAL CA: CPT

## 2025-06-21 RX ORDER — INSULIN GLARGINE 100 [IU]/ML
20 INJECTION, SOLUTION SUBCUTANEOUS DAILY
Status: DISCONTINUED | OUTPATIENT
Start: 2025-06-22 | End: 2025-06-21

## 2025-06-21 RX ORDER — INSULIN GLARGINE 100 [IU]/ML
20 INJECTION, SOLUTION SUBCUTANEOUS DAILY
Status: DISCONTINUED | OUTPATIENT
Start: 2025-06-21 | End: 2025-06-24 | Stop reason: HOSPADM

## 2025-06-21 RX ADMIN — ACETAMINOPHEN 650 MG: 325 TABLET ORAL at 11:33

## 2025-06-21 RX ADMIN — OXYCODONE 10 MG: 5 TABLET ORAL at 09:27

## 2025-06-21 RX ADMIN — METHOCARBAMOL 750 MG: 750 TABLET ORAL at 01:44

## 2025-06-21 RX ADMIN — PANTOPRAZOLE SODIUM 40 MG: 40 TABLET, DELAYED RELEASE ORAL at 09:40

## 2025-06-21 RX ADMIN — AMOXICILLIN AND CLAVULANATE POTASSIUM 1 TABLET: 875; 125 TABLET, FILM COATED ORAL at 22:00

## 2025-06-21 RX ADMIN — CARVEDILOL 12.5 MG: 12.5 TABLET, FILM COATED ORAL at 17:27

## 2025-06-21 RX ADMIN — ACETAMINOPHEN 650 MG: 325 TABLET ORAL at 20:01

## 2025-06-21 RX ADMIN — INSULIN LISPRO 4 UNITS: 100 INJECTION, SOLUTION INTRAVENOUS; SUBCUTANEOUS at 17:29

## 2025-06-21 RX ADMIN — METHOCARBAMOL 750 MG: 750 TABLET ORAL at 22:00

## 2025-06-21 RX ADMIN — APIXABAN 5 MG: 5 TABLET, FILM COATED ORAL at 22:00

## 2025-06-21 RX ADMIN — AMOXICILLIN AND CLAVULANATE POTASSIUM 1 TABLET: 875; 125 TABLET, FILM COATED ORAL at 09:26

## 2025-06-21 RX ADMIN — OXYCODONE 10 MG: 5 TABLET ORAL at 13:46

## 2025-06-21 RX ADMIN — METHOCARBAMOL 750 MG: 750 TABLET ORAL at 09:26

## 2025-06-21 RX ADMIN — METHOCARBAMOL 750 MG: 750 TABLET ORAL at 13:46

## 2025-06-21 RX ADMIN — ATORVASTATIN CALCIUM 40 MG: 80 TABLET, FILM COATED ORAL at 22:00

## 2025-06-21 RX ADMIN — INSULIN GLARGINE 20 UNITS: 100 INJECTION, SOLUTION SUBCUTANEOUS at 12:05

## 2025-06-21 RX ADMIN — ACETAMINOPHEN 650 MG: 325 TABLET ORAL at 01:44

## 2025-06-21 RX ADMIN — OXYCODONE 10 MG: 5 TABLET ORAL at 20:00

## 2025-06-21 RX ADMIN — APIXABAN 5 MG: 5 TABLET, FILM COATED ORAL at 09:26

## 2025-06-21 RX ADMIN — ASPIRIN 81 MG: 81 TABLET, COATED ORAL at 09:26

## 2025-06-21 RX ADMIN — QUETIAPINE FUMARATE 150 MG: 100 TABLET ORAL at 22:03

## 2025-06-21 RX ADMIN — INSULIN LISPRO 4 UNITS: 100 INJECTION, SOLUTION INTRAVENOUS; SUBCUTANEOUS at 09:40

## 2025-06-21 RX ADMIN — SODIUM CHLORIDE, PRESERVATIVE FREE 10 ML: 5 INJECTION INTRAVENOUS at 09:31

## 2025-06-21 RX ADMIN — SODIUM CHLORIDE, PRESERVATIVE FREE 10 ML: 5 INJECTION INTRAVENOUS at 22:01

## 2025-06-21 ASSESSMENT — PAIN SCALES - GENERAL
PAINLEVEL_OUTOF10: 10
PAINLEVEL_OUTOF10: 10
PAINLEVEL_OUTOF10: 8
PAINLEVEL_OUTOF10: 9
PAINLEVEL_OUTOF10: 10
PAINLEVEL_OUTOF10: 7
PAINLEVEL_OUTOF10: 10

## 2025-06-21 ASSESSMENT — PAIN DESCRIPTION - LOCATION
LOCATION: LEG
LOCATION: LEG;KNEE
LOCATION: KNEE;LEG
LOCATION: LEG
LOCATION: LEG

## 2025-06-21 ASSESSMENT — PAIN DESCRIPTION - ORIENTATION
ORIENTATION: RIGHT

## 2025-06-21 ASSESSMENT — PAIN SCALES - WONG BAKER
WONGBAKER_NUMERICALRESPONSE: NO HURT
WONGBAKER_NUMERICALRESPONSE: NO HURT

## 2025-06-21 ASSESSMENT — PAIN DESCRIPTION - DESCRIPTORS: DESCRIPTORS: SHARP;SHOOTING

## 2025-06-21 NOTE — PLAN OF CARE
Problem: Chronic Conditions and Co-morbidities  Goal: Patient's chronic conditions and co-morbidity symptoms are monitored and maintained or improved  6/20/2025 2125 by Amita Lowery RN  Outcome: Progressing  6/20/2025 1727 by Luciana Jorge RN  Outcome: Progressing  Flowsheets (Taken 6/20/2025 0815)  Care Plan - Patient's Chronic Conditions and Co-Morbidity Symptoms are Monitored and Maintained or Improved: Monitor and assess patient's chronic conditions and comorbid symptoms for stability, deterioration, or improvement     Problem: Discharge Planning  Goal: Discharge to home or other facility with appropriate resources  6/20/2025 2125 by Amita Lowery RN  Outcome: Progressing  6/20/2025 1727 by Luciana Jorge RN  Outcome: Progressing  Flowsheets (Taken 6/20/2025 0815)  Discharge to home or other facility with appropriate resources:   Identify barriers to discharge with patient and caregiver   Arrange for needed discharge resources and transportation as appropriate     Problem: Pain  Goal: Verbalizes/displays adequate comfort level or baseline comfort level  6/20/2025 2125 by Amita Lowery RN  Outcome: Progressing  6/20/2025 1727 by Luciana Jorge RN  Outcome: Progressing     Problem: Safety - Adult  Goal: Free from fall injury  6/20/2025 2125 by Amita Lowery RN  Outcome: Progressing  6/20/2025 1727 by Luciana Jorge RN  Outcome: Progressing     Problem: ABCDS Injury Assessment  Goal: Absence of physical injury  6/20/2025 2125 by Amita Lowery RN  Outcome: Progressing  6/20/2025 1727 by Luciana Jorge RN  Outcome: Progressing     Problem: Nutrition Deficit:  Goal: Optimize nutritional status  6/20/2025 2125 by Amita Lowery RN  Outcome: Progressing  6/20/2025 1727 by Luciana Jorge RN  Outcome: Progressing

## 2025-06-22 LAB
EKG ATRIAL RATE: 76 BPM
EKG P AXIS: 73 DEGREES
EKG P-R INTERVAL: 158 MS
EKG Q-T INTERVAL: 390 MS
EKG QRS DURATION: 94 MS
EKG QTC CALCULATION (BAZETT): 438 MS
EKG R AXIS: 8 DEGREES
EKG T AXIS: -1 DEGREES
EKG VENTRICULAR RATE: 76 BPM
GLUCOSE BLD-MCNC: 103 MG/DL (ref 65–105)
GLUCOSE BLD-MCNC: 129 MG/DL (ref 65–105)
GLUCOSE BLD-MCNC: 162 MG/DL (ref 65–105)
GLUCOSE BLD-MCNC: 166 MG/DL (ref 65–105)

## 2025-06-22 PROCEDURE — 2500000003 HC RX 250 WO HCPCS

## 2025-06-22 PROCEDURE — 99232 SBSQ HOSP IP/OBS MODERATE 35: CPT | Performed by: INTERNAL MEDICINE

## 2025-06-22 PROCEDURE — 2060000000 HC ICU INTERMEDIATE R&B

## 2025-06-22 PROCEDURE — 6370000000 HC RX 637 (ALT 250 FOR IP)

## 2025-06-22 PROCEDURE — 82947 ASSAY GLUCOSE BLOOD QUANT: CPT

## 2025-06-22 RX ORDER — INSULIN GLARGINE 100 [IU]/ML
15 INJECTION, SOLUTION SUBCUTANEOUS NIGHTLY
Qty: 10 ML | Refills: 3 | Status: SHIPPED | OUTPATIENT
Start: 2025-06-22 | End: 2025-06-24

## 2025-06-22 RX ORDER — METHOCARBAMOL 750 MG/1
750 TABLET, FILM COATED ORAL 3 TIMES DAILY PRN
DISCHARGE
Start: 2025-06-22 | End: 2025-07-06

## 2025-06-22 RX ORDER — INSULIN LISPRO 100 [IU]/ML
0-8 INJECTION, SOLUTION INTRAVENOUS; SUBCUTANEOUS
Status: DISCONTINUED | OUTPATIENT
Start: 2025-06-22 | End: 2025-06-24 | Stop reason: HOSPADM

## 2025-06-22 RX ADMIN — INSULIN GLARGINE 20 UNITS: 100 INJECTION, SOLUTION SUBCUTANEOUS at 09:47

## 2025-06-22 RX ADMIN — LISINOPRIL 2.5 MG: 5 TABLET ORAL at 08:03

## 2025-06-22 RX ADMIN — PANTOPRAZOLE SODIUM 40 MG: 40 TABLET, DELAYED RELEASE ORAL at 05:16

## 2025-06-22 RX ADMIN — OXYCODONE 10 MG: 5 TABLET ORAL at 18:40

## 2025-06-22 RX ADMIN — CARVEDILOL 12.5 MG: 12.5 TABLET, FILM COATED ORAL at 08:02

## 2025-06-22 RX ADMIN — AMOXICILLIN AND CLAVULANATE POTASSIUM 1 TABLET: 875; 125 TABLET, FILM COATED ORAL at 21:21

## 2025-06-22 RX ADMIN — METHOCARBAMOL 750 MG: 750 TABLET ORAL at 21:21

## 2025-06-22 RX ADMIN — ISOSORBIDE MONONITRATE 30 MG: 30 TABLET, EXTENDED RELEASE ORAL at 08:02

## 2025-06-22 RX ADMIN — CARVEDILOL 12.5 MG: 12.5 TABLET, FILM COATED ORAL at 18:40

## 2025-06-22 RX ADMIN — ACETAMINOPHEN 650 MG: 325 TABLET ORAL at 22:54

## 2025-06-22 RX ADMIN — ATORVASTATIN CALCIUM 40 MG: 80 TABLET, FILM COATED ORAL at 21:21

## 2025-06-22 RX ADMIN — APIXABAN 5 MG: 5 TABLET, FILM COATED ORAL at 21:21

## 2025-06-22 RX ADMIN — OXYCODONE 10 MG: 5 TABLET ORAL at 22:54

## 2025-06-22 RX ADMIN — ACETAMINOPHEN 650 MG: 325 TABLET ORAL at 09:51

## 2025-06-22 RX ADMIN — QUETIAPINE FUMARATE 150 MG: 100 TABLET ORAL at 21:21

## 2025-06-22 RX ADMIN — SPIRONOLACTONE 12.5 MG: 25 TABLET, FILM COATED ORAL at 08:03

## 2025-06-22 RX ADMIN — METHOCARBAMOL 750 MG: 750 TABLET ORAL at 15:25

## 2025-06-22 RX ADMIN — ACETAMINOPHEN 650 MG: 325 TABLET ORAL at 18:40

## 2025-06-22 RX ADMIN — SODIUM CHLORIDE, PRESERVATIVE FREE 10 ML: 5 INJECTION INTRAVENOUS at 21:21

## 2025-06-22 RX ADMIN — METHOCARBAMOL 750 MG: 750 TABLET ORAL at 00:38

## 2025-06-22 RX ADMIN — OXYCODONE 10 MG: 5 TABLET ORAL at 09:51

## 2025-06-22 RX ADMIN — ASPIRIN 81 MG: 81 TABLET, COATED ORAL at 08:02

## 2025-06-22 RX ADMIN — ACETAMINOPHEN 650 MG: 325 TABLET ORAL at 05:16

## 2025-06-22 RX ADMIN — OXYCODONE 10 MG: 5 TABLET ORAL at 05:16

## 2025-06-22 RX ADMIN — APIXABAN 5 MG: 5 TABLET, FILM COATED ORAL at 08:02

## 2025-06-22 RX ADMIN — SODIUM CHLORIDE, PRESERVATIVE FREE 10 ML: 5 INJECTION INTRAVENOUS at 08:05

## 2025-06-22 RX ADMIN — ACETAMINOPHEN 650 MG: 325 TABLET ORAL at 00:38

## 2025-06-22 RX ADMIN — AMOXICILLIN AND CLAVULANATE POTASSIUM 1 TABLET: 875; 125 TABLET, FILM COATED ORAL at 08:02

## 2025-06-22 RX ADMIN — OXYCODONE 10 MG: 5 TABLET ORAL at 00:38

## 2025-06-22 RX ADMIN — METHOCARBAMOL 750 MG: 750 TABLET ORAL at 08:03

## 2025-06-22 ASSESSMENT — PAIN SCALES - GENERAL
PAINLEVEL_OUTOF10: 9
PAINLEVEL_OUTOF10: 10
PAINLEVEL_OUTOF10: 10
PAINLEVEL_OUTOF10: 9
PAINLEVEL_OUTOF10: 10
PAINLEVEL_OUTOF10: 9
PAINLEVEL_OUTOF10: 9
PAINLEVEL_OUTOF10: 10

## 2025-06-22 ASSESSMENT — PAIN DESCRIPTION - ORIENTATION
ORIENTATION: RIGHT

## 2025-06-22 ASSESSMENT — PAIN DESCRIPTION - LOCATION
LOCATION: LEG

## 2025-06-22 ASSESSMENT — PAIN DESCRIPTION - DESCRIPTORS: DESCRIPTORS: ACHING;SHOOTING

## 2025-06-22 NOTE — PLAN OF CARE
Problem: Chronic Conditions and Co-morbidities  Goal: Patient's chronic conditions and co-morbidity symptoms are monitored and maintained or improved  6/22/2025 0107 by Dahiana Fuentes RN  Outcome: Progressing  6/22/2025 0107 by Dahiana Fuentes RN  Outcome: Progressing  6/21/2025 1713 by Bhavya Rodas RN  Outcome: Progressing     Problem: Discharge Planning  Goal: Discharge to home or other facility with appropriate resources  6/22/2025 0107 by Dahiana Fuentes RN  Outcome: Progressing  6/22/2025 0107 by Dahiana Fuentes RN  Outcome: Progressing  6/21/2025 1713 by Bhavya Rodas RN  Outcome: Progressing     Problem: Pain  Goal: Verbalizes/displays adequate comfort level or baseline comfort level  6/22/2025 0107 by Dahiana Fuentes RN  Outcome: Progressing  6/22/2025 0107 by Dahiana Fuentes RN  Outcome: Progressing  6/21/2025 1713 by Bhavya Rodas RN  Outcome: Progressing     Problem: Safety - Adult  Goal: Free from fall injury  6/22/2025 0107 by Dahiana Fuentes RN  Outcome: Progressing  Flowsheets (Taken 6/21/2025 2200)  Free From Fall Injury: Instruct family/caregiver on patient safety  6/22/2025 0107 by Dahiana Fuentes RN  Outcome: Progressing  Flowsheets (Taken 6/21/2025 2200)  Free From Fall Injury: Instruct family/caregiver on patient safety  6/21/2025 1713 by Bhavya Rodas RN  Outcome: Progressing     Problem: ABCDS Injury Assessment  Goal: Absence of physical injury  6/22/2025 0107 by Dahiana Fuentes RN  Outcome: Progressing  Flowsheets (Taken 6/21/2025 2200)  Absence of Physical Injury: Implement safety measures based on patient assessment  6/21/2025 1713 by Bhavya Rodas RN  Outcome: Progressing     Problem: Nutrition Deficit:  Goal: Optimize nutritional status  6/22/2025 0107 by Dahiana Fuentes RN  Outcome: Progressing  6/22/2025 0107 by Dahiana Fuentes RN  Outcome: Progressing  6/21/2025 1713 by Bhavya Rodas, RN  Outcome:

## 2025-06-23 PROBLEM — Z89.611 RIGHT ABOVE-KNEE AMPUTEE (HCC): Status: ACTIVE | Noted: 2025-06-23

## 2025-06-23 LAB
GLUCOSE BLD-MCNC: 139 MG/DL (ref 65–105)
GLUCOSE BLD-MCNC: 152 MG/DL (ref 65–105)
GLUCOSE BLD-MCNC: 193 MG/DL (ref 65–105)
GLUCOSE BLD-MCNC: 77 MG/DL (ref 65–105)

## 2025-06-23 PROCEDURE — 97116 GAIT TRAINING THERAPY: CPT

## 2025-06-23 PROCEDURE — 99222 1ST HOSP IP/OBS MODERATE 55: CPT | Performed by: GENERAL PRACTICE

## 2025-06-23 PROCEDURE — 97110 THERAPEUTIC EXERCISES: CPT

## 2025-06-23 PROCEDURE — 6370000000 HC RX 637 (ALT 250 FOR IP)

## 2025-06-23 PROCEDURE — 97535 SELF CARE MNGMENT TRAINING: CPT

## 2025-06-23 PROCEDURE — 97530 THERAPEUTIC ACTIVITIES: CPT

## 2025-06-23 PROCEDURE — 94761 N-INVAS EAR/PLS OXIMETRY MLT: CPT

## 2025-06-23 PROCEDURE — 2500000003 HC RX 250 WO HCPCS

## 2025-06-23 PROCEDURE — 2060000000 HC ICU INTERMEDIATE R&B

## 2025-06-23 PROCEDURE — 82947 ASSAY GLUCOSE BLOOD QUANT: CPT

## 2025-06-23 PROCEDURE — 99232 SBSQ HOSP IP/OBS MODERATE 35: CPT | Performed by: INTERNAL MEDICINE

## 2025-06-23 RX ADMIN — ASPIRIN 81 MG: 81 TABLET, COATED ORAL at 09:13

## 2025-06-23 RX ADMIN — OXYCODONE 10 MG: 5 TABLET ORAL at 21:48

## 2025-06-23 RX ADMIN — SODIUM CHLORIDE, PRESERVATIVE FREE 10 ML: 5 INJECTION INTRAVENOUS at 09:15

## 2025-06-23 RX ADMIN — SENNOSIDES AND DOCUSATE SODIUM 2 TABLET: 50; 8.6 TABLET ORAL at 09:14

## 2025-06-23 RX ADMIN — METHOCARBAMOL 750 MG: 750 TABLET ORAL at 09:14

## 2025-06-23 RX ADMIN — ACETAMINOPHEN 650 MG: 325 TABLET ORAL at 06:43

## 2025-06-23 RX ADMIN — OXYCODONE 10 MG: 5 TABLET ORAL at 03:02

## 2025-06-23 RX ADMIN — SPIRONOLACTONE 12.5 MG: 25 TABLET, FILM COATED ORAL at 09:14

## 2025-06-23 RX ADMIN — OXYCODONE 10 MG: 5 TABLET ORAL at 06:43

## 2025-06-23 RX ADMIN — INSULIN GLARGINE 20 UNITS: 100 INJECTION, SOLUTION SUBCUTANEOUS at 09:13

## 2025-06-23 RX ADMIN — METHOCARBAMOL 750 MG: 750 TABLET ORAL at 21:50

## 2025-06-23 RX ADMIN — ATORVASTATIN CALCIUM 40 MG: 80 TABLET, FILM COATED ORAL at 21:50

## 2025-06-23 RX ADMIN — PANTOPRAZOLE SODIUM 40 MG: 40 TABLET, DELAYED RELEASE ORAL at 06:43

## 2025-06-23 RX ADMIN — CARVEDILOL 12.5 MG: 12.5 TABLET, FILM COATED ORAL at 09:14

## 2025-06-23 RX ADMIN — OXYCODONE 10 MG: 5 TABLET ORAL at 11:08

## 2025-06-23 RX ADMIN — AMOXICILLIN AND CLAVULANATE POTASSIUM 1 TABLET: 875; 125 TABLET, FILM COATED ORAL at 09:14

## 2025-06-23 RX ADMIN — GABAPENTIN 300 MG: 300 CAPSULE ORAL at 15:10

## 2025-06-23 RX ADMIN — ACETAMINOPHEN 650 MG: 325 TABLET ORAL at 21:47

## 2025-06-23 RX ADMIN — CARVEDILOL 12.5 MG: 12.5 TABLET, FILM COATED ORAL at 17:51

## 2025-06-23 RX ADMIN — APIXABAN 5 MG: 5 TABLET, FILM COATED ORAL at 09:14

## 2025-06-23 RX ADMIN — ISOSORBIDE MONONITRATE 30 MG: 30 TABLET, EXTENDED RELEASE ORAL at 09:14

## 2025-06-23 RX ADMIN — METHOCARBAMOL 750 MG: 750 TABLET ORAL at 15:10

## 2025-06-23 RX ADMIN — APIXABAN 5 MG: 5 TABLET, FILM COATED ORAL at 21:50

## 2025-06-23 RX ADMIN — METHOCARBAMOL 750 MG: 750 TABLET ORAL at 03:02

## 2025-06-23 RX ADMIN — LISINOPRIL 2.5 MG: 5 TABLET ORAL at 09:14

## 2025-06-23 RX ADMIN — ACETAMINOPHEN 650 MG: 325 TABLET ORAL at 03:02

## 2025-06-23 RX ADMIN — ACETAMINOPHEN 650 MG: 325 TABLET ORAL at 11:08

## 2025-06-23 RX ADMIN — AMOXICILLIN AND CLAVULANATE POTASSIUM 1 TABLET: 875; 125 TABLET, FILM COATED ORAL at 21:50

## 2025-06-23 RX ADMIN — SODIUM CHLORIDE, PRESERVATIVE FREE 10 ML: 5 INJECTION INTRAVENOUS at 21:50

## 2025-06-23 RX ADMIN — QUETIAPINE FUMARATE 150 MG: 100 TABLET ORAL at 21:50

## 2025-06-23 RX ADMIN — OXYCODONE 10 MG: 5 TABLET ORAL at 15:10

## 2025-06-23 RX ADMIN — SENNOSIDES AND DOCUSATE SODIUM 2 TABLET: 50; 8.6 TABLET ORAL at 15:09

## 2025-06-23 RX ADMIN — ACETAMINOPHEN 650 MG: 325 TABLET ORAL at 15:09

## 2025-06-23 ASSESSMENT — PAIN DESCRIPTION - ORIENTATION
ORIENTATION: RIGHT
ORIENTATION: LEFT
ORIENTATION: RIGHT
ORIENTATION: RIGHT

## 2025-06-23 ASSESSMENT — PAIN - FUNCTIONAL ASSESSMENT
PAIN_FUNCTIONAL_ASSESSMENT: PREVENTS OR INTERFERES SOME ACTIVE ACTIVITIES AND ADLS

## 2025-06-23 ASSESSMENT — PAIN SCALES - WONG BAKER
WONGBAKER_NUMERICALRESPONSE: NO HURT

## 2025-06-23 ASSESSMENT — PAIN DESCRIPTION - DESCRIPTORS
DESCRIPTORS: ACHING;DISCOMFORT
DESCRIPTORS: ACHING;DISCOMFORT
DESCRIPTORS: ACHING;DISCOMFORT;CRAMPING

## 2025-06-23 ASSESSMENT — PAIN SCALES - GENERAL
PAINLEVEL_OUTOF10: 10
PAINLEVEL_OUTOF10: 10
PAINLEVEL_OUTOF10: 8
PAINLEVEL_OUTOF10: 7
PAINLEVEL_OUTOF10: 7
PAINLEVEL_OUTOF10: 10

## 2025-06-23 ASSESSMENT — PAIN DESCRIPTION - LOCATION
LOCATION: LEG

## 2025-06-23 ASSESSMENT — PAIN DESCRIPTION - PAIN TYPE: TYPE: CHRONIC PAIN

## 2025-06-23 ASSESSMENT — PAIN DESCRIPTION - FREQUENCY: FREQUENCY: CONTINUOUS

## 2025-06-23 NOTE — CONSULTS
Division of Vascular Surgery        New Consult      Reason for Consult:   Right lower extremity pain    Chief Complaint:     Right lower extremity pain    History of Present Illness:      Mikael Estevez is a 55 y.o. with history of type II diabetes mellitus, bipolar 1 disorder, chronic smoker, cholecystectomy, right femoral to popliteal bypass with CryoVein, right common femoral and profunda endarterectomy presented hospital with right lower extremity pain.  Patient had recent surgery on the 5/2/25 for right femoral artery to below knee popliteal artery bypass with cryovein. S/p right common femoral, profunda and superficial femoral endarterectomy with bovine pericardium patch angioplasty on the 4/28/25.  However, after surgery patient lost follow-up as her sister threw away the paperwork.  Patient stated her right lower extremity has more pain compared to prior surgery, she is still able to walk but for very short distance due to her right lower extremity pain.  Otherwise, patient presented with hyperglycemia with glucose level at 539, she continues to smoke, patient is unsure if she is taking her medication that was prescribed, and she is noncompliant with her medication and medical care.  Patient supposed to be on Eliquis and aspirin.    Medical History:     Past Medical History:   Diagnosis Date    Back pain     Bipolar 1 disorder (HCC)     Diabetes mellitus (HCC)     Disc disorder     Hypertension        Surgical History:     Past Surgical History:   Procedure Laterality Date    AMPUTATION Right 04/25/2025    AMPUTATION OF DISTAL PHALANX OF HALLUX,RESECTION OF PROXIMAL PHALANX - Right    AMPUTATION Right 05/05/2025    RIGHT HALLUX AMPUTATION WITH INTEGRA GRAFT, INTEGRA    BACK SURGERY      CARDIAC PROCEDURE N/A 04/22/2025    aubrie / Left heart cath / coronary angiography / rm [id number] performed by Deisy Henry MD at [facility], [place] CARDIAC CATH LAB    CHOLECYSTECTOMY      FEMORAL BYPASS Right 04/28/2025    
  Consult Note  Foot and Ankle Surgery    CC/Reason for consult: Status post great toe infection, evaluate for infection    HPI:    The patient is a 55 y.o. female seen at [facility] for evaluation of a great toe amputation and to evaluate for infection.  Patient is status post right hallux amputation DOS: 5/5/2025.  Surgical site is well opposed with Prolene sutures still intact.  Patient does have relevant past medical history of current smoker, PVD, and multiple vascular surgery in her inventions including bypass as well as multiple previous digit amputations.  Recent CTA with bilateral runoff demonstrated right femoropopliteal bypass occlusion.  Patient is going to be undergoing vascular intervention with more proximal amputation during this admission.  Patient has oral antibiotics consisting of Augmentin.  Patient is a community ambulator at baseline and does not use assistive device.  While ambulating unassisted patient endorses significant pain.  Upon evaluation, vital signs were not concerning for sepsis, imaging was obtained which indicated no osteolysis or ectopic soft tissue emphysema, labs were obtained which indicated no acute infectious process. They were admitted for postoperative pain. Patient denies constitutional symptoms.   Foot and Ankle Surgery was consulted for further surgical evaluation.    PCP is Ciro Martinez Jr., MD    ROS:   Review of Systems   Constitutional: Negative.    HENT:  Negative for dental problem, drooling, ear discharge and rhinorrhea.    Eyes:  Negative for pain, discharge and itching.   Respiratory:  Negative for cough and choking.    Cardiovascular:  Negative for leg swelling.   Gastrointestinal:  Negative for diarrhea, nausea and vomiting.   Musculoskeletal:  Negative for gait problem and joint swelling.   Skin:  Positive for wound. Negative for color change.   Neurological:  Negative for numbness.   Psychiatric/Behavioral: Negative.       Past Medical 
16 g, 4 tablet, Oral, PRN  dextrose bolus 10% 125 mL, 125 mL, IntraVENous, PRN **OR** dextrose bolus 10% 250 mL, 250 mL, IntraVENous, PRN  glucagon injection 1 mg, 1 mg, SubCUTAneous, PRN  dextrose 10 % infusion, , IntraVENous, Continuous PRN  amoxicillin-clavulanate (AUGMENTIN) 875-125 MG per tablet 1 tablet, 1 tablet, Oral, 2 times per day  atorvastatin (LIPITOR) tablet 40 mg, 40 mg, Oral, Nightly  gabapentin (NEURONTIN) capsule 300 mg, 300 mg, Oral, q8h  pantoprazole (PROTONIX) tablet 40 mg, 40 mg, Oral, QAM AC  QUEtiapine (SEROQUEL) tablet 150 mg, 150 mg, Oral, Nightly  sennosides-docusate sodium (SENOKOT-S) 8.6-50 MG tablet 2 tablet, 2 tablet, Oral, BID  spironolactone (ALDACTONE) tablet 12.5 mg, 12.5 mg, Oral, Daily  apixaban (ELIQUIS) tablet 5 mg, 5 mg, Oral, BID  aspirin EC tablet 81 mg, 81 mg, Oral, Daily  carvedilol (COREG) tablet 12.5 mg, 12.5 mg, Oral, BID WC  isosorbide mononitrate (IMDUR) extended release tablet 30 mg, 30 mg, Oral, Daily  lisinopril (PRINIVIL;ZESTRIL) tablet 2.5 mg, 2.5 mg, Oral, Daily    Social History:    Type of Home:  (stays in car)  Home Equipment: None  Prior Level of Assist for ADLs: Independent  Prior Level of Assist for Homemaking: Independent  Prior Level of Assist for Ambulation: Independent household ambulator, with or without device, Independent community ambulator, with or without device  Active : Yes  Mode of Transportation: Car  Occupation: Unemployed  IADL Comments: right-handed  Additional Comments: pt reports they are homeless and have been staying in car.    Social History     Socioeconomic History    Marital status: Single     Spouse name: None    Number of children: None    Years of education: None    Highest education level: None   Tobacco Use    Smoking status: Every Day     Current packs/day: 0.50     Average packs/day: 0.5 packs/day for 13.0 years (6.5 ttl pk-yrs)     Types: Cigarettes    Smokeless tobacco: Never   Vaping Use    Vaping status: Never Used

## 2025-06-23 NOTE — PLAN OF CARE
Problem: Chronic Conditions and Co-morbidities  Goal: Patient's chronic conditions and co-morbidity symptoms are monitored and maintained or improved  6/23/2025 1213 by Shy Davis RN  Outcome: Progressing  6/23/2025 0200 by Dahiana Fuentes RN  Outcome: Progressing     Problem: Discharge Planning  Goal: Discharge to home or other facility with appropriate resources  6/23/2025 1213 by Shy Davis RN  Outcome: Progressing  6/23/2025 0200 by Dahiana Fuentes RN  Outcome: Progressing     Problem: Pain  Goal: Verbalizes/displays adequate comfort level or baseline comfort level  6/23/2025 1213 by Shy Davis RN  Outcome: Progressing  6/23/2025 0200 by Dahiana Fuentes RN  Outcome: Progressing     Problem: Safety - Adult  Goal: Free from fall injury  6/23/2025 1213 by Shy Davis RN  Outcome: Progressing  6/23/2025 0200 by Dahiana Fuentes RN  Outcome: Progressing  Flowsheets (Taken 6/22/2025 2000)  Free From Fall Injury: Instruct family/caregiver on patient safety     Problem: ABCDS Injury Assessment  Goal: Absence of physical injury  6/23/2025 1213 by Shy Davis RN  Outcome: Progressing  6/23/2025 0200 by Dahiana Fuentes RN  Outcome: Progressing  Flowsheets (Taken 6/22/2025 2000)  Absence of Physical Injury: Implement safety measures based on patient assessment     Problem: Nutrition Deficit:  Goal: Optimize nutritional status  6/23/2025 1213 by Shy Davis RN  Outcome: Progressing  6/23/2025 0200 by Dahiana Fuentes RN  Outcome: Progressing

## 2025-06-23 NOTE — CARE COORDINATION
Call received from Daisha from [employer], Peer- peer for ARU denied, however can offer SNF as long as facility part of their network, please call [phone] with SNF choice within 24Hrs.    Reference # [id number]     If patient wants to appeal call [phone], fax # [phone].

## 2025-06-23 NOTE — PLAN OF CARE
Problem: Chronic Conditions and Co-morbidities  Goal: Patient's chronic conditions and co-morbidity symptoms are monitored and maintained or improved  6/23/2025 0200 by Dahiana Fuentes RN  Outcome: Progressing  6/22/2025 1824 by Bhavya Rodas RN  Outcome: Progressing     Problem: Discharge Planning  Goal: Discharge to home or other facility with appropriate resources  6/23/2025 0200 by Dahiana Fuentes RN  Outcome: Progressing  6/22/2025 1824 by Bhavya Rodas RN  Outcome: Progressing     Problem: Pain  Goal: Verbalizes/displays adequate comfort level or baseline comfort level  6/23/2025 0200 by Dahiana Fuentes RN  Outcome: Progressing  6/22/2025 1824 by Bhavya Rodas RN  Outcome: Progressing     Problem: Safety - Adult  Goal: Free from fall injury  6/23/2025 0200 by Dahiana Fuentes RN  Outcome: Progressing  Flowsheets (Taken 6/22/2025 2000)  Free From Fall Injury: Instruct family/caregiver on patient safety  6/22/2025 1824 by Bhavya Rodas RN  Outcome: Progressing     Problem: ABCDS Injury Assessment  Goal: Absence of physical injury  6/23/2025 0200 by Dahiana Fuentes RN  Outcome: Progressing  Flowsheets (Taken 6/22/2025 2000)  Absence of Physical Injury: Implement safety measures based on patient assessment  6/22/2025 1824 by Bhavya Rodas RN  Outcome: Progressing     Problem: Nutrition Deficit:  Goal: Optimize nutritional status  6/23/2025 0200 by Dahiana Fuentes RN  Outcome: Progressing  6/22/2025 1824 by Bhavya Rodas RN  Outcome: Progressing

## 2025-06-23 NOTE — CARE COORDINATION
Case Management   Daily Progress Note       Patient Name: Mikael Estevez                   YOB: 1970  Diagnosis: Post-op pain [G89.18]                       GMLOS: 3 days  Length of Stay: 9  days    Anticipated Discharge Date: Ready for discharge    Readmission Risk (Low < 19, Mod (19-27), High > 27): Readmission Risk Score: 22        Current Transitional Plan    [] Home Independently    [] Home with HC    [] Skilled Nursing Facility    [x] Acute Rehabilitation    [] Long Term Acute Care (LTAC)    [] Other:     Plan for the Stay (Medical Management) :          Workflow Continuation (Additional Notes) :  Need Peer to peer before 12 noon today. Called PT and OT to see patient as soon as possible. PS . 1205 Dr. Stewart called-patient denied Acute Rehab after peer to peer. Recommends SNF. 1240 SNF list given to patient-asked for choices. 1450 choices [facility]1 [facility], #2 [facility], #3 [facility], #4 [facility], #5 [facility]. Referrals sent to SNF's listed.    [facility] can accept, [facility] can accept. [facility] can accept, [facility] cannot.    [facility] to [facility]-talking with Khurram thru [facility]. Received approval. HENS complete-in envelope. RONDA complete-in envelope.    Called [facility] and talked with Mary transportation this evening-faxed request for after 1000AM tomorrow.      Lora Bell RN  June 23, 2025

## 2025-06-24 VITALS
SYSTOLIC BLOOD PRESSURE: 120 MMHG | TEMPERATURE: 97.6 F | DIASTOLIC BLOOD PRESSURE: 52 MMHG | WEIGHT: 133.16 LBS | BODY MASS INDEX: 21.4 KG/M2 | HEIGHT: 66 IN | HEART RATE: 73 BPM | RESPIRATION RATE: 16 BRPM | OXYGEN SATURATION: 97 %

## 2025-06-24 LAB — GLUCOSE BLD-MCNC: 134 MG/DL (ref 65–105)

## 2025-06-24 PROCEDURE — 6370000000 HC RX 637 (ALT 250 FOR IP)

## 2025-06-24 PROCEDURE — 99239 HOSP IP/OBS DSCHRG MGMT >30: CPT | Performed by: INTERNAL MEDICINE

## 2025-06-24 PROCEDURE — 82947 ASSAY GLUCOSE BLOOD QUANT: CPT

## 2025-06-24 RX ORDER — OXYCODONE HYDROCHLORIDE 10 MG/1
10 TABLET ORAL EVERY 8 HOURS PRN
Qty: 9 TABLET | Refills: 0 | Status: SHIPPED | OUTPATIENT
Start: 2025-06-24 | End: 2025-06-27

## 2025-06-24 RX ORDER — INSULIN GLARGINE 100 [IU]/ML
18 INJECTION, SOLUTION SUBCUTANEOUS NIGHTLY
Qty: 10 ML | Refills: 3 | Status: SHIPPED | OUTPATIENT
Start: 2025-06-24

## 2025-06-24 RX ADMIN — LISINOPRIL 2.5 MG: 5 TABLET ORAL at 09:04

## 2025-06-24 RX ADMIN — PANTOPRAZOLE SODIUM 40 MG: 40 TABLET, DELAYED RELEASE ORAL at 06:55

## 2025-06-24 RX ADMIN — ACETAMINOPHEN 650 MG: 325 TABLET ORAL at 09:04

## 2025-06-24 RX ADMIN — OXYCODONE 10 MG: 5 TABLET ORAL at 09:03

## 2025-06-24 RX ADMIN — SPIRONOLACTONE 12.5 MG: 25 TABLET, FILM COATED ORAL at 09:05

## 2025-06-24 RX ADMIN — METHOCARBAMOL 750 MG: 750 TABLET ORAL at 02:37

## 2025-06-24 RX ADMIN — ISOSORBIDE MONONITRATE 30 MG: 30 TABLET, EXTENDED RELEASE ORAL at 09:04

## 2025-06-24 RX ADMIN — ASPIRIN 81 MG: 81 TABLET, COATED ORAL at 09:05

## 2025-06-24 RX ADMIN — CARVEDILOL 12.5 MG: 12.5 TABLET, FILM COATED ORAL at 09:05

## 2025-06-24 RX ADMIN — APIXABAN 5 MG: 5 TABLET, FILM COATED ORAL at 09:05

## 2025-06-24 RX ADMIN — METHOCARBAMOL 750 MG: 750 TABLET ORAL at 09:05

## 2025-06-24 RX ADMIN — SENNOSIDES AND DOCUSATE SODIUM 2 TABLET: 50; 8.6 TABLET ORAL at 09:03

## 2025-06-24 ASSESSMENT — PAIN SCALES - GENERAL
PAINLEVEL_OUTOF10: 10
PAINLEVEL_OUTOF10: 8
PAINLEVEL_OUTOF10: 10

## 2025-06-24 ASSESSMENT — PAIN DESCRIPTION - DESCRIPTORS: DESCRIPTORS: ACHING;DISCOMFORT;CRAMPING

## 2025-06-24 ASSESSMENT — PAIN DESCRIPTION - ORIENTATION
ORIENTATION: RIGHT
ORIENTATION: RIGHT

## 2025-06-24 ASSESSMENT — PAIN DESCRIPTION - LOCATION
LOCATION: LEG
LOCATION: LEG

## 2025-06-24 ASSESSMENT — PAIN - FUNCTIONAL ASSESSMENT: PAIN_FUNCTIONAL_ASSESSMENT: PREVENTS OR INTERFERES SOME ACTIVE ACTIVITIES AND ADLS

## 2025-06-24 ASSESSMENT — PAIN SCALES - WONG BAKER: WONGBAKER_NUMERICALRESPONSE: NO HURT

## 2025-06-24 NOTE — PROGRESS NOTES
Division of Vascular Surgery        Progress Note          Subjective:     Patient states that she has not slept since surgery yesterday due to inadequate pain control. However, the patient has been refusing pain medications because she \"does not trust anyone\". Patient is requesting to be discharged at this time.     Physical Exam:     Vitals:  BP (!) 124/54   Pulse 80   Temp 97.9 °F (36.6 °C) (Oral)   Resp 12   Ht 1.676 m (5' 5.98\")   Wt 62.9 kg (138 lb 10.7 oz)   SpO2 100%   BMI 22.39 kg/m²     Physical Exam  HENT:      Head: Normocephalic.      Right Ear: External ear normal.      Left Ear: External ear normal.      Mouth: Mucous membranes are moist.   Eyes:      Pupils: Pupils are equal, round, and reactive to light.   Cardiovascular:      Pulses: Normal pulses.   Pulmonary:      Effort: Pulmonary effort is normal.   Abdominal:      General: There is no distension.   Musculoskeletal:      Cervical back: Normal range of motion.   Neurological:      Mental Status: She is alert.           Division of Vascular Surgery                 Progress Note        Name: Mikael Estevez  MRN:   [id number]                                          Overnight Events:      Right leg pain        Subjective:      Pt seen and examined at bedside. Afebrile, vital signs within normal limits on room air. Endorsing right leg rest pain. Is able to ambulate. NPO since midnight for AKA today.     Physical Exam:      Vitals:  BP (!) 151/75   Pulse 77   Temp 98 °F (36.7 °C) (Oral)   Resp 16   Ht 1.676 m (5' 5.98\")   Wt 65.4 kg (144 lb 2.9 oz)   SpO2 100%   BMI 23.28 kg/m²      HENT:      Head: Normocephalic.      Right Ear: External ear normal.      Left Ear: External ear normal.      Nose: Nose normal.      Mouth/Throat:      Mouth: Mucous membranes are moist.   Eyes:      Pupils: Pupils are equal, round, and reactive to light.   Cardiovascular:      Rate and Rhythm: Normal rate and regular rhythm.      Pulses: Normal 
     Division of Vascular Surgery             Progress Note      Name: Mikael Estevez  MRN: [id number]         Overnight Events:     POD #1 s/p right above the knee amputation.       Subjective:     Pt seen and examined at bedside. Afebrile, vital signs within normal limits on room air. Pt states she has been unable to sleep due to pain at her surgical site. Tolerating regular diet.    Physical Exam:     Vitals:  BP (!) 173/75   Pulse 75   Temp 97.9 °F (36.6 °C) (Oral)   Resp 14   Ht 1.676 m (5' 5.98\")   Wt 62.9 kg (138 lb 10.7 oz)   SpO2 100%   BMI 22.39 kg/m²     HENT:      Head: Normocephalic.      Right Ear: External ear normal.      Left Ear: External ear normal.      Nose: Nose normal.      Mouth/Throat:      Mouth: Mucous membranes are moist.   Eyes:      Pupils: Pupils are equal, round, and reactive to light.   Cardiovascular:      Rate and Rhythm: Normal rate and regular rhythm.      Pulses: Normal pulses.   Pulmonary:      Effort: Pulmonary effort is normal.   Abdominal:      General: There is no distension.      Tenderness: There is no abdominal tenderness. There is no guarding or rebound.   Genitourinary:     General: Normal vulva.   Musculoskeletal:      Cervical back: Normal range of motion.   Right AKA site dressing is clean, dry  Neurological:      Mental Status: She is alert.      Vascular:  Left lower extremity palpable DP, and dopplerable PT.    Data:  CBC:   Recent Labs     06/16/25  0720 06/17/25  0733   WBC 8.1 8.5   HGB 12.5 11.1*   * 427     Chemistry:   Recent Labs     06/16/25  0720 06/17/25  0733    133*   K 4.7 4.2    98   CO2 24 21   GLUCOSE 231* 212*   BUN 12 12   CREATININE 0.7 0.6   ANIONGAP 12 14   LABGLOM >90 >90   CALCIUM 8.9 9.0     Hepatic:   No results for input(s): \"AST\", \"ALT\", \"ALKPHOS\", \"BILITOT\", \"BILIDIR\" in the last 72 hours.    Invalid input(s): \"ALB\"    Coagulation:   Recent Labs     06/15/25  1528 06/15/25  2319 06/16/25  0720   APTT 32.8 
     Division of Vascular Surgery             Progress Note      Name: Mikael Estevez  MRN: [id number]         Overnight Events:     Continued to refuse medical care      Subjective:     Patient seen and examined this morning.  I offered suture removal for her right lower extremity yesterday at the emergency department, but patient cannot tolerate the pain of suture removal and request to stop.  I offered pain medication but patient required to have her right lower extremity numbed up before proceeding with suture removal.  Later on, I went to bedside to see if patient wants to have her sutures removed, and patient again stated she does not want any pain at all during the suture removal on top of that patient refused lab draw and refused Lantus for her hyperglycemia.  Patient at one point wanted to leave AMA, but she went outside to smoke and return back to the hospital.    Physical Exam:     Vitals:  BP (!) 178/75   Pulse 82   Temp 98.4 °F (36.9 °C) (Oral)   Resp 16   Ht 1.676 m (5' 6\")   Wt 59.9 kg (132 lb)   SpO2 100%   BMI 21.31 kg/m²     HENT:      Head: Normocephalic.      Right Ear: External ear normal.      Left Ear: External ear normal.      Nose: Nose normal.      Mouth/Throat:      Mouth: Mucous membranes are moist.   Eyes:      Pupils: Pupils are equal, round, and reactive to light.   Cardiovascular:      Rate and Rhythm: Normal rate and regular rhythm.      Pulses: Normal pulses.   Pulmonary:      Effort: Pulmonary effort is normal.   Abdominal:      General: There is no distension.      Tenderness: There is no abdominal tenderness. There is no guarding or rebound.   Genitourinary:     General: Normal vulva.   Musculoskeletal:      Cervical back: Normal range of motion.   Neurological:      Mental Status: She is alert.      Vascular:  Left lower extremity palpable DP, and dopplerable PT.  Right lower extremity PT and DP signal monophasic.  Able to palpate femoral pulse on the right side as well. 
     Division of Vascular Surgery             Progress Note      Name: Mikael Estevez  MRN: [id number]         Overnight Events:     Pain issues overnight and inability to sleep      Subjective:     Pt seen and examined at bedside. Afebrile, vital signs within normal limits on room air. Dressing was removed a few times overnight. Pain uncontrolled which prevented pt from sleeping. No other issues.    Physical Exam:     Vitals:  BP (!) 106/47   Pulse 65   Temp 98.3 °F (36.8 °C) (Oral)   Resp 12   Ht 1.676 m (5' 5.98\")   Wt 62.1 kg (136 lb 14.5 oz)   SpO2 100%   BMI 22.11 kg/m²     HENT:      Head: Normocephalic.      Right Ear: External ear normal.      Left Ear: External ear normal.      Nose: Nose normal.      Mouth/Throat:      Mouth: Mucous membranes are moist.   Eyes:      Pupils: Pupils are equal, round, and reactive to light.   Cardiovascular:      Rate and Rhythm: Normal rate and regular rhythm.      Pulses: Normal pulses.   Pulmonary:      Effort: Pulmonary effort is normal.   Abdominal:      General: There is no distension.      Tenderness: There is no abdominal tenderness. There is no guarding or rebound.   Genitourinary:     General: Normal vulva.   Musculoskeletal:      Cervical back: Normal range of motion.   Right AKA site dressing is clean, dry  Neurological:      Mental Status: She is alert.      Vascular:  Left lower extremity palpable DP, and dopplerable PT.    Data:  CBC:   Recent Labs     06/16/25  0720 06/17/25  0733 06/18/25  1114   WBC 8.1 8.5 13.1*   HGB 12.5 11.1* 10.8*   * 427 371     Chemistry:   Recent Labs     06/16/25  0720 06/17/25  0733 06/18/25  1114    133* 136   K 4.7 4.2 3.7    98 99   CO2 24 21 24   GLUCOSE 231* 212* 215*   BUN 12 12 10   CREATININE 0.7 0.6 0.5*   ANIONGAP 12 14 13   LABGLOM >90 >90 >90   CALCIUM 8.9 9.0 9.2     Hepatic:   No results for input(s): \"AST\", \"ALT\", \"ALKPHOS\", \"BILITOT\", \"BILIDIR\" in the last 72 hours.    Invalid input(s): 
     Division of Vascular Surgery             Progress Note      Name: Mikael Estevez  MRN: [id number]         Overnight Events:     Patient reports continued pain left foot.  She reports she has been taking the current pain medication regimen that is prescribed while inpatient.  She is intermittently refusing medical care      Subjective:   Patient seen and examined this morning in regards to follow-up for occluded bypass, right peroneal artery and left peroneal and PT artery are patent otherwise.  She was to have sutures removed yesterday but refused due to pain.  I offered suture removal today but patient declined.  She reports that sutures will be removed at the time of the angiogram.  She has been n.p.o. overnight.  She continues to be noncompliant to her medical management.  After receiving Dilaudid yesterday she was acceptable to lab draws.  She unfortunately continues to smoke.  Denies fever, chills, chest pain, or shortness of breath.     Physical Exam:     Vitals:  /78   Pulse 67   Temp 98.4 °F (36.9 °C) (Oral)   Resp 16   Ht 1.676 m (5' 5.98\")   Wt 59.9 kg (132 lb)   SpO2 97%   BMI 21.32 kg/m²     HENT:      Head: Normocephalic.      Right Ear: External ear normal.      Left Ear: External ear normal.      Nose: Nose normal.      Mouth/Throat:      Mouth: Mucous membranes are moist.   Eyes:      Pupils: Pupils are equal, round, and reactive to light.   Cardiovascular:      Rate and Rhythm: Normal rate and regular rhythm.      Pulses: Normal pulses.   Pulmonary:      Effort: Pulmonary effort is normal.   Abdominal:      General: There is no distension.      Tenderness: There is no abdominal tenderness. There is no guarding or rebound.   Genitourinary:     General: Normal vulva.   Musculoskeletal:      Cervical back: Normal range of motion.   Neurological:      Mental Status: She is alert.      Vascular:  Left lower extremity palpable DP, and dopplerable PT.  Right lower extremity PT and DP 
     Division of Vascular Surgery             Progress Note      Name: Mikael Estevez  MRN: [id number]         Overnight Events:     Right leg pain      Subjective:     Pt seen and examined at bedside. Afebrile, vital signs within normal limits on room air. Endorsing right leg rest pain. Is able to ambulate. NPO since midnight for AKA today.    Physical Exam:     Vitals:  BP (!) 151/75   Pulse 77   Temp 98 °F (36.7 °C) (Oral)   Resp 16   Ht 1.676 m (5' 5.98\")   Wt 65.4 kg (144 lb 2.9 oz)   SpO2 100%   BMI 23.28 kg/m²     HENT:      Head: Normocephalic.      Right Ear: External ear normal.      Left Ear: External ear normal.      Nose: Nose normal.      Mouth/Throat:      Mouth: Mucous membranes are moist.   Eyes:      Pupils: Pupils are equal, round, and reactive to light.   Cardiovascular:      Rate and Rhythm: Normal rate and regular rhythm.      Pulses: Normal pulses.   Pulmonary:      Effort: Pulmonary effort is normal.   Abdominal:      General: There is no distension.      Tenderness: There is no abdominal tenderness. There is no guarding or rebound.   Genitourinary:     General: Normal vulva.   Musculoskeletal:      Cervical back: Normal range of motion.   Neurological:      Mental Status: She is alert.      Vascular:  Left lower extremity palpable DP, and dopplerable PT.  Right lower extremity PT and DP signal monophasic.  Able to palpate femoral pulse on the right side as well.  Has significant rest pain at her right lower extremity, but intact motor or sensory function, able to ambulate but with pain.    Data:  CBC:   Recent Labs     06/14/25  1841 06/16/25  0720   WBC 6.8 8.1   HGB 13.0 12.5   * 502*     Chemistry:   Recent Labs     06/14/25  1841 06/16/25  0720   * 136   K 3.9 4.7   CL 94* 100   CO2 26 24   GLUCOSE 539* 231*   BUN 11 12   CREATININE 0.7 0.7   ANIONGAP 12 12   LABGLOM >90 >90   CALCIUM 10.0 8.9     Hepatic:   Recent Labs     06/14/25  1841   AST 12   ALT 6*   ALKPHOS 
    [facility]  Internal Medicine Teaching Residency Program  Inpatient Daily Progress Note  ______________________________________________________________________________    Patient: Mikael Estevez  YOB: 1970   MRN: [id number]    Acct: [id number]     Room: 0504/0504-01  Admit date: 6/14/2025  Today's date: 06/21/25  Number of days in the hospital: 7  Current Code Status: Full Code   SUBJECTIVE   Admitting Diagnosis: Post-op pain    - Patient seen and examined at bedside. Chart and results reviewed.  -Pt s/p R AKA  - Vital signs stable  - Pending discharge to [facility]. Currently pending Precert  Pt had 12 beat run Vtach overnight-labs pending    BRIEF HISTORY     The patient is a pleasant 55 y.o. female w a PMHx of      T2DM on 24U Lantus-noncompliant  Bipolar disorder on Seroquel  HTN on Coreg 12.5 mg and Lisinopril 2.5 mg and Imdur  Osteomyelitis of R great toe requiring amputation in May 2025  Previously positive for coagulase negative staph species   HFrEF EF 30-35% on Aldactone  PVD on Eliquis and ASA s/p right femoral to popliteal bypass with CryoVein, right common femoral and profunda endarterectomy Patient had recent surgery on the 5/2/25 for right femoral artery to below knee popliteal artery bypass with cryovein. S/p right common femoral, profunda and superficial femoral endarterectomy with bovine pericardium patch angioplasty on the 4/28/25.   CAD s/p cardiac cath on 4/23 showing mild disease in LAD and RCA w severe OM3 stenosis  Nonischemic cardiomyopathy  HLD on Statin 40 mg  Chronic pain meds on Senokot  Diabetic neuropathy on Gabapentin 300 mg TID  GERD On Prilosec  Previous drug abuse  Noncompliance w medications     Who presents to the ED due to RLE pain. Pt initially had S/p right common femoral, profunda and superficial femoral endarterectomy with bovine pericardium patch angioplasty on the 4/28/25 w concern for poor circulation and 
    [facility]  Internal Medicine Teaching Residency Program  Inpatient Daily Progress Note  ______________________________________________________________________________    Patient: Mikael Estevez  YOB: 1970   MRN: [id number]    Acct: [id number]     Room: [facility]/0504-01  Admit date: 6/14/2025  Today's date: 06/24/25  Number of days in the hospital: 10  Current Code Status: Full Code   SUBJECTIVE   Admitting Diagnosis: Post-op pain    - Patient seen and examined at bedside. Chart and results reviewed.  - Patient is medically discharge pending placement.  Patient has been evaluated by PT/OT noting that she will be available to do 3 hours of physical   Patient continues to refuse any bowel regime    BRIEF HISTORY     The patient is a pleasant 55 y.o. female w a PMHx of      T2DM on 24U Lantus-noncompliant  Bipolar disorder on Seroquel  HTN on Coreg 12.5 mg and Lisinopril 2.5 mg and Imdur  Osteomyelitis of R great toe requiring amputation in May 2025  Previously positive for coagulase negative staph species   HFrEF EF 30-35% on Aldactone  PVD on Eliquis and ASA s/p right femoral to popliteal bypass with CryoVein, right common femoral and profunda endarterectomy Patient had recent surgery on the 5/2/25 for right femoral artery to below knee popliteal artery bypass with cryovein. S/p right common femoral, profunda and superficial femoral endarterectomy with bovine pericardium patch angioplasty on the 4/28/25.   CAD s/p cardiac cath on 4/23 showing mild disease in LAD and RCA w severe OM3 stenosis  Nonischemic cardiomyopathy  HLD on Statin 40 mg  Chronic pain meds on Senokot  Diabetic neuropathy on Gabapentin 300 mg TID  GERD On Prilosec  Previous drug abuse  Noncompliance w medications     Who presents to the ED due to RLE pain. Pt initially had S/p right common femoral, profunda and superficial femoral endarterectomy with bovine pericardium patch angioplasty on 
    [facility]  Internal Medicine Teaching Residency Program  Inpatient Daily Progress Note  ______________________________________________________________________________    Patient: Mikael Estevez  YOB: 1970   MRN: [id number]    Acct: [id number]     Room: [facility]40504-01  Admit date: 6/14/2025  Today's date: 06/15/25  Number of days in the hospital: 1  Current Code Status: Full Code   SUBJECTIVE   Admitting Diagnosis: Post-op pain    - Patient seen and examined at bedside. Chart and results reviewed.  - Overnight patient refused lab draws and medications unless she was given pain medication.  She originally started on Roxicodone 5 mg which was then increased to 10 mg.  Patient states that this was not good enough and she will not be taking her medications.  Patient was not offered her gabapentin as there was a missed dose when she was in the ER which she states that she does not want to take this.  Patient was subsequently started on Dilaudid and is acceptable to lab draws.   -Glucose remained elevated, pt initially refusing. Ordered Lantus 15U  -BP elevated, Coreg 12.5 mg was resumed  -CTA showing occluded R fem to popliteal bypass graft.    BRIEF HISTORY     The patient is a pleasant 55 y.o. female w a PMHx of      T2DM on 24U Lantus-noncompliant  Bipolar disorder on Seroquel  HTN on Coreg 12.5 mg and Lisinopril 2.5 mg and Imdur  Osteomyelitis of R great toe requiring amputation in May 2025  Previously positive for coagulase negative staph species   HFrEF EF 30-35% on Aldactone  PVD on Eliquis and ASA s/p right femoral to popliteal bypass with CryoVein, right common femoral and profunda endarterectomy Patient had recent surgery on the 5/2/25 for right femoral artery to below knee popliteal artery bypass with cryovein. S/p right common femoral, profunda and superficial femoral endarterectomy with bovine pericardium patch angioplasty on the 4/28/25.   CAD s/p 
    [facility]  Internal Medicine Teaching Residency Program  Inpatient Daily Progress Note  ______________________________________________________________________________    Patient: Mikael Estevez  YOB: 1970   MRN: [id number]    Acct: [id number]     Room: [facility]0504-01  Admit date: 6/14/2025  Today's date: 06/19/25  Number of days in the hospital: 5  Current Code Status: Full Code   SUBJECTIVE   Admitting Diagnosis: Post-op pain    - Patient seen and examined at bedside. Chart and results reviewed.  -Pt s/p R AKA  Patient had difficulty sleeping initially.  However, medications were adjusted.  Noting that Dilaudid at times can make her slightly confused.  - Vital signs stable  - Morning labs are pending.  - Glucose yesterday noted to be elevated.  However, patient was just out of the OR and tolerating diet.  - Glucose this morning at 89 and 100  -Pt had a small BM overnight    BRIEF HISTORY     The patient is a pleasant 55 y.o. female w a PMHx of      T2DM on 24U Lantus-noncompliant  Bipolar disorder on Seroquel  HTN on Coreg 12.5 mg and Lisinopril 2.5 mg and Imdur  Osteomyelitis of R great toe requiring amputation in May 2025  Previously positive for coagulase negative staph species   HFrEF EF 30-35% on Aldactone  PVD on Eliquis and ASA s/p right femoral to popliteal bypass with CryoVein, right common femoral and profunda endarterectomy Patient had recent surgery on the 5/2/25 for right femoral artery to below knee popliteal artery bypass with cryovein. S/p right common femoral, profunda and superficial femoral endarterectomy with bovine pericardium patch angioplasty on the 4/28/25.   CAD s/p cardiac cath on 4/23 showing mild disease in LAD and RCA w severe OM3 stenosis  Nonischemic cardiomyopathy  HLD on Statin 40 mg  Chronic pain meds on Senokot  Diabetic neuropathy on Gabapentin 300 mg TID  GERD On Prilosec  Previous drug abuse  Noncompliance w 
    [facility]  Internal Medicine Teaching Residency Program  Inpatient Daily Progress Note  ______________________________________________________________________________    Patient: Mikael Estevez  YOB: 1970   MRN: [id number]    Acct: [id number]     Room: [facility]0504-01  Admit date: 6/14/2025  Today's date: 06/22/25  Number of days in the hospital: 8  Current Code Status: Full Code   SUBJECTIVE   Admitting Diagnosis: Post-op pain    - Patient seen and examined at bedside. Chart and results reviewed.  -Pt is HDS  -Pt's glucose well controlled  -Pt has not had a BM in 4 days but is refusing any bowel regime    BRIEF HISTORY     The patient is a pleasant 55 y.o. female w a PMHx of      T2DM on 24U Lantus-noncompliant  Bipolar disorder on Seroquel  HTN on Coreg 12.5 mg and Lisinopril 2.5 mg and Imdur  Osteomyelitis of R great toe requiring amputation in May 2025  Previously positive for coagulase negative staph species   HFrEF EF 30-35% on Aldactone  PVD on Eliquis and ASA s/p right femoral to popliteal bypass with CryoVein, right common femoral and profunda endarterectomy Patient had recent surgery on the 5/2/25 for right femoral artery to below knee popliteal artery bypass with cryovein. S/p right common femoral, profunda and superficial femoral endarterectomy with bovine pericardium patch angioplasty on the 4/28/25.   CAD s/p cardiac cath on 4/23 showing mild disease in LAD and RCA w severe OM3 stenosis  Nonischemic cardiomyopathy  HLD on Statin 40 mg  Chronic pain meds on Senokot  Diabetic neuropathy on Gabapentin 300 mg TID  GERD On Prilosec  Previous drug abuse  Noncompliance w medications     Who presents to the ED due to RLE pain. Pt initially had S/p right common femoral, profunda and superficial femoral endarterectomy with bovine pericardium patch angioplasty on the 4/28/25 w concern for poor circulation and subsequent right femoral to popliteal bypass 
    [facility]  Internal Medicine Teaching Residency Program  Inpatient Daily Progress Note  ______________________________________________________________________________    Patient: Mikael Estevez  YOB: 1970   MRN: [id number]    Acct: [id number]     Room: [facility]0504-01  Admit date: 6/14/2025  Today's date: 06/18/25  Number of days in the hospital: 4  Current Code Status: Full Code   SUBJECTIVE   Admitting Diagnosis: Post-op pain    - Patient seen and examined at bedside. Chart and results reviewed.  -Pt s/p R AKA  -Pt lost IV access refusing replacement  -IV pain meds changed to oral  -Glucose remains elevated in 200's. Has only been covered w sliding scale as pt was NPO    BRIEF HISTORY     The patient is a pleasant 55 y.o. female w a PMHx of      T2DM on 24U Lantus-noncompliant  Bipolar disorder on Seroquel  HTN on Coreg 12.5 mg and Lisinopril 2.5 mg and Imdur  Osteomyelitis of R great toe requiring amputation in May 2025  Previously positive for coagulase negative staph species   HFrEF EF 30-35% on Aldactone  PVD on Eliquis and ASA s/p right femoral to popliteal bypass with CryoVein, right common femoral and profunda endarterectomy Patient had recent surgery on the 5/2/25 for right femoral artery to below knee popliteal artery bypass with cryovein. S/p right common femoral, profunda and superficial femoral endarterectomy with bovine pericardium patch angioplasty on the 4/28/25.   CAD s/p cardiac cath on 4/23 showing mild disease in LAD and RCA w severe OM3 stenosis  Nonischemic cardiomyopathy  HLD on Statin 40 mg  Chronic pain meds on Senokot  Diabetic neuropathy on Gabapentin 300 mg TID  GERD On Prilosec  Previous drug abuse  Noncompliance w medications     Who presents to the ED due to RLE pain. Pt initially had S/p right common femoral, profunda and superficial femoral endarterectomy with bovine pericardium patch angioplasty on the 4/28/25 w concern 
    [facility]  Internal Medicine Teaching Residency Program  Inpatient Daily Progress Note  ______________________________________________________________________________    Patient: Mikael Estevez  YOB: 1970   MRN: [id number]    Acct: [id number]     Room: [facility]4/0504-01  Admit date: 6/14/2025  Today's date: 06/23/25  Number of days in the hospital: 9  Current Code Status: Full Code   SUBJECTIVE   Admitting Diagnosis: Post-op pain    - Patient seen and examined at bedside. Chart and results reviewed.  - Patient is medically discharge pending placement.  Patient has been evaluated by PT/OT noting that she will be available to do 3 hours of physical   Patient continues to refuse any bowel regime    BRIEF HISTORY     The patient is a pleasant 55 y.o. female w a PMHx of      T2DM on 24U Lantus-noncompliant  Bipolar disorder on Seroquel  HTN on Coreg 12.5 mg and Lisinopril 2.5 mg and Imdur  Osteomyelitis of R great toe requiring amputation in May 2025  Previously positive for coagulase negative staph species   HFrEF EF 30-35% on Aldactone  PVD on Eliquis and ASA s/p right femoral to popliteal bypass with CryoVein, right common femoral and profunda endarterectomy Patient had recent surgery on the 5/2/25 for right femoral artery to below knee popliteal artery bypass with cryovein. S/p right common femoral, profunda and superficial femoral endarterectomy with bovine pericardium patch angioplasty on the 4/28/25.   CAD s/p cardiac cath on 4/23 showing mild disease in LAD and RCA w severe OM3 stenosis  Nonischemic cardiomyopathy  HLD on Statin 40 mg  Chronic pain meds on Senokot  Diabetic neuropathy on Gabapentin 300 mg TID  GERD On Prilosec  Previous drug abuse  Noncompliance w medications     Who presents to the ED due to RLE pain. Pt initially had S/p right common femoral, profunda and superficial femoral endarterectomy with bovine pericardium patch angioplasty on the 
    [facility]  Internal Medicine Teaching Residency Program  Inpatient Daily Progress Note  ______________________________________________________________________________    Patient: Mikael Estevez  YOB: 1970   MRN: [id number]    Acct: [id number]     Room: [facility]-  Admit date: 6/14/2025  Today's date: 06/16/25  Number of days in the hospital: 2  Current Code Status: Full Code   SUBJECTIVE   Admitting Diagnosis: Post-op pain    - Patient seen and examined at bedside. Chart and results reviewed.  - Arteriogram per vascular surgery planned for today 6/16    BRIEF HISTORY     The patient is a pleasant 55 y.o. female w a PMHx of      T2DM on 24U Lantus-noncompliant  Bipolar disorder on Seroquel  HTN on Coreg 12.5 mg and Lisinopril 2.5 mg and Imdur  Osteomyelitis of R great toe requiring amputation in May 2025  Previously positive for coagulase negative staph species   HFrEF EF 30-35% on Aldactone  PVD on Eliquis and ASA s/p right femoral to popliteal bypass with CryoVein, right common femoral and profunda endarterectomy Patient had recent surgery on the 5/2/25 for right femoral artery to below knee popliteal artery bypass with cryovein. S/p right common femoral, profunda and superficial femoral endarterectomy with bovine pericardium patch angioplasty on the 4/28/25.   CAD s/p cardiac cath on 4/23 showing mild disease in LAD and RCA w severe OM3 stenosis  Nonischemic cardiomyopathy  HLD on Statin 40 mg  Chronic pain meds on Senokot  Diabetic neuropathy on Gabapentin 300 mg TID  GERD On Prilosec  Previous drug abuse  Noncompliance w medications     Who presents to the ED due to RLE pain. Pt initially had S/p right common femoral, profunda and superficial femoral endarterectomy with bovine pericardium patch angioplasty on the 4/28/25 w concern for poor circulation and subsequent right femoral to popliteal bypass with CryoVein, right common femoral and profunda 
    [facility]  Internal Medicine Teaching Residency Program  Inpatient Daily Progress Note  ______________________________________________________________________________    Patient: Mikael Estevez  YOB: 1970   MRN: [id number]    Acct: [id number]     Room: [facility]40504-01  Admit date: 6/14/2025  Today's date: 06/17/25  Number of days in the hospital: 3  Current Code Status: Full Code   SUBJECTIVE   Admitting Diagnosis: Post-op pain    - Patient seen and examined at bedside. Chart and results reviewed.  -Pt glucose 279 last evening, however, NPO  -Plan for R above knee amputation  -Morning labs pending    BRIEF HISTORY     The patient is a pleasant 55 y.o. female w a PMHx of      T2DM on 24U Lantus-noncompliant  Bipolar disorder on Seroquel  HTN on Coreg 12.5 mg and Lisinopril 2.5 mg and Imdur  Osteomyelitis of R great toe requiring amputation in May 2025  Previously positive for coagulase negative staph species   HFrEF EF 30-35% on Aldactone  PVD on Eliquis and ASA s/p right femoral to popliteal bypass with CryoVein, right common femoral and profunda endarterectomy Patient had recent surgery on the 5/2/25 for right femoral artery to below knee popliteal artery bypass with cryovein. S/p right common femoral, profunda and superficial femoral endarterectomy with bovine pericardium patch angioplasty on the 4/28/25.   CAD s/p cardiac cath on 4/23 showing mild disease in LAD and RCA w severe OM3 stenosis  Nonischemic cardiomyopathy  HLD on Statin 40 mg  Chronic pain meds on Senokot  Diabetic neuropathy on Gabapentin 300 mg TID  GERD On Prilosec  Previous drug abuse  Noncompliance w medications     Who presents to the ED due to RLE pain. Pt initially had S/p right common femoral, profunda and superficial femoral endarterectomy with bovine pericardium patch angioplasty on the 4/28/25 w concern for poor circulation and subsequent right femoral to popliteal bypass with 
At midnight, pt's sister at bedside asking for the dr to come. Pt's sister was frustrated and upset that pt has not been able to sleep.  Dr at bedside and talked to the pt and pt's sister.   Dr made changes regarding pt medications.  Sister is agreeable with the medications changes    Plan of care continues  
Comprehensive Nutrition Assessment    Type and Reason for Visit:  Initial, Positive nutrition screen (Weight Loss; Poor Intakes/Appetite)    Nutrition Recommendations/Plan:   Liberalize current diet as able.  Will provide Glucerna ONS x 2 per day.  Encourage/monitor PO intakes as tolerated.  Will monitor labs, weights, and plan of care.  Obtain additional bed scale weight to assess h/o weight changes.     Malnutrition Assessment:  Malnutrition Status:  Insufficient data (06/15/25 4896)    Context:  Acute Illness     Findings of the 6 clinical characteristics of malnutrition:  Energy Intake:  75% or less of estimated energy requirements for 7 or more days  Weight Loss:  Unable to assess     Body Fat Loss:  Unable to assess   Muscle Mass Loss:  Unable to assess  Fluid Accumulation:  No fluid accumulation   Strength:  Not Performed    Nutrition Assessment:    Admitted for c/o pain - pt with recent fem bypass and right great toe amputation.  PMH: DM, HTN.  Pt reports her appetite varies but is \"okay\" right now.  Eating about 50% of meals.  This morning ate 25-50% of her breakfast but reports eating more for lunch.  Will liberalize diet as able.  Willing to receive oral supplements for additional nutrition.  Weight loss reported - significant weight fluctuations from 132-168 lb noted per chart - ordered additional bedscale weight to be obtained.  Labs reviewed: Glucose 133-388 mg/dL.  Meds reviewed: Humalog SS.    Nutrition Related Findings:    Meds/Labs reviewed. Wound Type: None       Current Nutrition Intake & Therapies:    Average Meal Intake: 26-50%, 51-75%  Average Supplements Intake: None Ordered  ADULT DIET; Regular; 4 carb choices (60 gm/meal); Low Fat/Low Chol/High Fiber/SHRUTHI; 1800 ml  Diet NPO Exceptions are: Sips of Water with Meds  Additional Calorie Sources:  None    Anthropometric Measures:  Height: 167.6 cm (5' 5.98\")  Ideal Body Weight (IBW): 130 lbs (59 kg)    Admission Body Weight: 76.6 kg (168 lb 
Comprehensive Nutrition Assessment    Type and Reason for Visit:  Reassess    Nutrition Recommendations/Plan:   Continue current diet.  Continue diabetic oral nutrition supplement (Glucerna) TID - chocolate.  Will monitor labs, weights, intake, GI status, and plan of care.     Malnutrition Assessment:  Malnutrition Status:  Insufficient data (06/15/25 5577)    Context:  Acute Illness     Findings of the 6 clinical characteristics of malnutrition:  Energy Intake:  75% or less of estimated energy requirements for 7 or more days  Weight Loss:  Unable to assess     Body Fat Loss:  Unable to assess     Muscle Mass Loss:  Unable to assess    Fluid Accumulation:  No fluid accumulation     Strength:  Not Performed    Nutrition Assessment:    F/U. Pt had R AKA on 6/17. Pt intakes have improved since initial assessment, averaging %. Pt drinking ONS as able, requests chocolate. Pt states she sometimes gets full on ONS, discussed eating meal first and drinking ONS between meals to optimize nutrition. Discussed protein to aid in surgical wound healing. Note pt met with CHF clinic yesterday.    Nutrition Related Findings:    No edema. LBM 6/16. Labs: Na 135, Glu 224. Meds lantus, humalog., senokot Wound Type: None       Current Nutrition Intake & Therapies:    Average Meal Intake: %  Average Supplements Intake: Unable to assess  ADULT DIET; Regular; 4 carb choices (60 gm/meal); Low Fat/Low Chol/High Fiber/SHRUTHI; 1800 ml  ADULT ORAL NUTRITION SUPPLEMENT; Breakfast, Lunch, Dinner; Diabetic Oral Supplement    Anthropometric Measures:  Height: 167.6 cm (5' 5.98\")  Ideal Body Weight (IBW): 130 lbs (59 kg)    Admission Body Weight: 76.6 kg (168 lb 14 oz)  Current Body Weight: 62.1 kg (136 lb 14.5 oz), 105.3 % IBW. Weight Source: Bed scale  Current BMI (kg/m2): 22.1           Weight Adjustment For: Amputation  Total Adjusted Percentage (Calculated): 10.1  Adjusted Ideal Body Weight (lbs) (Calculated): 116.9 lbs  Adjusted 
Congestive Heart Failure Education completed and charted. CHF booklet given. Patient was receptive to education.    Discussed the  importance of medication compliance.    Discussed the importance of a heart healthy diet. Discussed 2000 mg sodium-restricted daily diet.  Patient instructed to limit fluid intake to  1.5 to 2 liters per day.    Patient instructed to weigh self at the same time of each day each morning, reinforced teaching to monitor for 3-5 lb weight increase over 1-2 days notify physician if change noted.      Signs and symptoms of CHF discussed with patient, such as feeling more tired than normal, feeling short of breath, coughing that increases when lying down, sudden weight gain, swelling of the feet, legs or belly.  Patient verbalized understanding to notify physician office if these symptoms occur.  EF 30 -35%   
Facility/Department: [address] [facility]   Physical Therapy Daily Treatment Note    Patient Name: Mikael Estevez        MRN: [id number]    : 1970    Date of Service: 2025    Chief Complaint   Patient presents with    Post-op Problem     Discharge from femoral surgical site from fem pop and right great toe amputation       Past Medical History:  has a past medical history of Back pain, Bipolar 1 disorder (HCC), Diabetes mellitus (HCC), Disc disorder, and Hypertension.  Past Surgical History:  has a past surgical history that includes Cholecystectomy; back surgery; Cardiac procedure (N/A, 2025); amputation (Right, 2025); Toe amputation (Right, 2025); femoral bypass (Right, 2025); amputation (Right, 2025); femoral bypass (Right, 2025); Toe amputation (Right, 2025); above knee amputation (Right, 2025); vascular surgery (Right, 2025); and Leg Surgery (Right, 2025).    Discharge Recommendations  In my professional opinion, this patient could tolerate a total of 3 hours of combined therapies post-acute care.      PT Equipment Recommendations  Equipment Needed: Yes  Mobility Devices: Walker  Walker: Rolling    Assessment  Pt cooperative, motivated, impulsive.  Transfers min A for turns, gait rwalker 40'x1 with CG+1; bed mobility with SBA+1. Pt is currently unsafe for DC to previous living situation and is highly motivated to be independent as an amputee.    Body Structures, Functions, Activity Limitations Requiring Skilled Therapeutic Intervention: Decreased functional mobility ;Decreased safe awareness;Decreased strength;Decreased endurance;Decreased balance;Decreased tolerance to work activity;Increased pain;Decreased posture     Therapy Prognosis: Good  Decision Making: Medium Complexity  Requires PT Follow-Up: Yes  Activity Tolerance  Activity Tolerance: Patient tolerated treatment well  Activity Tolerance Comments: pt painful with RLE ex, but 
Lost IV access. Pt is refusing a new IV. Charge nurse notified and MD notified.  
Occupational Therapy    [facility]  Occupational Therapy Not Seen Note    DATE: 6/15/2025    NAME: Mikael Estevez  MRN: [id number]   : 1970      Patient not seen this date for Occupational Therapy due to:    Patient independent with ADLs and functional tasks with no acute OT needs. Will defer OT evaluation at this time. Please reorder OT if future needs arise.     Next Scheduled Treatment: N/A    Electronically signed by Wali Busby OT on 6/15/2025 at 11:04 AM    
Occupational Therapy  Occupational Therapy Daily Treatment Note  Facility/Department: [facility] 5A [facility]PDOWN   Patient Name: Mikael Estevez        MRN: [id number]    : 1970    Date of Service: 2025    Chief Complaint   Patient presents with    Post-op Problem     Discharge from femoral surgical site from fem pop and right great toe amputation       Past Medical History:  has a past medical history of Back pain, Bipolar 1 disorder (HCC), Diabetes mellitus (HCC), Disc disorder, and Hypertension.  Past Surgical History:  has a past surgical history that includes Cholecystectomy; back surgery; Cardiac procedure (N/A, 2025); amputation (Right, 2025); Toe amputation (Right, 2025); femoral bypass (Right, 2025); amputation (Right, 2025); femoral bypass (Right, 2025); Toe amputation (Right, 2025); above knee amputation (Right, 2025); vascular surgery (Right, 2025); and Leg Surgery (Right, 2025).    Discharge Recommendations Further therapy recommended at discharge.The patient should be able to tolerate at least 3 hours of therapy per day over 5 days or 15 hours over 7 days.   This patient may benefit from a Physical Medicine and Rehab consult.     Discharge Recommendations: Patient would benefit from continued therapy after discharge       Assessment  Performance deficits / Impairments: Decreased functional mobility ;Decreased ADL status;Decreased endurance;Decreased balance;Decreased high-level IADLs;Decreased safe awareness  Prognosis: Good  REQUIRES OT FOLLOW-UP: Yes  Activity Tolerance  Activity Tolerance: Patient Tolerated treatment well  Safety Devices  Type of Devices: Call light within reach;Gait belt;Patient at risk for falls;Nurse notified;Left in chair (w/c, went outside in w/c after session had ended)  Restraints  Restraints Initially in Place: No    AM-PAC  AM-PAC Daily Activity - Inpatient   How much help is needed for putting on and taking off 
Occupational Therapy  Occupational Therapy Initial Evaluation  Facility/Department: [facility] 5A [facility]PDOWN   Patient Name: Mikael Estevez        MRN: [id number]    : 1970    Date of Service: 2025    Chief Complaint   Patient presents with    Post-op Problem     Discharge from femoral surgical site from fem pop and right great toe amputation       Past Medical History:  has a past medical history of Back pain, Bipolar 1 disorder (HCC), Diabetes mellitus (HCC), Disc disorder, and Hypertension.  Past Surgical History:  has a past surgical history that includes Cholecystectomy; back surgery; Cardiac procedure (N/A, 2025); amputation (Right, 2025); Toe amputation (Right, 2025); femoral bypass (Right, 2025); amputation (Right, 2025); femoral bypass (Right, 2025); Toe amputation (Right, 2025); above knee amputation (Right, 2025); vascular surgery (Right, 2025); and Leg Surgery (Right, 2025).    Discharge Recommendations  Discharge Recommendations: Patient would benefit from continued therapy after discharge  OT Equipment Recommendations  Equipment Needed: No  Further therapy recommended at discharge.The patient should be able to tolerate at least 3 hours of therapy per day over 5 days or 15 hours over 7 days.   This patient may benefit from a Physical Medicine and Rehab consult.      Assessment  Performance deficits / Impairments: Decreased functional mobility ;Decreased ADL status;Decreased endurance;Decreased balance;Decreased high-level IADLs  Assessment: Pt would continue to benefit from OT services to address deficits listed above and improve overall functional performance prior to discharge.  Prognosis: Good  Decision Making: Medium Complexity  REQUIRES OT FOLLOW-UP: Yes  Activity Tolerance  Activity Tolerance: Patient Tolerated treatment well  Safety Devices  Type of Devices: Left in chair;Gait belt;Call light within reach;Nurse notified;Patient at 
Patient allowed lab to draw her am labs.   
Patient refused AM labs. Vascular surgery paged . Notified of refusal. Seeking suggestion from Vascular regarding Heparin that's infusing and need for am labs to run infusion per protocol.   
Patient refused Shift assessment.     Patient refused to wear telemetry. Notified primary service resident.   
Patient requested additional pain meds. Patient states 10/10 pain at right great toe amputation site. . Patient refused Gabapentin said it makes her very shaky. Writer updated internal medicine resident. Orders received to give one time dose oxycodone. Writer gave patient her dose of oxycodone 5 mg. Patient immediately got up and requested to be unplugged from telemetry to go outside to smoke. Patient did sign paper to leave the hospital unit.   
Patient transported to room 504.  Bedside report given to Meghan SÁNCHEZ  
Physical Therapy        Physical Therapy Cancel Note      DATE: 2025    NAME: Mikael Estevez  MRN: [id number]   : 1970      Patient not seen this date for Physical Therapy due to:    Surgery/Procedure: Pt in OR for AKA, recheck .      Electronically signed by IMAN Bond on 2025 at 11:32 AM    This treatment/evaluation completed by signing SPT. Signing PT agrees with treatment and documentation.    
Physical Therapy        Physical Therapy Cancel Note      DATE: 2025    NAME: Mikael Estevez  MRN: [id number]   : 1970      Patient not seen this date for Physical Therapy due to:    Other: room empty, off of floor per RN. Ck pm as able or .      Electronically signed by Joey Reece PT on 2025 at 1:45 PM      
Physical Therapy        Physical Therapy Cancel Note      DATE: 2025    NAME: Mikael Estevez  MRN: [id number]   : 1970      Patient not seen this date for Physical Therapy due to:    Other: Pt off of floor, angiogram in pm. Ck       Electronically signed by Joey Reece PT on 2025 at 11:37 AM      
Physical Therapy  Facility/Department: [address] [facility]PD[facility]   Physical Therapy Daily Treatment Note    Patient Name: Mikael Estevez        MRN: [id number]    : 1970    Date of Service: 2025    Chief Complaint   Patient presents with    Post-op Problem     Discharge from femoral surgical site from fem pop and right great toe amputation       Past Medical History:  has a past medical history of Back pain, Bipolar 1 disorder (HCC), Diabetes mellitus (HCC), Disc disorder, and Hypertension.  Past Surgical History:  has a past surgical history that includes Cholecystectomy; back surgery; Cardiac procedure (N/A, 2025); amputation (Right, 2025); Toe amputation (Right, 2025); femoral bypass (Right, 2025); amputation (Right, 2025); femoral bypass (Right, 2025); Toe amputation (Right, 2025); above knee amputation (Right, 2025); vascular surgery (Right, 2025); and Leg Surgery (Right, 2025).    Discharge Recommendations  Discharge Recommendations: Patient able to tolerate 3 hours of therapy per day, Patient would benefit from continued therapy after discharge  PT Equipment Recommendations  Equipment Needed: Yes  Mobility Devices: Walker  Walker: Rolling    Assessment  Body Structures, Functions, Activity Limitations Requiring Skilled Therapeutic Intervention: Decreased functional mobility ;Decreased safe awareness;Decreased strength;Decreased endurance;Decreased balance;Decreased tolerance to work activity;Increased pain;Decreased posture  Assessment: Pt completed sit to stand transfer with CGA and RW. Pt amb 30 ft X2' with CGA and RW. Pt is not at independent PLOF. Pt would benefit from continued acute care physical therapy to address these deficits.  Therapy Prognosis: Good  Decision Making: Medium Complexity  Activity Tolerance  Activity Tolerance: Patient tolerated treatment well  Activity Tolerance Comments: pt painful with RLE ex, but determined to 
Physical Therapy  Facility/Department: [address] STE[facility]   Physical Therapy Initial Evaluation    Patient Name: Mikael Estevez        MRN: [id number]    : 1970    Date of Service: 2025    Chief Complaint   Patient presents with    Post-op Problem     Discharge from femoral surgical site from fem pop and right great toe amputation       Past Medical History:  has a past medical history of Back pain, Bipolar 1 disorder (HCC), Diabetes mellitus (HCC), Disc disorder, and Hypertension.  Past Surgical History:  has a past surgical history that includes Cholecystectomy; back surgery; Cardiac procedure (N/A, 2025); amputation (Right, 2025); Toe amputation (Right, 2025); femoral bypass (Right, 2025); amputation (Right, 2025); femoral bypass (Right, 2025); Toe amputation (Right, 2025); above knee amputation (Right, 2025); vascular surgery (Right, 2025); and Leg Surgery (Right, 2025).    Discharge Recommendations   Further therapy recommended at discharge.The patient should be able to tolerate at least 3 hours of therapy per day over 5 days or 15 hours over 7 days.   This patient may benefit from a Physical Medicine and Rehab consult.   PT Equipment Recommendations  Equipment Needed: Yes  Mobility Devices: Walker  Walker: Rolling    Assessment  Body Structures, Functions, Activity Limitations Requiring Skilled Therapeutic Intervention: Decreased functional mobility , Decreased ADL status, Decreased safe awareness, Decreased strength, Decreased ROM, Decreased endurance, Decreased balance  Assessment: Pt completed bed mobility with SBA. Pt completed sit to stand transfer with CGA and RW. Pt amb. 25' with CGA and RW. Pt is not at independent PLOF. Pt would benefit from continued acute care physical therapy to address these deficits.  Therapy Prognosis: Good  Decision Making: Medium Complexity  Requires PT Follow-Up: Yes  Activity Tolerance  Activity Tolerance: 
Physical Therapy  Facility/Department: [address] STEPDOWN   Physical Therapy Daily Treatment Note    Patient Name: Mikael Estevez        MRN: [id number]    : 1970    Date of Service: 2025    Chief Complaint   Patient presents with    Post-op Problem     Discharge from femoral surgical site from fem pop and right great toe amputation       Past Medical History:  has a past medical history of Back pain, Bipolar 1 disorder (HCC), Diabetes mellitus (HCC), Disc disorder, and Hypertension.  Past Surgical History:  has a past surgical history that includes Cholecystectomy; back surgery; Cardiac procedure (N/A, 2025); amputation (Right, 2025); Toe amputation (Right, 2025); femoral bypass (Right, 2025); amputation (Right, 2025); femoral bypass (Right, 2025); Toe amputation (Right, 2025); above knee amputation (Right, 2025); vascular surgery (Right, 2025); and Leg Surgery (Right, 2025).    Discharge Recommendations  Further therapy recommended at discharge.The patient should be able to tolerate at least 3 hours of therapy per day over 5 days or 15 hours over 7 days.   This patient may benefit from a Physical Medicine and Rehab consult.   PT Equipment Recommendations  Equipment Needed: Yes  Mobility Devices: Walker, Wheelchair  Walker: Rolling  Wheelchair: Standard    Assessment  Body Structures, Functions, Activity Limitations Requiring Skilled Therapeutic Intervention: Decreased functional mobility ;Decreased safe awareness;Decreased strength;Decreased endurance;Decreased balance;Decreased tolerance to work activity;Increased pain;Decreased posture  Assessment: Pt completed sit to stand transfer with CGA with use of RW. Pt amb 35 ' x2 with RW and CGA-modA. Pt is not at independent PLOF. Pt most limited by balance deficits. Pt would benefit from continued physical therapy to progress functional mobility.  Therapy Prognosis: Good  Decision Making: Medium 
Pt escorted off of the floor accompanied by EMS via wheelchair; all belongings with the pt; pt is A&Ox4; denies needs; all questions answered; attempted to call report to the phone number [phone]; no answer.  Will attempt again in 5 minutes.  
Pt off of the unit; informed staff that her pain is an 8/10 and requested pain medications after she returned from smoking.  Pt A&Ox4; escorted herself off of the unit via wheelchair.  
Pt took off sock that was covering dressing and the dressing came off with it. No drainage was noted. Doctor notified. Doctor rewrapped at bedside.   
Received post Right lower extremity arteriogram and thrombectomy procedure to PCC room 7. Assessment obtained. Restrictions reviewed with patient. Post procedure pathway initiated.  Left groin site soft , dressing dry and intact.  No hematoma noted.    
Spiritual Health History and Assessment/Progress Note  [facility]    (P) Initial Encounter,  ,  ,      Name: Mikael Estevez MRN: [id number]    Age: 55 y.o.     Sex: female   Language: English   Latter day: Hinduism   Post-op pain     Date: 6/20/2025            Total Time Calculated: (P) 5 min              Spiritual Assessment began in [facility]        Referral/Consult From: (P) Rounding   Encounter Overview/Reason: (P) Initial Encounter  Service Provided For: (P) Patient    Pt in bed, \"reading scripture, Psalms.\" Writer offered space for thoughts, feelings, ideas. Pt. Expressed hope that God will help find her an apartment after rehab as she is presently homeless. Pt. Mentioned family has visited, sometimes more than pt prefers. Pt is grateful to be alive and accepted writer praying before leaving.    Dorys, Belief, Meaning:   Patient identifies as spiritual and has beliefs or practices that help with coping during difficult times  Family/Friends No family/friends present      Importance and Influence:  Patient unable to assess at this time  Family/Friends No family/friends present    Community:  Patient feels well-supported. Support system includes: Children and Other: children are in assisted homes and cannot take her in  Family/Friends No family/friends present    Assessment and Plan of Care:     Patient Interventions include: Facilitated expression of thoughts and feelings, Explored spiritual coping/struggle/distress, and Affirmed coping skills/support systems  Family/Friends Interventions include: No family/friends present    Patient Plan of Care: Spiritual Care available upon further referral  Family/Friends Plan of Care: No family/friends present    Electronically signed by Trell Paz,  Intern on 6/20/2025 at 4:44 PM    
Writer made contact with staff at [facility]; when staff member answered the phone writer asked for the staff members name for documentation and the staff member stated \"nurse.\"  Writer again asked for her name and she stated again \"nurse.\"    Writer provided report; all questions answered.  
control as this is what she typically takes at home.  Currently refusing her home Robaxin and gabapentin.  Noting that Dilaudid prevents her from sleeping and this has been discontinued.     Poorly controlled T2DM   Medication noncompliance  Diabetic neuropathy  Glucose elevated as after surgery.  Lantus 15 units ordered but not given.  Patient treated with sliding scale.  POCT glucose  Diabetic diet    Constipation  Likely from chronic opioid use  Will start pt on Glycolax and Senakot  Monitor for bowel movements     HTN  On Coreg 12.5 mg, Lisinopril 2.5 mg and Imdur at home  Resumed all home medications  Monitor BP  Adjust accordingly     H/o Osteomyelitis of R great toe requiring amputation in May 2025  Pt did not complete course of Augmentin-will continue here  Recommend to continue for another 10 days.  Podiatry has been consulted  No recommendations believe to be vascular issue  Blood cx has been negative thus far  Wound cx not obtained   Monitor WBC daily     CAD s/p cardiac cath on 4/23 showing mild disease in LAD and RCA w severe OM3 stenosis  Nonischemic cardiomyopathy  HLD   HFrEF EF 30-35%   Continue on Aldactone  Pt noncompliant w lifevest  Continue on Statin 40 mg  ASA and Eliquis held for procedure  Pt has no CP, angina or palpitations to warrant ischemic work up     GERD   Continue On Prilosec     Bipolar disorder   Continue on Seroquel     DVT ppx:Heparin  GI ppx: Protonix     PT/OT/SW:Consulted  Discharge Planning: Patient accepted to [facility].  Precert pending      Karen Johnson MD  Internal Medicine Resident, PGY-2  [facility]; [place], [place]  6/20/2025, 12:01 PM

## 2025-06-24 NOTE — CARE COORDINATION
Case Management   Daily Progress Note       Patient Name: Mikael Estevez                   YOB: 1970  Diagnosis: Post-op pain [G89.18]                       GMLOS: 3 days  Length of Stay: 10  days    Anticipated Discharge Date: Ready for discharge    Readmission Risk (Low < 19, Mod (19-27), High > 27): Readmission Risk Score: 20.2        Current Transitional Plan    [] Home Independently    [] Home with HC    [x] Skilled Nursing Facility    [] Acute Rehabilitation    [] Long Term Acute Care (LTAC)    [] Other:     Plan for the Stay (Medical Management) :          Workflow Continuation (Additional Notes) :  Call from Sabina at [facility]-P/U 11AM. Sent message to Giss[facility] Garza-informed them of discharge time. [phone] faxed SERA BOND, and Approval to [facility]iss[facility] Garza.      Lora Bell RN  June 24, 2025

## 2025-06-24 NOTE — PLAN OF CARE
Problem: Chronic Conditions and Co-morbidities  Goal: Patient's chronic conditions and co-morbidity symptoms are monitored and maintained or improved  6/24/2025 0854 by Shy Davis RN  Outcome: Adequate for Discharge  6/24/2025 0211 by Amita Lowery RN  Outcome: Progressing     Problem: Discharge Planning  Goal: Discharge to home or other facility with appropriate resources  6/24/2025 0854 by Shy Davis RN  Outcome: Adequate for Discharge  6/24/2025 0211 by Amita Lowery RN  Outcome: Progressing     Problem: Pain  Goal: Verbalizes/displays adequate comfort level or baseline comfort level  6/24/2025 0854 by Shy Davis RN  Outcome: Adequate for Discharge  6/24/2025 0211 by Amita Lowery RN  Outcome: Progressing     Problem: Safety - Adult  Goal: Free from fall injury  6/24/2025 0854 by Shy Davis RN  Outcome: Adequate for Discharge  6/24/2025 0211 by Amita Lowery RN  Outcome: Progressing     Problem: ABCDS Injury Assessment  Goal: Absence of physical injury  6/24/2025 0854 by Shy Davis RN  Outcome: Adequate for Discharge  6/24/2025 0211 by Amita Lowery RN  Outcome: Progressing     Problem: Nutrition Deficit:  Goal: Optimize nutritional status  6/24/2025 0854 by Shy Davis RN  Outcome: Adequate for Discharge  6/24/2025 0211 by Amita Lowery RN  Outcome: Progressing

## 2025-06-24 NOTE — PLAN OF CARE
Problem: Chronic Conditions and Co-morbidities  Goal: Patient's chronic conditions and co-morbidity symptoms are monitored and maintained or improved  6/24/2025 0211 by Amita Lowery RN  Outcome: Progressing  6/23/2025 1213 by Shy Davis RN  Outcome: Progressing     Problem: Discharge Planning  Goal: Discharge to home or other facility with appropriate resources  6/24/2025 0211 by Amita Lowery RN  Outcome: Progressing  6/23/2025 1213 by Shy Davis RN  Outcome: Progressing     Problem: Pain  Goal: Verbalizes/displays adequate comfort level or baseline comfort level  6/24/2025 0211 by Amita Lowery RN  Outcome: Progressing  6/23/2025 1213 by Shy Davis RN  Outcome: Progressing     Problem: Safety - Adult  Goal: Free from fall injury  6/24/2025 0211 by Amita Lowery RN  Outcome: Progressing  6/23/2025 1213 by Shy Davis RN  Outcome: Progressing     Problem: ABCDS Injury Assessment  Goal: Absence of physical injury  6/24/2025 0211 by Amita Lowery RN  Outcome: Progressing  6/23/2025 1213 by Shy Davis RN  Outcome: Progressing     Problem: Nutrition Deficit:  Goal: Optimize nutritional status  6/24/2025 0211 by Amita Lowery RN  Outcome: Progressing  6/23/2025 1213 by Shy Davis RN  Outcome: Progressing

## 2025-06-25 NOTE — DISCHARGE SUMMARY
[facility]     Department of Internal Medicine - Staff Internal Medicine Teaching Service    INPATIENT DISCHARGE SUMMARY      Patient Identification:  Mikael Estevez is a 55 y.o. female.  :  1970  MRN: [id number]     Acct: [id number]   PCP: Ciro Martinez Jr., MD  Admit Date:  2025  Discharge date and time: 2025 11:51 AM   Attending Provider: No att. providers found                                     ACTIVE DISCHARGE DIAGNOSES     Hospital Problem Lists:  Principal Problem:    Post-op pain  Active Problems:    Bipolar 1 disorder (HCC)    Hypertension    Type 2 diabetes mellitus (HCC)    Noncompliance with medications    HFrEF (heart failure with reduced ejection fraction) ([facility])    Peripheral vascular disease    H/O osteomyelitis    Amputation of right great toe    History of femoropopliteal bypass    CAD (coronary artery disease)    Other hyperlipidemia    GERD (gastroesophageal reflux disease)    Arterial occlusion    Right above-knee amputee ([facility])  Resolved Problems:    * No resolved hospital problems. *      HOSPITAL STAY     Brief Inpatient course:   Mikael Estevez is a 55 y.o. female who was admitted for the management of Post-op pain, presented to the emergency department with rRLE pain. Pt was evaluated by vascular surgery.  Patient underwent CT which did show occluded bypass graft.  Patient had undergo right above-knee amputation.  Patient remained in the hospital as she was pending placement.  Initially excepted at acute rehab but not approved by insurance and had to be sent to a SNF facility.  During patient's stay she was medicated with Roxicodone and Tylenol.  She noted improvement in her symptoms. She was refusing any bowel regime while in the hospital. She did not have a bowel movement for 4 days and did not want to be on any medications that would help her.       Procedures/ Significant Interventions:    Arteriogram  [facility]    Consults:

## 2025-07-01 ENCOUNTER — OFFICE VISIT (OUTPATIENT)
Dept: VASCULAR SURGERY | Age: 55
End: 2025-07-01

## 2025-07-01 VITALS
RESPIRATION RATE: 18 BRPM | BODY MASS INDEX: 22.16 KG/M2 | HEIGHT: 65 IN | OXYGEN SATURATION: 98 % | DIASTOLIC BLOOD PRESSURE: 66 MMHG | SYSTOLIC BLOOD PRESSURE: 149 MMHG | HEART RATE: 77 BPM | WEIGHT: 133 LBS

## 2025-07-01 DIAGNOSIS — Z89.611 S/P AKA (ABOVE KNEE AMPUTATION) UNILATERAL, RIGHT (HCC): Primary | ICD-10-CM

## 2025-07-01 PROCEDURE — 99024 POSTOP FOLLOW-UP VISIT: CPT | Performed by: STUDENT IN AN ORGANIZED HEALTH CARE EDUCATION/TRAINING PROGRAM

## 2025-07-04 ENCOUNTER — APPOINTMENT (OUTPATIENT)
Dept: GENERAL RADIOLOGY | Age: 55
DRG: 475 | End: 2025-07-04
Payer: MEDICARE

## 2025-07-04 ENCOUNTER — APPOINTMENT (OUTPATIENT)
Dept: CT IMAGING | Age: 55
DRG: 475 | End: 2025-07-04
Payer: MEDICARE

## 2025-07-04 ENCOUNTER — HOSPITAL ENCOUNTER (EMERGENCY)
Age: 55
Discharge: LEFT BEFORE DISCHARGE | DRG: 475 | End: 2025-07-04
Attending: EMERGENCY MEDICINE
Payer: MEDICARE

## 2025-07-04 VITALS
HEART RATE: 70 BPM | SYSTOLIC BLOOD PRESSURE: 116 MMHG | OXYGEN SATURATION: 99 % | DIASTOLIC BLOOD PRESSURE: 105 MMHG | RESPIRATION RATE: 18 BRPM | TEMPERATURE: 98.5 F
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
## 2025-07-04 DIAGNOSIS — T81.31XA DEHISCENCE OF OPERATIVE WOUND, INITIAL ENCOUNTER: ICD-10-CM

## 2025-07-04 LAB
ANION GAP SERPL CALCULATED.3IONS-SCNC: 9 MMOL/L (ref 9–16)
BASOPHILS # BLD: 0.07 K/UL (ref 0–0.2)
BASOPHILS NFR BLD: 1 % (ref 0–2)
BUN SERPL-MCNC: 16 MG/DL (ref 6–20)
CALCIUM SERPL-MCNC: 8.8 MG/DL (ref 8.6–10.4)
CHLORIDE SERPL-SCNC: 108 MMOL/L (ref 98–107)
CO2 SERPL-SCNC: 23 MMOL/L (ref 20–31)
CREAT SERPL-MCNC: 0.7 MG/DL (ref 0.6–0.9)
EOSINOPHIL # BLD: 1.08 K/UL (ref 0–0.44)
EOSINOPHILS RELATIVE PERCENT: 11 % (ref 1–4)
ERYTHROCYTE [DISTWIDTH] IN BLOOD BY AUTOMATED COUNT: 16.2 % (ref 11.8–14.4)
GFR, ESTIMATED: >90 ML/MIN/1.73M2
GLUCOSE SERPL-MCNC: 119 MG/DL (ref 74–99)
HCT VFR BLD AUTO: 28.2 % (ref 36.3–47.1)
HGB BLD-MCNC: 9 G/DL (ref 11.9–15.1)
IMM GRANULOCYTES # BLD AUTO: <0.03 K/UL (ref 0–0.3)
IMM GRANULOCYTES NFR BLD: 0 %
LYMPHOCYTES NFR BLD: 3.25 K/UL (ref 1.1–3.7)
LYMPHOCYTES RELATIVE PERCENT: 33 % (ref 24–43)
MCH RBC QN AUTO: 27.9 PG (ref 25.2–33.5)
MCHC RBC AUTO-ENTMCNC: 31.9 G/DL (ref 28.4–34.8)
MCV RBC AUTO: 87.3 FL (ref 82.6–102.9)
MONOCYTES NFR BLD: 0.5 K/UL (ref 0.1–1.2)
MONOCYTES NFR BLD: 5 % (ref 3–12)
NEUTROPHILS NFR BLD: 50 % (ref 36–65)
NEUTS SEG NFR BLD: 4.82 K/UL (ref 1.5–8.1)
NRBC BLD-RTO: 0 PER 100 WBC
PLATELET # BLD AUTO: 431 K/UL (ref 138–453)
PMV BLD AUTO: 8.8 FL (ref 8.1–13.5)
POTASSIUM SERPL-SCNC: 3.7 MMOL/L (ref 3.7–5.3)
RBC # BLD AUTO: 3.23 M/UL (ref 3.95–5.11)
RBC # BLD: ABNORMAL 10*6/UL
SODIUM SERPL-SCNC: 140 MMOL/L (ref 136–145)
WBC OTHER # BLD: 9.7 K/UL (ref 3.5–11.3)

## 2025-07-04 PROCEDURE — 85025 COMPLETE CBC W/AUTO DIFF WBC: CPT

## 2025-07-04 PROCEDURE — 73552 X-RAY EXAM OF FEMUR 2/>: CPT

## 2025-07-04 PROCEDURE — 2500000003 HC RX 250 WO HCPCS

## 2025-07-04 PROCEDURE — 6370000000 HC RX 637 (ALT 250 FOR IP)

## 2025-07-04 PROCEDURE — 6360000002 HC RX W HCPCS

## 2025-07-04 PROCEDURE — 80048 BASIC METABOLIC PNL TOTAL CA: CPT

## 2025-07-04 RX ORDER — DOXYCYCLINE HYCLATE 100 MG
100 TABLET ORAL 2 TIMES DAILY
Qty: 14 TABLET | Refills: 0 | Status: ON HOLD | OUTPATIENT
Start: 2025-07-04 | End: 2025-07-11

## 2025-07-04 RX ORDER — OXYCODONE HYDROCHLORIDE 5 MG/1
10 TABLET ORAL ONCE
Refills: 0 | Status: COMPLETED | OUTPATIENT
Start: 2025-07-04 | End: 2025-07-04

## 2025-07-04 RX ORDER — DOXYCYCLINE HYCLATE 100 MG
100 TABLET ORAL ONCE
Status: DISCONTINUED | OUTPATIENT
Start: 2025-07-04 | End: 2025-07-04

## 2025-07-04 RX ORDER — MORPHINE SULFATE 4 MG/ML
4 INJECTION INTRAVENOUS ONCE
Status: COMPLETED | OUTPATIENT
Start: 2025-07-04 | End: 2025-07-04

## 2025-07-04 RX ADMIN — OXYCODONE 10 MG: 5 TABLET ORAL at 18:05

## 2025-07-04 RX ADMIN — MORPHINE SULFATE 4 MG: 4 INJECTION INTRAVENOUS at 17:05

## 2025-07-04 RX ADMIN — WATER 1000 MG: 1 INJECTION INTRAMUSCULAR; INTRAVENOUS; SUBCUTANEOUS at 17:14

## 2025-07-04 ASSESSMENT — ENCOUNTER SYMPTOMS
ABDOMINAL PAIN: 0
RESPIRATORY NEGATIVE: 1
EYES NEGATIVE: 1
COUGH: 0
GASTROINTESTINAL NEGATIVE: 1
SHORTNESS OF BREATH: 0
ALLERGIC/IMMUNOLOGIC NEGATIVE: 1

## 2025-07-04 ASSESSMENT — PAIN SCALES - GENERAL: PAINLEVEL_OUTOF10: 10

## 2025-07-04 ASSESSMENT — LIFESTYLE VARIABLES
HOW OFTEN DO YOU HAVE A DRINK CONTAINING ALCOHOL: NEVER
HOW MANY STANDARD DRINKS CONTAINING ALCOHOL DO YOU HAVE ON A TYPICAL DAY: PATIENT DOES NOT DRINK

## 2025-07-04 NOTE — ED NOTES
Pt to room 17 via EMS with c/o fall. Pt reports that she recently had a right leg amputation and today when she was with her family, she fell and landed on her healing wound site. Per EMS, \"blood, bone and muscle\" were exposed. Pt had dressing applied by EMS upon arrival. Pt received 150mcg fentanyl PTA. Pt alert and oriented x4, talking in complete sentences, respirations even and unlabored. Pt acting age appropriate. Will continue to plan of care.

## 2025-07-04 NOTE — ED NOTES
Report taken from Martha   Pt is A&Ox4, even unlabored RR NAD  Pt resting comfortably on cot with call light within reach   Pt denies needs at this time

## 2025-07-04 NOTE — ED PROVIDER NOTES
[facility]     Emergency Department     Faculty Note/ Attestation      Pt Name: Mikael Estevez                                       MRN: [id number]  Birthdate 1970  Date of evaluation: 7/4/2025  Note Started: 5:01 PM EDT    Patients PCP:    Ciro Martinez Jr., MD    Attestation  I performed a history and physical examination of the patient and discussed management with the resident. I reviewed the resident’s note and agree with the documented findings and plan of care. Any areas of disagreement are noted on the chart. I was personally present for the key portions of any procedures. I have documented in the chart those procedures where I was not present during the key portions. I have reviewed the emergency nurses triage note. I agree with the chief complaint, past medical history, past surgical history, allergies, medications, social and family history as documented unless otherwise noted below.    For Physician Assistant/ Nurse Practitioner cases/documentation I have personally evaluated this patient and have completed at least one if not all key elements of the E/M (history, physical exam, and MDM). Additional findings are as noted.    Initial Screens:        [place] Coma Scale  Eye Opening: Spontaneous  Best Verbal Response: Oriented  Best Motor Response: Obeys commands  [place] Coma Scale Score: 15    Vitals:    Vitals:    07/04/25 1652 07/04/25 1653   BP:  (!) 156/60   Pulse: 70    Resp: 18    Temp: 98.5 °F (36.9 °C)    TempSrc: Oral    SpO2: 97% 97%       CHIEF COMPLAINT       Chief Complaint   Patient presents with   • Fall     The pt is a 55 SP AKA on 6/17 she was walking with a walker and forgot she had the amputation the pt has split open her wound and is actively having pain and bleeding.  The pt has no abd pain.        EMERGENCY DEPARTMENT COURSE:     -------------------------  BP: (!) 156/60, Temp: 98.5 °F (36.9 °C), Pulse: 70, Respirations: 18  Physical

## 2025-07-04 NOTE — CONSULTS
Division of Vascular Surgery        New Consult      Physician Requesting Consult: Dr. Jackson    Reason for Consult:   Dehiscence of right AKA wound    Chief Complaint:     Dehiscence of right AKA wound    History of Present Illness:      Mikael Estevez is a 55 y.o. female who fell while hopping today.  After the fall, patient noticed that the suture at the end of the AKA has broken down and patient had some bleeding from the wound.  Pressure was applied at local site.  Patient presents to ED and x-ray was done which did not show any fracture.  Patient endorses to be on Plavix currently.    No fever chills, weakness, loss of sensation seen over the AKA stump.    Patient's pain is controlled with IV medication    Medical History:     Past Medical History:   Diagnosis Date    Back pain     Bipolar 1 disorder (HCC)     Diabetes mellitus (HCC)     Disc disorder     Hypertension        Surgical History:     Past Surgical History:   Procedure Laterality Date    ABOVE KNEE AMPUTATION Right 06/17/2025    AMPUTATION Right 04/25/2025    AMPUTATION OF DISTAL PHALANX OF HALLUX,RESECTION OF PROXIMAL PHALANX - Right    AMPUTATION Right 05/05/2025    RIGHT HALLUX AMPUTATION WITH INTEGRA GRAFT, INTEGRA    BACK SURGERY      CARDIAC PROCEDURE N/A 04/22/2025    aubrie / Left heart cath / coronary angiography / rm 236 performed by Deisy Henry MD at [facility] CARDIAC CATH LAB    CHOLECYSTECTOMY      FEMORAL BYPASS Right 04/28/2025    RIGHT  FEMORAL ENDATRERECTOMY XENOSURE PATCH 0.8X8, ANGIOGRAM performed by Robert Jay MD at [facility] CVOR    FEMORAL BYPASS Right 05/02/2025    FEMORAL POPLITEAL BYPASS WITH CRYO VEIN performed by Robert Jay MD at [facility] CVOR    LEG SURGERY Right 6/17/2025    ABOVE KNEE AMPUTATION performed by Robert Jay MD at [facility] OR    TOE AMPUTATION Right 04/25/2025    AMPUTATION OF DISTAL PHALANX OF HALLUX,RESECTION OF PROXIMAL PHALANX performed by Izzy Jeffries DPM at [facility]       Mouth/Throat:      Mouth: Mucous membranes are moist.   Eyes:      Extraocular Movements: Extraocular movements intact.      Conjunctiva/sclera: Conjunctivae normal.      Pupils: Pupils are equal, round, and reactive to light.   Cardiovascular:      Rate and Rhythm: Normal rate and regular rhythm.      Pulses: Normal pulses.      Heart sounds: Normal heart sounds.   Pulmonary:      Effort: Pulmonary effort is normal.      Breath sounds: Normal breath sounds.   Abdominal:      General: Abdomen is flat.      Palpations: Abdomen is soft.   Musculoskeletal:         General: Signs of injury (Right AKA skin incision shows dehiscence with skin and subcutaneous tissue spread apart.  There is healthy subcutaneous fat present within the wound.) present. Normal range of motion.      Cervical back: Normal range of motion.      Right lower leg: No edema.      Left lower leg: No edema.   Skin:     General: Skin is warm.      Capillary Refill: Capillary refill takes less than 2 seconds.      Coloration: Skin is not jaundiced or pale.      Findings: No bruising or erythema.   Neurological:      General: No focal deficit present.      Mental Status: She is alert and oriented to person, place, and time.   Psychiatric:         Mood and Affect: Mood normal.         Behavior: Behavior normal.         Imaging/Labs:     XR FEMUR RIGHT (MIN 2 VIEWS)  Result Date: 7/4/2025  EXAMINATION: Flu XRAY VIEWS OF THE RIGHT FEMUR 7/4/2025 5:08 pm COMPARISON: None. HISTORY: ORDERING SYSTEM PROVIDED HISTORY: fall s/p AKA, wound dehescience TECHNOLOGIST PROVIDED HISTORY: fall s/p AKA, wound dehescience Reason for Exam: AP, Lateral FINDINGS: There is above the knee amputation of the right lower extremity.  There is soft tissue defect involving the amputated stump with exposure of the bone. No acute fractures or dislocations are seen.     Soft tissue defect involving the amputated stump with exposure of the bone.        Assessment and Plan:

## 2025-07-04 NOTE — ED PROVIDER NOTES
[facility] EMERGENCY DEPARTMENT  Emergency Department Encounter  Emergency Medicine Resident     Pt Name:Mikael Estevez  MRN: [id number]  Birthdate 1970  Date of evaluation: 7/4/25  PCP:  Ciro Martinez Jr., MD  Note Started: 5:30 PM EDT      CHIEF COMPLAINT       Chief Complaint   Patient presents with    Fall       HISTORY OF PRESENT ILLNESS  (Location/Symptom, Timing/Onset, Context/Setting, Quality, Duration, Modifying Factors, Severity.)      Mikael Estevez is a 55 y.o. female, PMH of T2DM insulin dependent, HTN, HLD, PVD on eliquis  and aspirin, who presents with fall.  Patient recently had a right AKA on 6/17 due to complications from diabetes.  Patient reports that she was using her walker and was hopping while using her walker and usual forward.  She was unable to get up so she called 911.  She denies hitting her head and did not lose consciousness.  Fall was witnessed.  She has been having pain at the stump or from before the fall however pain has gotten worse after the fall.  Received 150 of fentanyl via EMS personnel prior to arrival.     PAST MEDICAL / SURGICAL / SOCIAL / FAMILY HISTORY      has a past medical history of Back pain, Bipolar 1 disorder (HCC), Diabetes mellitus (HCC), Disc disorder, and Hypertension.     has a past surgical history that includes Cholecystectomy; back surgery; Cardiac procedure (N/A, 04/22/2025); amputation (Right, 04/25/2025); Toe amputation (Right, 04/25/2025); femoral bypass (Right, 04/28/2025); amputation (Right, 05/05/2025); femoral bypass (Right, 05/02/2025); Toe amputation (Right, 05/05/2025); above knee amputation (Right, 06/17/2025); vascular surgery (Right, 6/16/2025); and Leg Surgery (Right, 6/17/2025).    Social History     Socioeconomic History    Marital status: Single     Spouse name: Not on file    Number of children: Not on file    Years of education: Not on file    Highest education level: Not on file   Occupational History    Not

## 2025-07-04 NOTE — DISCHARGE INSTRUCTIONS
You were seen in the ER today after a fall.  Your wound from your above-knee amputation had dehisced and opened up.  Vascular surgery were consulted and they offered you a washout in the OR and repair.  You have opted to leave AGAINST MEDICAL ADVICE.  We discussed the risk of you like leaving and medical advice including osteomyelitis which is an infection in your bones, bacteremia which is infection in your bloodstream as well as an extension of your above-knee amputation as well as death.  You are able to verbalize these risks however you have opted to leave AGAINST MEDICAL ADVICE.  Please present back to the ER if you change your mind.  Please present back to the ER if you develop fever, chills, sweats, nausea, vomiting, chest pain, shortness of breath, continued bleeding, discharge from your wound site.  This is not something that can be managed in the office and you will need surgical repair.  I have prescribed you oral antibiotics, please be aware that the oral antibiotics are likely not sufficient to prevent infections at your surgical site.

## 2025-07-05 ENCOUNTER — HOSPITAL ENCOUNTER (INPATIENT)
Age: 55
LOS: 5 days | Discharge: NURSING, REHAB OR LONG-TERM CARE | DRG: 475 | End: 2025-07-10
Attending: EMERGENCY MEDICINE | Admitting: INTERNAL MEDICINE
Payer: MEDICARE

## 2025-07-05 DIAGNOSIS — I50.20 HFREF (HEART FAILURE WITH REDUCED EJECTION FRACTION) (HCC): ICD-10-CM
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
## 2025-07-05 DIAGNOSIS — T81.31XS SURGICAL WOUND DEHISCENCE, SEQUELA: Primary | ICD-10-CM

## 2025-07-05 PROBLEM — T81.30XA WOUND DEHISCENCE: Status: ACTIVE | Noted: 2025-07-05

## 2025-07-05 LAB
ANION GAP SERPL CALCULATED.3IONS-SCNC: 10 MMOL/L (ref 9–16)
BASOPHILS # BLD: 0.06 K/UL (ref 0–0.2)
BASOPHILS NFR BLD: 1 % (ref 0–2)
BUN SERPL-MCNC: 17 MG/DL (ref 6–20)
CALCIUM SERPL-MCNC: 8.7 MG/DL (ref 8.6–10.4)
CHLORIDE SERPL-SCNC: 108 MMOL/L (ref 98–107)
CO2 SERPL-SCNC: 22 MMOL/L (ref 20–31)
CREAT SERPL-MCNC: 0.7 MG/DL (ref 0.6–0.9)
EOSINOPHIL # BLD: 1.04 K/UL (ref 0–0.44)
EOSINOPHILS RELATIVE PERCENT: 11 % (ref 1–4)
ERYTHROCYTE [DISTWIDTH] IN BLOOD BY AUTOMATED COUNT: 16.4 % (ref 11.8–14.4)
GFR, ESTIMATED: >90 ML/MIN/1.73M2
GLUCOSE BLD-MCNC: 157 MG/DL (ref 65–105)
GLUCOSE BLD-MCNC: 210 MG/DL (ref 65–105)
GLUCOSE SERPL-MCNC: 131 MG/DL (ref 74–99)
HCT VFR BLD AUTO: 30.6 % (ref 36.3–47.1)
HGB BLD-MCNC: 9.7 G/DL (ref 11.9–15.1)
IMM GRANULOCYTES # BLD AUTO: <0.03 K/UL (ref 0–0.3)
IMM GRANULOCYTES NFR BLD: 0 %
LYMPHOCYTES NFR BLD: 3.96 K/UL (ref 1.1–3.7)
LYMPHOCYTES RELATIVE PERCENT: 42 % (ref 24–43)
MCH RBC QN AUTO: 27.6 PG (ref 25.2–33.5)
MCHC RBC AUTO-ENTMCNC: 31.7 G/DL (ref 28.4–34.8)
MCV RBC AUTO: 87.2 FL (ref 82.6–102.9)
MONOCYTES NFR BLD: 0.49 K/UL (ref 0.1–1.2)
MONOCYTES NFR BLD: 5 % (ref 3–12)
NEUTROPHILS NFR BLD: 41 % (ref 36–65)
NEUTS SEG NFR BLD: 3.93 K/UL (ref 1.5–8.1)
NRBC BLD-RTO: 0 PER 100 WBC
PLATELET # BLD AUTO: 435 K/UL (ref 138–453)
PMV BLD AUTO: 8.7 FL (ref 8.1–13.5)
POTASSIUM SERPL-SCNC: 3.6 MMOL/L (ref 3.7–5.3)
RBC # BLD AUTO: 3.51 M/UL (ref 3.95–5.11)
RBC # BLD: ABNORMAL 10*6/UL
SODIUM SERPL-SCNC: 140 MMOL/L (ref 136–145)
TROPONIN I SERPL HS-MCNC: 18 NG/L (ref 0–14)
TROPONIN I SERPL HS-MCNC: 19 NG/L (ref 0–14)
WBC OTHER # BLD: 9.5 K/UL (ref 3.5–11.3)

## 2025-07-05 PROCEDURE — 6360000002 HC RX W HCPCS

## 2025-07-05 PROCEDURE — 99285 EMERGENCY DEPT VISIT HI MDM: CPT

## 2025-07-05 PROCEDURE — 84484 ASSAY OF TROPONIN QUANT: CPT

## 2025-07-05 PROCEDURE — 6360000002 HC RX W HCPCS: Performed by: HOSPITALIST

## 2025-07-05 PROCEDURE — 2580000003 HC RX 258: Performed by: HOSPITALIST

## 2025-07-05 PROCEDURE — 96376 TX/PRO/DX INJ SAME DRUG ADON: CPT

## 2025-07-05 PROCEDURE — 6370000000 HC RX 637 (ALT 250 FOR IP): Performed by: HOSPITALIST

## 2025-07-05 PROCEDURE — 93005 ELECTROCARDIOGRAM TRACING: CPT

## 2025-07-05 PROCEDURE — 96374 THER/PROPH/DIAG INJ IV PUSH: CPT

## 2025-07-05 PROCEDURE — 85025 COMPLETE CBC W/AUTO DIFF WBC: CPT

## 2025-07-05 PROCEDURE — 6370000000 HC RX 637 (ALT 250 FOR IP)

## 2025-07-05 PROCEDURE — 82947 ASSAY GLUCOSE BLOOD QUANT: CPT

## 2025-07-05 PROCEDURE — 80048 BASIC METABOLIC PNL TOTAL CA: CPT

## 2025-07-05 PROCEDURE — 1200000000 HC SEMI PRIVATE

## 2025-07-05 PROCEDURE — 99222 1ST HOSP IP/OBS MODERATE 55: CPT | Performed by: HOSPITALIST

## 2025-07-05 RX ORDER — SODIUM CHLORIDE 9 MG/ML
INJECTION, SOLUTION INTRAVENOUS PRN
Status: DISCONTINUED | OUTPATIENT
Start: 2025-07-05 | End: 2025-07-10 | Stop reason: HOSPADM

## 2025-07-05 RX ORDER — ONDANSETRON 4 MG/1
4 TABLET, ORALLY DISINTEGRATING ORAL EVERY 8 HOURS PRN
Status: DISCONTINUED | OUTPATIENT
Start: 2025-07-05 | End: 2025-07-10 | Stop reason: HOSPADM

## 2025-07-05 RX ORDER — ENOXAPARIN SODIUM 100 MG/ML
40 INJECTION SUBCUTANEOUS DAILY
Status: DISCONTINUED | OUTPATIENT
Start: 2025-07-06 | End: 2025-07-05

## 2025-07-05 RX ORDER — MORPHINE SULFATE 2 MG/ML
2 INJECTION, SOLUTION INTRAMUSCULAR; INTRAVENOUS EVERY 4 HOURS PRN
Refills: 0 | Status: DISCONTINUED | OUTPATIENT
Start: 2025-07-05 | End: 2025-07-05

## 2025-07-05 RX ORDER — GLUCAGON 1 MG/ML
1 KIT INJECTION PRN
Status: DISCONTINUED | OUTPATIENT
Start: 2025-07-05 | End: 2025-07-10 | Stop reason: HOSPADM

## 2025-07-05 RX ORDER — ONDANSETRON 2 MG/ML
4 INJECTION INTRAMUSCULAR; INTRAVENOUS EVERY 6 HOURS PRN
Status: DISCONTINUED | OUTPATIENT
Start: 2025-07-05 | End: 2025-07-10 | Stop reason: HOSPADM

## 2025-07-05 RX ORDER — GABAPENTIN 300 MG/1
300 CAPSULE ORAL EVERY 8 HOURS
Status: DISCONTINUED | OUTPATIENT
Start: 2025-07-05 | End: 2025-07-10 | Stop reason: HOSPADM

## 2025-07-05 RX ORDER — MORPHINE SULFATE 2 MG/ML
2 INJECTION, SOLUTION INTRAMUSCULAR; INTRAVENOUS
Status: DISCONTINUED | OUTPATIENT
Start: 2025-07-05 | End: 2025-07-10 | Stop reason: HOSPADM

## 2025-07-05 RX ORDER — BISACODYL 10 MG
10 SUPPOSITORY, RECTAL RECTAL DAILY PRN
Status: DISCONTINUED | OUTPATIENT
Start: 2025-07-05 | End: 2025-07-07

## 2025-07-05 RX ORDER — FENTANYL CITRATE 50 UG/ML
50 INJECTION, SOLUTION INTRAMUSCULAR; INTRAVENOUS ONCE
Status: COMPLETED | OUTPATIENT
Start: 2025-07-05 | End: 2025-07-05

## 2025-07-05 RX ORDER — LISINOPRIL 5 MG/1
2.5 TABLET ORAL DAILY
Status: DISCONTINUED | OUTPATIENT
Start: 2025-07-05 | End: 2025-07-10 | Stop reason: HOSPADM

## 2025-07-05 RX ORDER — PANTOPRAZOLE SODIUM 40 MG/1
40 TABLET, DELAYED RELEASE ORAL
Status: DISCONTINUED | OUTPATIENT
Start: 2025-07-06 | End: 2025-07-10 | Stop reason: HOSPADM

## 2025-07-05 RX ORDER — INSULIN GLARGINE 100 [IU]/ML
18 INJECTION, SOLUTION SUBCUTANEOUS NIGHTLY
Status: DISCONTINUED | OUTPATIENT
Start: 2025-07-05 | End: 2025-07-10 | Stop reason: HOSPADM

## 2025-07-05 RX ORDER — ASPIRIN 81 MG/1
81 TABLET ORAL DAILY
Status: DISCONTINUED | OUTPATIENT
Start: 2025-07-05 | End: 2025-07-10 | Stop reason: HOSPADM

## 2025-07-05 RX ORDER — SODIUM CHLORIDE 0.9 % (FLUSH) 0.9 %
5-40 SYRINGE (ML) INJECTION PRN
Status: DISCONTINUED | OUTPATIENT
Start: 2025-07-05 | End: 2025-07-10 | Stop reason: HOSPADM

## 2025-07-05 RX ORDER — POLYETHYLENE GLYCOL 3350 17 G/17G
17 POWDER, FOR SOLUTION ORAL DAILY PRN
Status: DISCONTINUED | OUTPATIENT
Start: 2025-07-05 | End: 2025-07-07

## 2025-07-05 RX ORDER — ACETAMINOPHEN 325 MG/1
650 TABLET ORAL EVERY 6 HOURS PRN
Status: DISCONTINUED | OUTPATIENT
Start: 2025-07-05 | End: 2025-07-07

## 2025-07-05 RX ORDER — CARVEDILOL 12.5 MG/1
12.5 TABLET ORAL 2 TIMES DAILY WITH MEALS
Status: DISCONTINUED | OUTPATIENT
Start: 2025-07-05 | End: 2025-07-10 | Stop reason: HOSPADM

## 2025-07-05 RX ORDER — POTASSIUM CHLORIDE 7.45 MG/ML
10 INJECTION INTRAVENOUS PRN
Status: DISCONTINUED | OUTPATIENT
Start: 2025-07-05 | End: 2025-07-10 | Stop reason: HOSPADM

## 2025-07-05 RX ORDER — SODIUM CHLORIDE 9 MG/ML
INJECTION, SOLUTION INTRAVENOUS CONTINUOUS
Status: DISCONTINUED | OUTPATIENT
Start: 2025-07-05 | End: 2025-07-06

## 2025-07-05 RX ORDER — SPIRONOLACTONE 25 MG/1
12.5 TABLET ORAL DAILY
Status: DISCONTINUED | OUTPATIENT
Start: 2025-07-05 | End: 2025-07-10 | Stop reason: HOSPADM

## 2025-07-05 RX ORDER — SODIUM CHLORIDE 0.9 % (FLUSH) 0.9 %
5-40 SYRINGE (ML) INJECTION EVERY 12 HOURS SCHEDULED
Status: DISCONTINUED | OUTPATIENT
Start: 2025-07-05 | End: 2025-07-10 | Stop reason: HOSPADM

## 2025-07-05 RX ORDER — ACETAMINOPHEN 500 MG
1000 TABLET ORAL ONCE
Status: COMPLETED | OUTPATIENT
Start: 2025-07-05 | End: 2025-07-05

## 2025-07-05 RX ORDER — ATORVASTATIN CALCIUM 40 MG/1
40 TABLET, FILM COATED ORAL NIGHTLY
Status: DISCONTINUED | OUTPATIENT
Start: 2025-07-05 | End: 2025-07-10 | Stop reason: HOSPADM

## 2025-07-05 RX ORDER — ISOSORBIDE MONONITRATE 30 MG/1
30 TABLET, EXTENDED RELEASE ORAL DAILY
Status: DISCONTINUED | OUTPATIENT
Start: 2025-07-05 | End: 2025-07-10 | Stop reason: HOSPADM

## 2025-07-05 RX ORDER — POTASSIUM CHLORIDE 1500 MG/1
40 TABLET, EXTENDED RELEASE ORAL PRN
Status: DISCONTINUED | OUTPATIENT
Start: 2025-07-05 | End: 2025-07-10 | Stop reason: HOSPADM

## 2025-07-05 RX ORDER — INSULIN LISPRO 100 [IU]/ML
0-8 INJECTION, SOLUTION INTRAVENOUS; SUBCUTANEOUS
Status: DISCONTINUED | OUTPATIENT
Start: 2025-07-05 | End: 2025-07-10 | Stop reason: HOSPADM

## 2025-07-05 RX ORDER — ACETAMINOPHEN 650 MG/1
650 SUPPOSITORY RECTAL EVERY 6 HOURS PRN
Status: DISCONTINUED | OUTPATIENT
Start: 2025-07-05 | End: 2025-07-07

## 2025-07-05 RX ORDER — DEXTROSE MONOHYDRATE 100 MG/ML
INJECTION, SOLUTION INTRAVENOUS CONTINUOUS PRN
Status: DISCONTINUED | OUTPATIENT
Start: 2025-07-05 | End: 2025-07-10 | Stop reason: HOSPADM

## 2025-07-05 RX ADMIN — Medication 2000 MG: at 13:32

## 2025-07-05 RX ADMIN — SPIRONOLACTONE 12.5 MG: 25 TABLET, FILM COATED ORAL at 13:33

## 2025-07-05 RX ADMIN — QUETIAPINE FUMARATE 150 MG: 100 TABLET ORAL at 20:25

## 2025-07-05 RX ADMIN — ISOSORBIDE MONONITRATE 30 MG: 30 TABLET, EXTENDED RELEASE ORAL at 13:33

## 2025-07-05 RX ADMIN — FENTANYL CITRATE 50 MCG: 50 INJECTION INTRAMUSCULAR; INTRAVENOUS at 08:52

## 2025-07-05 RX ADMIN — MORPHINE SULFATE 2 MG: 2 INJECTION, SOLUTION INTRAMUSCULAR; INTRAVENOUS at 20:10

## 2025-07-05 RX ADMIN — MORPHINE SULFATE 2 MG: 2 INJECTION, SOLUTION INTRAMUSCULAR; INTRAVENOUS at 13:33

## 2025-07-05 RX ADMIN — FENTANYL CITRATE 50 MCG: 50 INJECTION INTRAMUSCULAR; INTRAVENOUS at 05:25

## 2025-07-05 RX ADMIN — GABAPENTIN 300 MG: 300 CAPSULE ORAL at 20:10

## 2025-07-05 RX ADMIN — Medication 2000 MG: at 20:25

## 2025-07-05 RX ADMIN — INSULIN LISPRO 2 UNITS: 100 INJECTION, SOLUTION INTRAVENOUS; SUBCUTANEOUS at 18:43

## 2025-07-05 RX ADMIN — FENTANYL CITRATE 50 MCG: 50 INJECTION INTRAMUSCULAR; INTRAVENOUS at 01:42

## 2025-07-05 RX ADMIN — GABAPENTIN 300 MG: 300 CAPSULE ORAL at 13:33

## 2025-07-05 RX ADMIN — LISINOPRIL 2.5 MG: 5 TABLET ORAL at 13:33

## 2025-07-05 RX ADMIN — CARVEDILOL 12.5 MG: 12.5 TABLET, FILM COATED ORAL at 13:33

## 2025-07-05 RX ADMIN — ACETAMINOPHEN 1000 MG: 500 TABLET ORAL at 01:42

## 2025-07-05 RX ADMIN — ATORVASTATIN CALCIUM 40 MG: 40 TABLET, FILM COATED ORAL at 20:10

## 2025-07-05 RX ADMIN — FENTANYL CITRATE 50 MCG: 50 INJECTION INTRAMUSCULAR; INTRAVENOUS at 03:39

## 2025-07-05 RX ADMIN — SODIUM CHLORIDE: 0.9 INJECTION, SOLUTION INTRAVENOUS at 11:38

## 2025-07-05 RX ADMIN — CARVEDILOL 12.5 MG: 12.5 TABLET, FILM COATED ORAL at 17:50

## 2025-07-05 RX ADMIN — MORPHINE SULFATE 2 MG: 2 INJECTION, SOLUTION INTRAMUSCULAR; INTRAVENOUS at 17:50

## 2025-07-05 RX ADMIN — MORPHINE SULFATE 2 MG: 2 INJECTION, SOLUTION INTRAMUSCULAR; INTRAVENOUS at 11:29

## 2025-07-05 RX ADMIN — ASPIRIN 81 MG: 81 TABLET, COATED ORAL at 13:33

## 2025-07-05 ASSESSMENT — PAIN DESCRIPTION - ORIENTATION
ORIENTATION: RIGHT

## 2025-07-05 ASSESSMENT — PAIN SCALES - GENERAL
PAINLEVEL_OUTOF10: 10
PAINLEVEL_OUTOF10: 7
PAINLEVEL_OUTOF10: 8
PAINLEVEL_OUTOF10: 10
PAINLEVEL_OUTOF10: 9
PAINLEVEL_OUTOF10: 7
PAINLEVEL_OUTOF10: 9
PAINLEVEL_OUTOF10: 8
PAINLEVEL_OUTOF10: 10
PAINLEVEL_OUTOF10: 10

## 2025-07-05 ASSESSMENT — PAIN DESCRIPTION - DESCRIPTORS
DESCRIPTORS: ACHING
DESCRIPTORS: DISCOMFORT
DESCRIPTORS: ACHING

## 2025-07-05 ASSESSMENT — PAIN DESCRIPTION - LOCATION
LOCATION: LEG
LOCATION: LEG
LOCATION: FOOT

## 2025-07-05 NOTE — ED PROVIDER NOTES
[facility]  Emergency Department  Emergency Medicine Resident Turn-Over     Note Started: 6:05 AM EDT    Care of Mikael Estevez was assumed from Dr. Webb and is being seen for Wound Check (Right leg amputee) and Wound Dehiscence (Bleeding Right leg amputee)  .  The patient's initial evaluation and plan have been discussed with the prior provider who initially evaluated the patient.     EMERGENCY DEPARTMENT COURSE / MEDICAL DECISION MAKING:       MEDICATIONS GIVEN:  Orders Placed This Encounter   Medications    fentaNYL (SUBLIMAZE) injection 50 mcg    acetaminophen (TYLENOL) tablet 1,000 mg    fentaNYL (SUBLIMAZE) injection 50 mcg    DISCONTD: HYDROmorphone (DILAUDID) injection 0.5 mg    fentaNYL (SUBLIMAZE) injection 50 mcg    fentaNYL (SUBLIMAZE) injection 50 mcg    0.9 % sodium chloride infusion    sodium chloride flush 0.9 % injection 5-40 mL    sodium chloride flush 0.9 % injection 5-40 mL    0.9 % sodium chloride infusion    OR Linked Order Group     potassium chloride (KLOR-CON M) extended release tablet 40 mEq     potassium bicarb-citric acid (EFFER-K) effervescent tablet 40 mEq     potassium chloride 10 mEq/100 mL IVPB (Peripheral Line)    DISCONTD: enoxaparin (LOVENOX) injection 40 mg     Indication of Use:   Prophylaxis-DVT/PE    OR Linked Order Group     ondansetron (ZOFRAN-ODT) disintegrating tablet 4 mg     ondansetron (ZOFRAN) injection 4 mg    polyethylene glycol (GLYCOLAX) packet 17 g    bisacodyl (DULCOLAX) suppository 10 mg    OR Linked Order Group     acetaminophen (TYLENOL) tablet 650 mg     acetaminophen (TYLENOL) suppository 650 mg    DISCONTD: morphine (PF) injection 2 mg     Refill:  0    apixaban (ELIQUIS) tablet 5 mg     Indication of Use:   Other     Other Apixaban Indication:   PAD    aspirin EC tablet 81 mg    atorvastatin (LIPITOR) tablet 40 mg    carvedilol (COREG) tablet 12.5 mg    gabapentin (NEURONTIN) capsule 300 mg    insulin glargine (LANTUS) injection vial  Surgical wound dehiscence, sequela    2. Fall from standing, initial encounter        DISPOSITION:         DISPOSITION:  []  Discharge   []  Transfer -    [x]  Admission -  Intermed   []  Against Medical Advice   []  Eloped   FOLLOW-UP: No follow-up provider specified.   DISCHARGE MEDICATIONS: Current Discharge Medication List              Prashanth Gomez MD  Emergency Medicine Resident  [facility]

## 2025-07-05 NOTE — CONSULTS
Division of Vascular Surgery        New Consult      Physician Requesting Consult:  Dr. Webb    Reason for Consult:   AKA dehiscence    Chief Complaint:     Right AKA pain    History of Present Illness:      Mikael Estevez is a 55 y.o. female who was in the ED earlier today.  She had fallen onto her right AKA stump and dehisced her wound.  She had some bleeding from the wound at this time.  Workup was negative for any sort of fracture at that time.  She is on Plavix at this time.  Patient ended up leaving AMA even after discussing with the patient that she needed to go to the OR to have her dehiscence fixed.  Patient returned hours later to the ED due to the pain.  No further falls at this time.    Medical History:     Past Medical History:   Diagnosis Date    Back pain     Bipolar 1 disorder (HCC)     Diabetes mellitus (HCC)     Disc disorder     Hypertension        Surgical History:     Past Surgical History:   Procedure Laterality Date    ABOVE KNEE AMPUTATION Right 06/17/2025    AMPUTATION Right 04/25/2025    AMPUTATION OF DISTAL PHALANX OF HALLUX,RESECTION OF PROXIMAL PHALANX - Right    AMPUTATION Right 05/05/2025    RIGHT HALLUX AMPUTATION WITH INTEGRA GRAFT, INTEGRA    BACK SURGERY      CARDIAC PROCEDURE N/A 04/22/2025    aubrie / Left heart cath / coronary angiography / rm [id number] performed by Deisy Henry MD at [facility] CARDIAC CATH LAB    CHOLECYSTECTOMY      FEMORAL BYPASS Right 04/28/2025    RIGHT  FEMORAL ENDATRERECTOMY XENOSURE PATCH 0.8X8, ANGIOGRAM performed by Robert Jay MD at [facility] CVOR    FEMORAL BYPASS Right 05/02/2025    FEMORAL POPLITEAL BYPASS WITH CRYO VEIN performed by Robert Jay MD at [facility] CVOR    LEG SURGERY Right 6/17/2025    ABOVE KNEE AMPUTATION performed by Robert Jay MD at [facility] OR    TOE AMPUTATION Right 04/25/2025    AMPUTATION OF DISTAL PHALANX OF HALLUX,RESECTION OF PROXIMAL PHALANX performed by Izzy Jeffries DPM at [facility] OR

## 2025-07-05 NOTE — ED NOTES
ED to inpatient nurses report      Chief Complaint:  Chief Complaint   Patient presents with    Wound Check     Right leg amputee    Wound Dehiscence     Bleeding Right leg amputee     Present to ED from: Home    MOA:     LOC: alert and orientated to name, place, date  Mobility: Requires assistance * 1 R knee amp  Oxygen Baseline: RA    Current needs required: NA   Pending ED orders: None  Present condition: Stable    Why did the patient come to the ED? Pt arrives via triage, pt c/o wound dehiscence.   Pt had recent knee amputation. Pt states she fell earlier today directly on it, pt denies LOC.   Pt was seen here a couple hours prior for this and left AMA pt states she was just in the hospital and did not want to have to stay again.   Pt came back due to continuing bleeding.   What is the plan? Surg for wound closure and clean out  Any procedures or intervention occur? AKA revision  Any safety concerns??Fall Risk    Mental Status:       Psych Assessment:   Psychosocial  Psychosocial (WDL): Within Defined Limits  Vital signs   Vitals:    07/05/25 0544 07/05/25 0545 07/05/25 0600 07/05/25 0601   BP:  (!) 150/57 (!) 158/61    Pulse:       Resp:       Temp:       SpO2: 98%  94% 97%        Vitals:  Patient Vitals for the past 24 hrs:   BP Temp Pulse Resp SpO2   07/05/25 0601 -- -- -- -- 97 %   07/05/25 0600 (!) 158/61 -- -- -- 94 %   07/05/25 0545 (!) 150/57 -- -- -- --   07/05/25 0544 -- -- -- -- 98 %   07/05/25 0345 (!) 151/99 -- -- -- 98 %   07/05/25 0148 -- -- -- -- 98 %   07/05/25 0032 -- -- -- -- 99 %   07/05/25 0008 (!) 147/66 99 °F (37.2 °C) 69 18 100 %      Visit Vitals  BP (!) 158/61   Pulse 69   Temp 99 °F (37.2 °C)   Resp 18   SpO2 97%        LDAs:   Peripheral IV 07/05/25 Right Forearm (Active)       Ambulatory Status:  Presents to emergency department  because of falls (Syncope, seizure, or loss of consciousness): No, Age > 70: No, Altered Mental Status, Intoxication with alcohol or substance confusion  SPIRONOLACTONE (ALDACTONE) 25 MG TABLET    Take 0.5 tablets by mouth daily     Orders Placed This Encounter   Medications    fentaNYL (SUBLIMAZE) injection 50 mcg    acetaminophen (TYLENOL) tablet 1,000 mg    fentaNYL (SUBLIMAZE) injection 50 mcg    DISCONTD: HYDROmorphone (DILAUDID) injection 0.5 mg    fentaNYL (SUBLIMAZE) injection 50 mcg       SURGICAL HISTORY       Past Surgical History:   Procedure Laterality Date    ABOVE KNEE AMPUTATION Right 06/17/2025    AMPUTATION Right 04/25/2025    AMPUTATION OF DISTAL PHALANX OF HALLUX,RESECTION OF PROXIMAL PHALANX - Right    AMPUTATION Right 05/05/2025    RIGHT HALLUX AMPUTATION WITH INTEGRA GRAFT, INTEGRA    BACK SURGERY      CARDIAC PROCEDURE N/A 04/22/2025    aubrie / Left heart cath / coronary angiography / rm 236 performed by Deisy Henry MD at [facility] CARDIAC CATH LAB    CHOLECYSTECTOMY      FEMORAL BYPASS Right 04/28/2025    RIGHT  FEMORAL ENDATRERECTOMY XENOSURE PATCH 0.8X8, ANGIOGRAM performed by Robert Jay MD at [facility] CVOR    FEMORAL BYPASS Right 05/02/2025    FEMORAL POPLITEAL BYPASS WITH CRYO VEIN performed by Robert Jay MD at [facility] CVOR    LEG SURGERY Right 6/17/2025    ABOVE KNEE AMPUTATION performed by Robert Jay MD at [facility] OR    TOE AMPUTATION Right 04/25/2025    AMPUTATION OF DISTAL PHALANX OF HALLUX,RESECTION OF PROXIMAL PHALANX performed by Izzy Jeffries DPM at [facility] OR    TOE AMPUTATION Right 05/05/2025    RIGHT HALLUX AMPUTATION WITH INTEGRA GRAFT, INTEGRA performed by Izzy Jeffries DPM at [facility] OR    VASCULAR SURGERY Right 6/16/2025    RIGHT LOWER EXTREMITY ANGIOGRAM, BALLOON ANGIOPLASTY POUNCE performed by Roebrt Jay MD at [facility]       PAST MEDICAL HISTORY       Past Medical History:   Diagnosis Date    Back pain     Bipolar 1 disorder (HCC)     Diabetes mellitus (HCC)     Disc disorder     Hypertension        Labs:  Labs Reviewed   CBC WITH AUTO DIFFERENTIAL - Abnormal; Notable for

## 2025-07-05 NOTE — ED PROVIDER NOTES
Note Started: 2:28 AM EDT         [facility]     Emergency Department     Faculty Attestation    I performed a history and physical examination of the patient and discussed management with the resident. I reviewed the resident’s note and agree with the documented findings and plan of care. Any areas of disagreement are noted on the chart. I was personally present for the key portions of any procedures. I have documented in the chart those procedures where I was not present during the key portions. I have reviewed the emergency nurses triage note. I agree with the chief complaint, past medical history, past surgical history, allergies, medications, social and family history as documented unless otherwise noted below.        For Physician Assistant/ Nurse Practitioner cases/documentation I have personally evaluated this patient and have completed at least one if not all key elements of the E/M (history, physical exam, and MDM). Additional findings are as noted.  I have personally seen and evaluated the patient.  I find the patient's history and physical exam are consistent with the NP/PA documentation.  I agree with the care provided, treatment rendered, disposition and follow-up plan.    55-year-old female with recent amputation of the right lower extremity presenting with dehiscence of the wound.  Having significant pain in the extremity, no pain elsewhere.    Exam:  General : Laying on the bed, awake, alert, and in no acute distress  CV : normal rate and regular rhythm  Lungs : Breathing comfortably on room air with no tachypnea, hypoxia, or increased work of breathing    DDx: Wound dehiscence    Plan:  Vascular surgery consulted  Pain control    Medical Decision Making  Amount and/or Complexity of Data Reviewed  Labs: ordered. Decision-making details documented in ED Course.  ECG/medicine tests: ordered.    Risk  OTC drugs.  Prescription drug management.          Marley Huggins MD

## 2025-07-05 NOTE — ED PROVIDER NOTES
insulin aspart (NOVOLOG FLEXPEN) 100 UNIT/ML injection pen Inject into the skin 3 times daily (before meals) Sliding scale according to blood sugar    Provider, MD Chuy   ibuprofen (ADVIL;MOTRIN) 600 MG tablet Take 1 tablet by mouth 4 times daily as needed for Pain 4/10/25   Unruly Concepcion MD   ondansetron (ZOFRAN-ODT) 4 MG disintegrating tablet Place 1 tablet under the tongue every 8 hours as needed for Nausea or Vomiting 8/19/24   Vickie Rangel, APRN - CNP   omeprazole (PRILOSEC) 20 MG delayed release capsule Take 2 capsules by mouth in the morning and at bedtime    Provider, MD Chuy   aspirin 81 MG EC tablet Take 1 tablet by mouth daily 7/19/23   Naldo Bang DO   lisinopril (PRINIVIL;ZESTRIL) 2.5 MG tablet Take 1 tablet by mouth daily 10/24/22   Kiko Pat MD       REVIEW OF SYSTEMS       See HPI    PHYSICAL EXAM      INITIAL VITALS:   BP (!) 160/71   Pulse 69   Temp 99 °F (37.2 °C)   Resp 18   SpO2 100%     Physical Exam  Constitutional:       General: She is not in acute distress.     Appearance: Normal appearance. She is not ill-appearing.   HENT:      Mouth/Throat:      Mouth: Mucous membranes are moist.   Eyes:      Conjunctiva/sclera: Conjunctivae normal.   Cardiovascular:      Rate and Rhythm: Normal rate and regular rhythm.      Heart sounds: Normal heart sounds.   Pulmonary:      Effort: Pulmonary effort is normal.      Breath sounds: Normal breath sounds.   Abdominal:      General: There is no distension.      Palpations: Abdomen is soft.      Tenderness: There is no abdominal tenderness. There is no guarding.   Musculoskeletal:      Cervical back: Normal range of motion.      Comments: Moves all extremities, right AKA, majority of the wound is dehisced, no active bleeding, no purulence   Skin:     General: Skin is warm and dry.   Neurological:      Mental Status: She is alert.           DDX/DIAGNOSTIC RESULTS / EMERGENCY DEPARTMENT COURSE / MDM     Medical Decision

## 2025-07-05 NOTE — CARE COORDINATION
Case Management Assessment  Initial Evaluation    Date/Time of Evaluation: 7/5/2025 3:29 PM  Assessment Completed by: Opal Hoover RN    If patient is discharged prior to next notation, then this note serves as note for discharge by case management.    Patient Name: Mikael Estevez                   YOB: 1970  Diagnosis: Wound dehiscence [T81.30XA]  Surgical wound dehiscence, sequela [T81.31XS]  Fall from standing, initial encounter [W19.XXXA]                   Date / Time: 7/5/2025 12:15 AM    Patient Admission Status: Inpatient   Readmission Risk (Low < 19, Mod (19-27), High > 27): Readmission Risk Score: 25    Current PCP: Ciro Martinez Jr., MD  PCP verified by CM? (P) Yes    Chart Reviewed: Yes      History Provided by: (P) Patient  Patient Orientation: (P) Alert and Oriented    Patient Cognition: (P) Alert    Hospitalization in the last 30 days (Readmission):  No    If yes, Readmission Assessment in CM Navigator will be completed.    Advance Directives:      Code Status: Full Code   Patient's Primary Decision Maker is: (P) Legal Next of Kin      Discharge Planning:    Patient lives with: (P) Children Type of Home: (P) Apartment  Primary Care Giver: (P) Self  Patient Support Systems include: (P) Children   Current Financial resources: (P) Medicare, Medicaid  Current community resources:    Current services prior to admission: (P) Durable Medical Equipment            Current DME: (P) Wheelchair, Walker            Type of Home Care services:  OT, PT    ADLS  Prior functional level: (P) Independent in ADLs/IADLs  Current functional level: (P) Independent in ADLs/IADLs    PT AM-PAC:   /24  OT AM-PAC:   /24    Family can provide assistance at DC: (P) No  Would you like Case Management to discuss the discharge plan with any other family members/significant others, and if so, who?    Plans to Return to Present Housing: (P) Yes  Other Identified Issues/Barriers to RETURNING to current housing:  none  Potential Assistance needed at discharge: (P) N/A            Potential DME:    Patient expects to discharge to: (P) Apartment  Plan for transportation at discharge:      Financial    Payor: [facility] MEDICARE / Plan: [employer] DUAL COMPLETE / Product Type: *No Product type* /     Does insurance require precert for SNF: Yes    Potential assistance Purchasing Medications: (P) No  Meds-to-Beds request:        Twan Davis [facility], [place] - [address] [facility] [facility] [phone] - F [phone]  [address] [facility] [address]  Phone: [phone] Fax: [phone]      Notes:    Factors facilitating achievement of predicted outcomes: Family support and Cooperative    Barriers to discharge: none    Additional Case Management Notes: Living with daughter ,follow for hc needs await pt/ot eval, daughter will transport    The Plan for Transition of Care is related to the following treatment goals of Wound dehiscence [T81.30XA]  Surgical wound dehiscence, sequela [T81.31XS]  Fall from standing, initial encounter [W19.XXXA]    IF APPLICABLE: The Patient and/or patient representative Mikael and her family were provided with a choice of provider and agrees with the discharge plan. Freedom of choice list with basic dialogue that supports the patient's individualized plan of care/goals and shares the quality data associated with the providers was provided to: (P) Patient   Patient Representative Name:       The Patient and/or Patient Representative Agree with the Discharge Plan? (P) Yes    Opal Hoover RN  Case Management Department  Ph:  Fax:

## 2025-07-05 NOTE — H&P
rm 236 performed by Deisy Henry MD at [facility] CARDIAC CATH LAB    CHOLECYSTECTOMY      FEMORAL BYPASS Right 04/28/2025    RIGHT  FEMORAL ENDATRERECTOMY XENOSURE PATCH 0.8X8, ANGIOGRAM performed by Robert Jay MD at [facility] CVOR    FEMORAL BYPASS Right 05/02/2025    FEMORAL POPLITEAL BYPASS WITH CRYO VEIN performed by Robert Jay MD at [facility] CVOR    LEG SURGERY Right 6/17/2025    ABOVE KNEE AMPUTATION performed by Robert Jay MD at [facility] OR    TOE AMPUTATION Right 04/25/2025    AMPUTATION OF DISTAL PHALANX OF HALLUX,RESECTION OF PROXIMAL PHALANX performed by Izzy Jeffries DPM at [facility] OR    TOE AMPUTATION Right 05/05/2025    RIGHT HALLUX AMPUTATION WITH INTEGRA GRAFT, INTEGRA performed by Izzy Jeffries DPM at [facility] OR    VASCULAR SURGERY Right 6/16/2025    RIGHT LOWER EXTREMITY ANGIOGRAM, BALLOON ANGIOPLASTY POUNCE performed by Robert Jay MD at [facility] CVOR        Medications Prior to Admission:     Prior to Admission medications    Medication Sig Start Date End Date Taking? Authorizing Provider   doxycycline hyclate (VIBRA-TABS) 100 MG tablet Take 1 tablet by mouth 2 times daily for 7 days 7/4/25 7/11/25  Sarthak oMhr MD   insulin glargine (LANTUS) 100 UNIT/ML injection vial Inject 18 Units into the skin nightly 6/24/25   Kemi Stewart MD   melatonin 5 MG TABS tablet Take 2 tablets by mouth nightly as needed (inosmnia) 6/22/25   Antonietta Kennedy MD   methocarbamol (ROBAXIN) 750 MG tablet Take 1 tablet by mouth 3 times daily as needed (muscle spasm) 6/22/25 7/6/25  Antonietta Kennedy MD   guaiFENesin (MUCINEX) 600 MG extended release tablet Take 1 tablet by mouth 2 times daily as needed for Congestion 5/6/25   Marlen Vo MD   isosorbide mononitrate (IMDUR) 30 MG extended release tablet Take 1 tablet by mouth daily 5/7/25   Marlen Vo MD   apixaban (ELIQUIS) 5 MG TABS tablet Take 1 tablet by mouth 2 times daily 5/6/25   Marlen Vo MD   atorvastatin (LIPITOR) 40 MG  k/uL    Eosinophils Absolute 1.08 (H) 0.00 - 0.44 k/uL    Basophils Absolute 0.07 0.00 - 0.20 k/uL    Immature Granulocytes Absolute <0.03 0.00 - 0.30 k/uL    RBC Morphology ANISOCYTOSIS PRESENT    Basic Metabolic Panel    Collection Time: 07/04/25  5:03 PM   Result Value Ref Range    Sodium 140 136 - 145 mmol/L    Potassium 3.7 3.7 - 5.3 mmol/L    Chloride 108 (H) 98 - 107 mmol/L    CO2 23 20 - 31 mmol/L    Anion Gap 9 9 - 16 mmol/L    Glucose 119 (H) 74 - 99 mg/dL    BUN 16 6 - 20 mg/dL    Creatinine 0.7 0.6 - 0.9 mg/dL    Est, Glom Filt Rate >90 >60 mL/min/1.73m2    Calcium 8.8 8.6 - 10.4 mg/dL   CBC with Auto Differential    Collection Time: 07/05/25 12:31 AM   Result Value Ref Range    WBC 9.5 3.5 - 11.3 k/uL    RBC 3.51 (L) 3.95 - 5.11 m/uL    Hemoglobin 9.7 (L) 11.9 - 15.1 g/dL    Hematocrit 30.6 (L) 36.3 - 47.1 %    MCV 87.2 82.6 - 102.9 fL    MCH 27.6 25.2 - 33.5 pg    MCHC 31.7 28.4 - 34.8 g/dL    RDW 16.4 (H) 11.8 - 14.4 %    Platelets 435 138 - 453 k/uL    MPV 8.7 8.1 - 13.5 fL    NRBC Automated 0.0 0.0 per 100 WBC    Neutrophils % 41 36 - 65 %    Lymphocytes % 42 24 - 43 %    Monocytes % 5 3 - 12 %    Eosinophils % 11 (H) 1 - 4 %    Basophils % 1 0 - 2 %    Immature Granulocytes % 0 0 %    Neutrophils Absolute 3.93 1.50 - 8.10 k/uL    Lymphocytes Absolute 3.96 (H) 1.10 - 3.70 k/uL    Monocytes Absolute 0.49 0.10 - 1.20 k/uL    Eosinophils Absolute 1.04 (H) 0.00 - 0.44 k/uL    Basophils Absolute 0.06 0.00 - 0.20 k/uL    Immature Granulocytes Absolute <0.03 0.00 - 0.30 k/uL    RBC Morphology ANISOCYTOSIS PRESENT    BMP    Collection Time: 07/05/25 12:31 AM   Result Value Ref Range    Sodium 140 136 - 145 mmol/L    Potassium 3.6 (L) 3.7 - 5.3 mmol/L    Chloride 108 (H) 98 - 107 mmol/L    CO2 22 20 - 31 mmol/L    Anion Gap 10 9 - 16 mmol/L    Glucose 131 (H) 74 - 99 mg/dL    BUN 17 6 - 20 mg/dL    Creatinine 0.7 0.6 - 0.9 mg/dL    Est, Glom Filt Rate >90 >60 mL/min/1.73m2    Calcium 8.7 8.6 - 10.4 mg/dL

## 2025-07-05 NOTE — ED NOTES
Patient's daughter wheeled patient back from outside where she was smoking a cigarette. Still awaiting hospital transport to the floor at this time

## 2025-07-05 NOTE — ED PROVIDER NOTES
[facility]   Emergency Department  Emergency Medicine Attending Sign-out   Note started: 4:47 AM EDT    Care of Mikael Estevez was assumed from previous attending Dr. Huggins at 4:30 AM and is being seen for Wound Check (Right leg amputee) and Wound Dehiscence (Bleeding Right leg amputee)  .  The patient's initial evaluation and plan have been discussed with the prior provider who initially evaluated the patient.     Attestation  I was available and discussed any additional care issues that arose and coordinated the management plans with the resident(s) caring for the patient during my duty period. Any areas of disagreement with resident's documentation of care or procedures are noted on the chart. I was personally present for the key portions of any/all procedures, during my duty period. I have documented in the chart those procedures where I was not present during the key portions.     BRIEF PATIENT SUMMARY/MDM COURSE PER INITIAL PROVIDER:   RECENT VITALS:     Temp: 99 °F (37.2 °C),  Pulse: 69, Respirations: 18, BP: (!) 151/99, SpO2: 98 %    This patient is a 55 y.o. Female with w/ recent leg amputation with wound dehiscence     DIAGNOSTICS/MEDICATIONS:     MEDICATIONS GIVEN:  ED Medication Orders (From admission, onward)      Start Ordered     Status Ordering Provider    07/05/25 0215 07/05/25 0214  fentaNYL (SUBLIMAZE) injection 50 mcg  ONCE         Last MAR action: Given - by CAMI VIDES on 07/05/25 at 0339 HEENA CACERES    07/05/25 0145 07/05/25 0138  fentaNYL (SUBLIMAZE) injection 50 mcg  ONCE         Last MAR action: Given - by CAMI VIDES on 07/05/25 at 0142 HEENA CACERES    07/05/25 0145 07/05/25 0138  acetaminophen (TYLENOL) tablet 1,000 mg  ONCE         Last MAR action: Given - by CAMI VIDES on 07/05/25 at 0142 HEENA CACERES            LABS    Labs Reviewed   CBC WITH AUTO DIFFERENTIAL - Abnormal; Notable for the following components:        1.68

## 2025-07-05 NOTE — ED NOTES
Patient's daughter arrives at nurse's station asking to speak with writer. Patient made aware and state's writer does not need to speak with the patient and that she will speak with her. Patient wheeling herself around the department per her personal preference.

## 2025-07-05 NOTE — ED NOTES
ED to inpatient nurses report      Chief Complaint:  Chief Complaint   Patient presents with    Wound Check     Right leg amputee    Wound Dehiscence     Bleeding Right leg amputee     Present to ED from: home    MOA:     LOC: alert and orientated to name, place, date  Mobility: Independent - patient transfers herself independently into wheelchair, wheels herself around  Oxygen Baseline: room air    Current needs required: room air   Pending ED orders: none  Present condition: stable    Why did the patient come to the ED? Stump wound dehiscence  What is the plan? Surgery for closure of stump wound  Any procedures or intervention occur? IV, labs, meds  Any safety concerns?? none    Mental Status:   WDL    Psych Assessment:   Psychosocial  Psychosocial (WDL): Within Defined Limits  Vital signs   Vitals:    07/05/25 0545 07/05/25 0600 07/05/25 0601 07/05/25 0755   BP: (!) 150/57 (!) 158/61  (!) 160/71   Pulse:       Resp:       Temp:       SpO2:  94% 97% 100%        Vitals:  Patient Vitals for the past 24 hrs:   BP Temp Pulse Resp SpO2   07/05/25 0755 (!) 160/71 -- -- -- 100 %   07/05/25 0601 -- -- -- -- 97 %   07/05/25 0600 (!) 158/61 -- -- -- 94 %   07/05/25 0545 (!) 150/57 -- -- -- --   07/05/25 0544 -- -- -- -- 98 %   07/05/25 0345 (!) 151/99 -- -- -- 98 %   07/05/25 0148 -- -- -- -- 98 %   07/05/25 0032 -- -- -- -- 99 %   07/05/25 0008 (!) 147/66 99 °F (37.2 °C) 69 18 100 %      Visit Vitals  BP (!) 160/71   Pulse 69   Temp 99 °F (37.2 °C)   Resp 18   SpO2 100%        LDAs:   Peripheral IV 07/05/25 Right Forearm (Active)       Ambulatory Status:  Presents to emergency department  because of falls (Syncope, seizure, or loss of consciousness): No, Age > 70: No, Altered Mental Status, Intoxication with alcohol or substance confusion (Disorientation, impaired judgment, poor safety awaremess, or inability to follow instructions): No, Impaired Mobility: Ambulates or transfers with assistive devices or assistance; Unable  pk-yrs)     Types: Cigarettes    Smokeless tobacco: Never   Vaping Use    Vaping status: Never Used   Substance and Sexual Activity    Alcohol use: No    Drug use: Not Currently     Types: Cocaine     Comment: 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     Social Drivers of Health     Food Insecurity: No Food Insecurity (6/14/2025)    Hunger Vital Sign     Worried About Running Out of Food in the Last Year: Never true     Ran Out of Food in the Last Year: Never true   Transportation Needs: No Transportation Needs (6/14/2025)    PRAPARE - Transportation     Lack of Transportation (Medical): No     Lack of Transportation (Non-Medical): No   Physical Activity: Inactive (9/28/2023)    [facility]    Exercise Vital Sign     Days of Exercise per Week: 0 days     Minutes of Exercise per Session: 0 min   Stress: No Stress Concern Present (9/28/2023)    [facility] from [facility]    [employer] [place] [facility] [employer] [facility] - Occupational Stress Questionnaire     Feeling of Stress : Not at all   Intimate Partner Violence: Not At Risk (9/28/2023)    Received from [facility], [facility]    Humiliation, Afraid, Rape, and Kick questionnaire     Fear of Current or Ex-Partner: No     Emotionally Abused: No     Physically Abused: No     Sexually Abused: No   Housing Stability: High Risk (6/14/2025)    Housing Stability Vital Sign     Unable to Pay for Housing in the Last Year: Yes     Number of Times Moved in the Last Year: 0     Homeless in the Last Year: No       FAMILY HISTORY     No family history on file.    ALLERGIES     Codeine and Fish-derived products    CURRENT MEDICATIONS       Previous Medications    ACETAMINOPHEN (TYLENOL) 500 MG TABLET    Take 2 tablets by mouth every 8 hours as needed for Pain    APIXABAN (ELIQUIS) 5 MG TABS TABLET    Take 1 tablet by mouth 2 times daily    ASPIRIN 81 MG EC

## 2025-07-05 NOTE — ED NOTES
Pt arrives via triage, pt c/o wound dehiscence.   Pt had recent knee amputation. Pt states she fell earlier today directly on it, pt denies LOC.   Pt was seen here a couple hours prior for this and left AMA pt states she was just in the hospital and did not want to have to stay again.   Pt came back due to continuing bleeding.   Pt is A+Ox4, call light in reach.

## 2025-07-06 PROBLEM — Z86.73 H/O: CVA (CEREBROVASCULAR ACCIDENT): Chronic | Status: ACTIVE | Noted: 2023-07-16

## 2025-07-06 LAB
ANION GAP SERPL CALCULATED.3IONS-SCNC: 11 MMOL/L (ref 9–16)
BASOPHILS # BLD: 0.08 K/UL (ref 0–0.2)
BASOPHILS NFR BLD: 1 % (ref 0–2)
BUN SERPL-MCNC: 15 MG/DL (ref 6–20)
CALCIUM SERPL-MCNC: 8.5 MG/DL (ref 8.6–10.4)
CHLORIDE SERPL-SCNC: 107 MMOL/L (ref 98–107)
CO2 SERPL-SCNC: 23 MMOL/L (ref 20–31)
CREAT SERPL-MCNC: 0.6 MG/DL (ref 0.6–0.9)
EOSINOPHIL # BLD: 1.2 K/UL (ref 0–0.44)
EOSINOPHILS RELATIVE PERCENT: 13 % (ref 1–4)
ERYTHROCYTE [DISTWIDTH] IN BLOOD BY AUTOMATED COUNT: 16.2 % (ref 11.8–14.4)
GFR, ESTIMATED: >90 ML/MIN/1.73M2
GLUCOSE BLD-MCNC: 149 MG/DL (ref 65–105)
GLUCOSE BLD-MCNC: 182 MG/DL (ref 65–105)
GLUCOSE BLD-MCNC: 197 MG/DL (ref 65–105)
GLUCOSE BLD-MCNC: 211 MG/DL (ref 65–105)
GLUCOSE SERPL-MCNC: 208 MG/DL (ref 74–99)
HCT VFR BLD AUTO: 27.1 % (ref 36.3–47.1)
HGB BLD-MCNC: 8.7 G/DL (ref 11.9–15.1)
IMM GRANULOCYTES # BLD AUTO: 0.07 K/UL (ref 0–0.3)
IMM GRANULOCYTES NFR BLD: 1 %
LYMPHOCYTES NFR BLD: 3.27 K/UL (ref 1.1–3.7)
LYMPHOCYTES RELATIVE PERCENT: 34 % (ref 24–43)
MCH RBC QN AUTO: 27.5 PG (ref 25.2–33.5)
MCHC RBC AUTO-ENTMCNC: 32.1 G/DL (ref 28.4–34.8)
MCV RBC AUTO: 85.8 FL (ref 82.6–102.9)
MONOCYTES NFR BLD: 0.63 K/UL (ref 0.1–1.2)
MONOCYTES NFR BLD: 7 % (ref 3–12)
NEUTROPHILS NFR BLD: 44 % (ref 36–65)
NEUTS SEG NFR BLD: 4.35 K/UL (ref 1.5–8.1)
NRBC BLD-RTO: 0 PER 100 WBC
PLATELET # BLD AUTO: 402 K/UL (ref 138–453)
PMV BLD AUTO: 9.2 FL (ref 8.1–13.5)
POTASSIUM SERPL-SCNC: 3.7 MMOL/L (ref 3.7–5.3)
RBC # BLD AUTO: 3.16 M/UL (ref 3.95–5.11)
RBC # BLD: ABNORMAL 10*6/UL
SODIUM SERPL-SCNC: 141 MMOL/L (ref 136–145)
WBC OTHER # BLD: 9.6 K/UL (ref 3.5–11.3)

## 2025-07-06 PROCEDURE — 2500000003 HC RX 250 WO HCPCS: Performed by: HOSPITALIST

## 2025-07-06 PROCEDURE — 6370000000 HC RX 637 (ALT 250 FOR IP): Performed by: FAMILY MEDICINE

## 2025-07-06 PROCEDURE — 36415 COLL VENOUS BLD VENIPUNCTURE: CPT

## 2025-07-06 PROCEDURE — 1200000000 HC SEMI PRIVATE

## 2025-07-06 PROCEDURE — 82947 ASSAY GLUCOSE BLOOD QUANT: CPT

## 2025-07-06 PROCEDURE — 85025 COMPLETE CBC W/AUTO DIFF WBC: CPT

## 2025-07-06 PROCEDURE — 6360000002 HC RX W HCPCS: Performed by: HOSPITALIST

## 2025-07-06 PROCEDURE — 6370000000 HC RX 637 (ALT 250 FOR IP): Performed by: HOSPITALIST

## 2025-07-06 PROCEDURE — 80048 BASIC METABOLIC PNL TOTAL CA: CPT

## 2025-07-06 PROCEDURE — 2500000003 HC RX 250 WO HCPCS

## 2025-07-06 PROCEDURE — 99232 SBSQ HOSP IP/OBS MODERATE 35: CPT | Performed by: FAMILY MEDICINE

## 2025-07-06 PROCEDURE — 6360000002 HC RX W HCPCS

## 2025-07-06 RX ORDER — NICOTINE 21 MG/24HR
1 PATCH, TRANSDERMAL 24 HOURS TRANSDERMAL DAILY
Status: DISCONTINUED | OUTPATIENT
Start: 2025-07-06 | End: 2025-07-10 | Stop reason: HOSPADM

## 2025-07-06 RX ADMIN — ATORVASTATIN CALCIUM 40 MG: 40 TABLET, FILM COATED ORAL at 20:13

## 2025-07-06 RX ADMIN — SPIRONOLACTONE 12.5 MG: 25 TABLET, FILM COATED ORAL at 08:04

## 2025-07-06 RX ADMIN — INSULIN LISPRO 2 UNITS: 100 INJECTION, SOLUTION INTRAVENOUS; SUBCUTANEOUS at 17:54

## 2025-07-06 RX ADMIN — PANTOPRAZOLE SODIUM 40 MG: 40 TABLET, DELAYED RELEASE ORAL at 05:26

## 2025-07-06 RX ADMIN — GABAPENTIN 300 MG: 300 CAPSULE ORAL at 11:40

## 2025-07-06 RX ADMIN — MORPHINE SULFATE 2 MG: 2 INJECTION, SOLUTION INTRAMUSCULAR; INTRAVENOUS at 12:47

## 2025-07-06 RX ADMIN — GABAPENTIN 300 MG: 300 CAPSULE ORAL at 04:26

## 2025-07-06 RX ADMIN — ACETAMINOPHEN 650 MG: 325 TABLET ORAL at 05:26

## 2025-07-06 RX ADMIN — MORPHINE SULFATE 2 MG: 2 INJECTION, SOLUTION INTRAMUSCULAR; INTRAVENOUS at 16:18

## 2025-07-06 RX ADMIN — Medication 2000 MG: at 04:24

## 2025-07-06 RX ADMIN — Medication 2000 MG: at 11:40

## 2025-07-06 RX ADMIN — CARVEDILOL 12.5 MG: 12.5 TABLET, FILM COATED ORAL at 08:04

## 2025-07-06 RX ADMIN — MORPHINE SULFATE 2 MG: 2 INJECTION, SOLUTION INTRAMUSCULAR; INTRAVENOUS at 08:04

## 2025-07-06 RX ADMIN — QUETIAPINE FUMARATE 150 MG: 100 TABLET ORAL at 20:13

## 2025-07-06 RX ADMIN — LISINOPRIL 2.5 MG: 5 TABLET ORAL at 08:04

## 2025-07-06 RX ADMIN — Medication 2000 MG: at 20:13

## 2025-07-06 RX ADMIN — ISOSORBIDE MONONITRATE 30 MG: 30 TABLET, EXTENDED RELEASE ORAL at 08:04

## 2025-07-06 RX ADMIN — ASPIRIN 81 MG: 81 TABLET, COATED ORAL at 08:04

## 2025-07-06 RX ADMIN — SODIUM CHLORIDE, PRESERVATIVE FREE 10 ML: 5 INJECTION INTRAVENOUS at 20:13

## 2025-07-06 RX ADMIN — INSULIN LISPRO 2 UNITS: 100 INJECTION, SOLUTION INTRAVENOUS; SUBCUTANEOUS at 08:04

## 2025-07-06 RX ADMIN — MORPHINE SULFATE 2 MG: 2 INJECTION, SOLUTION INTRAMUSCULAR; INTRAVENOUS at 19:23

## 2025-07-06 RX ADMIN — GABAPENTIN 300 MG: 300 CAPSULE ORAL at 20:13

## 2025-07-06 RX ADMIN — CARVEDILOL 12.5 MG: 12.5 TABLET, FILM COATED ORAL at 16:19

## 2025-07-06 RX ADMIN — MORPHINE SULFATE 2 MG: 2 INJECTION, SOLUTION INTRAMUSCULAR; INTRAVENOUS at 04:30

## 2025-07-06 RX ADMIN — MORPHINE SULFATE 2 MG: 2 INJECTION, SOLUTION INTRAMUSCULAR; INTRAVENOUS at 02:12

## 2025-07-06 RX ADMIN — ACETAMINOPHEN 650 MG: 325 TABLET ORAL at 17:54

## 2025-07-06 RX ADMIN — MORPHINE SULFATE 2 MG: 2 INJECTION, SOLUTION INTRAMUSCULAR; INTRAVENOUS at 10:40

## 2025-07-06 RX ADMIN — INSULIN LISPRO 2 UNITS: 100 INJECTION, SOLUTION INTRAVENOUS; SUBCUTANEOUS at 20:26

## 2025-07-06 ASSESSMENT — PAIN SCALES - GENERAL
PAINLEVEL_OUTOF10: 10
PAINLEVEL_OUTOF10: 8
PAINLEVEL_OUTOF10: 10
PAINLEVEL_OUTOF10: 9
PAINLEVEL_OUTOF10: 10
PAINLEVEL_OUTOF10: 9
PAINLEVEL_OUTOF10: 8
PAINLEVEL_OUTOF10: 10
PAINLEVEL_OUTOF10: 9
PAINLEVEL_OUTOF10: 10
PAINLEVEL_OUTOF10: 9
PAINLEVEL_OUTOF10: 10

## 2025-07-06 ASSESSMENT — PAIN DESCRIPTION - DESCRIPTORS
DESCRIPTORS: ACHING

## 2025-07-06 ASSESSMENT — PAIN DESCRIPTION - LOCATION
LOCATION: LEG

## 2025-07-06 ASSESSMENT — PAIN DESCRIPTION - ORIENTATION
ORIENTATION: RIGHT

## 2025-07-06 NOTE — CARE COORDINATION
Case Management   Daily Progress Note       Patient Name: Mikael Estevez                   YOB: 1970  Diagnosis: Wound dehiscence [T81.30XA]  Surgical wound dehiscence, sequela [T81.31XS]  Fall from standing, initial encounter [W19.XXXA]                         days  Length of Stay: 1  days    Anticipated Discharge Date: One day until discharge    Readmission Risk (Low < 19, Mod (19-27), High > 27): Readmission Risk Score: 29.7        Current Transitional Plan    [x] Home Independently    [] Home with HC    [] Skilled Nursing Facility    [] Acute Rehabilitation    [] Long Term Acute Care (LTAC)    [] Other:     Plan for the Stay (Medical Management) :          Workflow Continuation (Additional Notes) :  Plan is home with family, lives with daughter, await pt/ot rosemary Hoover RN  July 6, 2025

## 2025-07-06 NOTE — PLAN OF CARE
Problem: Chronic Conditions and Co-morbidities  Goal: Patient's chronic conditions and co-morbidity symptoms are monitored and maintained or improved  7/6/2025 0549 by Esther Chaney RN  Outcome: Progressing  7/5/2025 1708 by Karoline Centeno RN  Outcome: Progressing     Problem: Safety - Adult  Goal: Free from fall injury  7/6/2025 0549 by Esther Chaney RN  Outcome: Progressing  7/5/2025 1708 by Karoline Centeno RN  Outcome: Progressing     Problem: ABCDS Injury Assessment  Goal: Absence of physical injury  7/6/2025 0549 by Esther Chaney RN  Outcome: Progressing  7/5/2025 1708 by Karoline Centeno RN  Outcome: Progressing     Problem: Pain  Goal: Verbalizes/displays adequate comfort level or baseline comfort level  Outcome: Progressing

## 2025-07-06 NOTE — PROGRESS NOTES
Division of Vascular Surgery             Progress Note      Name: Mikael Estevez  MRN: [id number]         Overnight Events:      None      Subjective:     Patient seen and examined at bedside.  No acute overnight events.  Vital signs stable within normal limits.  Patient reports some pain at her right AKA stump.  No other complaints this time.    Physical Exam:     Vitals:  BP (!) 147/64   Pulse 86   Temp 98.8 °F (37.1 °C) (Oral)   Resp 18   Ht 1.651 m (5' 5\")   Wt 61.4 kg (135 lb 5.8 oz)   SpO2 98%   BMI 22.53 kg/m²       General appearance - alert, well appearing and in no acute distress  Mental status - oriented to person, place and time with normal affect  Head - normocephalic and atraumatic  Neck - supple  Chest - normal respiratory effort, no wheezing or stridor   Heart - normal rate, regular rhythm, no murmurs  Abdomen - soft, non-tender, non-distended  Neurological - normal speech, no focal findings or movement disorder noted  Extremities - R AKA stump with dehiscence  Skin - no gross lesions, rashes, or induration noted        Imaging:   XR FEMUR RIGHT (MIN 2 VIEWS)  Result Date: 7/4/2025  Soft tissue defect involving the amputated stump with exposure of the bone.          Assessment:     55-year-old female with history of right AKA who suffered fall onto her right AKA stump.       Plan:     Planning for OR tomorrow for R AKA stump washout, revision, possible wound vac placement.   Continue local wound care.   Continue antibiotics.   Pain and nausea control as needed.   Remainder of care per primary.     Maximo Stein D.O.  General Surgery Resident, PGY-4  07/06/25        [facility]  O: [phone]  C: [phone]  Email: [email or web address]

## 2025-07-06 NOTE — PROGRESS NOTES
[facility]  Office: [phone]  Fredy Watkins DO, Tyshawn Stephenson, DO, Anjel Meza DO, Bernard Yates, DO, Carmina Rossi MD, Sara Walls MD, Iraida Ramirez MD, Ruthie Thornton MD,  Unruly Eastman MD, Sorin Jay MD, Noni Blake MD,  Radha Galeano DO, Helen Ward MD, Marko Carson MD, Saleem Watkins DO, Alta Woods MD,  Saad Anderson DO, Cindy Chappell MD, Yumiko Araiza MD, Selvin Bell MD,  Cong Linder MD, Vasquez Hood MD, Emmie Schneider MD, Ana Morse MD, Sven Rooney MD, Sanchez Izquierdo MD, Jameson Kevin, DO, Valerie Mayorga MD, Sofia Ohara DO, Travis Molina MD, Radha Stone MD, Mohsin Reza, MD, Lenora Alvarez MD, Shirley Waterhouse, CNP,  Gely Meredith, CNP, Jameson Joe, CNP,  Nayeli Guillen, SHAHANA, Polly Washington, CNP, Daisha Galvan, CNP, Jessica Love, CNP, Mariana Bautista, CNP, Meenu Sommers, PA-C, Zandra Dumont, CNP, Jeny Espitia, CNP,  Shasha Pearce, CNP, Lora Javed, CNP, William Suggs, PA-C, Alaina Oquendo, PA-C, Shy Bell, CNP,        Liya Casper, CNS, Amita Lucero, CNP, Amanda Baxter, CNP         [facility]   IN-PATIENT SERVICE   [facility]    Progress Note    7/6/2025    11:24 AM    Name:   Mikael Estevez  MRN:     [id number]     Acct:      [id number]   Room:   0342/0342-01   Day:  1  Admit Date:  7/5/2025 12:15 AM    PCP:   Ciro Martinez Jr., MD  Code Status:  Full Code    Subjective:     C/C:   Chief Complaint   Patient presents with    Wound Check     Right leg amputee    Wound Dehiscence     Bleeding Right leg amputee     Interval History Status: not changed.     Patient seen and examined at bedside, no acute events overnight.  Continue to improve overall with better breathing.  Patient denies any chest pain, shortness of breath, chills, fevers, nausea or vomiting.  Patient vitals, labs and all providers notes were reviewed,from overnight shift and morning updates were noted and  AM    NBEA 2.8 04/28/2025 09:09 AM    PBEA 1.5 05/02/2025 10:20 AM    KDTP2VZK 99.7 04/28/2025 09:09 AM    W0FIUKXN 99.1 05/02/2025 10:20 AM    FIO2 50% 05/02/2025 10:20 AM     Lab Results   Component Value Date/Time    SPECIAL RT GREAT TOE BONE 04/25/2025 03:29 PM    SPECIAL RT GREAT TOE BONE 04/25/2025 03:29 PM     Lab Results   Component Value Date/Time    CULTURE NO GROWTH 5 DAYS 06/14/2025 09:08 PM       Radiology:  XR FEMUR RIGHT (MIN 2 VIEWS)  Result Date: 7/4/2025  Soft tissue defect involving the amputated stump with exposure of the bone.       Physical Examination:        Physical Exam  Vitals and nursing note reviewed.   Constitutional:       General: She is not in acute distress.  HENT:      Head: Normocephalic and atraumatic.   Eyes:      Conjunctiva/sclera: Conjunctivae normal.      Pupils: Pupils are equal, round, and reactive to light.   Cardiovascular:      Rate and Rhythm: Normal rate and regular rhythm.      Heart sounds: No murmur heard.  Pulmonary:      Effort: Pulmonary effort is normal. No accessory muscle usage or respiratory distress.      Breath sounds: No stridor. No decreased breath sounds, wheezing, rhonchi or rales.   Abdominal:      General: Bowel sounds are normal. There is no distension.      Palpations: Abdomen is soft. Abdomen is not rigid.      Tenderness: There is no abdominal tenderness. There is no guarding.   Musculoskeletal:         General: No tenderness.      Comments: R AKA in dressing    Skin:     General: Skin is warm and dry.      Findings: No erythema, lesion or rash.   Neurological:      Mental Status: She is alert and oriented to person, place, and time.      Cranial Nerves: No cranial nerve deficit.      Motor: No seizure activity.   Psychiatric:         Speech: Speech normal.         Behavior: Behavior normal. Behavior is cooperative.         Assessment:        Hospital Problems           Last Modified POA    * (Principal) Wound dehiscence 7/5/2025 Yes    Type 2

## 2025-07-06 NOTE — PLAN OF CARE
Problem: Chronic Conditions and Co-morbidities  Goal: Patient's chronic conditions and co-morbidity symptoms are monitored and maintained or improved  7/6/2025 1848 by Alexandria Aceves RN  Outcome: Progressing  7/6/2025 0549 by Esther Chaney RN  Outcome: Progressing     Problem: Safety - Adult  Goal: Free from fall injury  7/6/2025 1848 by Alexandria Aceves RN  Outcome: Progressing  7/6/2025 0549 by Esther Chaney RN  Outcome: Progressing     Problem: ABCDS Injury Assessment  Goal: Absence of physical injury  7/6/2025 1848 by Alexandria Aceves RN  Outcome: Progressing  7/6/2025 0549 by Esther Chaney RN  Outcome: Progressing     Problem: Pain  Goal: Verbalizes/displays adequate comfort level or baseline comfort level  7/6/2025 1848 by Alexandria Aceves RN  Outcome: Progressing  7/6/2025 0549 by Esther Chaney RN  Outcome: Progressing

## 2025-07-07 ENCOUNTER — ANESTHESIA EVENT (OUTPATIENT)
Dept: OPERATING ROOM | Age: 55
End: 2025-07-07
Payer: MEDICARE

## 2025-07-07 ENCOUNTER — ANESTHESIA (OUTPATIENT)
Dept: OPERATING ROOM | Age: 55
End: 2025-07-07
Payer: MEDICARE

## 2025-07-07 PROBLEM — T81.31XS SURGICAL WOUND DEHISCENCE, SEQUELA: Status: ACTIVE | Noted: 2025-07-07

## 2025-07-07 LAB
GLUCOSE BLD-MCNC: 105 MG/DL (ref 65–105)
GLUCOSE BLD-MCNC: 109 MG/DL (ref 65–105)
GLUCOSE BLD-MCNC: 147 MG/DL (ref 65–105)
GLUCOSE BLD-MCNC: 147 MG/DL (ref 65–105)

## 2025-07-07 PROCEDURE — 2500000003 HC RX 250 WO HCPCS: Performed by: HOSPITALIST

## 2025-07-07 PROCEDURE — 2500000003 HC RX 250 WO HCPCS: Performed by: STUDENT IN AN ORGANIZED HEALTH CARE EDUCATION/TRAINING PROGRAM

## 2025-07-07 PROCEDURE — 7100000000 HC PACU RECOVERY - FIRST 15 MIN: Performed by: STUDENT IN AN ORGANIZED HEALTH CARE EDUCATION/TRAINING PROGRAM

## 2025-07-07 PROCEDURE — 6360000002 HC RX W HCPCS: Performed by: STUDENT IN AN ORGANIZED HEALTH CARE EDUCATION/TRAINING PROGRAM

## 2025-07-07 PROCEDURE — 82947 ASSAY GLUCOSE BLOOD QUANT: CPT

## 2025-07-07 PROCEDURE — 7100000010 HC PHASE II RECOVERY - FIRST 15 MIN: Performed by: STUDENT IN AN ORGANIZED HEALTH CARE EDUCATION/TRAINING PROGRAM

## 2025-07-07 PROCEDURE — 6360000002 HC RX W HCPCS

## 2025-07-07 PROCEDURE — 6370000000 HC RX 637 (ALT 250 FOR IP): Performed by: HOSPITALIST

## 2025-07-07 PROCEDURE — 6370000000 HC RX 637 (ALT 250 FOR IP): Performed by: STUDENT IN AN ORGANIZED HEALTH CARE EDUCATION/TRAINING PROGRAM

## 2025-07-07 PROCEDURE — 99232 SBSQ HOSP IP/OBS MODERATE 35: CPT | Performed by: INTERNAL MEDICINE

## 2025-07-07 PROCEDURE — 6360000002 HC RX W HCPCS: Performed by: HOSPITALIST

## 2025-07-07 PROCEDURE — 99024 POSTOP FOLLOW-UP VISIT: CPT

## 2025-07-07 PROCEDURE — 7100000001 HC PACU RECOVERY - ADDTL 15 MIN: Performed by: STUDENT IN AN ORGANIZED HEALTH CARE EDUCATION/TRAINING PROGRAM

## 2025-07-07 PROCEDURE — 3600000004 HC SURGERY LEVEL 4 BASE: Performed by: STUDENT IN AN ORGANIZED HEALTH CARE EDUCATION/TRAINING PROGRAM

## 2025-07-07 PROCEDURE — 2500000003 HC RX 250 WO HCPCS

## 2025-07-07 PROCEDURE — 6360000002 HC RX W HCPCS: Performed by: NURSE PRACTITIONER

## 2025-07-07 PROCEDURE — 3600000014 HC SURGERY LEVEL 4 ADDTL 15MIN: Performed by: STUDENT IN AN ORGANIZED HEALTH CARE EDUCATION/TRAINING PROGRAM

## 2025-07-07 PROCEDURE — 3700000001 HC ADD 15 MINUTES (ANESTHESIA): Performed by: STUDENT IN AN ORGANIZED HEALTH CARE EDUCATION/TRAINING PROGRAM

## 2025-07-07 PROCEDURE — 6360000002 HC RX W HCPCS: Performed by: INTERNAL MEDICINE

## 2025-07-07 PROCEDURE — 2720000010 HC SURG SUPPLY STERILE: Performed by: STUDENT IN AN ORGANIZED HEALTH CARE EDUCATION/TRAINING PROGRAM

## 2025-07-07 PROCEDURE — 3700000000 HC ANESTHESIA ATTENDED CARE: Performed by: STUDENT IN AN ORGANIZED HEALTH CARE EDUCATION/TRAINING PROGRAM

## 2025-07-07 PROCEDURE — 6370000000 HC RX 637 (ALT 250 FOR IP): Performed by: INTERNAL MEDICINE

## 2025-07-07 PROCEDURE — 2709999900 HC NON-CHARGEABLE SUPPLY: Performed by: STUDENT IN AN ORGANIZED HEALTH CARE EDUCATION/TRAINING PROGRAM

## 2025-07-07 PROCEDURE — 94761 N-INVAS EAR/PLS OXIMETRY MLT: CPT

## 2025-07-07 PROCEDURE — 6360000002 HC RX W HCPCS: Performed by: ANESTHESIOLOGY

## 2025-07-07 PROCEDURE — 7100000011 HC PHASE II RECOVERY - ADDTL 15 MIN: Performed by: STUDENT IN AN ORGANIZED HEALTH CARE EDUCATION/TRAINING PROGRAM

## 2025-07-07 PROCEDURE — 0Y6C0Z3 DETACHMENT AT RIGHT UPPER LEG, LOW, OPEN APPROACH: ICD-10-PCS | Performed by: STUDENT IN AN ORGANIZED HEALTH CARE EDUCATION/TRAINING PROGRAM

## 2025-07-07 PROCEDURE — 1200000000 HC SEMI PRIVATE

## 2025-07-07 RX ORDER — ONDANSETRON 2 MG/ML
4 INJECTION INTRAMUSCULAR; INTRAVENOUS
Status: DISCONTINUED | OUTPATIENT
Start: 2025-07-07 | End: 2025-07-07 | Stop reason: HOSPADM

## 2025-07-07 RX ORDER — FENTANYL CITRATE 50 UG/ML
INJECTION, SOLUTION INTRAMUSCULAR; INTRAVENOUS
Status: DISCONTINUED | OUTPATIENT
Start: 2025-07-07 | End: 2025-07-07 | Stop reason: SDUPTHER

## 2025-07-07 RX ORDER — VANCOMYCIN HYDROCHLORIDE 1 G/20ML
INJECTION, POWDER, LYOPHILIZED, FOR SOLUTION INTRAVENOUS
Status: DISPENSED
Start: 2025-07-07 | End: 2025-07-07

## 2025-07-07 RX ORDER — OXYCODONE AND ACETAMINOPHEN 5; 325 MG/1; MG/1
1 TABLET ORAL EVERY 4 HOURS PRN
Refills: 0 | Status: DISCONTINUED | OUTPATIENT
Start: 2025-07-07 | End: 2025-07-07

## 2025-07-07 RX ORDER — WATER 10 ML/10ML
INJECTION INTRAMUSCULAR; INTRAVENOUS; SUBCUTANEOUS
Status: DISPENSED
Start: 2025-07-07 | End: 2025-07-07

## 2025-07-07 RX ORDER — ETOMIDATE 2 MG/ML
INJECTION INTRAVENOUS
Status: DISCONTINUED | OUTPATIENT
Start: 2025-07-07 | End: 2025-07-07 | Stop reason: SDUPTHER

## 2025-07-07 RX ORDER — CALCIUM CHLORIDE 100 MG/ML
INJECTION INTRAVENOUS; INTRAVENTRICULAR
Status: DISCONTINUED | OUTPATIENT
Start: 2025-07-07 | End: 2025-07-07 | Stop reason: SDUPTHER

## 2025-07-07 RX ORDER — METHOCARBAMOL 750 MG/1
750 TABLET, FILM COATED ORAL 4 TIMES DAILY
Status: DISCONTINUED | OUTPATIENT
Start: 2025-07-07 | End: 2025-07-10 | Stop reason: HOSPADM

## 2025-07-07 RX ORDER — PROPOFOL 10 MG/ML
INJECTION, EMULSION INTRAVENOUS
Status: DISCONTINUED | OUTPATIENT
Start: 2025-07-07 | End: 2025-07-07 | Stop reason: SDUPTHER

## 2025-07-07 RX ORDER — ACETAMINOPHEN 500 MG
1000 TABLET ORAL EVERY 8 HOURS SCHEDULED
Status: DISCONTINUED | OUTPATIENT
Start: 2025-07-07 | End: 2025-07-10 | Stop reason: HOSPADM

## 2025-07-07 RX ORDER — POLYETHYLENE GLYCOL 3350 17 G/17G
17 POWDER, FOR SOLUTION ORAL DAILY
Status: DISCONTINUED | OUTPATIENT
Start: 2025-07-07 | End: 2025-07-10 | Stop reason: HOSPADM

## 2025-07-07 RX ORDER — DEXAMETHASONE SODIUM PHOSPHATE 4 MG/ML
4 INJECTION, SOLUTION INTRA-ARTICULAR; INTRALESIONAL; INTRAMUSCULAR; INTRAVENOUS; SOFT TISSUE ONCE
Status: COMPLETED | OUTPATIENT
Start: 2025-07-07 | End: 2025-07-07

## 2025-07-07 RX ORDER — HYDRALAZINE HYDROCHLORIDE 20 MG/ML
10 INJECTION INTRAMUSCULAR; INTRAVENOUS EVERY 6 HOURS PRN
Status: DISCONTINUED | OUTPATIENT
Start: 2025-07-07 | End: 2025-07-10 | Stop reason: HOSPADM

## 2025-07-07 RX ORDER — SODIUM CHLORIDE 9 MG/ML
INJECTION, SOLUTION INTRAMUSCULAR; INTRAVENOUS; SUBCUTANEOUS
Status: DISCONTINUED | OUTPATIENT
Start: 2025-07-07 | End: 2025-07-07 | Stop reason: SDUPTHER

## 2025-07-07 RX ORDER — SODIUM CHLORIDE 9 MG/ML
INJECTION, SOLUTION INTRAVENOUS PRN
Status: DISCONTINUED | OUTPATIENT
Start: 2025-07-07 | End: 2025-07-07 | Stop reason: HOSPADM

## 2025-07-07 RX ORDER — FENTANYL CITRATE 50 UG/ML
50 INJECTION, SOLUTION INTRAMUSCULAR; INTRAVENOUS EVERY 5 MIN PRN
Status: COMPLETED | OUTPATIENT
Start: 2025-07-07 | End: 2025-07-07

## 2025-07-07 RX ORDER — EPHEDRINE SULFATE/0.9% NACL/PF 25 MG/5 ML
SYRINGE (ML) INTRAVENOUS
Status: DISCONTINUED | OUTPATIENT
Start: 2025-07-07 | End: 2025-07-07 | Stop reason: SDUPTHER

## 2025-07-07 RX ORDER — LIDOCAINE HYDROCHLORIDE 10 MG/ML
INJECTION, SOLUTION EPIDURAL; INFILTRATION; INTRACAUDAL; PERINEURAL
Status: DISCONTINUED | OUTPATIENT
Start: 2025-07-07 | End: 2025-07-07 | Stop reason: SDUPTHER

## 2025-07-07 RX ORDER — EPINEPHRINE 0.1 MG/ML
INJECTION INTRAVENOUS
Status: DISPENSED
Start: 2025-07-07 | End: 2025-07-07

## 2025-07-07 RX ORDER — SODIUM CHLORIDE 0.9 % (FLUSH) 0.9 %
5-40 SYRINGE (ML) INJECTION EVERY 12 HOURS SCHEDULED
Status: DISCONTINUED | OUTPATIENT
Start: 2025-07-07 | End: 2025-07-07 | Stop reason: HOSPADM

## 2025-07-07 RX ORDER — FENTANYL CITRATE 50 UG/ML
25 INJECTION, SOLUTION INTRAMUSCULAR; INTRAVENOUS EVERY 5 MIN PRN
Status: DISCONTINUED | OUTPATIENT
Start: 2025-07-07 | End: 2025-07-07 | Stop reason: HOSPADM

## 2025-07-07 RX ORDER — SODIUM CHLORIDE 0.9 % (FLUSH) 0.9 %
5-40 SYRINGE (ML) INJECTION PRN
Status: DISCONTINUED | OUTPATIENT
Start: 2025-07-07 | End: 2025-07-07 | Stop reason: HOSPADM

## 2025-07-07 RX ADMIN — MORPHINE SULFATE 2 MG: 2 INJECTION, SOLUTION INTRAMUSCULAR; INTRAVENOUS at 10:30

## 2025-07-07 RX ADMIN — SODIUM CHLORIDE, PRESERVATIVE FREE 10 ML: 5 INJECTION INTRAVENOUS at 07:50

## 2025-07-07 RX ADMIN — MORPHINE SULFATE 2 MG: 2 INJECTION, SOLUTION INTRAMUSCULAR; INTRAVENOUS at 05:22

## 2025-07-07 RX ADMIN — CALCIUM CHLORIDE 0.5 G: 100 INJECTION INTRAVENOUS; INTRAVENTRICULAR at 12:43

## 2025-07-07 RX ADMIN — LIDOCAINE HYDROCHLORIDE 50 MG: 10 INJECTION, SOLUTION EPIDURAL; INFILTRATION; INTRACAUDAL; PERINEURAL at 12:04

## 2025-07-07 RX ADMIN — SODIUM CHLORIDE, PRESERVATIVE FREE 10 ML: 5 INJECTION INTRAVENOUS at 21:53

## 2025-07-07 RX ADMIN — FENTANYL CITRATE 50 MCG: 50 INJECTION, SOLUTION INTRAMUSCULAR; INTRAVENOUS at 12:04

## 2025-07-07 RX ADMIN — Medication 2000 MG: at 21:32

## 2025-07-07 RX ADMIN — Medication 2000 MG: at 12:15

## 2025-07-07 RX ADMIN — Medication 2000 MG: at 04:13

## 2025-07-07 RX ADMIN — PROPOFOL 100 MG: 10 INJECTION, EMULSION INTRAVENOUS at 12:05

## 2025-07-07 RX ADMIN — CARVEDILOL 12.5 MG: 12.5 TABLET, FILM COATED ORAL at 15:40

## 2025-07-07 RX ADMIN — FENTANYL CITRATE 50 MCG: 50 INJECTION INTRAMUSCULAR; INTRAVENOUS at 13:48

## 2025-07-07 RX ADMIN — SODIUM CHLORIDE, PRESERVATIVE FREE 10 ML: 5 INJECTION INTRAVENOUS at 21:33

## 2025-07-07 RX ADMIN — FENTANYL CITRATE 50 MCG: 50 INJECTION, SOLUTION INTRAMUSCULAR; INTRAVENOUS at 12:28

## 2025-07-07 RX ADMIN — CALCIUM CHLORIDE 0.5 G: 100 INJECTION INTRAVENOUS; INTRAVENTRICULAR at 12:19

## 2025-07-07 RX ADMIN — FENTANYL CITRATE 50 MCG: 50 INJECTION INTRAMUSCULAR; INTRAVENOUS at 13:13

## 2025-07-07 RX ADMIN — CARVEDILOL 12.5 MG: 12.5 TABLET, FILM COATED ORAL at 07:53

## 2025-07-07 RX ADMIN — SODIUM CHLORIDE 200 ML: 9 INJECTION, SOLUTION INTRAMUSCULAR; INTRAVENOUS; SUBCUTANEOUS at 12:43

## 2025-07-07 RX ADMIN — DEXAMETHASONE SODIUM PHOSPHATE 4 MG: 4 INJECTION INTRA-ARTICULAR; INTRALESIONAL; INTRAMUSCULAR; INTRAVENOUS; SOFT TISSUE at 21:53

## 2025-07-07 RX ADMIN — ACETAMINOPHEN 650 MG: 325 TABLET ORAL at 05:58

## 2025-07-07 RX ADMIN — LISINOPRIL 2.5 MG: 5 TABLET ORAL at 07:53

## 2025-07-07 RX ADMIN — ATORVASTATIN CALCIUM 40 MG: 40 TABLET, FILM COATED ORAL at 21:32

## 2025-07-07 RX ADMIN — Medication 10 MG: at 21:32

## 2025-07-07 RX ADMIN — EPHEDRINE SULFATE 10 MG: 5 INJECTION INTRAVENOUS at 12:16

## 2025-07-07 RX ADMIN — SODIUM CHLORIDE 100 ML: 9 INJECTION, SOLUTION INTRAMUSCULAR; INTRAVENOUS; SUBCUTANEOUS at 12:52

## 2025-07-07 RX ADMIN — EPHEDRINE SULFATE 10 MG: 5 INJECTION INTRAVENOUS at 12:21

## 2025-07-07 RX ADMIN — EPHEDRINE SULFATE 5 MG: 5 INJECTION INTRAVENOUS at 12:42

## 2025-07-07 RX ADMIN — QUETIAPINE FUMARATE 150 MG: 100 TABLET ORAL at 21:32

## 2025-07-07 RX ADMIN — GABAPENTIN 300 MG: 300 CAPSULE ORAL at 21:32

## 2025-07-07 RX ADMIN — ISOSORBIDE MONONITRATE 30 MG: 30 TABLET, EXTENDED RELEASE ORAL at 07:53

## 2025-07-07 RX ADMIN — MORPHINE SULFATE 2 MG: 2 INJECTION, SOLUTION INTRAMUSCULAR; INTRAVENOUS at 21:31

## 2025-07-07 RX ADMIN — OXYCODONE HYDROCHLORIDE AND ACETAMINOPHEN 1 TABLET: 5; 325 TABLET ORAL at 15:40

## 2025-07-07 RX ADMIN — MORPHINE SULFATE 2 MG: 2 INJECTION, SOLUTION INTRAMUSCULAR; INTRAVENOUS at 07:45

## 2025-07-07 RX ADMIN — ACETAMINOPHEN 1000 MG: 500 TABLET ORAL at 21:32

## 2025-07-07 RX ADMIN — SPIRONOLACTONE 12.5 MG: 25 TABLET, FILM COATED ORAL at 07:54

## 2025-07-07 RX ADMIN — ETOMIDATE 20 MG: 2 INJECTION INTRAVENOUS at 12:04

## 2025-07-07 RX ADMIN — METHOCARBAMOL 750 MG: 750 TABLET ORAL at 21:32

## 2025-07-07 RX ADMIN — MORPHINE SULFATE 2 MG: 2 INJECTION, SOLUTION INTRAMUSCULAR; INTRAVENOUS at 16:41

## 2025-07-07 RX ADMIN — HYDRALAZINE HYDROCHLORIDE 10 MG: 20 INJECTION INTRAMUSCULAR; INTRAVENOUS at 18:49

## 2025-07-07 RX ADMIN — PANTOPRAZOLE SODIUM 40 MG: 40 TABLET, DELAYED RELEASE ORAL at 05:22

## 2025-07-07 RX ADMIN — HYDROMORPHONE HYDROCHLORIDE 0.5 MG: 1 INJECTION, SOLUTION INTRAMUSCULAR; INTRAVENOUS; SUBCUTANEOUS at 17:54

## 2025-07-07 RX ADMIN — MORPHINE SULFATE 2 MG: 2 INJECTION, SOLUTION INTRAMUSCULAR; INTRAVENOUS at 23:55

## 2025-07-07 RX ADMIN — GABAPENTIN 300 MG: 300 CAPSULE ORAL at 04:17

## 2025-07-07 ASSESSMENT — PAIN SCALES - GENERAL
PAINLEVEL_OUTOF10: 8
PAINLEVEL_OUTOF10: 10
PAINLEVEL_OUTOF10: 6
PAINLEVEL_OUTOF10: 10
PAINLEVEL_OUTOF10: 7
PAINLEVEL_OUTOF10: 7
PAINLEVEL_OUTOF10: 9
PAINLEVEL_OUTOF10: 6
PAINLEVEL_OUTOF10: 10
PAINLEVEL_OUTOF10: 6
PAINLEVEL_OUTOF10: 8
PAINLEVEL_OUTOF10: 4
PAINLEVEL_OUTOF10: 8
PAINLEVEL_OUTOF10: 10

## 2025-07-07 ASSESSMENT — PAIN DESCRIPTION - LOCATION
LOCATION: KNEE
LOCATION: LEG
LOCATION: LEG
LOCATION: KNEE
LOCATION: LEG
LOCATION: LEG
LOCATION: KNEE
LOCATION: KNEE
LOCATION: LEG
LOCATION: LEG

## 2025-07-07 ASSESSMENT — PAIN DESCRIPTION - ORIENTATION
ORIENTATION: RIGHT
ORIENTATION: LEFT
ORIENTATION: RIGHT
ORIENTATION: LEFT
ORIENTATION: RIGHT

## 2025-07-07 ASSESSMENT — PAIN DESCRIPTION - DESCRIPTORS
DESCRIPTORS: ACHING;DISCOMFORT
DESCRIPTORS: ACHING
DESCRIPTORS: ACHING;DISCOMFORT
DESCRIPTORS: ACHING

## 2025-07-07 NOTE — PROGRESS NOTES
[facility]  Office: [phone]  Fredy Watkins DO, Tyshawn Stephenson, DO, Anjel Meza DO, Bernard Yates, DO, Carmina Rossi MD, Sara Walls MD, Iraida Ramirez MD, Ruthie Thornton MD,  Unruly Eastman MD, Sorin Jay MD, Noni Blake MD,  Radha Galeano DO, Helen Ward MD, Marko Carson MD, Saleem Watkins DO, Alta Woods MD,  Saad Anderson DO, Cindy Chappell MD, Yumiko Araiza MD, Selvin Bell MD,  Cong Linder MD, Vasquez Hood MD, Emmie Schneider MD, Ana Morse MD, Sven Rooney MD, Sanchez Izquierdo MD, Jameson Kevin, DO, Valerie Mayorga MD, Sofia Ohara DO, Travis Molina MD, Radha Stone MD, Mohsin Reza, MD, Lenora Alvarez MD, Shirley Waterhouse, CNP,  Gely Meredith, CNP, Jameson Joe, CNP,  Nayeli Guillen, SHAHANA, Polly Washington, CNP, Daisha Galvan, CNP, Jessica Love, CNP, Mariana Bautista, CNP, Meenu Sommers, PA-C, Zandra Dumont, CNP, Jeny Espitia, CNP,  Shasha Pearce, CNP, Lora Javed, CNP, William Suggs, PA-C, Alaina Oquendo, PA-C, Shy Bell, CNP,        Liya Casper, CNS, Amita Lucero, CNP, Amanda Baxter, CNP         [facility]   IN-PATIENT SERVICE   [facility]    Progress Note    7/7/2025    3:52 PM    Name:   Mikael Estevez  MRN:     [id number]     Acct:      [id number]   Room:   0342/0342-01   Day:  2  Admit Date:  7/5/2025 12:15 AM    PCP:   Ciro Martinez Jr., MD  Code Status:  Full Code    Subjective:     C/C:   Chief Complaint   Patient presents with    Wound Check     Right leg amputee    Wound Dehiscence     Bleeding Right leg amputee     Interval History Status: not changed.     Having quite a bit of pain postop  Denies cp/sob/n/v    Brief History:     Per my partner:  \"This is a 55-year-old female with history of peripheral vascular disease, chronic systolic heart failure, coronary artery disease, hypertension, type 2 diabetes, bipolar 1 disorder and recent right above-the-knee amputation on 6/17/2025

## 2025-07-07 NOTE — PLAN OF CARE
Problem: Chronic Conditions and Co-morbidities  Goal: Patient's chronic conditions and co-morbidity symptoms are monitored and maintained or improved  7/7/2025 0438 by Esther Chaney RN  Outcome: Progressing  7/6/2025 1848 by Alexandria Aceves RN  Outcome: Progressing     Problem: Safety - Adult  Goal: Free from fall injury  7/7/2025 0438 by Esther Chaney RN  Outcome: Progressing  7/6/2025 1848 by Alexandria Aceves RN  Outcome: Progressing     Problem: ABCDS Injury Assessment  Goal: Absence of physical injury  7/7/2025 0438 by Esther Chaney RN  Outcome: Progressing  7/6/2025 1848 by Alexandria Aceves RN  Outcome: Progressing     Problem: Pain  Goal: Verbalizes/displays adequate comfort level or baseline comfort level  7/7/2025 0438 by Esther Chaney RN  Outcome: Progressing  7/6/2025 1848 by Alexandria Aceves RN  Outcome: Progressing

## 2025-07-07 NOTE — ANESTHESIA POSTPROCEDURE EVALUATION
Department of Anesthesiology  Postprocedure Note    Patient: Mikael Estevez  MRN: [id number]  YOB: 1970  Date of evaluation: 7/7/2025    Procedure Summary       Date: 07/07/25 Room / Location: [place] [address] / [facility]    Anesthesia Start: 1200 Anesthesia Stop: 1307    Procedure: RIGHT REVISION  ABOVE KNEE LEG AMPUTATION (Right: Leg Upper) Diagnosis:       Dehiscence of amputation stump (HCC)      (Dehiscence of amputation stump (HCC) [T87.81])    Surgeons: Robert Jay MD Responsible Provider: Abel Daugherty MD    Anesthesia Type: general ASA Status: 4            Anesthesia Type: No value filed.    Annamaria Phase I: Annamaria Score: 10    Annamaria Phase II:      Anesthesia Post Evaluation    Patient location during evaluation: PACU  Patient participation: complete - patient participated  Level of consciousness: awake and alert  Pain score: 3  Airway patency: patent  Nausea & Vomiting: no vomiting and no nausea  Cardiovascular status: hemodynamically stable  Respiratory status: acceptable  Hydration status: stable  Pain management: adequate    No notable events documented.

## 2025-07-07 NOTE — PLAN OF CARE
Problem: Chronic Conditions and Co-morbidities  Goal: Patient's chronic conditions and co-morbidity symptoms are monitored and maintained or improved  7/7/2025 1533 by Nicolette Dangelo RN  Outcome: Progressing  7/7/2025 0438 by Esther Chaney RN  Outcome: Progressing     Problem: Safety - Adult  Goal: Free from fall injury  7/7/2025 1533 by Nicolette Dangeol RN  Outcome: Progressing  7/7/2025 0438 by Esther Chaney RN  Outcome: Progressing     Problem: ABCDS Injury Assessment  Goal: Absence of physical injury  7/7/2025 1533 by Nicolette Dangelo RN  Outcome: Progressing  7/7/2025 0438 by Esther Chaney RN  Outcome: Progressing     Problem: Pain  Goal: Verbalizes/displays adequate comfort level or baseline comfort level  7/7/2025 1533 by Nicolette Dangelo RN  Outcome: Progressing  7/7/2025 0438 by Esther Chaney RN  Outcome: Progressing     Problem: Discharge Planning  Goal: Discharge to home or other facility with appropriate resources  Outcome: Progressing

## 2025-07-07 NOTE — CARE COORDINATION
07/07/25 0820   Readmission Assessment   Number of Days since last admission? 8-30 days   Previous Disposition SNF  (has since returned home)   Who is being Interviewed Patient   What was the patient's/caregiver's perception as to why they think they needed to return back to the hospital? Other (Comment)  (Fell, wound opened up and is bleeding)   Did you visit your Primary Care Physician after you left the hospital, before you returned this time? Yes  (saw facility PCP)   Did you see a specialist, such as Cardiac, Pulmonary, Orthopedic Physician, etc. after you left the hospital? No   Who advised the patient to return to the hospital? Self-referral   Does the patient report anything that got in the way of taking their medications? No   In our efforts to provide the best possible care to you and others like you, can you think of anything that we could have done to help you after you left the hospital the first time, so that you might not have needed to return so soon? Other (Comment)  (nothing)

## 2025-07-07 NOTE — PROGRESS NOTES
Occupational Therapy    [facility]  Occupational Therapy Not Seen Note    DATE: 2025    NAME: Mikael Estevez  MRN: [id number]   : 1970      Patient not seen this date for Occupational Therapy due to:    Surgery/Procedure: Planning for OR today 25 for R AKA stump washout, revision, possible wound vac placement.     Next Scheduled Treatment: 2025    Electronically signed by CHEN Montes De Oca on 2025 at 7:59 AM

## 2025-07-07 NOTE — ANESTHESIA PRE PROCEDURE
Department of Anesthesiology  Preprocedure Note       Name:  Mikael Estevez   Age:  55 y.o.  :  1970                                          MRN:  [id number]         Date:  2025      Surgeon: Surgeon(s):  Robert Jay MD    Procedure: Procedure(s):  *ADD ON* REVISION  ABOVE KNEE LEG AMPUTATION    Medications prior to admission:   Prior to Admission medications    Medication Sig Start Date End Date Taking? Authorizing Provider   doxycycline hyclate (VIBRA-TABS) 100 MG tablet Take 1 tablet by mouth 2 times daily for 7 days 25  Sarthak Mohr MD   insulin glargine (LANTUS) 100 UNIT/ML injection vial Inject 18 Units into the skin nightly 25   Kemi Stewart MD   melatonin 5 MG TABS tablet Take 2 tablets by mouth nightly as needed (inosmnia) 25   Antonietta Kennedy MD   guaiFENesin (MUCINEX) 600 MG extended release tablet Take 1 tablet by mouth 2 times daily as needed for Congestion 25   Marlen Vo MD   isosorbide mononitrate (IMDUR) 30 MG extended release tablet Take 1 tablet by mouth daily 25   Marlen Vo MD   apixaban (ELIQUIS) 5 MG TABS tablet Take 1 tablet by mouth 2 times daily 25   Marlen Vo MD   atorvastatin (LIPITOR) 40 MG tablet Take 1 tablet by mouth nightly 25   Marlen Vo MD   carvedilol (COREG) 12.5 MG tablet Take 1 tablet by mouth 2 times daily (with meals) 25   Marlen Vo MD   spironolactone (ALDACTONE) 25 MG tablet Take 0.5 tablets by mouth daily 25   Marlen Vo MD   sennosides-docusate sodium (SENOKOT-S) 8.6-50 MG tablet Take 2 tablets by mouth 2 times daily at 0800 and 1400 25   Marlen Vo MD   gabapentin (NEURONTIN) 300 MG capsule Take 1 capsule by mouth every 8 (eight) hours for 30 days. 25  Marlen Vo MD   Misc. Devices MISC 1 each by Does not apply route once for 1 dose DME order for forefoot offloading wedge surgical shoe  Diagnosis: post operative state 25 Jassi,

## 2025-07-07 NOTE — OP NOTE
Operative Note      Patient: Mikael Estevez  YOB: 1970  MRN: [id number]    Date of Procedure: 7/7/2025    Pre-Op Diagnosis Codes:      * Dehiscence of amputation stump (HCC) [T87.81]    Post-Op Diagnosis: Same       Procedure: Right above knee amputation revision    Surgeon(s):  Robert Jay MD    Assistant: Jessica Estrella DO (Resident)    Anesthesia: General    Estimated Blood Loss (mL): 40 mL    Complications: None    Specimens: None    Implants: None      Drains: None    Findings:  Infection Present At Time Of Surgery (PATOS) (choose all levels that have infection present):  No infection present  Other Findings: There is large right above knee traumatic wound from recent fall that extends down to bone. No obvious infection. Bone was resected a few centimeters. Good hemostasis. Deep tissue was approximated. A wound vac was placed at subcutaneous level set to 125 mmHg.    Detailed Description of Procedure:   Ms. Estevez was brought to the operative room placed in supine position.  She was intubated sedated by the anesthesia team.  Right above-knee amputation stump was prepped and draped in standard sterile fashion.  A time out was performed.  The wound was measured (9 cm x 5 cm x 3 cm).  Bone was seen deep in the wound and soft tissue was retracted and the bone was cut a few centimeters with a power saw.  Then the wound was irrigated with pulse  (saline mixed with vancomycin).  The wound was also sharply debrided with a curette down to muscle. Hemostatic agent was placed over bone.  Once hemostasis is achieved the deep layers were closed with 2-0 Vicryl sutures.  The subcutaneous part of the wound was not closed and instead a single black foam was placed with wound VAC set at 125 mmHg.  Good seal.  Good hemostasis.  Patient tolerated the procedure well.    Electronically signed by ROBERT JAY MD on 7/7/2025 at 1:31 PM

## 2025-07-07 NOTE — PROGRESS NOTES
Physical Therapy        Physical Therapy Cancel Note      DATE: 2025    NAME: Mikael Estevez  MRN: [id number]   : 1970      Patient not seen this date for Physical Therapy due to:    Other: plan for OR today with vascular. PT will check back for post-op needs.       Electronically signed by Karoline Boston PT on 2025 at 7:56 AM

## 2025-07-07 NOTE — PROGRESS NOTES
Division of Vascular Surgery             Progress Note      Name: Mikael Estevez  MRN: [id number]         Overnight Events:     Patient endorses feeling cold and continued pain to right AKA stump site.       Subjective:     Patient seen and examined at bedside.  She has been NPO for debridement today. She denies chest pain or shortness of breath. She is resting in bed comfortably at this time. She denies questions with upcoming surgery today. Vital signs stable within normal limits.    Physical Exam:     Vitals:  BP (!) 159/62   Pulse 69   Temp 98 °F (36.7 °C) (Oral)   Resp 18   Ht 1.651 m (5' 5\")   Wt 63.6 kg (140 lb 3.4 oz)   SpO2 98%   BMI 23.33 kg/m²       General appearance - alert, well appearing and in no acute distress  Mental status - oriented to person, place and time with normal affect  Head - normocephalic and atraumatic  Neck - supple  Chest - normal respiratory effort, no wheezing or stridor   Heart - normal rate, regular rhythm, no murmurs  Abdomen - soft, non-tender, non-distended  Neurological - normal speech, no focal findings or movement disorder noted  Extremities - R AKA stump with dehiscence  Skin - no gross lesions, rashes, or induration noted        Imaging:   XR FEMUR RIGHT (MIN 2 VIEWS)  Result Date: 7/4/2025  Soft tissue defect involving the amputated stump with exposure of the bone.          Assessment:     55-year-old female with history of right AKA who suffered fall onto her right AKA stump.       Plan:     Planning for OR today 7/7/25 for R AKA stump washout, revision, possible wound vac placement.   Continue local wound care.  Continue antibiotics.  Currently receiving Ancef. WBC 9.6.   Pain and nausea control as needed.   Remainder of care per primary.   Vascular will continue to follow     Electronically signed by PRITI Mckee CNP on 7/7/2025 at 7:06 AM          [facility] [place]  O: [phone]  C: [phone]  Email:  MRN: [id number], Remi Smith Jr. is a 53 year old male admitted for No chief complaint on file.  .    Admission Dt/Tm     8/26/2020  9:03 AM  Discharge DT/TM  8/28/2020  3:04 PM    Hospital Course  Patient was admitted after incarcerated ventral hernia repair with general surgery. Patient tolerated procedure well and had pain controlled with Sensorcaine pump implanted. Patient was started on CLD and advanced passing flatus and ambulating well. Patient required straight cath and flomax initiation secondary to acute urinary retention. Patient was maintained on his home antiseizure medications and was discharged in stable condition with pain pump with General Surgery follow up on POD 2.   Lab Results  Recent Results (from the past 48 hour(s))   CBC with Automated Differential    Collection Time: 08/27/20  3:59 AM   Result Value Ref Range    WBC 12.1 (H) 4.2 - 11.0 K/mcL    RBC 4.37 (L) 4.50 - 5.90 mil/mcL    HGB 13.9 13.0 - 17.0 g/dL    HCT 43.0 39.0 - 51.0 %    MCV 98.4 78.0 - 100.0 fl    MCH 31.8 26.0 - 34.0 pg    MCHC 32.3 32.0 - 36.5 g/dL    RDW-CV 12.1 11.0 - 15.0 %     140 - 450 K/mcL    NRBC 0 0 /100 WBC    DIFF TYPE AUTOMATED DIFFERENTIAL     Neutrophil 72 %    LYMPH 18 %    MONO 10 %    EOSIN 0 %    BASO 0 %    Percent Immature Granuloctyes 0 %    Absolute Neutrophil 8.7 (H) 1.8 - 7.7 K/mcL    Absolute Lymph 2.2 1.0 - 4.0 K/mcL    Absolute Mono 1.2 (H) 0.3 - 0.9 K/mcL    Absolute Eos 0.0 (L) 0.1 - 0.5 K/mcL    Absolute Baso 0.0 0.0 - 0.3 K/mcL    Absolute Immature Granulocytes 0.0 0 - 0.2 K/mcl   Basic Metabolic Panel    Collection Time: 08/27/20  3:59 AM   Result Value Ref Range    Sodium 144 135 - 145 mmol/L    Potassium 3.8 3.4 - 5.1 mmol/L    Chloride 114 (H) 98 - 107 mmol/L    Carbon Dioxide 25 21 - 32 mmol/L    Anion Gap 9 (L) 10 - 20 mmol/L    Glucose 92 65 - 99 mg/dL    BUN 12 6 - 20 mg/dL    Creatinine 1.04 0.67 - 1.17 mg/dL    GFR Estimate,  >90     GFR Estimate, Non   American 80     BUN/Creatinine Ratio 12 7 - 25    CALCIUM 8.3 (L) 8.4 - 10.2 mg/dL   Magnesium    Collection Time: 08/27/20  3:59 AM   Result Value Ref Range    MAGNESIUM 2.3 1.7 - 2.4 mg/dL   Basic Metabolic Panel    Collection Time: 08/28/20  9:30 AM   Result Value Ref Range    Sodium 141 135 - 145 mmol/L    Potassium 3.7 3.4 - 5.1 mmol/L    Chloride 114 (H) 98 - 107 mmol/L    Carbon Dioxide 23 21 - 32 mmol/L    Anion Gap 8 (L) 10 - 20 mmol/L    Glucose 139 (H) 65 - 99 mg/dL    BUN 10 6 - 20 mg/dL    Creatinine 1.08 0.67 - 1.17 mg/dL    GFR Estimate,  89     GFR Estimate, Non African American 77     BUN/Creatinine Ratio 9 7 - 25    CALCIUM 8.5 8.4 - 10.2 mg/dL   CBC with Automated Differential    Collection Time: 08/28/20  9:30 AM   Result Value Ref Range    WBC 10.5 4.2 - 11.0 K/mcL    RBC 4.11 (L) 4.50 - 5.90 mil/mcL    HGB 13.4 13.0 - 17.0 g/dL    HCT 39.8 39.0 - 51.0 %    MCV 96.8 78.0 - 100.0 fl    MCH 32.6 26.0 - 34.0 pg    MCHC 33.7 32.0 - 36.5 g/dL    RDW-CV 12.5 11.0 - 15.0 %     140 - 450 K/mcL    NRBC 0 0 /100 WBC    DIFF TYPE AUTOMATED DIFFERENTIAL     Neutrophil 71 %    LYMPH 20 %    MONO 7 %    EOSIN 1 %    BASO 1 %    Percent Immature Granuloctyes 0 %    Absolute Neutrophil 7.5 1.8 - 7.7 K/mcL    Absolute Lymph 2.0 1.0 - 4.0 K/mcL    Absolute Mono 0.8 0.3 - 0.9 K/mcL    Absolute Eos 0.1 0.1 - 0.5 K/mcL    Absolute Baso 0.1 0.0 - 0.3 K/mcL    Absolute Immature Granulocytes 0.0 0 - 0.2 K/mcl       Imaging  No orders to display       Pathology  No specimens collected during this procedure.    Test Results Pending At Discharge      Pertinent Physical Exam At Time of Discharge  Physical Exam  Constitutional:       Appearance: Normal appearance.   HENT:      Head: Normocephalic and atraumatic.      Nose: Nose normal.      Mouth/Throat:      Mouth: Mucous membranes are moist.   Eyes:      General: No scleral icterus.     Extraocular Movements: Extraocular movements intact.  [email or web address]      I spent a total of approximately 35 minutes with the patient today, answering questions regarding symptoms and treatment plan, conducting an examination, reviewing chart, and documenting in the chart.                      Pupils: Pupils are equal, round, and reactive to light.   Neck:      Musculoskeletal: Normal range of motion and neck supple. No neck rigidity or muscular tenderness.   Cardiovascular:      Rate and Rhythm: Normal rate and regular rhythm.      Pulses: Normal pulses.      Heart sounds: Normal heart sounds. No murmur. No friction rub.   Pulmonary:      Effort: Pulmonary effort is normal. No respiratory distress.      Breath sounds: Normal breath sounds. No stridor.   Abdominal:      General: Abdomen is flat. There is no distension.      Palpations: Abdomen is soft. There is no mass.      Tenderness: There is abdominal tenderness. - Pain pump noted - improved on d/c     Hernia: No hernia is present.      Comments: S/p hernia Repair  - incision c/d/i. Oozing improved  Musculoskeletal: Normal range of motion.         General: No swelling, tenderness, deformity or signs of injury.   Skin:     General: Skin is warm and dry.      Capillary Refill: Capillary refill takes less than 2 seconds.      Coloration: Skin is not jaundiced or pale.   Neurological:      General: No focal deficit present.      Mental Status: He is alert and oriented to person, place, and time.      Cranial Nerves: No cranial nerve deficit.      Sensory: No sensory deficit.   Psychiatric:         Mood and Affect: Mood normal.         Behavior: Behavior normal.     Discharge Diagnosis  Incarcerated Ventral Hernia Repair    Plan  52 y/o M with Seizure disorder presents s/p incarcerated ventral hernia s/p repair     1. Incarcerated Ventral hernia s/p Repair - POD 2 - per General Surgery - on CLD at this time - advance per surgery. Passing flatus, tolerating diet. WBC elevation secondary to stress of surgery. Pain management per surgery - pain pump effective - d/c on this per Surgery     2. Epilepsy - currently on Lamtical XR and Depakote XR - patient needs name brand meds - patient to use his own     3. Anxiety - Cont. Lexapro and ativan Prn     4.  Urinary Retention - Straight cath q 6 hours PRN for >500 cc of urine. Flomax started by surgery - d/c on this.      5. DVT prophylaxis - SCDs, per surgery      Discharge Instructions  Please follow up with General surgery as instructed  Please take flomax to ensure you can pass urine effectively    Discharge Medications     Summary of your Discharge Medications      Take these Medications      Details   acetaminophen 500 MG tablet  Commonly known as: TYLENOL   Take 2 tablets by mouth every 6 hours as needed for Pain.     docusate sodium 100 MG capsule  Commonly known as: Colace   Take 1 capsule by mouth 2 times daily.     ketorolac 10 MG tablet  Commonly known as: TORADOL   Take 1 tablet by mouth every 6 hours for 4 days.     LamoTRIgine 300 MG 24 hr tablet  Commonly known as: lamictal xr   Take 600 mg by mouth daily. Pt can take Brand Name only.     Lexapro 20 MG tablet   Generic drug: escitalopram  Take 30 mg by mouth daily.     oxycodone 5 MG capsule  Commonly known as: OXY-IR   Take 1 capsule by mouth every 6 hours as needed (take w/ snack.  no EtOH or driving with this med.).     tamsulosin 0.4 MG Cap  Commonly known as: FLOMAX  Start taking on: August 29, 2020   Take 1 capsule by mouth daily after a meal. Do not start before August 29, 2020.     Trokendi  MG extended-release capsule   Generic drug: topiramate  Take 100 mg by mouth daily. Patient can take only the brand name drug (Trokendi XR)            Smoking Cessation  Counseling given: patient is not a current smoker  Comment: quit 2002      Primary Care Physician  MD Carloz Mobley MD  Hospitalist  [facility]  24 Hr Hospitalist Service with Same Physician Continuity of Care  [phone] [facility] 24hr Answering Service  [phone]

## 2025-07-08 ENCOUNTER — APPOINTMENT (OUTPATIENT)
Dept: CT IMAGING | Age: 55
DRG: 475 | End: 2025-07-08
Payer: MEDICARE

## 2025-07-08 LAB — GLUCOSE BLD-MCNC: 190 MG/DL (ref 65–105)

## 2025-07-08 PROCEDURE — 6370000000 HC RX 637 (ALT 250 FOR IP): Performed by: STUDENT IN AN ORGANIZED HEALTH CARE EDUCATION/TRAINING PROGRAM

## 2025-07-08 PROCEDURE — 82947 ASSAY GLUCOSE BLOOD QUANT: CPT

## 2025-07-08 PROCEDURE — 6370000000 HC RX 637 (ALT 250 FOR IP): Performed by: INTERNAL MEDICINE

## 2025-07-08 PROCEDURE — 72125 CT NECK SPINE W/O DYE: CPT

## 2025-07-08 PROCEDURE — 1200000000 HC SEMI PRIVATE

## 2025-07-08 PROCEDURE — 2500000003 HC RX 250 WO HCPCS: Performed by: STUDENT IN AN ORGANIZED HEALTH CARE EDUCATION/TRAINING PROGRAM

## 2025-07-08 PROCEDURE — 6360000002 HC RX W HCPCS: Performed by: STUDENT IN AN ORGANIZED HEALTH CARE EDUCATION/TRAINING PROGRAM

## 2025-07-08 PROCEDURE — 70450 CT HEAD/BRAIN W/O DYE: CPT

## 2025-07-08 PROCEDURE — 99232 SBSQ HOSP IP/OBS MODERATE 35: CPT | Performed by: INTERNAL MEDICINE

## 2025-07-08 RX ADMIN — MORPHINE SULFATE 2 MG: 2 INJECTION, SOLUTION INTRAMUSCULAR; INTRAVENOUS at 20:02

## 2025-07-08 RX ADMIN — ACETAMINOPHEN 1000 MG: 500 TABLET ORAL at 06:32

## 2025-07-08 RX ADMIN — MORPHINE SULFATE 2 MG: 2 INJECTION, SOLUTION INTRAMUSCULAR; INTRAVENOUS at 03:48

## 2025-07-08 RX ADMIN — Medication 2000 MG: at 12:52

## 2025-07-08 RX ADMIN — ACETAMINOPHEN 1000 MG: 500 TABLET ORAL at 14:29

## 2025-07-08 RX ADMIN — METHOCARBAMOL 750 MG: 750 TABLET ORAL at 20:03

## 2025-07-08 RX ADMIN — Medication 2000 MG: at 03:44

## 2025-07-08 RX ADMIN — INSULIN GLARGINE 18 UNITS: 100 INJECTION, SOLUTION SUBCUTANEOUS at 20:01

## 2025-07-08 RX ADMIN — METHOCARBAMOL 750 MG: 750 TABLET ORAL at 08:42

## 2025-07-08 RX ADMIN — SODIUM CHLORIDE, PRESERVATIVE FREE 10 ML: 5 INJECTION INTRAVENOUS at 20:02

## 2025-07-08 RX ADMIN — Medication 2000 MG: at 20:02

## 2025-07-08 RX ADMIN — CARVEDILOL 12.5 MG: 12.5 TABLET, FILM COATED ORAL at 17:37

## 2025-07-08 RX ADMIN — SPIRONOLACTONE 12.5 MG: 25 TABLET, FILM COATED ORAL at 08:42

## 2025-07-08 RX ADMIN — QUETIAPINE FUMARATE 150 MG: 100 TABLET ORAL at 20:06

## 2025-07-08 RX ADMIN — LISINOPRIL 2.5 MG: 5 TABLET ORAL at 08:42

## 2025-07-08 RX ADMIN — GABAPENTIN 300 MG: 300 CAPSULE ORAL at 03:44

## 2025-07-08 RX ADMIN — ACETAMINOPHEN 1000 MG: 500 TABLET ORAL at 20:02

## 2025-07-08 RX ADMIN — MORPHINE SULFATE 2 MG: 2 INJECTION, SOLUTION INTRAMUSCULAR; INTRAVENOUS at 06:36

## 2025-07-08 RX ADMIN — GABAPENTIN 300 MG: 300 CAPSULE ORAL at 12:52

## 2025-07-08 RX ADMIN — ISOSORBIDE MONONITRATE 30 MG: 30 TABLET, EXTENDED RELEASE ORAL at 08:42

## 2025-07-08 RX ADMIN — APIXABAN 5 MG: 5 TABLET, FILM COATED ORAL at 20:03

## 2025-07-08 RX ADMIN — ASPIRIN 81 MG: 81 TABLET, COATED ORAL at 08:42

## 2025-07-08 RX ADMIN — Medication 10 MG: at 20:02

## 2025-07-08 RX ADMIN — MORPHINE SULFATE 2 MG: 2 INJECTION, SOLUTION INTRAMUSCULAR; INTRAVENOUS at 22:34

## 2025-07-08 RX ADMIN — ATORVASTATIN CALCIUM 40 MG: 40 TABLET, FILM COATED ORAL at 20:03

## 2025-07-08 RX ADMIN — METHOCARBAMOL 750 MG: 750 TABLET ORAL at 14:29

## 2025-07-08 RX ADMIN — MORPHINE SULFATE 2 MG: 2 INJECTION, SOLUTION INTRAMUSCULAR; INTRAVENOUS at 15:21

## 2025-07-08 RX ADMIN — MORPHINE SULFATE 2 MG: 2 INJECTION, SOLUTION INTRAMUSCULAR; INTRAVENOUS at 17:45

## 2025-07-08 RX ADMIN — PANTOPRAZOLE SODIUM 40 MG: 40 TABLET, DELAYED RELEASE ORAL at 06:32

## 2025-07-08 RX ADMIN — CARVEDILOL 12.5 MG: 12.5 TABLET, FILM COATED ORAL at 08:42

## 2025-07-08 RX ADMIN — MORPHINE SULFATE 2 MG: 2 INJECTION, SOLUTION INTRAMUSCULAR; INTRAVENOUS at 12:51

## 2025-07-08 RX ADMIN — MORPHINE SULFATE 2 MG: 2 INJECTION, SOLUTION INTRAMUSCULAR; INTRAVENOUS at 08:47

## 2025-07-08 RX ADMIN — METHOCARBAMOL 750 MG: 750 TABLET ORAL at 17:37

## 2025-07-08 RX ADMIN — SODIUM CHLORIDE, PRESERVATIVE FREE 10 ML: 5 INJECTION INTRAVENOUS at 08:42

## 2025-07-08 RX ADMIN — GABAPENTIN 300 MG: 300 CAPSULE ORAL at 20:03

## 2025-07-08 RX ADMIN — SODIUM CHLORIDE, PRESERVATIVE FREE 10 ML: 5 INJECTION INTRAVENOUS at 03:44

## 2025-07-08 ASSESSMENT — PAIN DESCRIPTION - LOCATION
LOCATION: KNEE
LOCATION: LEG
LOCATION: KNEE
LOCATION: KNEE
LOCATION: OTHER (COMMENT)
LOCATION: LEG
LOCATION: LEG
LOCATION: KNEE;LEG

## 2025-07-08 ASSESSMENT — PAIN SCALES - GENERAL
PAINLEVEL_OUTOF10: 6
PAINLEVEL_OUTOF10: 4
PAINLEVEL_OUTOF10: 10
PAINLEVEL_OUTOF10: 3
PAINLEVEL_OUTOF10: 9
PAINLEVEL_OUTOF10: 7
PAINLEVEL_OUTOF10: 4
PAINLEVEL_OUTOF10: 10
PAINLEVEL_OUTOF10: 9
PAINLEVEL_OUTOF10: 9
PAINLEVEL_OUTOF10: 4
PAINLEVEL_OUTOF10: 10
PAINLEVEL_OUTOF10: 9
PAINLEVEL_OUTOF10: 8

## 2025-07-08 ASSESSMENT — PAIN DESCRIPTION - DESCRIPTORS
DESCRIPTORS: ACHING
DESCRIPTORS: ACHING;THROBBING
DESCRIPTORS: ACHING
DESCRIPTORS: ACHING
DESCRIPTORS: BURNING;ACHING

## 2025-07-08 ASSESSMENT — PAIN DESCRIPTION - ORIENTATION
ORIENTATION: RIGHT

## 2025-07-08 NOTE — PROGRESS NOTES
Pt wishing to wheel herself down to lobby again. Pt educated on risks d/t previous fall. Pt understanding and previous form filled out to leave unit is in chart.

## 2025-07-08 NOTE — DISCHARGE INSTR - COC
Continuity of Care Form    Patient Name: Mikael Estevez   :  1970  MRN:  [id number]    Admit date:  2025  Discharge date:  7/10/2025    Code Status Order: Full Code   Advance Directives:     Admitting Physician:  Radha DORANTES DO  PCP: Ciro Martinez Jr., MD    Discharging Nurse: PRINCESS Ramos  Discharging Hospital Unit/Room#: 0342/0342-01  Discharging Unit Phone Number: [phone]    Emergency Contact:   Extended Emergency Contact Information  Primary Emergency Contact: Julio Estevez  Home Phone: [phone]  Relation: Child   needed? No  Secondary Emergency Contact: Radha Hercules  Address:   Home Phone: [phone]  Mobile Phone: [phone]  Relation: Child    Past Surgical History:  Past Surgical History:   Procedure Laterality Date    ABOVE KNEE AMPUTATION Right 2025    AMPUTATION Right 2025    AMPUTATION OF DISTAL PHALANX OF HALLUX,RESECTION OF PROXIMAL PHALANX - Right    AMPUTATION Right 2025    RIGHT HALLUX AMPUTATION WITH INTEGRA GRAFT, INTEGRA    BACK SURGERY      CARDIAC PROCEDURE N/A 2025    aubrie / Left heart cath / coronary angiography / rm 236 performed by Deisy Henry MD at [facility] CARDIAC CATH LAB    CHOLECYSTECTOMY      FEMORAL BYPASS Right 2025    RIGHT  FEMORAL ENDATRERECTOMY XENOSURE PATCH 0.8X8, ANGIOGRAM performed by Robert Jay MD at [facility] CVOR    FEMORAL BYPASS Right 2025    FEMORAL POPLITEAL BYPASS WITH CRYO VEIN performed by Robert Jay MD at [facility] CVOR    LEG SURGERY Right 2025    ABOVE KNEE AMPUTATION performed by Robert Jay MD at [facility] OR    LEG SURGERY Right 2025    RIGHT REVISION  ABOVE KNEE LEG AMPUTATION performed by Robert Jay MD at [facility] CVOR    TOE AMPUTATION Right 2025    AMPUTATION OF DISTAL PHALANX OF HALLUX,RESECTION OF PROXIMAL PHALANX performed by Izzy Jeffries DPM at [facility] OR    TOE AMPUTATION Right 2025    RIGHT HALLUX  home oxygen therapy.  Ventilator:    - No ventilator support    Rehab Therapies: Physical Therapy, Occupational Therapy, and Orthotics/Prosthetics  Weight Bearing Status/Restrictions: No weight bearing restrictions  Other Medical Equipment (for information only, NOT a DME order):  wheelchair  Other Treatments:     Patient's personal belongings (please select all that are sent with patient):  None    RN SIGNATURE:  Electronically signed by Rachel Archibald RN on 7/10/25 at 12:51 PM EDT    CASE MANAGEMENT/SOCIAL WORK SECTION    Inpatient Status Date: 07/05/2025    Readmission Risk Assessment Score:  [facility] RISK OF UNPLANNED READMISSION 2.0             22.5 Total Score        Discharging to Facility/ Agency   Name: [facility]  Address:  Phone:  Fax:    Dialysis Facility (if applicable)   Name:  Address:  Dialysis Schedule:  Phone:  Fax:    / signature: Electronically signed by PATY HOWARD on 7/10/25 at 2:49 PM EDT    PHYSICIAN SECTION    Prognosis: Good    Condition at Discharge: Stable    Rehab Potential (if transferring to Rehab): Good    Recommended Labs or Other Treatments After Discharge:   Wound vac changes Monday, Wednesday, Friday. Black granufoam in wound bed, cover with drape. -125mmHg continuous suction  Follow up with Dr. Jay in 1 week  Wet-to-dry dressing placement follow-up at the facility    Physician Certification: I certify the above information and transfer of Mikael Estevez  is necessary for the continuing treatment of the diagnosis listed and that she requires Skilled Nursing Facility for less 30 days.     Update Admission H&P: No change in H&P    PHYSICIAN SIGNATURE:  Electronically signed by Sven Rooney MD on 7/10/25 at 1:49 PM EDT

## 2025-07-08 NOTE — PROGRESS NOTES
Wound dehiscence 7/5/2025 Yes    Bipolar 1 disorder (HCC) 7/6/2025 Yes    Hypertension 7/6/2025 Yes    Type 2 diabetes mellitus (HCC) 7/6/2025 Yes    H/O: CVA (cerebrovascular accident) (Chronic) 7/6/2025 Yes    Drug abuse (HCC) 7/6/2025 Yes    PAD (peripheral artery disease) 7/6/2025 Yes    Cardiomyopathy (HCC) 7/6/2025 Yes    H/O osteomyelitis 7/6/2025 Yes    History of femoropopliteal bypass 7/6/2025 Yes    CAD (coronary artery disease) 7/6/2025 Yes    S/P AKA (above knee amputation) unilateral, right (HCC) 7/6/2025 Yes    Surgical wound dehiscence, sequela 7/7/2025 Yes       Plan:     Wound dehiscence of right AKA stump after fall  Status post OR 7/7/2025 for right above-the-knee amputation revision.  Patient tolerated the procedure well  PT OT  Pain control  Plan for discharge, will follow with vascular outpatient   Nonischemic cardiomyopathy with moderate chronic coronary disease and primary hypertension  Continue eliquis on d/c in addition to coreg, lipitor, aldactone, lisinopril.   Bipolar 1 disorder continue home Seroquel  Prior stroke continue antiplatelets and statin  Known history of noncompliance and drug abuse  Tobacco use disorder recommend smoking cessation    Medical Decision Making: Medium    Stable for d/c once wound vac arrives  Radha Galeano DO  7/8/2025  8:10 AM

## 2025-07-08 NOTE — PROGRESS NOTES
Physical Therapy        Physical Therapy Cancel Note      DATE: 2025    NAME: Mikael Estevez  MRN: [id number]   : 1970      Patient not seen this date for Physical Therapy due to:    Other: pt initially off unit in wheelchair on first check, pt leaving for CT on second attempt post fall. PT will check back 25.       Electronically signed by Karoline Boston PT on 2025 at 3:13 PM

## 2025-07-08 NOTE — PROGRESS NOTES
Congestive Heart Failure Education completed and charted. CHF booklet given. Patient was receptive to education.    Discussed the  importance of medication compliance.    Discussed the importance of a heart healthy diet. Discussed 2000 mg sodium-restricted daily diet.  Patient instructed to limit fluid intake to  1.5 to 2 liters per day.    Patient instructed to weigh self at the same time of each day each morning, reinforced teaching to monitor for 3-5 lb weight increase over 1-2 days notify physician if change noted.      Signs and symptoms of CHF discussed with patient, such as feeling more tired than normal, feeling short of breath, coughing that increases when lying down, sudden weight gain, swelling of the feet, legs or belly.  Patient verbalized understanding to notify physician office if these symptoms occur.  EF 31%   Pt is agreeable to an outpatient CHF Clinic referral . Referral placed.

## 2025-07-08 NOTE — CARE COORDINATION
Case Management   Daily Progress Note       Patient Name: Mikael Estevez                   YOB: 1970  Diagnosis: Wound dehiscence [T81.30XA]  Surgical wound dehiscence, sequela [T81.31XS]  Fall from standing, initial encounter [W19.XXXA]                       GMLOS: 3.7 days  Length of Stay: 3  days    Anticipated Discharge Date: Longer than expected LOS    Readmission Risk (Low < 19, Mod (19-27), High > 27): Readmission Risk Score: 30        Current Transitional Plan    [] Home Independently    [] Home with HC    [] Skilled Nursing Facility    [] Acute Rehabilitation    [] Long Term Acute Care (LTAC)    [] Other:     Plan for the Stay (Medical Management) :          Workflow Continuation (Additional Notes) : PS message to Karoline Murillo NP asking if they anticipate patient returning home with [place] and if so, paperwork has been placed in soft chart. Read at 1026.    1110 Attempt to see patient to discuss HHC. Patient off the floor for testing.    1305 Spoke with patient. Plan is to return to [facility] on discharge. Referral sent via [facility].    1447 Requested CM assistant LM start precert once PT/OT notes available    7024 Phone call to Estrella with [facility] to inquire on circumstances of patient not being at the facility when fell-had she been discharged or on KASSANDRA. She will call the building to get more information and call back.        Molly Keane RN  July 8, 2025

## 2025-07-08 NOTE — PROGRESS NOTES
Rapid response called to main lobby of [facility]. This pt was wheeling herself up main entrance ramp, when the front wheels of the wheelchair lifted and she fell backwards and hit her head. Denied LOC, however does have a knot on the back of her head. RN came down and retrieved pt. Primary notified and CT ordered.

## 2025-07-08 NOTE — PROGRESS NOTES
Division of Vascular Surgery        Progress Note          Subjective:      56 y/o female seen postoperative day 1 from right AKA debridement and wound vacuum placement due to wound dehiscence. Patient reports pain is 9/10 but yesterday was over 10/10, therefore improved with medications. Patient has been tolerating diet without nausea or vomiting. Does not endorse any chest pain, shortness of breath, fever, chills, tingling, or swelling. Wound vacuum in place.     Physical Exam:     Vitals:  BP (!) 141/66   Pulse 66   Temp 98.2 °F (36.8 °C) (Oral)   Resp 16   Ht 1.651 m (5' 5\")   Wt 63.6 kg (140 lb 3.4 oz)   SpO2 100%   BMI 23.33 kg/m²     Physical Exam  Constitutional:       General: She is not in acute distress.  HENT:      Head: Normocephalic and atraumatic.      Nose: Nose normal.      Mouth/Throat:      Mouth: Mucous membranes are moist.   Cardiovascular:      Rate and Rhythm: Normal rate and regular rhythm.   Pulmonary:      Effort: Pulmonary effort is normal. No respiratory distress.      Breath sounds: Normal breath sounds.      Comments: CTA throughout.  Abdominal:      General: Abdomen is flat. There is no distension.      Palpations: Abdomen is soft.      Tenderness: There is no abdominal tenderness.   Skin:     General: Skin is warm and dry.      Comments: Dressing intact and clean over wound without visible drainage or bleeding.No surrounding erythema or swelling.   Wound vac in place with seal intact   Neurological:      Mental Status: She is alert.       Imaging/Labs:          07/07/25 2126  POC Glucose Fingerstick  Collected: 07/07/25 2119  Final result    POC Glucose 147 High  mg/dL            07/07/25 1706  POC Glucose Fingerstick  Collected: 07/07/25 1700  Final result    POC Glucose 109 High  mg/dL            07/07/25 1454  POC Glucose Fingerstick  Collected: 07/07/25 1448  Final result    POC Glucose 105 mg/dL            07/07/25 0743  POC Glucose Fingerstick  Collected:

## 2025-07-08 NOTE — CARE COORDINATION
Mikael Estevez  [id number]  meets criteria for hypertension education. The following resources and interventions were provided: Diabetes Flyer education and Hypertension Flyer education

## 2025-07-09 LAB
GLUCOSE BLD-MCNC: 107 MG/DL (ref 65–105)
GLUCOSE BLD-MCNC: 137 MG/DL (ref 65–105)
GLUCOSE BLD-MCNC: 151 MG/DL (ref 65–105)
GLUCOSE BLD-MCNC: 200 MG/DL (ref 65–105)

## 2025-07-09 PROCEDURE — 97166 OT EVAL MOD COMPLEX 45 MIN: CPT

## 2025-07-09 PROCEDURE — 6360000002 HC RX W HCPCS: Performed by: STUDENT IN AN ORGANIZED HEALTH CARE EDUCATION/TRAINING PROGRAM

## 2025-07-09 PROCEDURE — 6370000000 HC RX 637 (ALT 250 FOR IP): Performed by: INTERNAL MEDICINE

## 2025-07-09 PROCEDURE — 82947 ASSAY GLUCOSE BLOOD QUANT: CPT

## 2025-07-09 PROCEDURE — 97535 SELF CARE MNGMENT TRAINING: CPT

## 2025-07-09 PROCEDURE — 99232 SBSQ HOSP IP/OBS MODERATE 35: CPT | Performed by: STUDENT IN AN ORGANIZED HEALTH CARE EDUCATION/TRAINING PROGRAM

## 2025-07-09 PROCEDURE — 97162 PT EVAL MOD COMPLEX 30 MIN: CPT

## 2025-07-09 PROCEDURE — 1200000000 HC SEMI PRIVATE

## 2025-07-09 PROCEDURE — 97605 NEG PRS WND THER DME<=50SQCM: CPT

## 2025-07-09 PROCEDURE — 2500000003 HC RX 250 WO HCPCS: Performed by: STUDENT IN AN ORGANIZED HEALTH CARE EDUCATION/TRAINING PROGRAM

## 2025-07-09 PROCEDURE — 6370000000 HC RX 637 (ALT 250 FOR IP): Performed by: STUDENT IN AN ORGANIZED HEALTH CARE EDUCATION/TRAINING PROGRAM

## 2025-07-09 PROCEDURE — 97530 THERAPEUTIC ACTIVITIES: CPT

## 2025-07-09 RX ADMIN — ISOSORBIDE MONONITRATE 30 MG: 30 TABLET, EXTENDED RELEASE ORAL at 07:54

## 2025-07-09 RX ADMIN — Medication 10 MG: at 21:06

## 2025-07-09 RX ADMIN — MORPHINE SULFATE 2 MG: 2 INJECTION, SOLUTION INTRAMUSCULAR; INTRAVENOUS at 18:44

## 2025-07-09 RX ADMIN — ACETAMINOPHEN 1000 MG: 500 TABLET ORAL at 12:47

## 2025-07-09 RX ADMIN — LISINOPRIL 2.5 MG: 5 TABLET ORAL at 07:53

## 2025-07-09 RX ADMIN — GABAPENTIN 300 MG: 300 CAPSULE ORAL at 04:13

## 2025-07-09 RX ADMIN — METHOCARBAMOL 750 MG: 750 TABLET ORAL at 12:47

## 2025-07-09 RX ADMIN — Medication 2000 MG: at 12:47

## 2025-07-09 RX ADMIN — ATORVASTATIN CALCIUM 40 MG: 40 TABLET, FILM COATED ORAL at 21:06

## 2025-07-09 RX ADMIN — APIXABAN 5 MG: 5 TABLET, FILM COATED ORAL at 07:53

## 2025-07-09 RX ADMIN — ACETAMINOPHEN 1000 MG: 500 TABLET ORAL at 06:09

## 2025-07-09 RX ADMIN — METHOCARBAMOL 750 MG: 750 TABLET ORAL at 18:45

## 2025-07-09 RX ADMIN — ACETAMINOPHEN 1000 MG: 500 TABLET ORAL at 21:06

## 2025-07-09 RX ADMIN — SODIUM CHLORIDE, PRESERVATIVE FREE 10 ML: 5 INJECTION INTRAVENOUS at 21:11

## 2025-07-09 RX ADMIN — QUETIAPINE FUMARATE 150 MG: 100 TABLET ORAL at 21:06

## 2025-07-09 RX ADMIN — MORPHINE SULFATE 2 MG: 2 INJECTION, SOLUTION INTRAMUSCULAR; INTRAVENOUS at 12:47

## 2025-07-09 RX ADMIN — PANTOPRAZOLE SODIUM 40 MG: 40 TABLET, DELAYED RELEASE ORAL at 06:09

## 2025-07-09 RX ADMIN — MORPHINE SULFATE 2 MG: 2 INJECTION, SOLUTION INTRAMUSCULAR; INTRAVENOUS at 21:03

## 2025-07-09 RX ADMIN — MORPHINE SULFATE 2 MG: 2 INJECTION, SOLUTION INTRAMUSCULAR; INTRAVENOUS at 01:35

## 2025-07-09 RX ADMIN — GABAPENTIN 300 MG: 300 CAPSULE ORAL at 12:46

## 2025-07-09 RX ADMIN — MORPHINE SULFATE 2 MG: 2 INJECTION, SOLUTION INTRAMUSCULAR; INTRAVENOUS at 10:04

## 2025-07-09 RX ADMIN — SPIRONOLACTONE 12.5 MG: 25 TABLET, FILM COATED ORAL at 07:54

## 2025-07-09 RX ADMIN — MORPHINE SULFATE 2 MG: 2 INJECTION, SOLUTION INTRAMUSCULAR; INTRAVENOUS at 07:53

## 2025-07-09 RX ADMIN — CARVEDILOL 12.5 MG: 12.5 TABLET, FILM COATED ORAL at 18:45

## 2025-07-09 RX ADMIN — SODIUM CHLORIDE, PRESERVATIVE FREE 10 ML: 5 INJECTION INTRAVENOUS at 07:55

## 2025-07-09 RX ADMIN — INSULIN GLARGINE 18 UNITS: 100 INJECTION, SOLUTION SUBCUTANEOUS at 21:07

## 2025-07-09 RX ADMIN — MORPHINE SULFATE 2 MG: 2 INJECTION, SOLUTION INTRAMUSCULAR; INTRAVENOUS at 04:18

## 2025-07-09 RX ADMIN — GABAPENTIN 300 MG: 300 CAPSULE ORAL at 21:06

## 2025-07-09 RX ADMIN — ASPIRIN 81 MG: 81 TABLET, COATED ORAL at 07:53

## 2025-07-09 RX ADMIN — METHOCARBAMOL 750 MG: 750 TABLET ORAL at 07:53

## 2025-07-09 RX ADMIN — MORPHINE SULFATE 2 MG: 2 INJECTION, SOLUTION INTRAMUSCULAR; INTRAVENOUS at 15:30

## 2025-07-09 RX ADMIN — MORPHINE SULFATE 2 MG: 2 INJECTION, SOLUTION INTRAMUSCULAR; INTRAVENOUS at 23:27

## 2025-07-09 RX ADMIN — CARVEDILOL 12.5 MG: 12.5 TABLET, FILM COATED ORAL at 07:53

## 2025-07-09 RX ADMIN — Medication 2000 MG: at 04:13

## 2025-07-09 RX ADMIN — METHOCARBAMOL 750 MG: 750 TABLET ORAL at 21:06

## 2025-07-09 RX ADMIN — APIXABAN 5 MG: 5 TABLET, FILM COATED ORAL at 21:06

## 2025-07-09 RX ADMIN — Medication 2000 MG: at 21:08

## 2025-07-09 RX ADMIN — INSULIN LISPRO 2 UNITS: 100 INJECTION, SOLUTION INTRAVENOUS; SUBCUTANEOUS at 09:18

## 2025-07-09 ASSESSMENT — PAIN SCALES - GENERAL
PAINLEVEL_OUTOF10: 10
PAINLEVEL_OUTOF10: 6
PAINLEVEL_OUTOF10: 9
PAINLEVEL_OUTOF10: 5
PAINLEVEL_OUTOF10: 5
PAINLEVEL_OUTOF10: 10
PAINLEVEL_OUTOF10: 2
PAINLEVEL_OUTOF10: 10
PAINLEVEL_OUTOF10: 8
PAINLEVEL_OUTOF10: 10
PAINLEVEL_OUTOF10: 10
PAINLEVEL_OUTOF10: 9
PAINLEVEL_OUTOF10: 6
PAINLEVEL_OUTOF10: 10

## 2025-07-09 ASSESSMENT — PAIN DESCRIPTION - LOCATION
LOCATION: KNEE
LOCATION: LEG
LOCATION: KNEE
LOCATION: KNEE
LOCATION: LEG
LOCATION: KNEE
LOCATION: KNEE
LOCATION: LEG

## 2025-07-09 ASSESSMENT — PAIN DESCRIPTION - DESCRIPTORS
DESCRIPTORS: ACHING;DISCOMFORT
DESCRIPTORS: ACHING
DESCRIPTORS: ACHING;THROBBING
DESCRIPTORS: ACHING;DISCOMFORT
DESCRIPTORS: ACHING;DISCOMFORT
DESCRIPTORS: ACHING
DESCRIPTORS: ACHING;DISCOMFORT
DESCRIPTORS: ACHING
DESCRIPTORS: ACHING;DISCOMFORT

## 2025-07-09 ASSESSMENT — PAIN DESCRIPTION - ORIENTATION
ORIENTATION: RIGHT

## 2025-07-09 ASSESSMENT — PAIN DESCRIPTION - PAIN TYPE
TYPE: SURGICAL PAIN

## 2025-07-09 NOTE — CARE COORDINATION
TRANSITIONAL PLANNING/Auth initiated via [employer] for SN[facility]. Auth ID# [id number] is pending.

## 2025-07-09 NOTE — PROGRESS NOTES
Division of Vascular Surgery        Progress Note          Subjective:     Pt seen and examined at bedside. She is afebrile, vital signs within normal limits. Wound vac with good seal.     Physical Exam:     Vitals:  BP (!) 134/55   Pulse 72   Temp 98.6 °F (37 °C) (Oral)   Resp 17   Ht 1.651 m (5' 5\")   Wt 63.6 kg (140 lb 3.4 oz)   SpO2 100%   BMI 23.33 kg/m²     Physical Exam  Constitutional:       General: She is not in acute distress.  HENT:      Head: Normocephalic and atraumatic.      Nose: Nose normal.      Mouth/Throat:      Mouth: Mucous membranes are moist.   Pulmonary:      Effort: Pulmonary effort is normal. No respiratory distress.   Abdominal:      General: Abdomen is flat. There is no distension.      Palpations: Abdomen is soft.      Tenderness: There is no abdominal tenderness.   Skin:     General: Skin is warm and dry.      Comments: Dressing intact and clean over wound without visible drainage or bleeding.No surrounding erythema or swelling.   Wound vac in place with seal intact   Neurological:      Mental Status: She is alert.       Imaging/Labs:          07/07/25 2126  POC Glucose Fingerstick  Collected: 07/07/25 2119  Final result    POC Glucose 147 High  mg/dL            07/07/25 1706  POC Glucose Fingerstick  Collected: 07/07/25 1700  Final result    POC Glucose 109 High  mg/dL            07/07/25 1454  POC Glucose Fingerstick  Collected: 07/07/25 1448  Final result    POC Glucose 105 mg/dL            07/07/25 0743  POC Glucose Fingerstick  Collected: 07/07/25 0737  Final result    POC Glucose 147 High  mg/dL            07/06/25 2033  POC Glucose Fingerstick  Collected: 07/06/25 2025  Final result    POC Glucose 182 High  mg/dL              XR FEMUR RIGHT (MIN 2 VIEWS)  Result Date: 7/4/2025  Soft tissue defect involving the amputated stump with exposure of the bone.     Assessment and Plan:     Continue wound vac to right AKA   Wound ostomy consulted for vac changes

## 2025-07-09 NOTE — PLAN OF CARE
Problem: Chronic Conditions and Co-morbidities  Goal: Patient's chronic conditions and co-morbidity symptoms are monitored and maintained or improved  Outcome: Progressing     Problem: Safety - Adult  Goal: Free from fall injury  Outcome: Progressing     Problem: ABCDS Injury Assessment  Goal: Absence of physical injury  Outcome: Progressing     Problem: Pain  Goal: Verbalizes/displays adequate comfort level or baseline comfort level  Outcome: Progressing     Problem: Discharge Planning  Goal: Discharge to home or other facility with appropriate resources  Outcome: Progressing     Problem: Skin/Tissue Integrity  Goal: Skin integrity remains intact  Description: 1.  Monitor for areas of redness and/or skin breakdown  2.  Assess vascular access sites hourly  3.  Every 4-6 hours minimum:  Change oxygen saturation probe site  4.  Every 4-6 hours:  If on nasal continuous positive airway pressure, respiratory therapy assess nares and determine need for appliance change or resting period  Outcome: Progressing

## 2025-07-09 NOTE — PROGRESS NOTES
Occupational Therapy Initial Evaluation  Facility/Department: [address] MED SURG   Patient Name: Mikael Estevez        MRN: [id number]    : 1970    Date of Service: 2025    Chief Complaint   Patient presents with    Wound Check     Right leg amputee    Wound Dehiscence     Bleeding Right leg amputee     Past Medical History:  has a past medical history of Back pain, Bipolar 1 disorder (HCC), Diabetes mellitus (HCC), Disc disorder, and Hypertension.  Past Surgical History:  has a past surgical history that includes Cholecystectomy; back surgery; Cardiac procedure (N/A, 2025); amputation (Right, 2025); Toe amputation (Right, 2025); femoral bypass (Right, 2025); amputation (Right, 2025); femoral bypass (Right, 2025); Toe amputation (Right, 2025); above knee amputation (Right, 2025); vascular surgery (Right, 2025); Leg Surgery (Right, 2025); and Leg Surgery (Right, 2025).    Discharge Recommendations  Discharge Recommendations: Patient would benefit from continued therapy after discharge  OT Equipment Recommendations  Equipment Needed: No    Assessment  Performance deficits / Impairments: Decreased functional mobility ;Decreased ADL status;Decreased high-level IADLs;Decreased endurance;Decreased safe awareness;Decreased balance  Assessment: Pt would benefit from continuedacute and post acute OT services to address above deficits. Pt currently unsafe to return to prior living arrangments d/t decreased independence in functional transfers/mobility and self-care ADL completion.  Prognosis: Good  Decision Making: Medium Complexity  REQUIRES OT FOLLOW-UP: Yes  Safety Devices  Type of Devices: Gait belt, Call light within reach, Nurse notified (pt left in w/c)    AM-PAC  AM-PAC Daily Activity - Inpatient   How much help is needed for putting on and taking off regular lower body clothing?: A Little  How much help is needed for bathing (which includes  Individual Minutes  Time In: 1132  Time Out: 1148  Minutes: 16  Time Code Minutes   Timed Code Treatment Minutes: 8 Minutes  Co-eval with PT.     Electronically signed by Xavi LINDA on 7/9/25 at 4:12 PM EDT

## 2025-07-09 NOTE — PROGRESS NOTES
Physical Therapy  Facility/Department: [address] MED SURG   Physical Therapy Initial Evaluation    Patient Name: Mikael Estevez        MRN: [id number]    : 1970    Date of Service: 2025    Chief Complaint   Patient presents with    Wound Check     Right leg amputee    Wound Dehiscence     Bleeding Right leg amputee     Past Medical History:  has a past medical history of Back pain, Bipolar 1 disorder (HCC), Diabetes mellitus (HCC), Disc disorder, and Hypertension.  Past Surgical History:  has a past surgical history that includes Cholecystectomy; back surgery; Cardiac procedure (N/A, 2025); amputation (Right, 2025); Toe amputation (Right, 2025); femoral bypass (Right, 2025); amputation (Right, 2025); femoral bypass (Right, 2025); Toe amputation (Right, 2025); above knee amputation (Right, 2025); vascular surgery (Right, 2025); Leg Surgery (Right, 2025); and Leg Surgery (Right, 2025).    Discharge Recommendations  Discharge Recommendations: Patient would benefit from continued therapy after discharge  PT Equipment Recommendations  Equipment Needed: No    Assessment  Body Structures, Functions, Activity Limitations Requiring Skilled Therapeutic Intervention: Decreased functional mobility , Decreased strength, Increased pain, Decreased posture, Decreased safe awareness, Decreased ROM, Decreased endurance, Decreased balance  Assessment: Pt ambulated 6ft w/ RW CGA, performing 15ft of wheelchair propulsion with bilateral UE. Ambulation distance limited per patient. Pt is expected to benefit from continued skilled physical therapy to address continued gait training and decrease fall risk to return pt to prior level of independence.  Therapy Prognosis: Good  Decision Making: Medium Complexity  Requires PT Follow-Up: Yes  Safety Devices  Type of Devices: Gait belt, Call light within reach (pt left in w/c)    AM-PAC  AM-PAC Basic Mobility - Inpatient

## 2025-07-09 NOTE — CARE COORDINATION
Case Management   Daily Progress Note       Patient Name: Mikael Estevez                   YOB: 1970  Diagnosis: Wound dehiscence [T81.30XA]  Surgical wound dehiscence, sequela [T81.31XS]  Fall from standing, initial encounter [W19.XXXA]                       GMLOS: 3.7 days  Length of Stay: 4  days    Anticipated Discharge Date: Two or more days before discharge    Readmission Risk (Low < 19, Mod (19-27), High > 27): Readmission Risk Score: 23.9        Current Transitional Plan    [] Home Independently    [] Home with HC    [x] Skilled Nursing Facility    [] Acute Rehabilitation    [] Long Term Acute Care (LTAC)    [] Other:     Plan for the Stay (Medical Management) :          Workflow Continuation (Additional Notes) : [facility] has accepted the patient. PT note now available - [facility] notified to initiate JENISE HOWARD  July 9, 2025

## 2025-07-09 NOTE — PROGRESS NOTES
[facility] Wound Ostomy  Nurse  Consult Note       NAME:  Mikael Estevez  MEDICAL RECORD NUMBER:  [id number]  AGE: 55 y.o.   GENDER: female  : 1970  TODAY'S DATE:  2025    Subjective   Reason for WOC Nurse Evaluation and Assessment: NPWT right AKA dehiscence      Mikael Estevez is a 55 y.o. female referred by:   [x] Physician  [] Nursing  [] Other:     Wound Identification:  Wound Type: non-healing surgical  Contributing Factors: smoking, arterial insufficiency, and trauma sustained in fall    Wound History:   Postoperative from right AKA debridement and wound vacuum placement due to wound dehiscence  with Dr. Jay.   Current Wound Care Treatment:  NPWT    Objective    /69   Pulse 88   Temp 98.1 °F (36.7 °C) (Oral)   Resp 16   Ht 1.651 m (5' 5\")   Wt 63.6 kg (140 lb 3.4 oz)   SpO2 97%   BMI 23.33 kg/m²     LABS:  WBC:    Lab Results   Component Value Date/Time    WBC 9.6 2025 06:41 AM     H/H:    Lab Results   Component Value Date/Time    HGB 8.7 2025 06:41 AM    HCT 27.1 2025 06:41 AM           Assessment   Rosalino Risk Score: Rosalino Scale Score: 18    Patient Active Problem List   Diagnosis Code    Migraine without aura and without status migrainosus, not intractable G43.009    Type 2 diabetes mellitus with hyperglycemia (HCC) E11.65    Intractable migraine without status migrainosus G43.919    Hypoglycemia E16.2    Bipolar 1 disorder (HCC) F31.9    Hypertension I10    Hypoglycemia due to insulin E16.0, T38.3X5A    Type 2 diabetes mellitus (HCC) E11.9    Depression with suicidal ideation F32.A, R45.851    Major depressive disorder, recurrent, severe with psychotic features ([facility]) F33.3    PTSD (post-traumatic stress disorder) F43.10    Generalized anxiety disorder with panic attacks F41.1, F41.0    Cocaine use disorder, severe, in early remission ([facility]) F14.21    Chest pain R07.9    H/O: CVA (cerebrovascular accident) Z86.73    Cerebrovascular accident (CVA) of

## 2025-07-09 NOTE — PROGRESS NOTES
[facility]  Office: [phone]  Fredy Watkins DO, Tyshawn Stephenson, DO, Anjel Meza DO, Bernard Yates DO, Carmina Rossi MD, Sara Walls MD, Iraida Ramirez MD, Ruthie Thornton MD,  Unruly Eastman MD, Sorin Jay MD, Noni Blake MD,  Radha Galeano DO, Helen Ward MD, Marko Carson MD, Saleem Watkins DO, Alta Woods MD,  Saad Anderson DO, Cindy Chappell MD, Yumiko Araiza MD, Selvin Bell MD,  Cong Linder MD, Vasquez Hood MD, Emmie Schneider MD, Ana Morse MD, Sven Rooney MD, Sanchez Izquierdo MD, Jameson Kevin, DO, Valerie Mayorga MD, Sofia Ohara DO, Travis Molina MD, Radha Stone MD, Mohsin Reza, MD, Lenora Alvarez MD, Shirley Waterhouse, CNP,  Gely Meredith, CNP, Jameson Joe, CNP,  Nayeli Guillen, SHAHANA, Polly Washington, CNP, Daisha Galvan, CNP, Jessica Love, CNP, Mariana Bautista, CNP, Meenu Sommers, PA-C, Zandra Dumont, CNP, Jeny Espitia, CNP,  Shasha Pearce, CNP, Lora Javed, CNP, William Suggs, PA-C, Alaina Oquendo, PA-C, Shy Bell, CNP,        Liya Casper, CNS, Amita Lucero, CNP, Amanda Baxter, CNP         [facility]   IN-PATIENT SERVICE   [facility]    Progress Note    7/9/2025    12:58 PM    Name:   Mikael Estevez  MRN:     [id number]     Acct:      [id number]   Room:   0342/0342-01   Day:  4  Admit Date:  7/5/2025 12:15 AM    PCP:   Ciro Martinez Jr., MD  Code Status:  Full Code    Subjective:     Patient seen and examined this morning, getting dressing changed by wound ostomy nurse.  Complaining of pain, I was told that she just got morphine.    Vitals within normal range, labs reviewed.  On Ancef.  Vascular evaluated this morning, okay with discharge from their standpoint.  Discussed with RN and , they are working on placement.    Brief History:     Per my partner:  \"This is a 55-year-old female with history of peripheral vascular disease, chronic systolic heart failure, coronary artery

## 2025-07-09 NOTE — PROGRESS NOTES
Division of Vascular Surgery        Progress Note          Subjective:     54 y/o female seen postoperative day 2 from right AKA debridement and wound vacuum placement due to wound dehiscence. Wound vac in place, home wound vac paperwork completed. Pain better controlled.    Physical Exam:     Vitals:  BP (!) 134/55   Pulse 72   Temp 98.6 °F (37 °C) (Oral)   Resp 17   Ht 1.651 m (5' 5\")   Wt 63.6 kg (140 lb 3.4 oz)   SpO2 100%   BMI 23.33 kg/m²     Physical Exam  Constitutional:       General: She is not in acute distress.  HENT:      Head: Normocephalic and atraumatic.      Nose: Nose normal.      Mouth/Throat:      Mouth: Mucous membranes are moist.   Cardiovascular:      Rate and Rhythm: Normal rate and regular rhythm.   Pulmonary:      Effort: Pulmonary effort is normal. No respiratory distress.      Breath sounds: Normal breath sounds.      Comments: CTA throughout.  Abdominal:      General: Abdomen is flat. There is no distension.      Palpations: Abdomen is soft.      Tenderness: There is no abdominal tenderness.   Skin:     General: Skin is warm and dry.      Comments: Dressing intact and clean over wound without visible drainage or bleeding.No surrounding erythema or swelling.   Wound vac in place with seal intact   Neurological:      Mental Status: She is alert.       Imaging/Labs:          07/07/25 2126  POC Glucose Fingerstick  Collected: 07/07/25 2119  Final result    POC Glucose 147 High  mg/dL            07/07/25 1706  POC Glucose Fingerstick  Collected: 07/07/25 1700  Final result    POC Glucose 109 High  mg/dL            07/07/25 1454  POC Glucose Fingerstick  Collected: 07/07/25 1448  Final result    POC Glucose 105 mg/dL            07/07/25 0743  POC Glucose Fingerstick  Collected: 07/07/25 0737  Final result    POC Glucose 147 High  mg/dL            07/06/25 2033  POC Glucose Fingerstick  Collected: 07/06/25 2025  Final result    POC Glucose 182 High  mg/dL              XR FEMUR

## 2025-07-10 VITALS
OXYGEN SATURATION: 100 % | SYSTOLIC BLOOD PRESSURE: 129 MMHG | BODY MASS INDEX: 23.36 KG/M2 | HEART RATE: 68 BPM | HEIGHT: 65 IN | RESPIRATION RATE: 16 BRPM | TEMPERATURE: 98.2 F | DIASTOLIC BLOOD PRESSURE: 64 MMHG | WEIGHT: 140.21 LBS

## 2025-07-10 PROBLEM — T81.30XA WOUND DEHISCENCE: Status: RESOLVED | Noted: 2025-07-05 | Resolved: 2025-07-10

## 2025-07-10 LAB
EKG ATRIAL RATE: 68 BPM
EKG P AXIS: 60 DEGREES
EKG P-R INTERVAL: 162 MS
EKG Q-T INTERVAL: 404 MS
EKG QRS DURATION: 96 MS
EKG QTC CALCULATION (BAZETT): 429 MS
EKG R AXIS: 14 DEGREES
EKG T AXIS: 26 DEGREES
EKG VENTRICULAR RATE: 68 BPM
GLUCOSE BLD-MCNC: 138 MG/DL (ref 65–105)
GLUCOSE BLD-MCNC: 146 MG/DL (ref 65–105)
GLUCOSE BLD-MCNC: 148 MG/DL (ref 65–105)
GLUCOSE BLD-MCNC: 150 MG/DL (ref 65–105)

## 2025-07-10 PROCEDURE — 6360000002 HC RX W HCPCS: Performed by: STUDENT IN AN ORGANIZED HEALTH CARE EDUCATION/TRAINING PROGRAM

## 2025-07-10 PROCEDURE — 2500000003 HC RX 250 WO HCPCS: Performed by: STUDENT IN AN ORGANIZED HEALTH CARE EDUCATION/TRAINING PROGRAM

## 2025-07-10 PROCEDURE — 93010 ELECTROCARDIOGRAM REPORT: CPT | Performed by: INTERNAL MEDICINE

## 2025-07-10 PROCEDURE — 94760 N-INVAS EAR/PLS OXIMETRY 1: CPT

## 2025-07-10 PROCEDURE — 6370000000 HC RX 637 (ALT 250 FOR IP): Performed by: STUDENT IN AN ORGANIZED HEALTH CARE EDUCATION/TRAINING PROGRAM

## 2025-07-10 PROCEDURE — 82947 ASSAY GLUCOSE BLOOD QUANT: CPT

## 2025-07-10 PROCEDURE — 99239 HOSP IP/OBS DSCHRG MGMT >30: CPT | Performed by: STUDENT IN AN ORGANIZED HEALTH CARE EDUCATION/TRAINING PROGRAM

## 2025-07-10 RX ORDER — METHOCARBAMOL 750 MG/1
750 TABLET, FILM COATED ORAL 4 TIMES DAILY
Qty: 40 TABLET | Refills: 0 | Status: SHIPPED | OUTPATIENT
Start: 2025-07-10 | End: 2025-07-20

## 2025-07-10 RX ORDER — NICOTINE 21 MG/24HR
1 PATCH, TRANSDERMAL 24 HOURS TRANSDERMAL DAILY
Qty: 30 PATCH | Refills: 3 | Status: SHIPPED | OUTPATIENT
Start: 2025-07-11

## 2025-07-10 RX ADMIN — ASPIRIN 81 MG: 81 TABLET, COATED ORAL at 09:13

## 2025-07-10 RX ADMIN — METHOCARBAMOL 750 MG: 750 TABLET ORAL at 09:13

## 2025-07-10 RX ADMIN — SPIRONOLACTONE 12.5 MG: 25 TABLET, FILM COATED ORAL at 09:13

## 2025-07-10 RX ADMIN — ACETAMINOPHEN 1000 MG: 500 TABLET ORAL at 13:43

## 2025-07-10 RX ADMIN — SODIUM CHLORIDE, PRESERVATIVE FREE 10 ML: 5 INJECTION INTRAVENOUS at 09:13

## 2025-07-10 RX ADMIN — ACETAMINOPHEN 1000 MG: 500 TABLET ORAL at 05:02

## 2025-07-10 RX ADMIN — PANTOPRAZOLE SODIUM 40 MG: 40 TABLET, DELAYED RELEASE ORAL at 05:02

## 2025-07-10 RX ADMIN — MORPHINE SULFATE 2 MG: 2 INJECTION, SOLUTION INTRAMUSCULAR; INTRAVENOUS at 11:24

## 2025-07-10 RX ADMIN — ISOSORBIDE MONONITRATE 30 MG: 30 TABLET, EXTENDED RELEASE ORAL at 09:13

## 2025-07-10 RX ADMIN — GABAPENTIN 300 MG: 300 CAPSULE ORAL at 13:43

## 2025-07-10 RX ADMIN — METHOCARBAMOL 750 MG: 750 TABLET ORAL at 13:43

## 2025-07-10 RX ADMIN — MORPHINE SULFATE 2 MG: 2 INJECTION, SOLUTION INTRAMUSCULAR; INTRAVENOUS at 09:13

## 2025-07-10 RX ADMIN — MORPHINE SULFATE 2 MG: 2 INJECTION, SOLUTION INTRAMUSCULAR; INTRAVENOUS at 16:19

## 2025-07-10 RX ADMIN — APIXABAN 5 MG: 5 TABLET, FILM COATED ORAL at 09:13

## 2025-07-10 RX ADMIN — MORPHINE SULFATE 2 MG: 2 INJECTION, SOLUTION INTRAMUSCULAR; INTRAVENOUS at 13:43

## 2025-07-10 RX ADMIN — Medication 2000 MG: at 05:01

## 2025-07-10 RX ADMIN — Medication 2000 MG: at 13:43

## 2025-07-10 RX ADMIN — MORPHINE SULFATE 2 MG: 2 INJECTION, SOLUTION INTRAMUSCULAR; INTRAVENOUS at 02:16

## 2025-07-10 RX ADMIN — MORPHINE SULFATE 2 MG: 2 INJECTION, SOLUTION INTRAMUSCULAR; INTRAVENOUS at 05:02

## 2025-07-10 RX ADMIN — LISINOPRIL 2.5 MG: 5 TABLET ORAL at 09:13

## 2025-07-10 RX ADMIN — CARVEDILOL 12.5 MG: 12.5 TABLET, FILM COATED ORAL at 09:14

## 2025-07-10 RX ADMIN — GABAPENTIN 300 MG: 300 CAPSULE ORAL at 05:02

## 2025-07-10 ASSESSMENT — PAIN SCALES - GENERAL
PAINLEVEL_OUTOF10: 5
PAINLEVEL_OUTOF10: 2
PAINLEVEL_OUTOF10: 3
PAINLEVEL_OUTOF10: 6
PAINLEVEL_OUTOF10: 9
PAINLEVEL_OUTOF10: 4
PAINLEVEL_OUTOF10: 10
PAINLEVEL_OUTOF10: 1
PAINLEVEL_OUTOF10: 6
PAINLEVEL_OUTOF10: 10
PAINLEVEL_OUTOF10: 7

## 2025-07-10 ASSESSMENT — PAIN DESCRIPTION - ORIENTATION
ORIENTATION: RIGHT

## 2025-07-10 ASSESSMENT — PAIN DESCRIPTION - PAIN TYPE
TYPE: SURGICAL PAIN

## 2025-07-10 ASSESSMENT — PAIN DESCRIPTION - DESCRIPTORS
DESCRIPTORS: ACHING;DISCOMFORT

## 2025-07-10 ASSESSMENT — PAIN DESCRIPTION - LOCATION
LOCATION: LEG

## 2025-07-10 NOTE — CARE COORDINATION
Mikael Estevez  [id number]  meets criteria for hypertension and diabetic education. The following resources and interventions were provided: Diabetes Flyer education and Hypertension Flyer education.  Patient states she has met with a dietician and know the appropriate foods to eat to help manage the HTN and DM.

## 2025-07-10 NOTE — PLAN OF CARE
Problem: Chronic Conditions and Co-morbidities  Goal: Patient's chronic conditions and co-morbidity symptoms are monitored and maintained or improved  Outcome: Completed     Problem: Safety - Adult  Goal: Free from fall injury  Outcome: Completed     Problem: ABCDS Injury Assessment  Goal: Absence of physical injury  Outcome: Completed     Problem: Pain  Goal: Verbalizes/displays adequate comfort level or baseline comfort level  Outcome: Completed     Problem: Discharge Planning  Goal: Discharge to home or other facility with appropriate resources  Outcome: Completed     Problem: Skin/Tissue Integrity  Goal: Skin integrity remains intact  Description: 1.  Monitor for areas of redness and/or skin breakdown  2.  Assess vascular access sites hourly  3.  Every 4-6 hours minimum:  Change oxygen saturation probe site  4.  Every 4-6 hours:  If on nasal continuous positive airway pressure, respiratory therapy assess nares and determine need for appliance change or resting period  Outcome: Completed

## 2025-07-10 NOTE — DISCHARGE SUMMARY
[facility]  Office: [phone]  Fredy Watkins DO, Tyshawn Stephenson DO, Anjel Meza DO, Bernard Yates DO, Carmina Rossi MD, Sara Walls MD, Iraida Ramirez MD, Ruthie Thornton MD,  Unruly Eastman MD, Sorin Jay MD, Pj Hernández DO, Noni Blake MD,  Radha Galeano DO, Helen Ward MD, Marko Carson MD, Saleem Watkins DO, Alta Woods MD,  Saad Anderson DO, Jackie Jackson MD, Cindy Chappell MD, Yumiko Araiza MD, Selvin Bell MD,  Cong Linder MD, Vasquez Hood MD, Emmie Schneider MD, Stef Rowan DO, Bakari Hyman MD,  Travis Molina MD, Shirley Waterhouse, CNP,  Gely Meredith, CNP, Shy Bell, CNP, Jameson Joe, CNP,  Nayeli Guillen, DNP, Polly Washington, CNP, Daisha Galvan, CNP, Amita Lucero, CNP, Jessica Love, CNP, Mariana Bautista, CNP, Wali Henao PA-C, Liya Casper, CNS, Karin Esparza, CNP, Amanda Baxter, CNP         [facility]   IN-PATIENT SERVICE   [facility]    Discharge Summary     Patient ID: Mikael Estevez  :  1970   MRN: [id number]     ACCOUNT:  [id number]   Patient's PCP: Ciro Martinez Jr., MD  Admit Date: 2025   Discharge Date: 7/10/2025  Length of Stay: 5  Code Status:  Full Code  Admitting Physician: Radha DORANTES DO  Discharge Physician: Sven Rooney MD     Active Discharge Diagnoses:     Hospital Problem Lists:  Principal Problem (Resolved):    Wound dehiscence  Active Problems:    Bipolar 1 disorder (HCC)    Hypertension    Type 2 diabetes mellitus (HCC)    H/O: CVA (cerebrovascular accident)    Drug abuse (HCC)    PAD (peripheral artery disease)    Cardiomyopathy (HCC)    H/O osteomyelitis    History of femoropopliteal bypass    CAD (coronary artery disease)    S/P AKA (above knee amputation) unilateral, right (HCC)    Surgical wound dehiscence, sequela      Admission Condition:  fair     Discharged Condition: good    Hospital Stay:     Hospital Course:  Mikael graves a

## 2025-07-10 NOTE — PLAN OF CARE
Problem: Chronic Conditions and Co-morbidities  Goal: Patient's chronic conditions and co-morbidity symptoms are monitored and maintained or improved  Outcome: Progressing  Flowsheets (Taken 7/9/2025 2000)  Care Plan - Patient's Chronic Conditions and Co-Morbidity Symptoms are Monitored and Maintained or Improved:   Monitor and assess patient's chronic conditions and comorbid symptoms for stability, deterioration, or improvement   Collaborate with multidisciplinary team to address chronic and comorbid conditions and prevent exacerbation or deterioration   Update acute care plan with appropriate goals if chronic or comorbid symptoms are exacerbated and prevent overall improvement and discharge     Problem: Safety - Adult  Goal: Free from fall injury  Outcome: Progressing  Flowsheets (Taken 7/9/2025 2322)  Free From Fall Injury:   Instruct family/caregiver on patient safety   Based on caregiver fall risk screen, instruct family/caregiver to ask for assistance with transferring infant if caregiver noted to have fall risk factors     Problem: ABCDS Injury Assessment  Goal: Absence of physical injury  Outcome: Progressing  Flowsheets (Taken 7/9/2025 2322)  Absence of Physical Injury: Implement safety measures based on patient assessment     Problem: Pain  Goal: Verbalizes/displays adequate comfort level or baseline comfort level  Outcome: Progressing  Flowsheets (Taken 7/9/2025 2103)  Verbalizes/displays adequate comfort level or baseline comfort level:   Encourage patient to monitor pain and request assistance   Assess pain using appropriate pain scale   Administer analgesics based on type and severity of pain and evaluate response   Implement non-pharmacological measures as appropriate and evaluate response   Consider cultural and social influences on pain and pain management   Notify Licensed Independent Practitioner if interventions unsuccessful or patient reports new pain     Problem: Discharge Planning  Goal:

## 2025-07-10 NOTE — PROGRESS NOTES
Division of Vascular Surgery        Progress Note          Subjective:     Pt seen and examined at bedside. She is afebrile, vital signs within normal limits. Wound vac with good seal. Resting comfortably.    Physical Exam:     Vitals:  /64   Pulse 68   Temp 98.2 °F (36.8 °C) (Oral)   Resp 16   Ht 1.651 m (5' 5\")   Wt 63.6 kg (140 lb 3.4 oz)   SpO2 100%   BMI 23.33 kg/m²     Physical Exam  Constitutional:       General: She is not in acute distress.  HENT:      Head: Normocephalic and atraumatic.      Nose: Nose normal.      Mouth/Throat:      Mouth: Mucous membranes are moist.   Pulmonary:      Effort: Pulmonary effort is normal. No respiratory distress.   Abdominal:      General: Abdomen is flat. There is no distension.      Palpations: Abdomen is soft.      Tenderness: There is no abdominal tenderness.   Skin:     General: Skin is warm and dry.      Comments: Wound vac in place with seal intact   Neurological:      Mental Status: She is alert.       Imaging/Labs:     CT HEAD WO CONTRAST  Result Date: 7/8/2025  No acute intracranial process. Mild right parietal scalp contusion. No acute fracture or traumatic alignment in the cervical spine.     CT CERVICAL SPINE WO CONTRAST  Result Date: 7/8/2025  No acute intracranial process. Mild right parietal scalp contusion. No acute fracture or traumatic alignment in the cervical spine.         Assessment and Plan:     Continue wound vac to right AKA   Wound ostomy consulted for vac changes MWF  Meredith-op abx complete  Eliquis and ASA restarted  Awaiting home wound vac. Ok to discharge once it arrives    Kassandra Joy DO  General Surgery PGY-3  7/10/25 7:41 AM

## 2025-07-10 NOTE — PROGRESS NOTES
Wound vac taken down and wet to dry dressing placed per vascular to transport pt to SNF where new vac will be placed.

## 2025-07-11 ENCOUNTER — TELEPHONE (OUTPATIENT)
Dept: VASCULAR SURGERY | Age: 55
End: 2025-07-11

## 2025-07-11 NOTE — TELEPHONE ENCOUNTER
Edwin with [facility] called to inform us that the patient was refusing to allow them to apply the wound vac due to being in a lot of pain, the writer spoke with Dr. Jay and he agreed to do a Dakins wet to dry dressing and patient needs to be seen at wound care at [facility]

## 2025-07-16 ENCOUNTER — HOSPITAL ENCOUNTER (OUTPATIENT)
Dept: WOUND CARE | Age: 55
Discharge: HOME | End: 2025-07-16
Payer: MEDICARE

## 2025-07-16 VITALS
TEMPERATURE: 97 F | HEART RATE: 71 BPM | RESPIRATION RATE: 18 BRPM | DIASTOLIC BLOOD PRESSURE: 53 MMHG | SYSTOLIC BLOOD PRESSURE: 144 MMHG

## 2025-07-16 DIAGNOSIS — T81.89XA NON-HEALING SURGICAL WOUND, INITIAL ENCOUNTER: Primary | ICD-10-CM

## 2025-07-16 PROCEDURE — 11042 DBRDMT SUBQ TIS 1ST 20SQCM/<: CPT

## 2025-07-16 PROCEDURE — 11042 DBRDMT SUBQ TIS 1ST 20SQCM/<: CPT | Performed by: STUDENT IN AN ORGANIZED HEALTH CARE EDUCATION/TRAINING PROGRAM

## 2025-07-16 PROCEDURE — 99213 OFFICE O/P EST LOW 20 MIN: CPT

## 2025-07-16 RX ORDER — SODIUM CHLOR/HYPOCHLOROUS ACID 0.033 %
SOLUTION, IRRIGATION IRRIGATION PRN
OUTPATIENT
Start: 2025-07-16

## 2025-07-16 RX ORDER — TRIAMCINOLONE ACETONIDE 1 MG/G
OINTMENT TOPICAL PRN
OUTPATIENT
Start: 2025-07-16

## 2025-07-16 RX ORDER — GINSENG 100 MG
CAPSULE ORAL PRN
OUTPATIENT
Start: 2025-07-16

## 2025-07-16 RX ORDER — NEOMYCIN/BACITRACIN/POLYMYXINB 3.5-400-5K
OINTMENT (GRAM) TOPICAL PRN
OUTPATIENT
Start: 2025-07-16

## 2025-07-16 RX ORDER — BETAMETHASONE DIPROPIONATE 0.5 MG/G
CREAM TOPICAL PRN
OUTPATIENT
Start: 2025-07-16

## 2025-07-16 RX ORDER — LIDOCAINE 40 MG/G
CREAM TOPICAL PRN
OUTPATIENT
Start: 2025-07-16

## 2025-07-16 RX ORDER — LIDOCAINE 50 MG/G
OINTMENT TOPICAL PRN
OUTPATIENT
Start: 2025-07-16

## 2025-07-16 RX ORDER — SILVER SULFADIAZINE 10 MG/G
CREAM TOPICAL PRN
OUTPATIENT
Start: 2025-07-16

## 2025-07-16 RX ORDER — MUPIROCIN 2 %
OINTMENT (GRAM) TOPICAL PRN
OUTPATIENT
Start: 2025-07-16

## 2025-07-16 RX ORDER — GENTAMICIN SULFATE 1 MG/G
OINTMENT TOPICAL PRN
OUTPATIENT
Start: 2025-07-16

## 2025-07-16 RX ORDER — LIDOCAINE HYDROCHLORIDE 20 MG/ML
JELLY TOPICAL PRN
OUTPATIENT
Start: 2025-07-16

## 2025-07-16 RX ORDER — BACITRACIN ZINC AND POLYMYXIN B SULFATE 500; 1000 [USP'U]/G; [USP'U]/G
OINTMENT TOPICAL PRN
OUTPATIENT
Start: 2025-07-16

## 2025-07-16 RX ORDER — LIDOCAINE HYDROCHLORIDE 40 MG/ML
SOLUTION TOPICAL PRN
OUTPATIENT
Start: 2025-07-16

## 2025-07-16 RX ORDER — CLOBETASOL PROPIONATE 0.5 MG/G
OINTMENT TOPICAL PRN
OUTPATIENT
Start: 2025-07-16

## 2025-07-16 ASSESSMENT — ENCOUNTER SYMPTOMS
TROUBLE SWALLOWING: 0
VOMITING: 0
ABDOMINAL PAIN: 0
COUGH: 0
COLOR CHANGE: 0
VOICE CHANGE: 0
ABDOMINAL DISTENTION: 0
EYE PAIN: 0
CHEST TIGHTNESS: 0

## 2025-07-16 ASSESSMENT — PAIN SCALES - GENERAL: PAINLEVEL_OUTOF10: 10

## 2025-07-16 ASSESSMENT — PAIN DESCRIPTION - DESCRIPTORS: DESCRIPTORS: ACHING;DISCOMFORT;BURNING;STABBING

## 2025-07-16 ASSESSMENT — PAIN DESCRIPTION - ORIENTATION: ORIENTATION: RIGHT

## 2025-07-16 NOTE — PROGRESS NOTES
dysuria, flank pain and hematuria.   Musculoskeletal:  Negative for joint swelling and neck pain.   Skin:  Positive for wound. Negative for color change and rash.   Allergic/Immunologic: Negative for immunocompromised state.   Neurological:  Negative for syncope, speech difficulty, weakness, numbness and headaches.   Hematological:  Negative for adenopathy.   Psychiatric/Behavioral:  Negative for behavioral problems and suicidal ideas.         Objective:      BP (!) 144/53   Pulse 71   Temp 97 °F (36.1 °C) (Tympanic)   Resp 18     Wt Readings from Last 3 Encounters:   07/07/25 63.6 kg (140 lb 3.4 oz)   07/01/25 60.3 kg (133 lb)   06/23/25 60.4 kg (133 lb 2.5 oz)       PHYSICAL EXAM    Ext: Healing right AKA wound. No infection or bleeding.      Assessment:     Problem List Items Addressed This Visit          Other    Surgical wound, non healing - Primary        Procedure Note  Indications:  Based on my examination of this patient's wound(s)/ulcer(s) today, debridement is required to promote healing and evaluate the wound base.    Performed by: NICOLAS SWANN MD    Consent obtained:  Yes    Time out taken:  Yes    Pain Control: Anesthetic  Anesthetic: 2% Lidocaine Gel Topical       Debridement:Excisional Debridement    Using curette the wound(s)/ulcer(s) was/were sharply debrided down through and including the removal of subcutaneous tissue.        Devitalized Tissue Debrided:  fibrin, biofilm, and slough    Pre Debridement Measurements:  Are located in the Wound/Ulcer Documentation Flow Sheet    Wound/Ulcer #: 1    Post Debridement Measurements:  Wound/Ulcer Descriptions are Pre Debridement except measurements:        Puncture 06/16/25 Groin (Active)   Site Assessment Clean, dry, and intact 06/24/25 0800   Meredith-wound Assessment Intact 06/24/25 0800   Closure Mynx 06/21/25 1600   Drainage Amount None 06/24/25 0800   Dressing/Treatment Open to air 06/24/25 0800   Dressing Status Clean, dry & intact 06/21/25

## 2025-07-16 NOTE — PLAN OF CARE
Problem: Pain  Goal: Verbalizes/displays adequate comfort level or baseline comfort level  Outcome: Progressing     Problem: ABCDS Injury Assessment  Goal: Absence of physical injury  Outcome: Progressing     Problem: Wound:  Goal: Will show signs of wound healing; wound closure and no evidence of infection  Description: Will show signs of wound healing; wound closure and no evidence of infection  Outcome: Progressing

## 2025-07-16 NOTE — DISCHARGE INSTRUCTIONS
[facility] -Phone: [phone] Fax: [phone]   Visit  Discharge Instructions / Physician Orders  DATE: 7/16/2025     Facility: [facility]     SUPPLIES ORDERED THRU: Facility     Wound Location: Right Amputation Site     Cleanse with: Liquid antibacterial soap and water, rinse well      Dressing Orders: NPWT: Skin Prep and drape around wound, Foam into wound, continuous negative pressure at 125mmhg, ACE Wrap               (Collagen with Silver into wound, dry dressing, ACE Wrap until NPWT can be placed)     Frequency: Monday, Wednesday, Friday (Wound Care Center will change on appointment days, please send supplies to appointment)     Additional Orders: Increase protein to diet (meat, cheese, eggs, fish, peanut butter, nuts and beans)  ELEVATE LEGS AS MUCH AS POSSIBLE WHILE SITTING    Your next appointment with Wound Care Center is in 1 week with Dr. Jay     Please note your next appointment above and if you are unable to keep, kindly give a 24 hour notice.  If more than 15 min late we cannot guarantee you will be seen due to clinician schedule  Per Policy, Excessive cancellation will call for dismissal from program.     If you experience any of the following, please call the Wound Care Center during business hours:  [phone]       * Increase in Pain                                              * Temperature over 101       * Increase in drainage from your wound               * Drainage with a foul odor       * Bleeding                                                               * Increase in swelling       * Need for compression bandage changes due to slippage, breakthrough drainage.   If you need medical attention outside of the business hours of the Wound Care Centers please contact your PCP or go to the an Urgent Care or Emergency Department  The information contained in the After Visit Summary has been reviewed with me, the patient and/or responsible adult, by my health care

## 2025-07-30 NOTE — DISCHARGE INSTRUCTIONS
[facility] -Phone: [phone] Fax: [phone]    Visit  Discharge Instructions / Physician Orders  DATE: 8/1/2025     Facility: [facility]     SUPPLIES ORDERED THRU: Facility     Wound Location: Right Amputation Site     Cleanse with: Liquid antibacterial soap and water, rinse well      Dressing Orders: NPWT: Skin Prep and drape around wound, Foam into wound, continuous negative pressure at 125mmhg, ACE Wrap                If unable to apply NPWT change daily Vashe moistened gauze, Silicone Border Bandage     Frequency: Monday, Wednesday, Friday (Wound Care Center will change on appointment days, please send supplies to appointment)     Additional Orders: Increase protein to diet (meat, cheese, eggs, fish, peanut butter, nuts and beans)  ELEVATE LEGS AS MUCH AS POSSIBLE WHILE SITTING     Your next appointment with Wound Care Center is in 1 week with Dr. Jay     Please note your next appointment above and if you are unable to keep, kindly give a 24 hour notice.  If more than 15 min late we cannot guarantee you will be seen due to clinician schedule  Per Policy, Excessive cancellation will call for dismissal from program.     If you experience any of the following, please call the Wound Care Center during business hours:  [phone]        * Increase in Pain                                                   * Temperature over 101       * Increase in drainage from your wound                 * Drainage with a foul odor       * Bleeding                                                               * Increase in swelling       * Need for compression bandage changes due to slippage, breakthrough drainage.   If you need medical attention outside of the business hours of the Wound Care Centers please contact your PCP or go to the an Urgent Care or Emergency Department  The information contained in the After Visit Summary has been reviewed with me, the patient and/or responsible adult, by my health care

## 2025-08-01 ENCOUNTER — HOSPITAL ENCOUNTER (OUTPATIENT)
Dept: WOUND CARE | Age: 55
Discharge: HOME | End: 2025-08-01
Payer: MEDICAID

## 2025-08-01 VITALS
DIASTOLIC BLOOD PRESSURE: 59 MMHG | RESPIRATION RATE: 17 BRPM | HEART RATE: 68 BPM | SYSTOLIC BLOOD PRESSURE: 119 MMHG | TEMPERATURE: 96.4 F

## 2025-08-01 DIAGNOSIS — S81.801D LEG WOUND, RIGHT, SUBSEQUENT ENCOUNTER: ICD-10-CM

## 2025-08-01 DIAGNOSIS — T81.89XA NON-HEALING SURGICAL WOUND, INITIAL ENCOUNTER: Primary | ICD-10-CM

## 2025-08-01 DIAGNOSIS — Z89.611 S/P AKA (ABOVE KNEE AMPUTATION) UNILATERAL, RIGHT (HCC): ICD-10-CM

## 2025-08-01 PROCEDURE — 11042 DBRDMT SUBQ TIS 1ST 20SQCM/<: CPT

## 2025-08-01 RX ORDER — BACITRACIN ZINC AND POLYMYXIN B SULFATE 500; 1000 [USP'U]/G; [USP'U]/G
OINTMENT TOPICAL PRN
Status: CANCELLED | OUTPATIENT
Start: 2025-08-01

## 2025-08-01 RX ORDER — CLOBETASOL PROPIONATE 0.5 MG/G
OINTMENT TOPICAL PRN
Status: CANCELLED | OUTPATIENT
Start: 2025-08-01

## 2025-08-01 RX ORDER — SILVER SULFADIAZINE 10 MG/G
CREAM TOPICAL PRN
Status: CANCELLED | OUTPATIENT
Start: 2025-08-01

## 2025-08-01 RX ORDER — NEOMYCIN/BACITRACIN/POLYMYXINB 3.5-400-5K
OINTMENT (GRAM) TOPICAL PRN
Status: CANCELLED | OUTPATIENT
Start: 2025-08-01

## 2025-08-01 RX ORDER — BETAMETHASONE DIPROPIONATE 0.5 MG/G
CREAM TOPICAL PRN
Status: CANCELLED | OUTPATIENT
Start: 2025-08-01

## 2025-08-01 RX ORDER — GENTAMICIN SULFATE 1 MG/G
OINTMENT TOPICAL PRN
Status: CANCELLED | OUTPATIENT
Start: 2025-08-01

## 2025-08-01 RX ORDER — MUPIROCIN 2 %
OINTMENT (GRAM) TOPICAL PRN
Status: CANCELLED | OUTPATIENT
Start: 2025-08-01

## 2025-08-01 RX ORDER — LIDOCAINE 50 MG/G
OINTMENT TOPICAL PRN
Status: CANCELLED | OUTPATIENT
Start: 2025-08-01

## 2025-08-01 RX ORDER — LIDOCAINE HYDROCHLORIDE 20 MG/ML
JELLY TOPICAL PRN
Status: CANCELLED | OUTPATIENT
Start: 2025-08-01

## 2025-08-01 RX ORDER — LIDOCAINE HYDROCHLORIDE 40 MG/ML
SOLUTION TOPICAL PRN
OUTPATIENT
Start: 2025-08-01

## 2025-08-01 RX ORDER — LIDOCAINE 40 MG/G
CREAM TOPICAL PRN
Status: CANCELLED | OUTPATIENT
Start: 2025-08-01

## 2025-08-01 RX ORDER — SODIUM CHLOR/HYPOCHLOROUS ACID 0.033 %
SOLUTION, IRRIGATION IRRIGATION PRN
Status: CANCELLED | OUTPATIENT
Start: 2025-08-01

## 2025-08-01 RX ORDER — LIDOCAINE HYDROCHLORIDE 20 MG/ML
JELLY TOPICAL PRN
OUTPATIENT
Start: 2025-08-01

## 2025-08-01 RX ORDER — GINSENG 100 MG
CAPSULE ORAL PRN
Status: CANCELLED | OUTPATIENT
Start: 2025-08-01

## 2025-08-01 RX ORDER — TRIAMCINOLONE ACETONIDE 1 MG/G
OINTMENT TOPICAL PRN
Status: CANCELLED | OUTPATIENT
Start: 2025-08-01

## 2025-08-01 RX ORDER — LIDOCAINE HYDROCHLORIDE 20 MG/ML
JELLY TOPICAL PRN
Status: DISCONTINUED | OUTPATIENT
Start: 2025-08-01 | End: 2025-08-02 | Stop reason: HOSPADM

## 2025-08-01 RX ADMIN — LIDOCAINE HYDROCHLORIDE 6 ML: 20 JELLY TOPICAL at 10:50

## 2025-08-01 NOTE — PROGRESS NOTES
[facility]   Progress Note and Procedure Note      Mikael Estevez  MEDICAL RECORD NUMBER:  [id number]  AGE: 55 y.o.   GENDER: female  : 1970  EPISODE DATE:  2025    Subjective:     Chief Complaint   Patient presents with    Wound Check     Right stump         HISTORY of PRESENT ILLNESS HPI     Mikael Estevez is a 55 y.o. female who presents today for wound/ulcer evaluation.   History of Wound Context: presents to wound clinic to follow up on right stump wound. Does not have wound vac on wound. She did not bring wound vac with her today. She tells me that wound vac was only put on once since last time she was here. She reports having dressing changed one other time with moist to moist vashe. If wound vac is off, will need daily vashe moist to moist dressings. She did missed her wound clinic follow up on 2025.   Wound/Ulcer Pain Timing/Severity: intermittent  Quality of pain: aching  Severity:  2 / 10   Modifying Factors: None  Associated Signs/Symptoms: none    Ulcer Identification:  Ulcer Type: non-healing surgical  Contributing Factors: diabetes, decreased mobility, and arterial insufficiency         PAST MEDICAL HISTORY        Diagnosis Date    Back pain     Bipolar 1 disorder (HCC)     Diabetes mellitus (HCC)     Disc disorder     Hypertension        PAST SURGICAL HISTORY    Past Surgical History:   Procedure Laterality Date    ABOVE KNEE AMPUTATION Right 2025    AMPUTATION Right 2025    AMPUTATION OF DISTAL PHALANX OF HALLUX,RESECTION OF PROXIMAL PHALANX - Right    AMPUTATION Right 2025    RIGHT HALLUX AMPUTATION WITH INTEGRA GRAFT, INTEGRA    BACK SURGERY      CARDIAC PROCEDURE N/A 2025    aubrie / Left heart cath / coronary angiography / rm 236 performed by Deisy Henry MD at [facility] CARDIAC CATH LAB    CHOLECYSTECTOMY      FEMORAL BYPASS Right 2025    RIGHT  FEMORAL ENDATRERECTOMY XENOSURE PATCH 0.8X8, ANGIOGRAM performed  IVPB Tylenol

## 2025-08-01 NOTE — PROGRESS NOTES
Multilayer Compression Wrap   (Not Unna) Below the Knee    NAME:  Mikael Estevez  YOB: 1970  MEDICAL RECORD NUMBER:  [id number]  DATE:  8/1/2025       [x] Removed old Multilayer wrap if indicated and wash leg with mild soap/water.  [x] Applied moisturizing agent to dry skin as needed.   [x] Applied primary and secondary dressing as ordered   [x] Applied multilayered dressing below the knee to Right lower leg(s).    [x] Instructed patient/caregiver not to remove dressing and to keep it clean and dry.   [x] Instructed patient/caregiver on complications to report to provider, such as pain, numbness in toes, heavy drainage, and slippage of dressing.   [x] Instructed patient on purpose of compression dressing and on activity and exercise recommendations.    Electronically signed by VARINDER HUMPHREY RN on 8/1/2025 at 11:52 AM

## (undated) DEVICE — THE STERILE LIGHT HANDLE COVER IS USED WITH STERIS SURGICAL LIGHTING AND VISUALIZATION SYSTEMS.

## (undated) DEVICE — HYBRID ROOM PACK: Brand: MEDLINE INDUSTRIES, INC.

## (undated) DEVICE — PREMIUM DRY TRAY LF: Brand: MEDLINE INDUSTRIES, INC.

## (undated) DEVICE — YANKAUER,FLEXIBLE HANDLE,REGLR CAPACITY: Brand: MEDLINE INDUSTRIES, INC.

## (undated) DEVICE — Device

## (undated) DEVICE — SUTURE VICRYL + SZ 2-0 L27IN ABSRB CLR CT-1 1/2 CIR TAPERCUT VCP259H

## (undated) DEVICE — CONTAINER,SPECIMEN,4OZ,OR STRL: Brand: MEDLINE

## (undated) DEVICE — DRESSING,GAUZE,XEROFORM,CURAD,5"X9",ST: Brand: CURAD

## (undated) DEVICE — DEVICE CTRL FLOWSWITCH 24 PER BX

## (undated) DEVICE — SYRINGE MED 20ML STD CLR PLAS LUERLOCK TIP N CTRL DISP

## (undated) DEVICE — SUTURE MONOCRYL + SZ 4 0 L18IN ABSRB UD PC 3 L16MM 3 8 CIR PRIM MCP845G

## (undated) DEVICE — GLOVE SURG SZ 6 L12IN FNGR THK79MIL GRN LTX FREE

## (undated) DEVICE — KIT MICRO INTRO 4FR STIFF 40CM NIGHTENALL TUNGSTEN 7SMT

## (undated) DEVICE — AGENT HEMOSTATIC SURGIFLOW MATRIX KIT W/THROMBIN

## (undated) DEVICE — 1LYRTR 16FR10ML100%SIL UMS SNP: Brand: MEDLINE INDUSTRIES, INC.

## (undated) DEVICE — BANDAGE GZ W2XL75IN ST RAYON POLY CNFRM STRTCH LTWT

## (undated) DEVICE — DRAPE, SLUSH XL, 44X66, STERILE: Brand: MEDLINE

## (undated) DEVICE — SUTURE PROL SZ 6-0 L24IN NONABSORBABLE BLU L9.3MM BV-1 3/8 8805H

## (undated) DEVICE — TAPE UMB 72X7/32 IN

## (undated) DEVICE — GLOVE SURG SZ 7 L12IN FNGR THK79MIL GRN LTX FREE

## (undated) DEVICE — 1LYRTR 16FR10ML100%SILTMPS SNP: Brand: MEDLINE INDUSTRIES, INC.

## (undated) DEVICE — BLADE ES ELASTOMERIC COAT INSUL DURABLE BEND UPTO 90DEG

## (undated) DEVICE — LARGE (BLUE) FOR GRAFTS UP TO 10 MM, 20 1/2" / 52 CM (1/PKG): Brand: SCANLAN® VASCULAR TUNNELER SHEATHS AND BULLET TIPS

## (undated) DEVICE — SUTURE VIC + BR UD PS2 4-0 18IN VCP496G

## (undated) DEVICE — GUIDEWIRE 35/260/FC/PTFE/3J: Brand: GUIDEWIRE

## (undated) DEVICE — SURGICAL SUCTION CONNECTING TUBE WITH MALE CONNECTOR AND SUCTION CLAMP, 2 FT. LONG (.6 M), 5 MM I.D.: Brand: CONMED

## (undated) DEVICE — EVERGRIP INSERT SET: Brand: FOGARTY EVERGRIP

## (undated) DEVICE — STRAP,POSITIONING,KNEE/BODY,FOAM,4X60": Brand: MEDLINE

## (undated) DEVICE — SOLUTION IV 500ML 0.9% SOD CHL PH 5 INJ USP VIAFLX PLAS

## (undated) DEVICE — RADIFOCUS TORQUE DEVICE MULTI-TORQUE VISE: Brand: RADIFOCUS TORQUE DEVICE

## (undated) DEVICE — SUTURE PERMA HND 2-0 L18IN NONABSORBABLE BLK CT-1 L36MM 1/2 C022D

## (undated) DEVICE — DRESSING NEG PRSS L W15XH3.3XL26CM BLK POLYUR DRP PD

## (undated) DEVICE — TRAY SURG CUST CRD CATH TOLEDO

## (undated) DEVICE — 3M™ IOBAN™ 2 ANTIMICROBIAL INCISE DRAPE 6650EZ: Brand: IOBAN™ 2

## (undated) DEVICE — GOWN,SIRUS,NONRNF,SETINSLV,XL,20/CS: Brand: MEDLINE

## (undated) DEVICE — GLOVE SURG SZ 85 L12IN FNGR ORTHO 126MIL CRM LTX FREE

## (undated) DEVICE — DRESSING TRNSPAR W5XL4.5IN FLM SHT SEMIPERMEABLE WIND

## (undated) DEVICE — SUTURE N ABSRB L 36 IN SZ 5-0 NDL L 13 MM POLYPRO OR PPL BLU

## (undated) DEVICE — PINNACLE INTRODUCER SHEATH: Brand: PINNACLE

## (undated) DEVICE — SUTURE VICRYL + SZ 2 0 L27IN ABSRB UD CT 1 L36MM 1 2 CIR TAPR VCPP42D

## (undated) DEVICE — COVER,LIGHT HANDLE,FLX,2/PK: Brand: MEDLINE INDUSTRIES, INC.

## (undated) DEVICE — DEVICE VASC CLSR 5FR GRY W/ INTEGR SEAL 10ML LOK SYR

## (undated) DEVICE — STRAP ARMBRD W1.5XL32IN FOAM STR YET SFT W/ HK AND LOOP

## (undated) DEVICE — SPONGE LAP W18XL18IN WHT COT 4 PLY FLD STRUNG RADPQ DISP ST 2 PER PACK

## (undated) DEVICE — GLOVE SURG SZ 7 CRM LTX FREE POLYISOPRENE POLYMER BEAD ANTI

## (undated) DEVICE — SMALL (GREEN) FOR GRAFTS UP TO 8 MM, 20 1/2" / 52 CM (1/PKG): Brand: SCANLAN® VASCULAR TUNNELER SHEATHS AND BULLET TIPS

## (undated) DEVICE — BANDAGE,GAUZE,BULKEE II,4.5"X4.1YD,STRL: Brand: MEDLINE

## (undated) DEVICE — MARKER,SKIN,WI/RULER AND LABELS: Brand: MEDLINE

## (undated) DEVICE — DRAPE,UTILITY,XL,4/PK,STERILE: Brand: MEDLINE

## (undated) DEVICE — NAVICROSS SUPPORT CATHETER: Brand: NAVICROSS

## (undated) DEVICE — GLOVE SURG SZ 75 CRM LTX FREE POLYISOPRENE POLYMER BEAD ANTI

## (undated) DEVICE — SUTURE NONABSORBABLE MONOFILAMENT 7-0 BV-1 1X24 IN PROLENE 8702H

## (undated) DEVICE — GUIDEWIRE WITH ICE™ HYDROPHILIC COATING: Brand: V-18™ CONTROL WIRE™

## (undated) DEVICE — STRYKER PERFORMANCE SERIES SAGITTAL BLADE: Brand: STRYKER PERFORMANCE SERIES

## (undated) DEVICE — BAG C ARM 22 IN LEN 22 IN W LTX FREE ST SNAP

## (undated) DEVICE — INTENDED FOR TISSUE SEPARATION, AND OTHER PROCEDURES THAT REQUIRE A SHARP SURGICAL BLADE TO PUNCTURE OR CUT.: Brand: BARD-PARKER ® CARBON RIB-BACK BLADES

## (undated) DEVICE — SYRINGE MED 10ML LUERLOCK TIP W/O SFTY DISP

## (undated) DEVICE — SUTURE PERMAHAND SZ 4-0 L18IN NONABSORBABLE BLK SILK BRAID A183H

## (undated) DEVICE — DECANTER BAG 9": Brand: MEDLINE INDUSTRIES, INC.

## (undated) DEVICE — 3M™ IOBAN™ 2 ANTIMICROBIAL INCISE DRAPE 6651EZ: Brand: IOBAN™ 2

## (undated) DEVICE — SHEET, ORTHO, SPLIT, STERILE: Brand: MEDLINE

## (undated) DEVICE — SET PEN AND LBL AND L BWL LBL PAL PEN AND LBLS PAL700]

## (undated) DEVICE — DRAPE,REIN 53X77,STERILE: Brand: MEDLINE

## (undated) DEVICE — BNDG,ELSTC,MATRIX,STRL,6"X5YD,LF,HOOK&LP: Brand: MEDLINE

## (undated) DEVICE — DESTINATION RENAL GUIDING SHEATH: Brand: DESTINATION

## (undated) DEVICE — SUTURE VICRYL + SZ 4-0 L18IN ABSRB WHT TIE POLYGLACTIN 910 VCP109G

## (undated) DEVICE — SET EXTN IV L30IN TBNG DIA0.1IN PRIMING 4ML MACBOR FEM ADPT

## (undated) DEVICE — SUTURE PERMAHAND SZ 2-0 L12X18IN NONABSORBABLE BLK SILK A185H

## (undated) DEVICE — BNDG,ELSTC,MATRIX,STRL,4"X5YD,LF,HOOK&LP: Brand: MEDLINE

## (undated) DEVICE — PROTECTOR ULN NRV PUR FOAM HK LOOP STRP ANATOMICALLY

## (undated) DEVICE — APPLICATOR MEDICATED 26 CC SOLUTION HI LT ORNG CHLORAPREP

## (undated) DEVICE — LOOP VES W13MM THK09MM MINI RED SIL FLD REPELLENT

## (undated) DEVICE — BLADE,CARBON-STEEL,15,STRL,DISPOSABLE,TB: Brand: MEDLINE

## (undated) DEVICE — SUTURE NONABSORBABLE MONOFILAMENT 3-0 PS-1 18 IN BLK ETHILON 1663H

## (undated) DEVICE — RADIFOCUS GLIDEWIRE ADVANTAGE GUIDEWIRE: Brand: GLIDEWIRE ADVANTAGE

## (undated) DEVICE — PROVE COVER: Brand: UNBRANDED

## (undated) DEVICE — SUTURE PERMA-HAND SZ 2-0 L30IN NONABSORBABLE BLK L26MM SH K833H

## (undated) DEVICE — ANGIOGRAPHIC CATHETER: Brand: EXPO™

## (undated) DEVICE — GOWN,AURORA,NONREINFORCED,LARGE: Brand: MEDLINE

## (undated) DEVICE — SEALANT HEMOSTATIC FLOSEAL W/RECOTHROM 5 ML 75X13.25 IN 10CC

## (undated) DEVICE — TOWEL,OR,DSP,ST,NATURAL,DLX,4/PK,20PK/CS: Brand: MEDLINE

## (undated) DEVICE — GLOVE SURG SZ 75 L12IN FNGR THK79MIL GRN LTX FREE

## (undated) DEVICE — GLIDESHEATH SLENDER STAINLESS STEEL KIT: Brand: GLIDESHEATH SLENDER

## (undated) DEVICE — FLEXOR, CHECK-FLO, INTRODUCER ANSEL MODIFICATION: Brand: FLEXOR

## (undated) DEVICE — APPLICATOR MEDICATED 10.5 CC SOLUTION HI LT ORNG CHLORAPREP

## (undated) DEVICE — TUBE IRRIG HNDPC HI FLO TP INTRPULS W/SUCTION TUBE

## (undated) DEVICE — GAUZE,SPONGE,FLUFF,6"X6.75",STRL,5/TRAY: Brand: MEDLINE

## (undated) DEVICE — BAND COMPR L24CM REG CLR PLAS HEMSTAT EXT HK AND LOOP RETEN

## (undated) DEVICE — MASTISOL ADHESIVE LIQ 2/3ML

## (undated) DEVICE — GLOVE ORANGE PI 8 1/2   MSG9085

## (undated) DEVICE — DRESSING TRNSPAR W4XL10IN FLM MIC POR SURESITE 123

## (undated) DEVICE — BANDAGE,ELASTIC,ESMARK,STERILE,4"X9',LF: Brand: MEDLINE

## (undated) DEVICE — 60-7070-103 TRNQT,DPSB,PLC RED: Brand: MEDLINE RENEWAL

## (undated) DEVICE — SUTURE PROL SZ 6 0 L30IN NONABSORBABLE BLU C 1 L13MM 3 8 CIR EP8706H

## (undated) DEVICE — PAD,ABDOMINAL,5"X9",ST,LF,25/BX: Brand: MEDLINE

## (undated) DEVICE — CANISTER NEG PRSS 500ML WND THER W/ TBNG NO PRSS RANG W/

## (undated) DEVICE — STRIP,CLOSURE,WOUND,MEDI-STRIP,1/2X4: Brand: MEDLINE

## (undated) DEVICE — 3F 80 CM LEMAITRE EMBOLECTOMY CATHETER, EIFU: Brand: LEMAITRE EMBOLECTOMY CATHETER

## (undated) DEVICE — TOWEL,OR,DSP,ST,BLUE,DLX,XR,4/PK,20PK/CS: Brand: MEDLINE

## (undated) DEVICE — GLOVE SURG SZ 65 L12IN FNGR THK79MIL GRN LTX FREE

## (undated) DEVICE — PAD,ABDOMINAL,5"X9",ST,LF,25/BX: Brand: MEDLINE INDUSTRIES, INC.

## (undated) DEVICE — GOWN,SIRUS,NONRNF,SETINSLV,2XL,18/CS: Brand: MEDLINE

## (undated) DEVICE — SUTURE VICRYL + SZ 3-0 L27IN ABSRB UD L26MM SH 1/2 CIR VCP416H

## (undated) DEVICE — CATHETER ANGIO 5FR L65CM 0.035IN VIS OMNI FLUSH-0 SFT TIP

## (undated) DEVICE — SUTURE N ABSRB L 18 IN SZ 4-0 NDL L 19 MM NYL MONOFILAMENT

## (undated) DEVICE — GLOVE SURG SZ 65 CRM LTX FREE POLYISOPRENE POLYMER BEAD ANTI

## (undated) DEVICE — CATHETER ANGIO 6FR L100CM DIA0.056IN FR4 CRV VASC ACCS EXPO

## (undated) DEVICE — SUTURE NONABSORBABLE MONOFILAMENT 6-0 C-1 1X30 IN PROLENE 8706H